# Patient Record
Sex: FEMALE | Race: WHITE | NOT HISPANIC OR LATINO
[De-identification: names, ages, dates, MRNs, and addresses within clinical notes are randomized per-mention and may not be internally consistent; named-entity substitution may affect disease eponyms.]

---

## 2017-07-28 ENCOUNTER — RESULT REVIEW (OUTPATIENT)
Age: 77
End: 2017-07-28

## 2017-07-28 ENCOUNTER — OUTPATIENT (OUTPATIENT)
Dept: OUTPATIENT SERVICES | Facility: HOSPITAL | Age: 77
LOS: 1 days | Discharge: HOME | End: 2017-07-28
Payer: MEDICARE

## 2017-07-28 ENCOUNTER — TRANSCRIPTION ENCOUNTER (OUTPATIENT)
Age: 77
End: 2017-07-28

## 2017-07-28 DIAGNOSIS — Z98.89 OTHER SPECIFIED POSTPROCEDURAL STATES: Chronic | ICD-10-CM

## 2017-07-28 DIAGNOSIS — Z86.010 PERSONAL HISTORY OF COLONIC POLYPS: ICD-10-CM

## 2017-07-28 DIAGNOSIS — Z90.711 ACQUIRED ABSENCE OF UTERUS WITH REMAINING CERVICAL STUMP: Chronic | ICD-10-CM

## 2017-07-28 DIAGNOSIS — K21.9 GASTRO-ESOPHAGEAL REFLUX DISEASE WITHOUT ESOPHAGITIS: ICD-10-CM

## 2017-07-28 PROCEDURE — G0105: CPT

## 2017-07-28 PROCEDURE — 88305 TISSUE EXAM BY PATHOLOGIST: CPT

## 2017-07-28 PROCEDURE — 88305 TISSUE EXAM BY PATHOLOGIST: CPT | Mod: 26

## 2017-07-28 PROCEDURE — 88313 SPECIAL STAINS GROUP 2: CPT

## 2017-07-28 PROCEDURE — 88313 SPECIAL STAINS GROUP 2: CPT | Mod: 26

## 2017-07-28 PROCEDURE — 43239 EGD BIOPSY SINGLE/MULTIPLE: CPT

## 2017-07-31 LAB — SURGICAL PATHOLOGY FINAL REPORT - CH: SIGNIFICANT CHANGE UP

## 2017-08-02 DIAGNOSIS — Z86.010 PERSONAL HISTORY OF COLONIC POLYPS: ICD-10-CM

## 2017-08-02 DIAGNOSIS — Z12.11 ENCOUNTER FOR SCREENING FOR MALIGNANT NEOPLASM OF COLON: ICD-10-CM

## 2017-08-02 DIAGNOSIS — J44.9 CHRONIC OBSTRUCTIVE PULMONARY DISEASE, UNSPECIFIED: ICD-10-CM

## 2017-08-02 DIAGNOSIS — Z88.3 ALLERGY STATUS TO OTHER ANTI-INFECTIVE AGENTS: ICD-10-CM

## 2017-08-02 DIAGNOSIS — K64.8 OTHER HEMORRHOIDS: ICD-10-CM

## 2017-08-02 DIAGNOSIS — I10 ESSENTIAL (PRIMARY) HYPERTENSION: ICD-10-CM

## 2017-08-02 DIAGNOSIS — K57.30 DIVERTICULOSIS OF LARGE INTESTINE WITHOUT PERFORATION OR ABSCESS WITHOUT BLEEDING: ICD-10-CM

## 2017-08-02 DIAGNOSIS — K44.9 DIAPHRAGMATIC HERNIA WITHOUT OBSTRUCTION OR GANGRENE: ICD-10-CM

## 2017-08-02 DIAGNOSIS — E11.9 TYPE 2 DIABETES MELLITUS WITHOUT COMPLICATIONS: ICD-10-CM

## 2017-08-02 DIAGNOSIS — Z88.2 ALLERGY STATUS TO SULFONAMIDES: ICD-10-CM

## 2017-08-02 DIAGNOSIS — Z88.0 ALLERGY STATUS TO PENICILLIN: ICD-10-CM

## 2019-07-15 ENCOUNTER — EMERGENCY (EMERGENCY)
Facility: HOSPITAL | Age: 79
LOS: 1 days | Discharge: HOME | End: 2019-07-15
Attending: EMERGENCY MEDICINE | Admitting: EMERGENCY MEDICINE
Payer: MEDICARE

## 2019-07-15 VITALS
SYSTOLIC BLOOD PRESSURE: 170 MMHG | HEART RATE: 74 BPM | DIASTOLIC BLOOD PRESSURE: 74 MMHG | RESPIRATION RATE: 16 BRPM | OXYGEN SATURATION: 100 %

## 2019-07-15 VITALS
RESPIRATION RATE: 16 BRPM | OXYGEN SATURATION: 99 % | HEART RATE: 77 BPM | WEIGHT: 141.1 LBS | DIASTOLIC BLOOD PRESSURE: 74 MMHG | TEMPERATURE: 98 F | HEIGHT: 62 IN | SYSTOLIC BLOOD PRESSURE: 160 MMHG

## 2019-07-15 DIAGNOSIS — Z90.711 ACQUIRED ABSENCE OF UTERUS WITH REMAINING CERVICAL STUMP: Chronic | ICD-10-CM

## 2019-07-15 DIAGNOSIS — Z98.89 OTHER SPECIFIED POSTPROCEDURAL STATES: Chronic | ICD-10-CM

## 2019-07-15 PROCEDURE — 73562 X-RAY EXAM OF KNEE 3: CPT

## 2019-07-15 PROCEDURE — 73564 X-RAY EXAM KNEE 4 OR MORE: CPT | Mod: 26,RT

## 2019-07-15 PROCEDURE — 73562 X-RAY EXAM OF KNEE 3: CPT | Mod: 26,RT

## 2019-07-15 PROCEDURE — 73564 X-RAY EXAM KNEE 4 OR MORE: CPT

## 2019-07-15 PROCEDURE — 99283 EMERGENCY DEPT VISIT LOW MDM: CPT | Mod: 25

## 2019-07-15 PROCEDURE — 99284 EMERGENCY DEPT VISIT MOD MDM: CPT

## 2019-07-15 RX ORDER — LIDOCAINE 4 G/100G
1 CREAM TOPICAL
Qty: 30 | Refills: 0
Start: 2019-07-15

## 2019-07-15 RX ORDER — OXYCODONE AND ACETAMINOPHEN 5; 325 MG/1; MG/1
1 TABLET ORAL ONCE
Refills: 0 | Status: DISCONTINUED | OUTPATIENT
Start: 2019-07-15 | End: 2019-07-15

## 2019-07-15 RX ADMIN — OXYCODONE AND ACETAMINOPHEN 1 TABLET(S): 5; 325 TABLET ORAL at 14:06

## 2019-07-15 RX ADMIN — OXYCODONE AND ACETAMINOPHEN 1 TABLET(S): 5; 325 TABLET ORAL at 16:11

## 2019-07-15 NOTE — ED PROVIDER NOTE - ATTENDING CONTRIBUTION TO CARE
pt with right knee pain with ambulating/standing [place]. pt denies any other obvious trauma.  PE nml.  +2 peds pulses, no deformity.

## 2019-07-15 NOTE — ED PROVIDER NOTE - OBJECTIVE STATEMENT
Patient is a 78 y/o Female with PMHx of HLD, DM, and HTN presenting with R knee pain since yesterday. Pain started yesterday abruptly while stepping onto her right foot and she describes being unable to bear weight on her right leg since secondary to pain. She denies any trauma or previous injury to the area and did not hear a popping or clicking noise. Pain is generalized in the right knee, sharp, 7/10 while lying down and 10/10 with bearing weight or bending. Pain radiates down the anterior shin to the ankle. Patient tried taking 2 ibuprofen and icing the knee last night which did not decrease pain. Of note, patient describes walking 4 miles three days ago with family at a carnival which is much more than normal.

## 2019-07-15 NOTE — ED ADULT NURSE NOTE - OBJECTIVE STATEMENT
pt states she developed severs right leg pain yesterday afternoon. pt denies trauma. pt states she could not walk on the leg secondary to pain.

## 2019-07-15 NOTE — ED ADULT NURSE NOTE - NSIMPLEMENTINTERV_GEN_ALL_ED
Implemented All Fall with Harm Risk Interventions:  [place] to call system. Call bell, personal items and telephone within reach. Instruct patient to call for assistance. Room bathroom lighting operational. Non-slip footwear when patient is off stretcher. Physically safe environment: no spills, clutter or unnecessary equipment. Stretcher in lowest position, wheels locked, appropriate side rails in place. Provide visual cue, wrist band, yellow gown, etc. Monitor gait and stability. Monitor for mental status changes and reorient to person, place, and time. Review medications for side effects contributing to fall risk. Reinforce activity limits and safety measures with patient and family. Provide visual clues: red socks.

## 2019-07-15 NOTE — ED PROVIDER NOTE - PHYSICAL EXAMINATION
LLE: 5/5 knee flexion/extension, ankle plantarflexion/dorsiflexion; sensation intact to light touch throughout; DTrs WNL throughout    RLE: 4/5 knee flexion/extension pain limited, 4/5 plantarflexion pain limited, 5/5 dorsiflexion; sensation intact to light touch, DTRs WNL throughout. Dorsalis and post tib pulses 2+. No TTP over patella, med/lateral joint line. No increased temperature, erythema, or swelling noted. Negative anterior drawer, poster drawer, medial and lateral stress tests.

## 2021-03-18 ENCOUNTER — APPOINTMENT (OUTPATIENT)
Dept: OTOLARYNGOLOGY | Facility: CLINIC | Age: 81
End: 2021-03-18
Payer: MEDICARE

## 2021-03-18 VITALS
BODY MASS INDEX: 28.89 KG/M2 | SYSTOLIC BLOOD PRESSURE: 110 MMHG | HEIGHT: 61 IN | WEIGHT: 153 LBS | DIASTOLIC BLOOD PRESSURE: 60 MMHG | TEMPERATURE: 97.1 F | OXYGEN SATURATION: 98 % | HEART RATE: 97 BPM

## 2021-03-18 DIAGNOSIS — R49.0 DYSPHONIA: ICD-10-CM

## 2021-03-18 DIAGNOSIS — J38.7 OTHER DISEASES OF LARYNX: ICD-10-CM

## 2021-03-18 DIAGNOSIS — H90.5 UNSPECIFIED SENSORINEURAL HEARING LOSS: ICD-10-CM

## 2021-03-18 DIAGNOSIS — H61.23 IMPACTED CERUMEN, BILATERAL: ICD-10-CM

## 2021-03-18 PROCEDURE — 31575 DIAGNOSTIC LARYNGOSCOPY: CPT

## 2021-03-18 PROCEDURE — 99203 OFFICE O/P NEW LOW 30 MIN: CPT | Mod: 25

## 2021-03-18 PROCEDURE — 69210 REMOVE IMPACTED EAR WAX UNI: CPT | Mod: LT

## 2021-03-18 RX ORDER — BENZONATATE 150 MG/1
CAPSULE ORAL
Refills: 0 | Status: ACTIVE | COMMUNITY

## 2021-03-18 RX ORDER — AMLODIPINE BESYLATE 5 MG/1
TABLET ORAL
Refills: 0 | Status: ACTIVE | COMMUNITY

## 2021-03-18 RX ORDER — BUMETANIDE 0.5 MG/1
TABLET ORAL
Refills: 0 | Status: ACTIVE | COMMUNITY

## 2021-03-18 RX ORDER — LOSARTAN POTASSIUM 100 MG/1
TABLET, FILM COATED ORAL
Refills: 0 | Status: ACTIVE | COMMUNITY

## 2021-03-18 RX ORDER — FEXOFENADINE HCL 60 MG
CAPSULE ORAL
Refills: 0 | Status: ACTIVE | COMMUNITY

## 2021-03-18 RX ORDER — MONTELUKAST SODIUM 10 MG/1
TABLET, FILM COATED ORAL
Refills: 0 | Status: ACTIVE | COMMUNITY

## 2021-03-18 RX ORDER — DULOXETINE HYDROCHLORIDE 20 MG/1
20 CAPSULE, DELAYED RELEASE ORAL
Refills: 0 | Status: ACTIVE | COMMUNITY

## 2021-03-18 RX ORDER — FOLIC ACID 1 MG/1
1 TABLET ORAL
Refills: 0 | Status: ACTIVE | COMMUNITY

## 2021-03-18 RX ORDER — CLONAZEPAM 2 MG/1
TABLET ORAL
Refills: 0 | Status: ACTIVE | COMMUNITY

## 2021-03-18 RX ORDER — DOCUSATE SODIUM 100 MG/1
100 CAPSULE, LIQUID FILLED ORAL
Refills: 0 | Status: ACTIVE | COMMUNITY

## 2021-03-18 RX ORDER — OMEGA-3/DHA/EPA/FISH OIL 300-1000MG
CAPSULE ORAL
Refills: 0 | Status: ACTIVE | COMMUNITY

## 2021-03-18 RX ORDER — SITAGLIPTIN AND METFORMIN HYDROCHLORIDE 50; 1000 MG/1; MG/1
TABLET, FILM COATED ORAL
Refills: 0 | Status: ACTIVE | COMMUNITY

## 2021-03-18 RX ORDER — PRAVASTATIN SODIUM 80 MG/1
TABLET ORAL
Refills: 0 | Status: ACTIVE | COMMUNITY

## 2021-03-18 NOTE — HISTORY OF PRESENT ILLNESS
[de-identified] : Ms. FLORES is a 80 year female who presents with trouble hearing.  She reports that it is worst in the morning and notes that it it sounds like she hears people through a plastic bag.  Her daughter notes that she also notes that her voice is hoarse.  Notes that she is taking medication (Fosamax) for osteoporosis (prescribed by her endocrinologist, Dr. Angeles) which could affect her voice.  Her pcp is concerned since she takes two weekly. [Difficulty Swallowing] : no difficulty swallowing [Painful Swallowing] : no painful swallowing

## 2021-03-18 NOTE — PROCEDURE
[Unable to Cooperate with Mirror] : patient unable to cooperate with mirror [Hoarseness] : hoarseness not clearly evaluated by indirect laryngoscopy [Topical Lidocaine] : topical lidocaine [Oxymetazoline HCl] : oxymetazoline HCl [Flexible Endoscope] : examined with the flexible endoscope [Serial Number: ___] : Serial Number: [unfilled] [True Vocal Cords Paralysis] : no true vocal cord paralysis [True Vocal Cords Erythematous] : no true vocal cord edema [True Vocal Cords Faria's Nodules] : no true vocal cord nodules [Glottis Arytenoid Cartilages] : no arytenoid granulomas [Glottis Arytenoid Cartilages Erythema] : no arytenoid erythema [Normal] : posterior cricoid area had healthy pink mucosa in the interarytenoid area and the esophageal inlet

## 2021-03-18 NOTE — REVIEW OF SYSTEMS
[Seasonal Allergies] : seasonal allergies [Post Nasal Drip] : post nasal drip [Hearing Loss] : hearing loss [Nasal Congestion] : nasal congestion [Sinus Pain] : sinus pain [Sinus Pressure] : sinus pressure [Hoarseness] : hoarseness [Throat Clearing] : throat clearing [Throat Pain] : throat pain [Throat Itching] : throat itching [Depression] : depression [Negative] : Heme/Lymph

## 2021-03-18 NOTE — CONSULT LETTER
[Dear  ___] : Dear  [unfilled], [Consult Letter:] : I had the pleasure of evaluating your patient, [unfilled]. [Please see my note below.] : Please see my note below. [Consult Closing:] : Thank you very much for allowing me to participate in the care of this patient.  If you have any questions, please do not hesitate to contact me. [Sincerely,] : Sincerely, [FreeTextEntry2] : Clifford Ortiz MD [FreeTextEntry3] : Víctor Aceves MD, FACS\par Chief of Otolaryngology [facility]\par  - Dept. of Otolaryngology\par [facility] [employer]\par \par

## 2021-06-21 ENCOUNTER — APPOINTMENT (OUTPATIENT)
Dept: PULMONOLOGY | Facility: CLINIC | Age: 81
End: 2021-06-21
Payer: MEDICARE

## 2021-06-21 VITALS
HEART RATE: 79 BPM | BODY MASS INDEX: 27.38 KG/M2 | WEIGHT: 145 LBS | TEMPERATURE: 97.3 F | RESPIRATION RATE: 16 BRPM | DIASTOLIC BLOOD PRESSURE: 60 MMHG | HEIGHT: 61 IN | OXYGEN SATURATION: 96 % | SYSTOLIC BLOOD PRESSURE: 141 MMHG

## 2021-06-21 DIAGNOSIS — F10.10 ALCOHOL ABUSE, UNCOMPLICATED: ICD-10-CM

## 2021-06-21 DIAGNOSIS — K21.9 GASTRO-ESOPHAGEAL REFLUX DISEASE W/OUT ESOPHAGITIS: ICD-10-CM

## 2021-06-21 DIAGNOSIS — R06.83 SNORING: ICD-10-CM

## 2021-06-21 DIAGNOSIS — Z87.891 PERSONAL HISTORY OF NICOTINE DEPENDENCE: ICD-10-CM

## 2021-06-21 DIAGNOSIS — F17.200 NICOTINE DEPENDENCE, UNSPECIFIED, UNCOMPLICATED: ICD-10-CM

## 2021-06-21 DIAGNOSIS — J30.9 ALLERGIC RHINITIS, UNSPECIFIED: ICD-10-CM

## 2021-06-21 DIAGNOSIS — R06.02 SHORTNESS OF BREATH: ICD-10-CM

## 2021-06-21 DIAGNOSIS — Z63.4 DISAPPEARANCE AND DEATH OF FAMILY MEMBER: ICD-10-CM

## 2021-06-21 PROCEDURE — 99204 OFFICE O/P NEW MOD 45 MIN: CPT | Mod: 25

## 2021-06-21 PROCEDURE — 71046 X-RAY EXAM CHEST 2 VIEWS: CPT

## 2021-06-21 SDOH — SOCIAL STABILITY - SOCIAL INSECURITY: DISSAPEARANCE AND DEATH OF FAMILY MEMBER: Z63.4

## 2021-06-21 NOTE — HISTORY OF PRESENT ILLNESS
[TextBox_4] : Ms. FLORES is a 81 year old female presenting to the office today for initial pulmonary evaluation. Her chief complaint is\par \par -she notes referred by PCP for excessive daytime sleepiness\par -she notes she could fall asleep while watching a boring TV show \par -she notes she could fall asleep in a car\par  -she notes increased episodes of uncontrollable falling asleep over the past year when playing bingo, eating, reading\par -she notes last week she spent a week home in [place] with an increased falling asleep \par -she notes during the pandemic she has gotten into the habit of taking long naps through the day and evening and then staying up through the night\par -she notes attempted to improve sleep pattern, which did alleviated daytime uncontrollable falling asleep.\par -she notes concern of return to poor sleep schedule when returned to Assisted Living now\par -she notes snoring exacerbated when on back and left side\par -she notes observed apneic episodes during sleep\par -she notes total body shaking approximately every four inhaler and legs and feet twitch.\par -she notes snore and apneic event improved while sleeping on right side\par -she notes exercise daily, walking\par -she now notes generally feeling well\par -she notes energy levels fluctuate\par -she notes heartburn and reflux controlled with omeprazole\par -she notes regular bowel movements\par -she notes senses of smell and taste are good \par -she notes her memory and concentration are good\par -she notes intermittent SOB on exertion\par -she denies wheeze\par -she denies cough\par -she denies current inhaler Rx\par -she notes intermittent sinus congestion\par -she notes history of allergies\par \par -she denies any chest pain, chest pressure, diarrhea, constipation, dysphagia, dizziness, leg swelling, leg pain, itchy eyes, itchy ears, sour taste in the mouth, myalgias or arthralgias.

## 2021-06-21 NOTE — ASSESSMENT
[FreeTextEntry1] : Ms. FLORES is a 81 year old female with a history of cervical radiculopathy, DM, GERD, HTN, HLD, kyphoscoliosis, former smoker c/w COPD who now comes to the office for an initial pulmonary evaluation. #1 SOB\par \par Her shortness of breath is multifactorial due to:\par -poor mechanics of breathing \par -out of shape / overweight\par -pulmonary disease\par -COPD, ?JOSESITO, Allergies, GERD, lung CA screening\par -?cardiac disease \par \par problem 1: COPD (prior smoker) ([place]) \par -complete full PFTs\par -no Rx indicated at this time\par -COPD is a progressive disease and although it can’t be cured , appropriate management can slow its progression, reduce frequency and severity of exacerbations, and improve symptoms and the patient quality of life. Hospitalizations are the greatest contributor to the total COPD costs and account for up to 87% of total COPD related costs. Exacerbations are the main cause of admissions and subsequently account for the 40-75% of COPD costs. Inhaled maintenance therapy reduces the incidence of exacerbations in patients with stable COPD. Incorrect inhaler use and nonadherence are major obstacles to achieving COPD treatment goals. Many COPD patients have challenges (impaired inhalation, limited dexterity, reduced cognition: that limit their ability to correctly use their COPD treatment devices resulting in reduced symptom control. Of most importance is smoking cessation and early intervention with respiratory illnesses and contemplation for pulmonary rehab to enhance quality of life.\par -Inhaler technique reviewed as well as oral hygiene techniques reviewed with patient. Avoidance of cold air, extremes of temperature, rescue inhaler should be used before exercise. Order of medication reviewed with patient. Recommended use of a cool mist humidifier in the bedroom. \par \par Problem 2: Allergies\par -get Blood work to include: asthma panel, food IgE panel, IgE level, eosinophil level, vitamin D level \par -Add Olopatadine 0.6% at 1 sniff/nostril BID \par Environmental measures for allergies were encouraged including mattress and pillow covers, air purifier, and environmental controls. \par \par Problem 3: GERD\par -add Omeprazole 40 mg QAM, pre-meal\par -Rule of 2s: avoid eating too much, eating too late, eating too spicy, eating two hours before bed.\par -Things to avoid including overeating, spicy foods, tight clothing, eating within three hours of bed, this list is not all inclusive. \par -For treatment of reflux, possible options discussed including diet control, H2 blockers, PPIs, as well as coating motility agents discussed as treatment options. Timing of meals and proximity of last meal to sleep were discussed. If symptoms persist, a formal gastrointestinal evaluation is needed.\par \par Problem 4: ?JOSESITO\par -complete home sleep study\par Sleep apnea is associated with adverse clinical consequences which can affect most organ systems. Cardiovascular disease risk includes arrhythmias, atrial fibrillation, hypertension, coronary artery disease, and stroke. Metabolic disorders include diabetes type 2, non-alcoholic fatty liver disease. Mood disorder especially depression; and cognitive decline especially in the elderly. Associations with chronic reflux/Nowak’s esophagus some but not all inclusive. \par -Reasons include arousal consistent with hypopnea; respiratory events most prominent in REM sleep or supine position; therefore sleep staging and body position are important for accurate diagnosis and estimation of AHI. \par \par Treatment options discussed including CPAP/BiPAP machine, oral appliance, ProVent therapy, Oxy-Aid by [employer]pite[employer], new technologies, or positional sleep.Recommended use of the CPAP machine for moderate (AHI >15), moderate to severe (AHI 15-30) and severe patients (AHI > 30). Recommended weight loss which can reduce AHI especially in weight loss of greater than 5% of BMI. Positional sleep is recommended in those with low AHI, low-moderate BMI, and younger age. For severe sleep apnea, the hypoglossal nerve stimulator was recommended as well. \par \par Problem 5: Lung CA screening (extended smoking history in the past) \par -complete HR CT\par Lung cancer screening is recommended for people between the ages of 50 and 80 with prior 20+ pack year smoking histories. There is irrefutable evidence for realization of lung cancer screening based on two large randomized control trials demonstrating relative reduction in lung cancer mortality for patients undergoing low-dose CT scanning. Risks and benefits reviewed with the patient.\par \par problem 6: cardiac disease\par -recommended to continue to follow up with Cardiologist if needed (Mami) \par \par problem 7: poor breathing mechanics\par -Proper breathing techniques were reviewed with an emphasis of exhalation. Patient instructed to breath in for 1 second and out for four seconds. Patient was encouraged to not talk while walking. \par \par problem 8: out of shape / overweight\par -Weight loss, exercise, and diet control were discussed and are highly encouraged. Treatment options were given such as, aqua therapy, and contacting a nutritionist. Recommended to use the elliptical, stationary bike, less use of treadmill. Mindful eating was explained to the patient Obesity is associated with worsening asthma, shortness of breath, and potential for cardiac disease, diabetes, and other underlying medical conditions\par \par problem 9: health maintenance \par -recommended yearly flu shot after October 15\par -recommended strep pneumonia vaccines: Prevnar-13 vaccine, followed by Pneumo vaccine 23 one year following after 65\par -recommended early intervention for Upper Respiratory Infections (URIs)\par -recommended regular osteoporosis evaluations\par -recommended early dermatological evaluations\par -recommended after the age of 50 to the age of 70, colonoscopy every 5 years\par \par F/U in 6-8 weeks.\par She is encouraged to call with any changes, concerns, or questions\par

## 2021-06-21 NOTE — PROCEDURE
[FreeTextEntry1] : CXR reveals a normal sized heart; kyphoscoliosis; calcified aortic knob. no evidence of infiltrate or effusion\par \par FENO was unable to be performed by patient ; a normal value being less than 25\par Fractional exhaled nitric oxide (FENO) is regarded as a simple, noninvasive method for assessing eosinophilic airway inflammation. Produced by a variety of cells within the lung, nitric oxide (NO) concentrations are generally low in healthy individuals. However, high concentrations of NO appear to be involved in nonspecific host defense mechanisms and chronic inflammatory diseases such as asthma. The American Thoracic Society (ATS) therefore has recommended using FENO to aid in the diagnosis and monitoring of eosinophilic airway inflammation and asthma, and for identifying steroid responsive individuals whose chronic respiratory symptoms may be caused by airway inflammation. \par \par Full PFT revealed mild restrictive and mild obstructive dysfunction, with a FEV1 of 1.25L, which is 67% of predicted, normal lung volumes, and a diffusion of 153.2, which is 824% of predicted, with a normal flow volume loop.\par \par -Images and procedures reviewed in detail and discussed with patient.

## 2021-06-21 NOTE — ADDENDUM
[FreeTextEntry1] : Documented by Parht Yost acting as a scribe for Dr. Boris Mcnally on 06/21/2021.\par \par All medical record entries made by the Scribe were at my, Dr. Boris Mcnally's, direction and personally dictated by me on 06/21/2021 . I have reviewed the chart and agree that the record accurately reflects my personal performance of the history, physical exam, assessment and plan. I have also personally directed, reviewed, and agree with the discharge instructions. \par

## 2021-06-21 NOTE — PHYSICAL EXAM
[No Acute Distress] : no acute distress [Normal Oropharynx] : normal oropharynx [Normal Appearance] : normal appearance [No Neck Mass] : no neck mass [Normal Rate/Rhythm] : normal rate/rhythm [Normal S1, S2] : normal s1, s2 [No Resp Distress] : no resp distress [Clear to Auscultation Bilaterally] : clear to auscultation bilaterally [Benign] : benign [Normal Gait] : normal gait [No Clubbing] : no clubbing [No Cyanosis] : no cyanosis [No Edema] : no edema [FROM] : FROM [Normal Color/ Pigmentation] : normal color/ pigmentation [No Focal Deficits] : no focal deficits [Oriented x3] : oriented x3 [Normal Affect] : normal affect [III] : Mallampati Class: III [Kyphosis] : kyphosis [Murmur ___ / 6] : murmur [unfilled] / 6 [TextBox_54] : 2/6 systolic murmur  [TextBox_68] : I:E ratio 1:3; clear

## 2021-06-26 ENCOUNTER — EMERGENCY (EMERGENCY)
Facility: HOSPITAL | Age: 81
LOS: 1 days | Discharge: HOME | End: 2021-06-26
Attending: EMERGENCY MEDICINE | Admitting: EMERGENCY MEDICINE
Payer: MEDICARE

## 2021-06-26 VITALS
SYSTOLIC BLOOD PRESSURE: 124 MMHG | RESPIRATION RATE: 16 BRPM | TEMPERATURE: 99 F | OXYGEN SATURATION: 98 % | DIASTOLIC BLOOD PRESSURE: 66 MMHG | HEART RATE: 80 BPM

## 2021-06-26 VITALS
OXYGEN SATURATION: 97 % | SYSTOLIC BLOOD PRESSURE: 129 MMHG | HEART RATE: 84 BPM | DIASTOLIC BLOOD PRESSURE: 65 MMHG | TEMPERATURE: 98 F | HEIGHT: 62 IN | RESPIRATION RATE: 16 BRPM | WEIGHT: 123.9 LBS

## 2021-06-26 DIAGNOSIS — Z98.89 OTHER SPECIFIED POSTPROCEDURAL STATES: Chronic | ICD-10-CM

## 2021-06-26 DIAGNOSIS — Z90.711 ACQUIRED ABSENCE OF UTERUS WITH REMAINING CERVICAL STUMP: Chronic | ICD-10-CM

## 2021-06-26 PROCEDURE — 70450 CT HEAD/BRAIN W/O DYE: CPT | Mod: 26,MH

## 2021-06-26 PROCEDURE — 72125 CT NECK SPINE W/O DYE: CPT

## 2021-06-26 PROCEDURE — 70450 CT HEAD/BRAIN W/O DYE: CPT

## 2021-06-26 PROCEDURE — 99284 EMERGENCY DEPT VISIT MOD MDM: CPT | Mod: 25

## 2021-06-26 PROCEDURE — 72125 CT NECK SPINE W/O DYE: CPT | Mod: 26,MH

## 2021-06-26 PROCEDURE — 99284 EMERGENCY DEPT VISIT MOD MDM: CPT

## 2021-06-26 NOTE — ED PROVIDER NOTE - PROGRESS NOTE DETAILS
ct's, no acute findings, patient to be discharged back to [facility].  patient resting comfortably, asymptomatic , copy of results with discharge papers.  Reevaluated patient at bedside.  Patient feeling much improved.  Discussed the results of all diagnostic testing in ED and copies of all reports given.   An opportunity to ask questions was given.  Discussed the importance of prompt, close medical follow-up.  Patient will return with any changes, concerns or persistent / worsening symptoms.  Understanding of all instructions verbalized.

## 2021-06-26 NOTE — ED PROVIDER NOTE - NSFOLLOWUPINSTRUCTIONS_ED_ALL_ED_FT
care note for head injury given    patient to follow up with pmd Dr Jones,   recommend over the counter tylenol as directed for pain  if condition worsens return to ED

## 2021-06-26 NOTE — ED PROVIDER NOTE - OBJECTIVE STATEMENT
81 y female presents sent to ED from the [facility], states a heavy lamp fell on her head on Wednesday, while she was in her room , was trying to unplug something under a desk, denies loc, nv, blurred vision, states she has had frequent headaches since the injury , she informed nursing staff and her friend at the [facility] who advised she come to ED for cat scan.  nonsmoker.  denies etoh.  states her pmd is Dr Jones  states she takes asa daily.    PMH: DM (diabetes mellitus)    HTN (hypertension)    Hyperlipemia

## 2021-06-26 NOTE — ED PROVIDER NOTE - PATIENT PORTAL LINK FT
You can access the FollowMyHealth Patient Portal offered by [facility] by registering at the following website: [email or web address]. By joining [employer]’s FollowMyHealth portal, you will also be able to view your health information using other applications (apps) compatible with our system.

## 2021-06-26 NOTE — ED ADULT TRIAGE NOTE - CHIEF COMPLAINT QUOTE
Patient is 82yo female complaining of headache for 4 days s/p a fall on Wednesday hitting her head patient denied loss of consciousness  Patient is on aspirin daily

## 2021-06-26 NOTE — ED PROVIDER NOTE - PROVIDER TOKENS
FREE:[LAST:[PMD Dr Jones],PHONE:[(   )    -],FAX:[(   )    -],FOLLOWUP:[1-3 Days],ESTABLISHEDPATIENT:[T]]

## 2021-06-26 NOTE — ED PROVIDER NOTE - ATTENDING CONTRIBUTION TO CARE
82 yo F p/w trauma to head 4 days ago  vss  no signs of facial/head trauma  no fnd  sfs    ct head/ c spine neg  likely concussion  dc with pmd f/u  Based on the H&P, I do not suspect any life- / limb- threatening pathology that requires further intervention at this time.

## 2021-06-26 NOTE — ED PROVIDER NOTE - NSDCPRINTRESULTS_ED_ALL_ED
Left message for patient to return call.     Patient requests all Lab and Radiology Results on their Discharge Instructions

## 2021-06-26 NOTE — ED PROVIDER NOTE - CHPI ED SYMPTOMS NEG
no blurred vision/no confusion/no dizziness/no loss of consciousness/no change in level of consciousness

## 2021-07-03 ENCOUNTER — APPOINTMENT (OUTPATIENT)
Dept: CT IMAGING | Facility: CLINIC | Age: 81
End: 2021-07-03

## 2021-07-03 ENCOUNTER — RESULT REVIEW (OUTPATIENT)
Age: 81
End: 2021-07-03

## 2021-07-03 ENCOUNTER — OUTPATIENT (OUTPATIENT)
Dept: OUTPATIENT SERVICES | Facility: HOSPITAL | Age: 81
LOS: 1 days | End: 2021-07-03
Payer: MEDICARE

## 2021-07-03 DIAGNOSIS — J44.9 CHRONIC OBSTRUCTIVE PULMONARY DISEASE, UNSPECIFIED: ICD-10-CM

## 2021-07-03 DIAGNOSIS — Z98.89 OTHER SPECIFIED POSTPROCEDURAL STATES: Chronic | ICD-10-CM

## 2021-07-03 DIAGNOSIS — Z90.711 ACQUIRED ABSENCE OF UTERUS WITH REMAINING CERVICAL STUMP: Chronic | ICD-10-CM

## 2021-07-03 PROCEDURE — 71250 CT THORAX DX C-: CPT

## 2021-07-03 PROCEDURE — 71250 CT THORAX DX C-: CPT | Mod: 26,MH

## 2021-07-07 RX ORDER — OLOPATADINE HYDROCHLORIDE 665 UG/1
0.6 SPRAY, METERED NASAL
Qty: 3 | Refills: 1 | Status: DISCONTINUED | COMMUNITY
Start: 2021-06-21 | End: 2021-07-07

## 2021-07-07 RX ORDER — AZELASTINE HYDROCHLORIDE 137 UG/1
0.1 SPRAY, METERED NASAL TWICE DAILY
Qty: 3 | Refills: 1 | Status: ACTIVE | COMMUNITY
Start: 2021-07-07 | End: 1900-01-01

## 2021-07-11 ENCOUNTER — EMERGENCY (EMERGENCY)
Facility: HOSPITAL | Age: 81
LOS: 1 days | Discharge: HOME | End: 2021-07-11
Attending: EMERGENCY MEDICINE | Admitting: EMERGENCY MEDICINE
Payer: MEDICARE

## 2021-07-11 VITALS
RESPIRATION RATE: 18 BRPM | OXYGEN SATURATION: 95 % | TEMPERATURE: 99 F | DIASTOLIC BLOOD PRESSURE: 58 MMHG | SYSTOLIC BLOOD PRESSURE: 100 MMHG

## 2021-07-11 VITALS
RESPIRATION RATE: 18 BRPM | WEIGHT: 139.99 LBS | HEIGHT: 62 IN | TEMPERATURE: 102 F | DIASTOLIC BLOOD PRESSURE: 60 MMHG | SYSTOLIC BLOOD PRESSURE: 140 MMHG | HEART RATE: 120 BPM | OXYGEN SATURATION: 96 %

## 2021-07-11 DIAGNOSIS — Z90.711 ACQUIRED ABSENCE OF UTERUS WITH REMAINING CERVICAL STUMP: Chronic | ICD-10-CM

## 2021-07-11 DIAGNOSIS — Z98.89 OTHER SPECIFIED POSTPROCEDURAL STATES: Chronic | ICD-10-CM

## 2021-07-11 LAB
ALBUMIN SERPL ELPH-MCNC: 3.5 G/DL — SIGNIFICANT CHANGE UP (ref 3.3–5)
ALP SERPL-CCNC: 22 U/L — LOW (ref 40–120)
ALT FLD-CCNC: 36 U/L — SIGNIFICANT CHANGE UP (ref 12–78)
ANION GAP SERPL CALC-SCNC: 7 MMOL/L — SIGNIFICANT CHANGE UP (ref 5–17)
APPEARANCE UR: CLEAR — SIGNIFICANT CHANGE UP
AST SERPL-CCNC: 21 U/L — SIGNIFICANT CHANGE UP (ref 15–37)
BASOPHILS # BLD AUTO: 0.03 K/UL — SIGNIFICANT CHANGE UP (ref 0–0.2)
BASOPHILS NFR BLD AUTO: 0.3 % — SIGNIFICANT CHANGE UP (ref 0–2)
BILIRUB SERPL-MCNC: 0.4 MG/DL — SIGNIFICANT CHANGE UP (ref 0.2–1.2)
BILIRUB UR-MCNC: NEGATIVE — SIGNIFICANT CHANGE UP
BUN SERPL-MCNC: 10 MG/DL — SIGNIFICANT CHANGE UP (ref 7–23)
CALCIUM SERPL-MCNC: 8.8 MG/DL — SIGNIFICANT CHANGE UP (ref 8.5–10.1)
CHLORIDE SERPL-SCNC: 106 MMOL/L — SIGNIFICANT CHANGE UP (ref 96–108)
CO2 SERPL-SCNC: 25 MMOL/L — SIGNIFICANT CHANGE UP (ref 22–31)
COLOR SPEC: YELLOW — SIGNIFICANT CHANGE UP
CREAT SERPL-MCNC: 0.91 MG/DL — SIGNIFICANT CHANGE UP (ref 0.5–1.3)
DIFF PNL FLD: NEGATIVE — SIGNIFICANT CHANGE UP
EOSINOPHIL # BLD AUTO: 0.01 K/UL — SIGNIFICANT CHANGE UP (ref 0–0.5)
EOSINOPHIL NFR BLD AUTO: 0.1 % — SIGNIFICANT CHANGE UP (ref 0–6)
GLUCOSE SERPL-MCNC: 352 MG/DL — HIGH (ref 70–99)
GLUCOSE UR QL: 1000 MG/DL
HCT VFR BLD CALC: 34.2 % — LOW (ref 34.5–45)
HGB BLD-MCNC: 10.8 G/DL — LOW (ref 11.5–15.5)
IMM GRANULOCYTES NFR BLD AUTO: 0.6 % — SIGNIFICANT CHANGE UP (ref 0–1.5)
KETONES UR-MCNC: NEGATIVE — SIGNIFICANT CHANGE UP
LACTATE SERPL-SCNC: 1.3 MMOL/L — SIGNIFICANT CHANGE UP (ref 0.7–2)
LACTATE SERPL-SCNC: 3.1 MMOL/L — HIGH (ref 0.7–2)
LEUKOCYTE ESTERASE UR-ACNC: ABNORMAL
LYMPHOCYTES # BLD AUTO: 1.11 K/UL — SIGNIFICANT CHANGE UP (ref 1–3.3)
LYMPHOCYTES # BLD AUTO: 10.7 % — LOW (ref 13–44)
MCHC RBC-ENTMCNC: 27.3 PG — SIGNIFICANT CHANGE UP (ref 27–34)
MCHC RBC-ENTMCNC: 31.6 GM/DL — LOW (ref 32–36)
MCV RBC AUTO: 86.6 FL — SIGNIFICANT CHANGE UP (ref 80–100)
MONOCYTES # BLD AUTO: 0.58 K/UL — SIGNIFICANT CHANGE UP (ref 0–0.9)
MONOCYTES NFR BLD AUTO: 5.6 % — SIGNIFICANT CHANGE UP (ref 2–14)
NEUTROPHILS # BLD AUTO: 8.58 K/UL — HIGH (ref 1.8–7.4)
NEUTROPHILS NFR BLD AUTO: 82.7 % — HIGH (ref 43–77)
NITRITE UR-MCNC: NEGATIVE — SIGNIFICANT CHANGE UP
NRBC # BLD: 0 /100 WBCS — SIGNIFICANT CHANGE UP (ref 0–0)
PH UR: 7 — SIGNIFICANT CHANGE UP (ref 5–8)
PLATELET # BLD AUTO: 206 K/UL — SIGNIFICANT CHANGE UP (ref 150–400)
POTASSIUM SERPL-MCNC: 4.3 MMOL/L — SIGNIFICANT CHANGE UP (ref 3.5–5.3)
POTASSIUM SERPL-SCNC: 4.3 MMOL/L — SIGNIFICANT CHANGE UP (ref 3.5–5.3)
PROCALCITONIN SERPL-MCNC: 0.06 NG/ML — HIGH (ref 0–0.04)
PROT SERPL-MCNC: 7 G/DL — SIGNIFICANT CHANGE UP (ref 6–8.3)
PROT UR-MCNC: NEGATIVE — SIGNIFICANT CHANGE UP
RBC # BLD: 3.95 M/UL — SIGNIFICANT CHANGE UP (ref 3.8–5.2)
RBC # FLD: 14.2 % — SIGNIFICANT CHANGE UP (ref 10.3–14.5)
SARS-COV-2 RNA SPEC QL NAA+PROBE: SIGNIFICANT CHANGE UP
SODIUM SERPL-SCNC: 138 MMOL/L — SIGNIFICANT CHANGE UP (ref 135–145)
SP GR SPEC: 1.01 — SIGNIFICANT CHANGE UP (ref 1.01–1.02)
UROBILINOGEN FLD QL: NEGATIVE — SIGNIFICANT CHANGE UP
WBC # BLD: 10.37 K/UL — SIGNIFICANT CHANGE UP (ref 3.8–10.5)
WBC # FLD AUTO: 10.37 K/UL — SIGNIFICANT CHANGE UP (ref 3.8–10.5)

## 2021-07-11 PROCEDURE — 80053 COMPREHEN METABOLIC PANEL: CPT

## 2021-07-11 PROCEDURE — 99284 EMERGENCY DEPT VISIT MOD MDM: CPT | Mod: CS

## 2021-07-11 PROCEDURE — 96374 THER/PROPH/DIAG INJ IV PUSH: CPT

## 2021-07-11 PROCEDURE — 87040 BLOOD CULTURE FOR BACTERIA: CPT

## 2021-07-11 PROCEDURE — 99284 EMERGENCY DEPT VISIT MOD MDM: CPT | Mod: 25

## 2021-07-11 PROCEDURE — 71045 X-RAY EXAM CHEST 1 VIEW: CPT | Mod: 26

## 2021-07-11 PROCEDURE — 87086 URINE CULTURE/COLONY COUNT: CPT

## 2021-07-11 PROCEDURE — U0003: CPT

## 2021-07-11 PROCEDURE — 71045 X-RAY EXAM CHEST 1 VIEW: CPT

## 2021-07-11 PROCEDURE — 84145 PROCALCITONIN (PCT): CPT

## 2021-07-11 PROCEDURE — 36415 COLL VENOUS BLD VENIPUNCTURE: CPT

## 2021-07-11 PROCEDURE — U0005: CPT

## 2021-07-11 PROCEDURE — 85025 COMPLETE CBC W/AUTO DIFF WBC: CPT

## 2021-07-11 PROCEDURE — 83605 ASSAY OF LACTIC ACID: CPT

## 2021-07-11 PROCEDURE — 81001 URINALYSIS AUTO W/SCOPE: CPT

## 2021-07-11 PROCEDURE — 93005 ELECTROCARDIOGRAM TRACING: CPT

## 2021-07-11 PROCEDURE — 93010 ELECTROCARDIOGRAM REPORT: CPT

## 2021-07-11 RX ORDER — SODIUM CHLORIDE 9 MG/ML
1550 INJECTION, SOLUTION INTRAVENOUS ONCE
Refills: 0 | Status: COMPLETED | OUTPATIENT
Start: 2021-07-11 | End: 2021-07-11

## 2021-07-11 RX ORDER — CEFTRIAXONE 500 MG/1
1000 INJECTION, POWDER, FOR SOLUTION INTRAMUSCULAR; INTRAVENOUS ONCE
Refills: 0 | Status: COMPLETED | OUTPATIENT
Start: 2021-07-11 | End: 2021-07-11

## 2021-07-11 RX ORDER — ACETAMINOPHEN 500 MG
650 TABLET ORAL ONCE
Refills: 0 | Status: COMPLETED | OUTPATIENT
Start: 2021-07-11 | End: 2021-07-11

## 2021-07-11 RX ADMIN — Medication 650 MILLIGRAM(S): at 11:40

## 2021-07-11 RX ADMIN — SODIUM CHLORIDE 1550 MILLILITER(S): 9 INJECTION, SOLUTION INTRAVENOUS at 11:40

## 2021-07-11 RX ADMIN — Medication 650 MILLIGRAM(S): at 12:10

## 2021-07-11 RX ADMIN — CEFTRIAXONE 100 MILLIGRAM(S): 500 INJECTION, POWDER, FOR SOLUTION INTRAMUSCULAR; INTRAVENOUS at 12:55

## 2021-07-11 NOTE — ED PROVIDER NOTE - PATIENT PORTAL LINK FT
You can access the FollowMyHealth Patient Portal offered by [facility] by registering at the following website: [email or web address]. By joining [employer]’s FollowMyHealth portal, you will also be able to view your health information using other applications (apps) compatible with our system.

## 2021-07-11 NOTE — ED PROVIDER NOTE - NSFOLLOWUPINSTRUCTIONS_ED_ALL_ED_FT
Rest  Increase fluid intake  Take AUGMENTIN 1-tablet every 12-hours for the next 10-days  Follow-up with your doctor this week  Return here if needed Rest  Increase fluid intake  Take DURICEF 1-tablet every 12-hours for the next 10-days  Follow-up with your doctor this week  Return here if needed

## 2021-07-11 NOTE — ED ADULT NURSE NOTE - OBJECTIVE STATEMENT
Pt [facility] shes had chest and abd pain since yesterday - today she awoke with chills and the pain worsened.  Staff called MD and instructed to send to hosp

## 2021-07-11 NOTE — ED PROVIDER NOTE - OBJECTIVE STATEMENT
82 yo white female with H/O DM (diabetes mellitus)    HTN (hypertension) and    Hyperlipemia who was in her usual state of health up until 3-days ago when she developed hoarse voice, non productive mild cough and mild diarrhea, non bloody. Symptoms remained but then beginning last evening patient felt cold and remembers experiencing fever and chills. In addition patient feels achy all over. Denied any headache, nausea, vomiting, SOB, chest or abdominal pains and no dysuria or hematuria.

## 2021-07-11 NOTE — ED PROVIDER NOTE - CARE PROVIDER_API CALL
Nitesh High; PhD)  Infectious Disease; Internal Medicine  [address] [place] [address]  Phone: [phone]  Fax: [phone]  Follow Up Time:

## 2021-07-13 LAB
CULTURE RESULTS: SIGNIFICANT CHANGE UP
SPECIMEN SOURCE: SIGNIFICANT CHANGE UP

## 2021-07-14 ENCOUNTER — EMERGENCY (EMERGENCY)
Facility: HOSPITAL | Age: 81
LOS: 1 days | Discharge: HOME | End: 2021-07-14
Attending: EMERGENCY MEDICINE
Payer: MEDICARE

## 2021-07-14 ENCOUNTER — EMERGENCY (EMERGENCY)
Facility: HOSPITAL | Age: 81
LOS: 1 days | Discharge: ANOTHER HOSPITAL | End: 2021-07-14
Attending: STUDENT IN AN ORGANIZED HEALTH CARE EDUCATION/TRAINING PROGRAM | Admitting: STUDENT IN AN ORGANIZED HEALTH CARE EDUCATION/TRAINING PROGRAM
Payer: MEDICARE

## 2021-07-14 VITALS
DIASTOLIC BLOOD PRESSURE: 67 MMHG | TEMPERATURE: 98 F | HEART RATE: 61 BPM | SYSTOLIC BLOOD PRESSURE: 144 MMHG | OXYGEN SATURATION: 99 % | RESPIRATION RATE: 16 BRPM

## 2021-07-14 VITALS
HEART RATE: 63 BPM | SYSTOLIC BLOOD PRESSURE: 191 MMHG | DIASTOLIC BLOOD PRESSURE: 85 MMHG | WEIGHT: 141.1 LBS | HEIGHT: 60 IN | OXYGEN SATURATION: 97 % | RESPIRATION RATE: 18 BRPM

## 2021-07-14 VITALS
DIASTOLIC BLOOD PRESSURE: 73 MMHG | TEMPERATURE: 98 F | SYSTOLIC BLOOD PRESSURE: 131 MMHG | HEIGHT: 62 IN | HEART RATE: 72 BPM | OXYGEN SATURATION: 96 % | RESPIRATION RATE: 16 BRPM | WEIGHT: 141.1 LBS

## 2021-07-14 DIAGNOSIS — Z98.89 OTHER SPECIFIED POSTPROCEDURAL STATES: Chronic | ICD-10-CM

## 2021-07-14 DIAGNOSIS — Z90.711 ACQUIRED ABSENCE OF UTERUS WITH REMAINING CERVICAL STUMP: Chronic | ICD-10-CM

## 2021-07-14 LAB
ALBUMIN SERPL ELPH-MCNC: 3.1 G/DL — LOW (ref 3.3–5)
ALBUMIN SERPL ELPH-MCNC: 3.6 G/DL — SIGNIFICANT CHANGE UP (ref 3.3–5.2)
ALP SERPL-CCNC: 22 U/L — LOW (ref 40–120)
ALP SERPL-CCNC: 22 U/L — LOW (ref 40–120)
ALT FLD-CCNC: 25 U/L — SIGNIFICANT CHANGE UP
ALT FLD-CCNC: 32 U/L — SIGNIFICANT CHANGE UP (ref 12–78)
ANION GAP SERPL CALC-SCNC: 10 MMOL/L — SIGNIFICANT CHANGE UP (ref 5–17)
ANION GAP SERPL CALC-SCNC: 5 MMOL/L — SIGNIFICANT CHANGE UP (ref 5–17)
APTT BLD: 31.3 SEC — SIGNIFICANT CHANGE UP (ref 27.5–35.5)
AST SERPL-CCNC: 23 U/L — SIGNIFICANT CHANGE UP (ref 15–37)
AST SERPL-CCNC: 25 U/L — SIGNIFICANT CHANGE UP
BASOPHILS # BLD AUTO: 0.02 K/UL — SIGNIFICANT CHANGE UP (ref 0–0.2)
BASOPHILS # BLD AUTO: 0.03 K/UL — SIGNIFICANT CHANGE UP (ref 0–0.2)
BASOPHILS NFR BLD AUTO: 0.3 % — SIGNIFICANT CHANGE UP (ref 0–2)
BASOPHILS NFR BLD AUTO: 0.5 % — SIGNIFICANT CHANGE UP (ref 0–2)
BILIRUB SERPL-MCNC: 0.2 MG/DL — SIGNIFICANT CHANGE UP (ref 0.2–1.2)
BILIRUB SERPL-MCNC: <0.2 MG/DL — LOW (ref 0.4–2)
BLD GP AB SCN SERPL QL: SIGNIFICANT CHANGE UP
BUN SERPL-MCNC: 7.4 MG/DL — LOW (ref 8–20)
BUN SERPL-MCNC: 8 MG/DL — SIGNIFICANT CHANGE UP (ref 7–23)
CALCIUM SERPL-MCNC: 8.7 MG/DL — SIGNIFICANT CHANGE UP (ref 8.5–10.1)
CALCIUM SERPL-MCNC: 9 MG/DL — SIGNIFICANT CHANGE UP (ref 8.6–10.2)
CHLORIDE SERPL-SCNC: 104 MMOL/L — SIGNIFICANT CHANGE UP (ref 98–107)
CHLORIDE SERPL-SCNC: 108 MMOL/L — SIGNIFICANT CHANGE UP (ref 96–108)
CO2 SERPL-SCNC: 26 MMOL/L — SIGNIFICANT CHANGE UP (ref 22–29)
CO2 SERPL-SCNC: 29 MMOL/L — SIGNIFICANT CHANGE UP (ref 22–31)
CREAT SERPL-MCNC: 0.49 MG/DL — LOW (ref 0.5–1.3)
CREAT SERPL-MCNC: 0.5 MG/DL — SIGNIFICANT CHANGE UP (ref 0.5–1.3)
EOSINOPHIL # BLD AUTO: 0.22 K/UL — SIGNIFICANT CHANGE UP (ref 0–0.5)
EOSINOPHIL # BLD AUTO: 0.27 K/UL — SIGNIFICANT CHANGE UP (ref 0–0.5)
EOSINOPHIL NFR BLD AUTO: 3.7 % — SIGNIFICANT CHANGE UP (ref 0–6)
EOSINOPHIL NFR BLD AUTO: 4.5 % — SIGNIFICANT CHANGE UP (ref 0–6)
ETHANOL SERPL-MCNC: <10 MG/DL — SIGNIFICANT CHANGE UP (ref 0–9)
GAS PNL BLDV: SIGNIFICANT CHANGE UP
GLUCOSE SERPL-MCNC: 122 MG/DL — HIGH (ref 70–99)
GLUCOSE SERPL-MCNC: 149 MG/DL — HIGH (ref 70–99)
HCT VFR BLD CALC: 33.9 % — LOW (ref 34.5–45)
HCT VFR BLD CALC: 33.9 % — LOW (ref 34.5–45)
HGB BLD-MCNC: 10.4 G/DL — LOW (ref 11.5–15.5)
HGB BLD-MCNC: 10.4 G/DL — LOW (ref 11.5–15.5)
IMM GRANULOCYTES NFR BLD AUTO: 0.2 % — SIGNIFICANT CHANGE UP (ref 0–1.5)
IMM GRANULOCYTES NFR BLD AUTO: 0.3 % — SIGNIFICANT CHANGE UP (ref 0–1.5)
INR BLD: 1.15 RATIO — SIGNIFICANT CHANGE UP (ref 0.88–1.16)
LYMPHOCYTES # BLD AUTO: 2.47 K/UL — SIGNIFICANT CHANGE UP (ref 1–3.3)
LYMPHOCYTES # BLD AUTO: 2.57 K/UL — SIGNIFICANT CHANGE UP (ref 1–3.3)
LYMPHOCYTES # BLD AUTO: 41 % — SIGNIFICANT CHANGE UP (ref 13–44)
LYMPHOCYTES # BLD AUTO: 42.9 % — SIGNIFICANT CHANGE UP (ref 13–44)
MCHC RBC-ENTMCNC: 26.7 PG — LOW (ref 27–34)
MCHC RBC-ENTMCNC: 27.3 PG — SIGNIFICANT CHANGE UP (ref 27–34)
MCHC RBC-ENTMCNC: 30.7 GM/DL — LOW (ref 32–36)
MCHC RBC-ENTMCNC: 30.7 GM/DL — LOW (ref 32–36)
MCV RBC AUTO: 86.9 FL — SIGNIFICANT CHANGE UP (ref 80–100)
MCV RBC AUTO: 89 FL — SIGNIFICANT CHANGE UP (ref 80–100)
MONOCYTES # BLD AUTO: 0.66 K/UL — SIGNIFICANT CHANGE UP (ref 0–0.9)
MONOCYTES # BLD AUTO: 0.67 K/UL — SIGNIFICANT CHANGE UP (ref 0–0.9)
MONOCYTES NFR BLD AUTO: 11 % — SIGNIFICANT CHANGE UP (ref 2–14)
MONOCYTES NFR BLD AUTO: 11.1 % — SIGNIFICANT CHANGE UP (ref 2–14)
NEUTROPHILS # BLD AUTO: 2.49 K/UL — SIGNIFICANT CHANGE UP (ref 1.8–7.4)
NEUTROPHILS # BLD AUTO: 2.58 K/UL — SIGNIFICANT CHANGE UP (ref 1.8–7.4)
NEUTROPHILS NFR BLD AUTO: 41.6 % — LOW (ref 43–77)
NEUTROPHILS NFR BLD AUTO: 42.9 % — LOW (ref 43–77)
NRBC # BLD: 0 /100 WBCS — SIGNIFICANT CHANGE UP (ref 0–0)
PLATELET # BLD AUTO: 202 K/UL — SIGNIFICANT CHANGE UP (ref 150–400)
PLATELET # BLD AUTO: 206 K/UL — SIGNIFICANT CHANGE UP (ref 150–400)
POTASSIUM SERPL-MCNC: 3.9 MMOL/L — SIGNIFICANT CHANGE UP (ref 3.5–5.3)
POTASSIUM SERPL-MCNC: 4.2 MMOL/L — SIGNIFICANT CHANGE UP (ref 3.5–5.3)
POTASSIUM SERPL-SCNC: 3.9 MMOL/L — SIGNIFICANT CHANGE UP (ref 3.5–5.3)
POTASSIUM SERPL-SCNC: 4.2 MMOL/L — SIGNIFICANT CHANGE UP (ref 3.5–5.3)
PROT SERPL-MCNC: 6.7 G/DL — SIGNIFICANT CHANGE UP (ref 6.6–8.7)
PROT SERPL-MCNC: 6.9 G/DL — SIGNIFICANT CHANGE UP (ref 6–8.3)
PROTHROM AB SERPL-ACNC: 13.4 SEC — SIGNIFICANT CHANGE UP (ref 10.6–13.6)
RBC # BLD: 3.81 M/UL — SIGNIFICANT CHANGE UP (ref 3.8–5.2)
RBC # BLD: 3.9 M/UL — SIGNIFICANT CHANGE UP (ref 3.8–5.2)
RBC # FLD: 14.4 % — SIGNIFICANT CHANGE UP (ref 10.3–14.5)
RBC # FLD: 14.5 % — SIGNIFICANT CHANGE UP (ref 10.3–14.5)
SARS-COV-2 RNA SPEC QL NAA+PROBE: SIGNIFICANT CHANGE UP
SARS-COV-2 RNA SPEC QL NAA+PROBE: SIGNIFICANT CHANGE UP
SODIUM SERPL-SCNC: 140 MMOL/L — SIGNIFICANT CHANGE UP (ref 135–145)
SODIUM SERPL-SCNC: 142 MMOL/L — SIGNIFICANT CHANGE UP (ref 135–145)
WBC # BLD: 5.99 K/UL — SIGNIFICANT CHANGE UP (ref 3.8–10.5)
WBC # BLD: 6.02 K/UL — SIGNIFICANT CHANGE UP (ref 3.8–10.5)
WBC # FLD AUTO: 5.99 K/UL — SIGNIFICANT CHANGE UP (ref 3.8–10.5)
WBC # FLD AUTO: 6.02 K/UL — SIGNIFICANT CHANGE UP (ref 3.8–10.5)

## 2021-07-14 PROCEDURE — 84132 ASSAY OF SERUM POTASSIUM: CPT

## 2021-07-14 PROCEDURE — 85025 COMPLETE CBC W/AUTO DIFF WBC: CPT

## 2021-07-14 PROCEDURE — 86901 BLOOD TYPING SEROLOGIC RH(D): CPT

## 2021-07-14 PROCEDURE — 99283 EMERGENCY DEPT VISIT LOW MDM: CPT

## 2021-07-14 PROCEDURE — 80053 COMPREHEN METABOLIC PANEL: CPT

## 2021-07-14 PROCEDURE — 70450 CT HEAD/BRAIN W/O DYE: CPT | Mod: 26,MA,77

## 2021-07-14 PROCEDURE — 99284 EMERGENCY DEPT VISIT MOD MDM: CPT

## 2021-07-14 PROCEDURE — 86850 RBC ANTIBODY SCREEN: CPT

## 2021-07-14 PROCEDURE — 93005 ELECTROCARDIOGRAM TRACING: CPT

## 2021-07-14 PROCEDURE — 71045 X-RAY EXAM CHEST 1 VIEW: CPT | Mod: 26

## 2021-07-14 PROCEDURE — 82435 ASSAY OF BLOOD CHLORIDE: CPT

## 2021-07-14 PROCEDURE — 82330 ASSAY OF CALCIUM: CPT

## 2021-07-14 PROCEDURE — 82803 BLOOD GASES ANY COMBINATION: CPT

## 2021-07-14 PROCEDURE — 99284 EMERGENCY DEPT VISIT MOD MDM: CPT | Mod: 25

## 2021-07-14 PROCEDURE — 80307 DRUG TEST PRSMV CHEM ANLYZR: CPT

## 2021-07-14 PROCEDURE — 70450 CT HEAD/BRAIN W/O DYE: CPT | Mod: 26,MA

## 2021-07-14 PROCEDURE — 70450 CT HEAD/BRAIN W/O DYE: CPT

## 2021-07-14 PROCEDURE — 84295 ASSAY OF SERUM SODIUM: CPT

## 2021-07-14 PROCEDURE — 36415 COLL VENOUS BLD VENIPUNCTURE: CPT

## 2021-07-14 PROCEDURE — 83605 ASSAY OF LACTIC ACID: CPT

## 2021-07-14 PROCEDURE — 70450 CT HEAD/BRAIN W/O DYE: CPT | Mod: MA

## 2021-07-14 PROCEDURE — 85014 HEMATOCRIT: CPT

## 2021-07-14 PROCEDURE — 86900 BLOOD TYPING SEROLOGIC ABO: CPT

## 2021-07-14 PROCEDURE — 93010 ELECTROCARDIOGRAM REPORT: CPT | Mod: 77

## 2021-07-14 PROCEDURE — U0003: CPT

## 2021-07-14 PROCEDURE — 85730 THROMBOPLASTIN TIME PARTIAL: CPT

## 2021-07-14 PROCEDURE — 93010 ELECTROCARDIOGRAM REPORT: CPT

## 2021-07-14 PROCEDURE — 85610 PROTHROMBIN TIME: CPT

## 2021-07-14 PROCEDURE — 71045 X-RAY EXAM CHEST 1 VIEW: CPT

## 2021-07-14 PROCEDURE — 72125 CT NECK SPINE W/O DYE: CPT | Mod: 26,MA

## 2021-07-14 PROCEDURE — 85018 HEMOGLOBIN: CPT

## 2021-07-14 PROCEDURE — 72190 X-RAY EXAM OF PELVIS: CPT | Mod: 26

## 2021-07-14 PROCEDURE — 99285 EMERGENCY DEPT VISIT HI MDM: CPT | Mod: 25

## 2021-07-14 PROCEDURE — 72125 CT NECK SPINE W/O DYE: CPT | Mod: MA

## 2021-07-14 PROCEDURE — 72190 X-RAY EXAM OF PELVIS: CPT

## 2021-07-14 PROCEDURE — 99285 EMERGENCY DEPT VISIT HI MDM: CPT

## 2021-07-14 PROCEDURE — U0005: CPT

## 2021-07-14 PROCEDURE — 87635 SARS-COV-2 COVID-19 AMP PRB: CPT

## 2021-07-14 PROCEDURE — 82947 ASSAY GLUCOSE BLOOD QUANT: CPT

## 2021-07-14 NOTE — ED PROVIDER NOTE - CROS ED CONS ALL NEG
Lambert Delgadillo [facility] 79  [address] [facility], [address] [place] [address]  [phone]      Medical Progress Note      NAME: Frederick Charles   :  1951  MRM:  [id number]    Date/Time of service: 2021  11:54 AM       Subjective:     Chief Complaint:  Patient was personally seen and examined by me during this time period. Chart reviewed. Levophed titrated off yesterday evening. Denies SOB, CP; all other systems reviewed are negative. Objective:       Vitals:       Last 24hrs VS reviewed since prior progress note.  Most recent are:    Visit Vitals  BP (!) 117/56   Pulse 73   Temp 98.9 °F (37.2 °C)   Resp 14   Ht 5' 10\" (1.778 m)   Wt 74.1 kg (163 lb 5.8 oz)   SpO2 92%   BMI 23.44 kg/m²     SpO2 Readings from Last 6 Encounters:   21 92%   20 94%   20 99%   18 99%   18 98%   18 98%    O2 Flow Rate (L/min): 2 l/min       Intake/Output Summary (Last 24 hours) at 2021 1154  Last data filed at 2021 0000  Gross per 24 hour   Intake 353.39 ml   Output    Net 353.39 ml        Exam:     Physical Exam:    Gen:  Ill appearing, in no acute distress  HEENT: PERRL, hearing intact to voice  Resp:  diminished b/l   Card:  RRR, No murmurs, normal S1, S2  Abd:  Soft, non-tender, non-distended, normoactive bowel sounds are present  Musc:  No cyanosis or clubbing  Skin:  No rashes or ulcers, skin turgor is good  Neuro:  Cranial nerves 3-12 are grossly intact, moves all extremities, follows commands appropriately  Psych:  Somnolent but arousable and Oriented to person, place, and time      Medications Reviewed: (see below)    Lab Data Reviewed: (see below)    ______________________________________________________________________    Medications:     Current Facility-Administered Medications   Medication Dose Route Frequency    lidocaine (XYLOCAINE) 10 mg/mL (1 %) injection 10-30 mL  10-30 mL SubCUTAneous Multiple    heparin (porcine) 1,000 unit/mL injection 2,500 Units  2,500 Units IntraVENous Multiple    epoetin medhat-epbx (RETACRIT) injection 10,000 Units  10,000 Units SubCUTAneous DIALYSIS MON, WED & FRI    sodium chloride (NS) flush 5-40 mL  5-40 mL IntraVENous Q8H    sodium chloride (NS) flush 5-40 mL  5-40 mL IntraVENous PRN    acetaminophen (TYLENOL) tablet 650 mg  650 mg Oral Q4H PRN    melatonin (rapid dissolve) tablet 5 mg  5 mg Oral QHS PRN    Vancomycin - pharmacy to dose   Other Rx Dosing/Monitoring    morphine injection 2 mg  2 mg IntraVENous Q4H PRN    ondansetron (ZOFRAN) injection 6 mg  6 mg IntraVENous Q6H PRN    midodrine (PROAMATINE) tablet 10 mg  10 mg Oral TID    heparin (porcine) injection 5,000 Units  5,000 Units SubCUTAneous Q8H    famotidine (PF) (PEPCID) 20 mg in 0.9% sodium chloride 10 mL injection  20 mg IntraVENous QPM    B complex-vitaminC-folic acid (NEPHROCAP) cap  1 Cap Oral DAILY    megestroL (MEGACE) tablet 40 mg  40 mg Oral BID    timolol (TIMOPTIC) 0.5 % ophthalmic solution 1 Drop  1 Drop Left Eye DAILY    predniSONE (DELTASONE) tablet 5 mg  5 mg Oral DAILY WITH BREAKFAST    sodium chloride (NS) flush 5-10 mL  5-10 mL IntraVENous PRN    piperacillin-tazobactam (ZOSYN) 3.375 g in 0.9% sodium chloride (MBP/ADV) 100 mL MBP  3.375 g IntraVENous Q12H          Lab Review:     Recent Labs     05/12/21  0520 05/10/21  1654   WBC 16.2* 9.6   HGB 9.6* 13.3   HCT 31.4* 42.4   PLT 98* 148*     Recent Labs     05/12/21  0520 05/11/21  0315 05/10/21  2211 05/10/21  1654    139  --  138   K 4.5 3.5  --  3.2*    106  --  102   CO2 26 27  --  29   GLU 79 67  --  75   BUN 25* 14  --  11   CREA 6.09* 4.46*  --  3.82*   CA 8.3* 7.9*  --  8.5   MG 2.2  --  1.4*  --    PHOS  --   --  1.6*  --    ALB  --  2.3*  --  3.1*   TBILI  --  0.4  --  0.4   ALT  --  9*  --  11*   INR  --  1.1  --   --      Lab Results   Component Value Date/Time    Glucose (POC) 134 (H) 03/15/2020 09:43 PM    Glucose (POC) 118 (H) 03/15/2020 04:18 PM Glucose (POC) 170 (H) 03/15/2020 11:04 AM    Glucose (POC) 99 03/15/2020 07:34 AM    Glucose (POC) 92 03/14/2020 09:02 PM          Assessment / Plan:     Septic shock / Leukocytosis/ PNA :  improving, off of IV pressors. C/w midodrine. Follow cx. C/w IV Zosyn and IV vancomycin. *MRSA neg, stop vancomycin     Acute hypoxic respiratory failure/  Community acquired pneumonia of left lower lobe of lung: due to PNA and sepsis as above. Plan as above. Maintain O2 sats at least 92-94% oxygen per protocol. Supportive care, incentive spirometry. Encephalopathy: improving. Likely due to infection and hypoxia. Diarrhea : Covid neg.  Monitor      ESRD (end stage renal disease) on dialysis: Nephrology consult for ESRD on HD management.     History of cervical spinal surgery: PT/OT        Total time spent with patient: 28 Minutes **I personally saw and examined the patient during this time period**                 Care Plan discussed with: Patient and Nursing Staff    Discussed:  Care Plan    Prophylaxis:  Hep SQ    Disposition:   PT, OT, RN           ___________________________________________________    Attending Physician: Beryl Cast DO negative...

## 2021-07-14 NOTE — H&P ADULT - ASSESSMENT
82 y/o F s/p fall w/ possible small R. Frontal SAH  -Repeat CTH  -C-collar cleared clinically  -Syncopal workup outpatient    -Final plan pending    82 y/o F s/p fall w/ possible small R. Frontal SAH  -Repeat CTH negative  -Neurosurgery consulted - no acute intervention  -C-collar cleared clinically  -Syncopal workup outpatient  -Dispo per ED

## 2021-07-14 NOTE — ED ADULT NURSE NOTE - CHIEF COMPLAINT QUOTE
Patient brought in by ambulance from [facility] had unwitnessed fall complaining of headache no LOC on aspirin

## 2021-07-14 NOTE — ED ADULT TRIAGE NOTE - CHIEF COMPLAINT QUOTE
Patient brought in by ambulance from [facility] had unwitnessed fall complaining of headache no LOC on aspirin

## 2021-07-14 NOTE — ED PROVIDER NOTE - CLINICAL SUMMARY MEDICAL DECISION MAKING FREE TEXT BOX
81 year old female p/w mechanical fall at assisted living.   No LOC.  C/o right forehead pain.  CT head, reassess

## 2021-07-14 NOTE — ED PROVIDER NOTE - OBJECTIVE STATEMENT
80 yo female on aspirin, s/p fall this am, was seen at [facility]  and ct showed possible bleed. patient without any complaints at this time  patient arrived as code trauma b  patient awake and alert  denies pain

## 2021-07-14 NOTE — ED ADULT NURSE REASSESSMENT NOTE - NS ED NURSE REASSESS COMMENT FT1
Handoff report given to Janette [facility]; awaiting transport at this time call bell within reach

## 2021-07-14 NOTE — CONSULT NOTE ADULT - ATTENDING COMMENTS
NSGY Attg:    see above    patient seen and examined    agree with exam as above    repeat CT head without evidence of ICH    agree with plan as above  dispo per ACS/ER  recall prn

## 2021-07-14 NOTE — ED PROVIDER NOTE - PATIENT PORTAL LINK FT
You can access the FollowMyHealth Patient Portal offered by [facility] by registering at the following website: [email or web address]. By joining [employer]’s FollowMyHealth portal, you will also be able to view your health information using other applications (apps) compatible with our system.

## 2021-07-14 NOTE — ED ADULT NURSE NOTE - CHIEF COMPLAINT QUOTE
Pt BIBA as transfer from [place].  Possible head bleed s/p fall.  Pt alert on arrival.  Trauma B called.

## 2021-07-14 NOTE — ED PROVIDER NOTE - PROGRESS NOTE DETAILS
CT results discussed with daughter, Marcelina (HCP) who is aware of possible small SAH and that patient will be transferred to [facility]

## 2021-07-14 NOTE — ED ADULT NURSE NOTE - NSIMPLEMENTINTERV_GEN_ALL_ED
Implemented All Fall Risk Interventions:  [place] to call system. Call bell, personal items and telephone within reach. Instruct patient to call for assistance. Room bathroom lighting operational. Non-slip footwear when patient is off stretcher. Physically safe environment: no spills, clutter or unnecessary equipment. Stretcher in lowest position, wheels locked, appropriate side rails in place. Provide visual cue, wrist band, yellow gown, etc. Monitor gait and stability. Monitor for mental status changes and reorient to person, place, and time. Review medications for side effects contributing to fall risk. Reinforce activity limits and safety measures with patient and family.

## 2021-07-14 NOTE — CONSULT NOTE ADULT - ASSESSMENT
s/p fall w CTB: Question tiny focus of subarachnoid hemorrhage in the right frontal lobe at [facility],   tx to [facility] for further management, f/u CTB or arrival at 8 AM reported as No acute intracranial hemorrhage or mass effect.    pt seen w Dr Spencer   no nsx intervention or recommendations   pt is stable from NSx standpoint for discharge   recommend BM management   case and plan d/w ACS team   defer further care to primary team

## 2021-07-14 NOTE — ED ADULT NURSE NOTE - ED STAT RN HANDOFF DETAILS
Handoff report given to Janette Johnson of [facility] ambulance service

## 2021-07-14 NOTE — CHART NOTE - NSCHARTNOTEFT_GEN_A_CORE
Tertiary Trauma Survey (TTS)    Date of TTS: 07-14-21 @ 10:25                             Admit Date: 07-14-21 @ 07:28      Trauma Activation: III      Subjective / 24 hour events:   Ms. Matthews is a 82 yo F s/p mechanical fall at nursing facility, after reaching for something on floor. Pt states she fell forward, and hit her head on the ground. She denies LOC , but patient states that she fell asleep after calling for help. She called her daughter, who notified facility. Pt was approximately down for 20 minutes. She was transferred to [facility] with a GCS 15, and noted on CT to have a questionable R frontal SAH. Endorses daily ASA use, no other AC use. Pt was then transferred to [facility], GCS 15 (unchanged). Repeat CT head reads no ICH, or abnormality. NSGY consulted, who states that patient is clear from neurological standpoint. No other traumatic injuries noted. No follow up indicated form a NSGY or trauma perspective. Patient dispo per ED.     Vital Signs Last 24 Hrs  T(C): --  T(F): --  HR: 63 (14 Jul 2021 07:31) (63 - 63)  BP: 191/85 (14 Jul 2021 07:31) (191/85 - 191/85)  BP(mean): --  RR: 18 (14 Jul 2021 07:31) (18 - 18)  SpO2: 97% (14 Jul 2021 07:31) (97% - 97%)    Physical Exam:    General:  NCAT, GCS 15,     HEENT: R pupil mm reactive, L pupil kolloboma (chronic, non-reactive)     Pulm/Chest:  CTA b/l, no increase WOB, no accessory muscle use    Cardiac:  HDS, S1S2, sinus rhythm, on tele     GI / Abdomen: Soft, non-tender, non-distended     Musculoskeletal / Extremities: Normal active ROM, non-traumatic, no overlying bruising or abrasions     Integumentary: Skin intact , Warm, Dry     Vascular: 2+ palpable distal pulses       List Injuries Identified to Date:  1. None     List Operative and Interventional Radiological Procedures:     Consults (Date):  [ x ] Neurosurgery (7/14)   [  ] Orthopedics  [  ] Plastics  [  ] Urology  [  ] PM&R  [  ] Social Work    RADIOLOGICAL FINDINGS REVIEW:  Head CT:     IMPRESSION: No acute intracranial hemorrhage or mass effect.

## 2021-07-14 NOTE — ED ADULT NURSE NOTE - OBJECTIVE STATEMENT
Patient brought in by ambulance from [facility] as reported had unwitnessed fall complaining of headache as reported was on the floor for 20 minutes taking aspirin waiting for MD evaluation.

## 2021-07-14 NOTE — CONSULT NOTE ADULT - SUBJECTIVE AND OBJECTIVE BOX
Patient is a 81y old  Female who presents with a chief complaint of Trauma - fall (14 Jul 2021 07:37)      HPI: 81[facility] Patient was down for 20 mins at a nursing home, then was brought to [facility] for eval. She was found to have a possible small R. frontal SAH and transferred to [facility]. (14 Jul 2021 07:37)  pt seen in ED, AAOx3 and cooperative, denied HA or any sensorimotor changes, states at her baseline   - Nausea /- Vomiting  denies new/ worsening weakness  denies new/ worsening paresthesias/ numbness   denies  new/ worsening visual changes  denies C- Spine pain  denies T/LS  Spine pain  denies Bowel/ Bladder dysfunction       GCS: 15  Eye response (E)4  Verbal response (V)5  Motor response (M)6      ?traumatic SAH - Hunt & Quiroz:         PAST MEDICAL & SURGICAL HISTORY:  Diabetes mellitus    No significant past surgical history        SOCIAL HISTORY: - EtOH, - tobacco, - drugs    FAMILY HISTORY: non-pertinent at this time        MEDICATIONS  (STANDING):    MEDICATIONS  (PRN):    Allergies    Cipro (Rash)  penicillin (Rash)  sulfa drugs (Rash)    Intolerances      Vital Signs Last 24 Hrs  T(C): --  T(F): --  HR: 63 (14 Jul 2021 07:31) (63 - 63)  BP: 191/85 (14 Jul 2021 07:31) (191/85 - 191/85)  BP(mean): --  RR: 18 (14 Jul 2021 07:31) (18 - 18)  SpO2: 97% (14 Jul 2021 07:31) (97% - 97%)        PHYSICAL EXAM:  GENERAL: NAD, well-groomed, well-developed, AAOx3 and very cooperative  HEAD: traumatic, Normocephalic, no palpable step-off appreciated on palpation  EYES:  EOMI b/l, pupils R.coloboma - nondilated, Left 3mm round and reactive to light b/l, no active drainage or redness  NECK: Supple, nontender to palpation   TS/LS: nontender to palpation midline or paraspinal muscles b/l,   NERVOUS SYSTEM:  Alert & Oriented X3, speech is clear and fluent, no dysarthria appreciated. Good concentration & cooperative; Motor Strength 5/5 B/L upper and lower extremities, sensory is at baseline and symmetric b/l; No pronators or ankle clonus appreciated b/l, cerebellar signs grossly intact b/l. FS, TML, no valdes's b/l appreciated.   EXTREMITIES:  2+ Peripheral Pulses, No clubbing, cyanosis, or edema,  SKIN: No rashes or lesions appreciated                           10.4   5.99  )-----------( 206      ( 14 Jul 2021 07:41 )             33.9     07-14    140  |  104  |  7.4<L>  ----------------------------<  149<H>  4.2   |  26.0  |  0.50    Ca    9.0      14 Jul 2021 07:41    TPro  6.7  /  Alb  3.6  /  TBili  <0.2<L>  /  DBili  x   /  AST  25  /  ALT  25  /  AlkPhos  22<L>  07-14        LIVER FUNCTIONS - ( 14 Jul 2021 07:41 )  Alb: 3.6 g/dL / Pro: 6.7 g/dL / ALK PHOS: 22 U/L / ALT: 25 U/L / AST: 25 U/L / GGT: x               RADIOLOGY & ADDITIONAL STUDIES:  < from: CT Head No Cont (07.14.21 @ 07:54) >  EXAM:  CT BRAIN                          PROCEDURE DATE:  07/14/2021    INTERPRETATION:  CLINICAL INFORMATION: Multiple Trauma. . .  TECHNIQUE: Sequential axial images were obtained from the vertex to the skull base without intravenous contrast. Coronal and sagittal reformations were obtained.  COMPARISON: None.    FINDINGS:  There is no acute intracranial hemorrhage or mass effect. The ventricles and sulci are normal in size for patient's age.  There is no extraaxial fluid collection.  There is no displaced calvarial fracture. Status post bilateral intraocular lens implants. Left maxillary sinus mucosal thickening. The mastoid air cells are well aerated.    IMPRESSION: No acute intracranial hemorrhage or mass effect.    --- End of Report ---  PEGGY ARNDT MD; Attending Radiologist  This document has been electronically signed. Jul 14 2021  8:27AM  < end of copied text >        EXAM:  CT BRAIN                        PROCEDURE DATE:  07/14/2021    INTERPRETATION:  CLINICAL INFORMATION:  trip and fall    TECHNIQUE:  Axial CT images were acquired through the head.  Intravenous contrast: None  Two-dimensional reformats were generated.    COMPARISON STUDY: CT head 6/26/2021.    FINDINGS:  There is a tiny hyperdensity in a right frontal lobe sulcus seen on 602:13, 601:31, which may represent acute subarachnoid hemorrhage.  There is no CT evidence of acute intraparenchymal hemorrhage, mass effect, midline shift, or acute, large territorial infarct.  The ventricles and sulci are age-appropriate in size and configuration. The basal cisterns are patent.  There is nonspecific attenuation in the cerebral white matter, which is likely on the basis of chronic small vessel ischemic disease.  There are minimal bilateral mastoid air cell effusions. There is mild mucosal thickening within bilateral ethmoid air cells.  The calvarium and skull base are grossly intact. Bilateral ocular lens replacements.    IMPRESSION:  Question tiny focus of subarachnoid hemorrhage in the right frontal lobe. Recommend further workup with follow-up noncontrast head CT in 4-6 hours.  --- End of Report ---    KATIA ENRIQUEZ MD; Attending Radiologist  This document has been electronically signed. Jul 14 2021  4:42AM      Time Spent with patient/ education on the floor & arranging care: >55 mins of care and s/w ACS

## 2021-07-14 NOTE — CHART NOTE - NSCHARTNOTEFT_GEN_A_CORE
SOCIAL WORK NOTE:  RECEIVED CALL FROM DR SEBASTIAN THAT HE PROVIDED REPORT TO THE [facility] AT [facility] AND PATIENT WAS ACCEPTED FOR RETURN.  THIS WORKER PLACED CALL TO THE [facility] AND SPOKE WITH [facility] # [phone] TO MAKE THEM AWARE AS WELL.  THEY REQUESTED DC PAPERS BE FAXED TO THEM AND SAME COMPLETED TO FAX # [phone].  SAME COMPLETED.  PLACED CALL TO PATIENT'S DAUGHTER- DIMITRI ESTEVES # [phone] TO MAKE HER AWARE. SHE WAS PLEASED THAT PATIENT WAS BEING TRANSFERRED HOME.  DISCUSSED TRANSORTATION FORM AND WAS PLACED ON DC RACK WITH [employer].  AMBULANCE ARRANGED FOR 2 PM.

## 2021-07-14 NOTE — ED ADULT NURSE REASSESSMENT NOTE - NS ED NURSE REASSESS COMMENT FT1
Sandra from [facility] called and was advised that patient will be transferred to [facility] and that daughter ross was advised by Dr. Giron regarding the said transfer.

## 2021-07-14 NOTE — ED PROVIDER NOTE - MUSCULOSKELETAL, MLM
Spine appears normal, range of motion is not limited, no muscle or joint tenderness.  Hips and pelvis stable, full ROM of b/l UE and LE.  No LS spine tenderness

## 2021-07-14 NOTE — ED ADULT NURSE REASSESSMENT NOTE - NS ED NURSE REASSESS COMMENT FT1
Called carolyne at [facility] spoke with security and was advised bernice LEGER will call back in 20 mins.

## 2021-07-14 NOTE — ED ADULT TRIAGE NOTE - CHIEF COMPLAINT QUOTE
Pt BIBA as transfer from [place].  Possible head bleed s/p fall.  Pt alert on arrival.  Trauma B called.

## 2021-07-14 NOTE — H&P ADULT - HISTORY OF PRESENT ILLNESS
81y/oF s/p [facility] fall on ASA 81. Patient was down for 20 mins at a nursing home, then was brought to [facility] for eval. She was found to have a possible small R. frontal SAH and transferred to [facility].

## 2021-07-14 NOTE — H&P ADULT - NSHPPHYSICALEXAM_GEN_ALL_CORE
Constitutional: Well-developed well nourished Female in no acute distress  HEENT: Head is normocephalic and atraumatic, maxillofacial structures stable, no blood or discharge from nares or oral cavity, no meza sign / racoon eyes, EOMI b/l, pupils R.coloboma - nondilated, Left 3mm round and reactive to light b/l, no active drainage or redness  Neck: cervical collar in place, trachea midline  Respiratory: Breath sounds CTA b/l respirations are unlabored, no accessory muscle use, no conversational dyspnea  Cardiovascular: Regular rate & rhythm, +S1, S1, Chest wall is non-tender to palpation, no subQ emphysema or crepitus palpated  Gastrointestinal: Abdomen soft, non-tender, non-distended, no rebound tenderness / guarding, no ecchymosis or external signs of abdominal trauma  Musculoskeletal: moving all extremities spontaneously, 5/5 motor strength of b/l Upper and lower extremities. no point tenderness or deformity noted to upper or lower extremities b/l  Pelvis: stable  Vascular: 2+ radial, femoral, and DP pulses b/l  Neurological: GCS: 15 (4/5/6). A&O x 3; no gross sensory / motor / coordination deficits  Neurospinal: no C/T/L/S spine tenderness to palpation, no step-offs or signs of external trauma to the back

## 2021-07-14 NOTE — ED PROVIDER NOTE - PHYSICAL EXAMINATION
Head: Small hematoma to right forehead, no laceration  Full ROM of c-spine, no step-off or deformity

## 2021-07-14 NOTE — H&P ADULT - ATTENDING COMMENTS
81F fall from standing, no LOC,  transferred from [place],  seen and examined upon arrival as code trauma B.    also fell 2 weeks ago    GCS = 15  hemodynamically stable  NC/AT  left pupil reactive, right pupil tear-drop shaped  EOMI  no c-spine tenderness or limit in full active ROM  no t-spine or l-spine tenderness  no chest wall tenderness  soft / NT / ND  pelvic girdle stable  no deformity x 4 extremities  no gross neurologic deficit    CXR - no pneumothorax, hemothorax or displaced rib fractures  X-ray pelvis - no fractures  CT c-spine - no c-spine fracture  CT head @ [facility] - ? trace SAH  CT head @ [facility] - no [facility]    fall from standing  no acute injury  -f/u with her PMD for recurrent falls 81F on ASA fall from standing, no LOC,  transferred from [place],  seen and examined upon arrival as code trauma B.    also fell 2 weeks ago    GCS = 15  hemodynamically stable  NC/AT  left pupil reactive, right pupil tear-drop shaped  EOMI  no c-spine tenderness or limit in full active ROM  no t-spine or l-spine tenderness  no chest wall tenderness  soft / NT / ND  pelvic girdle stable  no deformity x 4 extremities  no gross neurologic deficit    CXR - no pneumothorax, hemothorax or displaced rib fractures  X-ray pelvis - no fractures  CT c-spine - no c-spine fracture  CT head @ [place] - ? trace SAH  CT head @ [facility] - no [facility]    fall from standing  no acute injury  -f/u with her PMD for recurrent falls

## 2021-07-16 ENCOUNTER — NON-APPOINTMENT (OUTPATIENT)
Age: 81
End: 2021-07-16

## 2021-07-16 LAB
CULTURE RESULTS: SIGNIFICANT CHANGE UP
CULTURE RESULTS: SIGNIFICANT CHANGE UP
SPECIMEN SOURCE: SIGNIFICANT CHANGE UP
SPECIMEN SOURCE: SIGNIFICANT CHANGE UP

## 2021-08-19 ENCOUNTER — APPOINTMENT (OUTPATIENT)
Dept: PULMONOLOGY | Facility: CLINIC | Age: 81
End: 2021-08-19

## 2021-09-07 PROBLEM — E11.9 TYPE 2 DIABETES MELLITUS WITHOUT COMPLICATIONS: Chronic | Status: ACTIVE | Noted: 2021-07-14

## 2021-09-13 ENCOUNTER — APPOINTMENT (OUTPATIENT)
Dept: PULMONOLOGY | Facility: CLINIC | Age: 81
End: 2021-09-13
Payer: MEDICARE

## 2021-09-13 ENCOUNTER — NON-APPOINTMENT (OUTPATIENT)
Age: 81
End: 2021-09-13

## 2021-09-13 PROCEDURE — 99443: CPT | Mod: 95

## 2021-10-12 ENCOUNTER — APPOINTMENT (OUTPATIENT)
Dept: PULMONOLOGY | Facility: CLINIC | Age: 81
End: 2021-10-12
Payer: MEDICARE

## 2021-10-12 PROCEDURE — 99442: CPT | Mod: 95

## 2021-10-13 ENCOUNTER — TRANSCRIPTION ENCOUNTER (OUTPATIENT)
Age: 81
End: 2021-10-13

## 2021-11-30 ENCOUNTER — EMERGENCY (EMERGENCY)
Facility: HOSPITAL | Age: 81
LOS: 1 days | Discharge: ANOTHER HOSPITAL | End: 2021-11-30
Attending: EMERGENCY MEDICINE | Admitting: EMERGENCY MEDICINE
Payer: MEDICARE

## 2021-11-30 VITALS
OXYGEN SATURATION: 97 % | DIASTOLIC BLOOD PRESSURE: 66 MMHG | TEMPERATURE: 98 F | SYSTOLIC BLOOD PRESSURE: 141 MMHG | RESPIRATION RATE: 16 BRPM | HEART RATE: 83 BPM | HEIGHT: 60 IN | WEIGHT: 136.03 LBS

## 2021-11-30 DIAGNOSIS — Z98.89 OTHER SPECIFIED POSTPROCEDURAL STATES: Chronic | ICD-10-CM

## 2021-11-30 DIAGNOSIS — Z90.711 ACQUIRED ABSENCE OF UTERUS WITH REMAINING CERVICAL STUMP: Chronic | ICD-10-CM

## 2021-11-30 PROCEDURE — 70450 CT HEAD/BRAIN W/O DYE: CPT | Mod: 26,MA

## 2021-11-30 PROCEDURE — 99284 EMERGENCY DEPT VISIT MOD MDM: CPT

## 2021-11-30 PROCEDURE — 72125 CT NECK SPINE W/O DYE: CPT | Mod: 26,MA

## 2021-11-30 NOTE — ED ADULT NURSE NOTE - OBJECTIVE STATEMENT
received patient awake, alert, h/o dementia, was sleeping in a chair, and slid out of chair and fell, c/o pain to the head- ct hea d and neck done, patient seen by Dr colorado received patient awake, alert , h/o dementia, was sleeping in a chair, and slid out of chair and fell, c/o pain to the head- ct hea d and neck done, patient seen by Dr colorado

## 2021-11-30 NOTE — ED PROVIDER NOTE - OBJECTIVE STATEMENT
80yo female bib em[facility] s/p fall with head injury, pt states she was not feeling well and fell forward out of the chair and hit her head, no LOC, no vomiting or dizziness, +headache

## 2021-11-30 NOTE — ED PROVIDER NOTE - PROGRESS NOTE DETAILS
spoke with daughter ross who states pt has been wandering off, has not been taking her meds, and is unsafe at the [facility], psychiatrist wants to get pt into an inpatient facility, and explained if pt is uncooperative than she will not be able to be evaluated,   her number is [phone]  dr knowles [phone]  therapist cindy espino [phone]

## 2021-11-30 NOTE — ED ADULT NURSE NOTE - NSICDXPASTMEDICALHX_GEN_ALL_CORE_FT
PAST MEDICAL HISTORY:  Diabetes mellitus     DM (diabetes mellitus)     HTN (hypertension)     Hyperlipemia      PAST MEDICAL HISTORY:  Dementia     Diabetes mellitus     DM (diabetes mellitus)     HTN (hypertension)     Hyperlipemia

## 2021-11-30 NOTE — ED PROVIDER NOTE - NSICDXPASTMEDICALHX_GEN_ALL_CORE_FT
PAST MEDICAL HISTORY:  Diabetes mellitus     DM (diabetes mellitus)     HTN (hypertension)     Hyperlipemia

## 2021-11-30 NOTE — ED ADULT NURSE NOTE - NS TRANSFER PATIENT BELONGINGS
Clothing 2 hand bags/Wrist Watch/Cell Phone/PDA (specify)/Jewelry/Money (specify)/Other belongings/Clothing

## 2021-11-30 NOTE — ED ADULT NURSE NOTE - INTERVENTIONS DEFINITIONS
[place] to call system/Call bell, personal items and telephone within reach/Instruct patient to call for assistance/Non-slip footwear when patient is off stretcher/Physically safe environment: no spills, clutter or unnecessary equipment/Provide visual cue, wrist band, yellow gown, etc./Monitor gait and stability/Monitor for mental status changes and reorient to person, place, and time/Provide visual clues: red socks

## 2021-12-01 ENCOUNTER — INPATIENT (INPATIENT)
Facility: HOSPITAL | Age: 81
LOS: 47 days | Discharge: HOME | End: 2022-01-18
Attending: PSYCHIATRY & NEUROLOGY | Admitting: PSYCHIATRY & NEUROLOGY
Payer: MEDICARE

## 2021-12-01 VITALS
DIASTOLIC BLOOD PRESSURE: 68 MMHG | HEART RATE: 71 BPM | TEMPERATURE: 99 F | SYSTOLIC BLOOD PRESSURE: 144 MMHG | RESPIRATION RATE: 17 BRPM | OXYGEN SATURATION: 98 %

## 2021-12-01 VITALS — WEIGHT: 141.1 LBS | TEMPERATURE: 99 F

## 2021-12-01 DIAGNOSIS — Z90.711 ACQUIRED ABSENCE OF UTERUS WITH REMAINING CERVICAL STUMP: Chronic | ICD-10-CM

## 2021-12-01 DIAGNOSIS — F33.9 MAJOR DEPRESSIVE DISORDER, RECURRENT, UNSPECIFIED: ICD-10-CM

## 2021-12-01 DIAGNOSIS — F31.10 BIPOLAR DISORDER, CURRENT EPISODE MANIC WITHOUT PSYCHOTIC FEATURES, UNSPECIFIED: ICD-10-CM

## 2021-12-01 DIAGNOSIS — F31.13 BIPOLAR DISORDER, CURRENT EPISODE MANIC WITHOUT PSYCHOTIC FEATURES, SEVERE: ICD-10-CM

## 2021-12-01 DIAGNOSIS — Z98.89 OTHER SPECIFIED POSTPROCEDURAL STATES: Chronic | ICD-10-CM

## 2021-12-01 DIAGNOSIS — F31.12 BIPOLAR DISORDER, CURRENT EPISODE MANIC WITHOUT PSYCHOTIC FEATURES, MODERATE: ICD-10-CM

## 2021-12-01 LAB
ALBUMIN SERPL ELPH-MCNC: 3 G/DL — LOW (ref 3.3–5)
ALP SERPL-CCNC: 18 U/L — LOW (ref 40–120)
ALT FLD-CCNC: 26 U/L — SIGNIFICANT CHANGE UP (ref 12–78)
AMPHET UR-MCNC: NEGATIVE — SIGNIFICANT CHANGE UP
ANION GAP SERPL CALC-SCNC: 4 MMOL/L — LOW (ref 5–17)
APAP SERPL-MCNC: 5 UG/ML — LOW (ref 10–30)
APPEARANCE UR: CLEAR — SIGNIFICANT CHANGE UP
AST SERPL-CCNC: 16 U/L — SIGNIFICANT CHANGE UP (ref 15–37)
BACTERIA # UR AUTO: ABNORMAL
BARBITURATES UR SCN-MCNC: NEGATIVE — SIGNIFICANT CHANGE UP
BASOPHILS # BLD AUTO: 0.03 K/UL — SIGNIFICANT CHANGE UP (ref 0–0.2)
BASOPHILS NFR BLD AUTO: 0.4 % — SIGNIFICANT CHANGE UP (ref 0–2)
BENZODIAZ UR-MCNC: NEGATIVE — SIGNIFICANT CHANGE UP
BILIRUB SERPL-MCNC: 0.2 MG/DL — SIGNIFICANT CHANGE UP (ref 0.2–1.2)
BILIRUB UR-MCNC: NEGATIVE — SIGNIFICANT CHANGE UP
BUN SERPL-MCNC: 9 MG/DL — SIGNIFICANT CHANGE UP (ref 7–23)
CALCIUM SERPL-MCNC: 8.6 MG/DL — SIGNIFICANT CHANGE UP (ref 8.5–10.1)
CHLORIDE SERPL-SCNC: 111 MMOL/L — HIGH (ref 96–108)
CO2 SERPL-SCNC: 29 MMOL/L — SIGNIFICANT CHANGE UP (ref 22–31)
COCAINE METAB.OTHER UR-MCNC: NEGATIVE — SIGNIFICANT CHANGE UP
COLOR SPEC: YELLOW — SIGNIFICANT CHANGE UP
CREAT SERPL-MCNC: 0.59 MG/DL — SIGNIFICANT CHANGE UP (ref 0.5–1.3)
DIFF PNL FLD: NEGATIVE — SIGNIFICANT CHANGE UP
EOSINOPHIL # BLD AUTO: 0.14 K/UL — SIGNIFICANT CHANGE UP (ref 0–0.5)
EOSINOPHIL NFR BLD AUTO: 1.7 % — SIGNIFICANT CHANGE UP (ref 0–6)
EPI CELLS # UR: NEGATIVE — SIGNIFICANT CHANGE UP
ETHANOL SERPL-MCNC: <10 MG/DL — SIGNIFICANT CHANGE UP (ref 0–10)
GLUCOSE SERPL-MCNC: 125 MG/DL — HIGH (ref 70–99)
GLUCOSE UR QL: NEGATIVE — SIGNIFICANT CHANGE UP
HCT VFR BLD CALC: 34.1 % — LOW (ref 34.5–45)
HGB BLD-MCNC: 10.7 G/DL — LOW (ref 11.5–15.5)
IMM GRANULOCYTES NFR BLD AUTO: 0.1 % — SIGNIFICANT CHANGE UP (ref 0–1.5)
KETONES UR-MCNC: NEGATIVE — SIGNIFICANT CHANGE UP
LEUKOCYTE ESTERASE UR-ACNC: NEGATIVE — SIGNIFICANT CHANGE UP
LYMPHOCYTES # BLD AUTO: 3.28 K/UL — SIGNIFICANT CHANGE UP (ref 1–3.3)
LYMPHOCYTES # BLD AUTO: 40.1 % — SIGNIFICANT CHANGE UP (ref 13–44)
MCHC RBC-ENTMCNC: 26.2 PG — LOW (ref 27–34)
MCHC RBC-ENTMCNC: 31.4 GM/DL — LOW (ref 32–36)
MCV RBC AUTO: 83.6 FL — SIGNIFICANT CHANGE UP (ref 80–100)
METHADONE UR-MCNC: NEGATIVE — SIGNIFICANT CHANGE UP
MONOCYTES # BLD AUTO: 0.68 K/UL — SIGNIFICANT CHANGE UP (ref 0–0.9)
MONOCYTES NFR BLD AUTO: 8.3 % — SIGNIFICANT CHANGE UP (ref 2–14)
NEUTROPHILS # BLD AUTO: 4.04 K/UL — SIGNIFICANT CHANGE UP (ref 1.8–7.4)
NEUTROPHILS NFR BLD AUTO: 49.4 % — SIGNIFICANT CHANGE UP (ref 43–77)
NITRITE UR-MCNC: NEGATIVE — SIGNIFICANT CHANGE UP
NRBC # BLD: 0 /100 WBCS — SIGNIFICANT CHANGE UP (ref 0–0)
OPIATES UR-MCNC: NEGATIVE — SIGNIFICANT CHANGE UP
PCP SPEC-MCNC: SIGNIFICANT CHANGE UP
PCP UR-MCNC: NEGATIVE — SIGNIFICANT CHANGE UP
PH UR: 6 — SIGNIFICANT CHANGE UP (ref 5–8)
PLATELET # BLD AUTO: 223 K/UL — SIGNIFICANT CHANGE UP (ref 150–400)
POTASSIUM SERPL-MCNC: 3.9 MMOL/L — SIGNIFICANT CHANGE UP (ref 3.5–5.3)
POTASSIUM SERPL-SCNC: 3.9 MMOL/L — SIGNIFICANT CHANGE UP (ref 3.5–5.3)
PROT SERPL-MCNC: 7 G/DL — SIGNIFICANT CHANGE UP (ref 6–8.3)
PROT UR-MCNC: NEGATIVE — SIGNIFICANT CHANGE UP
RBC # BLD: 4.08 M/UL — SIGNIFICANT CHANGE UP (ref 3.8–5.2)
RBC # FLD: 15.7 % — HIGH (ref 10.3–14.5)
RBC CASTS # UR COMP ASSIST: SIGNIFICANT CHANGE UP /HPF (ref 0–4)
SALICYLATES SERPL-MCNC: <1.7 MG/DL — LOW (ref 2.8–20)
SARS-COV-2 RNA SPEC QL NAA+PROBE: SIGNIFICANT CHANGE UP
SODIUM SERPL-SCNC: 144 MMOL/L — SIGNIFICANT CHANGE UP (ref 135–145)
SP GR SPEC: 1.01 — SIGNIFICANT CHANGE UP (ref 1.01–1.02)
THC UR QL: NEGATIVE — SIGNIFICANT CHANGE UP
UROBILINOGEN FLD QL: NEGATIVE — SIGNIFICANT CHANGE UP
WBC # BLD: 8.18 K/UL — SIGNIFICANT CHANGE UP (ref 3.8–10.5)
WBC # FLD AUTO: 8.18 K/UL — SIGNIFICANT CHANGE UP (ref 3.8–10.5)
WBC UR QL: SIGNIFICANT CHANGE UP

## 2021-12-01 PROCEDURE — 85025 COMPLETE CBC W/AUTO DIFF WBC: CPT

## 2021-12-01 PROCEDURE — 99222 1ST HOSP IP/OBS MODERATE 55: CPT

## 2021-12-01 PROCEDURE — 93010 ELECTROCARDIOGRAM REPORT: CPT

## 2021-12-01 PROCEDURE — 93005 ELECTROCARDIOGRAM TRACING: CPT

## 2021-12-01 PROCEDURE — 90792 PSYCH DIAG EVAL W/MED SRVCS: CPT | Mod: 95

## 2021-12-01 PROCEDURE — 70450 CT HEAD/BRAIN W/O DYE: CPT | Mod: MA

## 2021-12-01 PROCEDURE — 72125 CT NECK SPINE W/O DYE: CPT | Mod: MA

## 2021-12-01 PROCEDURE — 99285 EMERGENCY DEPT VISIT HI MDM: CPT | Mod: 25

## 2021-12-01 PROCEDURE — 80307 DRUG TEST PRSMV CHEM ANLYZR: CPT

## 2021-12-01 PROCEDURE — 80053 COMPREHEN METABOLIC PANEL: CPT

## 2021-12-01 PROCEDURE — 81001 URINALYSIS AUTO W/SCOPE: CPT

## 2021-12-01 PROCEDURE — 87635 SARS-COV-2 COVID-19 AMP PRB: CPT

## 2021-12-01 PROCEDURE — 36415 COLL VENOUS BLD VENIPUNCTURE: CPT

## 2021-12-01 RX ORDER — SODIUM CHLORIDE 9 MG/ML
1000 INJECTION, SOLUTION INTRAVENOUS
Refills: 0 | Status: DISCONTINUED | OUTPATIENT
Start: 2021-12-01 | End: 2022-01-18

## 2021-12-01 RX ORDER — DEXTROSE 50 % IN WATER 50 %
15 SYRINGE (ML) INTRAVENOUS ONCE
Refills: 0 | Status: DISCONTINUED | OUTPATIENT
Start: 2021-12-01 | End: 2022-01-18

## 2021-12-01 RX ORDER — SERTRALINE 25 MG/1
50 TABLET, FILM COATED ORAL DAILY
Refills: 0 | Status: DISCONTINUED | OUTPATIENT
Start: 2021-12-01 | End: 2021-12-02

## 2021-12-01 RX ORDER — LORATADINE 10 MG/1
10 TABLET ORAL DAILY
Refills: 0 | Status: DISCONTINUED | OUTPATIENT
Start: 2021-12-01 | End: 2021-12-02

## 2021-12-01 RX ORDER — INSULIN LISPRO 100/ML
VIAL (ML) SUBCUTANEOUS AT BEDTIME
Refills: 0 | Status: DISCONTINUED | OUTPATIENT
Start: 2021-12-01 | End: 2021-12-30

## 2021-12-01 RX ORDER — INSULIN GLARGINE 100 [IU]/ML
10 INJECTION, SOLUTION SUBCUTANEOUS EVERY MORNING
Refills: 0 | Status: DISCONTINUED | OUTPATIENT
Start: 2021-12-01 | End: 2021-12-06

## 2021-12-01 RX ORDER — DEXTROSE 50 % IN WATER 50 %
25 SYRINGE (ML) INTRAVENOUS ONCE
Refills: 0 | Status: DISCONTINUED | OUTPATIENT
Start: 2021-12-01 | End: 2022-01-18

## 2021-12-01 RX ORDER — DEXTROSE 50 % IN WATER 50 %
12.5 SYRINGE (ML) INTRAVENOUS ONCE
Refills: 0 | Status: DISCONTINUED | OUTPATIENT
Start: 2021-12-01 | End: 2022-01-18

## 2021-12-01 RX ORDER — AMLODIPINE BESYLATE 2.5 MG/1
5 TABLET ORAL DAILY
Refills: 0 | Status: DISCONTINUED | OUTPATIENT
Start: 2021-12-01 | End: 2021-12-02

## 2021-12-01 RX ORDER — INSULIN GLARGINE 100 [IU]/ML
14 INJECTION, SOLUTION SUBCUTANEOUS AT BEDTIME
Refills: 0 | Status: DISCONTINUED | OUTPATIENT
Start: 2021-12-01 | End: 2021-12-06

## 2021-12-01 RX ORDER — LOSARTAN POTASSIUM 100 MG/1
100 TABLET, FILM COATED ORAL DAILY
Refills: 0 | Status: DISCONTINUED | OUTPATIENT
Start: 2021-12-01 | End: 2021-12-02

## 2021-12-01 RX ORDER — SIMVASTATIN 20 MG/1
5 TABLET, FILM COATED ORAL AT BEDTIME
Refills: 0 | Status: DISCONTINUED | OUTPATIENT
Start: 2021-12-01 | End: 2021-12-02

## 2021-12-01 RX ORDER — MONTELUKAST 4 MG/1
10 TABLET, CHEWABLE ORAL AT BEDTIME
Refills: 0 | Status: DISCONTINUED | OUTPATIENT
Start: 2021-12-01 | End: 2021-12-02

## 2021-12-01 RX ORDER — PANTOPRAZOLE SODIUM 20 MG/1
40 TABLET, DELAYED RELEASE ORAL
Refills: 0 | Status: DISCONTINUED | OUTPATIENT
Start: 2021-12-01 | End: 2021-12-02

## 2021-12-01 RX ORDER — HYDROCHLOROTHIAZIDE 25 MG
25 TABLET ORAL AT BEDTIME
Refills: 0 | Status: DISCONTINUED | OUTPATIENT
Start: 2021-12-01 | End: 2021-12-02

## 2021-12-01 RX ORDER — CEPHALEXIN 500 MG
500 CAPSULE ORAL
Refills: 0 | Status: DISCONTINUED | OUTPATIENT
Start: 2021-12-01 | End: 2021-12-02

## 2021-12-01 RX ORDER — INSULIN LISPRO 100/ML
VIAL (ML) SUBCUTANEOUS
Refills: 0 | Status: DISCONTINUED | OUTPATIENT
Start: 2021-12-01 | End: 2021-12-30

## 2021-12-01 RX ORDER — LIDOCAINE 4 G/100G
1 CREAM TOPICAL DAILY
Refills: 0 | Status: DISCONTINUED | OUTPATIENT
Start: 2021-12-01 | End: 2021-12-29

## 2021-12-01 RX ORDER — GLUCAGON INJECTION, SOLUTION 0.5 MG/.1ML
1 INJECTION, SOLUTION SUBCUTANEOUS ONCE
Refills: 0 | Status: DISCONTINUED | OUTPATIENT
Start: 2021-12-01 | End: 2022-01-18

## 2021-12-01 RX ADMIN — Medication 500 MILLIGRAM(S): at 22:00

## 2021-12-01 RX ADMIN — Medication 500 MILLIGRAM(S): at 21:10

## 2021-12-01 RX ADMIN — Medication 25 MILLIGRAM(S): at 21:10

## 2021-12-01 RX ADMIN — INSULIN GLARGINE 14 UNIT(S): 100 INJECTION, SOLUTION SUBCUTANEOUS at 21:25

## 2021-12-01 RX ADMIN — SIMVASTATIN 5 MILLIGRAM(S): 20 TABLET, FILM COATED ORAL at 21:10

## 2021-12-01 RX ADMIN — MONTELUKAST 10 MILLIGRAM(S): 4 TABLET, CHEWABLE ORAL at 21:10

## 2021-12-01 NOTE — BH INPATIENT PSYCHIATRY ASSESSMENT NOTE - RISK ASSESSMENT
The patient arrived from [facility] after fall. Collateral information from daughter revealed she has exhibited manic symptoms and she was to have a meeting with her care manager for geriatric psychiatry admission. On evaluation the patient appears to be in Manic episode, with mixed features. Affect labile, patient is very tearful on exam. She exhibits tangential thought processes, religiously preoccupations. She requires inpatient hospitalization for stabilization.

## 2021-12-01 NOTE — BH INPATIENT PSYCHIATRY ASSESSMENT NOTE - NSCOMMENTSUICRISKFACT_PSY_ALL_CORE
Patient appears in mixed manic episode. She does endorse depression, labile in affect - tearful at times. She denied specific symptoms of depression.

## 2021-12-01 NOTE — BH PATIENT PROFILE - FALL HARM RISK - RISK INTERVENTIONS
Assistance OOB with selected safe patient handling equipment/Communicate Fall Risk and Risk Factors to all staff, patient, and family/Discuss with provider need for PT consult/Monitor gait and stability/Provide patient with walking aids - walker, cane, crutches/Reinforce activity limits and safety measures with patient and family/Bed in lowest position, wheels locked, appropriate side rails in place/Call bell, personal items and telephone in reach/Instruct patient to call for assistance before getting out of bed or chair/Non-slip footwear when patient is out of bed/Physically safe environment - no spills, clutter or unnecessary equipment/Purposeful Proactive Rounding Assistance OOB with selected safe patient handling equipment/Communicate Fall Risk and Risk Factors to all staff, patient, and family/Discuss with provider need for PT consult/Monitor gait and stability/Provide patient with walking aids - walker, cane, crutches/Reinforce activity limits and safety measures with patient and family/Visual Cue: Yellow wristband/Bed in lowest position, wheels locked, appropriate side rails in place/Call bell, personal items and telephone in reach/Instruct patient to call for assistance before getting out of bed or chair/Non-slip footwear when patient is out of bed/[place] to call system/Physically safe environment - no spills, clutter or unnecessary equipment/Purposeful Proactive Rounding/Room/bathroom lighting operational, light cord in reach

## 2021-12-01 NOTE — ED BEHAVIORAL HEALTH NOTE - BEHAVIORAL HEALTH NOTE
===================  PRE-HOSPITAL COURSE  ===================    SOURCE: Chart    DETAILS: Arrived via EMS activated by assisted living after fall    ============  ED COURSE   ============    SOURCE: Chart      ARRIVAL: EMS    BELONGINGS: No items of note    BEHAVIOR: Arrived alert and oriented, cooperative, no psych complaints, in behavioral control.     TREATMENT: No PRN psych meds    VISITORS:  None    ========================  COLLATERAL  ========================    NAME: Marcelina    NUMBER: [phone]    RELATIONSHIP: Daughter – Health Care Proxy reportedly    RELIABILITY: Good    COMMENTS:     Patient gives permission to obtain collateral from _Daughter____:  (x  ) Yes  (  )  No  Rationale for overriding objection            (  ) Lack of capacity. Details: ________            (  ) Assessing risk of danger to self/others. Details: ________      Rationale for selecting specific collateral source            (  ) Potential to impact risk of danger to self/others and no alternative equivalent. Details: _____      ========================  HPI  ========================    BASELINE FUNCTIONING: Patient is a  female 3x  with 5 adult children. Patient resides at [facility] for the past ~4.5 years. Patient at baseline is A&Ox4 requiring some assistance with ADLs and medication management. Described to be dramatic, stubborn and inappropriate at times at baseline. Patient is in treatment with a psychiatrist and therapist.     DATE HPI STARTED: ~1 year ago    DECOMPENSATION: Per daughter patient has been in decline for the past year or so, states she has been increasingly irritable and defiant, often leaving the assisted living in the middle of the night, getting lost and being brought home by strangers or losing her phone. She feels patient is more Hoahaoism than normal using the excuse that “wang is with me” when responding to getting lost. Also states patient seems more sexual, in recent months has said she is having sex with the xray tech and other inappropriate things. Daughter does admit patient has always been somewhat Hoahaoism and sexually inappropriate however. States patient has been spending more of her money and taking advantage of a gentleman financially that she calls her boyfriend. Patient at times making bizarre statements about having magical perera. Reports she feels patient is mixing up her days and nights, stays awake at night often then sleep during the day. In recent weeks feels patient is speaking incessantly and rapidly possibly manic. Also noticing some slurring of her speech this past week, reports patient has past history of Etoh abuse which she had been sober since 61 yrs old, she is unsure if patient has started drinking again. Patient also admitted this past week to cheeking some of her medications. Denies patient has been overtly suicidal, no AH/VH, no violence.  Daughter indicates that patient’s outpatient psychiatrist and therapist have been trying to convince her to be admitted but patient has refused, daughter states once she heard patient was taken to the hospital they wanted to take advantage and get her psychiatrically hospitalized.     SUICIDALITY: None    VIOLENCE: None     SUBSTANCE: None known but questions relapse on Etoh after recent slurred speech per HPI?    ========================  PAST PSYCHIATRIC HISTORY  ========================    DATE PAST PSYCHIATRIC HISTORY STARTED: ~5 years ago    MAIN PSYCHIATRIC DIAGNOSIS: Depression, Anxiety, PTSD, Cluster B personality traits    PSYCHIATRIC HOSPITALIZATIONS: 1 prior hosp [facility] ~ 5 years ago    PRIOR ILLNESS: States patient had no formal psychiatric history but has always been somewhat histrionic, about 5 years ago stopped eating, lying in bed and became delusional that her ex-spouse was poisoning her. At that time admitted to [facility]. Was d/c to assisted living where she resides today, had returned to relatively baseline functioning after d/c until the pandemic hit.     SUICIDALITY: No past SI/SA/SIB    VIOLENCE: No violence hx     SUBSTANCE USE: Hx Etoh abuse in past, sober with AA meetings starting around 61 yrs old?    ==============  OTHER HISTORY  ==============    CURRENT MEDICATION: Per Medical ED assessment    MEDICAL HISTORY:  Per Medical ED assessment    ALLERGIES: Per Medical ED assessment    LEGAL ISSUES: None    FIREARM ACCESS: None    SOCIAL HISTORY: Has 5 children, daughter is HCP, son POA. X3 divorce and x1 .     FAMILY HISTORY: None known    COVID Exposure Screen- collateral (i.e. third-party, chart review, belongings, etc; include EMS and ED staff)  1.	*Has the patient had a COVID-19 test in the last 90 days?  (  ) Yes   (x  ) No   (  ) Unknown- Reason: _____  IF YES PROCEED TO QUESTION #2. IF NO OR UNKNOWN, PLEASE SKIP TO QUESTION #3.  2.	Date of test(s) and result(s): ________  3.	*Has the patient tested positive for COVID-19 antibodies? (  ) Yes   ( x ) No   (  ) Unknown- Reason: _____  IF YES PROCEED TO QUESTION #4. IF NO or UNKNOWN, PLEASE SKIP TO QUESTION #5.  4.	Date of positive antibody test: ________  5.	*Has the patient received 2 doses of the COVID-19 vaccine? ( x ) Yes   (  ) No   (  ) Unknown- Reason: _____  IF YES PROCEED TO QUESTION #6. IF NO or UNKNOWN, PLEASE SKIP TO QUESTION #7.  6.	 Date of second dose: ___x3 vacc’d_____  7.	*In the past 10 days, has the patient been around anyone with a positive COVID-19 test?* (  ) Yes   ( x ) No   (  ) Unknown- Reason: __  IF YES PROCEED TO QUESTION #8. IF NO or UNKNOWN, PLEASE SKIP TO QUESTION #13.  8.	Was the patient within 6 feet of them for at least 15 minutes? (  ) Yes   (  ) No   (  ) Unknown- Reason: _____  9.	Did the patient provide care for them? (  ) Yes   (  ) No   (  ) Unknown- Reason: ______  10.	Did the patient have direct physical contact with them (touched, hugged, or kissed them)? (  ) Yes   (  ) No    (  ) Unknown- Reason: __  11.	Did the patient share eating or drinking utensils with them? (  ) Yes   (  ) No    (  ) Unknown- Reason: ____  12.	Did they sneeze, cough, or somehow get respiratory droplets on the patient? (  ) Yes   (  ) No    (  ) Unknown- Reason: ______  13.	*Has the patient been out of [place] within the past 10 days?* (  ) Yes   (x  ) No   (  ) Unknown- Reason: _____  IF YES PLEASE ANSWER THE FOLLOWING QUESTIONS:  14.	Which state/country did they go to? ______  15.	Were they there over 24 hours? (  ) Yes   (  ) No    (  ) Unknown- Reason: ______  16.	Date of return to [facility]: ______

## 2021-12-01 NOTE — BH INPATIENT PSYCHIATRY ASSESSMENT NOTE - NSDCCRITERIA_PSY_ALL_CORE
Patient took Aspirin 325 mg at 0800, see medication record. The patient has a fraility score of 1-VERY FIT, based on exercises without difficulty. Stabilization of patient's anjelica, decrease of symptoms by 50%.

## 2021-12-01 NOTE — ED BEHAVIORAL HEALTH ASSESSMENT NOTE - DETAILS
after hours Case discussed with ADN and bed reserved for patient; MADELYN discussion pending and would recommend transferring after she is observed to be fully alert given presenting concerns as above as per allergy list see collateral from [facility]

## 2021-12-01 NOTE — CHART NOTE - NSCHARTNOTEFT_GEN_A_CORE
Screening Medical Evaluation  Patient Admitted from: V ED    [facility] admitting diagnosis: Recurrent major depressive disorder    PAST MEDICAL & SURGICAL HISTORY:  HTN (hypertension)    DM (diabetes mellitus)    Hyperlipemia    Diabetes mellitus    Dementia    S/P carpal tunnel release  bilateral    S/P partial hysterectomy          Allergies    adhesives (Unknown)  Cipro (Hives)  Cipro (Rash)  penicillin (Rash)  penicillin (Unknown)  sulfa drugs (Rash)  sulfa drugs (Unknown)    Intolerances        Social History:     FAMILY HISTORY:  No pertinent family history in first degree relatives        MEDICATIONS  (STANDING):  amLODIPine   Tablet 5 milliGRAM(s) Oral daily  cephalexin 500 milliGRAM(s) Oral two times a day  dextrose 40% Gel 15 Gram(s) Oral once  dextrose 5%. 1000 milliLiter(s) (50 mL/Hr) IV Continuous <Continuous>  dextrose 5%. 1000 milliLiter(s) (100 mL/Hr) IV Continuous <Continuous>  dextrose 50% Injectable 25 Gram(s) IV Push once  dextrose 50% Injectable 12.5 Gram(s) IV Push once  dextrose 50% Injectable 25 Gram(s) IV Push once  glucagon  Injectable 1 milliGRAM(s) IntraMuscular once  hydrochlorothiazide 25 milliGRAM(s) Oral at bedtime  insulin glargine Injectable (LANTUS) 10 Unit(s) SubCutaneous every morning  insulin glargine Injectable (LANTUS) 14 Unit(s) SubCutaneous at bedtime  lidocaine   4% Patch 1 Patch Transdermal daily  loratadine 10 milliGRAM(s) Oral daily  losartan 100 milliGRAM(s) Oral daily  montelukast 10 milliGRAM(s) Oral at bedtime  naproxen 500 milliGRAM(s) Oral at bedtime  pantoprazole    Tablet 40 milliGRAM(s) Oral before breakfast  sertraline 50 milliGRAM(s) Oral daily  simvastatin 5 milliGRAM(s) Oral at bedtime    MEDICATIONS  (PRN):  LORazepam     Tablet 0.5 milliGRAM(s) Oral every 6 hours PRN anxiety      Vital Signs Last 24 Hrs  T(C): 37.3 (01 Dec 2021 15:32), Max: 37.3 (01 Dec 2021 12:05)  T(F): 99.2 (01 Dec 2021 15:32), Max: 99.2 (01 Dec 2021 12:05)  HR: 69 (01 Dec 2021 20:25) (68 - 72)  BP: 128/53 (01 Dec 2021 20:25) (128/53 - 149/74)  BP(mean): --  RR: 17 (01 Dec 2021 12:05) (16 - 17)  SpO2: 98% (01 Dec 2021 12:05) (96% - 98%)  CAPILLARY BLOOD GLUCOSE      POCT Blood Glucose.: 133 mg/dL (01 Dec 2021 16:45)        PHYSICAL EXAM:  GENERAL: NAD, well-developed  HEAD:  Atraumatic, Normocephalic  EYES: EOMI, PERRLA, conjunctiva and sclera clear  NECK: Supple, No JVD  CHEST/LUNG: Clear to auscultation bilaterally; No wheeze  HEART: Regular rate and rhythm; No murmurs, rubs, or gallops  ABDOMEN: Soft, Nontender, Nondistended; Bowel sounds present  EXTREMITIES:  2+ Peripheral Pulses, No clubbing, cyanosis, or edema  PSYCH: AAOx3  NEUROLOGY: non-focal  SKIN: No rashes or lesions    LABS:                        10.7   8.18  )-----------( 223      ( 01 Dec 2021 00:10 )             34.1     12    144  |  111<H>  |  9   ----------------------------<  125<H>  3.9   |  29  |  0.59    Ca    8.6      01 Dec 2021 00:10    TPro  7.0  /  Alb  3.0<L>  /  TBili  0.2  /  DBili  x   /  AST  16  /  ALT  26  /  AlkPhos  18<L>  12-01          Urinalysis Basic - ( 01 Dec 2021 01:51 )    Color: Yellow / Appearance: Clear / S.010 / pH: x  Gluc: x / Ketone: Negative  / Bili: Negative / Urobili: Negative   Blood: x / Protein: Negative / Nitrite: Negative   Leuk Esterase: Negative / RBC: 0-2 /HPF / WBC 0-2   Sq Epi: x / Non Sq Epi: Negative / Bacteria: Occasional        RADIOLOGY & ADDITIONAL TESTS:    Assessment and Plan:  81 year old female with hx of HTN, DM, HLD, Asthma presents to [facility] with an admitting diagnosis of Recurrent major depressive disorder. Pt has no medical complaints at this time including fevers, headache, dizziness, changes in vision, chest pain, SOB, abdominal pain, N/V/D/C, dysuria.     1. Recurrent major depressive disorder- follow plan per psychiatric team    2. HTN- continue HCTZ 25 mg qhs, Norvasc 5mg qd    3. HLD- continue Simvastatin 5 mg qd    4. GERD- continue Protonix 40 mg daily    5. Chronic Back pain- continue Naproxen 500mg, Lidocaine patch prn Screening Medical Evaluation  Patient Admitted from: V ED    [facility] admitting diagnosis: Recurrent major depressive disorder    PAST MEDICAL & SURGICAL HISTORY:  HTN (hypertension)    DM (diabetes mellitus)    Hyperlipemia    Diabetes mellitus    Dementia    S/P carpal tunnel release  bilateral    S/P partial hysterectomy          Allergies    adhesives (Unknown)  Cipro (Hives)  Cipro (Rash)  penicillin (Rash)  penicillin (Unknown)  sulfa drugs (Rash)  sulfa drugs (Unknown)    Intolerances        Social History:     FAMILY HISTORY:  No pertinent family history in first degree relatives        MEDICATIONS  (STANDING):  amLODIPine   Tablet 5 milliGRAM(s) Oral daily  cephalexin 500 milliGRAM(s) Oral two times a day  dextrose 40% Gel 15 Gram(s) Oral once  dextrose 5%. 1000 milliLiter(s) (50 mL/Hr) IV Continuous <Continuous>  dextrose 5%. 1000 milliLiter(s) (100 mL/Hr) IV Continuous <Continuous>  dextrose 50% Injectable 25 Gram(s) IV Push once  dextrose 50% Injectable 12.5 Gram(s) IV Push once  dextrose 50% Injectable 25 Gram(s) IV Push once  glucagon  Injectable 1 milliGRAM(s) IntraMuscular once  hydrochlorothiazide 25 milliGRAM(s) Oral at bedtime  insulin glargine Injectable (LANTUS) 10 Unit(s) SubCutaneous every morning  insulin glargine Injectable (LANTUS) 14 Unit(s) SubCutaneous at bedtime  lidocaine   4% Patch 1 Patch Transdermal daily  loratadine 10 milliGRAM(s) Oral daily  losartan 100 milliGRAM(s) Oral daily  montelukast 10 milliGRAM(s) Oral at bedtime  naproxen 500 milliGRAM(s) Oral at bedtime  pantoprazole    Tablet 40 milliGRAM(s) Oral before breakfast  sertraline 50 milliGRAM(s) Oral daily  simvastatin 5 milliGRAM(s) Oral at bedtime    MEDICATIONS  (PRN):  LORazepam     Tablet 0.5 milliGRAM(s) Oral every 6 hours PRN anxiety      Vital Signs Last 24 Hrs  T(C): 37.3 (01 Dec 2021 15:32), Max: 37.3 (01 Dec 2021 12:05)  T(F): 99.2 (01 Dec 2021 15:32), Max: 99.2 (01 Dec 2021 12:05)  HR: 69 (01 Dec 2021 20:25) (68 - 72)  BP: 128/53 (01 Dec 2021 20:25) (128/53 - 149/74)  BP(mean): --  RR: 17 (01 Dec 2021 12:05) (16 - 17)  SpO2: 98% (01 Dec 2021 12:05) (96% - 98%)  CAPILLARY BLOOD GLUCOSE      POCT Blood Glucose.: 133 mg/dL (01 Dec 2021 16:45)        PHYSICAL EXAM:  GENERAL: NAD, well-developed  HEAD:  Atraumatic, Normocephalic  EYES: EOMI, PERRLA, conjunctiva and sclera clear  NECK: Supple, No JVD  CHEST/LUNG: Clear to auscultation bilaterally; No wheeze  HEART: Regular rate and rhythm; No murmurs, rubs, or gallops  ABDOMEN: Soft, Nontender, Nondistended; Bowel sounds present  EXTREMITIES:  2+ Peripheral Pulses, No clubbing, cyanosis, or edema  PSYCH: AAOx3  NEUROLOGY: non-focal  SKIN: No rashes or lesions    LABS:                        10.7   8.18  )-----------( 223      ( 01 Dec 2021 00:10 )             34.1     12    144  |  111<H>  |  9   ----------------------------<  125<H>  3.9   |  29  |  0.59    Ca    8.6      01 Dec 2021 00:10    TPro  7.0  /  Alb  3.0<L>  /  TBili  0.2  /  DBili  x   /  AST  16  /  ALT  26  /  AlkPhos  18<L>  12-01          Urinalysis Basic - ( 01 Dec 2021 01:51 )    Color: Yellow / Appearance: Clear / S.010 / pH: x  Gluc: x / Ketone: Negative  / Bili: Negative / Urobili: Negative   Blood: x / Protein: Negative / Nitrite: Negative   Leuk Esterase: Negative / RBC: 0-2 /HPF / WBC 0-2   Sq Epi: x / Non Sq Epi: Negative / Bacteria: Occasional        RADIOLOGY & ADDITIONAL TESTS:    Assessment and Plan:  81 year old female with hx of HTN, DM, HLD, Asthma presents to [facility] with an admitting diagnosis of Recurrent major depressive disorder. Pt has no medical complaints at this time including fevers, headache, dizziness, changes in vision, chest pain, SOB, abdominal pain, N/V/D/C, dysuria.     1. Recurrent major depressive disorder- follow plan per psychiatric team    2. HTN- continue HCTZ 25 mg qhs, Norvasc 5mg qd    3. HLD- continue Simvastatin 5 mg qd    4. GERD- continue Protonix 40 mg daily    5. Chronic Back pain- continue Naproxen 500mg, Lidocaine patch prn    6. DM- continue Lantus BID and Insulin sliding scale

## 2021-12-01 NOTE — ED BEHAVIORAL HEALTH NOTE - BEHAVIORAL HEALTH NOTE
Chart reviewed, received signout, patient reassessed   - Patient alert and oriented x 4 this morning, though interview somewhat limited by patient mental status as she was tangential, pressured (not loud or fast but increased production), hyperverbal. She denied SI/HI/AVTH but made multiple mentions of there being "duplicates" of her and her  , and states that she does not like her medications though she would not say what she takes.    - Message left for psychiatrist Dr herrera [phone] requesting call back    MSE notable for pressured speech and tangential thought process, no signs of internal preoccupation or odd movements    81F, , retired, lives at [facility], [facility] of dementia (diagnosed at [facility] though baseline AAOx4, needs some assistance in medications and ADLs), DM2, HTN, HLD, psych hx of bipolar with one prior hospitalization in 2017 for psychotic depression, former etoh abuse (reportedly sober x 15 years), PTSD, some cluster B personality traits, no known suicide attempts or violence, now initially BIBEMS after a fall from sitting, subsequent collateral indicated that patient has had an acute exacerbation of manic symptoms in the setting of a year of general decline, and team had been planning for inpatient psych admission before the fall. She has since been medically cleared and would likely benefit from admission for safety, stabilization, possible medication titration   - Continue with plan for [facility] admission   - Obtain medication list from psychiatrist Dr. Herrera at [phone] Chart reviewed, received signout, patient reassessed   - Patient alert and oriented x 4 this morning, though interview somewhat limited by patient mental status as she was tangential, pressured (not loud or fast but increased production), hyperverbal. She denied SI/HI/AVTH but made multiple mentions of there being "duplicates" of her and her  , and states that she does not like her medications though she would not say what she takes.    - Message left for psychiatrist Dr herrera [phone] requesting call back    MSE notable for pressured speech and tangential thought process, no signs of internal preoccupation or odd movements    81F, , retired, lives at [facility], [facility] of dementia (diagnosed at [facility] though baseline AAOx4, needs some assistance in medications and ADLs), DM2, HTN, HLD, psych hx of bipolar with one prior hospitalization in 2017 for psychotic depression, former etoh abuse (reportedly sober x 15 years), PTSD, some cluster B personality traits, no known suicide attempts or violence, now initially BIBEMS after a fall from sitting, subsequent collateral indicated that patient has had an acute exacerbation of manic symptoms in the setting of a year of general decline, and team had been planning for inpatient psych admission before the fall. She has since been medically cleared and would likely benefit from admission for safety, stabilization, possible medication titration   - Continue with plan for [facility] admission   - Obtain medication list from psychiatrist Dr. Herrera at [phone]      COVID Exposure Screen- Patient    1. *Have you had a COVID-19 test in the last 90 days? ( ) Yes ( X) No ( ) Unknown- Reason: _____    IF YES PROCEED TO QUESTION #2. IF NO OR UNKNOWN, PLEASE SKIP TO QUESTION #3.    2. Date of test(s) and result(s): ________    3. *Have you tested positive for COVID-19 antibodies? ( ) Yes (X ) No ( ) Unknown- Reason: _____    IF YES PROCEED TO QUESTION #4. IF NO or UNKNOWN, PLEASE SKIP TO QUESTION #5.    4. Date of positive antibody test: ________    5. *Have you received 2 doses of the COVID-19 vaccine? (X ) Yes ( ) No ( ) Unknown- Reason: _____    IF YES PROCEED TO QUESTION #6. IF NO or UNKNOWN, PLEASE SKIP TO QUESTION #7.    6. Date of second dose: __reported 3rd dose  two weeks ago   7. *In the past 10 days, have you been around anyone with a positive COVID-19 test?* ( ) Yes ( X) No ( ) Unknown- Reason: ____    IF YES PROCEED TO QUESTION #8. IF NO or UNKNOWN, PLEASE SKIP TO QUESTION #13.    8. Were you within 6 feet of them for at least 15 minutes? ( ) Yes ( ) No ( ) Unknown- Reason: _____    9. Have you provided care for them? ( ) Yes ( ) No ( ) Unknown- Reason: ______    10. Have you had direct physical contact with them (touched, hugged, or kissed them)? ( ) Yes ( ) No ( ) Unknown- Reason: _____    11. Have you shared eating or drinking utensils with them? ( ) Yes ( ) No ( ) Unknown- Reason: ____    12. Have they sneezed, coughed, or somehow gotten respiratory droplets on you? ( ) Yes ( ) No ( ) Unknown- Reason: ______    13. *Have you been out of [place] within the past 10 days?* ( ) Yes (X ) No ( ) Unknown- Reason: _____    IF YES PLEASE ANSWER THE FOLLOWING QUESTIONS:    14. Which state/country have you been to? ______    15. Were you there over 24 hours? ( ) Yes ( ) No ( ) Unknown- Reason: ______    16. Date of return to [facility]: ______

## 2021-12-01 NOTE — ED BEHAVIORAL HEALTH ASSESSMENT NOTE - SUMMARY
This is an 81 year old , retired female, domiciled at the [facility], with past psychiatric history of Bipolar I disorder, w/ documented episodes of anjelica (as per her outpatient psychiatrist; Dr. Ross; [phone]) though onset of symptoms and formal psychiatric illness is in the last 5 years beginning with depressive sx and failure to thrive, history of alcohol abuse and reported concerns of PTSD and Cluster B Personality traits (sees a therapist; Gabriella Hernández [phone]), one known psychiatric admission ([facility] 2/2017), no history of suicide attempts, non-suicidal self injury, violence, aggression or legal issues, and past medical history of Dementia (diagnosed by providers at the [facility]), HTN, HLD, Type II DM, Asthma, Carpal Tunnel syndrome (s/p surgery) and GERD who initially presented to the ED BIB EMS activated after an unwitnessed fall reportedly from a sitting position with impact to the forehead. Patient has been medically evaluated without any visible injuries, complained of headache without N/V or focal neurological signs and labs are grossly within normal limits with negative CT head/neck/spine for acute processes. Collateral from daughter, however, indicates that patient has coincidentally been exhibiting symptoms of anjelica such that a tentative plan for inpatient lluvia-psychiatric admission had already been made for today.

## 2021-12-01 NOTE — BH INPATIENT PSYCHIATRY ASSESSMENT NOTE - NSBHCHARTREVIEWVS_PSY_A_CORE FT
Vital Signs Last 24 Hrs  T(C): 37.3 (12-01-21 @ 15:32), Max: 37.3 (12-01-21 @ 12:05)  T(F): 99.2 (12-01-21 @ 15:32), Max: 99.2 (12-01-21 @ 12:05)  HR: 71 (12-01-21 @ 12:05) (68 - 83)  BP: 144/68 (12-01-21 @ 12:05) (134/71 - 149/74)  BP(mean): --  RR: 17 (12-01-21 @ 12:05) (16 - 17)  SpO2: 98% (12-01-21 @ 12:05) (96% - 98%)    Orthostatic VS  12-01-21 @ 15:23  Lying BP: --/-- HR: --  Sitting BP: 168/60 HR: 83  Standing BP: 155/70 HR: 85  Site: --  Mode: --

## 2021-12-01 NOTE — ED ADULT NURSE REASSESSMENT NOTE - NS ED NURSE REASSESS COMMENT FT1
Richi called twice no answer. Report finally given to Ivelisse LEGER.
daughter concerned about mother's psychiatric condition- patient is due to see a psychiatrist tomorrow, but daughter wants patient evaluated by kierra while in the er, Dr colorado spoke with daughter, for telepsych evaluation
patient unable to speak to telepsych as patient very sleepy.
patient walked to the bathroom with assistance with a  walker.. urine sample sent.
patient's clothes and belongings taken away from the bedside, patient has 1 hearing aide in the right ear. patient denies any suicidal or homicidal ideations.- patient placed on 1:1
Received pt in bed alert.  Pt on 1:1 . Pt calm.  Awaiting telepsy and  transfer to [facility]

## 2021-12-01 NOTE — ED BEHAVIORAL HEALTH ASSESSMENT NOTE - OTHER
Sedate Unable to assess Expansive grooming (blown out hair, heavy make up) [facility]; [facility] Records Checked: [place] [facility], [facility], [place] [facility], [facility], [facility] In[facility], [facility], [facility] Medical, [facility] Outpatient BH, HIE ED, [facility] Inpatient, [facility] Outpatient, Tier Inpatient, Tier E&A, [facility] Inpatient, [facility] ED, [facility], [facility], [facility], [facility] In[facility], [facility], [facility], Social Media (For example - Facebook, Vanuam, Kallik), Web search, [employer] [facility]

## 2021-12-01 NOTE — BH INPATIENT PSYCHIATRY ASSESSMENT NOTE - DETAILS
Poor historian due to tangential thought processes. Will reassess when patient able to respond appropriately.

## 2021-12-01 NOTE — BH INPATIENT PSYCHIATRY ASSESSMENT NOTE - DESCRIPTION
Patient lives at [facility] for 4 years. She is Frisian / Slov[place]. She has been  3 times, and has 4 children.

## 2021-12-01 NOTE — BH INPATIENT PSYCHIATRY ASSESSMENT NOTE - MSE UNSTRUCTURED FT
Appearance: Elderly  female, appears stated age, thin. Sitting with a stuffed toy Lion.   Behavior: Cooperative with team, able to be redirected. Appropriate eye contact.  Speech: Over-productive speech, regular in tone, normal rate.   Motor: No PMR  Mood: "I'm been depressed forever"  Affect: Labile  Thought process: Tangential thought processes.  Thought content: Religiously preoccupations. Concerned of her Kizzy. Denied SHIIP.  Perception: Grandiose delusions of believing she is a healer / counselor for her co-residents. Not responding to internal stimuli.   Judgement: poor  Insight: Poor

## 2021-12-01 NOTE — DISCHARGE NOTE NURSING/CASE MANAGEMENT/SOCIAL WORK - NSDCPEFALRISK_GEN_ALL_CORE
For information on Fall & Injury Prevention, visit: [email or web address] OR  [email or web address] OR  https://www.cdc.gov/steadi/patient.html

## 2021-12-01 NOTE — ED BEHAVIORAL HEALTH ASSESSMENT NOTE - RISK ASSESSMENT
Pertinent known risk and protective factors are noted in documentation above Unable to determine Suicide Risk

## 2021-12-01 NOTE — BH PATIENT PROFILE - HOME MEDICATIONS
cefadroxil 500 mg oral capsule , 1 cap(s) orally 2 times a day   Percocet 5/325 oral tablet , 1 tab(s) orally every 6 hours MDD:20mg  Naprosyn 500 mg oral tablet , 1 tab(s) orally 2 times a day   lidocaine 5% topical film , Apply topically to affected area once a day   naproxen 500 mg oral tablet ,  orally once a day (at bedtime)  Lantus Solostar Pen 100 units/mL subcutaneous solution , 14 unit(s) subcutaneous   simvastatin 5 mg oral tablet , 1 tab(s) orally once a day (at bedtime)  amlodipine 5 mg oral tablet , 1 tab(s) orally once a day  Zyrtec 10 mg oral tablet , 1 tab(s) orally once a day  Singulair 10 mg oral tablet , 1 tab(s) orally once a day (in the evening)  hydrochlorothiazide 25 mg oral tablet , 1 tab(s) orally once a day  Onglyza 5 mg oral tablet , 1 tab(s) orally once a day  sertraline 50 mg oral tablet , 1 tab(s) orally once a day  omeprazole 20 mg oral delayed release tablet , 1 tab(s) orally once a day  losartan 100 mg oral tablet , 1 tab(s) orally once a day  Lantus Solostar Pen 100 units/mL subcutaneous solution , 10 unit(s) subcutaneous

## 2021-12-01 NOTE — DISCHARGE NOTE NURSING/CASE MANAGEMENT/SOCIAL WORK - PATIENT PORTAL LINK FT
You can access the FollowMyHealth Patient Portal offered by [facility] by registering at the following website: [email or web address]. By joining [facility]’s FollowMyHealth portal, you will also be able to view your health information using other applications (apps) compatible with our system.

## 2021-12-01 NOTE — BH INPATIENT PSYCHIATRY ASSESSMENT NOTE - HPI (INCLUDE ILLNESS QUALITY, SEVERITY, DURATION, TIMING, CONTEXT, MODIFYING FACTORS, ASSOCIATED SIGNS AND SYMPTOMS)
Pt is a 81 year old  female, with reported past psychiatric history of Bipolar I Disorder, reported past medical history of Diabetes, Dementia, HTN, HLD, Type II DM, Asthma, Carpal Tunnel syndrome (s/p surgery) and GERD, was brought in by EMS from [facility] [place] after an unwitnessed fall reportedly from a sitting position with impact to the forehead.     The patient was medically evaluated and cleared for fall. However upon collateral information from daughter she has been reportedly exhibiting concerning manic symptoms. The patient was seen with primary team. She was noted to be hyperverbal with tangential thought processes which made patient an unreliable historian. The patient stated she was unsure why she was brought to the hospital from her housing facility. She stated that she was placed in the facility after her 4 children had left her home, and she became depressed. 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 She reported she wasn't doing well at the facility, and reported her last known time well as when she was 16 years old. She stated "you're not going to understand this, but it all started when my mother bought me my first long dress at 16 years old and they noticed my back was crooked."     She denied any changes in her appetite, sleep, denied any psychotic sx of AVH / paranoia, denied a period of impulsive spending, she denied anxiety. The patient reported her memory was fine, became guarded stating "I'm fine it's not because im old!" She was able to report current President, however reported previous president incorrectly. Patient reported date as December 1st. She exhibited some difficulty with time, stating her  passed in 1953 (patient was born in 1940).

## 2021-12-01 NOTE — BH INPATIENT PSYCHIATRY ASSESSMENT NOTE - OTHER PAST PSYCHIATRIC HISTORY (INCLUDE DETAILS REGARDING ONSET, COURSE OF ILLNESS, INPATIENT/OUTPATIENT TREATMENT)
Patient was unable to report her diagnosis. As per chart review the patient has one known hospitalization([facility] 2/2017). Patient sees - outpatient psychiatrist Dr. Ross, [phone]. No reported history of suicide attempts, non-suicidal self injury, violence, aggression or legal issues.

## 2021-12-01 NOTE — ED BEHAVIORAL HEALTH ASSESSMENT NOTE - DESCRIPTION
3 times ; ; adult children as per HPI ED course and collateral from daughter are as per BTCM (ED Behavioral health) note

## 2021-12-01 NOTE — ED ADULT NURSE REASSESSMENT NOTE - NSIMPLEMENTINTERV_GEN_ALL_ED
Implemented All Fall with Harm Risk Interventions:  [place] to call system. Call bell, personal items and telephone within reach. Instruct patient to call for assistance. Room bathroom lighting operational. Non-slip footwear when patient is off stretcher. Physically safe environment: no spills, clutter or unnecessary equipment. Stretcher in lowest position, wheels locked, appropriate side rails in place. Provide visual cue, wrist band, yellow gown, etc. Monitor gait and stability. Monitor for mental status changes and reorient to person, place, and time. Review medications for side effects contributing to fall risk. Reinforce activity limits and safety measures with patient and family. Provide visual clues: red socks.

## 2021-12-01 NOTE — BH INPATIENT PSYCHIATRY ASSESSMENT NOTE - CURRENT MEDICATION
MEDICATIONS  (STANDING):    MEDICATIONS  (PRN):   MEDICATIONS  (STANDING):  amLODIPine   Tablet 5 milliGRAM(s) Oral daily  cephalexin 500 milliGRAM(s) Oral two times a day  dextrose 40% Gel 15 Gram(s) Oral once  dextrose 5%. 1000 milliLiter(s) (50 mL/Hr) IV Continuous <Continuous>  dextrose 5%. 1000 milliLiter(s) (100 mL/Hr) IV Continuous <Continuous>  dextrose 50% Injectable 25 Gram(s) IV Push once  dextrose 50% Injectable 12.5 Gram(s) IV Push once  dextrose 50% Injectable 25 Gram(s) IV Push once  glucagon  Injectable 1 milliGRAM(s) IntraMuscular once  hydrochlorothiazide 25 milliGRAM(s) Oral at bedtime  insulin glargine Injectable (LANTUS) 10 Unit(s) SubCutaneous every morning  insulin glargine Injectable (LANTUS) 14 Unit(s) SubCutaneous at bedtime  lidocaine   4% Patch 1 Patch Transdermal daily  loratadine 10 milliGRAM(s) Oral daily  losartan 100 milliGRAM(s) Oral daily  montelukast 10 milliGRAM(s) Oral at bedtime  naproxen 500 milliGRAM(s) Oral at bedtime  pantoprazole    Tablet 40 milliGRAM(s) Oral before breakfast  sertraline 50 milliGRAM(s) Oral daily  simvastatin 5 milliGRAM(s) Oral at bedtime    MEDICATIONS  (PRN):  LORazepam     Tablet 0.5 milliGRAM(s) Oral every 6 hours PRN anxiety

## 2021-12-01 NOTE — ED BEHAVIORAL HEALTH ASSESSMENT NOTE - HPI (INCLUDE ILLNESS QUALITY, SEVERITY, DURATION, TIMING, CONTEXT, MODIFYING FACTORS, ASSOCIATED SIGNS AND SYMPTOMS)
This is an 81 year old , retired female, domiciled at the [facility], with past psychiatric history of Bipolar I disorder, w/ documented episodes of anjelica (as per her outpatient psychiatrist; Dr. Ross; [phone]) though onset of symptoms and formal psychiatric illness is in the last 5 years beginning with depressive sx and failure to thrive, history of alcohol abuse and reported concerns of PTSD and Cluster B Personality traits (sees a therapist; Gabriella Hernández [phone]), one known psychiatric admission ([facility] 2/2017), no history of suicide attempts, non-suicidal self injury, violence, aggression or legal issues, and past medical history of Dementia (diagnosed by providers at the [facility]), HTN, HLD, Type II DM, Asthma, Carpal Tunnel syndrome (s/p surgery) and GERD who initially presented to the ED BIB EMS activated after an unwitnessed fall reportedly from a sitting position with impact to the forehead.     Patient has been medically evaluated without any visible injuries, complained of headache without N/V or focal neurological signs and labs are grossly within normal limits with negative CT head/neck/spine for acute processes. Collateral from daughter, however, indicates that patient has coincidentally been exhibiting symptoms of anjelica such that a tentative multidisciplinary team meeting with patient, family, therapist and psychiatrist had been scheduled for later in the day today to arrange for inpatient lluvia-psychiatric admission. On assessment, patient is sleeping on approach and remains somnolent upon wakening. She opens her eyes, acknowledges writer and attempts to engage but repeatedly falls asleep. Unable to obtain any information from patient at this time.     COVID Exposure Screen- Patient  Unable to assess    Collateral from patient's psychiatrist is obtained as such: Dr. Ross ([phone]) confirms patient's history as noted above and reports that patient has a known history of Bipolar I disorder and has recently been manic. She has hyperactive, leaving her home in the middle of the night, sleeping poorly, got a boyfriend who lives around her residence and has been spending excessively amongst themselves with patient having spent $3000 in her name. Family is very involved and wants her psychiatrically stabilized and psychiatrist is in agreement that this is clinically necessary for her safety / well-being at this time.

## 2021-12-01 NOTE — BH INPATIENT PSYCHIATRY ASSESSMENT NOTE - NSBHASSESSSUMMFT_PSY_ALL_CORE
This is an 81 year old , retired female, domiciled at the [facility], with past psychiatric history of Bipolar I disorder, w/ documented episodes of anjelica (as per her outpatient psychiatrist; Dr. Ross; [phone]) though onset of symptoms and formal psychiatric illness is in the last 5 years beginning with depressive sx and failure to thrive, history of alcohol abuse and reported concerns of PTSD and Cluster B Personality traits (sees a therapist; Gabriella Hernández [phone]), one known psychiatric admission ([facility] 2/2017), no history of suicide attempts, non-suicidal self injury. The patient arrived from [facility] after fall. Collateral information from daughter revealed she has exhibited manic symptoms and she was to have a meeting with her care manager for geriatric psychiatry admission. On evaluation the patient appears to be in Manic episode, with mixed features. Affect labile, patient is very tearful on exam. She exhibits tangential thought processes, religiously preoccupations. She requires inpatient hospitalization for stabilization.     Plan:     Continue current medications: Sertraline 50 mg Qd.   Continue Medical management: Simvastatin 5 mg Qhs, Protonix 40 mg Qam, Naproxen 500 mg Qhs, Hydrochlorothiazide 25 mg Qhs, Norvasc 5 mg Qd, Lidocaine 4% patch transdermal Qd,  Cephalexin 500 mg BID for 7 days. Loratidine 10 mg Qd.   PRNs: Ativan 0.5 mg Q6h PRN for Anxiety. Glucagon 1 mg PRN for hypoglycemia.  Collateral from O/P provider on 12/2.

## 2021-12-01 NOTE — ED BEHAVIORAL HEALTH ASSESSMENT NOTE - AXIS III
Dementia (diagnosed by providers at the [facility]), HTN, HLD, Type II DM, Asthma, Carpal Tunnel syndrome (s/p surgery) and GERD

## 2021-12-01 NOTE — ED BEHAVIORAL HEALTH ASSESSMENT NOTE - DIFFERENTIAL
Bipolar I disorder, by hx, current episode manic  vs exacerbation/progression of Dementia with behavioral disturbance in a patient with histrionic personality traits.

## 2021-12-02 LAB
A1C WITH ESTIMATED AVERAGE GLUCOSE RESULT: 6.9 % — HIGH (ref 4–5.6)
ALBUMIN SERPL ELPH-MCNC: 3.9 G/DL — SIGNIFICANT CHANGE UP (ref 3.3–5)
ALP SERPL-CCNC: 21 U/L — LOW (ref 40–120)
ALT FLD-CCNC: 22 U/L — SIGNIFICANT CHANGE UP (ref 4–33)
ANION GAP SERPL CALC-SCNC: 8 MMOL/L — SIGNIFICANT CHANGE UP (ref 7–14)
AST SERPL-CCNC: 22 U/L — SIGNIFICANT CHANGE UP (ref 4–32)
BASOPHILS # BLD AUTO: 0.03 K/UL — SIGNIFICANT CHANGE UP (ref 0–0.2)
BASOPHILS NFR BLD AUTO: 0.4 % — SIGNIFICANT CHANGE UP (ref 0–2)
BILIRUB SERPL-MCNC: 0.3 MG/DL — SIGNIFICANT CHANGE UP (ref 0.2–1.2)
BUN SERPL-MCNC: 8 MG/DL — SIGNIFICANT CHANGE UP (ref 7–23)
CALCIUM SERPL-MCNC: 9.6 MG/DL — SIGNIFICANT CHANGE UP (ref 8.4–10.5)
CHLORIDE SERPL-SCNC: 104 MMOL/L — SIGNIFICANT CHANGE UP (ref 98–107)
CO2 SERPL-SCNC: 27 MMOL/L — SIGNIFICANT CHANGE UP (ref 22–31)
CREAT SERPL-MCNC: 0.57 MG/DL — SIGNIFICANT CHANGE UP (ref 0.5–1.3)
EOSINOPHIL # BLD AUTO: 0.14 K/UL — SIGNIFICANT CHANGE UP (ref 0–0.5)
EOSINOPHIL NFR BLD AUTO: 1.9 % — SIGNIFICANT CHANGE UP (ref 0–6)
ESTIMATED AVERAGE GLUCOSE: 151 — SIGNIFICANT CHANGE UP
GLUCOSE SERPL-MCNC: 149 MG/DL — HIGH (ref 70–99)
HCT VFR BLD CALC: 39 % — SIGNIFICANT CHANGE UP (ref 34.5–45)
HGB BLD-MCNC: 12.1 G/DL — SIGNIFICANT CHANGE UP (ref 11.5–15.5)
IANC: 3.7 K/UL — SIGNIFICANT CHANGE UP (ref 1.5–8.5)
IMM GRANULOCYTES NFR BLD AUTO: 0.3 % — SIGNIFICANT CHANGE UP (ref 0–1.5)
LYMPHOCYTES # BLD AUTO: 2.88 K/UL — SIGNIFICANT CHANGE UP (ref 1–3.3)
LYMPHOCYTES # BLD AUTO: 39.2 % — SIGNIFICANT CHANGE UP (ref 13–44)
MCHC RBC-ENTMCNC: 26.2 PG — LOW (ref 27–34)
MCHC RBC-ENTMCNC: 31 GM/DL — LOW (ref 32–36)
MCV RBC AUTO: 84.4 FL — SIGNIFICANT CHANGE UP (ref 80–100)
MONOCYTES # BLD AUTO: 0.57 K/UL — SIGNIFICANT CHANGE UP (ref 0–0.9)
MONOCYTES NFR BLD AUTO: 7.8 % — SIGNIFICANT CHANGE UP (ref 2–14)
NEUTROPHILS # BLD AUTO: 3.7 K/UL — SIGNIFICANT CHANGE UP (ref 1.8–7.4)
NEUTROPHILS NFR BLD AUTO: 50.4 % — SIGNIFICANT CHANGE UP (ref 43–77)
NRBC # BLD: 0 /100 WBCS — SIGNIFICANT CHANGE UP
NRBC # FLD: 0 K/UL — SIGNIFICANT CHANGE UP
PLATELET # BLD AUTO: 232 K/UL — SIGNIFICANT CHANGE UP (ref 150–400)
POTASSIUM SERPL-MCNC: 3.9 MMOL/L — SIGNIFICANT CHANGE UP (ref 3.5–5.3)
POTASSIUM SERPL-SCNC: 3.9 MMOL/L — SIGNIFICANT CHANGE UP (ref 3.5–5.3)
PROT SERPL-MCNC: 6.9 G/DL — SIGNIFICANT CHANGE UP (ref 6–8.3)
RBC # BLD: 4.62 M/UL — SIGNIFICANT CHANGE UP (ref 3.8–5.2)
RBC # FLD: 15.6 % — HIGH (ref 10.3–14.5)
SODIUM SERPL-SCNC: 139 MMOL/L — SIGNIFICANT CHANGE UP (ref 135–145)
TSH SERPL-MCNC: 1.21 UIU/ML — SIGNIFICANT CHANGE UP (ref 0.27–4.2)
WBC # BLD: 7.34 K/UL — SIGNIFICANT CHANGE UP (ref 3.8–10.5)
WBC # FLD AUTO: 7.34 K/UL — SIGNIFICANT CHANGE UP (ref 3.8–10.5)

## 2021-12-02 PROCEDURE — 99232 SBSQ HOSP IP/OBS MODERATE 35: CPT

## 2021-12-02 RX ORDER — CHOLECALCIFEROL (VITAMIN D3) 125 MCG
5000 CAPSULE ORAL DAILY
Refills: 0 | Status: DISCONTINUED | OUTPATIENT
Start: 2021-12-02 | End: 2022-01-18

## 2021-12-02 RX ORDER — ALENDRONATE SODIUM 70 MG/1
70 TABLET ORAL
Refills: 0 | Status: DISCONTINUED | OUTPATIENT
Start: 2021-12-02 | End: 2022-01-13

## 2021-12-02 RX ORDER — BUMETANIDE 0.25 MG/ML
1 INJECTION INTRAMUSCULAR; INTRAVENOUS DAILY
Refills: 0 | Status: DISCONTINUED | OUTPATIENT
Start: 2021-12-02 | End: 2022-01-18

## 2021-12-02 RX ORDER — FOLIC ACID 0.8 MG
1 TABLET ORAL DAILY
Refills: 0 | Status: DISCONTINUED | OUTPATIENT
Start: 2021-12-02 | End: 2022-01-18

## 2021-12-02 RX ORDER — CLONAZEPAM 1 MG
0.5 TABLET ORAL
Refills: 0 | Status: DISCONTINUED | OUTPATIENT
Start: 2021-12-02 | End: 2021-12-09

## 2021-12-02 RX ORDER — DIVALPROEX SODIUM 500 MG/1
500 TABLET, DELAYED RELEASE ORAL AT BEDTIME
Refills: 0 | Status: DISCONTINUED | OUTPATIENT
Start: 2021-12-02 | End: 2021-12-09

## 2021-12-02 RX ORDER — ASCORBIC ACID 60 MG
1000 TABLET,CHEWABLE ORAL
Refills: 0 | Status: DISCONTINUED | OUTPATIENT
Start: 2021-12-02 | End: 2022-01-18

## 2021-12-02 RX ORDER — AMLODIPINE BESYLATE 2.5 MG/1
2.5 TABLET ORAL DAILY
Refills: 0 | Status: DISCONTINUED | OUTPATIENT
Start: 2021-12-02 | End: 2022-01-18

## 2021-12-02 RX ORDER — LOSARTAN POTASSIUM 100 MG/1
100 TABLET, FILM COATED ORAL DAILY
Refills: 0 | Status: DISCONTINUED | OUTPATIENT
Start: 2021-12-02 | End: 2022-01-18

## 2021-12-02 RX ORDER — ASPIRIN/CALCIUM CARB/MAGNESIUM 324 MG
81 TABLET ORAL DAILY
Refills: 0 | Status: DISCONTINUED | OUTPATIENT
Start: 2021-12-02 | End: 2021-12-20

## 2021-12-02 RX ORDER — SENNA PLUS 8.6 MG/1
2 TABLET ORAL AT BEDTIME
Refills: 0 | Status: DISCONTINUED | OUTPATIENT
Start: 2021-12-02 | End: 2022-01-18

## 2021-12-02 RX ORDER — PREGABALIN 225 MG/1
1000 CAPSULE ORAL DAILY
Refills: 0 | Status: DISCONTINUED | OUTPATIENT
Start: 2021-12-02 | End: 2022-01-18

## 2021-12-02 RX ORDER — METFORMIN HYDROCHLORIDE 850 MG/1
500 TABLET ORAL
Refills: 0 | Status: DISCONTINUED | OUTPATIENT
Start: 2021-12-02 | End: 2021-12-27

## 2021-12-02 RX ORDER — ATORVASTATIN CALCIUM 80 MG/1
10 TABLET, FILM COATED ORAL AT BEDTIME
Refills: 0 | Status: DISCONTINUED | OUTPATIENT
Start: 2021-12-02 | End: 2022-01-18

## 2021-12-02 RX ORDER — PANTOPRAZOLE SODIUM 20 MG/1
40 TABLET, DELAYED RELEASE ORAL
Refills: 0 | Status: DISCONTINUED | OUTPATIENT
Start: 2021-12-02 | End: 2021-12-20

## 2021-12-02 RX ORDER — SODIUM CHLORIDE 0.65 %
1 AEROSOL, SPRAY (ML) NASAL EVERY 12 HOURS
Refills: 0 | Status: DISCONTINUED | OUTPATIENT
Start: 2021-12-02 | End: 2022-01-18

## 2021-12-02 RX ORDER — MONTELUKAST 4 MG/1
10 TABLET, CHEWABLE ORAL DAILY
Refills: 0 | Status: DISCONTINUED | OUTPATIENT
Start: 2021-12-02 | End: 2022-01-18

## 2021-12-02 RX ORDER — MAGNESIUM OXIDE 400 MG ORAL TABLET 241.3 MG
400 TABLET ORAL EVERY 12 HOURS
Refills: 0 | Status: DISCONTINUED | OUTPATIENT
Start: 2021-12-02 | End: 2022-01-18

## 2021-12-02 RX ORDER — ACETAMINOPHEN 500 MG
650 TABLET ORAL EVERY 6 HOURS
Refills: 0 | Status: DISCONTINUED | OUTPATIENT
Start: 2021-12-02 | End: 2021-12-20

## 2021-12-02 RX ORDER — LORATADINE 10 MG/1
10 TABLET ORAL DAILY
Refills: 0 | Status: DISCONTINUED | OUTPATIENT
Start: 2021-12-02 | End: 2022-01-18

## 2021-12-02 RX ORDER — LOPERAMIDE HCL 2 MG
2 TABLET ORAL DAILY
Refills: 0 | Status: DISCONTINUED | OUTPATIENT
Start: 2021-12-02 | End: 2022-01-18

## 2021-12-02 RX ORDER — PSYLLIUM SEED (WITH DEXTROSE)
1 POWDER (GRAM) ORAL
Refills: 0 | Status: DISCONTINUED | OUTPATIENT
Start: 2021-12-02 | End: 2022-01-18

## 2021-12-02 RX ADMIN — ATORVASTATIN CALCIUM 10 MILLIGRAM(S): 80 TABLET, FILM COATED ORAL at 21:13

## 2021-12-02 RX ADMIN — Medication 1000 MILLIGRAM(S): at 21:12

## 2021-12-02 RX ADMIN — Medication 1 PACKET(S): at 21:18

## 2021-12-02 RX ADMIN — Medication 1 SPRAY(S): at 23:18

## 2021-12-02 RX ADMIN — PANTOPRAZOLE SODIUM 40 MILLIGRAM(S): 20 TABLET, DELAYED RELEASE ORAL at 09:43

## 2021-12-02 RX ADMIN — LIDOCAINE 1 PATCH: 4 CREAM TOPICAL at 09:48

## 2021-12-02 RX ADMIN — INSULIN GLARGINE 14 UNIT(S): 100 INJECTION, SOLUTION SUBCUTANEOUS at 21:51

## 2021-12-02 RX ADMIN — Medication 0: at 21:52

## 2021-12-02 RX ADMIN — INSULIN GLARGINE 10 UNIT(S): 100 INJECTION, SOLUTION SUBCUTANEOUS at 09:43

## 2021-12-02 RX ADMIN — Medication 0.5 MILLIGRAM(S): at 21:13

## 2021-12-02 RX ADMIN — METFORMIN HYDROCHLORIDE 500 MILLIGRAM(S): 850 TABLET ORAL at 21:30

## 2021-12-02 RX ADMIN — MAGNESIUM OXIDE 400 MG ORAL TABLET 400 MILLIGRAM(S): 241.3 TABLET ORAL at 21:31

## 2021-12-02 RX ADMIN — SERTRALINE 50 MILLIGRAM(S): 25 TABLET, FILM COATED ORAL at 09:44

## 2021-12-02 RX ADMIN — AMLODIPINE BESYLATE 5 MILLIGRAM(S): 2.5 TABLET ORAL at 09:44

## 2021-12-02 RX ADMIN — LOSARTAN POTASSIUM 100 MILLIGRAM(S): 100 TABLET, FILM COATED ORAL at 09:43

## 2021-12-02 RX ADMIN — LORATADINE 10 MILLIGRAM(S): 10 TABLET ORAL at 09:45

## 2021-12-02 NOTE — BH INPATIENT PSYCHIATRY PROGRESS NOTE - CURRENT MEDICATION
MEDICATIONS  (STANDING):  amLODIPine   Tablet 5 milliGRAM(s) Oral daily  cephalexin 500 milliGRAM(s) Oral two times a day  dextrose 40% Gel 15 Gram(s) Oral once  dextrose 5%. 1000 milliLiter(s) (50 mL/Hr) IV Continuous <Continuous>  dextrose 5%. 1000 milliLiter(s) (100 mL/Hr) IV Continuous <Continuous>  dextrose 50% Injectable 25 Gram(s) IV Push once  dextrose 50% Injectable 12.5 Gram(s) IV Push once  dextrose 50% Injectable 25 Gram(s) IV Push once  glucagon  Injectable 1 milliGRAM(s) IntraMuscular once  hydrochlorothiazide 25 milliGRAM(s) Oral at bedtime  insulin glargine Injectable (LANTUS) 10 Unit(s) SubCutaneous every morning  insulin glargine Injectable (LANTUS) 14 Unit(s) SubCutaneous at bedtime  insulin lispro (ADMELOG) corrective regimen sliding scale   SubCutaneous three times a day before meals  insulin lispro (ADMELOG) corrective regimen sliding scale   SubCutaneous at bedtime  lidocaine   4% Patch 1 Patch Transdermal daily  loratadine 10 milliGRAM(s) Oral daily  losartan 100 milliGRAM(s) Oral daily  montelukast 10 milliGRAM(s) Oral at bedtime  naproxen 500 milliGRAM(s) Oral at bedtime  pantoprazole    Tablet 40 milliGRAM(s) Oral before breakfast  sertraline 50 milliGRAM(s) Oral daily  simvastatin 5 milliGRAM(s) Oral at bedtime    MEDICATIONS  (PRN):  LORazepam     Tablet 0.5 milliGRAM(s) Oral every 6 hours PRN anxiety   MEDICATIONS  (STANDING):  amLODIPine   Tablet 5 milliGRAM(s) Oral daily  dextrose 40% Gel 15 Gram(s) Oral once  dextrose 5%. 1000 milliLiter(s) (50 mL/Hr) IV Continuous <Continuous>  dextrose 5%. 1000 milliLiter(s) (100 mL/Hr) IV Continuous <Continuous>  dextrose 50% Injectable 25 Gram(s) IV Push once  dextrose 50% Injectable 12.5 Gram(s) IV Push once  dextrose 50% Injectable 25 Gram(s) IV Push once  glucagon  Injectable 1 milliGRAM(s) IntraMuscular once  hydrochlorothiazide 25 milliGRAM(s) Oral at bedtime  insulin glargine Injectable (LANTUS) 10 Unit(s) SubCutaneous every morning  insulin glargine Injectable (LANTUS) 14 Unit(s) SubCutaneous at bedtime  insulin lispro (ADMELOG) corrective regimen sliding scale   SubCutaneous three times a day before meals  insulin lispro (ADMELOG) corrective regimen sliding scale   SubCutaneous at bedtime  lidocaine   4% Patch 1 Patch Transdermal daily  loratadine 10 milliGRAM(s) Oral daily  losartan 100 milliGRAM(s) Oral daily  montelukast 10 milliGRAM(s) Oral at bedtime  naproxen 500 milliGRAM(s) Oral at bedtime  pantoprazole    Tablet 40 milliGRAM(s) Oral before breakfast  sertraline 50 milliGRAM(s) Oral daily  simvastatin 5 milliGRAM(s) Oral at bedtime    MEDICATIONS  (PRN):  LORazepam     Tablet 0.5 milliGRAM(s) Oral every 6 hours PRN anxiety

## 2021-12-02 NOTE — BH INPATIENT PSYCHIATRY PROGRESS NOTE - NSBHMETABOLIC_PSY_ALL_CORE_FT
BMI: BMI (kg/m2): 27.6 (12-01-21 @ 15:23)  HbA1c: A1C with Estimated Average Glucose Result: 6.9 % (12-02-21 @ 10:05)    Glucose: POCT Blood Glucose.: 130 mg/dL (12-02-21 @ 08:13)    BP: 147/57 (12-01-21 @ 21:38) (128/53 - 147/57)  Lipid Panel:  BMI: BMI (kg/m2): 27.6 (12-01-21 @ 15:23)  HbA1c: A1C with Estimated Average Glucose Result: 6.9 % (12-02-21 @ 10:05)    Glucose: POCT Blood Glucose.: 129 mg/dL (12-02-21 @ 12:00)    BP: 147/57 (12-01-21 @ 21:38) (128/53 - 147/57)  Lipid Panel:

## 2021-12-02 NOTE — PHARMACOTHERAPY INTERVENTION NOTE - COMMENTS
Spoke with Dr. Mcgovern regarding Cephalexin order. The patient no longer needs med and Dr. victor dc order.

## 2021-12-02 NOTE — BH SOCIAL WORK INITIAL PSYCHOSOCIAL EVALUATION - NSBHCOGDIS_PSY_ALL_CORE
As per ER assessment, pt was diagnosed with Dementia by the provider at the assisted living facility./Yes

## 2021-12-02 NOTE — BH SOCIAL WORK INITIAL PSYCHOSOCIAL EVALUATION - OTHER PAST PSYCHIATRIC HISTORY (INCLUDE DETAILS REGARDING ONSET, COURSE OF ILLNESS, INPATIENT/OUTPATIENT TREATMENT)
Pt has had one previous hospitalization; [place] in 2017, and she was placed in assisted living from there.  Pt has a previous diagnosis of BAD, PTSD, and personality disorder.

## 2021-12-02 NOTE — BH SOCIAL WORK INITIAL PSYCHOSOCIAL EVALUATION - NSHIGHRISKBEHFT_PSY_ALL_CORE
It is reported that pt has been leaving her assisted living for long hours and also after dark.  She walks along [address] for long distances and will also cross the [address].  She has asked strangers to drive her home and has been spending large amounts of money.

## 2021-12-02 NOTE — BH INPATIENT PSYCHIATRY PROGRESS NOTE - NSBHCHARTREVIEWVS_PSY_A_CORE FT
Vital Signs Last 24 Hrs  T(C): 35.8 (12-02-21 @ 07:55), Max: 37.3 (12-01-21 @ 12:05)  T(F): 96.4 (12-02-21 @ 07:55), Max: 99.2 (12-01-21 @ 12:05)  HR: 78 (12-01-21 @ 21:38) (69 - 78)  BP: 147/57 (12-01-21 @ 21:38) (128/53 - 147/57)  BP(mean): --  RR: 17 (12-01-21 @ 12:05) (17 - 17)  SpO2: 97% (12-02-21 @ 05:06) (96% - 98%)    Orthostatic VS  12-02-21 @ 07:55  Lying BP: 136/64 HR: 79  Sitting BP: --/-- HR: --  Standing BP: --/-- HR: --  Site: --  Mode: --  Orthostatic VS  12-01-21 @ 15:23  Lying BP: --/-- HR: --  Sitting BP: 168/60 HR: 83  Standing BP: 155/70 HR: 85  Site: --  Mode: --   Vital Signs Last 24 Hrs  T(C): 35.8 (12-02-21 @ 07:55), Max: 37.3 (12-01-21 @ 15:32)  T(F): 96.4 (12-02-21 @ 07:55), Max: 99.2 (12-01-21 @ 15:32)  HR: 78 (12-01-21 @ 21:38) (69 - 78)  BP: 147/57 (12-01-21 @ 21:38) (128/53 - 147/57)  BP(mean): --  RR: --  SpO2: 97% (12-02-21 @ 05:06) (96% - 97%)    Orthostatic VS  12-02-21 @ 07:55  Lying BP: 136/64 HR: 79  Sitting BP: --/-- HR: --  Standing BP: --/-- HR: --  Site: --  Mode: --  Orthostatic VS  12-01-21 @ 15:23  Lying BP: --/-- HR: --  Sitting BP: 168/60 HR: 83  Standing BP: 155/70 HR: 85  Site: --  Mode: --

## 2021-12-02 NOTE — PSYCHIATRIC REHAB INITIAL EVALUATION - NSBHPRRECOMMEND_PSY_ALL_CORE
Writer met with Patient to orient her to psychiatric rehabilitation staff and services. Patient was receptive.  Patient is 81 years old and identifies as a , female admitted for manic episode. Patient presented with an elevated mood and labile affect. Patient was well groomed and well dressed. Patient exhibited tangential thought process throughout the session. Writer required multiple verbal redirections. Patient is religiously preoccupied and grandiose delusions of believing she is a healer / counselor for her co-residents. However, Patient is not responding to internal stimuli. Patient denied SI/HI/AH/VH.      Patient was able to identify her primary reason for admission. Psychiatric rehabilitation staff was able to establish a collaborative treatment goal to work on over the next 7 days.

## 2021-12-02 NOTE — BH SOCIAL WORK INITIAL PSYCHOSOCIAL EVALUATION - NSBHHOUSECOMMENTFT_PSY_ALL_CORE
Pt has resided here for the last 4 years.  Pt requires some assistance with ADLs and medication administration.

## 2021-12-03 PROCEDURE — 99232 SBSQ HOSP IP/OBS MODERATE 35: CPT

## 2021-12-03 RX ADMIN — METFORMIN HYDROCHLORIDE 500 MILLIGRAM(S): 850 TABLET ORAL at 20:44

## 2021-12-03 RX ADMIN — INSULIN GLARGINE 10 UNIT(S): 100 INJECTION, SOLUTION SUBCUTANEOUS at 08:32

## 2021-12-03 RX ADMIN — BUMETANIDE 1 MILLIGRAM(S): 0.25 INJECTION INTRAMUSCULAR; INTRAVENOUS at 09:41

## 2021-12-03 RX ADMIN — METFORMIN HYDROCHLORIDE 500 MILLIGRAM(S): 850 TABLET ORAL at 09:42

## 2021-12-03 RX ADMIN — Medication 1000 MILLIGRAM(S): at 09:44

## 2021-12-03 RX ADMIN — Medication 1 SPRAY(S): at 09:50

## 2021-12-03 RX ADMIN — MAGNESIUM OXIDE 400 MG ORAL TABLET 400 MILLIGRAM(S): 241.3 TABLET ORAL at 09:44

## 2021-12-03 RX ADMIN — Medication 1000 MILLIGRAM(S): at 20:45

## 2021-12-03 RX ADMIN — SENNA PLUS 2 TABLET(S): 8.6 TABLET ORAL at 20:44

## 2021-12-03 RX ADMIN — MONTELUKAST 10 MILLIGRAM(S): 4 TABLET, CHEWABLE ORAL at 09:41

## 2021-12-03 RX ADMIN — LOSARTAN POTASSIUM 100 MILLIGRAM(S): 100 TABLET, FILM COATED ORAL at 09:45

## 2021-12-03 RX ADMIN — Medication 5000 UNIT(S): at 09:41

## 2021-12-03 RX ADMIN — Medication 1 MILLIGRAM(S): at 09:45

## 2021-12-03 RX ADMIN — Medication 0.5 MILLIGRAM(S): at 06:25

## 2021-12-03 RX ADMIN — Medication 1 SPRAY(S): at 20:45

## 2021-12-03 RX ADMIN — ATORVASTATIN CALCIUM 10 MILLIGRAM(S): 80 TABLET, FILM COATED ORAL at 20:44

## 2021-12-03 RX ADMIN — Medication 0: at 21:25

## 2021-12-03 RX ADMIN — Medication 0.5 MILLIGRAM(S): at 20:45

## 2021-12-03 RX ADMIN — LORATADINE 10 MILLIGRAM(S): 10 TABLET ORAL at 09:42

## 2021-12-03 RX ADMIN — Medication 81 MILLIGRAM(S): at 09:41

## 2021-12-03 RX ADMIN — MAGNESIUM OXIDE 400 MG ORAL TABLET 400 MILLIGRAM(S): 241.3 TABLET ORAL at 20:44

## 2021-12-03 RX ADMIN — PREGABALIN 1000 MICROGRAM(S): 225 CAPSULE ORAL at 09:45

## 2021-12-03 RX ADMIN — PANTOPRAZOLE SODIUM 40 MILLIGRAM(S): 20 TABLET, DELAYED RELEASE ORAL at 08:32

## 2021-12-03 RX ADMIN — AMLODIPINE BESYLATE 2.5 MILLIGRAM(S): 2.5 TABLET ORAL at 09:42

## 2021-12-03 RX ADMIN — Medication 1 TABLET(S): at 09:40

## 2021-12-03 RX ADMIN — Medication 1 PACKET(S): at 09:40

## 2021-12-03 RX ADMIN — INSULIN GLARGINE 14 UNIT(S): 100 INJECTION, SOLUTION SUBCUTANEOUS at 21:24

## 2021-12-03 NOTE — BH INPATIENT PSYCHIATRY PROGRESS NOTE - NSBHCHARTREVIEWVS_PSY_A_CORE FT
Vital Signs Last 24 Hrs  T(C): 36.9 (12-03-21 @ 08:14), Max: 36.9 (12-03-21 @ 08:14)  T(F): 98.5 (12-03-21 @ 08:14), Max: 98.5 (12-03-21 @ 08:14)  HR: --  BP: --  BP(mean): --  RR: --  SpO2: --    Orthostatic VS  12-03-21 @ 08:14  Lying BP: --/-- HR: --  Sitting BP: 113/94 HR: 77  Standing BP: 124/59 HR: 94  Site: --  Mode: --  Orthostatic VS  12-02-21 @ 07:55  Lying BP: 136/64 HR: 79  Sitting BP: --/-- HR: --  Standing BP: --/-- HR: --  Site: --  Mode: --  Orthostatic VS  12-01-21 @ 15:23  Lying BP: --/-- HR: --  Sitting BP: 168/60 HR: 83  Standing BP: 155/70 HR: 85  Site: --  Mode: --

## 2021-12-03 NOTE — PHYSICAL THERAPY INITIAL EVALUATION ADULT - DISCHARGE DISPOSITION, PT EVAL
return to [facility]. recommend patient ambulate on unit with supervision from staff due to poor safety awareness

## 2021-12-03 NOTE — PHYSICAL THERAPY INITIAL EVALUATION ADULT - PERTINENT HX OF CURRENT PROBLEM, REHAB EVAL
patient is a 81 year old female admitted to [facility] for psychiatric care. PT consulted for recent fall from sitting position at [facility]

## 2021-12-03 NOTE — BH INPATIENT PSYCHIATRY PROGRESS NOTE - CURRENT MEDICATION
MEDICATIONS  (STANDING):  alendronate 70 milliGRAM(s) Oral <User Schedule>  amLODIPine   Tablet 2.5 milliGRAM(s) Oral daily  ascorbic acid 1000 milliGRAM(s) Oral two times a day  aspirin enteric coated 81 milliGRAM(s) Oral daily  atorvastatin 10 milliGRAM(s) Oral at bedtime  buMETAnide 1 milliGRAM(s) Oral daily  calcium carbonate 1250 mG  + Vitamin D (OsCal 500 + D) 1 Tablet(s) Oral daily  cholecalciferol 5000 Unit(s) Oral daily  clonazePAM  Tablet 0.5 milliGRAM(s) Oral <User Schedule>  cyanocobalamin 1000 MICROGram(s) Oral daily  dextrose 40% Gel 15 Gram(s) Oral once  dextrose 5%. 1000 milliLiter(s) (50 mL/Hr) IV Continuous <Continuous>  dextrose 5%. 1000 milliLiter(s) (100 mL/Hr) IV Continuous <Continuous>  dextrose 50% Injectable 25 Gram(s) IV Push once  dextrose 50% Injectable 12.5 Gram(s) IV Push once  dextrose 50% Injectable 25 Gram(s) IV Push once  diVALproex  milliGRAM(s) Oral at bedtime  folic acid 1 milliGRAM(s) Oral daily  glucagon  Injectable 1 milliGRAM(s) IntraMuscular once  insulin glargine Injectable (LANTUS) 10 Unit(s) SubCutaneous every morning  insulin glargine Injectable (LANTUS) 14 Unit(s) SubCutaneous at bedtime  insulin lispro (ADMELOG) corrective regimen sliding scale   SubCutaneous three times a day before meals  insulin lispro (ADMELOG) corrective regimen sliding scale   SubCutaneous at bedtime  lidocaine   4% Patch 1 Patch Transdermal daily  loratadine 10 milliGRAM(s) Oral daily  losartan 100 milliGRAM(s) Oral daily  magnesium oxide 400 milliGRAM(s) Oral every 12 hours  metFORMIN 500 milliGRAM(s) Oral two times a day  montelukast 10 milliGRAM(s) Oral daily  pantoprazole    Tablet 40 milliGRAM(s) Oral before breakfast  psyllium Powder 1 Packet(s) Oral two times a day  senna 2 Tablet(s) Oral at bedtime  sitaGLIPtin 50 milliGRAM(s) Oral daily  sodium chloride 0.65% Nasal 1 Spray(s) Both Nostrils every 12 hours    MEDICATIONS  (PRN):  acetaminophen     Tablet .. 650 milliGRAM(s) Oral every 6 hours PRN Moderate Pain (4 - 6)  loperamide 2 milliGRAM(s) Oral daily PRN Diarrhea  LORazepam     Tablet 0.5 milliGRAM(s) Oral every 6 hours PRN anxiety

## 2021-12-03 NOTE — PHYSICAL THERAPY INITIAL EVALUATION ADULT - MANUAL MUSCLE TESTING RESULTS, REHAB EVAL
formal MMT not performed as patient is difficult to redirect. patient performed standing marching, and standing hip abduction at wall bar to demonstrate what she does every morning/no strength deficits were identified

## 2021-12-03 NOTE — BH INPATIENT PSYCHIATRY PROGRESS NOTE - PRN MEDS
MEDICATIONS  (PRN):  acetaminophen     Tablet .. 650 milliGRAM(s) Oral every 6 hours PRN Moderate Pain (4 - 6)  loperamide 2 milliGRAM(s) Oral daily PRN Diarrhea  LORazepam     Tablet 0.5 milliGRAM(s) Oral every 6 hours PRN anxiety

## 2021-12-03 NOTE — PHYSICAL THERAPY INITIAL EVALUATION ADULT - ADDITIONAL COMMENTS
patient reports she ambulates independently without device at [facility]. 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 she denies recent fall.

## 2021-12-03 NOTE — BH INPATIENT PSYCHIATRY PROGRESS NOTE - NSBHMETABOLIC_PSY_ALL_CORE_FT
BMI: BMI (kg/m2): 27.6 (12-01-21 @ 15:23)  HbA1c: A1C with Estimated Average Glucose Result: 6.9 % (12-02-21 @ 10:05)    Glucose: POCT Blood Glucose.: 120 mg/dL (12-03-21 @ 08:11)    BP: 147/57 (12-01-21 @ 21:38) (128/53 - 147/57)  Lipid Panel:  BMI: BMI (kg/m2): 27.6 (12-01-21 @ 15:23)  HbA1c: A1C with Estimated Average Glucose Result: 6.9 % (12-02-21 @ 10:05)    Glucose: POCT Blood Glucose.: 107 mg/dL (12-03-21 @ 12:02)    BP: 147/57 (12-01-21 @ 21:38) (128/53 - 147/57)  Lipid Panel:

## 2021-12-04 PROCEDURE — 99231 SBSQ HOSP IP/OBS SF/LOW 25: CPT

## 2021-12-04 RX ADMIN — ATORVASTATIN CALCIUM 10 MILLIGRAM(S): 80 TABLET, FILM COATED ORAL at 22:06

## 2021-12-04 RX ADMIN — METFORMIN HYDROCHLORIDE 500 MILLIGRAM(S): 850 TABLET ORAL at 09:01

## 2021-12-04 RX ADMIN — LIDOCAINE 1 PATCH: 4 CREAM TOPICAL at 10:00

## 2021-12-04 RX ADMIN — Medication 0.5 MILLIGRAM(S): at 22:06

## 2021-12-04 RX ADMIN — Medication 5000 UNIT(S): at 09:12

## 2021-12-04 RX ADMIN — Medication 1 TABLET(S): at 09:02

## 2021-12-04 RX ADMIN — Medication 1 SPRAY(S): at 22:08

## 2021-12-04 RX ADMIN — Medication 1 PACKET(S): at 09:07

## 2021-12-04 RX ADMIN — Medication 1000 MILLIGRAM(S): at 22:06

## 2021-12-04 RX ADMIN — MAGNESIUM OXIDE 400 MG ORAL TABLET 400 MILLIGRAM(S): 241.3 TABLET ORAL at 22:08

## 2021-12-04 RX ADMIN — METFORMIN HYDROCHLORIDE 500 MILLIGRAM(S): 850 TABLET ORAL at 22:09

## 2021-12-04 RX ADMIN — PREGABALIN 1000 MICROGRAM(S): 225 CAPSULE ORAL at 09:04

## 2021-12-04 RX ADMIN — MONTELUKAST 10 MILLIGRAM(S): 4 TABLET, CHEWABLE ORAL at 09:03

## 2021-12-04 RX ADMIN — PANTOPRAZOLE SODIUM 40 MILLIGRAM(S): 20 TABLET, DELAYED RELEASE ORAL at 09:04

## 2021-12-04 RX ADMIN — LIDOCAINE 1 PATCH: 4 CREAM TOPICAL at 09:10

## 2021-12-04 RX ADMIN — Medication 1 PACKET(S): at 22:09

## 2021-12-04 RX ADMIN — INSULIN GLARGINE 14 UNIT(S): 100 INJECTION, SOLUTION SUBCUTANEOUS at 22:07

## 2021-12-04 RX ADMIN — LOSARTAN POTASSIUM 100 MILLIGRAM(S): 100 TABLET, FILM COATED ORAL at 10:01

## 2021-12-04 RX ADMIN — SENNA PLUS 2 TABLET(S): 8.6 TABLET ORAL at 22:08

## 2021-12-04 RX ADMIN — INSULIN GLARGINE 10 UNIT(S): 100 INJECTION, SOLUTION SUBCUTANEOUS at 09:00

## 2021-12-04 RX ADMIN — AMLODIPINE BESYLATE 2.5 MILLIGRAM(S): 2.5 TABLET ORAL at 09:03

## 2021-12-04 RX ADMIN — Medication 1 SPRAY(S): at 09:11

## 2021-12-04 RX ADMIN — BUMETANIDE 1 MILLIGRAM(S): 0.25 INJECTION INTRAMUSCULAR; INTRAVENOUS at 09:05

## 2021-12-04 RX ADMIN — Medication 0.5 MILLIGRAM(S): at 06:12

## 2021-12-04 RX ADMIN — LORATADINE 10 MILLIGRAM(S): 10 TABLET ORAL at 09:04

## 2021-12-04 RX ADMIN — Medication 1 MILLIGRAM(S): at 09:03

## 2021-12-04 RX ADMIN — Medication 1000 MILLIGRAM(S): at 09:04

## 2021-12-04 RX ADMIN — Medication 81 MILLIGRAM(S): at 09:02

## 2021-12-04 RX ADMIN — MAGNESIUM OXIDE 400 MG ORAL TABLET 400 MILLIGRAM(S): 241.3 TABLET ORAL at 09:02

## 2021-12-04 NOTE — BH INPATIENT PSYCHIATRY PROGRESS NOTE - NSBHMETABOLIC_PSY_ALL_CORE_FT
BMI: BMI (kg/m2): 27.6 (12-01-21 @ 15:23)  HbA1c: A1C with Estimated Average Glucose Result: 6.9 % (12-02-21 @ 10:05)    Glucose: POCT Blood Glucose.: 108 mg/dL (12-04-21 @ 08:07)    BP: 147/57 (12-01-21 @ 21:38) (128/53 - 147/57)  Lipid Panel:

## 2021-12-04 NOTE — BH INPATIENT PSYCHIATRY PROGRESS NOTE - NSBHCHARTREVIEWVS_PSY_A_CORE FT
Vital Signs Last 24 Hrs  T(C): 36.7 (12-03-21 @ 15:39), Max: 36.7 (12-03-21 @ 15:39)  T(F): 98 (12-03-21 @ 15:39), Max: 98 (12-03-21 @ 15:39)  HR: --  BP: --  BP(mean): --  RR: --  SpO2: --    Orthostatic VS  12-04-21 @ 05:40  Lying BP: 128/57 HR: 73  Sitting BP: 118/53 HR: 77  Standing BP: --/-- HR: --  Site: --  Mode: --  Orthostatic VS  12-03-21 @ 08:14  Lying BP: --/-- HR: --  Sitting BP: 113/94 HR: 77  Standing BP: 124/59 HR: 94  Site: --  Mode: --

## 2021-12-05 RX ORDER — INSULIN GLARGINE 100 [IU]/ML
7 INJECTION, SOLUTION SUBCUTANEOUS ONCE
Refills: 0 | Status: DISCONTINUED | OUTPATIENT
Start: 2021-12-05 | End: 2021-12-06

## 2021-12-05 RX ADMIN — SENNA PLUS 2 TABLET(S): 8.6 TABLET ORAL at 20:57

## 2021-12-05 RX ADMIN — LIDOCAINE 1 PATCH: 4 CREAM TOPICAL at 09:11

## 2021-12-05 RX ADMIN — METFORMIN HYDROCHLORIDE 500 MILLIGRAM(S): 850 TABLET ORAL at 20:57

## 2021-12-05 RX ADMIN — Medication 1 SPRAY(S): at 09:17

## 2021-12-05 RX ADMIN — Medication 1 PACKET(S): at 09:07

## 2021-12-05 RX ADMIN — Medication 1 TABLET(S): at 09:09

## 2021-12-05 RX ADMIN — BUMETANIDE 1 MILLIGRAM(S): 0.25 INJECTION INTRAMUSCULAR; INTRAVENOUS at 09:07

## 2021-12-05 RX ADMIN — AMLODIPINE BESYLATE 2.5 MILLIGRAM(S): 2.5 TABLET ORAL at 09:09

## 2021-12-05 RX ADMIN — Medication 1000 MILLIGRAM(S): at 20:57

## 2021-12-05 RX ADMIN — LOSARTAN POTASSIUM 100 MILLIGRAM(S): 100 TABLET, FILM COATED ORAL at 09:10

## 2021-12-05 RX ADMIN — Medication 5000 UNIT(S): at 09:17

## 2021-12-05 RX ADMIN — Medication 0.5 MILLIGRAM(S): at 05:47

## 2021-12-05 RX ADMIN — MAGNESIUM OXIDE 400 MG ORAL TABLET 400 MILLIGRAM(S): 241.3 TABLET ORAL at 09:10

## 2021-12-05 RX ADMIN — LIDOCAINE 1 PATCH: 4 CREAM TOPICAL at 19:46

## 2021-12-05 RX ADMIN — Medication 0.5 MILLIGRAM(S): at 21:33

## 2021-12-05 RX ADMIN — PREGABALIN 1000 MICROGRAM(S): 225 CAPSULE ORAL at 09:10

## 2021-12-05 RX ADMIN — INSULIN GLARGINE 14 UNIT(S): 100 INJECTION, SOLUTION SUBCUTANEOUS at 21:29

## 2021-12-05 RX ADMIN — ALENDRONATE SODIUM 70 MILLIGRAM(S): 70 TABLET ORAL at 05:47

## 2021-12-05 RX ADMIN — Medication 1 MILLIGRAM(S): at 09:10

## 2021-12-05 RX ADMIN — ATORVASTATIN CALCIUM 10 MILLIGRAM(S): 80 TABLET, FILM COATED ORAL at 20:58

## 2021-12-05 RX ADMIN — Medication 1 SPRAY(S): at 21:30

## 2021-12-05 RX ADMIN — LIDOCAINE 1 PATCH: 4 CREAM TOPICAL at 18:02

## 2021-12-05 RX ADMIN — Medication 1000 MILLIGRAM(S): at 09:08

## 2021-12-05 RX ADMIN — LORATADINE 10 MILLIGRAM(S): 10 TABLET ORAL at 09:09

## 2021-12-05 RX ADMIN — PANTOPRAZOLE SODIUM 40 MILLIGRAM(S): 20 TABLET, DELAYED RELEASE ORAL at 06:38

## 2021-12-05 RX ADMIN — Medication 81 MILLIGRAM(S): at 09:07

## 2021-12-05 RX ADMIN — METFORMIN HYDROCHLORIDE 500 MILLIGRAM(S): 850 TABLET ORAL at 09:09

## 2021-12-05 RX ADMIN — LIDOCAINE 1 PATCH: 4 CREAM TOPICAL at 21:46

## 2021-12-05 RX ADMIN — Medication 1 PACKET(S): at 20:57

## 2021-12-05 RX ADMIN — MONTELUKAST 10 MILLIGRAM(S): 4 TABLET, CHEWABLE ORAL at 09:08

## 2021-12-05 RX ADMIN — MAGNESIUM OXIDE 400 MG ORAL TABLET 400 MILLIGRAM(S): 241.3 TABLET ORAL at 20:57

## 2021-12-05 NOTE — BH INPATIENT PSYCHIATRY PROGRESS NOTE - CURRENT MEDICATION
MEDICATIONS  (STANDING):  alendronate 70 milliGRAM(s) Oral <User Schedule>  amLODIPine   Tablet 2.5 milliGRAM(s) Oral daily  ascorbic acid 1000 milliGRAM(s) Oral two times a day  aspirin enteric coated 81 milliGRAM(s) Oral daily  atorvastatin 10 milliGRAM(s) Oral at bedtime  buMETAnide 1 milliGRAM(s) Oral daily  calcium carbonate 1250 mG  + Vitamin D (OsCal 500 + D) 1 Tablet(s) Oral daily  cholecalciferol 5000 Unit(s) Oral daily  clonazePAM  Tablet 0.5 milliGRAM(s) Oral <User Schedule>  cyanocobalamin 1000 MICROGram(s) Oral daily  dextrose 40% Gel 15 Gram(s) Oral once  dextrose 5%. 1000 milliLiter(s) (50 mL/Hr) IV Continuous <Continuous>  dextrose 5%. 1000 milliLiter(s) (100 mL/Hr) IV Continuous <Continuous>  dextrose 50% Injectable 25 Gram(s) IV Push once  dextrose 50% Injectable 12.5 Gram(s) IV Push once  dextrose 50% Injectable 25 Gram(s) IV Push once  diVALproex  milliGRAM(s) Oral at bedtime  folic acid 1 milliGRAM(s) Oral daily  glucagon  Injectable 1 milliGRAM(s) IntraMuscular once  insulin glargine Injectable (LANTUS) 14 Unit(s) SubCutaneous at bedtime  insulin glargine Injectable (LANTUS) 10 Unit(s) SubCutaneous every morning  insulin lispro (ADMELOG) corrective regimen sliding scale   SubCutaneous three times a day before meals  insulin lispro (ADMELOG) corrective regimen sliding scale   SubCutaneous at bedtime  lidocaine   4% Patch 1 Patch Transdermal daily  loratadine 10 milliGRAM(s) Oral daily  losartan 100 milliGRAM(s) Oral daily  magnesium oxide 400 milliGRAM(s) Oral every 12 hours  metFORMIN 500 milliGRAM(s) Oral two times a day  montelukast 10 milliGRAM(s) Oral daily  pantoprazole    Tablet 40 milliGRAM(s) Oral before breakfast  psyllium Powder 1 Packet(s) Oral two times a day  senna 2 Tablet(s) Oral at bedtime  sitaGLIPtin 50 milliGRAM(s) Oral daily  sodium chloride 0.65% Nasal 1 Spray(s) Both Nostrils every 12 hours    MEDICATIONS  (PRN):  acetaminophen     Tablet .. 650 milliGRAM(s) Oral every 6 hours PRN Moderate Pain (4 - 6)  loperamide 2 milliGRAM(s) Oral daily PRN Diarrhea  LORazepam     Tablet 0.5 milliGRAM(s) Oral every 6 hours PRN anxiety

## 2021-12-05 NOTE — BH INPATIENT PSYCHIATRY PROGRESS NOTE - NSBHCHARTREVIEWVS_PSY_A_CORE FT
Vital Signs Last 24 Hrs  T(C): 36.7 (12-05-21 @ 07:53), Max: 36.7 (12-05-21 @ 07:53)  T(F): 98 (12-05-21 @ 07:53), Max: 98 (12-05-21 @ 07:53)  HR: --  BP: --  BP(mean): --  RR: 17 (12-05-21 @ 07:53) (17 - 17)  SpO2: --    Orthostatic VS  12-05-21 @ 07:53  Lying BP: --/-- HR: --  Sitting BP: 123/60 HR: 81  Standing BP: 125/65 HR: 88  Site: upper left arm  Mode: electronic  Orthostatic VS  12-04-21 @ 05:40  Lying BP: 128/57 HR: 73  Sitting BP: 118/53 HR: 77  Standing BP: --/-- HR: --  Site: --  Mode: --

## 2021-12-05 NOTE — BH INPATIENT PSYCHIATRY PROGRESS NOTE - NSBHMETABOLIC_PSY_ALL_CORE_FT
BMI: BMI (kg/m2): 27.6 (12-01-21 @ 15:23)  HbA1c: A1C with Estimated Average Glucose Result: 6.9 % (12-02-21 @ 10:05)    Glucose: POCT Blood Glucose.: 61 mg/dL (12-05-21 @ 08:01)    BP: --  Lipid Panel:

## 2021-12-06 PROCEDURE — 99232 SBSQ HOSP IP/OBS MODERATE 35: CPT

## 2021-12-06 RX ADMIN — Medication 0.5 MILLIGRAM(S): at 20:23

## 2021-12-06 RX ADMIN — Medication 1 SPRAY(S): at 20:23

## 2021-12-06 RX ADMIN — Medication 1000 MILLIGRAM(S): at 20:14

## 2021-12-06 RX ADMIN — METFORMIN HYDROCHLORIDE 500 MILLIGRAM(S): 850 TABLET ORAL at 20:15

## 2021-12-06 RX ADMIN — Medication 0.5 MILLIGRAM(S): at 06:42

## 2021-12-06 RX ADMIN — SENNA PLUS 2 TABLET(S): 8.6 TABLET ORAL at 20:14

## 2021-12-06 RX ADMIN — Medication 1 PACKET(S): at 20:15

## 2021-12-06 RX ADMIN — INSULIN GLARGINE 10 UNIT(S): 100 INJECTION, SOLUTION SUBCUTANEOUS at 08:22

## 2021-12-06 RX ADMIN — MAGNESIUM OXIDE 400 MG ORAL TABLET 400 MILLIGRAM(S): 241.3 TABLET ORAL at 20:14

## 2021-12-06 RX ADMIN — PREGABALIN 1000 MICROGRAM(S): 225 CAPSULE ORAL at 10:14

## 2021-12-06 RX ADMIN — ATORVASTATIN CALCIUM 10 MILLIGRAM(S): 80 TABLET, FILM COATED ORAL at 20:14

## 2021-12-06 RX ADMIN — LORATADINE 10 MILLIGRAM(S): 10 TABLET ORAL at 10:08

## 2021-12-06 RX ADMIN — Medication 1 MILLIGRAM(S): at 10:14

## 2021-12-06 RX ADMIN — PANTOPRAZOLE SODIUM 40 MILLIGRAM(S): 20 TABLET, DELAYED RELEASE ORAL at 10:10

## 2021-12-06 RX ADMIN — Medication 0: at 20:57

## 2021-12-06 RX ADMIN — BUMETANIDE 1 MILLIGRAM(S): 0.25 INJECTION INTRAMUSCULAR; INTRAVENOUS at 10:14

## 2021-12-06 RX ADMIN — MAGNESIUM OXIDE 400 MG ORAL TABLET 400 MILLIGRAM(S): 241.3 TABLET ORAL at 10:09

## 2021-12-06 RX ADMIN — METFORMIN HYDROCHLORIDE 500 MILLIGRAM(S): 850 TABLET ORAL at 10:08

## 2021-12-06 RX ADMIN — Medication 1000 MILLIGRAM(S): at 10:09

## 2021-12-06 RX ADMIN — Medication 81 MILLIGRAM(S): at 10:14

## 2021-12-06 RX ADMIN — Medication 1 SPRAY(S): at 10:15

## 2021-12-06 RX ADMIN — Medication 1 TABLET(S): at 10:09

## 2021-12-06 RX ADMIN — Medication 5000 UNIT(S): at 10:14

## 2021-12-06 RX ADMIN — MONTELUKAST 10 MILLIGRAM(S): 4 TABLET, CHEWABLE ORAL at 10:10

## 2021-12-06 NOTE — BH INPATIENT PSYCHIATRY PROGRESS NOTE - NSBHCHARTREVIEWVS_PSY_A_CORE FT
Vital Signs Last 24 Hrs  T(C): 36.7 (12-06-21 @ 08:04), Max: 36.8 (12-05-21 @ 15:40)  T(F): 98 (12-06-21 @ 08:04), Max: 98.2 (12-05-21 @ 15:40)  HR: 71 (12-05-21 @ 20:04) (71 - 71)  BP: 131/51 (12-05-21 @ 20:04) (131/51 - 131/51)  BP(mean): --  RR: 18 (12-05-21 @ 20:04) (18 - 18)  SpO2: --    Orthostatic VS  12-06-21 @ 08:04  Lying BP: --/-- HR: --  Sitting BP: 109/55 HR: 68  Standing BP: 118/55 HR: 70  Site: --  Mode: --  Orthostatic VS  12-05-21 @ 07:53  Lying BP: --/-- HR: --  Sitting BP: 123/60 HR: 81  Standing BP: 125/65 HR: 88  Site: upper left arm  Mode: electronic

## 2021-12-06 NOTE — BH INPATIENT PSYCHIATRY PROGRESS NOTE - NSBHMETABOLIC_PSY_ALL_CORE_FT
BMI: BMI (kg/m2): 27.6 (12-01-21 @ 15:23)  HbA1c: A1C with Estimated Average Glucose Result: 6.9 % (12-02-21 @ 10:05)    Glucose: POCT Blood Glucose.: 85 mg/dL (12-06-21 @ 08:18)    BP: 131/51 (12-05-21 @ 20:04) (131/51 - 131/51)  Lipid Panel:

## 2021-12-06 NOTE — BH INPATIENT PSYCHIATRY PROGRESS NOTE - CURRENT MEDICATION
MEDICATIONS  (STANDING):  alendronate 70 milliGRAM(s) Oral <User Schedule>  amLODIPine   Tablet 2.5 milliGRAM(s) Oral daily  ascorbic acid 1000 milliGRAM(s) Oral two times a day  aspirin enteric coated 81 milliGRAM(s) Oral daily  atorvastatin 10 milliGRAM(s) Oral at bedtime  buMETAnide 1 milliGRAM(s) Oral daily  calcium carbonate 1250 mG  + Vitamin D (OsCal 500 + D) 1 Tablet(s) Oral daily  cholecalciferol 5000 Unit(s) Oral daily  clonazePAM  Tablet 0.5 milliGRAM(s) Oral <User Schedule>  cyanocobalamin 1000 MICROGram(s) Oral daily  dextrose 40% Gel 15 Gram(s) Oral once  dextrose 5%. 1000 milliLiter(s) (50 mL/Hr) IV Continuous <Continuous>  dextrose 5%. 1000 milliLiter(s) (100 mL/Hr) IV Continuous <Continuous>  dextrose 50% Injectable 25 Gram(s) IV Push once  dextrose 50% Injectable 12.5 Gram(s) IV Push once  dextrose 50% Injectable 25 Gram(s) IV Push once  diVALproex  milliGRAM(s) Oral at bedtime  folic acid 1 milliGRAM(s) Oral daily  glucagon  Injectable 1 milliGRAM(s) IntraMuscular once  insulin lispro (ADMELOG) corrective regimen sliding scale   SubCutaneous three times a day before meals  insulin lispro (ADMELOG) corrective regimen sliding scale   SubCutaneous at bedtime  lidocaine   4% Patch 1 Patch Transdermal daily  loratadine 10 milliGRAM(s) Oral daily  losartan 100 milliGRAM(s) Oral daily  magnesium oxide 400 milliGRAM(s) Oral every 12 hours  metFORMIN 500 milliGRAM(s) Oral two times a day  montelukast 10 milliGRAM(s) Oral daily  pantoprazole    Tablet 40 milliGRAM(s) Oral before breakfast  psyllium Powder 1 Packet(s) Oral two times a day  senna 2 Tablet(s) Oral at bedtime  sitaGLIPtin 50 milliGRAM(s) Oral daily  sodium chloride 0.65% Nasal 1 Spray(s) Both Nostrils every 12 hours    MEDICATIONS  (PRN):  acetaminophen     Tablet .. 650 milliGRAM(s) Oral every 6 hours PRN Moderate Pain (4 - 6)  loperamide 2 milliGRAM(s) Oral daily PRN Diarrhea  LORazepam     Tablet 0.5 milliGRAM(s) Oral every 6 hours PRN anxiety

## 2021-12-07 PROCEDURE — 99232 SBSQ HOSP IP/OBS MODERATE 35: CPT

## 2021-12-07 RX ORDER — MAGNESIUM HYDROXIDE 400 MG/1
30 TABLET, CHEWABLE ORAL DAILY
Refills: 0 | Status: DISCONTINUED | OUTPATIENT
Start: 2021-12-07 | End: 2022-01-18

## 2021-12-07 RX ADMIN — Medication 1000 MILLIGRAM(S): at 20:05

## 2021-12-07 RX ADMIN — Medication 5000 UNIT(S): at 11:21

## 2021-12-07 RX ADMIN — LOSARTAN POTASSIUM 100 MILLIGRAM(S): 100 TABLET, FILM COATED ORAL at 11:29

## 2021-12-07 RX ADMIN — AMLODIPINE BESYLATE 2.5 MILLIGRAM(S): 2.5 TABLET ORAL at 11:17

## 2021-12-07 RX ADMIN — ATORVASTATIN CALCIUM 10 MILLIGRAM(S): 80 TABLET, FILM COATED ORAL at 20:05

## 2021-12-07 RX ADMIN — PREGABALIN 1000 MICROGRAM(S): 225 CAPSULE ORAL at 11:14

## 2021-12-07 RX ADMIN — Medication 1 TABLET(S): at 11:20

## 2021-12-07 RX ADMIN — Medication 0.5 MILLIGRAM(S): at 20:05

## 2021-12-07 RX ADMIN — Medication 0: at 21:08

## 2021-12-07 RX ADMIN — METFORMIN HYDROCHLORIDE 500 MILLIGRAM(S): 850 TABLET ORAL at 20:05

## 2021-12-07 RX ADMIN — Medication 1000 MILLIGRAM(S): at 11:12

## 2021-12-07 RX ADMIN — LORATADINE 10 MILLIGRAM(S): 10 TABLET ORAL at 11:15

## 2021-12-07 RX ADMIN — MAGNESIUM OXIDE 400 MG ORAL TABLET 400 MILLIGRAM(S): 241.3 TABLET ORAL at 20:04

## 2021-12-07 RX ADMIN — Medication 1 MILLIGRAM(S): at 11:13

## 2021-12-07 RX ADMIN — PANTOPRAZOLE SODIUM 40 MILLIGRAM(S): 20 TABLET, DELAYED RELEASE ORAL at 11:16

## 2021-12-07 RX ADMIN — MONTELUKAST 10 MILLIGRAM(S): 4 TABLET, CHEWABLE ORAL at 11:15

## 2021-12-07 RX ADMIN — MAGNESIUM HYDROXIDE 30 MILLILITER(S): 400 TABLET, CHEWABLE ORAL at 20:23

## 2021-12-07 RX ADMIN — METFORMIN HYDROCHLORIDE 500 MILLIGRAM(S): 850 TABLET ORAL at 11:29

## 2021-12-07 RX ADMIN — Medication 0.5 MILLIGRAM(S): at 06:12

## 2021-12-07 RX ADMIN — Medication 81 MILLIGRAM(S): at 11:13

## 2021-12-07 RX ADMIN — SENNA PLUS 2 TABLET(S): 8.6 TABLET ORAL at 20:05

## 2021-12-07 RX ADMIN — Medication 1 SPRAY(S): at 20:06

## 2021-12-07 RX ADMIN — MAGNESIUM OXIDE 400 MG ORAL TABLET 400 MILLIGRAM(S): 241.3 TABLET ORAL at 11:16

## 2021-12-07 RX ADMIN — BUMETANIDE 1 MILLIGRAM(S): 0.25 INJECTION INTRAMUSCULAR; INTRAVENOUS at 11:20

## 2021-12-07 NOTE — BH INPATIENT PSYCHIATRY PROGRESS NOTE - CURRENT MEDICATION
MEDICATIONS  (STANDING):  alendronate 70 milliGRAM(s) Oral <User Schedule>  amLODIPine   Tablet 2.5 milliGRAM(s) Oral daily  ascorbic acid 1000 milliGRAM(s) Oral two times a day  aspirin enteric coated 81 milliGRAM(s) Oral daily  atorvastatin 10 milliGRAM(s) Oral at bedtime  buMETAnide 1 milliGRAM(s) Oral daily  calcium carbonate 1250 mG  + Vitamin D (OsCal 500 + D) 1 Tablet(s) Oral daily  cholecalciferol 5000 Unit(s) Oral daily  clonazePAM  Tablet 0.5 milliGRAM(s) Oral <User Schedule>  cyanocobalamin 1000 MICROGram(s) Oral daily  dextrose 40% Gel 15 Gram(s) Oral once  dextrose 5%. 1000 milliLiter(s) (50 mL/Hr) IV Continuous <Continuous>  dextrose 5%. 1000 milliLiter(s) (100 mL/Hr) IV Continuous <Continuous>  dextrose 50% Injectable 25 Gram(s) IV Push once  dextrose 50% Injectable 12.5 Gram(s) IV Push once  dextrose 50% Injectable 25 Gram(s) IV Push once  diVALproex  milliGRAM(s) Oral at bedtime  folic acid 1 milliGRAM(s) Oral daily  glucagon  Injectable 1 milliGRAM(s) IntraMuscular once  insulin lispro (ADMELOG) corrective regimen sliding scale   SubCutaneous three times a day before meals  insulin lispro (ADMELOG) corrective regimen sliding scale   SubCutaneous at bedtime  lidocaine   4% Patch 1 Patch Transdermal daily  loratadine 10 milliGRAM(s) Oral daily  losartan 100 milliGRAM(s) Oral daily  magnesium oxide 400 milliGRAM(s) Oral every 12 hours  metFORMIN 500 milliGRAM(s) Oral two times a day  montelukast 10 milliGRAM(s) Oral daily  pantoprazole    Tablet 40 milliGRAM(s) Oral before breakfast  psyllium Powder 1 Packet(s) Oral two times a day  senna 2 Tablet(s) Oral at bedtime  sitaGLIPtin 50 milliGRAM(s) Oral daily  sodium chloride 0.65% Nasal 1 Spray(s) Both Nostrils every 12 hours    MEDICATIONS  (PRN):  acetaminophen     Tablet .. 650 milliGRAM(s) Oral every 6 hours PRN Moderate Pain (4 - 6)  loperamide 2 milliGRAM(s) Oral daily PRN Diarrhea  LORazepam     Tablet 0.5 milliGRAM(s) Oral every 6 hours PRN anxiety   MEDICATIONS  (STANDING):  alendronate 70 milliGRAM(s) Oral <User Schedule>  amLODIPine   Tablet 2.5 milliGRAM(s) Oral daily  ascorbic acid 1000 milliGRAM(s) Oral two times a day  aspirin enteric coated 81 milliGRAM(s) Oral daily  atorvastatin 10 milliGRAM(s) Oral at bedtime  buMETAnide 1 milliGRAM(s) Oral daily  calcium carbonate 1250 mG  + Vitamin D (OsCal 500 + D) 1 Tablet(s) Oral daily  cholecalciferol 5000 Unit(s) Oral daily  clonazePAM  Tablet 0.5 milliGRAM(s) Oral <User Schedule>  cyanocobalamin 1000 MICROGram(s) Oral daily  dextrose 40% Gel 15 Gram(s) Oral once  dextrose 5%. 1000 milliLiter(s) (50 mL/Hr) IV Continuous <Continuous>  dextrose 5%. 1000 milliLiter(s) (100 mL/Hr) IV Continuous <Continuous>  dextrose 50% Injectable 25 Gram(s) IV Push once  dextrose 50% Injectable 12.5 Gram(s) IV Push once  dextrose 50% Injectable 25 Gram(s) IV Push once  diVALproex  milliGRAM(s) Oral at bedtime  folic acid 1 milliGRAM(s) Oral daily  glucagon  Injectable 1 milliGRAM(s) IntraMuscular once  insulin lispro (ADMELOG) corrective regimen sliding scale   SubCutaneous three times a day before meals  insulin lispro (ADMELOG) corrective regimen sliding scale   SubCutaneous at bedtime  lidocaine   4% Patch 1 Patch Transdermal daily  loratadine 10 milliGRAM(s) Oral daily  losartan 100 milliGRAM(s) Oral daily  magnesium oxide 400 milliGRAM(s) Oral every 12 hours  metFORMIN 500 milliGRAM(s) Oral two times a day  montelukast 10 milliGRAM(s) Oral daily  pantoprazole    Tablet 40 milliGRAM(s) Oral before breakfast  psyllium Powder 1 Packet(s) Oral two times a day  senna 2 Tablet(s) Oral at bedtime  sitaGLIPtin 50 milliGRAM(s) Oral daily  sodium chloride 0.65% Nasal 1 Spray(s) Both Nostrils every 12 hours    MEDICATIONS  (PRN):  acetaminophen     Tablet .. 650 milliGRAM(s) Oral every 6 hours PRN Moderate Pain (4 - 6)  loperamide 2 milliGRAM(s) Oral daily PRN Diarrhea  LORazepam     Tablet 0.5 milliGRAM(s) Oral every 6 hours PRN anxiety  magnesium hydroxide Suspension 30 milliLiter(s) Oral daily PRN Constipation

## 2021-12-07 NOTE — BH INPATIENT PSYCHIATRY PROGRESS NOTE - PRN MEDS
MEDICATIONS  (PRN):  acetaminophen     Tablet .. 650 milliGRAM(s) Oral every 6 hours PRN Moderate Pain (4 - 6)  loperamide 2 milliGRAM(s) Oral daily PRN Diarrhea  LORazepam     Tablet 0.5 milliGRAM(s) Oral every 6 hours PRN anxiety   MEDICATIONS  (PRN):  acetaminophen     Tablet .. 650 milliGRAM(s) Oral every 6 hours PRN Moderate Pain (4 - 6)  loperamide 2 milliGRAM(s) Oral daily PRN Diarrhea  LORazepam     Tablet 0.5 milliGRAM(s) Oral every 6 hours PRN anxiety  magnesium hydroxide Suspension 30 milliLiter(s) Oral daily PRN Constipation

## 2021-12-07 NOTE — BH INPATIENT PSYCHIATRY PROGRESS NOTE - NSBHCHARTREVIEWVS_PSY_A_CORE FT
Vital Signs Last 24 Hrs  T(C): 37.1 (12-06-21 @ 15:30), Max: 37.1 (12-06-21 @ 15:30)  T(F): 98.8 (12-06-21 @ 15:30), Max: 98.8 (12-06-21 @ 15:30)  HR: --  BP: --  BP(mean): --  RR: --  SpO2: --    Orthostatic VS  12-06-21 @ 08:04  Lying BP: --/-- HR: --  Sitting BP: 109/55 HR: 68  Standing BP: 118/55 HR: 70  Site: --  Mode: --

## 2021-12-07 NOTE — BH INPATIENT PSYCHIATRY PROGRESS NOTE - NSBHMETABOLIC_PSY_ALL_CORE_FT
BMI: BMI (kg/m2): 27.6 (12-01-21 @ 15:23)  HbA1c: A1C with Estimated Average Glucose Result: 6.9 % (12-02-21 @ 10:05)    Glucose: POCT Blood Glucose.: 84 mg/dL (12-07-21 @ 08:18)    BP: 131/51 (12-05-21 @ 20:04) (131/51 - 131/51)  Lipid Panel:  BMI: BMI (kg/m2): 27.6 (12-01-21 @ 15:23)  HbA1c: A1C with Estimated Average Glucose Result: 6.9 % (12-02-21 @ 10:05)    Glucose: POCT Blood Glucose.: 146 mg/dL (12-07-21 @ 11:53)    BP: 131/51 (12-05-21 @ 20:04) (131/51 - 131/51)  Lipid Panel:

## 2021-12-08 PROCEDURE — 90853 GROUP PSYCHOTHERAPY: CPT

## 2021-12-08 PROCEDURE — 99232 SBSQ HOSP IP/OBS MODERATE 35: CPT | Mod: 25

## 2021-12-08 RX ADMIN — Medication 5000 UNIT(S): at 09:09

## 2021-12-08 RX ADMIN — SENNA PLUS 2 TABLET(S): 8.6 TABLET ORAL at 20:40

## 2021-12-08 RX ADMIN — METFORMIN HYDROCHLORIDE 500 MILLIGRAM(S): 850 TABLET ORAL at 09:09

## 2021-12-08 RX ADMIN — Medication 0.5 MILLIGRAM(S): at 20:40

## 2021-12-08 RX ADMIN — PANTOPRAZOLE SODIUM 40 MILLIGRAM(S): 20 TABLET, DELAYED RELEASE ORAL at 08:24

## 2021-12-08 RX ADMIN — Medication 1000 MILLIGRAM(S): at 20:40

## 2021-12-08 RX ADMIN — LORATADINE 10 MILLIGRAM(S): 10 TABLET ORAL at 09:07

## 2021-12-08 RX ADMIN — MAGNESIUM OXIDE 400 MG ORAL TABLET 400 MILLIGRAM(S): 241.3 TABLET ORAL at 20:40

## 2021-12-08 RX ADMIN — Medication 1 SPRAY(S): at 20:39

## 2021-12-08 RX ADMIN — METFORMIN HYDROCHLORIDE 500 MILLIGRAM(S): 850 TABLET ORAL at 20:41

## 2021-12-08 RX ADMIN — Medication 0.5 MILLIGRAM(S): at 05:43

## 2021-12-08 RX ADMIN — PREGABALIN 1000 MICROGRAM(S): 225 CAPSULE ORAL at 11:30

## 2021-12-08 RX ADMIN — LOSARTAN POTASSIUM 100 MILLIGRAM(S): 100 TABLET, FILM COATED ORAL at 09:09

## 2021-12-08 RX ADMIN — Medication 81 MILLIGRAM(S): at 09:05

## 2021-12-08 RX ADMIN — Medication 1000 MILLIGRAM(S): at 09:08

## 2021-12-08 RX ADMIN — Medication 1 PACKET(S): at 20:40

## 2021-12-08 RX ADMIN — BUMETANIDE 1 MILLIGRAM(S): 0.25 INJECTION INTRAMUSCULAR; INTRAVENOUS at 09:05

## 2021-12-08 RX ADMIN — MONTELUKAST 10 MILLIGRAM(S): 4 TABLET, CHEWABLE ORAL at 11:30

## 2021-12-08 RX ADMIN — Medication 1 MILLIGRAM(S): at 09:07

## 2021-12-08 RX ADMIN — AMLODIPINE BESYLATE 2.5 MILLIGRAM(S): 2.5 TABLET ORAL at 09:06

## 2021-12-08 RX ADMIN — Medication 1 SPRAY(S): at 11:30

## 2021-12-08 RX ADMIN — ATORVASTATIN CALCIUM 10 MILLIGRAM(S): 80 TABLET, FILM COATED ORAL at 20:40

## 2021-12-08 RX ADMIN — MAGNESIUM OXIDE 400 MG ORAL TABLET 400 MILLIGRAM(S): 241.3 TABLET ORAL at 09:06

## 2021-12-08 RX ADMIN — Medication 1 TABLET(S): at 09:05

## 2021-12-08 NOTE — BH INPATIENT PSYCHIATRY PROGRESS NOTE - NSBHCHARTREVIEWVS_PSY_A_CORE FT
Vital Signs Last 24 Hrs  T(C): 36.6 (12-08-21 @ 07:55), Max: 37 (12-07-21 @ 15:59)  T(F): 97.8 (12-08-21 @ 07:55), Max: 98.6 (12-07-21 @ 15:59)  HR: --  BP: --  BP(mean): --  RR: 17 (12-08-21 @ 07:55) (17 - 17)  SpO2: --    Orthostatic VS  12-08-21 @ 07:55  Lying BP: --/-- HR: --  Sitting BP: 136/63 HR: 82  Standing BP: 130/67 HR: 83  Site: upper left arm  Mode: electronic

## 2021-12-08 NOTE — BH INPATIENT PSYCHIATRY PROGRESS NOTE - PRN MEDS
MEDICATIONS  (PRN):  acetaminophen     Tablet .. 650 milliGRAM(s) Oral every 6 hours PRN Moderate Pain (4 - 6)  loperamide 2 milliGRAM(s) Oral daily PRN Diarrhea  LORazepam     Tablet 0.5 milliGRAM(s) Oral every 6 hours PRN anxiety  magnesium hydroxide Suspension 30 milliLiter(s) Oral daily PRN Constipation

## 2021-12-08 NOTE — BH PSYCHOLOGY - GROUP THERAPY NOTE - NSBHPSYCHOLRESPCOMMENT_PSY_A_CORE FT
Patient was participatory, engaged, and attentive to material. Patient engaged in exercise. Their responses were slightly distracting, tending to shift focus away from group topic. However, with a fair amount of redirection, patient was able to provide somewhat meaningful responses. Patient reported enjoying group session and expressed gratitude to the facilitator.

## 2021-12-08 NOTE — BH INPATIENT PSYCHIATRY PROGRESS NOTE - CURRENT MEDICATION
MEDICATIONS  (STANDING):  alendronate 70 milliGRAM(s) Oral <User Schedule>  amLODIPine   Tablet 2.5 milliGRAM(s) Oral daily  ascorbic acid 1000 milliGRAM(s) Oral two times a day  aspirin enteric coated 81 milliGRAM(s) Oral daily  atorvastatin 10 milliGRAM(s) Oral at bedtime  buMETAnide 1 milliGRAM(s) Oral daily  calcium carbonate 1250 mG  + Vitamin D (OsCal 500 + D) 1 Tablet(s) Oral daily  cholecalciferol 5000 Unit(s) Oral daily  clonazePAM  Tablet 0.5 milliGRAM(s) Oral <User Schedule>  cyanocobalamin 1000 MICROGram(s) Oral daily  dextrose 40% Gel 15 Gram(s) Oral once  dextrose 5%. 1000 milliLiter(s) (50 mL/Hr) IV Continuous <Continuous>  dextrose 5%. 1000 milliLiter(s) (100 mL/Hr) IV Continuous <Continuous>  dextrose 50% Injectable 25 Gram(s) IV Push once  dextrose 50% Injectable 12.5 Gram(s) IV Push once  dextrose 50% Injectable 25 Gram(s) IV Push once  diVALproex  milliGRAM(s) Oral at bedtime  folic acid 1 milliGRAM(s) Oral daily  glucagon  Injectable 1 milliGRAM(s) IntraMuscular once  insulin lispro (ADMELOG) corrective regimen sliding scale   SubCutaneous three times a day before meals  insulin lispro (ADMELOG) corrective regimen sliding scale   SubCutaneous at bedtime  lidocaine   4% Patch 1 Patch Transdermal daily  loratadine 10 milliGRAM(s) Oral daily  losartan 100 milliGRAM(s) Oral daily  magnesium oxide 400 milliGRAM(s) Oral every 12 hours  metFORMIN 500 milliGRAM(s) Oral two times a day  montelukast 10 milliGRAM(s) Oral daily  pantoprazole    Tablet 40 milliGRAM(s) Oral before breakfast  psyllium Powder 1 Packet(s) Oral two times a day  senna 2 Tablet(s) Oral at bedtime  sitaGLIPtin 50 milliGRAM(s) Oral daily  sodium chloride 0.65% Nasal 1 Spray(s) Both Nostrils every 12 hours    MEDICATIONS  (PRN):  acetaminophen     Tablet .. 650 milliGRAM(s) Oral every 6 hours PRN Moderate Pain (4 - 6)  loperamide 2 milliGRAM(s) Oral daily PRN Diarrhea  LORazepam     Tablet 0.5 milliGRAM(s) Oral every 6 hours PRN anxiety  magnesium hydroxide Suspension 30 milliLiter(s) Oral daily PRN Constipation

## 2021-12-08 NOTE — BH PSYCHOLOGY - GROUP THERAPY NOTE - NSBHPSYCHOLDISCUSS_PSY_A_CORE FT
In group psychotherapy session, patients were introduced to the concept of relaxation and read from a handout describing the biological/psychological/social impact of stress. Patients discussed their experience with stress in their past and on the unit. Psychologist discussed ways in which relaxation and other forms of effective coping allow patients a greater sense of control over their thought process in the service of autonomy and expansion of their choices and options. Patients engaged in a relaxation exercise (visualization of a peaceful and safe place) incorporating technology (sounds of nature) to aid in exercise immersion.  Patients discussed ways to apply these techniques and were encouraged to extend this in practice during their time on the unit.

## 2021-12-08 NOTE — BH INPATIENT PSYCHIATRY PROGRESS NOTE - NSBHMETABOLIC_PSY_ALL_CORE_FT
BMI: BMI (kg/m2): 27.6 (12-01-21 @ 15:23)  HbA1c: A1C with Estimated Average Glucose Result: 6.9 % (12-02-21 @ 10:05)    Glucose: POCT Blood Glucose.: 103 mg/dL (12-08-21 @ 08:19)    BP: 131/51 (12-05-21 @ 20:04) (131/51 - 131/51)  Lipid Panel:  BMI: BMI (kg/m2): 27.6 (12-01-21 @ 15:23)  HbA1c: A1C with Estimated Average Glucose Result: 6.9 % (12-02-21 @ 10:05)    Glucose: POCT Blood Glucose.: 90 mg/dL (12-08-21 @ 12:11)    BP: 131/51 (12-05-21 @ 20:04) (131/51 - 131/51)  Lipid Panel:

## 2021-12-09 PROCEDURE — 99232 SBSQ HOSP IP/OBS MODERATE 35: CPT

## 2021-12-09 RX ORDER — GABAPENTIN 400 MG/1
100 CAPSULE ORAL
Refills: 0 | Status: DISCONTINUED | OUTPATIENT
Start: 2021-12-09 | End: 2021-12-15

## 2021-12-09 RX ADMIN — Medication 1 SPRAY(S): at 20:26

## 2021-12-09 RX ADMIN — LOSARTAN POTASSIUM 100 MILLIGRAM(S): 100 TABLET, FILM COATED ORAL at 10:21

## 2021-12-09 RX ADMIN — ATORVASTATIN CALCIUM 10 MILLIGRAM(S): 80 TABLET, FILM COATED ORAL at 20:23

## 2021-12-09 RX ADMIN — Medication 1000 MILLIGRAM(S): at 20:24

## 2021-12-09 RX ADMIN — Medication 1 MILLIGRAM(S): at 10:15

## 2021-12-09 RX ADMIN — GABAPENTIN 100 MILLIGRAM(S): 400 CAPSULE ORAL at 20:22

## 2021-12-09 RX ADMIN — Medication 5000 UNIT(S): at 10:29

## 2021-12-09 RX ADMIN — AMLODIPINE BESYLATE 2.5 MILLIGRAM(S): 2.5 TABLET ORAL at 10:17

## 2021-12-09 RX ADMIN — Medication 1 SPRAY(S): at 10:26

## 2021-12-09 RX ADMIN — MAGNESIUM OXIDE 400 MG ORAL TABLET 400 MILLIGRAM(S): 241.3 TABLET ORAL at 10:20

## 2021-12-09 RX ADMIN — METFORMIN HYDROCHLORIDE 500 MILLIGRAM(S): 850 TABLET ORAL at 20:24

## 2021-12-09 RX ADMIN — SENNA PLUS 2 TABLET(S): 8.6 TABLET ORAL at 20:25

## 2021-12-09 RX ADMIN — PREGABALIN 1000 MICROGRAM(S): 225 CAPSULE ORAL at 10:15

## 2021-12-09 RX ADMIN — BUMETANIDE 1 MILLIGRAM(S): 0.25 INJECTION INTRAMUSCULAR; INTRAVENOUS at 10:17

## 2021-12-09 RX ADMIN — Medication 81 MILLIGRAM(S): at 10:20

## 2021-12-09 RX ADMIN — Medication 0.5 MILLIGRAM(S): at 20:21

## 2021-12-09 RX ADMIN — Medication 1 PACKET(S): at 20:21

## 2021-12-09 RX ADMIN — PANTOPRAZOLE SODIUM 40 MILLIGRAM(S): 20 TABLET, DELAYED RELEASE ORAL at 10:19

## 2021-12-09 RX ADMIN — GABAPENTIN 100 MILLIGRAM(S): 400 CAPSULE ORAL at 10:21

## 2021-12-09 RX ADMIN — METFORMIN HYDROCHLORIDE 500 MILLIGRAM(S): 850 TABLET ORAL at 10:14

## 2021-12-09 RX ADMIN — MAGNESIUM OXIDE 400 MG ORAL TABLET 400 MILLIGRAM(S): 241.3 TABLET ORAL at 20:21

## 2021-12-09 RX ADMIN — Medication 0: at 21:07

## 2021-12-09 RX ADMIN — Medication 1 PACKET(S): at 10:26

## 2021-12-09 RX ADMIN — MONTELUKAST 10 MILLIGRAM(S): 4 TABLET, CHEWABLE ORAL at 10:24

## 2021-12-09 RX ADMIN — Medication 1 TABLET(S): at 10:16

## 2021-12-09 RX ADMIN — Medication 0.5 MILLIGRAM(S): at 06:19

## 2021-12-09 RX ADMIN — Medication 1000 MILLIGRAM(S): at 10:18

## 2021-12-09 RX ADMIN — LORATADINE 10 MILLIGRAM(S): 10 TABLET ORAL at 10:21

## 2021-12-09 NOTE — BH INPATIENT PSYCHIATRY PROGRESS NOTE - NSBHCHARTREVIEWVS_PSY_A_CORE FT
Vital Signs Last 24 Hrs  T(C): 36.7 (12-09-21 @ 08:10), Max: 37.2 (12-08-21 @ 15:40)  T(F): 98 (12-09-21 @ 08:10), Max: 99 (12-08-21 @ 15:40)  HR: --  BP: --  BP(mean): --  RR: --  SpO2: --    Orthostatic VS  12-09-21 @ 08:10  Lying BP: --/-- HR: --  Sitting BP: 142/70 HR: 101  Standing BP: 138/69 HR: 96  Site: --  Mode: --  Orthostatic VS  12-08-21 @ 07:55  Lying BP: --/-- HR: --  Sitting BP: 136/63 HR: 82  Standing BP: 130/67 HR: 83  Site: upper left arm  Mode: electronic

## 2021-12-09 NOTE — BH INPATIENT PSYCHIATRY PROGRESS NOTE - NSBHMETABOLIC_PSY_ALL_CORE_FT
BMI: BMI (kg/m2): 27.6 (12-01-21 @ 15:23)  HbA1c: A1C with Estimated Average Glucose Result: 6.9 % (12-02-21 @ 10:05)    Glucose: POCT Blood Glucose.: 133 mg/dL (12-09-21 @ 08:40)    BP: --  Lipid Panel:

## 2021-12-09 NOTE — BH INPATIENT PSYCHIATRY PROGRESS NOTE - PRN MEDS
MEDICATIONS  (PRN):  acetaminophen     Tablet .. 650 milliGRAM(s) Oral every 6 hours PRN Moderate Pain (4 - 6)  loperamide 2 milliGRAM(s) Oral daily PRN Diarrhea  magnesium hydroxide Suspension 30 milliLiter(s) Oral daily PRN Constipation

## 2021-12-09 NOTE — BH INPATIENT PSYCHIATRY PROGRESS NOTE - CURRENT MEDICATION
MEDICATIONS  (STANDING):  alendronate 70 milliGRAM(s) Oral <User Schedule>  amLODIPine   Tablet 2.5 milliGRAM(s) Oral daily  ascorbic acid 1000 milliGRAM(s) Oral two times a day  aspirin enteric coated 81 milliGRAM(s) Oral daily  atorvastatin 10 milliGRAM(s) Oral at bedtime  buMETAnide 1 milliGRAM(s) Oral daily  calcium carbonate 1250 mG  + Vitamin D (OsCal 500 + D) 1 Tablet(s) Oral daily  cholecalciferol 5000 Unit(s) Oral daily  clonazePAM  Tablet 0.5 milliGRAM(s) Oral <User Schedule>  cyanocobalamin 1000 MICROGram(s) Oral daily  dextrose 40% Gel 15 Gram(s) Oral once  dextrose 5%. 1000 milliLiter(s) (50 mL/Hr) IV Continuous <Continuous>  dextrose 5%. 1000 milliLiter(s) (100 mL/Hr) IV Continuous <Continuous>  dextrose 50% Injectable 25 Gram(s) IV Push once  dextrose 50% Injectable 12.5 Gram(s) IV Push once  dextrose 50% Injectable 25 Gram(s) IV Push once  folic acid 1 milliGRAM(s) Oral daily  gabapentin 100 milliGRAM(s) Oral two times a day  glucagon  Injectable 1 milliGRAM(s) IntraMuscular once  insulin lispro (ADMELOG) corrective regimen sliding scale   SubCutaneous three times a day before meals  insulin lispro (ADMELOG) corrective regimen sliding scale   SubCutaneous at bedtime  lidocaine   4% Patch 1 Patch Transdermal daily  loratadine 10 milliGRAM(s) Oral daily  losartan 100 milliGRAM(s) Oral daily  magnesium oxide 400 milliGRAM(s) Oral every 12 hours  metFORMIN 500 milliGRAM(s) Oral two times a day  montelukast 10 milliGRAM(s) Oral daily  pantoprazole    Tablet 40 milliGRAM(s) Oral before breakfast  psyllium Powder 1 Packet(s) Oral two times a day  senna 2 Tablet(s) Oral at bedtime  sitaGLIPtin 50 milliGRAM(s) Oral daily  sodium chloride 0.65% Nasal 1 Spray(s) Both Nostrils every 12 hours    MEDICATIONS  (PRN):  acetaminophen     Tablet .. 650 milliGRAM(s) Oral every 6 hours PRN Moderate Pain (4 - 6)  loperamide 2 milliGRAM(s) Oral daily PRN Diarrhea  magnesium hydroxide Suspension 30 milliLiter(s) Oral daily PRN Constipation

## 2021-12-10 RX ORDER — CLONAZEPAM 1 MG
0.5 TABLET ORAL ONCE
Refills: 0 | Status: DISCONTINUED | OUTPATIENT
Start: 2021-12-10 | End: 2021-12-10

## 2021-12-10 RX ORDER — CLONAZEPAM 1 MG
0.5 TABLET ORAL
Refills: 0 | Status: DISCONTINUED | OUTPATIENT
Start: 2021-12-10 | End: 2021-12-17

## 2021-12-10 RX ADMIN — Medication 0.5 MILLIGRAM(S): at 20:27

## 2021-12-10 RX ADMIN — METFORMIN HYDROCHLORIDE 500 MILLIGRAM(S): 850 TABLET ORAL at 20:26

## 2021-12-10 RX ADMIN — Medication 1 MILLIGRAM(S): at 08:46

## 2021-12-10 RX ADMIN — Medication 1 PACKET(S): at 08:45

## 2021-12-10 RX ADMIN — ATORVASTATIN CALCIUM 10 MILLIGRAM(S): 80 TABLET, FILM COATED ORAL at 20:27

## 2021-12-10 RX ADMIN — Medication 1000 MILLIGRAM(S): at 08:46

## 2021-12-10 RX ADMIN — LOSARTAN POTASSIUM 100 MILLIGRAM(S): 100 TABLET, FILM COATED ORAL at 08:46

## 2021-12-10 RX ADMIN — GABAPENTIN 100 MILLIGRAM(S): 400 CAPSULE ORAL at 08:46

## 2021-12-10 RX ADMIN — Medication 5000 UNIT(S): at 08:48

## 2021-12-10 RX ADMIN — PANTOPRAZOLE SODIUM 40 MILLIGRAM(S): 20 TABLET, DELAYED RELEASE ORAL at 08:47

## 2021-12-10 RX ADMIN — GABAPENTIN 100 MILLIGRAM(S): 400 CAPSULE ORAL at 20:26

## 2021-12-10 RX ADMIN — Medication 1000 MILLIGRAM(S): at 20:27

## 2021-12-10 RX ADMIN — MAGNESIUM HYDROXIDE 30 MILLILITER(S): 400 TABLET, CHEWABLE ORAL at 08:57

## 2021-12-10 RX ADMIN — Medication 0.5 MILLIGRAM(S): at 06:26

## 2021-12-10 RX ADMIN — Medication 1 SPRAY(S): at 08:56

## 2021-12-10 RX ADMIN — Medication 81 MILLIGRAM(S): at 08:47

## 2021-12-10 RX ADMIN — PREGABALIN 1000 MICROGRAM(S): 225 CAPSULE ORAL at 08:47

## 2021-12-10 RX ADMIN — Medication 1 TABLET(S): at 08:46

## 2021-12-10 RX ADMIN — Medication 1 SPRAY(S): at 20:28

## 2021-12-10 RX ADMIN — BUMETANIDE 1 MILLIGRAM(S): 0.25 INJECTION INTRAMUSCULAR; INTRAVENOUS at 08:46

## 2021-12-10 RX ADMIN — Medication 1 PACKET(S): at 20:25

## 2021-12-10 RX ADMIN — MONTELUKAST 10 MILLIGRAM(S): 4 TABLET, CHEWABLE ORAL at 08:48

## 2021-12-10 RX ADMIN — Medication 0: at 21:30

## 2021-12-10 RX ADMIN — MAGNESIUM OXIDE 400 MG ORAL TABLET 400 MILLIGRAM(S): 241.3 TABLET ORAL at 20:27

## 2021-12-10 RX ADMIN — AMLODIPINE BESYLATE 2.5 MILLIGRAM(S): 2.5 TABLET ORAL at 08:47

## 2021-12-10 RX ADMIN — LORATADINE 10 MILLIGRAM(S): 10 TABLET ORAL at 08:45

## 2021-12-10 RX ADMIN — MAGNESIUM OXIDE 400 MG ORAL TABLET 400 MILLIGRAM(S): 241.3 TABLET ORAL at 08:48

## 2021-12-10 RX ADMIN — METFORMIN HYDROCHLORIDE 500 MILLIGRAM(S): 850 TABLET ORAL at 08:46

## 2021-12-10 RX ADMIN — SENNA PLUS 2 TABLET(S): 8.6 TABLET ORAL at 20:27

## 2021-12-10 NOTE — BH INPATIENT PSYCHIATRY PROGRESS NOTE - NSBHCHARTREVIEWVS_PSY_A_CORE FT
Vital Signs Last 24 Hrs  T(C): 36.1 (12-09-21 @ 15:31), Max: 36.1 (12-09-21 @ 15:31)  T(F): 96.9 (12-09-21 @ 15:31), Max: 96.9 (12-09-21 @ 15:31)  HR: --  BP: --  BP(mean): --  RR: --  SpO2: --    Orthostatic VS  12-09-21 @ 08:10  Lying BP: --/-- HR: --  Sitting BP: 142/70 HR: 101  Standing BP: 138/69 HR: 96  Site: --  Mode: --

## 2021-12-10 NOTE — BH INPATIENT PSYCHIATRY PROGRESS NOTE - NSBHMETABOLIC_PSY_ALL_CORE_FT
BMI: BMI (kg/m2): 27.6 (12-01-21 @ 15:23)  HbA1c: A1C with Estimated Average Glucose Result: 6.9 % (12-02-21 @ 10:05)    Glucose: POCT Blood Glucose.: 111 mg/dL (12-10-21 @ 08:20)    BP: --  Lipid Panel:

## 2021-12-10 NOTE — BH INPATIENT PSYCHIATRY PROGRESS NOTE - NSBHFUPINTERVALCCFT_PSY_A_CORE
"I did not take the medication, Neurontin. I need my daughter to bring in the bible and I need to use it to get some answers before I can take it."

## 2021-12-10 NOTE — BH INPATIENT PSYCHIATRY PROGRESS NOTE - CURRENT MEDICATION
MEDICATIONS  (STANDING):  alendronate 70 milliGRAM(s) Oral <User Schedule>  amLODIPine   Tablet 2.5 milliGRAM(s) Oral daily  ascorbic acid 1000 milliGRAM(s) Oral two times a day  aspirin enteric coated 81 milliGRAM(s) Oral daily  atorvastatin 10 milliGRAM(s) Oral at bedtime  buMETAnide 1 milliGRAM(s) Oral daily  calcium carbonate 1250 mG  + Vitamin D (OsCal 500 + D) 1 Tablet(s) Oral daily  cholecalciferol 5000 Unit(s) Oral daily  cyanocobalamin 1000 MICROGram(s) Oral daily  dextrose 40% Gel 15 Gram(s) Oral once  dextrose 5%. 1000 milliLiter(s) (50 mL/Hr) IV Continuous <Continuous>  dextrose 5%. 1000 milliLiter(s) (100 mL/Hr) IV Continuous <Continuous>  dextrose 50% Injectable 25 Gram(s) IV Push once  dextrose 50% Injectable 12.5 Gram(s) IV Push once  dextrose 50% Injectable 25 Gram(s) IV Push once  folic acid 1 milliGRAM(s) Oral daily  gabapentin 100 milliGRAM(s) Oral two times a day  glucagon  Injectable 1 milliGRAM(s) IntraMuscular once  insulin lispro (ADMELOG) corrective regimen sliding scale   SubCutaneous three times a day before meals  insulin lispro (ADMELOG) corrective regimen sliding scale   SubCutaneous at bedtime  lidocaine   4% Patch 1 Patch Transdermal daily  loratadine 10 milliGRAM(s) Oral daily  losartan 100 milliGRAM(s) Oral daily  magnesium oxide 400 milliGRAM(s) Oral every 12 hours  metFORMIN 500 milliGRAM(s) Oral two times a day  montelukast 10 milliGRAM(s) Oral daily  pantoprazole    Tablet 40 milliGRAM(s) Oral before breakfast  psyllium Powder 1 Packet(s) Oral two times a day  senna 2 Tablet(s) Oral at bedtime  sitaGLIPtin 50 milliGRAM(s) Oral daily  sodium chloride 0.65% Nasal 1 Spray(s) Both Nostrils every 12 hours    MEDICATIONS  (PRN):  acetaminophen     Tablet .. 650 milliGRAM(s) Oral every 6 hours PRN Moderate Pain (4 - 6)  loperamide 2 milliGRAM(s) Oral daily PRN Diarrhea  magnesium hydroxide Suspension 30 milliLiter(s) Oral daily PRN Constipation   MEDICATIONS  (STANDING):  alendronate 70 milliGRAM(s) Oral <User Schedule>  amLODIPine   Tablet 2.5 milliGRAM(s) Oral daily  ascorbic acid 1000 milliGRAM(s) Oral two times a day  aspirin enteric coated 81 milliGRAM(s) Oral daily  atorvastatin 10 milliGRAM(s) Oral at bedtime  buMETAnide 1 milliGRAM(s) Oral daily  calcium carbonate 1250 mG  + Vitamin D (OsCal 500 + D) 1 Tablet(s) Oral daily  cholecalciferol 5000 Unit(s) Oral daily  clonazePAM  Tablet 0.5 milliGRAM(s) Oral <User Schedule>  cyanocobalamin 1000 MICROGram(s) Oral daily  dextrose 40% Gel 15 Gram(s) Oral once  dextrose 5%. 1000 milliLiter(s) (50 mL/Hr) IV Continuous <Continuous>  dextrose 5%. 1000 milliLiter(s) (100 mL/Hr) IV Continuous <Continuous>  dextrose 50% Injectable 25 Gram(s) IV Push once  dextrose 50% Injectable 12.5 Gram(s) IV Push once  dextrose 50% Injectable 25 Gram(s) IV Push once  folic acid 1 milliGRAM(s) Oral daily  gabapentin 100 milliGRAM(s) Oral two times a day  glucagon  Injectable 1 milliGRAM(s) IntraMuscular once  insulin lispro (ADMELOG) corrective regimen sliding scale   SubCutaneous three times a day before meals  insulin lispro (ADMELOG) corrective regimen sliding scale   SubCutaneous at bedtime  lidocaine   4% Patch 1 Patch Transdermal daily  loratadine 10 milliGRAM(s) Oral daily  losartan 100 milliGRAM(s) Oral daily  magnesium oxide 400 milliGRAM(s) Oral every 12 hours  metFORMIN 500 milliGRAM(s) Oral two times a day  montelukast 10 milliGRAM(s) Oral daily  pantoprazole    Tablet 40 milliGRAM(s) Oral before breakfast  psyllium Powder 1 Packet(s) Oral two times a day  senna 2 Tablet(s) Oral at bedtime  sitaGLIPtin 50 milliGRAM(s) Oral daily  sodium chloride 0.65% Nasal 1 Spray(s) Both Nostrils every 12 hours    MEDICATIONS  (PRN):  acetaminophen     Tablet .. 650 milliGRAM(s) Oral every 6 hours PRN Moderate Pain (4 - 6)  loperamide 2 milliGRAM(s) Oral daily PRN Diarrhea  magnesium hydroxide Suspension 30 milliLiter(s) Oral daily PRN Constipation

## 2021-12-11 PROCEDURE — 99232 SBSQ HOSP IP/OBS MODERATE 35: CPT

## 2021-12-11 RX ADMIN — Medication 1 PACKET(S): at 11:01

## 2021-12-11 RX ADMIN — MAGNESIUM HYDROXIDE 30 MILLILITER(S): 400 TABLET, CHEWABLE ORAL at 11:03

## 2021-12-11 RX ADMIN — MAGNESIUM OXIDE 400 MG ORAL TABLET 400 MILLIGRAM(S): 241.3 TABLET ORAL at 10:57

## 2021-12-11 RX ADMIN — METFORMIN HYDROCHLORIDE 500 MILLIGRAM(S): 850 TABLET ORAL at 10:56

## 2021-12-11 RX ADMIN — Medication 1 MILLIGRAM(S): at 10:58

## 2021-12-11 RX ADMIN — MAGNESIUM OXIDE 400 MG ORAL TABLET 400 MILLIGRAM(S): 241.3 TABLET ORAL at 21:51

## 2021-12-11 RX ADMIN — METFORMIN HYDROCHLORIDE 500 MILLIGRAM(S): 850 TABLET ORAL at 21:51

## 2021-12-11 RX ADMIN — GABAPENTIN 100 MILLIGRAM(S): 400 CAPSULE ORAL at 21:50

## 2021-12-11 RX ADMIN — Medication 1000 MILLIGRAM(S): at 10:54

## 2021-12-11 RX ADMIN — Medication 81 MILLIGRAM(S): at 10:53

## 2021-12-11 RX ADMIN — PREGABALIN 1000 MICROGRAM(S): 225 CAPSULE ORAL at 11:00

## 2021-12-11 RX ADMIN — MONTELUKAST 10 MILLIGRAM(S): 4 TABLET, CHEWABLE ORAL at 11:00

## 2021-12-11 RX ADMIN — Medication 0.5 MILLIGRAM(S): at 06:26

## 2021-12-11 RX ADMIN — LOSARTAN POTASSIUM 100 MILLIGRAM(S): 100 TABLET, FILM COATED ORAL at 10:58

## 2021-12-11 RX ADMIN — Medication 1000 MILLIGRAM(S): at 21:50

## 2021-12-11 RX ADMIN — AMLODIPINE BESYLATE 2.5 MILLIGRAM(S): 2.5 TABLET ORAL at 10:54

## 2021-12-11 RX ADMIN — PANTOPRAZOLE SODIUM 40 MILLIGRAM(S): 20 TABLET, DELAYED RELEASE ORAL at 10:57

## 2021-12-11 RX ADMIN — GABAPENTIN 100 MILLIGRAM(S): 400 CAPSULE ORAL at 10:59

## 2021-12-11 RX ADMIN — ATORVASTATIN CALCIUM 10 MILLIGRAM(S): 80 TABLET, FILM COATED ORAL at 21:51

## 2021-12-11 RX ADMIN — BUMETANIDE 1 MILLIGRAM(S): 0.25 INJECTION INTRAMUSCULAR; INTRAVENOUS at 10:53

## 2021-12-11 RX ADMIN — Medication 1 TABLET(S): at 11:01

## 2021-12-11 RX ADMIN — Medication 0.5 MILLIGRAM(S): at 21:50

## 2021-12-11 RX ADMIN — Medication 5000 UNIT(S): at 10:55

## 2021-12-11 RX ADMIN — LORATADINE 10 MILLIGRAM(S): 10 TABLET ORAL at 10:59

## 2021-12-11 RX ADMIN — Medication 1 SPRAY(S): at 11:02

## 2021-12-11 NOTE — BH INPATIENT PSYCHIATRY PROGRESS NOTE - NSBHCHARTREVIEWVS_PSY_A_CORE FT
Vital Signs Last 24 Hrs  T(C): 36.1 (12-11-21 @ 09:19), Max: 36.9 (12-10-21 @ 15:34)  T(F): 96.9 (12-11-21 @ 09:19), Max: 98.4 (12-10-21 @ 15:34)  HR: 91 (12-11-21 @ 09:19) (91 - 91)  BP: 124/53 (12-11-21 @ 09:19) (124/53 - 124/53)  BP(mean): --  RR: --  SpO2: --

## 2021-12-11 NOTE — BH INPATIENT PSYCHIATRY PROGRESS NOTE - NSBHMETABOLIC_PSY_ALL_CORE_FT
BMI: BMI (kg/m2): 27.6 (12-01-21 @ 15:23)  HbA1c: A1C with Estimated Average Glucose Result: 6.9 % (12-02-21 @ 10:05)    Glucose: POCT Blood Glucose.: 105 mg/dL (12-11-21 @ 12:00)    BP: 124/53 (12-11-21 @ 09:19) (124/53 - 124/53)  Lipid Panel:

## 2021-12-12 PROCEDURE — 99231 SBSQ HOSP IP/OBS SF/LOW 25: CPT

## 2021-12-12 RX ADMIN — Medication 0.5 MILLIGRAM(S): at 21:29

## 2021-12-12 RX ADMIN — ALENDRONATE SODIUM 70 MILLIGRAM(S): 70 TABLET ORAL at 06:12

## 2021-12-12 RX ADMIN — METFORMIN HYDROCHLORIDE 500 MILLIGRAM(S): 850 TABLET ORAL at 21:29

## 2021-12-12 RX ADMIN — GABAPENTIN 100 MILLIGRAM(S): 400 CAPSULE ORAL at 10:30

## 2021-12-12 RX ADMIN — LORATADINE 10 MILLIGRAM(S): 10 TABLET ORAL at 10:28

## 2021-12-12 RX ADMIN — MAGNESIUM OXIDE 400 MG ORAL TABLET 400 MILLIGRAM(S): 241.3 TABLET ORAL at 10:28

## 2021-12-12 RX ADMIN — Medication 0.5 MILLIGRAM(S): at 06:47

## 2021-12-12 RX ADMIN — PREGABALIN 1000 MICROGRAM(S): 225 CAPSULE ORAL at 10:26

## 2021-12-12 RX ADMIN — Medication 1 SPRAY(S): at 10:54

## 2021-12-12 RX ADMIN — LOSARTAN POTASSIUM 100 MILLIGRAM(S): 100 TABLET, FILM COATED ORAL at 10:28

## 2021-12-12 RX ADMIN — MAGNESIUM OXIDE 400 MG ORAL TABLET 400 MILLIGRAM(S): 241.3 TABLET ORAL at 21:30

## 2021-12-12 RX ADMIN — Medication 5000 UNIT(S): at 10:58

## 2021-12-12 RX ADMIN — ATORVASTATIN CALCIUM 10 MILLIGRAM(S): 80 TABLET, FILM COATED ORAL at 21:28

## 2021-12-12 RX ADMIN — Medication 1 SPRAY(S): at 21:32

## 2021-12-12 RX ADMIN — AMLODIPINE BESYLATE 2.5 MILLIGRAM(S): 2.5 TABLET ORAL at 10:26

## 2021-12-12 RX ADMIN — Medication 1 TABLET(S): at 10:27

## 2021-12-12 RX ADMIN — GABAPENTIN 100 MILLIGRAM(S): 400 CAPSULE ORAL at 21:28

## 2021-12-12 RX ADMIN — MONTELUKAST 10 MILLIGRAM(S): 4 TABLET, CHEWABLE ORAL at 10:44

## 2021-12-12 RX ADMIN — Medication 81 MILLIGRAM(S): at 10:58

## 2021-12-12 RX ADMIN — Medication 1 MILLIGRAM(S): at 10:27

## 2021-12-12 RX ADMIN — Medication 1000 MILLIGRAM(S): at 10:26

## 2021-12-12 RX ADMIN — BUMETANIDE 1 MILLIGRAM(S): 0.25 INJECTION INTRAMUSCULAR; INTRAVENOUS at 10:25

## 2021-12-12 RX ADMIN — METFORMIN HYDROCHLORIDE 500 MILLIGRAM(S): 850 TABLET ORAL at 10:49

## 2021-12-12 RX ADMIN — PANTOPRAZOLE SODIUM 40 MILLIGRAM(S): 20 TABLET, DELAYED RELEASE ORAL at 10:43

## 2021-12-12 RX ADMIN — Medication 1000 MILLIGRAM(S): at 21:27

## 2021-12-12 NOTE — BH INPATIENT PSYCHIATRY PROGRESS NOTE - NSBHMETABOLIC_PSY_ALL_CORE_FT
BMI: BMI (kg/m2): 27.6 (12-01-21 @ 15:23)  HbA1c: A1C with Estimated Average Glucose Result: 6.9 % (12-02-21 @ 10:05)  Glucose: POCT Blood Glucose.: 164 mg/dL (12-12-21 @ 20:58)  BP: 124/53 (12-11-21 @ 09:19) (124/53 - 124/53)

## 2021-12-12 NOTE — BH INPATIENT PSYCHIATRY PROGRESS NOTE - NSBHCHARTREVIEWVS_PSY_A_CORE FT
Vital Signs Last 24 Hrs  T(C): 36.6 (12-12-21 @ 15:33), Max: 36.7 (12-12-21 @ 07:58)  T(F): 97.8 (12-12-21 @ 15:33), Max: 98 (12-12-21 @ 07:58)  RR: 18 (12-12-21 @ 07:58) (18 - 18)    Orthostatic VS  12-12-21 @ 07:58  Sitting BP: 132/55 HR: 85  Standing BP: 127/58 HR: 91

## 2021-12-13 PROCEDURE — 99232 SBSQ HOSP IP/OBS MODERATE 35: CPT

## 2021-12-13 RX ADMIN — MAGNESIUM OXIDE 400 MG ORAL TABLET 400 MILLIGRAM(S): 241.3 TABLET ORAL at 09:32

## 2021-12-13 RX ADMIN — ATORVASTATIN CALCIUM 10 MILLIGRAM(S): 80 TABLET, FILM COATED ORAL at 20:23

## 2021-12-13 RX ADMIN — LIDOCAINE 1 PATCH: 4 CREAM TOPICAL at 19:32

## 2021-12-13 RX ADMIN — Medication 81 MILLIGRAM(S): at 09:30

## 2021-12-13 RX ADMIN — Medication 0.5 MILLIGRAM(S): at 06:31

## 2021-12-13 RX ADMIN — Medication 1 PACKET(S): at 09:29

## 2021-12-13 RX ADMIN — Medication 650 MILLIGRAM(S): at 05:45

## 2021-12-13 RX ADMIN — LIDOCAINE 1 PATCH: 4 CREAM TOPICAL at 09:32

## 2021-12-13 RX ADMIN — LOSARTAN POTASSIUM 100 MILLIGRAM(S): 100 TABLET, FILM COATED ORAL at 09:31

## 2021-12-13 RX ADMIN — PREGABALIN 1000 MICROGRAM(S): 225 CAPSULE ORAL at 09:32

## 2021-12-13 RX ADMIN — Medication 1 TABLET(S): at 09:31

## 2021-12-13 RX ADMIN — Medication 650 MILLIGRAM(S): at 05:00

## 2021-12-13 RX ADMIN — AMLODIPINE BESYLATE 2.5 MILLIGRAM(S): 2.5 TABLET ORAL at 09:31

## 2021-12-13 RX ADMIN — METFORMIN HYDROCHLORIDE 500 MILLIGRAM(S): 850 TABLET ORAL at 20:25

## 2021-12-13 RX ADMIN — Medication 1 PACKET(S): at 20:22

## 2021-12-13 RX ADMIN — LORATADINE 10 MILLIGRAM(S): 10 TABLET ORAL at 09:30

## 2021-12-13 RX ADMIN — BUMETANIDE 1 MILLIGRAM(S): 0.25 INJECTION INTRAMUSCULAR; INTRAVENOUS at 09:32

## 2021-12-13 RX ADMIN — Medication 0: at 20:49

## 2021-12-13 RX ADMIN — MAGNESIUM OXIDE 400 MG ORAL TABLET 400 MILLIGRAM(S): 241.3 TABLET ORAL at 20:25

## 2021-12-13 RX ADMIN — Medication 0.5 MILLIGRAM(S): at 20:23

## 2021-12-13 RX ADMIN — Medication 1000 MILLIGRAM(S): at 09:31

## 2021-12-13 RX ADMIN — GABAPENTIN 100 MILLIGRAM(S): 400 CAPSULE ORAL at 09:31

## 2021-12-13 RX ADMIN — Medication 5000 UNIT(S): at 09:34

## 2021-12-13 RX ADMIN — Medication 1 MILLIGRAM(S): at 09:31

## 2021-12-13 RX ADMIN — GABAPENTIN 100 MILLIGRAM(S): 400 CAPSULE ORAL at 20:23

## 2021-12-13 RX ADMIN — Medication 1 SPRAY(S): at 09:34

## 2021-12-13 RX ADMIN — LIDOCAINE 1 PATCH: 4 CREAM TOPICAL at 21:01

## 2021-12-13 RX ADMIN — MONTELUKAST 10 MILLIGRAM(S): 4 TABLET, CHEWABLE ORAL at 09:32

## 2021-12-13 RX ADMIN — Medication 1000 MILLIGRAM(S): at 20:24

## 2021-12-13 RX ADMIN — SENNA PLUS 2 TABLET(S): 8.6 TABLET ORAL at 20:25

## 2021-12-13 RX ADMIN — METFORMIN HYDROCHLORIDE 500 MILLIGRAM(S): 850 TABLET ORAL at 09:30

## 2021-12-13 RX ADMIN — Medication 1 SPRAY(S): at 20:26

## 2021-12-13 RX ADMIN — PANTOPRAZOLE SODIUM 40 MILLIGRAM(S): 20 TABLET, DELAYED RELEASE ORAL at 10:04

## 2021-12-13 NOTE — BH INPATIENT PSYCHIATRY PROGRESS NOTE - NSBHCHARTREVIEWVS_PSY_A_CORE FT
Vital Signs Last 24 Hrs  T(C): 36.9 (12-13-21 @ 06:35), Max: 37.1 (12-13-21 @ 01:27)  T(F): 98.5 (12-13-21 @ 06:35), Max: 98.7 (12-13-21 @ 01:27)  HR: --  BP: --  BP(mean): --  RR: --  SpO2: --    Orthostatic VS  12-12-21 @ 07:58  Lying BP: --/-- HR: --  Sitting BP: 132/55 HR: 85  Standing BP: 127/58 HR: 91  Site: --  Mode: --

## 2021-12-13 NOTE — BH INPATIENT PSYCHIATRY PROGRESS NOTE - NSBHMETABOLIC_PSY_ALL_CORE_FT
BMI: BMI (kg/m2): 27.6 (12-01-21 @ 15:23)  HbA1c: A1C with Estimated Average Glucose Result: 6.9 % (12-02-21 @ 10:05)    Glucose: POCT Blood Glucose.: 116 mg/dL (12-13-21 @ 07:54)    BP: 124/53 (12-11-21 @ 09:19) (124/53 - 124/53)  Lipid Panel:  BMI: BMI (kg/m2): 27.6 (12-01-21 @ 15:23)  HbA1c: A1C with Estimated Average Glucose Result: 6.9 % (12-02-21 @ 10:05)    Glucose: POCT Blood Glucose.: 107 mg/dL (12-13-21 @ 12:17)    BP: 124/53 (12-11-21 @ 09:19) (124/53 - 124/53)  Lipid Panel:

## 2021-12-14 PROCEDURE — 99232 SBSQ HOSP IP/OBS MODERATE 35: CPT

## 2021-12-14 RX ADMIN — GABAPENTIN 100 MILLIGRAM(S): 400 CAPSULE ORAL at 20:20

## 2021-12-14 RX ADMIN — Medication 1000 MILLIGRAM(S): at 20:21

## 2021-12-14 RX ADMIN — LIDOCAINE 1 PATCH: 4 CREAM TOPICAL at 19:50

## 2021-12-14 RX ADMIN — METFORMIN HYDROCHLORIDE 500 MILLIGRAM(S): 850 TABLET ORAL at 20:21

## 2021-12-14 RX ADMIN — SENNA PLUS 2 TABLET(S): 8.6 TABLET ORAL at 20:21

## 2021-12-14 RX ADMIN — Medication 1 PACKET(S): at 20:20

## 2021-12-14 RX ADMIN — Medication 1 SPRAY(S): at 20:23

## 2021-12-14 RX ADMIN — ATORVASTATIN CALCIUM 10 MILLIGRAM(S): 80 TABLET, FILM COATED ORAL at 20:21

## 2021-12-14 RX ADMIN — Medication 0.5 MILLIGRAM(S): at 06:12

## 2021-12-14 RX ADMIN — PANTOPRAZOLE SODIUM 40 MILLIGRAM(S): 20 TABLET, DELAYED RELEASE ORAL at 09:51

## 2021-12-14 RX ADMIN — MAGNESIUM OXIDE 400 MG ORAL TABLET 400 MILLIGRAM(S): 241.3 TABLET ORAL at 20:21

## 2021-12-14 RX ADMIN — Medication 0.5 MILLIGRAM(S): at 20:21

## 2021-12-14 NOTE — BH INPATIENT PSYCHIATRY PROGRESS NOTE - NSBHCHARTREVIEWVS_PSY_A_CORE FT
Vital Signs Last 24 Hrs  T(C): 36.6 (12-13-21 @ 15:41), Max: 36.6 (12-13-21 @ 15:41)  T(F): 97.8 (12-13-21 @ 15:41), Max: 97.8 (12-13-21 @ 15:41)  HR: --  BP: --  BP(mean): --  RR: --  SpO2: --    Orthostatic VS  12-14-21 @ 07:22  Lying BP: --/-- HR: --  Sitting BP: 116/53 HR: 67  Standing BP: 120/69 HR: 74  Site: --  Mode: --

## 2021-12-14 NOTE — BH INPATIENT PSYCHIATRY PROGRESS NOTE - NSBHMETABOLIC_PSY_ALL_CORE_FT
BMI: BMI (kg/m2): 27.6 (12-01-21 @ 15:23)  HbA1c: A1C with Estimated Average Glucose Result: 6.9 % (12-02-21 @ 10:05)    Glucose: POCT Blood Glucose.: 247 mg/dL (12-14-21 @ 08:26)    BP: --  Lipid Panel:

## 2021-12-15 PROCEDURE — 90853 GROUP PSYCHOTHERAPY: CPT

## 2021-12-15 PROCEDURE — 99232 SBSQ HOSP IP/OBS MODERATE 35: CPT | Mod: 25

## 2021-12-15 RX ORDER — GABAPENTIN 400 MG/1
300 CAPSULE ORAL
Refills: 0 | Status: DISCONTINUED | OUTPATIENT
Start: 2021-12-15 | End: 2021-12-22

## 2021-12-15 RX ADMIN — Medication 1 PACKET(S): at 20:35

## 2021-12-15 RX ADMIN — LOSARTAN POTASSIUM 100 MILLIGRAM(S): 100 TABLET, FILM COATED ORAL at 11:12

## 2021-12-15 RX ADMIN — Medication 1 MILLIGRAM(S): at 11:06

## 2021-12-15 RX ADMIN — MAGNESIUM OXIDE 400 MG ORAL TABLET 400 MILLIGRAM(S): 241.3 TABLET ORAL at 20:35

## 2021-12-15 RX ADMIN — Medication 1 PACKET(S): at 11:14

## 2021-12-15 RX ADMIN — METFORMIN HYDROCHLORIDE 500 MILLIGRAM(S): 850 TABLET ORAL at 20:37

## 2021-12-15 RX ADMIN — BUMETANIDE 1 MILLIGRAM(S): 0.25 INJECTION INTRAMUSCULAR; INTRAVENOUS at 11:05

## 2021-12-15 RX ADMIN — Medication 1 SPRAY(S): at 20:35

## 2021-12-15 RX ADMIN — METFORMIN HYDROCHLORIDE 500 MILLIGRAM(S): 850 TABLET ORAL at 11:07

## 2021-12-15 RX ADMIN — Medication 1000 MILLIGRAM(S): at 11:10

## 2021-12-15 RX ADMIN — Medication 1 SPRAY(S): at 11:13

## 2021-12-15 RX ADMIN — SENNA PLUS 2 TABLET(S): 8.6 TABLET ORAL at 20:38

## 2021-12-15 RX ADMIN — GABAPENTIN 300 MILLIGRAM(S): 400 CAPSULE ORAL at 20:36

## 2021-12-15 RX ADMIN — PANTOPRAZOLE SODIUM 40 MILLIGRAM(S): 20 TABLET, DELAYED RELEASE ORAL at 08:05

## 2021-12-15 RX ADMIN — Medication 0: at 21:10

## 2021-12-15 RX ADMIN — LORATADINE 10 MILLIGRAM(S): 10 TABLET ORAL at 11:11

## 2021-12-15 RX ADMIN — MAGNESIUM OXIDE 400 MG ORAL TABLET 400 MILLIGRAM(S): 241.3 TABLET ORAL at 11:12

## 2021-12-15 RX ADMIN — Medication 81 MILLIGRAM(S): at 11:09

## 2021-12-15 RX ADMIN — AMLODIPINE BESYLATE 2.5 MILLIGRAM(S): 2.5 TABLET ORAL at 11:21

## 2021-12-15 RX ADMIN — Medication 0.5 MILLIGRAM(S): at 20:37

## 2021-12-15 RX ADMIN — PREGABALIN 1000 MICROGRAM(S): 225 CAPSULE ORAL at 11:07

## 2021-12-15 RX ADMIN — Medication 1000 MILLIGRAM(S): at 20:37

## 2021-12-15 RX ADMIN — ATORVASTATIN CALCIUM 10 MILLIGRAM(S): 80 TABLET, FILM COATED ORAL at 20:36

## 2021-12-15 RX ADMIN — Medication 1 TABLET(S): at 11:08

## 2021-12-15 RX ADMIN — Medication 0.5 MILLIGRAM(S): at 06:44

## 2021-12-15 RX ADMIN — Medication 5000 UNIT(S): at 11:06

## 2021-12-15 RX ADMIN — LIDOCAINE 1 PATCH: 4 CREAM TOPICAL at 21:00

## 2021-12-15 RX ADMIN — MONTELUKAST 10 MILLIGRAM(S): 4 TABLET, CHEWABLE ORAL at 11:09

## 2021-12-15 RX ADMIN — GABAPENTIN 300 MILLIGRAM(S): 400 CAPSULE ORAL at 11:12

## 2021-12-15 NOTE — BH INPATIENT PSYCHIATRY PROGRESS NOTE - CURRENT MEDICATION
MEDICATIONS  (STANDING):  alendronate 70 milliGRAM(s) Oral <User Schedule>  amLODIPine   Tablet 2.5 milliGRAM(s) Oral daily  ascorbic acid 1000 milliGRAM(s) Oral two times a day  aspirin enteric coated 81 milliGRAM(s) Oral daily  atorvastatin 10 milliGRAM(s) Oral at bedtime  buMETAnide 1 milliGRAM(s) Oral daily  calcium carbonate 1250 mG  + Vitamin D (OsCal 500 + D) 1 Tablet(s) Oral daily  cholecalciferol 5000 Unit(s) Oral daily  clonazePAM  Tablet 0.5 milliGRAM(s) Oral <User Schedule>  cyanocobalamin 1000 MICROGram(s) Oral daily  dextrose 40% Gel 15 Gram(s) Oral once  dextrose 5%. 1000 milliLiter(s) (50 mL/Hr) IV Continuous <Continuous>  dextrose 5%. 1000 milliLiter(s) (100 mL/Hr) IV Continuous <Continuous>  dextrose 50% Injectable 25 Gram(s) IV Push once  dextrose 50% Injectable 12.5 Gram(s) IV Push once  dextrose 50% Injectable 25 Gram(s) IV Push once  folic acid 1 milliGRAM(s) Oral daily  gabapentin 300 milliGRAM(s) Oral two times a day  glucagon  Injectable 1 milliGRAM(s) IntraMuscular once  insulin lispro (ADMELOG) corrective regimen sliding scale   SubCutaneous three times a day before meals  insulin lispro (ADMELOG) corrective regimen sliding scale   SubCutaneous at bedtime  lidocaine   4% Patch 1 Patch Transdermal daily  loratadine 10 milliGRAM(s) Oral daily  losartan 100 milliGRAM(s) Oral daily  magnesium oxide 400 milliGRAM(s) Oral every 12 hours  metFORMIN 500 milliGRAM(s) Oral two times a day  montelukast 10 milliGRAM(s) Oral daily  pantoprazole    Tablet 40 milliGRAM(s) Oral before breakfast  psyllium Powder 1 Packet(s) Oral two times a day  senna 2 Tablet(s) Oral at bedtime  sitaGLIPtin 50 milliGRAM(s) Oral daily  sodium chloride 0.65% Nasal 1 Spray(s) Both Nostrils every 12 hours    MEDICATIONS  (PRN):  acetaminophen     Tablet .. 650 milliGRAM(s) Oral every 6 hours PRN Moderate Pain (4 - 6)  loperamide 2 milliGRAM(s) Oral daily PRN Diarrhea  magnesium hydroxide Suspension 30 milliLiter(s) Oral daily PRN Constipation

## 2021-12-15 NOTE — BH INPATIENT PSYCHIATRY PROGRESS NOTE - NSBHMETABOLIC_PSY_ALL_CORE_FT
BMI: BMI (kg/m2): 27.6 (12-01-21 @ 15:23)  HbA1c: A1C with Estimated Average Glucose Result: 6.9 % (12-02-21 @ 10:05)    Glucose: POCT Blood Glucose.: 139 mg/dL (12-15-21 @ 08:19)    BP: --  Lipid Panel:

## 2021-12-15 NOTE — BH INPATIENT PSYCHIATRY PROGRESS NOTE - NSBHCHARTREVIEWVS_PSY_A_CORE FT
Vital Signs Last 24 Hrs  T(C): 36.6 (12-14-21 @ 15:34), Max: 36.6 (12-14-21 @ 15:34)  T(F): 97.8 (12-14-21 @ 15:34), Max: 97.8 (12-14-21 @ 15:34)  HR: --  BP: --  BP(mean): --  RR: --  SpO2: --    Orthostatic VS  12-14-21 @ 07:22  Lying BP: --/-- HR: --  Sitting BP: 116/53 HR: 67  Standing BP: 120/69 HR: 74  Site: --  Mode: --

## 2021-12-16 PROCEDURE — 99232 SBSQ HOSP IP/OBS MODERATE 35: CPT

## 2021-12-16 RX ADMIN — Medication 1000 MILLIGRAM(S): at 12:31

## 2021-12-16 RX ADMIN — LORATADINE 10 MILLIGRAM(S): 10 TABLET ORAL at 12:34

## 2021-12-16 RX ADMIN — Medication 1 PACKET(S): at 21:00

## 2021-12-16 RX ADMIN — BUMETANIDE 1 MILLIGRAM(S): 0.25 INJECTION INTRAMUSCULAR; INTRAVENOUS at 12:36

## 2021-12-16 RX ADMIN — Medication 1 MILLIGRAM(S): at 12:34

## 2021-12-16 RX ADMIN — Medication 0.5 MILLIGRAM(S): at 06:12

## 2021-12-16 RX ADMIN — Medication 1 TABLET(S): at 12:38

## 2021-12-16 RX ADMIN — METFORMIN HYDROCHLORIDE 500 MILLIGRAM(S): 850 TABLET ORAL at 12:35

## 2021-12-16 RX ADMIN — PREGABALIN 1000 MICROGRAM(S): 225 CAPSULE ORAL at 12:36

## 2021-12-16 RX ADMIN — Medication 5000 UNIT(S): at 12:34

## 2021-12-16 RX ADMIN — Medication 1 PACKET(S): at 12:39

## 2021-12-16 RX ADMIN — LOSARTAN POTASSIUM 100 MILLIGRAM(S): 100 TABLET, FILM COATED ORAL at 12:32

## 2021-12-16 RX ADMIN — MONTELUKAST 10 MILLIGRAM(S): 4 TABLET, CHEWABLE ORAL at 13:13

## 2021-12-16 RX ADMIN — METFORMIN HYDROCHLORIDE 500 MILLIGRAM(S): 850 TABLET ORAL at 21:01

## 2021-12-16 RX ADMIN — MAGNESIUM OXIDE 400 MG ORAL TABLET 400 MILLIGRAM(S): 241.3 TABLET ORAL at 21:01

## 2021-12-16 RX ADMIN — MAGNESIUM OXIDE 400 MG ORAL TABLET 400 MILLIGRAM(S): 241.3 TABLET ORAL at 12:35

## 2021-12-16 RX ADMIN — AMLODIPINE BESYLATE 2.5 MILLIGRAM(S): 2.5 TABLET ORAL at 12:30

## 2021-12-16 RX ADMIN — SENNA PLUS 2 TABLET(S): 8.6 TABLET ORAL at 21:02

## 2021-12-16 RX ADMIN — GABAPENTIN 300 MILLIGRAM(S): 400 CAPSULE ORAL at 21:00

## 2021-12-16 RX ADMIN — ATORVASTATIN CALCIUM 10 MILLIGRAM(S): 80 TABLET, FILM COATED ORAL at 21:01

## 2021-12-16 RX ADMIN — Medication 1000 MILLIGRAM(S): at 21:01

## 2021-12-16 RX ADMIN — Medication 1 SPRAY(S): at 12:39

## 2021-12-16 RX ADMIN — Medication 0.5 MILLIGRAM(S): at 22:07

## 2021-12-16 RX ADMIN — Medication 1 SPRAY(S): at 22:56

## 2021-12-16 RX ADMIN — GABAPENTIN 300 MILLIGRAM(S): 400 CAPSULE ORAL at 12:33

## 2021-12-16 RX ADMIN — PANTOPRAZOLE SODIUM 40 MILLIGRAM(S): 20 TABLET, DELAYED RELEASE ORAL at 08:06

## 2021-12-16 RX ADMIN — Medication 81 MILLIGRAM(S): at 12:32

## 2021-12-16 NOTE — BH INPATIENT PSYCHIATRY PROGRESS NOTE - NSBHCHARTREVIEWVS_PSY_A_CORE FT
Vital Signs Last 24 Hrs  T(C): 36.7 (12-16-21 @ 08:28), Max: 37.1 (12-15-21 @ 15:40)  T(F): 98 (12-16-21 @ 08:28), Max: 98.7 (12-15-21 @ 15:40)  HR: 70 (12-16-21 @ 08:28) (70 - 70)  BP: 114/55 (12-16-21 @ 08:28) (114/55 - 114/55)  BP(mean): --  RR: --  SpO2: --

## 2021-12-16 NOTE — BH INPATIENT PSYCHIATRY PROGRESS NOTE - NSBHMETABOLIC_PSY_ALL_CORE_FT
BMI: BMI (kg/m2): 27.6 (12-01-21 @ 15:23)  HbA1c: A1C with Estimated Average Glucose Result: 6.9 % (12-02-21 @ 10:05)    Glucose: POCT Blood Glucose.: 137 mg/dL (12-16-21 @ 08:16)    BP: 114/55 (12-16-21 @ 08:28) (114/55 - 114/55)  Lipid Panel:  BMI: BMI (kg/m2): 27.6 (12-01-21 @ 15:23)  HbA1c: A1C with Estimated Average Glucose Result: 6.9 % (12-02-21 @ 10:05)    Glucose: POCT Blood Glucose.: 167 mg/dL (12-16-21 @ 12:11)    BP: 114/55 (12-16-21 @ 08:28) (114/55 - 114/55)  Lipid Panel:

## 2021-12-16 NOTE — PROGRESS NOTE ADULT - SUBJECTIVE AND OBJECTIVE BOX
Podiatry pager #: [phone]/ [phone]    Patient is a 81y old  Female who presents with a chief complaint of Painful tender ingrowing toenails of both feet aggravated by shoe gear ambulation palpation and secondary dry skin.    HPI:      PAST MEDICAL & SURGICAL HISTORY:  HTN (hypertension)    DM (diabetes mellitus)    Hyperlipemia    Diabetes mellitus    Dementia    S/P carpal tunnel release  bilateral    S/P partial hysterectomy        MEDICATIONS  (STANDING):  alendronate 70 milliGRAM(s) Oral <User Schedule>  amLODIPine   Tablet 2.5 milliGRAM(s) Oral daily  ascorbic acid 1000 milliGRAM(s) Oral two times a day  aspirin enteric coated 81 milliGRAM(s) Oral daily  atorvastatin 10 milliGRAM(s) Oral at bedtime  buMETAnide 1 milliGRAM(s) Oral daily  calcium carbonate 1250 mG  + Vitamin D (OsCal 500 + D) 1 Tablet(s) Oral daily  cholecalciferol 5000 Unit(s) Oral daily  clonazePAM  Tablet 0.5 milliGRAM(s) Oral <User Schedule>  cyanocobalamin 1000 MICROGram(s) Oral daily  dextrose 40% Gel 15 Gram(s) Oral once  dextrose 5%. 1000 milliLiter(s) (50 mL/Hr) IV Continuous <Continuous>  dextrose 5%. 1000 milliLiter(s) (100 mL/Hr) IV Continuous <Continuous>  dextrose 50% Injectable 25 Gram(s) IV Push once  dextrose 50% Injectable 12.5 Gram(s) IV Push once  dextrose 50% Injectable 25 Gram(s) IV Push once  folic acid 1 milliGRAM(s) Oral daily  gabapentin 300 milliGRAM(s) Oral two times a day  glucagon  Injectable 1 milliGRAM(s) IntraMuscular once  insulin lispro (ADMELOG) corrective regimen sliding scale   SubCutaneous three times a day before meals  insulin lispro (ADMELOG) corrective regimen sliding scale   SubCutaneous at bedtime  lidocaine   4% Patch 1 Patch Transdermal daily  loratadine 10 milliGRAM(s) Oral daily  losartan 100 milliGRAM(s) Oral daily  magnesium oxide 400 milliGRAM(s) Oral every 12 hours  metFORMIN 500 milliGRAM(s) Oral two times a day  montelukast 10 milliGRAM(s) Oral daily  pantoprazole    Tablet 40 milliGRAM(s) Oral before breakfast  psyllium Powder 1 Packet(s) Oral two times a day  senna 2 Tablet(s) Oral at bedtime  sitaGLIPtin 50 milliGRAM(s) Oral daily  sodium chloride 0.65% Nasal 1 Spray(s) Both Nostrils every 12 hours    MEDICATIONS  (PRN):  acetaminophen     Tablet .. 650 milliGRAM(s) Oral every 6 hours PRN Moderate Pain (4 - 6)  loperamide 2 milliGRAM(s) Oral daily PRN Diarrhea  magnesium hydroxide Suspension 30 milliLiter(s) Oral daily PRN Constipation      Allergies    adhesives (Unknown)  Cipro (Hives)  Cipro (Rash)  penicillin (Rash)  penicillin (Unknown)  sulfa drugs (Rash)  sulfa drugs (Unknown)    Intolerances        VITALS:    Vital Signs Last 24 Hrs  T(C): 36.7 (17 Dec 2021 06:08), Max: 37 (16 Dec 2021 15:48)  T(F): 98 (17 Dec 2021 06:08), Max: 98.6 (16 Dec 2021 15:48)  HR: --  BP: --  BP(mean): --  RR: --  SpO2: --    LABS:                CAPILLARY BLOOD GLUCOSE      POCT Blood Glucose.: 142 mg/dL (17 Dec 2021 08:13)  POCT Blood Glucose.: 124 mg/dL (16 Dec 2021 21:19)  POCT Blood Glucose.: 95 mg/dL (16 Dec 2021 16:29)  POCT Blood Glucose.: 167 mg/dL (16 Dec 2021 12:11)          LOWER EXTREMITY PHYSICAL EXAM:    Vasular: DP/PT 1_/4, B/L, CFT <_2 seconds B/L, Temperature gradient WNL_, B/L.   Neuro: Epicritic sensation Diminished_ to the level of _Toes, B/L.  Skin:  Positive ingrown/incurvated, dystrophic toenails all 10 digits.  Positive xerosis plantar aspect of both feet.  No ulcerations or clinical signs of infection      RADIOLOGY & ADDITIONAL STUDIES:

## 2021-12-16 NOTE — PROGRESS NOTE ADULT - ASSESSMENT
Assessment/plan:    Ingrown/incurvated toenails  Xerosis bilateral  Diabetes mellitus    Aseptic debridement of ingrown/incurvated toenails all 10 digits with Betadine applied  Recommend ammonium lactate 12% cream to feet daily  Recommend continued routine podiatric diabetic foot care as an outpatient  Thank you for consultation

## 2021-12-17 PROCEDURE — 99232 SBSQ HOSP IP/OBS MODERATE 35: CPT

## 2021-12-17 RX ORDER — SOD,AMMONIUM,POTASSIUM LACTATE
1 CREAM (GRAM) TOPICAL ONCE
Refills: 0 | Status: COMPLETED | OUTPATIENT
Start: 2021-12-17 | End: 2021-12-21

## 2021-12-17 RX ADMIN — MONTELUKAST 10 MILLIGRAM(S): 4 TABLET, CHEWABLE ORAL at 08:41

## 2021-12-17 RX ADMIN — Medication 1 PACKET(S): at 08:46

## 2021-12-17 RX ADMIN — LIDOCAINE 1 PATCH: 4 CREAM TOPICAL at 22:36

## 2021-12-17 RX ADMIN — Medication 0.5 MILLIGRAM(S): at 05:51

## 2021-12-17 RX ADMIN — GABAPENTIN 300 MILLIGRAM(S): 400 CAPSULE ORAL at 21:50

## 2021-12-17 RX ADMIN — Medication 1000 MILLIGRAM(S): at 21:51

## 2021-12-17 RX ADMIN — METFORMIN HYDROCHLORIDE 500 MILLIGRAM(S): 850 TABLET ORAL at 08:41

## 2021-12-17 RX ADMIN — AMLODIPINE BESYLATE 2.5 MILLIGRAM(S): 2.5 TABLET ORAL at 08:40

## 2021-12-17 RX ADMIN — Medication 1 SPRAY(S): at 22:35

## 2021-12-17 RX ADMIN — GABAPENTIN 300 MILLIGRAM(S): 400 CAPSULE ORAL at 08:39

## 2021-12-17 RX ADMIN — METFORMIN HYDROCHLORIDE 500 MILLIGRAM(S): 850 TABLET ORAL at 21:51

## 2021-12-17 RX ADMIN — LORATADINE 10 MILLIGRAM(S): 10 TABLET ORAL at 08:41

## 2021-12-17 RX ADMIN — Medication 81 MILLIGRAM(S): at 08:40

## 2021-12-17 RX ADMIN — Medication 0.5 MILLIGRAM(S): at 21:52

## 2021-12-17 RX ADMIN — MAGNESIUM OXIDE 400 MG ORAL TABLET 400 MILLIGRAM(S): 241.3 TABLET ORAL at 21:50

## 2021-12-17 RX ADMIN — Medication 1 TABLET(S): at 08:41

## 2021-12-17 RX ADMIN — LOSARTAN POTASSIUM 100 MILLIGRAM(S): 100 TABLET, FILM COATED ORAL at 08:41

## 2021-12-17 RX ADMIN — LIDOCAINE 1 PATCH: 4 CREAM TOPICAL at 08:42

## 2021-12-17 RX ADMIN — Medication 1 MILLIGRAM(S): at 08:42

## 2021-12-17 RX ADMIN — Medication 1 SPRAY(S): at 08:43

## 2021-12-17 RX ADMIN — Medication 1 PACKET(S): at 21:56

## 2021-12-17 RX ADMIN — MAGNESIUM HYDROXIDE 30 MILLILITER(S): 400 TABLET, CHEWABLE ORAL at 08:40

## 2021-12-17 RX ADMIN — PANTOPRAZOLE SODIUM 40 MILLIGRAM(S): 20 TABLET, DELAYED RELEASE ORAL at 07:41

## 2021-12-17 RX ADMIN — ATORVASTATIN CALCIUM 10 MILLIGRAM(S): 80 TABLET, FILM COATED ORAL at 21:52

## 2021-12-17 RX ADMIN — Medication 5000 UNIT(S): at 08:43

## 2021-12-17 RX ADMIN — Medication 1000 MILLIGRAM(S): at 08:41

## 2021-12-17 RX ADMIN — BUMETANIDE 1 MILLIGRAM(S): 0.25 INJECTION INTRAMUSCULAR; INTRAVENOUS at 08:40

## 2021-12-17 RX ADMIN — SENNA PLUS 2 TABLET(S): 8.6 TABLET ORAL at 21:49

## 2021-12-17 RX ADMIN — PREGABALIN 1000 MICROGRAM(S): 225 CAPSULE ORAL at 08:40

## 2021-12-17 RX ADMIN — MAGNESIUM OXIDE 400 MG ORAL TABLET 400 MILLIGRAM(S): 241.3 TABLET ORAL at 08:40

## 2021-12-17 NOTE — BH INPATIENT PSYCHIATRY PROGRESS NOTE - CURRENT MEDICATION
MEDICATIONS  (STANDING):  alendronate 70 milliGRAM(s) Oral <User Schedule>  amLODIPine   Tablet 2.5 milliGRAM(s) Oral daily  ammonium lactate 12% Lotion 1 Application(s) Topical once  ascorbic acid 1000 milliGRAM(s) Oral two times a day  aspirin enteric coated 81 milliGRAM(s) Oral daily  atorvastatin 10 milliGRAM(s) Oral at bedtime  buMETAnide 1 milliGRAM(s) Oral daily  calcium carbonate 1250 mG  + Vitamin D (OsCal 500 + D) 1 Tablet(s) Oral daily  cholecalciferol 5000 Unit(s) Oral daily  clonazePAM  Tablet 0.5 milliGRAM(s) Oral <User Schedule>  cyanocobalamin 1000 MICROGram(s) Oral daily  dextrose 40% Gel 15 Gram(s) Oral once  dextrose 5%. 1000 milliLiter(s) (50 mL/Hr) IV Continuous <Continuous>  dextrose 5%. 1000 milliLiter(s) (100 mL/Hr) IV Continuous <Continuous>  dextrose 50% Injectable 25 Gram(s) IV Push once  dextrose 50% Injectable 12.5 Gram(s) IV Push once  dextrose 50% Injectable 25 Gram(s) IV Push once  folic acid 1 milliGRAM(s) Oral daily  gabapentin 300 milliGRAM(s) Oral two times a day  glucagon  Injectable 1 milliGRAM(s) IntraMuscular once  insulin lispro (ADMELOG) corrective regimen sliding scale   SubCutaneous three times a day before meals  insulin lispro (ADMELOG) corrective regimen sliding scale   SubCutaneous at bedtime  lidocaine   4% Patch 1 Patch Transdermal daily  loratadine 10 milliGRAM(s) Oral daily  losartan 100 milliGRAM(s) Oral daily  magnesium oxide 400 milliGRAM(s) Oral every 12 hours  metFORMIN 500 milliGRAM(s) Oral two times a day  montelukast 10 milliGRAM(s) Oral daily  pantoprazole    Tablet 40 milliGRAM(s) Oral before breakfast  psyllium Powder 1 Packet(s) Oral two times a day  senna 2 Tablet(s) Oral at bedtime  sitaGLIPtin 50 milliGRAM(s) Oral daily  sodium chloride 0.65% Nasal 1 Spray(s) Both Nostrils every 12 hours    MEDICATIONS  (PRN):  acetaminophen     Tablet .. 650 milliGRAM(s) Oral every 6 hours PRN Moderate Pain (4 - 6)  loperamide 2 milliGRAM(s) Oral daily PRN Diarrhea  magnesium hydroxide Suspension 30 milliLiter(s) Oral daily PRN Constipation

## 2021-12-17 NOTE — BH INPATIENT PSYCHIATRY PROGRESS NOTE - NSBHMETABOLIC_PSY_ALL_CORE_FT
BMI: BMI (kg/m2): 27.6 (12-01-21 @ 15:23)  HbA1c: A1C with Estimated Average Glucose Result: 6.9 % (12-02-21 @ 10:05)    Glucose: POCT Blood Glucose.: 142 mg/dL (12-17-21 @ 08:13)    BP: 114/55 (12-16-21 @ 08:28) (114/55 - 114/55)  Lipid Panel:  BMI: BMI (kg/m2): 27.6 (12-01-21 @ 15:23)  HbA1c: A1C with Estimated Average Glucose Result: 6.9 % (12-02-21 @ 10:05)    Glucose: POCT Blood Glucose.: 144 mg/dL (12-17-21 @ 12:14)    BP: 114/55 (12-16-21 @ 08:28) (114/55 - 114/55)  Lipid Panel:

## 2021-12-17 NOTE — BH INPATIENT PSYCHIATRY PROGRESS NOTE - NSBHCHARTREVIEWVS_PSY_A_CORE FT
Vital Signs Last 24 Hrs  T(C): 36.7 (12-17-21 @ 06:08), Max: 37 (12-16-21 @ 15:48)  T(F): 98 (12-17-21 @ 06:08), Max: 98.6 (12-16-21 @ 15:48)  HR: --  BP: --  BP(mean): --  RR: --  SpO2: --    Orthostatic VS  12-17-21 @ 06:08  Lying BP: --/-- HR: --  Sitting BP: 131/57 HR: 79  Standing BP: 136/69 HR: 89  Site: upper right arm  Mode: electronic   Vital Signs Last 24 Hrs  T(C): 36.7 (12-17-21 @ 06:08), Max: 37 (12-16-21 @ 15:48)  T(F): 98 (12-17-21 @ 06:08), Max: 98.6 (12-16-21 @ 15:48)  HR: --  BP: --  BP(mean): --  RR: --  SpO2: --    Orthostatic VS  12-17-21 @ 10:30  Lying BP: --/-- HR: --  Sitting BP: 144/53 HR: 79  Standing BP: 143/58 HR: 89  Site: --  Mode: --  Orthostatic VS  12-17-21 @ 06:08  Lying BP: --/-- HR: --  Sitting BP: 131/57 HR: 79  Standing BP: 136/69 HR: 89  Site: upper right arm  Mode: electronic

## 2021-12-18 RX ADMIN — GABAPENTIN 300 MILLIGRAM(S): 400 CAPSULE ORAL at 10:00

## 2021-12-18 RX ADMIN — Medication 650 MILLIGRAM(S): at 18:51

## 2021-12-18 RX ADMIN — MAGNESIUM HYDROXIDE 30 MILLILITER(S): 400 TABLET, CHEWABLE ORAL at 09:59

## 2021-12-18 RX ADMIN — Medication 1 TABLET(S): at 10:00

## 2021-12-18 RX ADMIN — Medication 5000 UNIT(S): at 10:33

## 2021-12-18 RX ADMIN — SENNA PLUS 2 TABLET(S): 8.6 TABLET ORAL at 21:50

## 2021-12-18 RX ADMIN — AMLODIPINE BESYLATE 2.5 MILLIGRAM(S): 2.5 TABLET ORAL at 10:00

## 2021-12-18 RX ADMIN — Medication 1000 MILLIGRAM(S): at 21:50

## 2021-12-18 RX ADMIN — Medication 650 MILLIGRAM(S): at 11:26

## 2021-12-18 RX ADMIN — Medication 81 MILLIGRAM(S): at 09:59

## 2021-12-18 RX ADMIN — Medication 1000 MILLIGRAM(S): at 10:01

## 2021-12-18 RX ADMIN — Medication 1 MILLIGRAM(S): at 10:02

## 2021-12-18 RX ADMIN — LORATADINE 10 MILLIGRAM(S): 10 TABLET ORAL at 09:59

## 2021-12-18 RX ADMIN — BUMETANIDE 1 MILLIGRAM(S): 0.25 INJECTION INTRAMUSCULAR; INTRAVENOUS at 10:01

## 2021-12-18 RX ADMIN — PREGABALIN 1000 MICROGRAM(S): 225 CAPSULE ORAL at 10:01

## 2021-12-18 RX ADMIN — METFORMIN HYDROCHLORIDE 500 MILLIGRAM(S): 850 TABLET ORAL at 09:59

## 2021-12-18 RX ADMIN — GABAPENTIN 300 MILLIGRAM(S): 400 CAPSULE ORAL at 22:20

## 2021-12-18 RX ADMIN — LOSARTAN POTASSIUM 100 MILLIGRAM(S): 100 TABLET, FILM COATED ORAL at 10:01

## 2021-12-18 RX ADMIN — MAGNESIUM OXIDE 400 MG ORAL TABLET 400 MILLIGRAM(S): 241.3 TABLET ORAL at 10:01

## 2021-12-18 RX ADMIN — PANTOPRAZOLE SODIUM 40 MILLIGRAM(S): 20 TABLET, DELAYED RELEASE ORAL at 10:00

## 2021-12-18 RX ADMIN — ATORVASTATIN CALCIUM 10 MILLIGRAM(S): 80 TABLET, FILM COATED ORAL at 21:49

## 2021-12-18 RX ADMIN — MONTELUKAST 10 MILLIGRAM(S): 4 TABLET, CHEWABLE ORAL at 09:59

## 2021-12-18 RX ADMIN — METFORMIN HYDROCHLORIDE 500 MILLIGRAM(S): 850 TABLET ORAL at 21:50

## 2021-12-18 RX ADMIN — Medication 1 PACKET(S): at 09:58

## 2021-12-18 RX ADMIN — Medication 650 MILLIGRAM(S): at 19:10

## 2021-12-18 RX ADMIN — MAGNESIUM OXIDE 400 MG ORAL TABLET 400 MILLIGRAM(S): 241.3 TABLET ORAL at 21:49

## 2021-12-18 RX ADMIN — Medication 1 PACKET(S): at 21:49

## 2021-12-19 RX ADMIN — Medication 1000 MILLIGRAM(S): at 09:53

## 2021-12-19 RX ADMIN — Medication 1 SPRAY(S): at 20:39

## 2021-12-19 RX ADMIN — MONTELUKAST 10 MILLIGRAM(S): 4 TABLET, CHEWABLE ORAL at 09:51

## 2021-12-19 RX ADMIN — Medication 650 MILLIGRAM(S): at 09:52

## 2021-12-19 RX ADMIN — Medication 1000 MILLIGRAM(S): at 20:31

## 2021-12-19 RX ADMIN — METFORMIN HYDROCHLORIDE 500 MILLIGRAM(S): 850 TABLET ORAL at 20:32

## 2021-12-19 RX ADMIN — LORATADINE 10 MILLIGRAM(S): 10 TABLET ORAL at 09:52

## 2021-12-19 RX ADMIN — MAGNESIUM OXIDE 400 MG ORAL TABLET 400 MILLIGRAM(S): 241.3 TABLET ORAL at 09:52

## 2021-12-19 RX ADMIN — ALENDRONATE SODIUM 70 MILLIGRAM(S): 70 TABLET ORAL at 06:40

## 2021-12-19 RX ADMIN — Medication 5000 UNIT(S): at 09:56

## 2021-12-19 RX ADMIN — BUMETANIDE 1 MILLIGRAM(S): 0.25 INJECTION INTRAMUSCULAR; INTRAVENOUS at 09:52

## 2021-12-19 RX ADMIN — SENNA PLUS 2 TABLET(S): 8.6 TABLET ORAL at 20:31

## 2021-12-19 RX ADMIN — Medication 1 TABLET(S): at 09:53

## 2021-12-19 RX ADMIN — METFORMIN HYDROCHLORIDE 500 MILLIGRAM(S): 850 TABLET ORAL at 09:54

## 2021-12-19 RX ADMIN — AMLODIPINE BESYLATE 2.5 MILLIGRAM(S): 2.5 TABLET ORAL at 09:54

## 2021-12-19 RX ADMIN — ATORVASTATIN CALCIUM 10 MILLIGRAM(S): 80 TABLET, FILM COATED ORAL at 20:31

## 2021-12-19 RX ADMIN — Medication 1 PACKET(S): at 20:32

## 2021-12-19 RX ADMIN — Medication 81 MILLIGRAM(S): at 09:52

## 2021-12-19 RX ADMIN — PREGABALIN 1000 MICROGRAM(S): 225 CAPSULE ORAL at 09:53

## 2021-12-19 RX ADMIN — MAGNESIUM HYDROXIDE 30 MILLILITER(S): 400 TABLET, CHEWABLE ORAL at 09:54

## 2021-12-19 RX ADMIN — GABAPENTIN 300 MILLIGRAM(S): 400 CAPSULE ORAL at 20:32

## 2021-12-19 RX ADMIN — Medication 1 PACKET(S): at 09:54

## 2021-12-19 RX ADMIN — MAGNESIUM OXIDE 400 MG ORAL TABLET 400 MILLIGRAM(S): 241.3 TABLET ORAL at 20:31

## 2021-12-19 RX ADMIN — LOSARTAN POTASSIUM 100 MILLIGRAM(S): 100 TABLET, FILM COATED ORAL at 09:53

## 2021-12-19 RX ADMIN — Medication 1 MILLIGRAM(S): at 09:54

## 2021-12-19 RX ADMIN — GABAPENTIN 300 MILLIGRAM(S): 400 CAPSULE ORAL at 09:53

## 2021-12-19 NOTE — CHART NOTE - NSCHARTNOTEFT_GEN_A_CORE
Pt. c/o right sided rib pain Notified by RN patient c/o right sided flank pain. Patient evaluated at bedside. Patient is alert and oriented x 3.  Per patient yesterday morning she attempted to swallow one big pill and was having difficulty passing it. The male nurse attempted to assist with the Heimlich maneuver by wrapping his arms around her stomach at which time patient reports immediate sharp pain on the right side of her ribs. Patient endorses pain when taking deep breaths and coughing.  Patient declined pain medication at the present time.  Will order a X-Ray to r/o rib fracture and continue to monitor closely.  Plan discussed with patient and primary RN.

## 2021-12-20 PROCEDURE — 74230 X-RAY XM SWLNG FUNCJ C+: CPT | Mod: 26

## 2021-12-20 PROCEDURE — 71101 X-RAY EXAM UNILAT RIBS/CHEST: CPT | Mod: 26,RT

## 2021-12-20 PROCEDURE — 99232 SBSQ HOSP IP/OBS MODERATE 35: CPT | Mod: 25

## 2021-12-20 PROCEDURE — 72100 X-RAY EXAM L-S SPINE 2/3 VWS: CPT | Mod: 26

## 2021-12-20 PROCEDURE — 72220 X-RAY EXAM SACRUM TAILBONE: CPT | Mod: 26

## 2021-12-20 PROCEDURE — 90853 GROUP PSYCHOTHERAPY: CPT

## 2021-12-20 RX ORDER — ASPIRIN/CALCIUM CARB/MAGNESIUM 324 MG
81 TABLET ORAL DAILY
Refills: 0 | Status: DISCONTINUED | OUTPATIENT
Start: 2021-12-20 | End: 2022-01-18

## 2021-12-20 RX ORDER — PANTOPRAZOLE SODIUM 20 MG/1
40 TABLET, DELAYED RELEASE ORAL DAILY
Refills: 0 | Status: DISCONTINUED | OUTPATIENT
Start: 2021-12-20 | End: 2022-01-18

## 2021-12-20 RX ADMIN — Medication 1 MILLIGRAM(S): at 11:23

## 2021-12-20 RX ADMIN — Medication 1000 MILLIGRAM(S): at 21:40

## 2021-12-20 RX ADMIN — Medication 1 SPRAY(S): at 22:16

## 2021-12-20 RX ADMIN — Medication 5000 UNIT(S): at 11:22

## 2021-12-20 RX ADMIN — PANTOPRAZOLE SODIUM 40 MILLIGRAM(S): 20 TABLET, DELAYED RELEASE ORAL at 11:23

## 2021-12-20 RX ADMIN — MAGNESIUM OXIDE 400 MG ORAL TABLET 400 MILLIGRAM(S): 241.3 TABLET ORAL at 21:41

## 2021-12-20 RX ADMIN — Medication 1 SPRAY(S): at 11:24

## 2021-12-20 RX ADMIN — METFORMIN HYDROCHLORIDE 500 MILLIGRAM(S): 850 TABLET ORAL at 11:21

## 2021-12-20 RX ADMIN — Medication 1 PACKET(S): at 16:38

## 2021-12-20 RX ADMIN — MAGNESIUM OXIDE 400 MG ORAL TABLET 400 MILLIGRAM(S): 241.3 TABLET ORAL at 11:21

## 2021-12-20 RX ADMIN — PREGABALIN 1000 MICROGRAM(S): 225 CAPSULE ORAL at 11:23

## 2021-12-20 RX ADMIN — SENNA PLUS 2 TABLET(S): 8.6 TABLET ORAL at 22:16

## 2021-12-20 RX ADMIN — Medication 1000 MILLIGRAM(S): at 11:22

## 2021-12-20 RX ADMIN — Medication 1 TABLET(S): at 11:22

## 2021-12-20 RX ADMIN — GABAPENTIN 300 MILLIGRAM(S): 400 CAPSULE ORAL at 21:41

## 2021-12-20 RX ADMIN — Medication 81 MILLIGRAM(S): at 11:23

## 2021-12-20 RX ADMIN — Medication 1 PACKET(S): at 21:48

## 2021-12-20 RX ADMIN — LORATADINE 10 MILLIGRAM(S): 10 TABLET ORAL at 11:21

## 2021-12-20 RX ADMIN — BUMETANIDE 1 MILLIGRAM(S): 0.25 INJECTION INTRAMUSCULAR; INTRAVENOUS at 11:21

## 2021-12-20 RX ADMIN — MONTELUKAST 10 MILLIGRAM(S): 4 TABLET, CHEWABLE ORAL at 11:23

## 2021-12-20 RX ADMIN — LOSARTAN POTASSIUM 100 MILLIGRAM(S): 100 TABLET, FILM COATED ORAL at 11:23

## 2021-12-20 RX ADMIN — METFORMIN HYDROCHLORIDE 500 MILLIGRAM(S): 850 TABLET ORAL at 21:42

## 2021-12-20 RX ADMIN — Medication 0: at 21:35

## 2021-12-20 RX ADMIN — AMLODIPINE BESYLATE 2.5 MILLIGRAM(S): 2.5 TABLET ORAL at 11:22

## 2021-12-20 RX ADMIN — GABAPENTIN 300 MILLIGRAM(S): 400 CAPSULE ORAL at 11:21

## 2021-12-20 RX ADMIN — ATORVASTATIN CALCIUM 10 MILLIGRAM(S): 80 TABLET, FILM COATED ORAL at 21:41

## 2021-12-20 NOTE — BH PSYCHOLOGY - GROUP THERAPY NOTE - NSPSYCHOLGRPCOGPT_PSY_A_CORE FT
Thought process appeared circumstantial. Patient noted making several comments that appeared to be loosely related to topic and was somewhat difficult to redirect when brought to her attention.   
Thought process appeared tangential and self-aggrandizing, at times. Two comments were notable as they were drastically unrelated to the topic.

## 2021-12-20 NOTE — BH PSYCHOLOGY - GROUP THERAPY NOTE - NSPSYCHOLGRPCOGAFCT_PSY_A_CORE FT
Affect appeared variable, though congruent with reported mood. 
Affect appeared Variable in range; appropriate to the situation; congruent with reported mood. 
Affect appeared slightly labile, though appropriate to reported content.

## 2021-12-20 NOTE — BH INPATIENT PSYCHIATRY PROGRESS NOTE - CURRENT MEDICATION
MEDICATIONS  (STANDING):  alendronate 70 milliGRAM(s) Oral <User Schedule>  amLODIPine   Tablet 2.5 milliGRAM(s) Oral daily  ammonium lactate 12% Lotion 1 Application(s) Topical once  ascorbic acid 1000 milliGRAM(s) Oral two times a day  aspirin  chewable 81 milliGRAM(s) Oral daily  aspirin enteric coated 81 milliGRAM(s) Oral daily  atorvastatin 10 milliGRAM(s) Oral at bedtime  buMETAnide 1 milliGRAM(s) Oral daily  calcium carbonate 1250 mG  + Vitamin D (OsCal 500 + D) 1 Tablet(s) Oral daily  cholecalciferol 5000 Unit(s) Oral daily  cyanocobalamin 1000 MICROGram(s) Oral daily  dextrose 40% Gel 15 Gram(s) Oral once  dextrose 5%. 1000 milliLiter(s) (50 mL/Hr) IV Continuous <Continuous>  dextrose 5%. 1000 milliLiter(s) (100 mL/Hr) IV Continuous <Continuous>  dextrose 50% Injectable 25 Gram(s) IV Push once  dextrose 50% Injectable 12.5 Gram(s) IV Push once  dextrose 50% Injectable 25 Gram(s) IV Push once  folic acid 1 milliGRAM(s) Oral daily  gabapentin 300 milliGRAM(s) Oral two times a day  glucagon  Injectable 1 milliGRAM(s) IntraMuscular once  insulin lispro (ADMELOG) corrective regimen sliding scale   SubCutaneous three times a day before meals  insulin lispro (ADMELOG) corrective regimen sliding scale   SubCutaneous at bedtime  lidocaine   4% Patch 1 Patch Transdermal daily  loratadine 10 milliGRAM(s) Oral daily  losartan 100 milliGRAM(s) Oral daily  magnesium oxide 400 milliGRAM(s) Oral every 12 hours  metFORMIN 500 milliGRAM(s) Oral two times a day  montelukast 10 milliGRAM(s) Oral daily  pantoprazole   Suspension 40 milliGRAM(s) Oral daily  psyllium Powder 1 Packet(s) Oral two times a day  senna 2 Tablet(s) Oral at bedtime  sitaGLIPtin 50 milliGRAM(s) Oral daily  sodium chloride 0.65% Nasal 1 Spray(s) Both Nostrils every 12 hours    MEDICATIONS  (PRN):  loperamide 2 milliGRAM(s) Oral daily PRN Diarrhea  magnesium hydroxide Suspension 30 milliLiter(s) Oral daily PRN Constipation   MEDICATIONS  (STANDING):  alendronate 70 milliGRAM(s) Oral <User Schedule>  amLODIPine   Tablet 2.5 milliGRAM(s) Oral daily  ammonium lactate 12% Lotion 1 Application(s) Topical once  ascorbic acid 1000 milliGRAM(s) Oral two times a day  aspirin  chewable 81 milliGRAM(s) Oral daily  atorvastatin 10 milliGRAM(s) Oral at bedtime  buMETAnide 1 milliGRAM(s) Oral daily  calcium carbonate 1250 mG  + Vitamin D (OsCal 500 + D) 1 Tablet(s) Oral daily  cholecalciferol 5000 Unit(s) Oral daily  cyanocobalamin 1000 MICROGram(s) Oral daily  dextrose 40% Gel 15 Gram(s) Oral once  dextrose 5%. 1000 milliLiter(s) (50 mL/Hr) IV Continuous <Continuous>  dextrose 5%. 1000 milliLiter(s) (100 mL/Hr) IV Continuous <Continuous>  dextrose 50% Injectable 25 Gram(s) IV Push once  dextrose 50% Injectable 12.5 Gram(s) IV Push once  dextrose 50% Injectable 25 Gram(s) IV Push once  folic acid 1 milliGRAM(s) Oral daily  gabapentin 300 milliGRAM(s) Oral two times a day  glucagon  Injectable 1 milliGRAM(s) IntraMuscular once  insulin lispro (ADMELOG) corrective regimen sliding scale   SubCutaneous three times a day before meals  insulin lispro (ADMELOG) corrective regimen sliding scale   SubCutaneous at bedtime  lidocaine   4% Patch 1 Patch Transdermal daily  loratadine 10 milliGRAM(s) Oral daily  losartan 100 milliGRAM(s) Oral daily  magnesium oxide 400 milliGRAM(s) Oral every 12 hours  metFORMIN 500 milliGRAM(s) Oral two times a day  montelukast 10 milliGRAM(s) Oral daily  pantoprazole   Suspension 40 milliGRAM(s) Oral daily  psyllium Powder 1 Packet(s) Oral two times a day  senna 2 Tablet(s) Oral at bedtime  sitaGLIPtin 50 milliGRAM(s) Oral daily  sodium chloride 0.65% Nasal 1 Spray(s) Both Nostrils every 12 hours    MEDICATIONS  (PRN):  loperamide 2 milliGRAM(s) Oral daily PRN Diarrhea  magnesium hydroxide Suspension 30 milliLiter(s) Oral daily PRN Constipation

## 2021-12-20 NOTE — BH PSYCHOLOGY - GROUP THERAPY NOTE - NSBHPSYCHOLPARTICIPCOMMENT_PSY_A_CORE FT
Patient appeared partially engaged and attentive as evidenced by fair eye contact, head nodding and body language, and responses to questions posed by the facilitator. At times, she engaged in cross talk with a fellow group member. When redirected, she appeared attentive and engaged.    Despite limited verbal participation / engagement  
Patient appeared fully engaged and attentive as evidenced by good eye contact, head nodding and body language, and responses to questions posed by the facilitator. 
Patient appeared partially engaged and attentive as evidenced by fair eye contact, head nodding and body language, and occasional responses to questions posed by the facilitator.

## 2021-12-20 NOTE — BH PSYCHOLOGY - GROUP THERAPY NOTE - NSPSYCHOLGRPCOGPROB_PSY_A_CORE
anxiety/high reactivity to psychosocial stressor/lack of behaviors to reduce symptoms/poor insight into triggers for symptoms
anxiety/cognitive distortions/dysphoria/impaired problem solving skills/lack of behaviors to reduce symptoms/poor insight into illness/poor insight into triggers for symptoms
anxiety/high reactivity to psychosocial stressor/impaired problem solving skills/lack of behaviors to reduce symptoms

## 2021-12-20 NOTE — BH TREATMENT PLAN - NSCMSPTSTRENGTHS_PSY_ALL_CORE
Assertive/Olivia/spirituality
Assertive/Olivia/spirituality
Patient reports none
Patient reports none

## 2021-12-20 NOTE — BH PSYCHOLOGY - GROUP THERAPY NOTE - NSBHPSYCHOLADDL_PSY_A_CORE
The concept of challenging dysfunctional beliefs was reviewed. Psychologist provided psychoeducation on cognitive restructuring. Patients took sample handouts of scripted questions which they used practice challenging negative self-statements. Discussion included the following topics: changing one's mood, improving self-esteem and the experience of symptoms.
In group session patients discussed coping. Psychoeducation on stress, negative patterns of behavior, and positive/negative coping strategies was provided. Stressors were defined and examples categories were discussed. Patients were encouraged to share recent examples of stress triggers and to discuss their own responses. Patients were presented with a handout (Badger ahead plan) and instructions on how to complete the plan for review at next session. Patients identified effective coping behaviors that suited them best. Patients discussed coping plan and reflected on it as a tool to expand their choices and options.

## 2021-12-20 NOTE — BH INPATIENT PSYCHIATRY PROGRESS NOTE - NSBHMETABOLIC_PSY_ALL_CORE_FT
BMI: BMI (kg/m2): 27.6 (12-01-21 @ 15:23)  HbA1c: A1C with Estimated Average Glucose Result: 6.9 % (12-02-21 @ 10:05)    Glucose: POCT Blood Glucose.: 142 mg/dL (12-20-21 @ 08:01)    BP: 135/58 (12-19-21 @ 08:05) (135/58 - 135/58)  Lipid Panel:  BMI: BMI (kg/m2): 27.6 (12-01-21 @ 15:23)  HbA1c: A1C with Estimated Average Glucose Result: 6.9 % (12-02-21 @ 10:05)    Glucose: POCT Blood Glucose.: 229 mg/dL (12-20-21 @ 12:13)    BP: 135/58 (12-19-21 @ 08:05) (135/58 - 135/58)  Lipid Panel:

## 2021-12-20 NOTE — BH PSYCHOLOGY - GROUP THERAPY NOTE - NSPSYCHOLGRPCOGINT_PSY_A_CORE
explain the connection between health habits and stress vulnerability/group members provided support/emotion regulation skills taught/behavioral distress tolerance skills taught/relaxation skills practiced
explain the connection between health habits and stress vulnerability/explore reducing vulnerability to stress/behavioral distress tolerance skills taught
group members provided support/group members suggested positive behaviors/emotion regulation skills taught/explore reducing vulnerability to stress/provided education about depression treatments/cognitive restructuring/behavioral distress tolerance skills taught

## 2021-12-20 NOTE — BH INPATIENT PSYCHIATRY PROGRESS NOTE - PRN MEDS
MEDICATIONS  (PRN):  loperamide 2 milliGRAM(s) Oral daily PRN Diarrhea  magnesium hydroxide Suspension 30 milliLiter(s) Oral daily PRN Constipation

## 2021-12-20 NOTE — BH TREATMENT PLAN - NSTXMANICINTERRN_PSY_ALL_CORE
Provided diversion activities of coloring and crafts, limit setting and redirected pt as needed
Redirect to quieter areas of the unit to decrease stimulation as needed. Limit-setting on intrusive or inappropriate behavior. Encourage compliance with treatment plan.

## 2021-12-20 NOTE — BH PSYCHOLOGY - GROUP THERAPY NOTE - NSPSYCHOLGRPCOGPT_PSY_A_CORE
shared negative coping experience/shared positive coping experience/gave feedback to others/other...
participated in exercise/shared negative coping experience/shared positive coping experience
shared positive coping experience/other...

## 2021-12-20 NOTE — BH PSYCHOLOGY - GROUP THERAPY NOTE - NSPSYCHOLGRPCOGSPCH_PSY_A_CORE FT
Rate of speech appeared normal. Volume of speech appeared normal. Quality of speech appeared emotional and slightly dramatic. Speech quantity was garrulous.
Rate, intelligibility, and volume of expressive speech fell WNL. Quality of speech appeared emotional. Speech quantity was garrulous and overly detailed.   
Expressive speech fell WNL in regards to rate, intelligibility, and volume. Quality of speech was emotional and patient was garrulous. She was difficult to redirect.

## 2021-12-20 NOTE — BH INPATIENT PSYCHIATRY PROGRESS NOTE - NSBHCHARTREVIEWVS_PSY_A_CORE FT
Vital Signs Last 24 Hrs  T(C): 36.8 (12-20-21 @ 08:24), Max: 36.8 (12-20-21 @ 08:24)  T(F): 98.2 (12-20-21 @ 08:24), Max: 98.2 (12-20-21 @ 08:24)  HR: --  BP: --  BP(mean): --  RR: 17 (12-20-21 @ 08:24) (17 - 17)  SpO2: --    Orthostatic VS  12-20-21 @ 08:24  Lying BP: --/-- HR: --  Sitting BP: 134/58 HR: 90  Standing BP: 118/70 HR: 80  Site: --  Mode: --

## 2021-12-20 NOTE — BH TREATMENT PLAN - NSTXMANICINTERPR_PSY_ALL_CORE
Over the next 7 days, Psychiatric Rehabilitation staff will utilize group and individual psychotherapy, and psychoeducation to assist the patient in reaching his treatment goal.
Over the next 7 days, Psychiatric Rehabilitation staff will utilize group and individual psychotherapy, and psychoeducation to assist the patient in reaching his treatment goal.
Writer met with patient in order to discuss progress towards Psychiatric Rehabilitation goals over the past week to which patient has demonstrated some progress. Patient remains hypomanic, though with improvements in intensity in symptoms. Patient is increasingly redirectable. Writer will continue to engage patient daily in order to develop rapport, provide support and assist patient in demonstrating progress towards Psychiatric Rehabilitaiton goals over the next 7 days.

## 2021-12-20 NOTE — BH TREATMENT PLAN - NSTXDISORGINTERRN_PSY_ALL_CORE
Redirected pt as needed
Encourage medication compliance. Encourage participation in group and individual therapies. Re-orient/redirect Pt. throughout the day as needed.
Reinforce unit routine. Maintain consistency and structure. Reinforce treatment plan. Encourage participation in unit activities.

## 2021-12-20 NOTE — BH PSYCHOLOGY - GROUP THERAPY NOTE - NSPSYCHOLGRPCOGGOAL_PSY_A_CORE
reduce reaction to psychosocial stressors/recognize triggers for onset of symptoms/develop improved problem solving skills/learn cognitive skills to improve coping with stress
reduce reaction to psychosocial stressors/recognize triggers for onset of symptoms/develop improved problem solving skills/learn cognitive skills to improve coping with stress
reduce reaction to psychosocial stressors/develop improved problem solving skills/prevent relapse of symptoms/learn cognitive skills to improve coping with stress

## 2021-12-21 PROCEDURE — 99232 SBSQ HOSP IP/OBS MODERATE 35: CPT

## 2021-12-21 RX ORDER — ACETAMINOPHEN 500 MG
650 TABLET ORAL EVERY 6 HOURS
Refills: 0 | Status: DISCONTINUED | OUTPATIENT
Start: 2021-12-21 | End: 2022-01-18

## 2021-12-21 RX ADMIN — AMLODIPINE BESYLATE 2.5 MILLIGRAM(S): 2.5 TABLET ORAL at 08:58

## 2021-12-21 RX ADMIN — Medication 1 SPRAY(S): at 21:35

## 2021-12-21 RX ADMIN — Medication 0: at 21:34

## 2021-12-21 RX ADMIN — METFORMIN HYDROCHLORIDE 500 MILLIGRAM(S): 850 TABLET ORAL at 08:57

## 2021-12-21 RX ADMIN — PANTOPRAZOLE SODIUM 40 MILLIGRAM(S): 20 TABLET, DELAYED RELEASE ORAL at 08:00

## 2021-12-21 RX ADMIN — GABAPENTIN 300 MILLIGRAM(S): 400 CAPSULE ORAL at 08:58

## 2021-12-21 RX ADMIN — Medication 1 MILLIGRAM(S): at 08:58

## 2021-12-21 RX ADMIN — Medication 1000 MILLIGRAM(S): at 08:57

## 2021-12-21 RX ADMIN — SENNA PLUS 2 TABLET(S): 8.6 TABLET ORAL at 21:21

## 2021-12-21 RX ADMIN — MAGNESIUM OXIDE 400 MG ORAL TABLET 400 MILLIGRAM(S): 241.3 TABLET ORAL at 21:21

## 2021-12-21 RX ADMIN — ATORVASTATIN CALCIUM 10 MILLIGRAM(S): 80 TABLET, FILM COATED ORAL at 21:22

## 2021-12-21 RX ADMIN — Medication 650 MILLIGRAM(S): at 16:03

## 2021-12-21 RX ADMIN — Medication 650 MILLIGRAM(S): at 16:54

## 2021-12-21 RX ADMIN — MAGNESIUM OXIDE 400 MG ORAL TABLET 400 MILLIGRAM(S): 241.3 TABLET ORAL at 08:58

## 2021-12-21 RX ADMIN — Medication 1 PACKET(S): at 21:34

## 2021-12-21 RX ADMIN — Medication 1000 MILLIGRAM(S): at 21:21

## 2021-12-21 RX ADMIN — Medication 1 TABLET(S): at 08:58

## 2021-12-21 RX ADMIN — LORATADINE 10 MILLIGRAM(S): 10 TABLET ORAL at 08:58

## 2021-12-21 RX ADMIN — Medication 5000 UNIT(S): at 08:59

## 2021-12-21 RX ADMIN — GABAPENTIN 300 MILLIGRAM(S): 400 CAPSULE ORAL at 21:22

## 2021-12-21 RX ADMIN — PREGABALIN 1000 MICROGRAM(S): 225 CAPSULE ORAL at 08:57

## 2021-12-21 RX ADMIN — BUMETANIDE 1 MILLIGRAM(S): 0.25 INJECTION INTRAMUSCULAR; INTRAVENOUS at 08:58

## 2021-12-21 RX ADMIN — METFORMIN HYDROCHLORIDE 500 MILLIGRAM(S): 850 TABLET ORAL at 21:22

## 2021-12-21 RX ADMIN — Medication 81 MILLIGRAM(S): at 08:57

## 2021-12-21 RX ADMIN — LOSARTAN POTASSIUM 100 MILLIGRAM(S): 100 TABLET, FILM COATED ORAL at 08:57

## 2021-12-21 RX ADMIN — MONTELUKAST 10 MILLIGRAM(S): 4 TABLET, CHEWABLE ORAL at 08:57

## 2021-12-21 RX ADMIN — Medication 1 APPLICATION(S): at 23:43

## 2021-12-21 RX ADMIN — Medication 1 PACKET(S): at 09:00

## 2021-12-21 RX ADMIN — Medication 1 SPRAY(S): at 08:59

## 2021-12-21 NOTE — BH INPATIENT PSYCHIATRY PROGRESS NOTE - NSBHCHARTREVIEWVS_PSY_A_CORE FT
Vital Signs Last 24 Hrs  T(C): 37.1 (12-21-21 @ 05:51), Max: 37.1 (12-21-21 @ 05:51)  T(F): 98.7 (12-21-21 @ 05:51), Max: 98.7 (12-21-21 @ 05:51)  HR: --  BP: --  BP(mean): --  RR: --  SpO2: --    Orthostatic VS  12-21-21 @ 05:51  Lying BP: 126/50 HR: 84  Sitting BP: 120/56 HR: 90  Standing BP: --/-- HR: --  Site: --  Mode: --  Orthostatic VS  12-20-21 @ 08:24  Lying BP: --/-- HR: --  Sitting BP: 134/58 HR: 90  Standing BP: 118/70 HR: 80  Site: --  Mode: --

## 2021-12-21 NOTE — BH INPATIENT PSYCHIATRY PROGRESS NOTE - CURRENT MEDICATION
MEDICATIONS  (STANDING):  alendronate 70 milliGRAM(s) Oral <User Schedule>  amLODIPine   Tablet 2.5 milliGRAM(s) Oral daily  ammonium lactate 12% Lotion 1 Application(s) Topical once  ascorbic acid 1000 milliGRAM(s) Oral two times a day  aspirin  chewable 81 milliGRAM(s) Oral daily  atorvastatin 10 milliGRAM(s) Oral at bedtime  buMETAnide 1 milliGRAM(s) Oral daily  calcium carbonate 1250 mG  + Vitamin D (OsCal 500 + D) 1 Tablet(s) Oral daily  cholecalciferol 5000 Unit(s) Oral daily  cyanocobalamin 1000 MICROGram(s) Oral daily  dextrose 40% Gel 15 Gram(s) Oral once  dextrose 5%. 1000 milliLiter(s) (50 mL/Hr) IV Continuous <Continuous>  dextrose 5%. 1000 milliLiter(s) (100 mL/Hr) IV Continuous <Continuous>  dextrose 50% Injectable 25 Gram(s) IV Push once  dextrose 50% Injectable 12.5 Gram(s) IV Push once  dextrose 50% Injectable 25 Gram(s) IV Push once  folic acid 1 milliGRAM(s) Oral daily  gabapentin 300 milliGRAM(s) Oral two times a day  glucagon  Injectable 1 milliGRAM(s) IntraMuscular once  insulin lispro (ADMELOG) corrective regimen sliding scale   SubCutaneous at bedtime  insulin lispro (ADMELOG) corrective regimen sliding scale   SubCutaneous three times a day before meals  lidocaine   4% Patch 1 Patch Transdermal daily  loratadine 10 milliGRAM(s) Oral daily  losartan 100 milliGRAM(s) Oral daily  magnesium oxide 400 milliGRAM(s) Oral every 12 hours  metFORMIN 500 milliGRAM(s) Oral two times a day  montelukast 10 milliGRAM(s) Oral daily  pantoprazole   Suspension 40 milliGRAM(s) Oral daily  psyllium Powder 1 Packet(s) Oral two times a day  senna 2 Tablet(s) Oral at bedtime  sitaGLIPtin 50 milliGRAM(s) Oral daily  sodium chloride 0.65% Nasal 1 Spray(s) Both Nostrils every 12 hours    MEDICATIONS  (PRN):  loperamide 2 milliGRAM(s) Oral daily PRN Diarrhea  magnesium hydroxide Suspension 30 milliLiter(s) Oral daily PRN Constipation

## 2021-12-21 NOTE — BH INPATIENT PSYCHIATRY PROGRESS NOTE - NSBHMETABOLIC_PSY_ALL_CORE_FT
BMI: BMI (kg/m2): 27.6 (12-01-21 @ 15:23)  HbA1c: A1C with Estimated Average Glucose Result: 6.9 % (12-02-21 @ 10:05)    Glucose: POCT Blood Glucose.: 142 mg/dL (12-21-21 @ 08:16)    BP: 135/58 (12-19-21 @ 08:05) (135/58 - 135/58)  Lipid Panel:  BMI: BMI (kg/m2): 27.6 (12-01-21 @ 15:23)  HbA1c: A1C with Estimated Average Glucose Result: 6.9 % (12-02-21 @ 10:05)    Glucose: POCT Blood Glucose.: 182 mg/dL (12-21-21 @ 12:42)    BP: 135/58 (12-19-21 @ 08:05) (135/58 - 135/58)  Lipid Panel:

## 2021-12-22 PROCEDURE — 99232 SBSQ HOSP IP/OBS MODERATE 35: CPT

## 2021-12-22 RX ORDER — GABAPENTIN 400 MG/1
400 CAPSULE ORAL
Refills: 0 | Status: DISCONTINUED | OUTPATIENT
Start: 2021-12-22 | End: 2021-12-25

## 2021-12-22 RX ADMIN — SENNA PLUS 2 TABLET(S): 8.6 TABLET ORAL at 21:26

## 2021-12-22 RX ADMIN — Medication 1000 MILLIGRAM(S): at 10:45

## 2021-12-22 RX ADMIN — AMLODIPINE BESYLATE 2.5 MILLIGRAM(S): 2.5 TABLET ORAL at 10:46

## 2021-12-22 RX ADMIN — Medication 1 MILLIGRAM(S): at 11:01

## 2021-12-22 RX ADMIN — Medication 1 TABLET(S): at 10:46

## 2021-12-22 RX ADMIN — ATORVASTATIN CALCIUM 10 MILLIGRAM(S): 80 TABLET, FILM COATED ORAL at 21:25

## 2021-12-22 RX ADMIN — PANTOPRAZOLE SODIUM 40 MILLIGRAM(S): 20 TABLET, DELAYED RELEASE ORAL at 11:02

## 2021-12-22 RX ADMIN — Medication 1 SPRAY(S): at 21:43

## 2021-12-22 RX ADMIN — Medication 5000 UNIT(S): at 11:01

## 2021-12-22 RX ADMIN — BUMETANIDE 1 MILLIGRAM(S): 0.25 INJECTION INTRAMUSCULAR; INTRAVENOUS at 10:45

## 2021-12-22 RX ADMIN — Medication 0: at 21:42

## 2021-12-22 RX ADMIN — Medication 1 PACKET(S): at 10:45

## 2021-12-22 RX ADMIN — METFORMIN HYDROCHLORIDE 500 MILLIGRAM(S): 850 TABLET ORAL at 21:27

## 2021-12-22 RX ADMIN — LOSARTAN POTASSIUM 100 MILLIGRAM(S): 100 TABLET, FILM COATED ORAL at 10:46

## 2021-12-22 RX ADMIN — MAGNESIUM OXIDE 400 MG ORAL TABLET 400 MILLIGRAM(S): 241.3 TABLET ORAL at 10:47

## 2021-12-22 RX ADMIN — MONTELUKAST 10 MILLIGRAM(S): 4 TABLET, CHEWABLE ORAL at 10:46

## 2021-12-22 RX ADMIN — GABAPENTIN 400 MILLIGRAM(S): 400 CAPSULE ORAL at 10:45

## 2021-12-22 RX ADMIN — Medication 81 MILLIGRAM(S): at 10:44

## 2021-12-22 RX ADMIN — LORATADINE 10 MILLIGRAM(S): 10 TABLET ORAL at 10:46

## 2021-12-22 RX ADMIN — Medication 1 PACKET(S): at 21:30

## 2021-12-22 RX ADMIN — Medication 1000 MILLIGRAM(S): at 21:25

## 2021-12-22 RX ADMIN — MAGNESIUM OXIDE 400 MG ORAL TABLET 400 MILLIGRAM(S): 241.3 TABLET ORAL at 21:26

## 2021-12-22 RX ADMIN — GABAPENTIN 400 MILLIGRAM(S): 400 CAPSULE ORAL at 21:28

## 2021-12-22 RX ADMIN — PREGABALIN 1000 MICROGRAM(S): 225 CAPSULE ORAL at 11:01

## 2021-12-22 RX ADMIN — METFORMIN HYDROCHLORIDE 500 MILLIGRAM(S): 850 TABLET ORAL at 10:46

## 2021-12-22 NOTE — BH INPATIENT PSYCHIATRY PROGRESS NOTE - CURRENT MEDICATION
MEDICATIONS  (STANDING):  alendronate 70 milliGRAM(s) Oral <User Schedule>  amLODIPine   Tablet 2.5 milliGRAM(s) Oral daily  ascorbic acid 1000 milliGRAM(s) Oral two times a day  aspirin  chewable 81 milliGRAM(s) Oral daily  atorvastatin 10 milliGRAM(s) Oral at bedtime  buMETAnide 1 milliGRAM(s) Oral daily  calcium carbonate 1250 mG  + Vitamin D (OsCal 500 + D) 1 Tablet(s) Oral daily  cholecalciferol 5000 Unit(s) Oral daily  cyanocobalamin 1000 MICROGram(s) Oral daily  dextrose 40% Gel 15 Gram(s) Oral once  dextrose 5%. 1000 milliLiter(s) (50 mL/Hr) IV Continuous <Continuous>  dextrose 5%. 1000 milliLiter(s) (100 mL/Hr) IV Continuous <Continuous>  dextrose 50% Injectable 25 Gram(s) IV Push once  dextrose 50% Injectable 12.5 Gram(s) IV Push once  dextrose 50% Injectable 25 Gram(s) IV Push once  folic acid 1 milliGRAM(s) Oral daily  gabapentin 400 milliGRAM(s) Oral two times a day  glucagon  Injectable 1 milliGRAM(s) IntraMuscular once  insulin lispro (ADMELOG) corrective regimen sliding scale   SubCutaneous three times a day before meals  insulin lispro (ADMELOG) corrective regimen sliding scale   SubCutaneous at bedtime  lidocaine   4% Patch 1 Patch Transdermal daily  loratadine 10 milliGRAM(s) Oral daily  losartan 100 milliGRAM(s) Oral daily  magnesium oxide 400 milliGRAM(s) Oral every 12 hours  metFORMIN 500 milliGRAM(s) Oral two times a day  montelukast 10 milliGRAM(s) Oral daily  pantoprazole   Suspension 40 milliGRAM(s) Oral daily  psyllium Powder 1 Packet(s) Oral two times a day  senna 2 Tablet(s) Oral at bedtime  sitaGLIPtin 50 milliGRAM(s) Oral daily  sodium chloride 0.65% Nasal 1 Spray(s) Both Nostrils every 12 hours    MEDICATIONS  (PRN):  acetaminophen     Tablet .. 650 milliGRAM(s) Oral every 6 hours PRN Mild Pain (1 - 3)  loperamide 2 milliGRAM(s) Oral daily PRN Diarrhea  magnesium hydroxide Suspension 30 milliLiter(s) Oral daily PRN Constipation

## 2021-12-22 NOTE — BH INPATIENT PSYCHIATRY PROGRESS NOTE - NSBHMETABOLIC_PSY_ALL_CORE_FT
BMI: BMI (kg/m2): 27.6 (12-01-21 @ 15:23)  HbA1c: A1C with Estimated Average Glucose Result: 6.9 % (12-02-21 @ 10:05)    Glucose: POCT Blood Glucose.: 179 mg/dL (12-22-21 @ 07:53)    BP: --  Lipid Panel:  BMI: BMI (kg/m2): 27.6 (12-01-21 @ 15:23)  HbA1c: A1C with Estimated Average Glucose Result: 6.9 % (12-02-21 @ 10:05)    Glucose: POCT Blood Glucose.: 344 mg/dL (12-22-21 @ 12:07)    BP: --  Lipid Panel:

## 2021-12-22 NOTE — BH INPATIENT PSYCHIATRY PROGRESS NOTE - NSBHCHARTREVIEWVS_PSY_A_CORE FT
Vital Signs Last 24 Hrs  T(C): 36.7 (12-22-21 @ 07:47), Max: 36.8 (12-21-21 @ 15:44)  T(F): 98 (12-22-21 @ 07:47), Max: 98.2 (12-21-21 @ 15:44)  HR: --  BP: --  BP(mean): --  RR: --  SpO2: --    Orthostatic VS  12-22-21 @ 07:47  Lying BP: --/-- HR: --  Sitting BP: 142/61 HR: 78  Standing BP: 134/63 HR: 80  Site: --  Mode: --  Orthostatic VS  12-21-21 @ 05:51  Lying BP: 126/50 HR: 84  Sitting BP: 120/56 HR: 90  Standing BP: --/-- HR: --  Site: --  Mode: --

## 2021-12-22 NOTE — BH INPATIENT PSYCHIATRY PROGRESS NOTE - PRN MEDS
MEDICATIONS  (PRN):  acetaminophen     Tablet .. 650 milliGRAM(s) Oral every 6 hours PRN Mild Pain (1 - 3)  loperamide 2 milliGRAM(s) Oral daily PRN Diarrhea  magnesium hydroxide Suspension 30 milliLiter(s) Oral daily PRN Constipation

## 2021-12-23 LAB
GLUCOSE BLDC GLUCOMTR-MCNC: 150 MG/DL — HIGH (ref 70–99)
SARS-COV-2 RNA SPEC QL NAA+PROBE: SIGNIFICANT CHANGE UP

## 2021-12-23 PROCEDURE — 99232 SBSQ HOSP IP/OBS MODERATE 35: CPT

## 2021-12-23 RX ADMIN — Medication 650 MILLIGRAM(S): at 23:58

## 2021-12-23 RX ADMIN — METFORMIN HYDROCHLORIDE 500 MILLIGRAM(S): 850 TABLET ORAL at 22:30

## 2021-12-23 RX ADMIN — Medication 81 MILLIGRAM(S): at 10:02

## 2021-12-23 RX ADMIN — Medication 1 MILLIGRAM(S): at 10:04

## 2021-12-23 RX ADMIN — Medication 5000 UNIT(S): at 10:13

## 2021-12-23 RX ADMIN — LORATADINE 10 MILLIGRAM(S): 10 TABLET ORAL at 10:03

## 2021-12-23 RX ADMIN — AMLODIPINE BESYLATE 2.5 MILLIGRAM(S): 2.5 TABLET ORAL at 10:12

## 2021-12-23 RX ADMIN — BUMETANIDE 1 MILLIGRAM(S): 0.25 INJECTION INTRAMUSCULAR; INTRAVENOUS at 10:03

## 2021-12-23 RX ADMIN — MAGNESIUM OXIDE 400 MG ORAL TABLET 400 MILLIGRAM(S): 241.3 TABLET ORAL at 10:03

## 2021-12-23 RX ADMIN — PANTOPRAZOLE SODIUM 40 MILLIGRAM(S): 20 TABLET, DELAYED RELEASE ORAL at 10:02

## 2021-12-23 RX ADMIN — Medication 1 TABLET(S): at 10:02

## 2021-12-23 RX ADMIN — MAGNESIUM HYDROXIDE 30 MILLILITER(S): 400 TABLET, CHEWABLE ORAL at 10:12

## 2021-12-23 RX ADMIN — Medication 1000 MILLIGRAM(S): at 22:29

## 2021-12-23 RX ADMIN — SENNA PLUS 2 TABLET(S): 8.6 TABLET ORAL at 22:30

## 2021-12-23 RX ADMIN — GABAPENTIN 400 MILLIGRAM(S): 400 CAPSULE ORAL at 22:29

## 2021-12-23 RX ADMIN — Medication 1 PACKET(S): at 22:30

## 2021-12-23 RX ADMIN — Medication 1 PACKET(S): at 10:12

## 2021-12-23 RX ADMIN — METFORMIN HYDROCHLORIDE 500 MILLIGRAM(S): 850 TABLET ORAL at 10:03

## 2021-12-23 RX ADMIN — PREGABALIN 1000 MICROGRAM(S): 225 CAPSULE ORAL at 10:02

## 2021-12-23 RX ADMIN — MAGNESIUM OXIDE 400 MG ORAL TABLET 400 MILLIGRAM(S): 241.3 TABLET ORAL at 22:30

## 2021-12-23 RX ADMIN — Medication 1000 MILLIGRAM(S): at 10:03

## 2021-12-23 RX ADMIN — MONTELUKAST 10 MILLIGRAM(S): 4 TABLET, CHEWABLE ORAL at 10:02

## 2021-12-23 RX ADMIN — GABAPENTIN 400 MILLIGRAM(S): 400 CAPSULE ORAL at 10:02

## 2021-12-23 RX ADMIN — Medication 650 MILLIGRAM(S): at 22:58

## 2021-12-23 RX ADMIN — ATORVASTATIN CALCIUM 10 MILLIGRAM(S): 80 TABLET, FILM COATED ORAL at 22:29

## 2021-12-23 RX ADMIN — Medication 1 SPRAY(S): at 22:31

## 2021-12-23 RX ADMIN — LOSARTAN POTASSIUM 100 MILLIGRAM(S): 100 TABLET, FILM COATED ORAL at 10:04

## 2021-12-23 NOTE — BH INPATIENT PSYCHIATRY PROGRESS NOTE - NSBHCHARTREVIEWVS_PSY_A_CORE FT
Vital Signs Last 24 Hrs  T(C): 37.2 (12-23-21 @ 07:37), Max: 37.2 (12-23-21 @ 07:37)  T(F): 98.9 (12-23-21 @ 07:37), Max: 98.9 (12-23-21 @ 07:37)  HR: --  BP: --  BP(mean): --  RR: --  SpO2: --    Orthostatic VS  12-23-21 @ 07:37  Lying BP: --/-- HR: --  Sitting BP: 146/57 HR: 88  Standing BP: 164/60 HR: 101  Site: --  Mode: --  Orthostatic VS  12-22-21 @ 07:47  Lying BP: --/-- HR: --  Sitting BP: 142/61 HR: 78  Standing BP: 134/63 HR: 80  Site: --  Mode: --

## 2021-12-23 NOTE — BH INPATIENT PSYCHIATRY PROGRESS NOTE - CURRENT MEDICATION
MEDICATIONS  (STANDING):  alendronate 70 milliGRAM(s) Oral <User Schedule>  amLODIPine   Tablet 2.5 milliGRAM(s) Oral daily  ascorbic acid 1000 milliGRAM(s) Oral two times a day  aspirin  chewable 81 milliGRAM(s) Oral daily  atorvastatin 10 milliGRAM(s) Oral at bedtime  buMETAnide 1 milliGRAM(s) Oral daily  calcium carbonate 1250 mG  + Vitamin D (OsCal 500 + D) 1 Tablet(s) Oral daily  cholecalciferol 5000 Unit(s) Oral daily  cyanocobalamin 1000 MICROGram(s) Oral daily  dextrose 40% Gel 15 Gram(s) Oral once  dextrose 5%. 1000 milliLiter(s) (50 mL/Hr) IV Continuous <Continuous>  dextrose 5%. 1000 milliLiter(s) (100 mL/Hr) IV Continuous <Continuous>  dextrose 50% Injectable 25 Gram(s) IV Push once  dextrose 50% Injectable 12.5 Gram(s) IV Push once  dextrose 50% Injectable 25 Gram(s) IV Push once  folic acid 1 milliGRAM(s) Oral daily  gabapentin 400 milliGRAM(s) Oral two times a day  glucagon  Injectable 1 milliGRAM(s) IntraMuscular once  insulin lispro (ADMELOG) corrective regimen sliding scale   SubCutaneous three times a day before meals  insulin lispro (ADMELOG) corrective regimen sliding scale   SubCutaneous at bedtime  lidocaine   4% Patch 1 Patch Transdermal daily  loratadine 10 milliGRAM(s) Oral daily  losartan 100 milliGRAM(s) Oral daily  magnesium oxide 400 milliGRAM(s) Oral every 12 hours  metFORMIN 500 milliGRAM(s) Oral two times a day  montelukast 10 milliGRAM(s) Oral daily  pantoprazole   Suspension 40 milliGRAM(s) Oral daily  psyllium Powder 1 Packet(s) Oral two times a day  senna 2 Tablet(s) Oral at bedtime  sitaGLIPtin 50 milliGRAM(s) Oral daily  sodium chloride 0.65% Nasal 1 Spray(s) Both Nostrils every 12 hours    MEDICATIONS  (PRN):  acetaminophen     Tablet .. 650 milliGRAM(s) Oral every 6 hours PRN Mild Pain (1 - 3)  loperamide 2 milliGRAM(s) Oral daily PRN Diarrhea  magnesium hydroxide Suspension 30 milliLiter(s) Oral daily PRN Constipation   MEDICATIONS  (STANDING):  alendronate 70 milliGRAM(s) Oral <User Schedule>  amLODIPine   Tablet 2.5 milliGRAM(s) Oral daily  ascorbic acid 1000 milliGRAM(s) Oral two times a day  aspirin  chewable 81 milliGRAM(s) Oral daily  atorvastatin 10 milliGRAM(s) Oral at bedtime  buMETAnide 1 milliGRAM(s) Oral daily  calcium carbonate 1250 mG  + Vitamin D (OsCal 500 + D) 1 Tablet(s) Oral daily  cholecalciferol 5000 Unit(s) Oral daily  cyanocobalamin 1000 MICROGram(s) Oral daily  dextrose 40% Gel 15 Gram(s) Oral once  dextrose 5%. 1000 milliLiter(s) (50 mL/Hr) IV Continuous <Continuous>  dextrose 5%. 1000 milliLiter(s) (100 mL/Hr) IV Continuous <Continuous>  dextrose 50% Injectable 25 Gram(s) IV Push once  dextrose 50% Injectable 12.5 Gram(s) IV Push once  dextrose 50% Injectable 25 Gram(s) IV Push once  folic acid 1 milliGRAM(s) Oral daily  gabapentin 400 milliGRAM(s) Oral two times a day  glucagon  Injectable 1 milliGRAM(s) IntraMuscular once  insulin lispro (ADMELOG) corrective regimen sliding scale   SubCutaneous at bedtime  insulin lispro (ADMELOG) corrective regimen sliding scale   SubCutaneous three times a day before meals  lidocaine   4% Patch 1 Patch Transdermal daily  loratadine 10 milliGRAM(s) Oral daily  losartan 100 milliGRAM(s) Oral daily  magnesium oxide 400 milliGRAM(s) Oral every 12 hours  metFORMIN 500 milliGRAM(s) Oral two times a day  montelukast 10 milliGRAM(s) Oral daily  pantoprazole   Suspension 40 milliGRAM(s) Oral daily  psyllium Powder 1 Packet(s) Oral two times a day  senna 2 Tablet(s) Oral at bedtime  sitaGLIPtin 50 milliGRAM(s) Oral daily  sodium chloride 0.65% Nasal 1 Spray(s) Both Nostrils every 12 hours    MEDICATIONS  (PRN):  acetaminophen     Tablet .. 650 milliGRAM(s) Oral every 6 hours PRN Mild Pain (1 - 3)  loperamide 2 milliGRAM(s) Oral daily PRN Diarrhea  magnesium hydroxide Suspension 30 milliLiter(s) Oral daily PRN Constipation

## 2021-12-23 NOTE — BH INPATIENT PSYCHIATRY PROGRESS NOTE - NSBHMETABOLIC_PSY_ALL_CORE_FT
BMI: BMI (kg/m2): 27.6 (12-01-21 @ 15:23)  HbA1c: A1C with Estimated Average Glucose Result: 6.9 % (12-02-21 @ 10:05)    Glucose: POCT Blood Glucose.: 149 mg/dL (12-23-21 @ 08:12)    BP: --  Lipid Panel:  BMI: BMI (kg/m2): 27.6 (12-01-21 @ 15:23)  HbA1c: A1C with Estimated Average Glucose Result: 6.9 % (12-02-21 @ 10:05)    Glucose: POCT Blood Glucose.: 158 mg/dL (12-23-21 @ 11:53)    BP: --  Lipid Panel:

## 2021-12-24 LAB
GLUCOSE BLDC GLUCOMTR-MCNC: 133 MG/DL — HIGH (ref 70–99)
GLUCOSE BLDC GLUCOMTR-MCNC: 145 MG/DL — HIGH (ref 70–99)
GLUCOSE BLDC GLUCOMTR-MCNC: 163 MG/DL — HIGH (ref 70–99)
GLUCOSE BLDC GLUCOMTR-MCNC: 204 MG/DL — HIGH (ref 70–99)

## 2021-12-24 RX ADMIN — PANTOPRAZOLE SODIUM 40 MILLIGRAM(S): 20 TABLET, DELAYED RELEASE ORAL at 09:07

## 2021-12-24 RX ADMIN — Medication 5000 UNIT(S): at 09:12

## 2021-12-24 RX ADMIN — Medication 1 SPRAY(S): at 21:50

## 2021-12-24 RX ADMIN — LIDOCAINE 1 PATCH: 4 CREAM TOPICAL at 23:02

## 2021-12-24 RX ADMIN — Medication 1 SPRAY(S): at 09:13

## 2021-12-24 RX ADMIN — Medication 1000 MILLIGRAM(S): at 21:49

## 2021-12-24 RX ADMIN — LIDOCAINE 1 PATCH: 4 CREAM TOPICAL at 09:09

## 2021-12-24 RX ADMIN — Medication 1 MILLIGRAM(S): at 09:08

## 2021-12-24 RX ADMIN — Medication 1 TABLET(S): at 09:10

## 2021-12-24 RX ADMIN — Medication 1 PACKET(S): at 09:07

## 2021-12-24 RX ADMIN — MAGNESIUM OXIDE 400 MG ORAL TABLET 400 MILLIGRAM(S): 241.3 TABLET ORAL at 21:49

## 2021-12-24 RX ADMIN — SENNA PLUS 2 TABLET(S): 8.6 TABLET ORAL at 21:49

## 2021-12-24 RX ADMIN — AMLODIPINE BESYLATE 2.5 MILLIGRAM(S): 2.5 TABLET ORAL at 09:10

## 2021-12-24 RX ADMIN — LORATADINE 10 MILLIGRAM(S): 10 TABLET ORAL at 09:10

## 2021-12-24 RX ADMIN — PREGABALIN 1000 MICROGRAM(S): 225 CAPSULE ORAL at 09:09

## 2021-12-24 RX ADMIN — ATORVASTATIN CALCIUM 10 MILLIGRAM(S): 80 TABLET, FILM COATED ORAL at 21:49

## 2021-12-24 RX ADMIN — METFORMIN HYDROCHLORIDE 500 MILLIGRAM(S): 850 TABLET ORAL at 09:07

## 2021-12-24 RX ADMIN — Medication 1: at 12:55

## 2021-12-24 RX ADMIN — MONTELUKAST 10 MILLIGRAM(S): 4 TABLET, CHEWABLE ORAL at 09:10

## 2021-12-24 RX ADMIN — LOSARTAN POTASSIUM 100 MILLIGRAM(S): 100 TABLET, FILM COATED ORAL at 09:07

## 2021-12-24 RX ADMIN — METFORMIN HYDROCHLORIDE 500 MILLIGRAM(S): 850 TABLET ORAL at 21:50

## 2021-12-24 RX ADMIN — GABAPENTIN 400 MILLIGRAM(S): 400 CAPSULE ORAL at 09:06

## 2021-12-24 RX ADMIN — MAGNESIUM HYDROXIDE 30 MILLILITER(S): 400 TABLET, CHEWABLE ORAL at 22:12

## 2021-12-24 RX ADMIN — BUMETANIDE 1 MILLIGRAM(S): 0.25 INJECTION INTRAMUSCULAR; INTRAVENOUS at 09:11

## 2021-12-24 RX ADMIN — LIDOCAINE 1 PATCH: 4 CREAM TOPICAL at 21:01

## 2021-12-24 RX ADMIN — MAGNESIUM OXIDE 400 MG ORAL TABLET 400 MILLIGRAM(S): 241.3 TABLET ORAL at 09:09

## 2021-12-24 RX ADMIN — Medication 1 PACKET(S): at 22:04

## 2021-12-24 RX ADMIN — Medication 81 MILLIGRAM(S): at 09:06

## 2021-12-24 RX ADMIN — GABAPENTIN 400 MILLIGRAM(S): 400 CAPSULE ORAL at 21:49

## 2021-12-24 RX ADMIN — Medication 1000 MILLIGRAM(S): at 09:08

## 2021-12-25 LAB
GLUCOSE BLDC GLUCOMTR-MCNC: 114 MG/DL — HIGH (ref 70–99)
GLUCOSE BLDC GLUCOMTR-MCNC: 126 MG/DL — HIGH (ref 70–99)
GLUCOSE BLDC GLUCOMTR-MCNC: 163 MG/DL — HIGH (ref 70–99)
GLUCOSE BLDC GLUCOMTR-MCNC: 206 MG/DL — HIGH (ref 70–99)
SARS-COV-2 RNA SPEC QL NAA+PROBE: SIGNIFICANT CHANGE UP

## 2021-12-25 PROCEDURE — 99232 SBSQ HOSP IP/OBS MODERATE 35: CPT

## 2021-12-25 RX ORDER — GABAPENTIN 400 MG/1
500 CAPSULE ORAL AT BEDTIME
Refills: 0 | Status: DISCONTINUED | OUTPATIENT
Start: 2021-12-25 | End: 2021-12-30

## 2021-12-25 RX ORDER — GABAPENTIN 400 MG/1
400 CAPSULE ORAL DAILY
Refills: 0 | Status: DISCONTINUED | OUTPATIENT
Start: 2021-12-25 | End: 2021-12-30

## 2021-12-25 RX ADMIN — LIDOCAINE 1 PATCH: 4 CREAM TOPICAL at 19:38

## 2021-12-25 RX ADMIN — GABAPENTIN 400 MILLIGRAM(S): 400 CAPSULE ORAL at 09:43

## 2021-12-25 RX ADMIN — Medication 1 SPRAY(S): at 09:48

## 2021-12-25 RX ADMIN — AMLODIPINE BESYLATE 2.5 MILLIGRAM(S): 2.5 TABLET ORAL at 09:46

## 2021-12-25 RX ADMIN — Medication 5000 UNIT(S): at 09:47

## 2021-12-25 RX ADMIN — BUMETANIDE 1 MILLIGRAM(S): 0.25 INJECTION INTRAMUSCULAR; INTRAVENOUS at 09:46

## 2021-12-25 RX ADMIN — METFORMIN HYDROCHLORIDE 500 MILLIGRAM(S): 850 TABLET ORAL at 09:44

## 2021-12-25 RX ADMIN — MAGNESIUM OXIDE 400 MG ORAL TABLET 400 MILLIGRAM(S): 241.3 TABLET ORAL at 09:46

## 2021-12-25 RX ADMIN — Medication 1: at 17:26

## 2021-12-25 RX ADMIN — LIDOCAINE 1 PATCH: 4 CREAM TOPICAL at 22:30

## 2021-12-25 RX ADMIN — PANTOPRAZOLE SODIUM 40 MILLIGRAM(S): 20 TABLET, DELAYED RELEASE ORAL at 09:45

## 2021-12-25 RX ADMIN — MAGNESIUM HYDROXIDE 30 MILLILITER(S): 400 TABLET, CHEWABLE ORAL at 21:37

## 2021-12-25 RX ADMIN — METFORMIN HYDROCHLORIDE 500 MILLIGRAM(S): 850 TABLET ORAL at 21:12

## 2021-12-25 RX ADMIN — SENNA PLUS 2 TABLET(S): 8.6 TABLET ORAL at 21:12

## 2021-12-25 RX ADMIN — MAGNESIUM OXIDE 400 MG ORAL TABLET 400 MILLIGRAM(S): 241.3 TABLET ORAL at 21:12

## 2021-12-25 RX ADMIN — Medication 81 MILLIGRAM(S): at 09:48

## 2021-12-25 RX ADMIN — Medication 1 TABLET(S): at 09:45

## 2021-12-25 RX ADMIN — GABAPENTIN 500 MILLIGRAM(S): 400 CAPSULE ORAL at 21:13

## 2021-12-25 RX ADMIN — LIDOCAINE 1 PATCH: 4 CREAM TOPICAL at 09:47

## 2021-12-25 RX ADMIN — LORATADINE 10 MILLIGRAM(S): 10 TABLET ORAL at 09:45

## 2021-12-25 RX ADMIN — Medication 1000 MILLIGRAM(S): at 21:12

## 2021-12-25 RX ADMIN — LOSARTAN POTASSIUM 100 MILLIGRAM(S): 100 TABLET, FILM COATED ORAL at 09:45

## 2021-12-25 RX ADMIN — Medication 1 SPRAY(S): at 21:13

## 2021-12-25 RX ADMIN — ATORVASTATIN CALCIUM 10 MILLIGRAM(S): 80 TABLET, FILM COATED ORAL at 21:12

## 2021-12-25 RX ADMIN — MONTELUKAST 10 MILLIGRAM(S): 4 TABLET, CHEWABLE ORAL at 09:44

## 2021-12-25 RX ADMIN — Medication 1 PACKET(S): at 21:12

## 2021-12-25 RX ADMIN — Medication 1 PACKET(S): at 09:49

## 2021-12-25 RX ADMIN — Medication 1 MILLIGRAM(S): at 09:45

## 2021-12-25 RX ADMIN — PREGABALIN 1000 MICROGRAM(S): 225 CAPSULE ORAL at 09:46

## 2021-12-25 RX ADMIN — Medication 1000 MILLIGRAM(S): at 09:44

## 2021-12-25 NOTE — BH INPATIENT PSYCHIATRY PROGRESS NOTE - NSBHMETABOLIC_PSY_ALL_CORE_FT
BMI: BMI (kg/m2): 27.6 (12-01-21 @ 15:23)  HbA1c: A1C with Estimated Average Glucose Result: 6.9 % (12-02-21 @ 10:05)    Glucose: POCT Blood Glucose.: 114 mg/dL (12-25-21 @ 13:20)    BP: --  Lipid Panel:

## 2021-12-25 NOTE — BH INPATIENT PSYCHIATRY PROGRESS NOTE - NSBHCHARTREVIEWVS_PSY_A_CORE FT
Vital Signs Last 24 Hrs  T(C): 37.1 (12-25-21 @ 08:13), Max: 37.1 (12-25-21 @ 08:13)  T(F): 98.8 (12-25-21 @ 08:13), Max: 98.8 (12-25-21 @ 08:13)  HR: --  BP: --  BP(mean): --  RR: --  SpO2: --    Orthostatic VS  12-25-21 @ 09:54  Lying BP: --/-- HR: --  Sitting BP: 130/60 HR: 80  Standing BP: 126/60 HR: 80  Site: --  Mode: --  Orthostatic VS  12-25-21 @ 08:13  Lying BP: --/-- HR: --  Sitting BP: 133/52 HR: 80  Standing BP: 124/53 HR: 91  Site: --  Mode: --  Orthostatic VS  12-24-21 @ 07:23  Lying BP: --/-- HR: --  Sitting BP: 141/56 HR: 73  Standing BP: 139/56 HR: 92  Site: --  Mode: --

## 2021-12-26 LAB
GLUCOSE BLDC GLUCOMTR-MCNC: 129 MG/DL — HIGH (ref 70–99)
GLUCOSE BLDC GLUCOMTR-MCNC: 147 MG/DL — HIGH (ref 70–99)
GLUCOSE BLDC GLUCOMTR-MCNC: 153 MG/DL — HIGH (ref 70–99)
GLUCOSE BLDC GLUCOMTR-MCNC: 201 MG/DL — HIGH (ref 70–99)

## 2021-12-26 RX ADMIN — Medication 5000 UNIT(S): at 09:05

## 2021-12-26 RX ADMIN — Medication 1 SPRAY(S): at 21:59

## 2021-12-26 RX ADMIN — Medication 1 TABLET(S): at 08:19

## 2021-12-26 RX ADMIN — Medication 1 PACKET(S): at 21:59

## 2021-12-26 RX ADMIN — Medication 81 MILLIGRAM(S): at 08:19

## 2021-12-26 RX ADMIN — MAGNESIUM HYDROXIDE 30 MILLILITER(S): 400 TABLET, CHEWABLE ORAL at 22:00

## 2021-12-26 RX ADMIN — Medication 650 MILLIGRAM(S): at 22:03

## 2021-12-26 RX ADMIN — Medication 1000 MILLIGRAM(S): at 21:58

## 2021-12-26 RX ADMIN — LOSARTAN POTASSIUM 100 MILLIGRAM(S): 100 TABLET, FILM COATED ORAL at 08:20

## 2021-12-26 RX ADMIN — MAGNESIUM OXIDE 400 MG ORAL TABLET 400 MILLIGRAM(S): 241.3 TABLET ORAL at 21:59

## 2021-12-26 RX ADMIN — Medication 1 MILLIGRAM(S): at 08:20

## 2021-12-26 RX ADMIN — MONTELUKAST 10 MILLIGRAM(S): 4 TABLET, CHEWABLE ORAL at 08:19

## 2021-12-26 RX ADMIN — Medication 1 SPRAY(S): at 09:08

## 2021-12-26 RX ADMIN — METFORMIN HYDROCHLORIDE 500 MILLIGRAM(S): 850 TABLET ORAL at 08:20

## 2021-12-26 RX ADMIN — BUMETANIDE 1 MILLIGRAM(S): 0.25 INJECTION INTRAMUSCULAR; INTRAVENOUS at 08:19

## 2021-12-26 RX ADMIN — Medication 1000 MILLIGRAM(S): at 08:19

## 2021-12-26 RX ADMIN — Medication 1 PACKET(S): at 08:21

## 2021-12-26 RX ADMIN — SENNA PLUS 2 TABLET(S): 8.6 TABLET ORAL at 21:58

## 2021-12-26 RX ADMIN — LORATADINE 10 MILLIGRAM(S): 10 TABLET ORAL at 08:20

## 2021-12-26 RX ADMIN — ATORVASTATIN CALCIUM 10 MILLIGRAM(S): 80 TABLET, FILM COATED ORAL at 21:58

## 2021-12-26 RX ADMIN — PANTOPRAZOLE SODIUM 40 MILLIGRAM(S): 20 TABLET, DELAYED RELEASE ORAL at 08:19

## 2021-12-26 RX ADMIN — ALENDRONATE SODIUM 70 MILLIGRAM(S): 70 TABLET ORAL at 06:33

## 2021-12-26 RX ADMIN — PREGABALIN 1000 MICROGRAM(S): 225 CAPSULE ORAL at 08:20

## 2021-12-26 RX ADMIN — AMLODIPINE BESYLATE 2.5 MILLIGRAM(S): 2.5 TABLET ORAL at 08:20

## 2021-12-26 RX ADMIN — MAGNESIUM OXIDE 400 MG ORAL TABLET 400 MILLIGRAM(S): 241.3 TABLET ORAL at 08:20

## 2021-12-26 RX ADMIN — Medication 650 MILLIGRAM(S): at 23:00

## 2021-12-26 RX ADMIN — GABAPENTIN 400 MILLIGRAM(S): 400 CAPSULE ORAL at 08:19

## 2021-12-26 RX ADMIN — METFORMIN HYDROCHLORIDE 500 MILLIGRAM(S): 850 TABLET ORAL at 21:59

## 2021-12-26 RX ADMIN — GABAPENTIN 500 MILLIGRAM(S): 400 CAPSULE ORAL at 21:59

## 2021-12-27 LAB
GLUCOSE BLDC GLUCOMTR-MCNC: 124 MG/DL — HIGH (ref 70–99)
GLUCOSE BLDC GLUCOMTR-MCNC: 141 MG/DL — HIGH (ref 70–99)
GLUCOSE BLDC GLUCOMTR-MCNC: 198 MG/DL — HIGH (ref 70–99)

## 2021-12-27 PROCEDURE — 99232 SBSQ HOSP IP/OBS MODERATE 35: CPT

## 2021-12-27 PROCEDURE — 99231 SBSQ HOSP IP/OBS SF/LOW 25: CPT

## 2021-12-27 RX ORDER — METFORMIN HYDROCHLORIDE 850 MG/1
500 TABLET ORAL
Refills: 0 | Status: DISCONTINUED | OUTPATIENT
Start: 2021-12-27 | End: 2022-01-04

## 2021-12-27 RX ORDER — LANOLIN ALCOHOL/MO/W.PET/CERES
3 CREAM (GRAM) TOPICAL AT BEDTIME
Refills: 0 | Status: DISCONTINUED | OUTPATIENT
Start: 2021-12-27 | End: 2022-01-18

## 2021-12-27 RX ADMIN — LORATADINE 10 MILLIGRAM(S): 10 TABLET ORAL at 09:55

## 2021-12-27 RX ADMIN — Medication 650 MILLIGRAM(S): at 10:44

## 2021-12-27 RX ADMIN — GABAPENTIN 400 MILLIGRAM(S): 400 CAPSULE ORAL at 09:56

## 2021-12-27 RX ADMIN — MONTELUKAST 10 MILLIGRAM(S): 4 TABLET, CHEWABLE ORAL at 09:55

## 2021-12-27 RX ADMIN — MAGNESIUM HYDROXIDE 30 MILLILITER(S): 400 TABLET, CHEWABLE ORAL at 10:18

## 2021-12-27 RX ADMIN — Medication 650 MILLIGRAM(S): at 10:17

## 2021-12-27 RX ADMIN — METFORMIN HYDROCHLORIDE 500 MILLIGRAM(S): 850 TABLET ORAL at 16:44

## 2021-12-27 RX ADMIN — Medication 650 MILLIGRAM(S): at 23:00

## 2021-12-27 RX ADMIN — Medication 1 TABLET(S): at 09:55

## 2021-12-27 RX ADMIN — MAGNESIUM OXIDE 400 MG ORAL TABLET 400 MILLIGRAM(S): 241.3 TABLET ORAL at 09:56

## 2021-12-27 RX ADMIN — Medication 650 MILLIGRAM(S): at 22:22

## 2021-12-27 RX ADMIN — Medication 5000 UNIT(S): at 10:12

## 2021-12-27 RX ADMIN — Medication 1 SPRAY(S): at 10:11

## 2021-12-27 RX ADMIN — METFORMIN HYDROCHLORIDE 500 MILLIGRAM(S): 850 TABLET ORAL at 09:56

## 2021-12-27 RX ADMIN — SENNA PLUS 2 TABLET(S): 8.6 TABLET ORAL at 21:35

## 2021-12-27 RX ADMIN — MAGNESIUM OXIDE 400 MG ORAL TABLET 400 MILLIGRAM(S): 241.3 TABLET ORAL at 21:36

## 2021-12-27 RX ADMIN — GABAPENTIN 500 MILLIGRAM(S): 400 CAPSULE ORAL at 21:36

## 2021-12-27 RX ADMIN — AMLODIPINE BESYLATE 2.5 MILLIGRAM(S): 2.5 TABLET ORAL at 09:55

## 2021-12-27 RX ADMIN — PANTOPRAZOLE SODIUM 40 MILLIGRAM(S): 20 TABLET, DELAYED RELEASE ORAL at 09:55

## 2021-12-27 RX ADMIN — PREGABALIN 1000 MICROGRAM(S): 225 CAPSULE ORAL at 09:55

## 2021-12-27 RX ADMIN — Medication 3 MILLIGRAM(S): at 22:26

## 2021-12-27 RX ADMIN — Medication 1 SPRAY(S): at 21:36

## 2021-12-27 RX ADMIN — Medication 1 PACKET(S): at 21:35

## 2021-12-27 RX ADMIN — BUMETANIDE 1 MILLIGRAM(S): 0.25 INJECTION INTRAMUSCULAR; INTRAVENOUS at 09:56

## 2021-12-27 RX ADMIN — Medication 1000 MILLIGRAM(S): at 21:36

## 2021-12-27 RX ADMIN — Medication 1 MILLIGRAM(S): at 09:56

## 2021-12-27 RX ADMIN — Medication 1000 MILLIGRAM(S): at 09:57

## 2021-12-27 RX ADMIN — Medication 81 MILLIGRAM(S): at 09:55

## 2021-12-27 RX ADMIN — LOSARTAN POTASSIUM 100 MILLIGRAM(S): 100 TABLET, FILM COATED ORAL at 09:56

## 2021-12-27 RX ADMIN — ATORVASTATIN CALCIUM 10 MILLIGRAM(S): 80 TABLET, FILM COATED ORAL at 21:36

## 2021-12-27 RX ADMIN — Medication 1 PACKET(S): at 10:12

## 2021-12-27 RX ADMIN — Medication 1: at 11:49

## 2021-12-27 NOTE — BH INPATIENT PSYCHIATRY PROGRESS NOTE - NSBHCHARTREVIEWVS_PSY_A_CORE FT
Vital Signs Last 24 Hrs  T(C): 36.6 (12-27-21 @ 08:04), Max: 36.6 (12-27-21 @ 08:04)  T(F): 97.8 (12-27-21 @ 08:04), Max: 97.8 (12-27-21 @ 08:04)  HR: --  BP: --  BP(mean): --  RR: --  SpO2: --    Orthostatic VS  12-27-21 @ 08:04  Lying BP: --/-- HR: --  Sitting BP: 133/51 HR: 68  Standing BP: 130/57 HR: 80  Site: --  Mode: --

## 2021-12-27 NOTE — BH INPATIENT PSYCHIATRY PROGRESS NOTE - CURRENT MEDICATION
MEDICATIONS  (STANDING):  alendronate 70 milliGRAM(s) Oral <User Schedule>  amLODIPine   Tablet 2.5 milliGRAM(s) Oral daily  ascorbic acid 1000 milliGRAM(s) Oral two times a day  aspirin  chewable 81 milliGRAM(s) Oral daily  atorvastatin 10 milliGRAM(s) Oral at bedtime  buMETAnide 1 milliGRAM(s) Oral daily  calcium carbonate 1250 mG  + Vitamin D (OsCal 500 + D) 1 Tablet(s) Oral daily  cholecalciferol 5000 Unit(s) Oral daily  cyanocobalamin 1000 MICROGram(s) Oral daily  dextrose 40% Gel 15 Gram(s) Oral once  dextrose 5%. 1000 milliLiter(s) (100 mL/Hr) IV Continuous <Continuous>  dextrose 5%. 1000 milliLiter(s) (50 mL/Hr) IV Continuous <Continuous>  dextrose 50% Injectable 25 Gram(s) IV Push once  dextrose 50% Injectable 12.5 Gram(s) IV Push once  dextrose 50% Injectable 25 Gram(s) IV Push once  folic acid 1 milliGRAM(s) Oral daily  gabapentin 400 milliGRAM(s) Oral daily  gabapentin 500 milliGRAM(s) Oral at bedtime  glucagon  Injectable 1 milliGRAM(s) IntraMuscular once  insulin lispro (ADMELOG) corrective regimen sliding scale   SubCutaneous at bedtime  insulin lispro (ADMELOG) corrective regimen sliding scale   SubCutaneous three times a day before meals  lidocaine   4% Patch 1 Patch Transdermal daily  loratadine 10 milliGRAM(s) Oral daily  losartan 100 milliGRAM(s) Oral daily  magnesium oxide 400 milliGRAM(s) Oral every 12 hours  metFORMIN 500 milliGRAM(s) Oral two times a day with meals  montelukast 10 milliGRAM(s) Oral daily  pantoprazole   Suspension 40 milliGRAM(s) Oral daily  psyllium Powder 1 Packet(s) Oral two times a day  senna 2 Tablet(s) Oral at bedtime  sitaGLIPtin 50 milliGRAM(s) Oral daily  sodium chloride 0.65% Nasal 1 Spray(s) Both Nostrils every 12 hours    MEDICATIONS  (PRN):  acetaminophen     Tablet .. 650 milliGRAM(s) Oral every 6 hours PRN Mild Pain (1 - 3)  loperamide 2 milliGRAM(s) Oral daily PRN Diarrhea  magnesium hydroxide Suspension 30 milliLiter(s) Oral daily PRN Constipation

## 2021-12-27 NOTE — BH INPATIENT PSYCHIATRY PROGRESS NOTE - NSBHATTENDATTEST_PSY_ALL_CORE
I have personally seen, examined and participated in the care of this patient. I have reviewed all pertinent clinical information, including history, physical exam, plan and the Medical/PA/NP Student’s note and agree except as noted.

## 2021-12-27 NOTE — BH INPATIENT PSYCHIATRY PROGRESS NOTE - NSBHMETABOLIC_PSY_ALL_CORE_FT
BMI: BMI (kg/m2): 27.6 (12-01-21 @ 15:23)  HbA1c: A1C with Estimated Average Glucose Result: 6.9 % (12-02-21 @ 10:05)    Glucose: POCT Blood Glucose.: 198 mg/dL (12-27-21 @ 11:32)    BP: 142/57 (12-26-21 @ 07:44) (142/57 - 142/57)  Lipid Panel:

## 2021-12-28 LAB
GLUCOSE BLDC GLUCOMTR-MCNC: 127 MG/DL — HIGH (ref 70–99)
GLUCOSE BLDC GLUCOMTR-MCNC: 145 MG/DL — HIGH (ref 70–99)
GLUCOSE BLDC GLUCOMTR-MCNC: 151 MG/DL — HIGH (ref 70–99)
GLUCOSE BLDC GLUCOMTR-MCNC: 179 MG/DL — HIGH (ref 70–99)
GLUCOSE BLDC GLUCOMTR-MCNC: 215 MG/DL — HIGH (ref 70–99)
SARS-COV-2 RNA SPEC QL NAA+PROBE: SIGNIFICANT CHANGE UP

## 2021-12-28 PROCEDURE — 99232 SBSQ HOSP IP/OBS MODERATE 35: CPT

## 2021-12-28 RX ADMIN — Medication 1 MILLIGRAM(S): at 08:02

## 2021-12-28 RX ADMIN — METFORMIN HYDROCHLORIDE 500 MILLIGRAM(S): 850 TABLET ORAL at 16:46

## 2021-12-28 RX ADMIN — LIDOCAINE 1 PATCH: 4 CREAM TOPICAL at 08:04

## 2021-12-28 RX ADMIN — AMLODIPINE BESYLATE 2.5 MILLIGRAM(S): 2.5 TABLET ORAL at 08:03

## 2021-12-28 RX ADMIN — Medication 1 SPRAY(S): at 22:20

## 2021-12-28 RX ADMIN — SENNA PLUS 2 TABLET(S): 8.6 TABLET ORAL at 22:24

## 2021-12-28 RX ADMIN — LORATADINE 10 MILLIGRAM(S): 10 TABLET ORAL at 08:02

## 2021-12-28 RX ADMIN — PREGABALIN 1000 MICROGRAM(S): 225 CAPSULE ORAL at 08:03

## 2021-12-28 RX ADMIN — Medication 1: at 12:05

## 2021-12-28 RX ADMIN — MONTELUKAST 10 MILLIGRAM(S): 4 TABLET, CHEWABLE ORAL at 08:03

## 2021-12-28 RX ADMIN — LIDOCAINE 1 PATCH: 4 CREAM TOPICAL at 22:44

## 2021-12-28 RX ADMIN — Medication 1 PACKET(S): at 08:04

## 2021-12-28 RX ADMIN — Medication 1 SPRAY(S): at 08:05

## 2021-12-28 RX ADMIN — Medication 1000 MILLIGRAM(S): at 22:17

## 2021-12-28 RX ADMIN — Medication 650 MILLIGRAM(S): at 17:47

## 2021-12-28 RX ADMIN — PANTOPRAZOLE SODIUM 40 MILLIGRAM(S): 20 TABLET, DELAYED RELEASE ORAL at 08:02

## 2021-12-28 RX ADMIN — Medication 5000 UNIT(S): at 08:05

## 2021-12-28 RX ADMIN — ATORVASTATIN CALCIUM 10 MILLIGRAM(S): 80 TABLET, FILM COATED ORAL at 22:18

## 2021-12-28 RX ADMIN — MAGNESIUM OXIDE 400 MG ORAL TABLET 400 MILLIGRAM(S): 241.3 TABLET ORAL at 08:03

## 2021-12-28 RX ADMIN — Medication 1 TABLET(S): at 08:03

## 2021-12-28 RX ADMIN — Medication 81 MILLIGRAM(S): at 08:02

## 2021-12-28 RX ADMIN — LOSARTAN POTASSIUM 100 MILLIGRAM(S): 100 TABLET, FILM COATED ORAL at 08:02

## 2021-12-28 RX ADMIN — GABAPENTIN 400 MILLIGRAM(S): 400 CAPSULE ORAL at 08:02

## 2021-12-28 RX ADMIN — MAGNESIUM OXIDE 400 MG ORAL TABLET 400 MILLIGRAM(S): 241.3 TABLET ORAL at 22:17

## 2021-12-28 RX ADMIN — Medication 1 PACKET(S): at 22:18

## 2021-12-28 RX ADMIN — Medication 1000 MILLIGRAM(S): at 08:02

## 2021-12-28 RX ADMIN — BUMETANIDE 1 MILLIGRAM(S): 0.25 INJECTION INTRAMUSCULAR; INTRAVENOUS at 08:03

## 2021-12-28 RX ADMIN — GABAPENTIN 500 MILLIGRAM(S): 400 CAPSULE ORAL at 22:25

## 2021-12-28 RX ADMIN — METFORMIN HYDROCHLORIDE 500 MILLIGRAM(S): 850 TABLET ORAL at 08:03

## 2021-12-28 NOTE — BH INPATIENT PSYCHIATRY PROGRESS NOTE - CURRENT MEDICATION
MEDICATIONS  (STANDING):  alendronate 70 milliGRAM(s) Oral <User Schedule>  amLODIPine   Tablet 2.5 milliGRAM(s) Oral daily  ascorbic acid 1000 milliGRAM(s) Oral two times a day  aspirin  chewable 81 milliGRAM(s) Oral daily  atorvastatin 10 milliGRAM(s) Oral at bedtime  buMETAnide 1 milliGRAM(s) Oral daily  calcium carbonate 1250 mG  + Vitamin D (OsCal 500 + D) 1 Tablet(s) Oral daily  cholecalciferol 5000 Unit(s) Oral daily  cyanocobalamin 1000 MICROGram(s) Oral daily  dextrose 40% Gel 15 Gram(s) Oral once  dextrose 5%. 1000 milliLiter(s) (50 mL/Hr) IV Continuous <Continuous>  dextrose 5%. 1000 milliLiter(s) (100 mL/Hr) IV Continuous <Continuous>  dextrose 50% Injectable 25 Gram(s) IV Push once  dextrose 50% Injectable 12.5 Gram(s) IV Push once  dextrose 50% Injectable 25 Gram(s) IV Push once  folic acid 1 milliGRAM(s) Oral daily  gabapentin 500 milliGRAM(s) Oral at bedtime  gabapentin 400 milliGRAM(s) Oral daily  glucagon  Injectable 1 milliGRAM(s) IntraMuscular once  insulin lispro (ADMELOG) corrective regimen sliding scale   SubCutaneous three times a day before meals  insulin lispro (ADMELOG) corrective regimen sliding scale   SubCutaneous at bedtime  lidocaine   4% Patch 1 Patch Transdermal daily  loratadine 10 milliGRAM(s) Oral daily  losartan 100 milliGRAM(s) Oral daily  magnesium oxide 400 milliGRAM(s) Oral every 12 hours  metFORMIN 500 milliGRAM(s) Oral two times a day with meals  montelukast 10 milliGRAM(s) Oral daily  pantoprazole   Suspension 40 milliGRAM(s) Oral daily  psyllium Powder 1 Packet(s) Oral two times a day  senna 2 Tablet(s) Oral at bedtime  sitaGLIPtin 50 milliGRAM(s) Oral daily  sodium chloride 0.65% Nasal 1 Spray(s) Both Nostrils every 12 hours    MEDICATIONS  (PRN):  acetaminophen     Tablet .. 650 milliGRAM(s) Oral every 6 hours PRN Mild Pain (1 - 3)  loperamide 2 milliGRAM(s) Oral daily PRN Diarrhea  magnesium hydroxide Suspension 30 milliLiter(s) Oral daily PRN Constipation  melatonin. 3 milliGRAM(s) Oral at bedtime PRN Insomnia   MEDICATIONS  (STANDING):  alendronate 70 milliGRAM(s) Oral <User Schedule>  amLODIPine   Tablet 2.5 milliGRAM(s) Oral daily  ascorbic acid 1000 milliGRAM(s) Oral two times a day  aspirin  chewable 81 milliGRAM(s) Oral daily  atorvastatin 10 milliGRAM(s) Oral at bedtime  buMETAnide 1 milliGRAM(s) Oral daily  calcium carbonate 1250 mG  + Vitamin D (OsCal 500 + D) 1 Tablet(s) Oral daily  cholecalciferol 5000 Unit(s) Oral daily  cyanocobalamin 1000 MICROGram(s) Oral daily  dextrose 40% Gel 15 Gram(s) Oral once  dextrose 5%. 1000 milliLiter(s) (50 mL/Hr) IV Continuous <Continuous>  dextrose 5%. 1000 milliLiter(s) (100 mL/Hr) IV Continuous <Continuous>  dextrose 50% Injectable 25 Gram(s) IV Push once  dextrose 50% Injectable 12.5 Gram(s) IV Push once  dextrose 50% Injectable 25 Gram(s) IV Push once  folic acid 1 milliGRAM(s) Oral daily  gabapentin 400 milliGRAM(s) Oral daily  gabapentin 500 milliGRAM(s) Oral at bedtime  glucagon  Injectable 1 milliGRAM(s) IntraMuscular once  insulin lispro (ADMELOG) corrective regimen sliding scale   SubCutaneous three times a day before meals  insulin lispro (ADMELOG) corrective regimen sliding scale   SubCutaneous at bedtime  lidocaine   4% Patch 1 Patch Transdermal daily  loratadine 10 milliGRAM(s) Oral daily  losartan 100 milliGRAM(s) Oral daily  magnesium oxide 400 milliGRAM(s) Oral every 12 hours  metFORMIN 500 milliGRAM(s) Oral two times a day with meals  montelukast 10 milliGRAM(s) Oral daily  pantoprazole   Suspension 40 milliGRAM(s) Oral daily  psyllium Powder 1 Packet(s) Oral two times a day  senna 2 Tablet(s) Oral at bedtime  sitaGLIPtin 50 milliGRAM(s) Oral daily  sodium chloride 0.65% Nasal 1 Spray(s) Both Nostrils every 12 hours    MEDICATIONS  (PRN):  acetaminophen     Tablet .. 650 milliGRAM(s) Oral every 6 hours PRN Mild Pain (1 - 3)  loperamide 2 milliGRAM(s) Oral daily PRN Diarrhea  magnesium hydroxide Suspension 30 milliLiter(s) Oral daily PRN Constipation  melatonin. 3 milliGRAM(s) Oral at bedtime PRN Insomnia

## 2021-12-28 NOTE — BH INPATIENT PSYCHIATRY PROGRESS NOTE - PRN MEDS
MEDICATIONS  (PRN):  acetaminophen     Tablet .. 650 milliGRAM(s) Oral every 6 hours PRN Mild Pain (1 - 3)  loperamide 2 milliGRAM(s) Oral daily PRN Diarrhea  magnesium hydroxide Suspension 30 milliLiter(s) Oral daily PRN Constipation  melatonin. 3 milliGRAM(s) Oral at bedtime PRN Insomnia

## 2021-12-28 NOTE — BH INPATIENT PSYCHIATRY PROGRESS NOTE - NSBHCHARTREVIEWVS_PSY_A_CORE FT
Vital Signs Last 24 Hrs  T(C): 36.9 (12-28-21 @ 06:08), Max: 36.9 (12-28-21 @ 06:08)  T(F): 98.4 (12-28-21 @ 06:08), Max: 98.4 (12-28-21 @ 06:08)  HR: 71 (12-28-21 @ 06:08) (71 - 71)  BP: 123/52 (12-28-21 @ 06:08) (123/52 - 123/52)  BP(mean): --  RR: --  SpO2: 95% (12-28-21 @ 04:00) (95% - 96%)    Orthostatic VS  12-27-21 @ 08:04  Lying BP: --/-- HR: --  Sitting BP: 133/51 HR: 68  Standing BP: 130/57 HR: 80  Site: --  Mode: --

## 2021-12-28 NOTE — BH INPATIENT PSYCHIATRY PROGRESS NOTE - NSBHMETABOLIC_PSY_ALL_CORE_FT
BMI: BMI (kg/m2): 27.6 (12-01-21 @ 15:23)  HbA1c: A1C with Estimated Average Glucose Result: 6.9 % (12-02-21 @ 10:05)    Glucose: POCT Blood Glucose.: 145 mg/dL (12-28-21 @ 07:44)    BP: 123/52 (12-28-21 @ 06:08) (123/52 - 142/57)  Lipid Panel:  BMI: BMI (kg/m2): 27.6 (12-01-21 @ 15:23)  HbA1c: A1C with Estimated Average Glucose Result: 6.9 % (12-02-21 @ 10:05)    Glucose: POCT Blood Glucose.: 179 mg/dL (12-28-21 @ 11:34)    BP: 123/52 (12-28-21 @ 06:08) (123/52 - 142/57)  Lipid Panel:

## 2021-12-29 LAB
GLUCOSE BLDC GLUCOMTR-MCNC: 123 MG/DL — HIGH (ref 70–99)
GLUCOSE BLDC GLUCOMTR-MCNC: 126 MG/DL — HIGH (ref 70–99)
GLUCOSE BLDC GLUCOMTR-MCNC: 165 MG/DL — HIGH (ref 70–99)
GLUCOSE BLDC GLUCOMTR-MCNC: 183 MG/DL — HIGH (ref 70–99)

## 2021-12-29 PROCEDURE — 99232 SBSQ HOSP IP/OBS MODERATE 35: CPT

## 2021-12-29 RX ORDER — DIVALPROEX SODIUM 500 MG/1
250 TABLET, DELAYED RELEASE ORAL
Refills: 0 | Status: DISCONTINUED | OUTPATIENT
Start: 2021-12-29 | End: 2022-01-03

## 2021-12-29 RX ORDER — DIVALPROEX SODIUM 500 MG/1
125 TABLET, DELAYED RELEASE ORAL
Refills: 0 | Status: DISCONTINUED | OUTPATIENT
Start: 2021-12-29 | End: 2021-12-29

## 2021-12-29 RX ORDER — DIVALPROEX SODIUM 500 MG/1
250 TABLET, DELAYED RELEASE ORAL
Refills: 0 | Status: DISCONTINUED | OUTPATIENT
Start: 2021-12-29 | End: 2021-12-29

## 2021-12-29 RX ADMIN — SENNA PLUS 2 TABLET(S): 8.6 TABLET ORAL at 21:18

## 2021-12-29 RX ADMIN — Medication 1 SPRAY(S): at 21:20

## 2021-12-29 RX ADMIN — MAGNESIUM OXIDE 400 MG ORAL TABLET 400 MILLIGRAM(S): 241.3 TABLET ORAL at 08:13

## 2021-12-29 RX ADMIN — LIDOCAINE 1 PATCH: 4 CREAM TOPICAL at 19:30

## 2021-12-29 RX ADMIN — Medication 1000 MILLIGRAM(S): at 21:18

## 2021-12-29 RX ADMIN — Medication 650 MILLIGRAM(S): at 09:00

## 2021-12-29 RX ADMIN — LIDOCAINE 1 PATCH: 4 CREAM TOPICAL at 20:59

## 2021-12-29 RX ADMIN — Medication 81 MILLIGRAM(S): at 08:11

## 2021-12-29 RX ADMIN — MONTELUKAST 10 MILLIGRAM(S): 4 TABLET, CHEWABLE ORAL at 08:12

## 2021-12-29 RX ADMIN — Medication 1 PACKET(S): at 08:12

## 2021-12-29 RX ADMIN — Medication 1 MILLIGRAM(S): at 08:14

## 2021-12-29 RX ADMIN — DIVALPROEX SODIUM 250 MILLIGRAM(S): 500 TABLET, DELAYED RELEASE ORAL at 21:19

## 2021-12-29 RX ADMIN — ATORVASTATIN CALCIUM 10 MILLIGRAM(S): 80 TABLET, FILM COATED ORAL at 21:18

## 2021-12-29 RX ADMIN — Medication 1 TABLET(S): at 08:12

## 2021-12-29 RX ADMIN — PREGABALIN 1000 MICROGRAM(S): 225 CAPSULE ORAL at 08:11

## 2021-12-29 RX ADMIN — METFORMIN HYDROCHLORIDE 500 MILLIGRAM(S): 850 TABLET ORAL at 17:42

## 2021-12-29 RX ADMIN — GABAPENTIN 400 MILLIGRAM(S): 400 CAPSULE ORAL at 08:13

## 2021-12-29 RX ADMIN — LORATADINE 10 MILLIGRAM(S): 10 TABLET ORAL at 08:13

## 2021-12-29 RX ADMIN — Medication 1 PACKET(S): at 21:16

## 2021-12-29 RX ADMIN — LOSARTAN POTASSIUM 100 MILLIGRAM(S): 100 TABLET, FILM COATED ORAL at 08:14

## 2021-12-29 RX ADMIN — AMLODIPINE BESYLATE 2.5 MILLIGRAM(S): 2.5 TABLET ORAL at 08:12

## 2021-12-29 RX ADMIN — Medication 0: at 20:58

## 2021-12-29 RX ADMIN — Medication 5000 UNIT(S): at 08:11

## 2021-12-29 RX ADMIN — LIDOCAINE 1 PATCH: 4 CREAM TOPICAL at 08:13

## 2021-12-29 RX ADMIN — METFORMIN HYDROCHLORIDE 500 MILLIGRAM(S): 850 TABLET ORAL at 08:14

## 2021-12-29 RX ADMIN — GABAPENTIN 500 MILLIGRAM(S): 400 CAPSULE ORAL at 21:17

## 2021-12-29 RX ADMIN — Medication 1000 MILLIGRAM(S): at 08:10

## 2021-12-29 RX ADMIN — BUMETANIDE 1 MILLIGRAM(S): 0.25 INJECTION INTRAMUSCULAR; INTRAVENOUS at 08:14

## 2021-12-29 RX ADMIN — PANTOPRAZOLE SODIUM 40 MILLIGRAM(S): 20 TABLET, DELAYED RELEASE ORAL at 08:15

## 2021-12-29 RX ADMIN — MAGNESIUM OXIDE 400 MG ORAL TABLET 400 MILLIGRAM(S): 241.3 TABLET ORAL at 21:19

## 2021-12-29 RX ADMIN — Medication 1 SPRAY(S): at 08:16

## 2021-12-29 NOTE — BH INPATIENT PSYCHIATRY PROGRESS NOTE - NSBHCHARTREVIEWVS_PSY_A_CORE FT
Vital Signs Last 24 Hrs  T(C): 36.9 (12-29-21 @ 08:43), Max: 36.9 (12-28-21 @ 15:49)  T(F): 98.4 (12-29-21 @ 08:43), Max: 98.4 (12-28-21 @ 15:49)  HR: --  BP: --  BP(mean): --  RR: --  SpO2: --

## 2021-12-29 NOTE — BH INPATIENT PSYCHIATRY PROGRESS NOTE - NSBHMETABOLIC_PSY_ALL_CORE_FT
BMI: BMI (kg/m2): 27.6 (12-01-21 @ 15:23)  HbA1c: A1C with Estimated Average Glucose Result: 6.9 % (12-02-21 @ 10:05)    Glucose: POCT Blood Glucose.: 165 mg/dL (12-29-21 @ 11:49)    BP: 123/52 (12-28-21 @ 06:08) (123/52 - 123/52)  Lipid Panel:

## 2021-12-29 NOTE — UM REPORT PROGRESS NOTE - NSUMSWACTION_GEN_A_CORE FT
SW will provide pt support and psychoeducation and encourage compliance with medication and participation in unit milieu. SW will maintain contact with the pt's daughter, providing treatment updates, gaining her perspective, and addressing discharge planning issues. Daughter was dissatisfied with [facility] onsite psychiatric services and requested referral to [facility] which will be arranged.

## 2021-12-29 NOTE — UM REPORT PROGRESS NOTE - NSUMSWNOTE_GEN_A_CORE FT
The pt is an 81 year old  woman with a dx Bipolar I Disorder, manic phase who has been residing in an [facility] for 4 years. The pt was admitted due to recent onset of manic symptoms, including impulsivity and leaving the facility at night. The pt has been compliant with medication and eating adequately but her sleep has been impaired. Pt was recently dx with sleep apnea and now is using a CPAP since being hospitalized, reporting difficulty adjusting to it. Pt's thinking is tangential, circumstantial, grandiose, and religiously focused but no overt delusional ideas seen. Pt's mood is labile, at times elevated and at others emotional and on the verge of tears. The pt's speech is hyperverbal but less pressured than previously. The pt is being treated with Depakote and level will be obtained on 1/7/22 with possible need for further dose titration and start of Seroquel trial. Pt will be referred back to [facility] when stable and SW will arrange an evaluation.  The pt is an 81 year old  woman with a dx Bipolar I Disorder, manic phase who has been residing in an [facility] for 4 years. The pt was admitted due to recent onset of manic symptoms, including impulsivity and leaving the facility at night. The pt has been compliant with medication and eating adequately but her sleep has been impaired. Pt was recently dx with sleep apnea and now is using a CPAP since being hospitalized, reporting difficulty adjusting to it. The pt required prn Melatonin last night. Pt's thinking is tangential, circumstantial, grandiose, and religiously focused but less intensity seen and no overt delusional ideas seen. Pt's mood is elevated but less intensity seen and less labile now. The pt's speech is hyperverbal but less pressured than previously. The pt is being treated with Depakote and Gabapentin. SW has initiated discharge planning efforts for pt's return to [facility] and referral to the [facility] as pt's family were dissatisfied with the [facility] onsite psychiatrist..

## 2021-12-29 NOTE — BH INPATIENT PSYCHIATRY PROGRESS NOTE - CURRENT MEDICATION
MEDICATIONS  (STANDING):  alendronate 70 milliGRAM(s) Oral <User Schedule>  amLODIPine   Tablet 2.5 milliGRAM(s) Oral daily  ascorbic acid 1000 milliGRAM(s) Oral two times a day  aspirin  chewable 81 milliGRAM(s) Oral daily  atorvastatin 10 milliGRAM(s) Oral at bedtime  buMETAnide 1 milliGRAM(s) Oral daily  calcium carbonate 1250 mG  + Vitamin D (OsCal 500 + D) 1 Tablet(s) Oral daily  cholecalciferol 5000 Unit(s) Oral daily  cyanocobalamin 1000 MICROGram(s) Oral daily  dextrose 40% Gel 15 Gram(s) Oral once  dextrose 5%. 1000 milliLiter(s) (50 mL/Hr) IV Continuous <Continuous>  dextrose 5%. 1000 milliLiter(s) (100 mL/Hr) IV Continuous <Continuous>  dextrose 50% Injectable 25 Gram(s) IV Push once  dextrose 50% Injectable 12.5 Gram(s) IV Push once  dextrose 50% Injectable 25 Gram(s) IV Push once  diVALproex Sprinkle 250 milliGRAM(s) Oral two times a day  folic acid 1 milliGRAM(s) Oral daily  gabapentin 400 milliGRAM(s) Oral daily  gabapentin 500 milliGRAM(s) Oral at bedtime  glucagon  Injectable 1 milliGRAM(s) IntraMuscular once  insulin lispro (ADMELOG) corrective regimen sliding scale   SubCutaneous three times a day before meals  insulin lispro (ADMELOG) corrective regimen sliding scale   SubCutaneous at bedtime  loratadine 10 milliGRAM(s) Oral daily  losartan 100 milliGRAM(s) Oral daily  magnesium oxide 400 milliGRAM(s) Oral every 12 hours  metFORMIN 500 milliGRAM(s) Oral two times a day with meals  montelukast 10 milliGRAM(s) Oral daily  pantoprazole   Suspension 40 milliGRAM(s) Oral daily  psyllium Powder 1 Packet(s) Oral two times a day  senna 2 Tablet(s) Oral at bedtime  sitaGLIPtin 50 milliGRAM(s) Oral daily  sodium chloride 0.65% Nasal 1 Spray(s) Both Nostrils every 12 hours    MEDICATIONS  (PRN):  acetaminophen     Tablet .. 650 milliGRAM(s) Oral every 6 hours PRN Mild Pain (1 - 3)  loperamide 2 milliGRAM(s) Oral daily PRN Diarrhea  magnesium hydroxide Suspension 30 milliLiter(s) Oral daily PRN Constipation  melatonin. 3 milliGRAM(s) Oral at bedtime PRN Insomnia   MEDICATIONS  (STANDING):  alendronate 70 milliGRAM(s) Oral <User Schedule>  amLODIPine   Tablet 2.5 milliGRAM(s) Oral daily  ascorbic acid 1000 milliGRAM(s) Oral two times a day  aspirin  chewable 81 milliGRAM(s) Oral daily  atorvastatin 10 milliGRAM(s) Oral at bedtime  buMETAnide 1 milliGRAM(s) Oral daily  calcium carbonate 1250 mG  + Vitamin D (OsCal 500 + D) 1 Tablet(s) Oral daily  cholecalciferol 5000 Unit(s) Oral daily  cyanocobalamin 1000 MICROGram(s) Oral daily  dextrose 40% Gel 15 Gram(s) Oral once  dextrose 5%. 1000 milliLiter(s) (50 mL/Hr) IV Continuous <Continuous>  dextrose 5%. 1000 milliLiter(s) (100 mL/Hr) IV Continuous <Continuous>  dextrose 50% Injectable 25 Gram(s) IV Push once  dextrose 50% Injectable 12.5 Gram(s) IV Push once  dextrose 50% Injectable 25 Gram(s) IV Push once  diVALproex Sprinkle 125 milliGRAM(s) Oral two times a day  folic acid 1 milliGRAM(s) Oral daily  gabapentin 400 milliGRAM(s) Oral daily  gabapentin 500 milliGRAM(s) Oral at bedtime  glucagon  Injectable 1 milliGRAM(s) IntraMuscular once  insulin lispro (ADMELOG) corrective regimen sliding scale   SubCutaneous three times a day before meals  insulin lispro (ADMELOG) corrective regimen sliding scale   SubCutaneous at bedtime  loratadine 10 milliGRAM(s) Oral daily  losartan 100 milliGRAM(s) Oral daily  magnesium oxide 400 milliGRAM(s) Oral every 12 hours  metFORMIN 500 milliGRAM(s) Oral two times a day with meals  montelukast 10 milliGRAM(s) Oral daily  pantoprazole   Suspension 40 milliGRAM(s) Oral daily  psyllium Powder 1 Packet(s) Oral two times a day  senna 2 Tablet(s) Oral at bedtime  sitaGLIPtin 50 milliGRAM(s) Oral daily  sodium chloride 0.65% Nasal 1 Spray(s) Both Nostrils every 12 hours    MEDICATIONS  (PRN):  acetaminophen     Tablet .. 650 milliGRAM(s) Oral every 6 hours PRN Mild Pain (1 - 3)  loperamide 2 milliGRAM(s) Oral daily PRN Diarrhea  magnesium hydroxide Suspension 30 milliLiter(s) Oral daily PRN Constipation  melatonin. 3 milliGRAM(s) Oral at bedtime PRN Insomnia

## 2021-12-30 LAB
GLUCOSE BLDC GLUCOMTR-MCNC: 125 MG/DL — HIGH (ref 70–99)
GLUCOSE BLDC GLUCOMTR-MCNC: 160 MG/DL — HIGH (ref 70–99)
GLUCOSE BLDC GLUCOMTR-MCNC: 161 MG/DL — HIGH (ref 70–99)

## 2021-12-30 PROCEDURE — 99232 SBSQ HOSP IP/OBS MODERATE 35: CPT

## 2021-12-30 RX ORDER — GABAPENTIN 400 MG/1
300 CAPSULE ORAL DAILY
Refills: 0 | Status: DISCONTINUED | OUTPATIENT
Start: 2021-12-30 | End: 2022-01-18

## 2021-12-30 RX ORDER — LANOLIN ALCOHOL/MO/W.PET/CERES
3 CREAM (GRAM) TOPICAL AT BEDTIME
Refills: 0 | Status: DISCONTINUED | OUTPATIENT
Start: 2021-12-30 | End: 2022-01-18

## 2021-12-30 RX ORDER — GABAPENTIN 400 MG/1
600 CAPSULE ORAL AT BEDTIME
Refills: 0 | Status: DISCONTINUED | OUTPATIENT
Start: 2021-12-30 | End: 2022-01-18

## 2021-12-30 RX ADMIN — LORATADINE 10 MILLIGRAM(S): 10 TABLET ORAL at 10:19

## 2021-12-30 RX ADMIN — PREGABALIN 1000 MICROGRAM(S): 225 CAPSULE ORAL at 10:17

## 2021-12-30 RX ADMIN — GABAPENTIN 600 MILLIGRAM(S): 400 CAPSULE ORAL at 21:44

## 2021-12-30 RX ADMIN — Medication 3 MILLIGRAM(S): at 21:44

## 2021-12-30 RX ADMIN — Medication 1 TABLET(S): at 10:17

## 2021-12-30 RX ADMIN — AMLODIPINE BESYLATE 2.5 MILLIGRAM(S): 2.5 TABLET ORAL at 10:16

## 2021-12-30 RX ADMIN — Medication 1 PACKET(S): at 10:19

## 2021-12-30 RX ADMIN — GABAPENTIN 400 MILLIGRAM(S): 400 CAPSULE ORAL at 10:19

## 2021-12-30 RX ADMIN — MAGNESIUM OXIDE 400 MG ORAL TABLET 400 MILLIGRAM(S): 241.3 TABLET ORAL at 21:43

## 2021-12-30 RX ADMIN — Medication 1000 MILLIGRAM(S): at 10:17

## 2021-12-30 RX ADMIN — Medication 1 PACKET(S): at 21:44

## 2021-12-30 RX ADMIN — METFORMIN HYDROCHLORIDE 500 MILLIGRAM(S): 850 TABLET ORAL at 10:18

## 2021-12-30 RX ADMIN — Medication 5000 UNIT(S): at 10:26

## 2021-12-30 RX ADMIN — MONTELUKAST 10 MILLIGRAM(S): 4 TABLET, CHEWABLE ORAL at 10:17

## 2021-12-30 RX ADMIN — DIVALPROEX SODIUM 250 MILLIGRAM(S): 500 TABLET, DELAYED RELEASE ORAL at 21:43

## 2021-12-30 RX ADMIN — PANTOPRAZOLE SODIUM 40 MILLIGRAM(S): 20 TABLET, DELAYED RELEASE ORAL at 10:19

## 2021-12-30 RX ADMIN — MAGNESIUM OXIDE 400 MG ORAL TABLET 400 MILLIGRAM(S): 241.3 TABLET ORAL at 10:17

## 2021-12-30 RX ADMIN — Medication 1 MILLIGRAM(S): at 10:18

## 2021-12-30 RX ADMIN — ATORVASTATIN CALCIUM 10 MILLIGRAM(S): 80 TABLET, FILM COATED ORAL at 21:44

## 2021-12-30 RX ADMIN — Medication 1000 MILLIGRAM(S): at 21:43

## 2021-12-30 RX ADMIN — Medication 1 SPRAY(S): at 21:45

## 2021-12-30 RX ADMIN — SENNA PLUS 2 TABLET(S): 8.6 TABLET ORAL at 21:44

## 2021-12-30 RX ADMIN — Medication 1 SPRAY(S): at 10:25

## 2021-12-30 RX ADMIN — LOSARTAN POTASSIUM 100 MILLIGRAM(S): 100 TABLET, FILM COATED ORAL at 10:18

## 2021-12-30 RX ADMIN — BUMETANIDE 1 MILLIGRAM(S): 0.25 INJECTION INTRAMUSCULAR; INTRAVENOUS at 10:18

## 2021-12-30 RX ADMIN — Medication 81 MILLIGRAM(S): at 10:18

## 2021-12-30 RX ADMIN — DIVALPROEX SODIUM 250 MILLIGRAM(S): 500 TABLET, DELAYED RELEASE ORAL at 10:16

## 2021-12-30 NOTE — BH INPATIENT PSYCHIATRY PROGRESS NOTE - CURRENT MEDICATION
MEDICATIONS  (STANDING):  alendronate 70 milliGRAM(s) Oral <User Schedule>  amLODIPine   Tablet 2.5 milliGRAM(s) Oral daily  ascorbic acid 1000 milliGRAM(s) Oral two times a day  aspirin  chewable 81 milliGRAM(s) Oral daily  atorvastatin 10 milliGRAM(s) Oral at bedtime  buMETAnide 1 milliGRAM(s) Oral daily  calcium carbonate 1250 mG  + Vitamin D (OsCal 500 + D) 1 Tablet(s) Oral daily  cholecalciferol 5000 Unit(s) Oral daily  cyanocobalamin 1000 MICROGram(s) Oral daily  dextrose 40% Gel 15 Gram(s) Oral once  dextrose 5%. 1000 milliLiter(s) (50 mL/Hr) IV Continuous <Continuous>  dextrose 5%. 1000 milliLiter(s) (100 mL/Hr) IV Continuous <Continuous>  dextrose 50% Injectable 25 Gram(s) IV Push once  dextrose 50% Injectable 12.5 Gram(s) IV Push once  dextrose 50% Injectable 25 Gram(s) IV Push once  diVALproex Sprinkle 250 milliGRAM(s) Oral two times a day  folic acid 1 milliGRAM(s) Oral daily  gabapentin 400 milliGRAM(s) Oral daily  gabapentin 500 milliGRAM(s) Oral at bedtime  glucagon  Injectable 1 milliGRAM(s) IntraMuscular once  insulin lispro (ADMELOG) corrective regimen sliding scale   SubCutaneous three times a day before meals  insulin lispro (ADMELOG) corrective regimen sliding scale   SubCutaneous at bedtime  loratadine 10 milliGRAM(s) Oral daily  losartan 100 milliGRAM(s) Oral daily  magnesium oxide 400 milliGRAM(s) Oral every 12 hours  melatonin. 3 milliGRAM(s) Oral at bedtime  metFORMIN 500 milliGRAM(s) Oral two times a day with meals  montelukast 10 milliGRAM(s) Oral daily  pantoprazole   Suspension 40 milliGRAM(s) Oral daily  psyllium Powder 1 Packet(s) Oral two times a day  senna 2 Tablet(s) Oral at bedtime  sitaGLIPtin 50 milliGRAM(s) Oral daily  sodium chloride 0.65% Nasal 1 Spray(s) Both Nostrils every 12 hours    MEDICATIONS  (PRN):  acetaminophen     Tablet .. 650 milliGRAM(s) Oral every 6 hours PRN Mild Pain (1 - 3)  loperamide 2 milliGRAM(s) Oral daily PRN Diarrhea  magnesium hydroxide Suspension 30 milliLiter(s) Oral daily PRN Constipation  melatonin. 3 milliGRAM(s) Oral at bedtime PRN Insomnia   MEDICATIONS  (STANDING):  alendronate 70 milliGRAM(s) Oral <User Schedule>  amLODIPine   Tablet 2.5 milliGRAM(s) Oral daily  ascorbic acid 1000 milliGRAM(s) Oral two times a day  aspirin  chewable 81 milliGRAM(s) Oral daily  atorvastatin 10 milliGRAM(s) Oral at bedtime  buMETAnide 1 milliGRAM(s) Oral daily  calcium carbonate 1250 mG  + Vitamin D (OsCal 500 + D) 1 Tablet(s) Oral daily  cholecalciferol 5000 Unit(s) Oral daily  cyanocobalamin 1000 MICROGram(s) Oral daily  dextrose 40% Gel 15 Gram(s) Oral once  dextrose 5%. 1000 milliLiter(s) (50 mL/Hr) IV Continuous <Continuous>  dextrose 5%. 1000 milliLiter(s) (100 mL/Hr) IV Continuous <Continuous>  dextrose 50% Injectable 25 Gram(s) IV Push once  dextrose 50% Injectable 12.5 Gram(s) IV Push once  dextrose 50% Injectable 25 Gram(s) IV Push once  diVALproex Sprinkle 250 milliGRAM(s) Oral two times a day  folic acid 1 milliGRAM(s) Oral daily  gabapentin 300 milliGRAM(s) Oral daily  gabapentin 600 milliGRAM(s) Oral at bedtime  glucagon  Injectable 1 milliGRAM(s) IntraMuscular once  insulin lispro (ADMELOG) corrective regimen sliding scale   SubCutaneous three times a day before meals  insulin lispro (ADMELOG) corrective regimen sliding scale   SubCutaneous at bedtime  loratadine 10 milliGRAM(s) Oral daily  losartan 100 milliGRAM(s) Oral daily  magnesium oxide 400 milliGRAM(s) Oral every 12 hours  melatonin. 3 milliGRAM(s) Oral at bedtime  metFORMIN 500 milliGRAM(s) Oral two times a day with meals  montelukast 10 milliGRAM(s) Oral daily  pantoprazole   Suspension 40 milliGRAM(s) Oral daily  psyllium Powder 1 Packet(s) Oral two times a day  senna 2 Tablet(s) Oral at bedtime  sitaGLIPtin 50 milliGRAM(s) Oral daily  sodium chloride 0.65% Nasal 1 Spray(s) Both Nostrils every 12 hours    MEDICATIONS  (PRN):  acetaminophen     Tablet .. 650 milliGRAM(s) Oral every 6 hours PRN Mild Pain (1 - 3)  loperamide 2 milliGRAM(s) Oral daily PRN Diarrhea  magnesium hydroxide Suspension 30 milliLiter(s) Oral daily PRN Constipation  melatonin. 3 milliGRAM(s) Oral at bedtime PRN Insomnia

## 2021-12-30 NOTE — BH INPATIENT PSYCHIATRY PROGRESS NOTE - NSBHCHARTREVIEWVS_PSY_A_CORE FT
Vital Signs Last 24 Hrs  T(C): 36.7 (12-30-21 @ 08:14), Max: 36.7 (12-29-21 @ 16:02)  T(F): 98.1 (12-30-21 @ 08:14), Max: 98.1 (12-29-21 @ 16:02)  HR: --  BP: --  BP(mean): --  RR: --  SpO2: --    Orthostatic VS  12-30-21 @ 08:14  Lying BP: --/-- HR: --  Sitting BP: 117/59 HR: 88  Standing BP: --/-- HR: --  Site: --  Mode: --

## 2021-12-30 NOTE — BH INPATIENT PSYCHIATRY PROGRESS NOTE - NSBHMETABOLIC_PSY_ALL_CORE_FT
BMI: BMI (kg/m2): 27.6 (12-01-21 @ 15:23)  HbA1c: A1C with Estimated Average Glucose Result: 6.9 % (12-02-21 @ 10:05)    Glucose: POCT Blood Glucose.: 161 mg/dL (12-30-21 @ 07:52)    BP: 123/52 (12-28-21 @ 06:08) (123/52 - 123/52)  Lipid Panel:  BMI: BMI (kg/m2): 27.6 (12-01-21 @ 15:23)  HbA1c: A1C with Estimated Average Glucose Result: 6.9 % (12-02-21 @ 10:05)    Glucose: POCT Blood Glucose.: 160 mg/dL (12-30-21 @ 11:37)    BP: 123/52 (12-28-21 @ 06:08) (123/52 - 123/52)  Lipid Panel:

## 2021-12-30 NOTE — CHART NOTE - NSCHARTNOTEFT_GEN_A_CORE
Patient blood sugars persistently well controlled on oral diabetes medications metformin and Januvia. Can discontinue sliding scale and fingersticks. A1C 6.9.

## 2021-12-31 PROCEDURE — 99231 SBSQ HOSP IP/OBS SF/LOW 25: CPT

## 2021-12-31 RX ADMIN — LORATADINE 10 MILLIGRAM(S): 10 TABLET ORAL at 09:09

## 2021-12-31 RX ADMIN — LOSARTAN POTASSIUM 100 MILLIGRAM(S): 100 TABLET, FILM COATED ORAL at 09:09

## 2021-12-31 RX ADMIN — MAGNESIUM OXIDE 400 MG ORAL TABLET 400 MILLIGRAM(S): 241.3 TABLET ORAL at 22:03

## 2021-12-31 RX ADMIN — Medication 5000 UNIT(S): at 09:11

## 2021-12-31 RX ADMIN — Medication 1 PACKET(S): at 09:12

## 2021-12-31 RX ADMIN — Medication 1 SPRAY(S): at 09:10

## 2021-12-31 RX ADMIN — Medication 1000 MILLIGRAM(S): at 09:11

## 2021-12-31 RX ADMIN — PREGABALIN 1000 MICROGRAM(S): 225 CAPSULE ORAL at 09:11

## 2021-12-31 RX ADMIN — Medication 1 MILLIGRAM(S): at 09:11

## 2021-12-31 RX ADMIN — Medication 1 PACKET(S): at 00:04

## 2021-12-31 RX ADMIN — PANTOPRAZOLE SODIUM 40 MILLIGRAM(S): 20 TABLET, DELAYED RELEASE ORAL at 09:12

## 2021-12-31 RX ADMIN — Medication 81 MILLIGRAM(S): at 09:10

## 2021-12-31 RX ADMIN — Medication 3 MILLIGRAM(S): at 22:03

## 2021-12-31 RX ADMIN — GABAPENTIN 300 MILLIGRAM(S): 400 CAPSULE ORAL at 09:11

## 2021-12-31 RX ADMIN — BUMETANIDE 1 MILLIGRAM(S): 0.25 INJECTION INTRAMUSCULAR; INTRAVENOUS at 09:10

## 2021-12-31 RX ADMIN — Medication 1 TABLET(S): at 09:10

## 2021-12-31 RX ADMIN — METFORMIN HYDROCHLORIDE 500 MILLIGRAM(S): 850 TABLET ORAL at 16:42

## 2021-12-31 RX ADMIN — SENNA PLUS 2 TABLET(S): 8.6 TABLET ORAL at 22:02

## 2021-12-31 RX ADMIN — Medication 1000 MILLIGRAM(S): at 22:02

## 2021-12-31 RX ADMIN — AMLODIPINE BESYLATE 2.5 MILLIGRAM(S): 2.5 TABLET ORAL at 09:12

## 2021-12-31 RX ADMIN — DIVALPROEX SODIUM 250 MILLIGRAM(S): 500 TABLET, DELAYED RELEASE ORAL at 22:03

## 2021-12-31 RX ADMIN — ATORVASTATIN CALCIUM 10 MILLIGRAM(S): 80 TABLET, FILM COATED ORAL at 22:02

## 2021-12-31 RX ADMIN — METFORMIN HYDROCHLORIDE 500 MILLIGRAM(S): 850 TABLET ORAL at 09:11

## 2021-12-31 RX ADMIN — Medication 1 SPRAY(S): at 22:04

## 2021-12-31 RX ADMIN — GABAPENTIN 600 MILLIGRAM(S): 400 CAPSULE ORAL at 22:02

## 2021-12-31 RX ADMIN — MONTELUKAST 10 MILLIGRAM(S): 4 TABLET, CHEWABLE ORAL at 09:10

## 2021-12-31 RX ADMIN — DIVALPROEX SODIUM 250 MILLIGRAM(S): 500 TABLET, DELAYED RELEASE ORAL at 09:10

## 2021-12-31 RX ADMIN — MAGNESIUM OXIDE 400 MG ORAL TABLET 400 MILLIGRAM(S): 241.3 TABLET ORAL at 09:09

## 2021-12-31 NOTE — BH INPATIENT PSYCHIATRY PROGRESS NOTE - CURRENT MEDICATION
MEDICATIONS  (STANDING):  alendronate 70 milliGRAM(s) Oral <User Schedule>  amLODIPine   Tablet 2.5 milliGRAM(s) Oral daily  ascorbic acid 1000 milliGRAM(s) Oral two times a day  aspirin  chewable 81 milliGRAM(s) Oral daily  atorvastatin 10 milliGRAM(s) Oral at bedtime  buMETAnide 1 milliGRAM(s) Oral daily  calcium carbonate 1250 mG  + Vitamin D (OsCal 500 + D) 1 Tablet(s) Oral daily  cholecalciferol 5000 Unit(s) Oral daily  cyanocobalamin 1000 MICROGram(s) Oral daily  dextrose 40% Gel 15 Gram(s) Oral once  dextrose 5%. 1000 milliLiter(s) (50 mL/Hr) IV Continuous <Continuous>  dextrose 5%. 1000 milliLiter(s) (100 mL/Hr) IV Continuous <Continuous>  dextrose 50% Injectable 25 Gram(s) IV Push once  dextrose 50% Injectable 12.5 Gram(s) IV Push once  dextrose 50% Injectable 25 Gram(s) IV Push once  diVALproex Sprinkle 250 milliGRAM(s) Oral two times a day  folic acid 1 milliGRAM(s) Oral daily  gabapentin 300 milliGRAM(s) Oral daily  gabapentin 600 milliGRAM(s) Oral at bedtime  glucagon  Injectable 1 milliGRAM(s) IntraMuscular once  loratadine 10 milliGRAM(s) Oral daily  losartan 100 milliGRAM(s) Oral daily  magnesium oxide 400 milliGRAM(s) Oral every 12 hours  melatonin. 3 milliGRAM(s) Oral at bedtime  metFORMIN 500 milliGRAM(s) Oral two times a day with meals  montelukast 10 milliGRAM(s) Oral daily  pantoprazole   Suspension 40 milliGRAM(s) Oral daily  psyllium Powder 1 Packet(s) Oral two times a day  senna 2 Tablet(s) Oral at bedtime  sitaGLIPtin 50 milliGRAM(s) Oral daily  sodium chloride 0.65% Nasal 1 Spray(s) Both Nostrils every 12 hours    MEDICATIONS  (PRN):  acetaminophen     Tablet .. 650 milliGRAM(s) Oral every 6 hours PRN Mild Pain (1 - 3)  loperamide 2 milliGRAM(s) Oral daily PRN Diarrhea  magnesium hydroxide Suspension 30 milliLiter(s) Oral daily PRN Constipation  melatonin. 3 milliGRAM(s) Oral at bedtime PRN Insomnia

## 2021-12-31 NOTE — BH INPATIENT PSYCHIATRY PROGRESS NOTE - NSBHCHARTREVIEWVS_PSY_A_CORE FT
Vital Signs Last 24 Hrs  T(C): 37.1 (12-30-21 @ 15:43), Max: 37.1 (12-30-21 @ 15:43)  T(F): 98.7 (12-30-21 @ 15:43), Max: 98.7 (12-30-21 @ 15:43)  HR: --  BP: --  BP(mean): --  RR: --  SpO2: --    Orthostatic VS  12-31-21 @ 05:57  Lying BP: 101/59 HR: 69  Sitting BP: --/-- HR: --  Standing BP: --/-- HR: --  Site: --  Mode: --  Orthostatic VS  12-30-21 @ 08:14  Lying BP: --/-- HR: --  Sitting BP: 117/59 HR: 88  Standing BP: --/-- HR: --  Site: --  Mode: --

## 2021-12-31 NOTE — BH INPATIENT PSYCHIATRY PROGRESS NOTE - NSBHMETABOLIC_PSY_ALL_CORE_FT
BMI: BMI (kg/m2): 27.6 (12-01-21 @ 15:23)  HbA1c: A1C with Estimated Average Glucose Result: 6.9 % (12-02-21 @ 10:05)    Glucose: POCT Blood Glucose.: 125 mg/dL (12-30-21 @ 16:20)    BP: --  Lipid Panel:

## 2022-01-01 LAB — SARS-COV-2 RNA SPEC QL NAA+PROBE: SIGNIFICANT CHANGE UP

## 2022-01-01 PROCEDURE — 99231 SBSQ HOSP IP/OBS SF/LOW 25: CPT

## 2022-01-01 RX ADMIN — PREGABALIN 1000 MICROGRAM(S): 225 CAPSULE ORAL at 08:05

## 2022-01-01 RX ADMIN — ATORVASTATIN CALCIUM 10 MILLIGRAM(S): 80 TABLET, FILM COATED ORAL at 20:36

## 2022-01-01 RX ADMIN — Medication 1000 MILLIGRAM(S): at 20:35

## 2022-01-01 RX ADMIN — AMLODIPINE BESYLATE 2.5 MILLIGRAM(S): 2.5 TABLET ORAL at 08:04

## 2022-01-01 RX ADMIN — MAGNESIUM OXIDE 400 MG ORAL TABLET 400 MILLIGRAM(S): 241.3 TABLET ORAL at 20:38

## 2022-01-01 RX ADMIN — GABAPENTIN 300 MILLIGRAM(S): 400 CAPSULE ORAL at 08:05

## 2022-01-01 RX ADMIN — BUMETANIDE 1 MILLIGRAM(S): 0.25 INJECTION INTRAMUSCULAR; INTRAVENOUS at 08:04

## 2022-01-01 RX ADMIN — Medication 3 MILLIGRAM(S): at 02:08

## 2022-01-01 RX ADMIN — Medication 1 PACKET(S): at 20:31

## 2022-01-01 RX ADMIN — METFORMIN HYDROCHLORIDE 500 MILLIGRAM(S): 850 TABLET ORAL at 16:25

## 2022-01-01 RX ADMIN — MONTELUKAST 10 MILLIGRAM(S): 4 TABLET, CHEWABLE ORAL at 08:06

## 2022-01-01 RX ADMIN — SENNA PLUS 2 TABLET(S): 8.6 TABLET ORAL at 20:34

## 2022-01-01 RX ADMIN — METFORMIN HYDROCHLORIDE 500 MILLIGRAM(S): 850 TABLET ORAL at 08:06

## 2022-01-01 RX ADMIN — Medication 1 TABLET(S): at 08:05

## 2022-01-01 RX ADMIN — Medication 5000 UNIT(S): at 08:05

## 2022-01-01 RX ADMIN — Medication 1 SPRAY(S): at 20:31

## 2022-01-01 RX ADMIN — Medication 1000 MILLIGRAM(S): at 08:04

## 2022-01-01 RX ADMIN — Medication 3 MILLIGRAM(S): at 20:34

## 2022-01-01 RX ADMIN — GABAPENTIN 600 MILLIGRAM(S): 400 CAPSULE ORAL at 20:33

## 2022-01-01 RX ADMIN — Medication 81 MILLIGRAM(S): at 08:04

## 2022-01-01 RX ADMIN — DIVALPROEX SODIUM 250 MILLIGRAM(S): 500 TABLET, DELAYED RELEASE ORAL at 20:32

## 2022-01-01 RX ADMIN — DIVALPROEX SODIUM 250 MILLIGRAM(S): 500 TABLET, DELAYED RELEASE ORAL at 08:05

## 2022-01-01 RX ADMIN — Medication 1 PACKET(S): at 08:08

## 2022-01-01 RX ADMIN — Medication 1 SPRAY(S): at 08:08

## 2022-01-01 RX ADMIN — Medication 1 MILLIGRAM(S): at 08:07

## 2022-01-01 RX ADMIN — LOSARTAN POTASSIUM 100 MILLIGRAM(S): 100 TABLET, FILM COATED ORAL at 08:06

## 2022-01-01 RX ADMIN — LORATADINE 10 MILLIGRAM(S): 10 TABLET ORAL at 08:07

## 2022-01-01 RX ADMIN — MAGNESIUM OXIDE 400 MG ORAL TABLET 400 MILLIGRAM(S): 241.3 TABLET ORAL at 08:09

## 2022-01-01 RX ADMIN — PANTOPRAZOLE SODIUM 40 MILLIGRAM(S): 20 TABLET, DELAYED RELEASE ORAL at 08:06

## 2022-01-01 NOTE — BH INPATIENT PSYCHIATRY PROGRESS NOTE - CURRENT MEDICATION
MEDICATIONS  (STANDING):  alendronate 70 milliGRAM(s) Oral <User Schedule>  amLODIPine   Tablet 2.5 milliGRAM(s) Oral daily  ascorbic acid 1000 milliGRAM(s) Oral two times a day  aspirin  chewable 81 milliGRAM(s) Oral daily  atorvastatin 10 milliGRAM(s) Oral at bedtime  buMETAnide 1 milliGRAM(s) Oral daily  calcium carbonate 1250 mG  + Vitamin D (OsCal 500 + D) 1 Tablet(s) Oral daily  cholecalciferol 5000 Unit(s) Oral daily  cyanocobalamin 1000 MICROGram(s) Oral daily  dextrose 40% Gel 15 Gram(s) Oral once  dextrose 5%. 1000 milliLiter(s) (50 mL/Hr) IV Continuous <Continuous>  dextrose 5%. 1000 milliLiter(s) (100 mL/Hr) IV Continuous <Continuous>  dextrose 50% Injectable 25 Gram(s) IV Push once  dextrose 50% Injectable 12.5 Gram(s) IV Push once  dextrose 50% Injectable 25 Gram(s) IV Push once  diVALproex Sprinkle 250 milliGRAM(s) Oral two times a day  folic acid 1 milliGRAM(s) Oral daily  gabapentin 600 milliGRAM(s) Oral at bedtime  gabapentin 300 milliGRAM(s) Oral daily  glucagon  Injectable 1 milliGRAM(s) IntraMuscular once  loratadine 10 milliGRAM(s) Oral daily  losartan 100 milliGRAM(s) Oral daily  magnesium oxide 400 milliGRAM(s) Oral every 12 hours  melatonin. 3 milliGRAM(s) Oral at bedtime  metFORMIN 500 milliGRAM(s) Oral two times a day with meals  montelukast 10 milliGRAM(s) Oral daily  pantoprazole   Suspension 40 milliGRAM(s) Oral daily  psyllium Powder 1 Packet(s) Oral two times a day  senna 2 Tablet(s) Oral at bedtime  sitaGLIPtin 50 milliGRAM(s) Oral daily  sodium chloride 0.65% Nasal 1 Spray(s) Both Nostrils every 12 hours    MEDICATIONS  (PRN):  acetaminophen     Tablet .. 650 milliGRAM(s) Oral every 6 hours PRN Mild Pain (1 - 3)  loperamide 2 milliGRAM(s) Oral daily PRN Diarrhea  magnesium hydroxide Suspension 30 milliLiter(s) Oral daily PRN Constipation  melatonin. 3 milliGRAM(s) Oral at bedtime PRN Insomnia

## 2022-01-01 NOTE — BH INPATIENT PSYCHIATRY PROGRESS NOTE - NSBHMETABOLIC_PSY_ALL_CORE_FT
BMI: BMI (kg/m2): 27.6 (12-01-21 @ 15:23)  HbA1c: A1C with Estimated Average Glucose Result: 6.9 % (12-02-21 @ 10:05)  Glucose: POCT Blood Glucose.: 125 mg/dL (12-30-21 @ 16:20)

## 2022-01-01 NOTE — BH INPATIENT PSYCHIATRY PROGRESS NOTE - NSBHCHARTREVIEWVS_PSY_A_CORE FT
Vital Signs Last 24 Hrs  T(C): 36.9 (01-01-22 @ 15:13), Max: 36.9 (01-01-22 @ 15:13)  T(F): 98.5 (01-01-22 @ 15:13), Max: 98.5 (01-01-22 @ 15:13)    01-01-22 @ 07:36  Lying BP: 109/61 HR: 67

## 2022-01-02 PROCEDURE — 99231 SBSQ HOSP IP/OBS SF/LOW 25: CPT

## 2022-01-02 RX ADMIN — Medication 1 MILLIGRAM(S): at 08:06

## 2022-01-02 RX ADMIN — PREGABALIN 1000 MICROGRAM(S): 225 CAPSULE ORAL at 08:07

## 2022-01-02 RX ADMIN — METFORMIN HYDROCHLORIDE 500 MILLIGRAM(S): 850 TABLET ORAL at 16:50

## 2022-01-02 RX ADMIN — DIVALPROEX SODIUM 250 MILLIGRAM(S): 500 TABLET, DELAYED RELEASE ORAL at 08:07

## 2022-01-02 RX ADMIN — Medication 3 MILLIGRAM(S): at 20:37

## 2022-01-02 RX ADMIN — Medication 5000 UNIT(S): at 08:07

## 2022-01-02 RX ADMIN — MONTELUKAST 10 MILLIGRAM(S): 4 TABLET, CHEWABLE ORAL at 08:06

## 2022-01-02 RX ADMIN — ALENDRONATE SODIUM 70 MILLIGRAM(S): 70 TABLET ORAL at 06:30

## 2022-01-02 RX ADMIN — GABAPENTIN 300 MILLIGRAM(S): 400 CAPSULE ORAL at 08:06

## 2022-01-02 RX ADMIN — LORATADINE 10 MILLIGRAM(S): 10 TABLET ORAL at 08:06

## 2022-01-02 RX ADMIN — PANTOPRAZOLE SODIUM 40 MILLIGRAM(S): 20 TABLET, DELAYED RELEASE ORAL at 08:06

## 2022-01-02 RX ADMIN — Medication 1 PACKET(S): at 08:05

## 2022-01-02 RX ADMIN — MAGNESIUM OXIDE 400 MG ORAL TABLET 400 MILLIGRAM(S): 241.3 TABLET ORAL at 20:37

## 2022-01-02 RX ADMIN — DIVALPROEX SODIUM 250 MILLIGRAM(S): 500 TABLET, DELAYED RELEASE ORAL at 20:37

## 2022-01-02 RX ADMIN — Medication 1000 MILLIGRAM(S): at 08:06

## 2022-01-02 RX ADMIN — BUMETANIDE 1 MILLIGRAM(S): 0.25 INJECTION INTRAMUSCULAR; INTRAVENOUS at 08:06

## 2022-01-02 RX ADMIN — METFORMIN HYDROCHLORIDE 500 MILLIGRAM(S): 850 TABLET ORAL at 08:05

## 2022-01-02 RX ADMIN — Medication 1 PACKET(S): at 20:45

## 2022-01-02 RX ADMIN — Medication 1000 MILLIGRAM(S): at 20:37

## 2022-01-02 RX ADMIN — Medication 81 MILLIGRAM(S): at 08:07

## 2022-01-02 RX ADMIN — MAGNESIUM OXIDE 400 MG ORAL TABLET 400 MILLIGRAM(S): 241.3 TABLET ORAL at 08:05

## 2022-01-02 RX ADMIN — Medication 1 TABLET(S): at 08:07

## 2022-01-02 RX ADMIN — SENNA PLUS 2 TABLET(S): 8.6 TABLET ORAL at 20:38

## 2022-01-02 RX ADMIN — ATORVASTATIN CALCIUM 10 MILLIGRAM(S): 80 TABLET, FILM COATED ORAL at 20:37

## 2022-01-02 RX ADMIN — AMLODIPINE BESYLATE 2.5 MILLIGRAM(S): 2.5 TABLET ORAL at 08:07

## 2022-01-02 RX ADMIN — GABAPENTIN 600 MILLIGRAM(S): 400 CAPSULE ORAL at 20:38

## 2022-01-02 RX ADMIN — Medication 1 SPRAY(S): at 08:07

## 2022-01-02 RX ADMIN — LOSARTAN POTASSIUM 100 MILLIGRAM(S): 100 TABLET, FILM COATED ORAL at 08:07

## 2022-01-02 NOTE — BH INPATIENT PSYCHIATRY PROGRESS NOTE - CURRENT MEDICATION
MEDICATIONS  (STANDING):  alendronate 70 milliGRAM(s) Oral <User Schedule>  amLODIPine   Tablet 2.5 milliGRAM(s) Oral daily  ascorbic acid 1000 milliGRAM(s) Oral two times a day  aspirin  chewable 81 milliGRAM(s) Oral daily  atorvastatin 10 milliGRAM(s) Oral at bedtime  buMETAnide 1 milliGRAM(s) Oral daily  calcium carbonate 1250 mG  + Vitamin D (OsCal 500 + D) 1 Tablet(s) Oral daily  cholecalciferol 5000 Unit(s) Oral daily  cyanocobalamin 1000 MICROGram(s) Oral daily  dextrose 40% Gel 15 Gram(s) Oral once  dextrose 5%. 1000 milliLiter(s) (100 mL/Hr) IV Continuous <Continuous>  dextrose 5%. 1000 milliLiter(s) (50 mL/Hr) IV Continuous <Continuous>  dextrose 50% Injectable 25 Gram(s) IV Push once  dextrose 50% Injectable 12.5 Gram(s) IV Push once  dextrose 50% Injectable 25 Gram(s) IV Push once  diVALproex Sprinkle 250 milliGRAM(s) Oral two times a day  folic acid 1 milliGRAM(s) Oral daily  gabapentin 300 milliGRAM(s) Oral daily  gabapentin 600 milliGRAM(s) Oral at bedtime  glucagon  Injectable 1 milliGRAM(s) IntraMuscular once  loratadine 10 milliGRAM(s) Oral daily  losartan 100 milliGRAM(s) Oral daily  magnesium oxide 400 milliGRAM(s) Oral every 12 hours  melatonin. 3 milliGRAM(s) Oral at bedtime  metFORMIN 500 milliGRAM(s) Oral two times a day with meals  montelukast 10 milliGRAM(s) Oral daily  pantoprazole   Suspension 40 milliGRAM(s) Oral daily  psyllium Powder 1 Packet(s) Oral two times a day  senna 2 Tablet(s) Oral at bedtime  sitaGLIPtin 50 milliGRAM(s) Oral daily  sodium chloride 0.65% Nasal 1 Spray(s) Both Nostrils every 12 hours    MEDICATIONS  (PRN):  acetaminophen     Tablet .. 650 milliGRAM(s) Oral every 6 hours PRN Mild Pain (1 - 3)  loperamide 2 milliGRAM(s) Oral daily PRN Diarrhea  magnesium hydroxide Suspension 30 milliLiter(s) Oral daily PRN Constipation  melatonin. 3 milliGRAM(s) Oral at bedtime PRN Insomnia

## 2022-01-02 NOTE — BH INPATIENT PSYCHIATRY PROGRESS NOTE - NSBHCHARTREVIEWVS_PSY_A_CORE FT
Vital Signs Last 24 Hrs  T(C): 36.7 (01-02-22 @ 07:45), Max: 36.9 (01-01-22 @ 15:13)  T(F): 98.1 (01-02-22 @ 07:45), Max: 98.5 (01-01-22 @ 15:13)  HR: --  BP: --  BP(mean): --  RR: --  SpO2: --    Orthostatic VS  01-02-22 @ 07:45  Lying BP: --/-- HR: --  Sitting BP: 120/63 HR: 85  Standing BP: --/-- HR: --  Site: upper left arm  Mode: electronic  Orthostatic VS  01-01-22 @ 07:36  Lying BP: 109/61 HR: 67  Sitting BP: --/-- HR: --  Standing BP: --/-- HR: --  Site: --  Mode: --

## 2022-01-03 LAB
24R-OH-CALCIDIOL SERPL-MCNC: 55.7 NG/ML — SIGNIFICANT CHANGE UP (ref 30–80)
ALBUMIN SERPL ELPH-MCNC: 4.2 G/DL — SIGNIFICANT CHANGE UP (ref 3.3–5)
ALP SERPL-CCNC: 26 U/L — LOW (ref 40–120)
ALT FLD-CCNC: 17 U/L — SIGNIFICANT CHANGE UP (ref 4–33)
ANION GAP SERPL CALC-SCNC: 9 MMOL/L — SIGNIFICANT CHANGE UP (ref 7–14)
AST SERPL-CCNC: 17 U/L — SIGNIFICANT CHANGE UP (ref 4–32)
BASOPHILS # BLD AUTO: 0.03 K/UL — SIGNIFICANT CHANGE UP (ref 0–0.2)
BASOPHILS NFR BLD AUTO: 0.4 % — SIGNIFICANT CHANGE UP (ref 0–2)
BILIRUB SERPL-MCNC: 0.3 MG/DL — SIGNIFICANT CHANGE UP (ref 0.2–1.2)
BUN SERPL-MCNC: 11 MG/DL — SIGNIFICANT CHANGE UP (ref 7–23)
CALCIUM SERPL-MCNC: 9.8 MG/DL — SIGNIFICANT CHANGE UP (ref 8.4–10.5)
CHLORIDE SERPL-SCNC: 104 MMOL/L — SIGNIFICANT CHANGE UP (ref 98–107)
CO2 SERPL-SCNC: 28 MMOL/L — SIGNIFICANT CHANGE UP (ref 22–31)
CREAT SERPL-MCNC: 0.66 MG/DL — SIGNIFICANT CHANGE UP (ref 0.5–1.3)
EOSINOPHIL # BLD AUTO: 0.19 K/UL — SIGNIFICANT CHANGE UP (ref 0–0.5)
EOSINOPHIL NFR BLD AUTO: 2.6 % — SIGNIFICANT CHANGE UP (ref 0–6)
FOLATE SERPL-MCNC: 20 NG/ML — HIGH (ref 3.1–17.5)
GLUCOSE SERPL-MCNC: 136 MG/DL — HIGH (ref 70–99)
HCT VFR BLD CALC: 36.7 % — SIGNIFICANT CHANGE UP (ref 34.5–45)
HGB BLD-MCNC: 11.4 G/DL — LOW (ref 11.5–15.5)
IANC: 4.04 K/UL — SIGNIFICANT CHANGE UP (ref 1.5–8.5)
IMM GRANULOCYTES NFR BLD AUTO: 0.3 % — SIGNIFICANT CHANGE UP (ref 0–1.5)
LYMPHOCYTES # BLD AUTO: 2.48 K/UL — SIGNIFICANT CHANGE UP (ref 1–3.3)
LYMPHOCYTES # BLD AUTO: 33.6 % — SIGNIFICANT CHANGE UP (ref 13–44)
MCHC RBC-ENTMCNC: 26 PG — LOW (ref 27–34)
MCHC RBC-ENTMCNC: 31.1 GM/DL — LOW (ref 32–36)
MCV RBC AUTO: 83.8 FL — SIGNIFICANT CHANGE UP (ref 80–100)
MONOCYTES # BLD AUTO: 0.62 K/UL — SIGNIFICANT CHANGE UP (ref 0–0.9)
MONOCYTES NFR BLD AUTO: 8.4 % — SIGNIFICANT CHANGE UP (ref 2–14)
NEUTROPHILS # BLD AUTO: 4.04 K/UL — SIGNIFICANT CHANGE UP (ref 1.8–7.4)
NEUTROPHILS NFR BLD AUTO: 54.7 % — SIGNIFICANT CHANGE UP (ref 43–77)
NRBC # BLD: 0 /100 WBCS — SIGNIFICANT CHANGE UP
NRBC # FLD: 0 K/UL — SIGNIFICANT CHANGE UP
PLATELET # BLD AUTO: 257 K/UL — SIGNIFICANT CHANGE UP (ref 150–400)
POTASSIUM SERPL-MCNC: 4.7 MMOL/L — SIGNIFICANT CHANGE UP (ref 3.5–5.3)
POTASSIUM SERPL-SCNC: 4.7 MMOL/L — SIGNIFICANT CHANGE UP (ref 3.5–5.3)
PROT SERPL-MCNC: 7 G/DL — SIGNIFICANT CHANGE UP (ref 6–8.3)
RBC # BLD: 4.38 M/UL — SIGNIFICANT CHANGE UP (ref 3.8–5.2)
RBC # FLD: 14.9 % — HIGH (ref 10.3–14.5)
SODIUM SERPL-SCNC: 141 MMOL/L — SIGNIFICANT CHANGE UP (ref 135–145)
VALPROATE SERPL-MCNC: 33.3 UG/ML — LOW (ref 50–100)
VIT B12 SERPL-MCNC: 1494 PG/ML — HIGH (ref 200–900)
WBC # BLD: 7.38 K/UL — SIGNIFICANT CHANGE UP (ref 3.8–10.5)
WBC # FLD AUTO: 7.38 K/UL — SIGNIFICANT CHANGE UP (ref 3.8–10.5)

## 2022-01-03 PROCEDURE — 99232 SBSQ HOSP IP/OBS MODERATE 35: CPT

## 2022-01-03 RX ORDER — DIVALPROEX SODIUM 500 MG/1
500 TABLET, DELAYED RELEASE ORAL AT BEDTIME
Refills: 0 | Status: DISCONTINUED | OUTPATIENT
Start: 2022-01-03 | End: 2022-01-18

## 2022-01-03 RX ORDER — DIVALPROEX SODIUM 500 MG/1
250 TABLET, DELAYED RELEASE ORAL DAILY
Refills: 0 | Status: DISCONTINUED | OUTPATIENT
Start: 2022-01-03 | End: 2022-01-18

## 2022-01-03 RX ADMIN — BUMETANIDE 1 MILLIGRAM(S): 0.25 INJECTION INTRAMUSCULAR; INTRAVENOUS at 09:08

## 2022-01-03 RX ADMIN — Medication 1000 MILLIGRAM(S): at 09:02

## 2022-01-03 RX ADMIN — PREGABALIN 1000 MICROGRAM(S): 225 CAPSULE ORAL at 09:02

## 2022-01-03 RX ADMIN — Medication 1 PACKET(S): at 09:00

## 2022-01-03 RX ADMIN — LOSARTAN POTASSIUM 100 MILLIGRAM(S): 100 TABLET, FILM COATED ORAL at 09:06

## 2022-01-03 RX ADMIN — LORATADINE 10 MILLIGRAM(S): 10 TABLET ORAL at 09:05

## 2022-01-03 RX ADMIN — PANTOPRAZOLE SODIUM 40 MILLIGRAM(S): 20 TABLET, DELAYED RELEASE ORAL at 09:00

## 2022-01-03 RX ADMIN — MONTELUKAST 10 MILLIGRAM(S): 4 TABLET, CHEWABLE ORAL at 09:03

## 2022-01-03 RX ADMIN — Medication 81 MILLIGRAM(S): at 09:04

## 2022-01-03 RX ADMIN — METFORMIN HYDROCHLORIDE 500 MILLIGRAM(S): 850 TABLET ORAL at 16:51

## 2022-01-03 RX ADMIN — GABAPENTIN 300 MILLIGRAM(S): 400 CAPSULE ORAL at 09:04

## 2022-01-03 RX ADMIN — METFORMIN HYDROCHLORIDE 500 MILLIGRAM(S): 850 TABLET ORAL at 09:08

## 2022-01-03 RX ADMIN — MAGNESIUM OXIDE 400 MG ORAL TABLET 400 MILLIGRAM(S): 241.3 TABLET ORAL at 09:01

## 2022-01-03 RX ADMIN — Medication 1000 MILLIGRAM(S): at 21:11

## 2022-01-03 RX ADMIN — Medication 1 PACKET(S): at 21:12

## 2022-01-03 RX ADMIN — Medication 1 MILLIGRAM(S): at 09:06

## 2022-01-03 RX ADMIN — Medication 1 SPRAY(S): at 09:51

## 2022-01-03 RX ADMIN — AMLODIPINE BESYLATE 2.5 MILLIGRAM(S): 2.5 TABLET ORAL at 09:01

## 2022-01-03 RX ADMIN — Medication 1 SPRAY(S): at 21:12

## 2022-01-03 RX ADMIN — DIVALPROEX SODIUM 500 MILLIGRAM(S): 500 TABLET, DELAYED RELEASE ORAL at 21:11

## 2022-01-03 RX ADMIN — MAGNESIUM OXIDE 400 MG ORAL TABLET 400 MILLIGRAM(S): 241.3 TABLET ORAL at 21:11

## 2022-01-03 RX ADMIN — Medication 3 MILLIGRAM(S): at 21:12

## 2022-01-03 RX ADMIN — DIVALPROEX SODIUM 250 MILLIGRAM(S): 500 TABLET, DELAYED RELEASE ORAL at 09:04

## 2022-01-03 RX ADMIN — Medication 5000 UNIT(S): at 09:51

## 2022-01-03 RX ADMIN — Medication 1 TABLET(S): at 09:00

## 2022-01-03 RX ADMIN — GABAPENTIN 600 MILLIGRAM(S): 400 CAPSULE ORAL at 21:11

## 2022-01-03 RX ADMIN — ATORVASTATIN CALCIUM 10 MILLIGRAM(S): 80 TABLET, FILM COATED ORAL at 21:10

## 2022-01-03 RX ADMIN — SENNA PLUS 2 TABLET(S): 8.6 TABLET ORAL at 21:12

## 2022-01-03 NOTE — BH INPATIENT PSYCHIATRY PROGRESS NOTE - NSBHMETABOLIC_PSY_ALL_CORE_FT
BMI: BMI (kg/m2): 27.6 (12-01-21 @ 15:23)  HbA1c: A1C with Estimated Average Glucose Result: 6.9 % (12-02-21 @ 10:05)    Glucose: POCT Blood Glucose.: 125 mg/dL (12-30-21 @ 16:20)    BP: 132/56 (01-03-22 @ 06:36) (132/56 - 132/56)  Lipid Panel:

## 2022-01-03 NOTE — BH INPATIENT PSYCHIATRY PROGRESS NOTE - NSBHCHARTREVIEWVS_PSY_A_CORE FT
Vital Signs Last 24 Hrs  T(C): 36.9 (01-03-22 @ 06:36), Max: 36.9 (01-03-22 @ 06:36)  T(F): 98.4 (01-03-22 @ 06:36), Max: 98.4 (01-03-22 @ 06:36)  HR: 76 (01-03-22 @ 06:36) (76 - 76)  BP: 132/56 (01-03-22 @ 06:36) (132/56 - 132/56)  BP(mean): --  RR: --  SpO2: --    Orthostatic VS  01-02-22 @ 07:45  Lying BP: --/-- HR: --  Sitting BP: 120/63 HR: 85  Standing BP: --/-- HR: --  Site: upper left arm  Mode: electronic

## 2022-01-03 NOTE — BH INPATIENT PSYCHIATRY PROGRESS NOTE - CURRENT MEDICATION
MEDICATIONS  (STANDING):  alendronate 70 milliGRAM(s) Oral <User Schedule>  amLODIPine   Tablet 2.5 milliGRAM(s) Oral daily  ascorbic acid 1000 milliGRAM(s) Oral two times a day  aspirin  chewable 81 milliGRAM(s) Oral daily  atorvastatin 10 milliGRAM(s) Oral at bedtime  buMETAnide 1 milliGRAM(s) Oral daily  calcium carbonate 1250 mG  + Vitamin D (OsCal 500 + D) 1 Tablet(s) Oral daily  cholecalciferol 5000 Unit(s) Oral daily  cyanocobalamin 1000 MICROGram(s) Oral daily  dextrose 40% Gel 15 Gram(s) Oral once  dextrose 5%. 1000 milliLiter(s) (50 mL/Hr) IV Continuous <Continuous>  dextrose 5%. 1000 milliLiter(s) (100 mL/Hr) IV Continuous <Continuous>  dextrose 50% Injectable 25 Gram(s) IV Push once  dextrose 50% Injectable 12.5 Gram(s) IV Push once  dextrose 50% Injectable 25 Gram(s) IV Push once  diVALproex Sprinkle 250 milliGRAM(s) Oral daily  diVALproex Sprinkle 500 milliGRAM(s) Oral at bedtime  folic acid 1 milliGRAM(s) Oral daily  gabapentin 600 milliGRAM(s) Oral at bedtime  gabapentin 300 milliGRAM(s) Oral daily  glucagon  Injectable 1 milliGRAM(s) IntraMuscular once  loratadine 10 milliGRAM(s) Oral daily  losartan 100 milliGRAM(s) Oral daily  magnesium oxide 400 milliGRAM(s) Oral every 12 hours  melatonin. 3 milliGRAM(s) Oral at bedtime  metFORMIN 500 milliGRAM(s) Oral two times a day with meals  montelukast 10 milliGRAM(s) Oral daily  pantoprazole   Suspension 40 milliGRAM(s) Oral daily  psyllium Powder 1 Packet(s) Oral two times a day  senna 2 Tablet(s) Oral at bedtime  sitaGLIPtin 50 milliGRAM(s) Oral daily  sodium chloride 0.65% Nasal 1 Spray(s) Both Nostrils every 12 hours    MEDICATIONS  (PRN):  acetaminophen     Tablet .. 650 milliGRAM(s) Oral every 6 hours PRN Mild Pain (1 - 3)  loperamide 2 milliGRAM(s) Oral daily PRN Diarrhea  magnesium hydroxide Suspension 30 milliLiter(s) Oral daily PRN Constipation  melatonin. 3 milliGRAM(s) Oral at bedtime PRN Insomnia

## 2022-01-04 PROCEDURE — 99232 SBSQ HOSP IP/OBS MODERATE 35: CPT

## 2022-01-04 RX ORDER — METFORMIN HYDROCHLORIDE 850 MG/1
500 TABLET ORAL
Refills: 0 | Status: DISCONTINUED | OUTPATIENT
Start: 2022-01-04 | End: 2022-01-18

## 2022-01-04 RX ADMIN — METFORMIN HYDROCHLORIDE 500 MILLIGRAM(S): 850 TABLET ORAL at 18:27

## 2022-01-04 RX ADMIN — DIVALPROEX SODIUM 250 MILLIGRAM(S): 500 TABLET, DELAYED RELEASE ORAL at 09:21

## 2022-01-04 RX ADMIN — Medication 81 MILLIGRAM(S): at 09:21

## 2022-01-04 RX ADMIN — ATORVASTATIN CALCIUM 10 MILLIGRAM(S): 80 TABLET, FILM COATED ORAL at 21:23

## 2022-01-04 RX ADMIN — GABAPENTIN 300 MILLIGRAM(S): 400 CAPSULE ORAL at 09:19

## 2022-01-04 RX ADMIN — Medication 1 PACKET(S): at 09:20

## 2022-01-04 RX ADMIN — PANTOPRAZOLE SODIUM 40 MILLIGRAM(S): 20 TABLET, DELAYED RELEASE ORAL at 09:20

## 2022-01-04 RX ADMIN — Medication 1 MILLIGRAM(S): at 09:21

## 2022-01-04 RX ADMIN — Medication 1 PACKET(S): at 21:24

## 2022-01-04 RX ADMIN — LOSARTAN POTASSIUM 100 MILLIGRAM(S): 100 TABLET, FILM COATED ORAL at 09:20

## 2022-01-04 RX ADMIN — AMLODIPINE BESYLATE 2.5 MILLIGRAM(S): 2.5 TABLET ORAL at 09:20

## 2022-01-04 RX ADMIN — Medication 1 SPRAY(S): at 09:21

## 2022-01-04 RX ADMIN — Medication 1 SPRAY(S): at 21:24

## 2022-01-04 RX ADMIN — BUMETANIDE 1 MILLIGRAM(S): 0.25 INJECTION INTRAMUSCULAR; INTRAVENOUS at 09:19

## 2022-01-04 RX ADMIN — SENNA PLUS 2 TABLET(S): 8.6 TABLET ORAL at 21:21

## 2022-01-04 RX ADMIN — Medication 3 MILLIGRAM(S): at 01:51

## 2022-01-04 RX ADMIN — GABAPENTIN 600 MILLIGRAM(S): 400 CAPSULE ORAL at 21:22

## 2022-01-04 RX ADMIN — MONTELUKAST 10 MILLIGRAM(S): 4 TABLET, CHEWABLE ORAL at 09:21

## 2022-01-04 RX ADMIN — Medication 1000 MILLIGRAM(S): at 21:23

## 2022-01-04 RX ADMIN — DIVALPROEX SODIUM 500 MILLIGRAM(S): 500 TABLET, DELAYED RELEASE ORAL at 21:23

## 2022-01-04 RX ADMIN — MAGNESIUM OXIDE 400 MG ORAL TABLET 400 MILLIGRAM(S): 241.3 TABLET ORAL at 21:22

## 2022-01-04 RX ADMIN — MAGNESIUM OXIDE 400 MG ORAL TABLET 400 MILLIGRAM(S): 241.3 TABLET ORAL at 09:21

## 2022-01-04 RX ADMIN — Medication 1000 MILLIGRAM(S): at 09:19

## 2022-01-04 RX ADMIN — Medication 3 MILLIGRAM(S): at 21:22

## 2022-01-04 RX ADMIN — LORATADINE 10 MILLIGRAM(S): 10 TABLET ORAL at 09:20

## 2022-01-04 RX ADMIN — Medication 5000 UNIT(S): at 09:19

## 2022-01-04 RX ADMIN — PREGABALIN 1000 MICROGRAM(S): 225 CAPSULE ORAL at 09:20

## 2022-01-04 RX ADMIN — Medication 1 TABLET(S): at 09:20

## 2022-01-04 NOTE — BH INPATIENT PSYCHIATRY PROGRESS NOTE - NSBHCHARTREVIEWVS_PSY_A_CORE FT
Vital Signs Last 24 Hrs  T(C): 36.7 (01-04-22 @ 08:18), Max: 36.7 (01-03-22 @ 15:35)  T(F): 98.1 (01-04-22 @ 08:18), Max: 98.1 (01-04-22 @ 08:18)  HR: --  BP: --  BP(mean): --  RR: --  SpO2: --    Orthostatic VS  01-04-22 @ 08:18  Lying BP: --/-- HR: --  Sitting BP: 124/59 HR: 76  Standing BP: 127/54 HR: 86  Site: upper left arm  Mode: electronic

## 2022-01-04 NOTE — BH INPATIENT PSYCHIATRY PROGRESS NOTE - CURRENT MEDICATION
MEDICATIONS  (STANDING):  alendronate 70 milliGRAM(s) Oral <User Schedule>  amLODIPine   Tablet 2.5 milliGRAM(s) Oral daily  ascorbic acid 1000 milliGRAM(s) Oral two times a day  aspirin  chewable 81 milliGRAM(s) Oral daily  atorvastatin 10 milliGRAM(s) Oral at bedtime  buMETAnide 1 milliGRAM(s) Oral daily  calcium carbonate 1250 mG  + Vitamin D (OsCal 500 + D) 1 Tablet(s) Oral daily  cholecalciferol 5000 Unit(s) Oral daily  cyanocobalamin 1000 MICROGram(s) Oral daily  dextrose 40% Gel 15 Gram(s) Oral once  dextrose 5%. 1000 milliLiter(s) (50 mL/Hr) IV Continuous <Continuous>  dextrose 5%. 1000 milliLiter(s) (100 mL/Hr) IV Continuous <Continuous>  dextrose 50% Injectable 25 Gram(s) IV Push once  dextrose 50% Injectable 12.5 Gram(s) IV Push once  dextrose 50% Injectable 25 Gram(s) IV Push once  diVALproex Sprinkle 250 milliGRAM(s) Oral daily  diVALproex Sprinkle 500 milliGRAM(s) Oral at bedtime  folic acid 1 milliGRAM(s) Oral daily  gabapentin 300 milliGRAM(s) Oral daily  gabapentin 600 milliGRAM(s) Oral at bedtime  glucagon  Injectable 1 milliGRAM(s) IntraMuscular once  loratadine 10 milliGRAM(s) Oral daily  losartan 100 milliGRAM(s) Oral daily  magnesium oxide 400 milliGRAM(s) Oral every 12 hours  melatonin. 3 milliGRAM(s) Oral at bedtime  metFORMIN 500 milliGRAM(s) Oral two times a day with meals  montelukast 10 milliGRAM(s) Oral daily  pantoprazole   Suspension 40 milliGRAM(s) Oral daily  psyllium Powder 1 Packet(s) Oral two times a day  senna 2 Tablet(s) Oral at bedtime  sitaGLIPtin 50 milliGRAM(s) Oral daily  sodium chloride 0.65% Nasal 1 Spray(s) Both Nostrils every 12 hours    MEDICATIONS  (PRN):  acetaminophen     Tablet .. 650 milliGRAM(s) Oral every 6 hours PRN Mild Pain (1 - 3)  loperamide 2 milliGRAM(s) Oral daily PRN Diarrhea  magnesium hydroxide Suspension 30 milliLiter(s) Oral daily PRN Constipation  melatonin. 3 milliGRAM(s) Oral at bedtime PRN Insomnia

## 2022-01-05 PROCEDURE — 99232 SBSQ HOSP IP/OBS MODERATE 35: CPT

## 2022-01-05 RX ADMIN — Medication 1 TABLET(S): at 08:03

## 2022-01-05 RX ADMIN — Medication 1 PACKET(S): at 08:03

## 2022-01-05 RX ADMIN — Medication 81 MILLIGRAM(S): at 08:01

## 2022-01-05 RX ADMIN — SENNA PLUS 2 TABLET(S): 8.6 TABLET ORAL at 21:55

## 2022-01-05 RX ADMIN — DIVALPROEX SODIUM 250 MILLIGRAM(S): 500 TABLET, DELAYED RELEASE ORAL at 08:01

## 2022-01-05 RX ADMIN — GABAPENTIN 300 MILLIGRAM(S): 400 CAPSULE ORAL at 08:02

## 2022-01-05 RX ADMIN — Medication 5000 UNIT(S): at 08:04

## 2022-01-05 RX ADMIN — GABAPENTIN 600 MILLIGRAM(S): 400 CAPSULE ORAL at 21:55

## 2022-01-05 RX ADMIN — METFORMIN HYDROCHLORIDE 500 MILLIGRAM(S): 850 TABLET ORAL at 16:22

## 2022-01-05 RX ADMIN — DIVALPROEX SODIUM 500 MILLIGRAM(S): 500 TABLET, DELAYED RELEASE ORAL at 21:56

## 2022-01-05 RX ADMIN — MAGNESIUM OXIDE 400 MG ORAL TABLET 400 MILLIGRAM(S): 241.3 TABLET ORAL at 21:54

## 2022-01-05 RX ADMIN — Medication 1 PACKET(S): at 21:54

## 2022-01-05 RX ADMIN — AMLODIPINE BESYLATE 2.5 MILLIGRAM(S): 2.5 TABLET ORAL at 08:02

## 2022-01-05 RX ADMIN — ATORVASTATIN CALCIUM 10 MILLIGRAM(S): 80 TABLET, FILM COATED ORAL at 21:55

## 2022-01-05 RX ADMIN — Medication 1 MILLIGRAM(S): at 08:01

## 2022-01-05 RX ADMIN — Medication 1000 MILLIGRAM(S): at 21:54

## 2022-01-05 RX ADMIN — Medication 1 SPRAY(S): at 21:56

## 2022-01-05 RX ADMIN — LOSARTAN POTASSIUM 100 MILLIGRAM(S): 100 TABLET, FILM COATED ORAL at 08:01

## 2022-01-05 RX ADMIN — LORATADINE 10 MILLIGRAM(S): 10 TABLET ORAL at 08:03

## 2022-01-05 RX ADMIN — Medication 1 SPRAY(S): at 08:17

## 2022-01-05 RX ADMIN — BUMETANIDE 1 MILLIGRAM(S): 0.25 INJECTION INTRAMUSCULAR; INTRAVENOUS at 08:01

## 2022-01-05 RX ADMIN — MONTELUKAST 10 MILLIGRAM(S): 4 TABLET, CHEWABLE ORAL at 08:02

## 2022-01-05 RX ADMIN — METFORMIN HYDROCHLORIDE 500 MILLIGRAM(S): 850 TABLET ORAL at 08:02

## 2022-01-05 RX ADMIN — PANTOPRAZOLE SODIUM 40 MILLIGRAM(S): 20 TABLET, DELAYED RELEASE ORAL at 08:03

## 2022-01-05 RX ADMIN — PREGABALIN 1000 MICROGRAM(S): 225 CAPSULE ORAL at 08:02

## 2022-01-05 RX ADMIN — Medication 3 MILLIGRAM(S): at 21:55

## 2022-01-05 RX ADMIN — MAGNESIUM OXIDE 400 MG ORAL TABLET 400 MILLIGRAM(S): 241.3 TABLET ORAL at 08:02

## 2022-01-05 RX ADMIN — Medication 1000 MILLIGRAM(S): at 08:01

## 2022-01-05 NOTE — BH INPATIENT PSYCHIATRY PROGRESS NOTE - NSBHCHARTREVIEWVS_PSY_A_CORE FT
Vital Signs Last 24 Hrs  T(C): 36.4 (01-04-22 @ 15:54), Max: 36.4 (01-04-22 @ 15:54)  T(F): 97.5 (01-04-22 @ 15:54), Max: 97.5 (01-04-22 @ 15:54)  HR: 74 (01-05-22 @ 05:39) (74 - 74)  BP: 143/55 (01-05-22 @ 05:39) (143/55 - 143/55)  BP(mean): --  RR: --  SpO2: --    Orthostatic VS  01-04-22 @ 08:18  Lying BP: --/-- HR: --  Sitting BP: 124/59 HR: 76  Standing BP: 127/54 HR: 86  Site: upper left arm  Mode: electronic   Vital Signs Last 24 Hrs  T(C): 36.1 (01-05-22 @ 15:57), Max: 36.1 (01-05-22 @ 15:57)  T(F): 96.9 (01-05-22 @ 15:57), Max: 96.9 (01-05-22 @ 15:57)  HR: --  BP: --  BP(mean): --  RR: --  SpO2: --    Orthostatic VS  01-04-22 @ 08:18  Lying BP: --/-- HR: --  Sitting BP: 124/59 HR: 76  Standing BP: 127/54 HR: 86  Site: upper left arm  Mode: electronic

## 2022-01-06 LAB — SARS-COV-2 RNA SPEC QL NAA+PROBE: SIGNIFICANT CHANGE UP

## 2022-01-06 PROCEDURE — 99232 SBSQ HOSP IP/OBS MODERATE 35: CPT

## 2022-01-06 RX ORDER — SALIVA SUBSTITUTE COMB NO.11 351 MG
5 POWDER IN PACKET (EA) MUCOUS MEMBRANE EVERY 4 HOURS
Refills: 0 | Status: DISCONTINUED | OUTPATIENT
Start: 2022-01-06 | End: 2022-01-18

## 2022-01-06 RX ADMIN — MAGNESIUM OXIDE 400 MG ORAL TABLET 400 MILLIGRAM(S): 241.3 TABLET ORAL at 08:07

## 2022-01-06 RX ADMIN — DIVALPROEX SODIUM 500 MILLIGRAM(S): 500 TABLET, DELAYED RELEASE ORAL at 21:54

## 2022-01-06 RX ADMIN — LOSARTAN POTASSIUM 100 MILLIGRAM(S): 100 TABLET, FILM COATED ORAL at 08:08

## 2022-01-06 RX ADMIN — PANTOPRAZOLE SODIUM 40 MILLIGRAM(S): 20 TABLET, DELAYED RELEASE ORAL at 08:08

## 2022-01-06 RX ADMIN — METFORMIN HYDROCHLORIDE 500 MILLIGRAM(S): 850 TABLET ORAL at 08:07

## 2022-01-06 RX ADMIN — BUMETANIDE 1 MILLIGRAM(S): 0.25 INJECTION INTRAMUSCULAR; INTRAVENOUS at 08:07

## 2022-01-06 RX ADMIN — Medication 3 MILLIGRAM(S): at 21:55

## 2022-01-06 RX ADMIN — GABAPENTIN 300 MILLIGRAM(S): 400 CAPSULE ORAL at 08:06

## 2022-01-06 RX ADMIN — Medication 1000 MILLIGRAM(S): at 21:54

## 2022-01-06 RX ADMIN — Medication 1000 MILLIGRAM(S): at 08:06

## 2022-01-06 RX ADMIN — PREGABALIN 1000 MICROGRAM(S): 225 CAPSULE ORAL at 08:08

## 2022-01-06 RX ADMIN — Medication 1 TABLET(S): at 08:06

## 2022-01-06 RX ADMIN — MAGNESIUM OXIDE 400 MG ORAL TABLET 400 MILLIGRAM(S): 241.3 TABLET ORAL at 21:55

## 2022-01-06 RX ADMIN — DIVALPROEX SODIUM 250 MILLIGRAM(S): 500 TABLET, DELAYED RELEASE ORAL at 08:06

## 2022-01-06 RX ADMIN — GABAPENTIN 600 MILLIGRAM(S): 400 CAPSULE ORAL at 21:54

## 2022-01-06 RX ADMIN — Medication 1 MILLIGRAM(S): at 08:08

## 2022-01-06 RX ADMIN — Medication 1 SPRAY(S): at 08:08

## 2022-01-06 RX ADMIN — Medication 1 PACKET(S): at 21:55

## 2022-01-06 RX ADMIN — Medication 81 MILLIGRAM(S): at 08:08

## 2022-01-06 RX ADMIN — ATORVASTATIN CALCIUM 10 MILLIGRAM(S): 80 TABLET, FILM COATED ORAL at 21:55

## 2022-01-06 RX ADMIN — Medication 5000 UNIT(S): at 08:07

## 2022-01-06 RX ADMIN — METFORMIN HYDROCHLORIDE 500 MILLIGRAM(S): 850 TABLET ORAL at 16:47

## 2022-01-06 RX ADMIN — AMLODIPINE BESYLATE 2.5 MILLIGRAM(S): 2.5 TABLET ORAL at 08:07

## 2022-01-06 RX ADMIN — Medication 1 PACKET(S): at 08:07

## 2022-01-06 RX ADMIN — LORATADINE 10 MILLIGRAM(S): 10 TABLET ORAL at 08:07

## 2022-01-06 RX ADMIN — MONTELUKAST 10 MILLIGRAM(S): 4 TABLET, CHEWABLE ORAL at 08:06

## 2022-01-06 RX ADMIN — SENNA PLUS 2 TABLET(S): 8.6 TABLET ORAL at 21:55

## 2022-01-06 NOTE — BH INPATIENT PSYCHIATRY PROGRESS NOTE - NSBHMETABOLIC_PSY_ALL_CORE_FT
BMI: BMI (kg/m2): 27.6 (12-01-21 @ 15:23)  HbA1c: A1C with Estimated Average Glucose Result: 6.9 % (12-02-21 @ 10:05)    Glucose: POCT Blood Glucose.: 125 mg/dL (12-30-21 @ 16:20)    BP: 143/55 (01-05-22 @ 05:39) (143/55 - 143/55)  Lipid Panel:

## 2022-01-06 NOTE — BH INPATIENT PSYCHIATRY PROGRESS NOTE - NSBHCHARTREVIEWVS_PSY_A_CORE FT
Vital Signs Last 24 Hrs  T(C): 36.1 (01-05-22 @ 15:57), Max: 36.1 (01-05-22 @ 15:57)  T(F): 96.9 (01-05-22 @ 15:57), Max: 96.9 (01-05-22 @ 15:57)  HR: --  BP: --  BP(mean): --  RR: --  SpO2: --    Orthostatic VS  01-06-22 @ 07:50  Lying BP: --/-- HR: --  Sitting BP: 108/68 HR: 80  Standing BP: --/-- HR: --  Site: --  Mode: --

## 2022-01-06 NOTE — BH INPATIENT PSYCHIATRY PROGRESS NOTE - PRN MEDS
MEDICATIONS  (PRN):  acetaminophen     Tablet .. 650 milliGRAM(s) Oral every 6 hours PRN Mild Pain (1 - 3)  loperamide 2 milliGRAM(s) Oral daily PRN Diarrhea  magnesium hydroxide Suspension 30 milliLiter(s) Oral daily PRN Constipation  melatonin. 3 milliGRAM(s) Oral at bedtime PRN Insomnia   MEDICATIONS  (PRN):  acetaminophen     Tablet .. 650 milliGRAM(s) Oral every 6 hours PRN Mild Pain (1 - 3)  Biotene Dry Mouth Oral Rinse 5 milliLiter(s) Swish and Spit every 4 hours PRN dry mouth  loperamide 2 milliGRAM(s) Oral daily PRN Diarrhea  magnesium hydroxide Suspension 30 milliLiter(s) Oral daily PRN Constipation  melatonin. 3 milliGRAM(s) Oral at bedtime PRN Insomnia

## 2022-01-07 LAB — VALPROATE SERPL-MCNC: 68.4 UG/ML — SIGNIFICANT CHANGE UP (ref 50–100)

## 2022-01-07 PROCEDURE — 99232 SBSQ HOSP IP/OBS MODERATE 35: CPT

## 2022-01-07 RX ADMIN — Medication 1 MILLIGRAM(S): at 10:09

## 2022-01-07 RX ADMIN — LOSARTAN POTASSIUM 100 MILLIGRAM(S): 100 TABLET, FILM COATED ORAL at 10:10

## 2022-01-07 RX ADMIN — Medication 5000 UNIT(S): at 10:12

## 2022-01-07 RX ADMIN — MAGNESIUM OXIDE 400 MG ORAL TABLET 400 MILLIGRAM(S): 241.3 TABLET ORAL at 10:09

## 2022-01-07 RX ADMIN — Medication 1 PACKET(S): at 10:08

## 2022-01-07 RX ADMIN — DIVALPROEX SODIUM 500 MILLIGRAM(S): 500 TABLET, DELAYED RELEASE ORAL at 22:11

## 2022-01-07 RX ADMIN — MONTELUKAST 10 MILLIGRAM(S): 4 TABLET, CHEWABLE ORAL at 10:09

## 2022-01-07 RX ADMIN — Medication 1 TABLET(S): at 10:09

## 2022-01-07 RX ADMIN — AMLODIPINE BESYLATE 2.5 MILLIGRAM(S): 2.5 TABLET ORAL at 10:09

## 2022-01-07 RX ADMIN — Medication 81 MILLIGRAM(S): at 10:10

## 2022-01-07 RX ADMIN — Medication 1 SPRAY(S): at 22:11

## 2022-01-07 RX ADMIN — DIVALPROEX SODIUM 250 MILLIGRAM(S): 500 TABLET, DELAYED RELEASE ORAL at 10:08

## 2022-01-07 RX ADMIN — Medication 1 SPRAY(S): at 10:12

## 2022-01-07 RX ADMIN — PREGABALIN 1000 MICROGRAM(S): 225 CAPSULE ORAL at 10:10

## 2022-01-07 RX ADMIN — LORATADINE 10 MILLIGRAM(S): 10 TABLET ORAL at 10:10

## 2022-01-07 RX ADMIN — Medication 1 PACKET(S): at 22:13

## 2022-01-07 RX ADMIN — ATORVASTATIN CALCIUM 10 MILLIGRAM(S): 80 TABLET, FILM COATED ORAL at 22:12

## 2022-01-07 RX ADMIN — Medication 1000 MILLIGRAM(S): at 10:09

## 2022-01-07 RX ADMIN — GABAPENTIN 300 MILLIGRAM(S): 400 CAPSULE ORAL at 10:10

## 2022-01-07 RX ADMIN — Medication 1000 MILLIGRAM(S): at 22:10

## 2022-01-07 RX ADMIN — SENNA PLUS 2 TABLET(S): 8.6 TABLET ORAL at 22:12

## 2022-01-07 RX ADMIN — PANTOPRAZOLE SODIUM 40 MILLIGRAM(S): 20 TABLET, DELAYED RELEASE ORAL at 10:08

## 2022-01-07 RX ADMIN — BUMETANIDE 1 MILLIGRAM(S): 0.25 INJECTION INTRAMUSCULAR; INTRAVENOUS at 10:09

## 2022-01-07 RX ADMIN — METFORMIN HYDROCHLORIDE 500 MILLIGRAM(S): 850 TABLET ORAL at 10:01

## 2022-01-07 RX ADMIN — METFORMIN HYDROCHLORIDE 500 MILLIGRAM(S): 850 TABLET ORAL at 16:57

## 2022-01-07 RX ADMIN — MAGNESIUM OXIDE 400 MG ORAL TABLET 400 MILLIGRAM(S): 241.3 TABLET ORAL at 22:12

## 2022-01-07 RX ADMIN — Medication 3 MILLIGRAM(S): at 22:11

## 2022-01-07 RX ADMIN — GABAPENTIN 600 MILLIGRAM(S): 400 CAPSULE ORAL at 22:12

## 2022-01-07 NOTE — BH TREATMENT PLAN - NSDCCRITERIA_PSY_ALL_CORE
Stabilization of patient's anjelica, decrease of symptoms by 50%.

## 2022-01-07 NOTE — BH TREATMENT PLAN - NSBHPRIMARYDX_PSY_ALL_CORE
Bipolar I disorder, most recent episode (or current) manic with mixed features    

## 2022-01-07 NOTE — BH TREATMENT PLAN - NSTXMANICGOAL_PSY_ALL_CORE
Exhibit a substantial reduction in elated/angry acting out, and pressured speech that prevents mutual conversation
Demonstrate a substantial reduction in both hyperactive pacing on the unit and social intrusiveness
Exhibit a substantial reduction in elated/angry acting out, and pressured speech that prevents mutual conversation
Demonstrate a substantial reduction in both hyperactive pacing on the unit and social intrusiveness
Demonstrate a substantial reduction in both hyperactive pacing on the unit and social intrusiveness

## 2022-01-07 NOTE — BH TREATMENT PLAN - NSTXDCOPNOINTERSW_PSY_ALL_CORE
SW will encourage compliance in order to facilite discharge planning
SW will provide encouragement for compliance
SW will encourage pt to comply with medication and treatment planning
SW will provide support and psychoeducation and reinforce the pt's adaptive efforts. SW will maintain contact with the pt's daughter, providing treatment updates, gaining her perspective, and addressing discharge plan of pt returning to [facility] when stable and [facility]. BABATUNDE will confer with [facility] regarding pt's return to their facility as the pt shows stabilization.

## 2022-01-07 NOTE — BH TREATMENT PLAN - NSTXDISORGGOAL_PSY_ALL_CORE
Will demonstrate the ability to maintain reality orientation and communicate clearly with others during 2 conversations with staff daily
Will demonstrate purposeful and predictable thoughts/behaviors by making a request
Will demonstrate the ability to maintain reality orientation and communicate clearly with others during 2 conversations with staff daily
Will demonstrate the ability to maintain reality orientation and communicate clearly with others during 2 conversations with staff daily

## 2022-01-07 NOTE — BH INPATIENT PSYCHIATRY PROGRESS NOTE - NSBHCHARTREVIEWVS_PSY_A_CORE FT
Vital Signs Last 24 Hrs  T(C): 36.6 (01-07-22 @ 07:40), Max: 36.8 (01-06-22 @ 16:02)  T(F): 97.9 (01-07-22 @ 07:40), Max: 98.2 (01-06-22 @ 16:02)  HR: --  BP: --  BP(mean): --  RR: --  SpO2: --    Orthostatic VS  01-07-22 @ 07:40  Lying BP: --/-- HR: --  Sitting BP: 118/50 HR: 73  Standing BP: --/-- HR: --  Site: upper left arm  Mode: electronic  Orthostatic VS  01-06-22 @ 07:50  Lying BP: --/-- HR: --  Sitting BP: 108/68 HR: 80  Standing BP: --/-- HR: --  Site: --  Mode: --

## 2022-01-07 NOTE — BH TREATMENT PLAN - NSTXCOPEINTERPR_PSY_ALL_CORE
Patient will identify and utilize two coping skills daily to manage her anxiety and irritability within seven days.

## 2022-01-07 NOTE — BH INPATIENT PSYCHIATRY PROGRESS NOTE - CURRENT MEDICATION
MEDICATIONS  (STANDING):  alendronate 70 milliGRAM(s) Oral <User Schedule>  amLODIPine   Tablet 2.5 milliGRAM(s) Oral daily  ascorbic acid 1000 milliGRAM(s) Oral two times a day  aspirin  chewable 81 milliGRAM(s) Oral daily  atorvastatin 10 milliGRAM(s) Oral at bedtime  buMETAnide 1 milliGRAM(s) Oral daily  calcium carbonate 1250 mG  + Vitamin D (OsCal 500 + D) 1 Tablet(s) Oral daily  cholecalciferol 5000 Unit(s) Oral daily  cyanocobalamin 1000 MICROGram(s) Oral daily  dextrose 40% Gel 15 Gram(s) Oral once  dextrose 5%. 1000 milliLiter(s) (50 mL/Hr) IV Continuous <Continuous>  dextrose 5%. 1000 milliLiter(s) (100 mL/Hr) IV Continuous <Continuous>  dextrose 50% Injectable 25 Gram(s) IV Push once  dextrose 50% Injectable 12.5 Gram(s) IV Push once  dextrose 50% Injectable 25 Gram(s) IV Push once  diVALproex Sprinkle 250 milliGRAM(s) Oral daily  diVALproex Sprinkle 500 milliGRAM(s) Oral at bedtime  folic acid 1 milliGRAM(s) Oral daily  gabapentin 300 milliGRAM(s) Oral daily  gabapentin 600 milliGRAM(s) Oral at bedtime  glucagon  Injectable 1 milliGRAM(s) IntraMuscular once  loratadine 10 milliGRAM(s) Oral daily  losartan 100 milliGRAM(s) Oral daily  magnesium oxide 400 milliGRAM(s) Oral every 12 hours  melatonin. 3 milliGRAM(s) Oral at bedtime  metFORMIN 500 milliGRAM(s) Oral two times a day with meals  montelukast 10 milliGRAM(s) Oral daily  pantoprazole   Suspension 40 milliGRAM(s) Oral daily  psyllium Powder 1 Packet(s) Oral two times a day  senna 2 Tablet(s) Oral at bedtime  sitaGLIPtin 50 milliGRAM(s) Oral daily  sodium chloride 0.65% Nasal 1 Spray(s) Both Nostrils every 12 hours    MEDICATIONS  (PRN):  acetaminophen     Tablet .. 650 milliGRAM(s) Oral every 6 hours PRN Mild Pain (1 - 3)  Biotene Dry Mouth Oral Rinse 5 milliLiter(s) Swish and Spit every 4 hours PRN dry mouth  loperamide 2 milliGRAM(s) Oral daily PRN Diarrhea  magnesium hydroxide Suspension 30 milliLiter(s) Oral daily PRN Constipation  melatonin. 3 milliGRAM(s) Oral at bedtime PRN Insomnia

## 2022-01-07 NOTE — BH TREATMENT PLAN - NSTXPLANTHERAPYSESSIONSFT_PSY_ALL_CORE
12-09-21  Type of therapy: Dialectical behavior therapy,Coping skills,Inspiration and motiviation,Leisure development,Music therapy,Peer advocate  Type of session: Individual  Level of patient participation: Engaged  Duration of participation: 30 minutes  Therapy conducted by: Psych rehab  Therapy Summary: Writer met with Patient to discuss the progress of Psych Rehab goal. Patient was receptive. Patient was seen in her room. Patient was well dressed and well groomed. Patient was pleasant, cooperative, and receptive with the writer. Patient reported that she is doing fabulous and continued to be hypomanic.  Patient did not reduce her hyperactivity in the unit. Patient reported that she will continue to have her 45 mins physical exercise daily in order to be in shape. However, she is receptive when verbally redirected by the staff to minimize her hyperactivity. Patient denied SI/HI/VH/AH. Patient remains to be religiously preoccupied and believes as the healer/therapist of her residence. Patient calls her co-residence and unit’s peers as her children. Patient thought process is tangential and jumps from one topic to another topic and it was difficult to interrupt her speech.  Patient reported that she refused certain medications because she knows nothing is wrong with her. Patient reported that she will continue to take her vitamins and other meds without any hesitations. Over the past seven days, Patient demonstrated some progress towards her Psych Rehab goal but she continues to need verbal redirections from the staff to reduce hyperactivity in the unit. Patient has pleasant attitude towards her peers. Patient is visible in the unit and attends Psych Rehab groups. Patient has good ADLs and fair behavior control.    12-09-21  Type of therapy: Other,Physical therapy  Type of session: Individual  Level of patient participation: Participates  --  Therapy conducted by: Other (specify),Physical therapy  Therapy Summary: Patient engaged in gait training.  
  01-05-22  Type of therapy: Coping skills,Creative arts therapy,Health and fitness,Spirituality,Stress management  Type of session: Individual  Level of patient participation: Participated with encouragement  Duration of participation: 15 minutes  Therapy conducted by: Psych rehab  Therapy Summary: In response to the COVID-19 outbreak there has been a shift in hospital wide policies and protocols. As a result it should be noted the unit activities and structure have been temporarily modified to promote safety of patients and staff. Patient over the past week made some gains towards her rehab goals. Patient appears calmer, less restless, and is less socially intrusive. Patient is less irritable and less religiously preoccupied. Patient is complying with her medications and is attentive to her ADL's. Patient maintains herself active with reading, writing, and watching television. However, patient continues to complain of not sleeping well. Patient continues to state she is worried the medications will make her lethargic and unable to function. Patient remains grandiose.  
  12-16-21  Type of therapy: Creative arts therapy,Leisure development,Music therapy,Peer advocate,Spirituality,Symptom management,Transition planning  Type of session: Individual  Level of patient participation: Participates  Duration of participation: 30 minutes  Therapy conducted by: Psych rehab  Therapy Summary: Writer and patient discussed level of functioning. Patient discussed childhood memories and experiences, though was notably disorganized, tangential and hyperverbal. During the session, writer provided empathy and support. Patient has been compliant with standing medications, though states to family that she is not taking her medication. Patient's reality testing is poor at this time. Patient endorses some improvements in regards to symptom. Patient is slightly less intrusive and better redirectable. Patient continues to endorse grandiosity, hyperverbal speech, unrelated thoughts, and Mosque preoccupation. Per chart patient denies AH/VH, SI/HI. Writer was unable to obtain a CGI self-rating due to tangential thought process. Patient's mood was notably elevated.     Patient is visible on the unit and interactive with peers. Patient's behavioral control was fair over the past week, though patient continues to require some redirection in the context of intrusiveness with peers. Patient attended the majority of Psychiatric Rehabilitation groups offered over the past week. Patient is fairly engaged and requires some redirection at times in the context of disorganization.

## 2022-01-07 NOTE — BH TREATMENT PLAN - NSTXCAREGIVERPARTICIPATE_PSY_P_CORE
Family/Caregiver participated in defining interventions
Family/Caregiver participated in identification of needs/problems/goals for treatment

## 2022-01-07 NOTE — BH TREATMENT PLAN - NSTXPATIENTPARTICIPATE_PSY_ALL_CORE
Patient participated in identification of needs/problems/goals for treatment
Patient participated in development of after care plan
Patient participated in identification of needs/problems/goals for treatment

## 2022-01-07 NOTE — BH INPATIENT PSYCHIATRY PROGRESS NOTE - PRN MEDS
MEDICATIONS  (PRN):  acetaminophen     Tablet .. 650 milliGRAM(s) Oral every 6 hours PRN Mild Pain (1 - 3)  Biotene Dry Mouth Oral Rinse 5 milliLiter(s) Swish and Spit every 4 hours PRN dry mouth  loperamide 2 milliGRAM(s) Oral daily PRN Diarrhea  magnesium hydroxide Suspension 30 milliLiter(s) Oral daily PRN Constipation  melatonin. 3 milliGRAM(s) Oral at bedtime PRN Insomnia

## 2022-01-08 LAB — VIT C SERPL-MCNC: 2 MG/DL — SIGNIFICANT CHANGE UP (ref 0.4–2)

## 2022-01-08 RX ADMIN — GABAPENTIN 300 MILLIGRAM(S): 400 CAPSULE ORAL at 08:11

## 2022-01-08 RX ADMIN — AMLODIPINE BESYLATE 2.5 MILLIGRAM(S): 2.5 TABLET ORAL at 08:13

## 2022-01-08 RX ADMIN — GABAPENTIN 600 MILLIGRAM(S): 400 CAPSULE ORAL at 20:34

## 2022-01-08 RX ADMIN — PANTOPRAZOLE SODIUM 40 MILLIGRAM(S): 20 TABLET, DELAYED RELEASE ORAL at 08:12

## 2022-01-08 RX ADMIN — Medication 1 TABLET(S): at 08:13

## 2022-01-08 RX ADMIN — Medication 1 SPRAY(S): at 09:15

## 2022-01-08 RX ADMIN — SENNA PLUS 2 TABLET(S): 8.6 TABLET ORAL at 20:34

## 2022-01-08 RX ADMIN — Medication 5000 UNIT(S): at 09:15

## 2022-01-08 RX ADMIN — MAGNESIUM OXIDE 400 MG ORAL TABLET 400 MILLIGRAM(S): 241.3 TABLET ORAL at 08:13

## 2022-01-08 RX ADMIN — Medication 1 SPRAY(S): at 20:41

## 2022-01-08 RX ADMIN — ATORVASTATIN CALCIUM 10 MILLIGRAM(S): 80 TABLET, FILM COATED ORAL at 20:37

## 2022-01-08 RX ADMIN — Medication 3 MILLIGRAM(S): at 20:35

## 2022-01-08 RX ADMIN — PREGABALIN 1000 MICROGRAM(S): 225 CAPSULE ORAL at 08:13

## 2022-01-08 RX ADMIN — Medication 1000 MILLIGRAM(S): at 08:11

## 2022-01-08 RX ADMIN — Medication 1000 MILLIGRAM(S): at 20:36

## 2022-01-08 RX ADMIN — Medication 1 MILLIGRAM(S): at 08:12

## 2022-01-08 RX ADMIN — MONTELUKAST 10 MILLIGRAM(S): 4 TABLET, CHEWABLE ORAL at 08:13

## 2022-01-08 RX ADMIN — METFORMIN HYDROCHLORIDE 500 MILLIGRAM(S): 850 TABLET ORAL at 17:43

## 2022-01-08 RX ADMIN — Medication 1 PACKET(S): at 08:11

## 2022-01-08 RX ADMIN — Medication 1 PACKET(S): at 20:33

## 2022-01-08 RX ADMIN — METFORMIN HYDROCHLORIDE 500 MILLIGRAM(S): 850 TABLET ORAL at 08:12

## 2022-01-08 RX ADMIN — Medication 81 MILLIGRAM(S): at 08:11

## 2022-01-08 RX ADMIN — LOSARTAN POTASSIUM 100 MILLIGRAM(S): 100 TABLET, FILM COATED ORAL at 08:12

## 2022-01-08 RX ADMIN — DIVALPROEX SODIUM 250 MILLIGRAM(S): 500 TABLET, DELAYED RELEASE ORAL at 08:11

## 2022-01-08 RX ADMIN — LORATADINE 10 MILLIGRAM(S): 10 TABLET ORAL at 08:13

## 2022-01-08 RX ADMIN — MAGNESIUM OXIDE 400 MG ORAL TABLET 400 MILLIGRAM(S): 241.3 TABLET ORAL at 20:37

## 2022-01-08 RX ADMIN — DIVALPROEX SODIUM 500 MILLIGRAM(S): 500 TABLET, DELAYED RELEASE ORAL at 20:36

## 2022-01-08 RX ADMIN — BUMETANIDE 1 MILLIGRAM(S): 0.25 INJECTION INTRAMUSCULAR; INTRAVENOUS at 08:11

## 2022-01-09 RX ADMIN — GABAPENTIN 300 MILLIGRAM(S): 400 CAPSULE ORAL at 08:12

## 2022-01-09 RX ADMIN — MAGNESIUM OXIDE 400 MG ORAL TABLET 400 MILLIGRAM(S): 241.3 TABLET ORAL at 08:16

## 2022-01-09 RX ADMIN — AMLODIPINE BESYLATE 2.5 MILLIGRAM(S): 2.5 TABLET ORAL at 08:15

## 2022-01-09 RX ADMIN — DIVALPROEX SODIUM 250 MILLIGRAM(S): 500 TABLET, DELAYED RELEASE ORAL at 08:12

## 2022-01-09 RX ADMIN — PANTOPRAZOLE SODIUM 40 MILLIGRAM(S): 20 TABLET, DELAYED RELEASE ORAL at 08:11

## 2022-01-09 RX ADMIN — Medication 1 TABLET(S): at 08:12

## 2022-01-09 RX ADMIN — BUMETANIDE 1 MILLIGRAM(S): 0.25 INJECTION INTRAMUSCULAR; INTRAVENOUS at 08:15

## 2022-01-09 RX ADMIN — Medication 1000 MILLIGRAM(S): at 08:14

## 2022-01-09 RX ADMIN — METFORMIN HYDROCHLORIDE 500 MILLIGRAM(S): 850 TABLET ORAL at 08:12

## 2022-01-09 RX ADMIN — ATORVASTATIN CALCIUM 10 MILLIGRAM(S): 80 TABLET, FILM COATED ORAL at 20:43

## 2022-01-09 RX ADMIN — MAGNESIUM OXIDE 400 MG ORAL TABLET 400 MILLIGRAM(S): 241.3 TABLET ORAL at 20:43

## 2022-01-09 RX ADMIN — METFORMIN HYDROCHLORIDE 500 MILLIGRAM(S): 850 TABLET ORAL at 16:46

## 2022-01-09 RX ADMIN — LOSARTAN POTASSIUM 100 MILLIGRAM(S): 100 TABLET, FILM COATED ORAL at 08:15

## 2022-01-09 RX ADMIN — MONTELUKAST 10 MILLIGRAM(S): 4 TABLET, CHEWABLE ORAL at 08:15

## 2022-01-09 RX ADMIN — Medication 81 MILLIGRAM(S): at 08:14

## 2022-01-09 RX ADMIN — PREGABALIN 1000 MICROGRAM(S): 225 CAPSULE ORAL at 08:15

## 2022-01-09 RX ADMIN — SENNA PLUS 2 TABLET(S): 8.6 TABLET ORAL at 20:44

## 2022-01-09 RX ADMIN — Medication 1 SPRAY(S): at 08:13

## 2022-01-09 RX ADMIN — Medication 1000 MILLIGRAM(S): at 20:44

## 2022-01-09 RX ADMIN — Medication 1 PACKET(S): at 20:40

## 2022-01-09 RX ADMIN — Medication 1 SPRAY(S): at 20:41

## 2022-01-09 RX ADMIN — LORATADINE 10 MILLIGRAM(S): 10 TABLET ORAL at 08:16

## 2022-01-09 RX ADMIN — Medication 5000 UNIT(S): at 08:13

## 2022-01-09 RX ADMIN — Medication 3 MILLIGRAM(S): at 20:43

## 2022-01-09 RX ADMIN — Medication 1 MILLIGRAM(S): at 08:16

## 2022-01-09 RX ADMIN — GABAPENTIN 600 MILLIGRAM(S): 400 CAPSULE ORAL at 20:41

## 2022-01-09 RX ADMIN — Medication 1 PACKET(S): at 08:12

## 2022-01-09 RX ADMIN — ALENDRONATE SODIUM 70 MILLIGRAM(S): 70 TABLET ORAL at 06:30

## 2022-01-09 RX ADMIN — DIVALPROEX SODIUM 500 MILLIGRAM(S): 500 TABLET, DELAYED RELEASE ORAL at 20:42

## 2022-01-10 LAB — VALPROATE SERPL-MCNC: 62.5 UG/ML — SIGNIFICANT CHANGE UP (ref 50–100)

## 2022-01-10 PROCEDURE — 99232 SBSQ HOSP IP/OBS MODERATE 35: CPT

## 2022-01-10 RX ADMIN — DIVALPROEX SODIUM 500 MILLIGRAM(S): 500 TABLET, DELAYED RELEASE ORAL at 20:29

## 2022-01-10 RX ADMIN — MAGNESIUM OXIDE 400 MG ORAL TABLET 400 MILLIGRAM(S): 241.3 TABLET ORAL at 08:52

## 2022-01-10 RX ADMIN — Medication 1000 MILLIGRAM(S): at 20:29

## 2022-01-10 RX ADMIN — LORATADINE 10 MILLIGRAM(S): 10 TABLET ORAL at 08:54

## 2022-01-10 RX ADMIN — Medication 1 PACKET(S): at 08:52

## 2022-01-10 RX ADMIN — MONTELUKAST 10 MILLIGRAM(S): 4 TABLET, CHEWABLE ORAL at 08:54

## 2022-01-10 RX ADMIN — Medication 1 PACKET(S): at 20:29

## 2022-01-10 RX ADMIN — Medication 1 TABLET(S): at 08:53

## 2022-01-10 RX ADMIN — METFORMIN HYDROCHLORIDE 500 MILLIGRAM(S): 850 TABLET ORAL at 08:53

## 2022-01-10 RX ADMIN — MAGNESIUM OXIDE 400 MG ORAL TABLET 400 MILLIGRAM(S): 241.3 TABLET ORAL at 20:29

## 2022-01-10 RX ADMIN — Medication 81 MILLIGRAM(S): at 08:53

## 2022-01-10 RX ADMIN — PREGABALIN 1000 MICROGRAM(S): 225 CAPSULE ORAL at 08:52

## 2022-01-10 RX ADMIN — Medication 5000 UNIT(S): at 10:06

## 2022-01-10 RX ADMIN — LOSARTAN POTASSIUM 100 MILLIGRAM(S): 100 TABLET, FILM COATED ORAL at 08:54

## 2022-01-10 RX ADMIN — DIVALPROEX SODIUM 250 MILLIGRAM(S): 500 TABLET, DELAYED RELEASE ORAL at 08:53

## 2022-01-10 RX ADMIN — ATORVASTATIN CALCIUM 10 MILLIGRAM(S): 80 TABLET, FILM COATED ORAL at 20:29

## 2022-01-10 RX ADMIN — BUMETANIDE 1 MILLIGRAM(S): 0.25 INJECTION INTRAMUSCULAR; INTRAVENOUS at 10:06

## 2022-01-10 RX ADMIN — GABAPENTIN 300 MILLIGRAM(S): 400 CAPSULE ORAL at 08:54

## 2022-01-10 RX ADMIN — Medication 1000 MILLIGRAM(S): at 08:52

## 2022-01-10 RX ADMIN — GABAPENTIN 600 MILLIGRAM(S): 400 CAPSULE ORAL at 20:28

## 2022-01-10 RX ADMIN — Medication 1 SPRAY(S): at 08:55

## 2022-01-10 RX ADMIN — PANTOPRAZOLE SODIUM 40 MILLIGRAM(S): 20 TABLET, DELAYED RELEASE ORAL at 08:53

## 2022-01-10 RX ADMIN — Medication 3 MILLIGRAM(S): at 21:32

## 2022-01-10 RX ADMIN — Medication 1 MILLIGRAM(S): at 08:53

## 2022-01-10 RX ADMIN — SENNA PLUS 2 TABLET(S): 8.6 TABLET ORAL at 20:29

## 2022-01-10 RX ADMIN — AMLODIPINE BESYLATE 2.5 MILLIGRAM(S): 2.5 TABLET ORAL at 08:54

## 2022-01-10 NOTE — BH INPATIENT PSYCHIATRY PROGRESS NOTE - NSBHCHARTREVIEWVS_PSY_A_CORE FT
Vital Signs Last 24 Hrs  T(C): 36.6 (01-10-22 @ 07:18), Max: 36.6 (01-10-22 @ 07:18)  T(F): 97.9 (01-10-22 @ 07:18), Max: 97.9 (01-10-22 @ 07:18)  HR: 85 (01-10-22 @ 07:18) (85 - 85)  BP: 126/53 (01-10-22 @ 07:18) (126/53 - 126/53)  BP(mean): --  RR: --  SpO2: --

## 2022-01-10 NOTE — BH INPATIENT PSYCHIATRY PROGRESS NOTE - NSBHMETABOLIC_PSY_ALL_CORE_FT
BMI: BMI (kg/m2): 27.6 (12-01-21 @ 15:23)  HbA1c: A1C with Estimated Average Glucose Result: 6.9 % (12-02-21 @ 10:05)    Glucose: POCT Blood Glucose.: 125 mg/dL (12-30-21 @ 16:20)    BP: 126/53 (01-10-22 @ 07:18) (116/52 - 126/53)  Lipid Panel:

## 2022-01-11 DIAGNOSIS — F31.10 BIPOLAR DISORDER, CURRENT EPISODE MANIC WITHOUT PSYCHOTIC FEATURES, UNSPECIFIED: ICD-10-CM

## 2022-01-11 LAB
FERRITIN SERPL-MCNC: 10 NG/ML — LOW (ref 15–150)
IRON SATN MFR SERPL: 10 % — LOW (ref 14–50)
IRON SATN MFR SERPL: 43 UG/DL — SIGNIFICANT CHANGE UP (ref 30–160)
TIBC SERPL-MCNC: 416 UG/DL — SIGNIFICANT CHANGE UP (ref 220–430)
UIBC SERPL-MCNC: 373 UG/DL — HIGH (ref 110–370)

## 2022-01-11 RX ORDER — FERROUS SULFATE 325(65) MG
300 TABLET ORAL
Refills: 0 | Status: DISCONTINUED | OUTPATIENT
Start: 2022-01-11 | End: 2022-01-11

## 2022-01-11 RX ORDER — FERROUS SULFATE 325(65) MG
300 TABLET ORAL DAILY
Refills: 0 | Status: DISCONTINUED | OUTPATIENT
Start: 2022-01-11 | End: 2022-01-18

## 2022-01-11 RX ADMIN — Medication 1 PACKET(S): at 08:49

## 2022-01-11 RX ADMIN — PREGABALIN 1000 MICROGRAM(S): 225 CAPSULE ORAL at 08:48

## 2022-01-11 RX ADMIN — Medication 1000 MILLIGRAM(S): at 08:48

## 2022-01-11 RX ADMIN — METFORMIN HYDROCHLORIDE 500 MILLIGRAM(S): 850 TABLET ORAL at 08:48

## 2022-01-11 RX ADMIN — Medication 1000 MILLIGRAM(S): at 22:13

## 2022-01-11 RX ADMIN — Medication 1 PACKET(S): at 22:09

## 2022-01-11 RX ADMIN — DIVALPROEX SODIUM 500 MILLIGRAM(S): 500 TABLET, DELAYED RELEASE ORAL at 22:10

## 2022-01-11 RX ADMIN — MONTELUKAST 10 MILLIGRAM(S): 4 TABLET, CHEWABLE ORAL at 08:49

## 2022-01-11 RX ADMIN — GABAPENTIN 600 MILLIGRAM(S): 400 CAPSULE ORAL at 22:09

## 2022-01-11 RX ADMIN — MAGNESIUM OXIDE 400 MG ORAL TABLET 400 MILLIGRAM(S): 241.3 TABLET ORAL at 08:54

## 2022-01-11 RX ADMIN — Medication 1 MILLIGRAM(S): at 08:49

## 2022-01-11 RX ADMIN — LORATADINE 10 MILLIGRAM(S): 10 TABLET ORAL at 08:54

## 2022-01-11 RX ADMIN — Medication 1 TABLET(S): at 08:48

## 2022-01-11 RX ADMIN — ATORVASTATIN CALCIUM 10 MILLIGRAM(S): 80 TABLET, FILM COATED ORAL at 22:13

## 2022-01-11 RX ADMIN — Medication 81 MILLIGRAM(S): at 08:48

## 2022-01-11 RX ADMIN — Medication 5000 UNIT(S): at 08:56

## 2022-01-11 RX ADMIN — Medication 3 MILLIGRAM(S): at 22:10

## 2022-01-11 RX ADMIN — AMLODIPINE BESYLATE 2.5 MILLIGRAM(S): 2.5 TABLET ORAL at 08:49

## 2022-01-11 RX ADMIN — Medication 1 SPRAY(S): at 08:56

## 2022-01-11 RX ADMIN — MAGNESIUM OXIDE 400 MG ORAL TABLET 400 MILLIGRAM(S): 241.3 TABLET ORAL at 22:09

## 2022-01-11 RX ADMIN — BUMETANIDE 1 MILLIGRAM(S): 0.25 INJECTION INTRAMUSCULAR; INTRAVENOUS at 08:48

## 2022-01-11 RX ADMIN — GABAPENTIN 300 MILLIGRAM(S): 400 CAPSULE ORAL at 08:49

## 2022-01-11 RX ADMIN — DIVALPROEX SODIUM 250 MILLIGRAM(S): 500 TABLET, DELAYED RELEASE ORAL at 08:48

## 2022-01-11 RX ADMIN — LOSARTAN POTASSIUM 100 MILLIGRAM(S): 100 TABLET, FILM COATED ORAL at 08:54

## 2022-01-11 RX ADMIN — SENNA PLUS 2 TABLET(S): 8.6 TABLET ORAL at 22:10

## 2022-01-11 RX ADMIN — PANTOPRAZOLE SODIUM 40 MILLIGRAM(S): 20 TABLET, DELAYED RELEASE ORAL at 08:49

## 2022-01-11 NOTE — BH DISCHARGE NOTE NURSING/SOCIAL WORK/PSYCH REHAB - DISCHARGE INSTRUCTIONS AFTERCARE APPOINTMENTS
In order to check the location, date, or time of your aftercare appointment, please refer to your Discharge Instructions Document given to you upon leaving the hospital.  If you have lost the instructions please call [phone]

## 2022-01-11 NOTE — BH INPATIENT PSYCHIATRY DISCHARGE NOTE - DESCRIPTION
Patient lives at [facility] for 4 years. She is Slov[place] / French. She has been  3 times, and has 4 children.

## 2022-01-11 NOTE — BH INPATIENT PSYCHIATRY PROGRESS NOTE - NSBHATTESTNPTRAINEE_PSY_A_CORE
agree

## 2022-01-11 NOTE — BH DISCHARGE NOTE NURSING/SOCIAL WORK/PSYCH REHAB - PATIENT PORTAL LINK FT
You can access the FollowMyHealth Patient Portal offered by [facility] by registering at the following website: [email or web address]. By joining [employer]’s FollowMyHealth portal, you will also be able to view your health information using other applications (apps) compatible with our system.

## 2022-01-11 NOTE — BH INPATIENT PSYCHIATRY DISCHARGE NOTE - NSDCCPCAREPLAN_GEN_ALL_CORE_FT
PRINCIPAL DISCHARGE DIAGNOSIS  Diagnosis: Bipolar I disorder, most recent episode (or current) manic with mixed features  Assessment and Plan of Treatment:

## 2022-01-11 NOTE — BH DISCHARGE NOTE NURSING/SOCIAL WORK/PSYCH REHAB - NSBHDCADDR1FT_A_CORE
Your appointment is virtual. You will be contacted by your provider and sent a link to join a Zoom meeting.

## 2022-01-11 NOTE — BH INPATIENT PSYCHIATRY DISCHARGE NOTE - HPI (INCLUDE ILLNESS QUALITY, SEVERITY, DURATION, TIMING, CONTEXT, MODIFYING FACTORS, ASSOCIATED SIGNS AND SYMPTOMS)
Pt is a 81 year old  female, with reported past psychiatric history of Bipolar I Disorder, reported past medical history of Diabetes, Dementia, HTN, HLD, Type II DM, Asthma, Carpal Tunnel syndrome (s/p surgery) and GERD, was brought in by EMS from [facility] after an unwitnessed fall reportedly from a sitting position with impact to the forehead.     The patient was medically evaluated and cleared for fall. However upon collateral information from daughter she has been reportedly exhibiting concerning manic symptoms. The patient was seen with primary team. She was noted to be hyperverbal with tangential thought processes which made patient an unreliable historian. The patient stated she was unsure why she was brought to the hospital from her housing facility. She stated that she was placed in the facility after her 4 children had left her home, and she became depressed. 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 She reported she wasn't doing well at the facility, and reported her last known time well as when she was 16 years old. She stated "you're not going to understand this, but it all started when my mother bought me my first long dress at 16 years old and they noticed my back was crooked."     She denied any changes in her appetite, sleep, denied any psychotic sx of AVH / paranoia, denied a period of impulsive spending, she denied anxiety. The patient reported her memory was fine, became guarded stating "I'm fine it's not because im old!" She was able to report current President, however reported previous president incorrectly. Patient reported date as December 1st. She exhibited some difficulty with time, stating her  passed in 1953 (patient was born in 1940).

## 2022-01-11 NOTE — BH INPATIENT PSYCHIATRY DISCHARGE NOTE - HOSPITAL COURSE
On admission the patient displaying an elevated mood, tangential thought process, poor sleep, and hyperverbal/pressured speech concerning for anjelica, along with cognitive abnormalities and decreased orientation/insight. She also appeared to have Taoist delusions, sharing anecdotes about healing tumors concerning for grandiosity. Collateral information from family revealed a longstanding Taoist preoccupation with prayer and healing, and while these may have been amplified towards grandiosity by her current mental state it became clear that they are not out of character for her in regards to content. Collateral information from her daughter (and health care proxy) revealed a history of psychiatric decompensation many years prior, presumed to be depression with psychotic features, that was reportedly treated with klonopin monotherapy. She says the patient responded phenomenally to anti-anxiety medications and was managed well with klonopin for years. Given the patient's history of anti-anxiety medication response, the patient was trialed on gabapentin, and titrated to 300mg qAM and 600mg qHS. The patient also has a history of untreated sleep apnea, and the resulting poor sleep/oxygenation may have also contributed to cognitive difficulties and progression to anjelica. She was started on a CPAP nightly. The patient still displayed manic symtpoms, but began improving in orientation and insight. Her conversations with family on the phone were reportedly improved, and the patient was able to identify some problem behaviors leading to admission. To target her anjelica she was started on Depakote and titrated to 250mg qAM and 500mg qHS. The patients main concern was daytime sedation from medication, but she tolerated it well with no reported side effects. Over time on medication her speech became less pressured and more interruptible, and her thought process normalized towards linearity. She began speaking in a less tangential fashion, but still shares the occasional anecdote which is indicative of her communication style. Her Taoist views were less pervasive in her shared anecdotes, and she focused more on prayer and healing rather than grandiose claims. She showed improved sleep, reporting 6-8 hours nightly. Her mood also normalized and she demonstrated a euthymic affect with less lability and expansiveness.     There were no behavioral problems on the unit.  Patient did not become agitated, did not require emergency intramuscular medications or seclusion/restraints, and did not self-harm on the unit. Patient remained actively engaged in treatment and participated in individual, group, and milieu therapy.    Medically, during this hospitalization the patient had one mechanical fall with no injury. She also experienced a choking episode which was worked up with an xray and speech/swallow evaluation. The Xray was negative for any acute pathology. 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 She was placed on a minced and moist diet and progressively returned to her home "soft" diet with no complication.     DAY OF DISCHARGE    - Mental Status  Patient appears stated age, more excessive make up again. She is cooperative with appropriate eye contact. Speech with increased productivity, normal volume, normal rate, interruptable. No PMR/ PMA. Mood is stated as "good" with euthymic affect, normal range, stable. Thought process is linear with intermittent tangential anecdotes that can relate to story (appears to be part of communication style), but redirectable. Thought content with previously noted Taoist delusions although less focused on them. No SI/HI. No AVH. Judgement fair, insight improving, impulse control intact at the time of exam.    - Suicidal and aggression risk assessments  The patient is at elevated chronic risk of self-harm due to dx of bipolar disorder, history of trauma, hx of substance abuse. Modifiable risk factors included active manic episode. Immediate risk was minimized by inpatient admission to a safe environment with appropriate supervision and limited access to lethal means. Protective factors for suicide and aggression include supportive family, future oriented thinking (planning on going back to her group home to see her boyfriend), Taoist beliefs (believes in the power of prayer and healing), stable housing at her assisted living facility, stable relationships, denial of any SI/HI. Future risk was minimized before discharge by maximizing outpatient support, providing relevant patient education, discussing emergency procedures, and ensuring close follow-up. Patient denies all suicidal and aggressive/homicidal ideation, intent and plan, and, in view of demonstrated clinical improvement, is NOT an acute danger to self or others at this time. Although the patient remains at their CHRONIC baseline risk of self-harm, such risk cannot be further ameliorated by continued inpatient treatment and the patient is therefore appropriate for discharge.     - Summary  On day of discharge, the patient has improved significantly and no longer requires inpatient treatment and care. Pt will be discharged and follow-up with outpatient care. Patient was provided with a 14-day supply of medications, extensive psychoeducation on treatment options and motivational counseling targeting healthy lifestyle. Patient was instructed on actions for crisis situations, understood and agreed to follow instructions for handling crisis, including coming to ER or calling 911 should the patient or their family feel that they are in danger of hurting self or others. Patient also was given Suicide Prevention Lifeline number 2-841-947-3355 and provided with instructions on its use.     Verbal handoff was given to the following, including discussion of hospital course, treatment, and medications. The patient’s appointment is:   On admission the patient displaying an elevated mood, tangential thought process, poor sleep, and hyperverbal/pressured speech concerning for anjelica, along with cognitive abnormalities and decreased orientation/insight. She also appeared to have Gnosticist delusions, sharing anecdotes about healing tumors concerning for grandiosity. Collateral information from family revealed a longstanding Gnosticist preoccupation with prayer and healing, and while these may have been amplified towards grandiosity by her current mental state it became clear that they are not out of character for her in regards to content. Collateral information from her daughter (and health care proxy) revealed a history of psychiatric decompensation many years prior, presumed to be depression with psychotic features, that was reportedly treated with klonopin monotherapy. She says the patient responded phenomenally to anti-anxiety medications and was managed well with klonopin for years. Given the patient's history of anti-anxiety medication response, the patient was trialed on gabapentin, and titrated to 300mg qAM and 600mg qHS. The patient also has a history of untreated sleep apnea, and the resulting poor sleep/oxygenation may have also contributed to cognitive difficulties and progression to anjelica. She was started on a CPAP nightly. The patient still displayed manic symtpoms, but began improving in orientation and insight. Her conversations with family on the phone were reportedly improved, and the patient was able to identify some problem behaviors leading to admission. To target her anjelica she was started on Depakote and titrated to 250mg qAM and 500mg qHS. The patients main concern was daytime sedation from medication, but she tolerated it well with no reported side effects. Over time on medication her speech became less pressured and more interruptible, and her thought process normalized towards linearity. She began speaking in a less tangential fashion, but still shares the occasional anecdote which is indicative of her communication style. Her Gnosticist views were less pervasive in her shared anecdotes, and she focused more on prayer and healing rather than grandiose claims. She showed improved sleep, reporting 6-8 hours nightly. Her mood also normalized and she demonstrated a euthymic affect with less lability and expansiveness. VPA level 62    There were no behavioral problems on the unit.  Patient did not become agitated, did not require emergency intramuscular medications or seclusion/restraints, and did not self-harm on the unit. Patient remained actively engaged in treatment and participated in individual, group, and milieu therapy.    Medically, during this hospitalization the patient had one mechanical fall with no injury. She also experienced a choking episode which was worked up with an xray and speech/swallow evaluation. The Xray was negative for any acute pathology. 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 She was placed on a minced and moist diet and progressively returned to her home "soft" diet with no complication.     DAY OF DISCHARGE    - Mental Status  Patient appears stated age, more excessive make up again. She is cooperative with appropriate eye contact. Speech with increased productivity, normal volume, normal rate, interruptable. No PMR/ PMA. Mood is stated as "good" with euthymic affect, normal range, stable. Thought process is linear with intermittent tangential anecdotes that can relate to story (appears to be part of communication style), but redirectable. Thought content with previously noted Gnosticist delusions although less focused on them. No SI/HI. No AVH. Judgement fair, insight improving, impulse control intact at the time of exam.    - Suicidal and aggression risk assessments  The patient is at elevated chronic risk of self-harm due to dx of bipolar disorder, history of trauma, hx of substance abuse. Modifiable risk factors included active manic episode. Immediate risk was minimized by inpatient admission to a safe environment with appropriate supervision and limited access to lethal means. Protective factors for suicide and aggression include supportive family, future oriented thinking (planning on going back to her group home to see her boyfriend), Gnosticist beliefs (believes in the power of prayer and healing), stable housing at her assisted living facility, stable relationships, denial of any SI/HI. Future risk was minimized before discharge by maximizing outpatient support, providing relevant patient education, discussing emergency procedures, and ensuring close follow-up. Patient denies all suicidal and aggressive/homicidal ideation, intent and plan, and, in view of demonstrated clinical improvement, is NOT an acute danger to self or others at this time. Although the patient remains at their CHRONIC baseline risk of self-harm, such risk cannot be further ameliorated by continued inpatient treatment and the patient is therefore appropriate for discharge.     - Summary  On day of discharge, the patient has improved significantly and no longer requires inpatient treatment and care. Pt will be discharged and follow-up with outpatient care. Patient was provided with a30 day supply of medication sent to assisted living, extensive psychoeducation on treatment options and motivational counseling targeting healthy lifestyle. Patient was instructed on actions for crisis situations, understood and agreed to follow instructions for handling crisis, including coming to ER or calling 911 should the patient or their family feel that they are in danger of hurting self or others. Patient also was given Suicide Prevention Lifeline number 1-805.747.5333 and provided with instructions on its use.

## 2022-01-11 NOTE — BH DISCHARGE NOTE NURSING/SOCIAL WORK/PSYCH REHAB - NSDCPRRECOMMEND_PSY_ALL_CORE
Patient is scheduled to be discharged on Tuesday January 18th 2022 and will return to her previous residence. Writer encouraged patient to continue utilizing her coping skills, maintain medication compliance and to participate in structured social activities.

## 2022-01-11 NOTE — BH INPATIENT PSYCHIATRY PROGRESS NOTE - CASE SUMMARY
Agree
agree with above will start VPA
Agree
Agree
agree with above, consider Li for anjelica Cr 0.57 no major contraindication. Start CPAP , clarify diet
Agree with assessment and plan of resident. Will recheck  VPA level Monday, eval need to increase dose. Patient is improving less intrusive, gaining some insight. Will focus on insight and judgement particularly safety awarenss around not leaving her home assisted living at night alone
Patient continues manic. Cont Depakote trial, valproic acid level ordered for 1/3. Vitamin levels ordered
agree with a/p of resident Patient appearing less manic, less pressured showing some insight about her behaviors when manic including leaving facility unsupervised at night. Will not for now increase Depakote
agree with above, patient  hyperverbal, thought disordered, sleep is somewhat better,  will recheck VPA level, consider trial seroquel for anjelica with plan to taper gabapentin
Agree with assessment and plan of resident. PAtient remains with grandiose Mormon delusions though appears current symptoms represent worsening of a long term  baseline In that she reports she saw [employer] [place] [employer] in a vision around birth of a child and since then has come to believe she has a special relation with her as well as ability to heal . Consider low dose Abilify and taper gabapentin
agree with above. Cont Depakote and gabapentin, discussed with medicine will cont ASA and add Fe for anemia will need further w/u post d/c
Agree with above A/P patient seen as less overactive, less intrusive with peers. Cont meds, check VPA, ordered Biotene for c/o dry mouth

## 2022-01-11 NOTE — BH INPATIENT PSYCHIATRY DISCHARGE NOTE - NSICDXPASTMEDICALHX_GEN_ALL_CORE_FT
PAST MEDICAL HISTORY:  Dementia     Diabetes mellitus     DM (diabetes mellitus)     HTN (hypertension)     Hyperlipemia

## 2022-01-11 NOTE — BH INPATIENT PSYCHIATRY DISCHARGE NOTE - NSDCMRMEDTOKEN_GEN_ALL_CORE_FT
amlodipine 5 mg oral tablet: 1 tab(s) orally once a day  cefadroxil 500 mg oral capsule: 1 cap(s) orally 2 times a day   hydrochlorothiazide 25 mg oral tablet: 1 tab(s) orally once a day  Lantus Solostar Pen 100 units/mL subcutaneous solution: 10 unit(s) subcutaneous   Lantus Solostar Pen 100 units/mL subcutaneous solution: 14 unit(s) subcutaneous   lidocaine 5% topical film: Apply topically to affected area once a day   losartan 100 mg oral tablet: 1 tab(s) orally once a day  Naprosyn 500 mg oral tablet: 1 tab(s) orally 2 times a day   naproxen 500 mg oral tablet:  orally once a day (at bedtime)  omeprazole 20 mg oral delayed release tablet: 1 tab(s) orally once a day  Onglyza 5 mg oral tablet: 1 tab(s) orally once a day  Percocet 5/325 oral tablet: 1 tab(s) orally every 6 hours MDD:20mg  sertraline 50 mg oral tablet: 1 tab(s) orally once a day  simvastatin 5 mg oral tablet: 1 tab(s) orally once a day (at bedtime)  Singulair 10 mg oral tablet: 1 tab(s) orally once a day (in the evening)  Zyrtec 10 mg oral tablet: 1 tab(s) orally once a day   acetaminophen 325 mg oral tablet: 2 tab(s) orally every 6 hours, As needed, Mild Pain (1 - 3)  alendronate 70 mg/75 mL oral solution: 75 milliliter(s) orally once a week  at 6;30 AM on Sundays   amLODIPine 2.5 mg oral tablet: 1 tab(s) orally once a day  ascorbic acid 1000 mg oral tablet: 1 tab(s) orally 2 times a day  aspirin 81 mg oral tablet, chewable: 1 tab(s) orally once a day  Astelin 0.1% nasal spray: 1 spray(s) in each nostril 2 times a day  bumetanide 1 mg oral tablet: 1 tab(s) orally once a day  calcium-vitamin D 500 mg-5 mcg (200 intl units) oral tablet: 1 tab(s) orally once a day  cholecalciferol oral tablet: 5000 unit(s) orally once a day  cyanocobalamin 1000 mcg oral tablet: 1 tab(s) orally once a day  divalproex sodium 125 mg oral delayed release capsule: 2 caps in AM and 4 caps in PM  ferrous sulfate 300 mg/5 mL (60 mg/5 mL elemental iron) oral liquid: 5 milliliter(s) orally once a day  fexofenadine 60 mg oral tablet: 1 tab(s) orally 2 times a day  folic acid 1 mg oral tablet: 1 tab(s) orally once a day  gabapentin 300 mg oral capsule: 1 cap(s) in AM and 2 caps at hs  loperamide 2 mg oral capsule: 1 cap(s) orally once a day, As needed, Diarrhea  losartan 100 mg oral tablet: 1 tab(s) orally once a day  magnesium hydroxide 8% oral suspension: 30 milliliter(s) orally once a day, As needed, Constipation  melatonin 3 mg oral tablet: 1 tab(s) orally once a day (at bedtime) and may take one addition tab prn nightly prn insomnia  Metamucil Unflavored Coarse Milled Original 3.4 g/7 g oral powder for reconstitution: 1 packet(s) orally 2 times a day  metFORMIN 500 mg oral tablet: 1 tab(s) orally 2 times a day (with meals)  montelukast 10 mg oral tablet: 1 tab(s) orally once a day  Ocean 0.65% nasal spray: 1 spray(s) nasal 2 times a day, As Needed  omeprazole 20 mg oral delayed release tablet: 1 tab(s) orally once a day  pravastatin 20 mg oral tablet: 1 tab(s) orally once a day (at bedtime)   saliva substitutes oral solution: 5 milliliter(s) swish and spit orally every 4 hours, As Needed for dry mouth  senna oral tablet: 2 tab(s) orally once a day (at bedtime)  SITagliptin 50 mg oral tablet: 1 tab(s) orally once a day  Slow-Mag 64m tab(s) orally 3 times a day   alendronate 70 mg/75 mL oral solution: 75 milliliter(s) orally once a week  at 6;30 AM on Sundays   amLODIPine 2.5 mg oral tablet: 1 tab(s) orally once a day  ascorbic acid 1000 mg oral tablet: 1 tab(s) orally 2 times a day  aspirin 81 mg oral tablet, chewable: 1 tab(s) orally once a day  Astelin 0.1% nasal spray: 1 spray(s) in each nostril 2 times a day  bumetanide 1 mg oral tablet: 1 tab(s) orally once a day  calcium-vitamin D 500 mg-5 mcg (200 intl units) oral tablet: 1 tab(s) orally once a day  cholecalciferol oral tablet: 5000 unit(s) orally once a day  cyanocobalamin 1000 mcg oral tablet: 1 tab(s) orally once a day  divalproex sodium 125 mg oral delayed release capsule: 2 caps in AM and 4 caps in PM  ferrous sulfate 300 mg/5 mL (60 mg/5 mL elemental iron) oral liquid: 5 milliliter(s) orally once a day  fexofenadine 60 mg oral tablet: 1 tab(s) orally 2 times a day  folic acid 0.8 mg oral tablet: 1 tab(s) orally once a day  gabapentin 300 mg oral capsule: 1 cap(s) in AM and 2 caps at hs  loperamide 2 mg oral capsule: 1 cap(s) orally once a day, As needed, Diarrhea  losartan 100 mg oral tablet: 1 tab(s) orally once a day  magnesium hydroxide 8% oral suspension: 30 milliliter(s) orally once a day, As needed, Constipation  melatonin 3 mg oral tablet: 1 tab(s) orally once a day (at bedtime) and may take one addition tab prn nightly prn insomnia  Metamucil Unflavored Coarse Milled Original 3.4 g/7 g oral powder for reconstitution: 1 packet(s) orally 2 times a day  metFORMIN 500 mg oral tablet: 1 tab(s) orally 2 times a day (with meals)  montelukast 10 mg oral tablet: 1 tab(s) orally once a day  Ocean 0.65% nasal spray: 1 spray(s) nasal 2 times a day, As Needed  omeprazole 20 mg oral delayed release tablet: 1 tab(s) orally once a day  pravastatin 20 mg oral tablet: 1 tab(s) orally once a day (at bedtime)   saliva substitutes oral solution: 5 milliliter(s) swish and spit orally every 4 hours, As Needed for dry mouth  senna oral tablet: 2 tab(s) orally once a day (at bedtime)  SITagliptin 50 mg oral tablet: 1 tab(s) orally once a day  Slow-Mag 64m tab(s) orally 3 times a day  Tylenol 500 mg oral tablet: 1 tab(s) orally every 6 hours, As Needed - 3)

## 2022-01-11 NOTE — BH INPATIENT PSYCHIATRY PROGRESS NOTE - NSBHCHARTREVIEWVS_PSY_A_CORE FT
Vital Signs Last 24 Hrs  T(C): 36.4 (01-11-22 @ 07:49), Max: 36.4 (01-11-22 @ 07:49)  T(F): 97.6 (01-11-22 @ 07:49), Max: 97.6 (01-11-22 @ 07:49)  HR: --  BP: --  BP(mean): --  RR: --  SpO2: --    Orthostatic VS  01-11-22 @ 07:49  Lying BP: --/-- HR: --  Sitting BP: 124/57 HR: 76  Standing BP: --/-- HR: --  Site: --  Mode: --

## 2022-01-11 NOTE — BH INPATIENT PSYCHIATRY PROGRESS NOTE - CURRENT MEDICATION
MEDICATIONS  (STANDING):  alendronate 70 milliGRAM(s) Oral <User Schedule>  amLODIPine   Tablet 2.5 milliGRAM(s) Oral daily  ascorbic acid 1000 milliGRAM(s) Oral two times a day  aspirin  chewable 81 milliGRAM(s) Oral daily  atorvastatin 10 milliGRAM(s) Oral at bedtime  buMETAnide 1 milliGRAM(s) Oral daily  calcium carbonate 1250 mG  + Vitamin D (OsCal 500 + D) 1 Tablet(s) Oral daily  cholecalciferol 5000 Unit(s) Oral daily  cyanocobalamin 1000 MICROGram(s) Oral daily  dextrose 40% Gel 15 Gram(s) Oral once  dextrose 5%. 1000 milliLiter(s) (50 mL/Hr) IV Continuous <Continuous>  dextrose 5%. 1000 milliLiter(s) (100 mL/Hr) IV Continuous <Continuous>  dextrose 50% Injectable 25 Gram(s) IV Push once  dextrose 50% Injectable 12.5 Gram(s) IV Push once  dextrose 50% Injectable 25 Gram(s) IV Push once  diVALproex Sprinkle 250 milliGRAM(s) Oral daily  diVALproex Sprinkle 500 milliGRAM(s) Oral at bedtime  folic acid 1 milliGRAM(s) Oral daily  gabapentin 300 milliGRAM(s) Oral daily  gabapentin 600 milliGRAM(s) Oral at bedtime  glucagon  Injectable 1 milliGRAM(s) IntraMuscular once  loratadine 10 milliGRAM(s) Oral daily  losartan 100 milliGRAM(s) Oral daily  magnesium oxide 400 milliGRAM(s) Oral every 12 hours  melatonin. 3 milliGRAM(s) Oral at bedtime  metFORMIN 500 milliGRAM(s) Oral two times a day with meals  montelukast 10 milliGRAM(s) Oral daily  pantoprazole   Suspension 40 milliGRAM(s) Oral daily  psyllium Powder 1 Packet(s) Oral two times a day  senna 2 Tablet(s) Oral at bedtime  sitaGLIPtin 50 milliGRAM(s) Oral daily  sodium chloride 0.65% Nasal 1 Spray(s) Both Nostrils every 12 hours    MEDICATIONS  (PRN):  acetaminophen     Tablet .. 650 milliGRAM(s) Oral every 6 hours PRN Mild Pain (1 - 3)  Biotene Dry Mouth Oral Rinse 5 milliLiter(s) Swish and Spit every 4 hours PRN dry mouth  loperamide 2 milliGRAM(s) Oral daily PRN Diarrhea  magnesium hydroxide Suspension 30 milliLiter(s) Oral daily PRN Constipation  melatonin. 3 milliGRAM(s) Oral at bedtime PRN Insomnia   MEDICATIONS  (STANDING):  alendronate 70 milliGRAM(s) Oral <User Schedule>  amLODIPine   Tablet 2.5 milliGRAM(s) Oral daily  ascorbic acid 1000 milliGRAM(s) Oral two times a day  aspirin  chewable 81 milliGRAM(s) Oral daily  atorvastatin 10 milliGRAM(s) Oral at bedtime  buMETAnide 1 milliGRAM(s) Oral daily  calcium carbonate 1250 mG  + Vitamin D (OsCal 500 + D) 1 Tablet(s) Oral daily  cholecalciferol 5000 Unit(s) Oral daily  cyanocobalamin 1000 MICROGram(s) Oral daily  dextrose 40% Gel 15 Gram(s) Oral once  dextrose 5%. 1000 milliLiter(s) (50 mL/Hr) IV Continuous <Continuous>  dextrose 5%. 1000 milliLiter(s) (100 mL/Hr) IV Continuous <Continuous>  dextrose 50% Injectable 25 Gram(s) IV Push once  dextrose 50% Injectable 12.5 Gram(s) IV Push once  dextrose 50% Injectable 25 Gram(s) IV Push once  diVALproex Sprinkle 250 milliGRAM(s) Oral daily  diVALproex Sprinkle 500 milliGRAM(s) Oral at bedtime  ferrous    sulfate Liquid 300 milliGRAM(s) Oral daily  folic acid 1 milliGRAM(s) Oral daily  gabapentin 300 milliGRAM(s) Oral daily  gabapentin 600 milliGRAM(s) Oral at bedtime  glucagon  Injectable 1 milliGRAM(s) IntraMuscular once  loratadine 10 milliGRAM(s) Oral daily  losartan 100 milliGRAM(s) Oral daily  magnesium oxide 400 milliGRAM(s) Oral every 12 hours  melatonin. 3 milliGRAM(s) Oral at bedtime  metFORMIN 500 milliGRAM(s) Oral two times a day with meals  montelukast 10 milliGRAM(s) Oral daily  pantoprazole   Suspension 40 milliGRAM(s) Oral daily  psyllium Powder 1 Packet(s) Oral two times a day  senna 2 Tablet(s) Oral at bedtime  sitaGLIPtin 50 milliGRAM(s) Oral daily  sodium chloride 0.65% Nasal 1 Spray(s) Both Nostrils every 12 hours    MEDICATIONS  (PRN):  acetaminophen     Tablet .. 650 milliGRAM(s) Oral every 6 hours PRN Mild Pain (1 - 3)  Biotene Dry Mouth Oral Rinse 5 milliLiter(s) Swish and Spit every 4 hours PRN dry mouth  loperamide 2 milliGRAM(s) Oral daily PRN Diarrhea  magnesium hydroxide Suspension 30 milliLiter(s) Oral daily PRN Constipation  melatonin. 3 milliGRAM(s) Oral at bedtime PRN Insomnia

## 2022-01-11 NOTE — BH INPATIENT PSYCHIATRY PROGRESS NOTE - MODIFICATIONS
None
NA
None
NA

## 2022-01-11 NOTE — BH DISCHARGE NOTE NURSING/SOCIAL WORK/PSYCH REHAB - NSCDUDCCRISIS_PSY_A_CORE
[facility] Well  [phone] [facility]-WELL ([phone])  Text "WELL" to [phone]  Website: [email or web address] [facility] 1 [phone] 861-EVYX ([phone]) Website: www.safehorizon.org/.National Suicide Prevention Lifeline 1 (582) 889-7971/.  [employer]  1 (270) LIFENET (570-0067)/.  [facility]  [phone] [place] [address] [phone]   Hours:  Monday through Friday from 9 AM to 3 PM/.  [employer] [facility] Crisis Line  [phone], Option 1

## 2022-01-11 NOTE — BH INPATIENT PSYCHIATRY DISCHARGE NOTE - DETAILS
Patient was a poor historian due to tangential thought processes. On reassessment and after discussing with patient's family, it was revealed that the patient does have a distant history of trauma.

## 2022-01-11 NOTE — BH INPATIENT PSYCHIATRY DISCHARGE NOTE - NSBHDCMEDICALFT_PSY_A_CORE
Medically, during this hospitalization the patient had one mechanical fall with no injury. She also experienced a choking episode which was worked up with an xray and speech/swallow evaluation. The Xray was negative for any acute pathology. 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 She was placed on a minced and moist diet and progressively returned to her home "soft" diet with no complication.    Medically, during this hospitalization the patient had one mechanical fall with no injury. She also experienced a choking episode which was worked up with an xray and speech/swallow evaluation. The Xray was negative for any acute pathology. 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 She was placed on a minced and moist diet and progressively returned to her home "soft" diet with no complication.   Dx with Fe deficiency started on Fe family aware and will arrange for outpatinet w/u with own providers

## 2022-01-11 NOTE — BH INPATIENT PSYCHIATRY DISCHARGE NOTE - OTHER PAST PSYCHIATRIC HISTORY (INCLUDE DETAILS REGARDING ONSET, COURSE OF ILLNESS, INPATIENT/OUTPATIENT TREATMENT)
Pt has had one previous hospitalization; [place] in 2017, and she was placed in assisted living from there.  Pt has a previous diagnosis of BAD, PTSD, and personality disorder.

## 2022-01-12 ENCOUNTER — NON-APPOINTMENT (OUTPATIENT)
Age: 82
End: 2022-01-12

## 2022-01-12 ENCOUNTER — APPOINTMENT (OUTPATIENT)
Dept: PULMONOLOGY | Facility: CLINIC | Age: 82
End: 2022-01-12
Payer: MEDICARE

## 2022-01-12 PROBLEM — F03.90 UNSPECIFIED DEMENTIA WITHOUT BEHAVIORAL DISTURBANCE: Chronic | Status: ACTIVE | Noted: 2021-12-01

## 2022-01-12 LAB — SARS-COV-2 RNA SPEC QL NAA+PROBE: SIGNIFICANT CHANGE UP

## 2022-01-12 PROCEDURE — 99448 NTRPROF PH1/NTRNET/EHR 21-30: CPT

## 2022-01-12 PROCEDURE — 99232 SBSQ HOSP IP/OBS MODERATE 35: CPT

## 2022-01-12 RX ORDER — SALIVA SUBSTITUTE COMB NO.11 351 MG
5 POWDER IN PACKET (EA) MUCOUS MEMBRANE
Qty: 0 | Refills: 0 | DISCHARGE
Start: 2022-01-12

## 2022-01-12 RX ORDER — AMLODIPINE BESYLATE 2.5 MG/1
1 TABLET ORAL
Qty: 30 | Refills: 0
Start: 2022-01-12 | End: 2022-02-10

## 2022-01-12 RX ORDER — LOPERAMIDE HCL 2 MG
1 TABLET ORAL
Qty: 0 | Refills: 0 | DISCHARGE
Start: 2022-01-12

## 2022-01-12 RX ORDER — PSYLLIUM SEED (WITH DEXTROSE)
1 POWDER (GRAM) ORAL
Qty: 0 | Refills: 0 | DISCHARGE
Start: 2022-01-12

## 2022-01-12 RX ORDER — ACETAMINOPHEN 500 MG
2 TABLET ORAL
Qty: 0 | Refills: 0 | DISCHARGE
Start: 2022-01-12

## 2022-01-12 RX ORDER — ASPIRIN/CALCIUM CARB/MAGNESIUM 324 MG
1 TABLET ORAL
Qty: 30 | Refills: 0
Start: 2022-01-12 | End: 2022-02-10

## 2022-01-12 RX ORDER — SENNA PLUS 8.6 MG/1
2 TABLET ORAL
Qty: 0 | Refills: 0 | DISCHARGE
Start: 2022-01-12

## 2022-01-12 RX ORDER — LANOLIN ALCOHOL/MO/W.PET/CERES
1 CREAM (GRAM) TOPICAL
Qty: 0 | Refills: 0 | DISCHARGE
Start: 2022-01-12

## 2022-01-12 RX ORDER — FOLIC ACID 0.8 MG
1 TABLET ORAL
Qty: 0 | Refills: 0 | DISCHARGE
Start: 2022-01-12

## 2022-01-12 RX ORDER — PREGABALIN 225 MG/1
1 CAPSULE ORAL
Qty: 0 | Refills: 0 | DISCHARGE
Start: 2022-01-12

## 2022-01-12 RX ORDER — MAGNESIUM HYDROXIDE 400 MG/1
30 TABLET, CHEWABLE ORAL
Qty: 0 | Refills: 0 | DISCHARGE
Start: 2022-01-12

## 2022-01-12 RX ORDER — BUMETANIDE 0.25 MG/ML
1 INJECTION INTRAMUSCULAR; INTRAVENOUS
Qty: 30 | Refills: 0
Start: 2022-01-12 | End: 2022-02-10

## 2022-01-12 RX ORDER — SODIUM CHLORIDE 0.65 %
1 AEROSOL, SPRAY (ML) NASAL
Qty: 0 | Refills: 0 | DISCHARGE
Start: 2022-01-12

## 2022-01-12 RX ORDER — MONTELUKAST 4 MG/1
1 TABLET, CHEWABLE ORAL
Qty: 30 | Refills: 0
Start: 2022-01-12 | End: 2022-02-10

## 2022-01-12 RX ORDER — SITAGLIPTIN 50 MG/1
1 TABLET, FILM COATED ORAL
Qty: 30 | Refills: 0
Start: 2022-01-12 | End: 2022-02-10

## 2022-01-12 RX ORDER — ASCORBIC ACID 60 MG
1 TABLET,CHEWABLE ORAL
Qty: 0 | Refills: 0 | DISCHARGE
Start: 2022-01-12

## 2022-01-12 RX ORDER — FERROUS SULFATE 325(65) MG
5 TABLET ORAL
Qty: 150 | Refills: 0
Start: 2022-01-12 | End: 2022-02-10

## 2022-01-12 RX ORDER — METFORMIN HYDROCHLORIDE 850 MG/1
1 TABLET ORAL
Qty: 60 | Refills: 0
Start: 2022-01-12 | End: 2022-02-10

## 2022-01-12 RX ORDER — DIVALPROEX SODIUM 500 MG/1
2 TABLET, DELAYED RELEASE ORAL
Qty: 180 | Refills: 0
Start: 2022-01-12 | End: 2022-02-10

## 2022-01-12 RX ORDER — CHOLECALCIFEROL (VITAMIN D3) 125 MCG
5000 CAPSULE ORAL
Qty: 0 | Refills: 0 | DISCHARGE
Start: 2022-01-12

## 2022-01-12 RX ORDER — GABAPENTIN 400 MG/1
1 CAPSULE ORAL
Qty: 60 | Refills: 0
Start: 2022-01-12 | End: 2022-02-10

## 2022-01-12 RX ORDER — ALENDRONATE SODIUM 70 MG/1
75 TABLET ORAL
Qty: 2250 | Refills: 0
Start: 2022-01-12 | End: 2022-02-10

## 2022-01-12 RX ORDER — LOSARTAN POTASSIUM 100 MG/1
1 TABLET, FILM COATED ORAL
Qty: 30 | Refills: 0
Start: 2022-01-12 | End: 2022-02-10

## 2022-01-12 RX ADMIN — METFORMIN HYDROCHLORIDE 500 MILLIGRAM(S): 850 TABLET ORAL at 10:08

## 2022-01-12 RX ADMIN — AMLODIPINE BESYLATE 2.5 MILLIGRAM(S): 2.5 TABLET ORAL at 10:10

## 2022-01-12 RX ADMIN — MONTELUKAST 10 MILLIGRAM(S): 4 TABLET, CHEWABLE ORAL at 10:11

## 2022-01-12 RX ADMIN — Medication 1 SPRAY(S): at 10:11

## 2022-01-12 RX ADMIN — Medication 1000 MILLIGRAM(S): at 10:10

## 2022-01-12 RX ADMIN — PREGABALIN 1000 MICROGRAM(S): 225 CAPSULE ORAL at 10:10

## 2022-01-12 RX ADMIN — LOSARTAN POTASSIUM 100 MILLIGRAM(S): 100 TABLET, FILM COATED ORAL at 10:08

## 2022-01-12 RX ADMIN — MAGNESIUM OXIDE 400 MG ORAL TABLET 400 MILLIGRAM(S): 241.3 TABLET ORAL at 21:11

## 2022-01-12 RX ADMIN — ATORVASTATIN CALCIUM 10 MILLIGRAM(S): 80 TABLET, FILM COATED ORAL at 21:12

## 2022-01-12 RX ADMIN — Medication 5000 UNIT(S): at 10:09

## 2022-01-12 RX ADMIN — Medication 1 PACKET(S): at 21:12

## 2022-01-12 RX ADMIN — DIVALPROEX SODIUM 500 MILLIGRAM(S): 500 TABLET, DELAYED RELEASE ORAL at 21:12

## 2022-01-12 RX ADMIN — Medication 1 MILLIGRAM(S): at 10:09

## 2022-01-12 RX ADMIN — METFORMIN HYDROCHLORIDE 500 MILLIGRAM(S): 850 TABLET ORAL at 16:56

## 2022-01-12 RX ADMIN — PANTOPRAZOLE SODIUM 40 MILLIGRAM(S): 20 TABLET, DELAYED RELEASE ORAL at 10:09

## 2022-01-12 RX ADMIN — Medication 1 TABLET(S): at 10:09

## 2022-01-12 RX ADMIN — Medication 5 MILLILITER(S): at 10:11

## 2022-01-12 RX ADMIN — MAGNESIUM OXIDE 400 MG ORAL TABLET 400 MILLIGRAM(S): 241.3 TABLET ORAL at 10:10

## 2022-01-12 RX ADMIN — Medication 3 MILLIGRAM(S): at 21:12

## 2022-01-12 RX ADMIN — GABAPENTIN 300 MILLIGRAM(S): 400 CAPSULE ORAL at 10:11

## 2022-01-12 RX ADMIN — Medication 81 MILLIGRAM(S): at 10:08

## 2022-01-12 RX ADMIN — Medication 1000 MILLIGRAM(S): at 21:11

## 2022-01-12 RX ADMIN — LORATADINE 10 MILLIGRAM(S): 10 TABLET ORAL at 10:11

## 2022-01-12 RX ADMIN — BUMETANIDE 1 MILLIGRAM(S): 0.25 INJECTION INTRAMUSCULAR; INTRAVENOUS at 10:10

## 2022-01-12 RX ADMIN — Medication 1 SPRAY(S): at 21:13

## 2022-01-12 RX ADMIN — GABAPENTIN 600 MILLIGRAM(S): 400 CAPSULE ORAL at 21:11

## 2022-01-12 RX ADMIN — SENNA PLUS 2 TABLET(S): 8.6 TABLET ORAL at 21:11

## 2022-01-12 RX ADMIN — DIVALPROEX SODIUM 250 MILLIGRAM(S): 500 TABLET, DELAYED RELEASE ORAL at 10:08

## 2022-01-12 RX ADMIN — Medication 1 PACKET(S): at 10:09

## 2022-01-12 RX ADMIN — Medication 300 MILLIGRAM(S): at 10:09

## 2022-01-12 RX ADMIN — Medication 3 MILLIGRAM(S): at 02:14

## 2022-01-12 NOTE — BH INPATIENT PSYCHIATRY PROGRESS NOTE - CURRENT MEDICATION
MEDICATIONS  (STANDING):  alendronate 70 milliGRAM(s) Oral <User Schedule>  amLODIPine   Tablet 2.5 milliGRAM(s) Oral daily  ascorbic acid 1000 milliGRAM(s) Oral two times a day  aspirin  chewable 81 milliGRAM(s) Oral daily  atorvastatin 10 milliGRAM(s) Oral at bedtime  buMETAnide 1 milliGRAM(s) Oral daily  calcium carbonate 1250 mG  + Vitamin D (OsCal 500 + D) 1 Tablet(s) Oral daily  cholecalciferol 5000 Unit(s) Oral daily  cyanocobalamin 1000 MICROGram(s) Oral daily  dextrose 40% Gel 15 Gram(s) Oral once  dextrose 5%. 1000 milliLiter(s) (50 mL/Hr) IV Continuous <Continuous>  dextrose 5%. 1000 milliLiter(s) (100 mL/Hr) IV Continuous <Continuous>  dextrose 50% Injectable 25 Gram(s) IV Push once  dextrose 50% Injectable 12.5 Gram(s) IV Push once  dextrose 50% Injectable 25 Gram(s) IV Push once  diVALproex Sprinkle 250 milliGRAM(s) Oral daily  diVALproex Sprinkle 500 milliGRAM(s) Oral at bedtime  ferrous    sulfate Liquid 300 milliGRAM(s) Oral daily  folic acid 1 milliGRAM(s) Oral daily  gabapentin 300 milliGRAM(s) Oral daily  gabapentin 600 milliGRAM(s) Oral at bedtime  glucagon  Injectable 1 milliGRAM(s) IntraMuscular once  loratadine 10 milliGRAM(s) Oral daily  losartan 100 milliGRAM(s) Oral daily  magnesium oxide 400 milliGRAM(s) Oral every 12 hours  melatonin. 3 milliGRAM(s) Oral at bedtime  metFORMIN 500 milliGRAM(s) Oral two times a day with meals  montelukast 10 milliGRAM(s) Oral daily  pantoprazole   Suspension 40 milliGRAM(s) Oral daily  psyllium Powder 1 Packet(s) Oral two times a day  senna 2 Tablet(s) Oral at bedtime  sitaGLIPtin 50 milliGRAM(s) Oral daily  sodium chloride 0.65% Nasal 1 Spray(s) Both Nostrils every 12 hours    MEDICATIONS  (PRN):  acetaminophen     Tablet .. 650 milliGRAM(s) Oral every 6 hours PRN Mild Pain (1 - 3)  Biotene Dry Mouth Oral Rinse 5 milliLiter(s) Swish and Spit every 4 hours PRN dry mouth  loperamide 2 milliGRAM(s) Oral daily PRN Diarrhea  magnesium hydroxide Suspension 30 milliLiter(s) Oral daily PRN Constipation  melatonin. 3 milliGRAM(s) Oral at bedtime PRN Insomnia

## 2022-01-12 NOTE — BH INPATIENT PSYCHIATRY PROGRESS NOTE - NSBHCHARTREVIEWVS_PSY_A_CORE FT
Vital Signs Last 24 Hrs  T(C): 36.7 (01-11-22 @ 15:33), Max: 36.7 (01-11-22 @ 15:33)  T(F): 98.1 (01-11-22 @ 15:33), Max: 98.1 (01-11-22 @ 15:33)  HR: 64 (01-12-22 @ 06:31) (64 - 64)  BP: 113/68 (01-12-22 @ 06:31) (113/68 - 113/68)  BP(mean): --  RR: --  SpO2: --    Orthostatic VS  01-11-22 @ 07:49  Lying BP: --/-- HR: --  Sitting BP: 124/57 HR: 76  Standing BP: --/-- HR: --  Site: --  Mode: --

## 2022-01-12 NOTE — BH INPATIENT PSYCHIATRY PROGRESS NOTE - NSBHMETABOLIC_PSY_ALL_CORE_FT
BMI: BMI (kg/m2): 27.6 (12-01-21 @ 15:23)  HbA1c: A1C with Estimated Average Glucose Result: 6.9 % (12-02-21 @ 10:05)    Glucose: POCT Blood Glucose.: 125 mg/dL (12-30-21 @ 16:20)    BP: 113/68 (01-12-22 @ 06:31) (113/68 - 126/53)  Lipid Panel:

## 2022-01-12 NOTE — BH INPATIENT PSYCHIATRY PROGRESS NOTE - NSBHFUPINTERVALCCFT_PSY_A_CORE
"I'm battling this mask" Patient reports difficulty getting used to CPAP mask, no dizziness, cough, SOB

## 2022-01-13 PROCEDURE — 99232 SBSQ HOSP IP/OBS MODERATE 35: CPT

## 2022-01-13 RX ADMIN — PANTOPRAZOLE SODIUM 40 MILLIGRAM(S): 20 TABLET, DELAYED RELEASE ORAL at 08:12

## 2022-01-13 RX ADMIN — BUMETANIDE 1 MILLIGRAM(S): 0.25 INJECTION INTRAMUSCULAR; INTRAVENOUS at 08:10

## 2022-01-13 RX ADMIN — Medication 3 MILLIGRAM(S): at 22:00

## 2022-01-13 RX ADMIN — Medication 1 SPRAY(S): at 08:13

## 2022-01-13 RX ADMIN — Medication 300 MILLIGRAM(S): at 08:12

## 2022-01-13 RX ADMIN — Medication 1 PACKET(S): at 22:01

## 2022-01-13 RX ADMIN — LOSARTAN POTASSIUM 100 MILLIGRAM(S): 100 TABLET, FILM COATED ORAL at 08:11

## 2022-01-13 RX ADMIN — DIVALPROEX SODIUM 500 MILLIGRAM(S): 500 TABLET, DELAYED RELEASE ORAL at 22:01

## 2022-01-13 RX ADMIN — METFORMIN HYDROCHLORIDE 500 MILLIGRAM(S): 850 TABLET ORAL at 08:10

## 2022-01-13 RX ADMIN — Medication 1 SPRAY(S): at 22:38

## 2022-01-13 RX ADMIN — ATORVASTATIN CALCIUM 10 MILLIGRAM(S): 80 TABLET, FILM COATED ORAL at 21:58

## 2022-01-13 RX ADMIN — AMLODIPINE BESYLATE 2.5 MILLIGRAM(S): 2.5 TABLET ORAL at 08:10

## 2022-01-13 RX ADMIN — Medication 1 TABLET(S): at 08:08

## 2022-01-13 RX ADMIN — MONTELUKAST 10 MILLIGRAM(S): 4 TABLET, CHEWABLE ORAL at 08:08

## 2022-01-13 RX ADMIN — GABAPENTIN 600 MILLIGRAM(S): 400 CAPSULE ORAL at 22:00

## 2022-01-13 RX ADMIN — Medication 1000 MILLIGRAM(S): at 08:09

## 2022-01-13 RX ADMIN — Medication 1000 MILLIGRAM(S): at 21:58

## 2022-01-13 RX ADMIN — Medication 1 PACKET(S): at 08:11

## 2022-01-13 RX ADMIN — LORATADINE 10 MILLIGRAM(S): 10 TABLET ORAL at 08:10

## 2022-01-13 RX ADMIN — PREGABALIN 1000 MICROGRAM(S): 225 CAPSULE ORAL at 08:10

## 2022-01-13 RX ADMIN — DIVALPROEX SODIUM 250 MILLIGRAM(S): 500 TABLET, DELAYED RELEASE ORAL at 08:11

## 2022-01-13 RX ADMIN — Medication 5000 UNIT(S): at 08:08

## 2022-01-13 RX ADMIN — MAGNESIUM OXIDE 400 MG ORAL TABLET 400 MILLIGRAM(S): 241.3 TABLET ORAL at 08:09

## 2022-01-13 RX ADMIN — Medication 81 MILLIGRAM(S): at 08:12

## 2022-01-13 RX ADMIN — Medication 1 MILLIGRAM(S): at 08:09

## 2022-01-13 RX ADMIN — METFORMIN HYDROCHLORIDE 500 MILLIGRAM(S): 850 TABLET ORAL at 17:25

## 2022-01-13 RX ADMIN — GABAPENTIN 300 MILLIGRAM(S): 400 CAPSULE ORAL at 08:08

## 2022-01-13 RX ADMIN — SENNA PLUS 2 TABLET(S): 8.6 TABLET ORAL at 23:38

## 2022-01-13 RX ADMIN — MAGNESIUM OXIDE 400 MG ORAL TABLET 400 MILLIGRAM(S): 241.3 TABLET ORAL at 23:38

## 2022-01-13 NOTE — BH INPATIENT PSYCHIATRY PROGRESS NOTE - NSBHFUPINTERVALCCFT_PSY_A_CORE
"I slept better Patient reports difficulty getting used to CPAP mask though says it went better when staff helped her put it on after she was already resting in bed for a while

## 2022-01-13 NOTE — BH INPATIENT PSYCHIATRY PROGRESS NOTE - NSBHMETABOLIC_PSY_ALL_CORE_FT
BMI: BMI (kg/m2): 27.6 (12-01-21 @ 15:23)  HbA1c: A1C with Estimated Average Glucose Result: 6.9 % (12-02-21 @ 10:05)    Glucose: POCT Blood Glucose.: 125 mg/dL (12-30-21 @ 16:20)    BP: 135/56 (01-13-22 @ 06:27) (113/68 - 135/56)  Lipid Panel:

## 2022-01-13 NOTE — BH INPATIENT PSYCHIATRY PROGRESS NOTE - CURRENT MEDICATION
MEDICATIONS  (STANDING):  amLODIPine   Tablet 2.5 milliGRAM(s) Oral daily  ascorbic acid 1000 milliGRAM(s) Oral two times a day  aspirin  chewable 81 milliGRAM(s) Oral daily  atorvastatin 10 milliGRAM(s) Oral at bedtime  buMETAnide 1 milliGRAM(s) Oral daily  calcium carbonate 1250 mG  + Vitamin D (OsCal 500 + D) 1 Tablet(s) Oral daily  cholecalciferol 5000 Unit(s) Oral daily  cyanocobalamin 1000 MICROGram(s) Oral daily  dextrose 40% Gel 15 Gram(s) Oral once  dextrose 5%. 1000 milliLiter(s) (50 mL/Hr) IV Continuous <Continuous>  dextrose 5%. 1000 milliLiter(s) (100 mL/Hr) IV Continuous <Continuous>  dextrose 50% Injectable 25 Gram(s) IV Push once  dextrose 50% Injectable 12.5 Gram(s) IV Push once  dextrose 50% Injectable 25 Gram(s) IV Push once  diVALproex Sprinkle 250 milliGRAM(s) Oral daily  diVALproex Sprinkle 500 milliGRAM(s) Oral at bedtime  ferrous    sulfate Liquid 300 milliGRAM(s) Oral daily  folic acid 1 milliGRAM(s) Oral daily  gabapentin 300 milliGRAM(s) Oral daily  gabapentin 600 milliGRAM(s) Oral at bedtime  glucagon  Injectable 1 milliGRAM(s) IntraMuscular once  loratadine 10 milliGRAM(s) Oral daily  losartan 100 milliGRAM(s) Oral daily  magnesium oxide 400 milliGRAM(s) Oral every 12 hours  melatonin. 3 milliGRAM(s) Oral at bedtime  metFORMIN 500 milliGRAM(s) Oral two times a day with meals  montelukast 10 milliGRAM(s) Oral daily  pantoprazole   Suspension 40 milliGRAM(s) Oral daily  psyllium Powder 1 Packet(s) Oral two times a day  senna 2 Tablet(s) Oral at bedtime  sitaGLIPtin 50 milliGRAM(s) Oral daily  sodium chloride 0.65% Nasal 1 Spray(s) Both Nostrils every 12 hours    MEDICATIONS  (PRN):  acetaminophen     Tablet .. 650 milliGRAM(s) Oral every 6 hours PRN Mild Pain (1 - 3)  Biotene Dry Mouth Oral Rinse 5 milliLiter(s) Swish and Spit every 4 hours PRN dry mouth  loperamide 2 milliGRAM(s) Oral daily PRN Diarrhea  magnesium hydroxide Suspension 30 milliLiter(s) Oral daily PRN Constipation  melatonin. 3 milliGRAM(s) Oral at bedtime PRN Insomnia

## 2022-01-13 NOTE — BH INPATIENT PSYCHIATRY PROGRESS NOTE - NSBHCHARTREVIEWVS_PSY_A_CORE FT
Vital Signs Last 24 Hrs  T(C): 36.6 (01-13-22 @ 06:27), Max: 36.7 (01-12-22 @ 15:33)  T(F): 97.9 (01-13-22 @ 06:27), Max: 98.1 (01-12-22 @ 15:33)  HR: 76 (01-13-22 @ 06:27) (76 - 76)  BP: 135/56 (01-13-22 @ 06:27) (135/56 - 135/56)  BP(mean): --  RR: --  SpO2: --    Orthostatic VS  01-12-22 @ 20:19  Lying BP: --/-- HR: --  Sitting BP: 116/78 HR: 80  Standing BP: 123/60 HR: 91  Site: upper left arm  Mode: electronic

## 2022-01-14 PROCEDURE — 99232 SBSQ HOSP IP/OBS MODERATE 35: CPT

## 2022-01-14 RX ADMIN — GABAPENTIN 600 MILLIGRAM(S): 400 CAPSULE ORAL at 21:05

## 2022-01-14 RX ADMIN — SENNA PLUS 2 TABLET(S): 8.6 TABLET ORAL at 21:06

## 2022-01-14 RX ADMIN — DIVALPROEX SODIUM 500 MILLIGRAM(S): 500 TABLET, DELAYED RELEASE ORAL at 21:05

## 2022-01-14 RX ADMIN — Medication 1 SPRAY(S): at 08:12

## 2022-01-14 RX ADMIN — METFORMIN HYDROCHLORIDE 500 MILLIGRAM(S): 850 TABLET ORAL at 16:40

## 2022-01-14 RX ADMIN — Medication 300 MILLIGRAM(S): at 08:10

## 2022-01-14 RX ADMIN — AMLODIPINE BESYLATE 2.5 MILLIGRAM(S): 2.5 TABLET ORAL at 08:04

## 2022-01-14 RX ADMIN — LOSARTAN POTASSIUM 100 MILLIGRAM(S): 100 TABLET, FILM COATED ORAL at 08:00

## 2022-01-14 RX ADMIN — MAGNESIUM OXIDE 400 MG ORAL TABLET 400 MILLIGRAM(S): 241.3 TABLET ORAL at 08:03

## 2022-01-14 RX ADMIN — PANTOPRAZOLE SODIUM 40 MILLIGRAM(S): 20 TABLET, DELAYED RELEASE ORAL at 08:05

## 2022-01-14 RX ADMIN — MAGNESIUM OXIDE 400 MG ORAL TABLET 400 MILLIGRAM(S): 241.3 TABLET ORAL at 21:06

## 2022-01-14 RX ADMIN — DIVALPROEX SODIUM 250 MILLIGRAM(S): 500 TABLET, DELAYED RELEASE ORAL at 08:27

## 2022-01-14 RX ADMIN — GABAPENTIN 300 MILLIGRAM(S): 400 CAPSULE ORAL at 08:04

## 2022-01-14 RX ADMIN — Medication 1 PACKET(S): at 08:01

## 2022-01-14 RX ADMIN — Medication 1000 MILLIGRAM(S): at 08:11

## 2022-01-14 RX ADMIN — Medication 3 MILLIGRAM(S): at 21:05

## 2022-01-14 RX ADMIN — Medication 5000 UNIT(S): at 08:12

## 2022-01-14 RX ADMIN — Medication 1000 MILLIGRAM(S): at 21:06

## 2022-01-14 RX ADMIN — Medication 1 TABLET(S): at 08:02

## 2022-01-14 RX ADMIN — LORATADINE 10 MILLIGRAM(S): 10 TABLET ORAL at 08:01

## 2022-01-14 RX ADMIN — Medication 1 MILLIGRAM(S): at 08:11

## 2022-01-14 RX ADMIN — METFORMIN HYDROCHLORIDE 500 MILLIGRAM(S): 850 TABLET ORAL at 08:02

## 2022-01-14 RX ADMIN — BUMETANIDE 1 MILLIGRAM(S): 0.25 INJECTION INTRAMUSCULAR; INTRAVENOUS at 08:11

## 2022-01-14 RX ADMIN — Medication 1 SPRAY(S): at 21:09

## 2022-01-14 RX ADMIN — Medication 81 MILLIGRAM(S): at 08:01

## 2022-01-14 RX ADMIN — ATORVASTATIN CALCIUM 10 MILLIGRAM(S): 80 TABLET, FILM COATED ORAL at 21:06

## 2022-01-14 RX ADMIN — Medication 1 PACKET(S): at 21:06

## 2022-01-14 RX ADMIN — PREGABALIN 1000 MICROGRAM(S): 225 CAPSULE ORAL at 08:03

## 2022-01-14 RX ADMIN — MONTELUKAST 10 MILLIGRAM(S): 4 TABLET, CHEWABLE ORAL at 08:10

## 2022-01-14 NOTE — BH INPATIENT PSYCHIATRY PROGRESS NOTE - NSBHCHARTREVIEWVS_PSY_A_CORE FT
Vital Signs Last 24 Hrs  T(C): 36.5 (01-14-22 @ 15:44), Max: 36.6 (01-14-22 @ 08:01)  T(F): 97.7 (01-14-22 @ 15:44), Max: 97.9 (01-14-22 @ 08:01)  HR: --  BP: --  BP(mean): --  RR: --  SpO2: --    Orthostatic VS  01-14-22 @ 08:01  Lying BP: --/-- HR: --  Sitting BP: 135/67 HR: 70  Standing BP: --/-- HR: --  Site: upper left arm  Mode: electronic  Orthostatic VS  01-12-22 @ 20:19  Lying BP: --/-- HR: --  Sitting BP: 116/78 HR: 80  Standing BP: 123/60 HR: 91  Site: upper left arm  Mode: electronic

## 2022-01-15 LAB — SARS-COV-2 RNA SPEC QL NAA+PROBE: SIGNIFICANT CHANGE UP

## 2022-01-15 RX ADMIN — BUMETANIDE 1 MILLIGRAM(S): 0.25 INJECTION INTRAMUSCULAR; INTRAVENOUS at 09:43

## 2022-01-15 RX ADMIN — Medication 1 TABLET(S): at 09:44

## 2022-01-15 RX ADMIN — GABAPENTIN 600 MILLIGRAM(S): 400 CAPSULE ORAL at 21:44

## 2022-01-15 RX ADMIN — Medication 5000 UNIT(S): at 09:43

## 2022-01-15 RX ADMIN — GABAPENTIN 300 MILLIGRAM(S): 400 CAPSULE ORAL at 09:47

## 2022-01-15 RX ADMIN — Medication 3 MILLIGRAM(S): at 21:45

## 2022-01-15 RX ADMIN — MAGNESIUM OXIDE 400 MG ORAL TABLET 400 MILLIGRAM(S): 241.3 TABLET ORAL at 21:44

## 2022-01-15 RX ADMIN — Medication 1 PACKET(S): at 09:42

## 2022-01-15 RX ADMIN — Medication 1 MILLIGRAM(S): at 09:42

## 2022-01-15 RX ADMIN — LOSARTAN POTASSIUM 100 MILLIGRAM(S): 100 TABLET, FILM COATED ORAL at 09:44

## 2022-01-15 RX ADMIN — Medication 1 PACKET(S): at 21:41

## 2022-01-15 RX ADMIN — Medication 1000 MILLIGRAM(S): at 09:43

## 2022-01-15 RX ADMIN — Medication 5 MILLILITER(S): at 09:45

## 2022-01-15 RX ADMIN — DIVALPROEX SODIUM 250 MILLIGRAM(S): 500 TABLET, DELAYED RELEASE ORAL at 09:44

## 2022-01-15 RX ADMIN — SENNA PLUS 2 TABLET(S): 8.6 TABLET ORAL at 21:42

## 2022-01-15 RX ADMIN — PREGABALIN 1000 MICROGRAM(S): 225 CAPSULE ORAL at 09:44

## 2022-01-15 RX ADMIN — MONTELUKAST 10 MILLIGRAM(S): 4 TABLET, CHEWABLE ORAL at 09:44

## 2022-01-15 RX ADMIN — LORATADINE 10 MILLIGRAM(S): 10 TABLET ORAL at 09:44

## 2022-01-15 RX ADMIN — Medication 1 SPRAY(S): at 09:45

## 2022-01-15 RX ADMIN — Medication 81 MILLIGRAM(S): at 09:43

## 2022-01-15 RX ADMIN — Medication 1000 MILLIGRAM(S): at 21:45

## 2022-01-15 RX ADMIN — AMLODIPINE BESYLATE 2.5 MILLIGRAM(S): 2.5 TABLET ORAL at 09:43

## 2022-01-15 RX ADMIN — ATORVASTATIN CALCIUM 10 MILLIGRAM(S): 80 TABLET, FILM COATED ORAL at 21:43

## 2022-01-15 RX ADMIN — Medication 300 MILLIGRAM(S): at 09:45

## 2022-01-15 RX ADMIN — DIVALPROEX SODIUM 500 MILLIGRAM(S): 500 TABLET, DELAYED RELEASE ORAL at 21:46

## 2022-01-15 RX ADMIN — METFORMIN HYDROCHLORIDE 500 MILLIGRAM(S): 850 TABLET ORAL at 09:45

## 2022-01-15 RX ADMIN — MAGNESIUM OXIDE 400 MG ORAL TABLET 400 MILLIGRAM(S): 241.3 TABLET ORAL at 09:43

## 2022-01-15 RX ADMIN — Medication 1 SPRAY(S): at 22:09

## 2022-01-15 RX ADMIN — PANTOPRAZOLE SODIUM 40 MILLIGRAM(S): 20 TABLET, DELAYED RELEASE ORAL at 09:42

## 2022-01-16 RX ADMIN — GABAPENTIN 600 MILLIGRAM(S): 400 CAPSULE ORAL at 22:00

## 2022-01-16 RX ADMIN — SENNA PLUS 2 TABLET(S): 8.6 TABLET ORAL at 21:59

## 2022-01-16 RX ADMIN — GABAPENTIN 300 MILLIGRAM(S): 400 CAPSULE ORAL at 08:02

## 2022-01-16 RX ADMIN — BUMETANIDE 1 MILLIGRAM(S): 0.25 INJECTION INTRAMUSCULAR; INTRAVENOUS at 08:01

## 2022-01-16 RX ADMIN — LORATADINE 10 MILLIGRAM(S): 10 TABLET ORAL at 08:03

## 2022-01-16 RX ADMIN — AMLODIPINE BESYLATE 2.5 MILLIGRAM(S): 2.5 TABLET ORAL at 08:03

## 2022-01-16 RX ADMIN — ATORVASTATIN CALCIUM 10 MILLIGRAM(S): 80 TABLET, FILM COATED ORAL at 22:00

## 2022-01-16 RX ADMIN — MAGNESIUM OXIDE 400 MG ORAL TABLET 400 MILLIGRAM(S): 241.3 TABLET ORAL at 22:01

## 2022-01-16 RX ADMIN — MONTELUKAST 10 MILLIGRAM(S): 4 TABLET, CHEWABLE ORAL at 08:02

## 2022-01-16 RX ADMIN — METFORMIN HYDROCHLORIDE 500 MILLIGRAM(S): 850 TABLET ORAL at 16:47

## 2022-01-16 RX ADMIN — MAGNESIUM OXIDE 400 MG ORAL TABLET 400 MILLIGRAM(S): 241.3 TABLET ORAL at 08:01

## 2022-01-16 RX ADMIN — Medication 1 MILLIGRAM(S): at 08:03

## 2022-01-16 RX ADMIN — Medication 1 PACKET(S): at 22:02

## 2022-01-16 RX ADMIN — Medication 300 MILLIGRAM(S): at 08:03

## 2022-01-16 RX ADMIN — Medication 1 TABLET(S): at 08:01

## 2022-01-16 RX ADMIN — DIVALPROEX SODIUM 500 MILLIGRAM(S): 500 TABLET, DELAYED RELEASE ORAL at 22:01

## 2022-01-16 RX ADMIN — DIVALPROEX SODIUM 250 MILLIGRAM(S): 500 TABLET, DELAYED RELEASE ORAL at 08:02

## 2022-01-16 RX ADMIN — Medication 1 PACKET(S): at 08:03

## 2022-01-16 RX ADMIN — PREGABALIN 1000 MICROGRAM(S): 225 CAPSULE ORAL at 08:02

## 2022-01-16 RX ADMIN — METFORMIN HYDROCHLORIDE 500 MILLIGRAM(S): 850 TABLET ORAL at 08:03

## 2022-01-16 RX ADMIN — Medication 1000 MILLIGRAM(S): at 08:01

## 2022-01-16 RX ADMIN — PANTOPRAZOLE SODIUM 40 MILLIGRAM(S): 20 TABLET, DELAYED RELEASE ORAL at 08:01

## 2022-01-16 RX ADMIN — Medication 1 SPRAY(S): at 07:43

## 2022-01-16 RX ADMIN — Medication 81 MILLIGRAM(S): at 08:01

## 2022-01-16 RX ADMIN — Medication 1000 MILLIGRAM(S): at 22:00

## 2022-01-16 RX ADMIN — Medication 3 MILLIGRAM(S): at 22:00

## 2022-01-16 RX ADMIN — Medication 5000 UNIT(S): at 08:14

## 2022-01-16 RX ADMIN — LOSARTAN POTASSIUM 100 MILLIGRAM(S): 100 TABLET, FILM COATED ORAL at 08:14

## 2022-01-17 LAB — SARS-COV-2 RNA SPEC QL NAA+PROBE: SIGNIFICANT CHANGE UP

## 2022-01-17 RX ADMIN — Medication 3 MILLIGRAM(S): at 21:45

## 2022-01-17 RX ADMIN — Medication 1 PACKET(S): at 21:46

## 2022-01-17 RX ADMIN — Medication 1 SPRAY(S): at 09:21

## 2022-01-17 RX ADMIN — MONTELUKAST 10 MILLIGRAM(S): 4 TABLET, CHEWABLE ORAL at 09:12

## 2022-01-17 RX ADMIN — PREGABALIN 1000 MICROGRAM(S): 225 CAPSULE ORAL at 09:12

## 2022-01-17 RX ADMIN — MAGNESIUM OXIDE 400 MG ORAL TABLET 400 MILLIGRAM(S): 241.3 TABLET ORAL at 21:46

## 2022-01-17 RX ADMIN — Medication 81 MILLIGRAM(S): at 09:12

## 2022-01-17 RX ADMIN — GABAPENTIN 600 MILLIGRAM(S): 400 CAPSULE ORAL at 21:45

## 2022-01-17 RX ADMIN — Medication 1 PACKET(S): at 09:13

## 2022-01-17 RX ADMIN — LORATADINE 10 MILLIGRAM(S): 10 TABLET ORAL at 09:17

## 2022-01-17 RX ADMIN — Medication 300 MILLIGRAM(S): at 09:13

## 2022-01-17 RX ADMIN — Medication 1000 MILLIGRAM(S): at 09:11

## 2022-01-17 RX ADMIN — DIVALPROEX SODIUM 250 MILLIGRAM(S): 500 TABLET, DELAYED RELEASE ORAL at 09:12

## 2022-01-17 RX ADMIN — Medication 1 TABLET(S): at 09:12

## 2022-01-17 RX ADMIN — BUMETANIDE 1 MILLIGRAM(S): 0.25 INJECTION INTRAMUSCULAR; INTRAVENOUS at 09:11

## 2022-01-17 RX ADMIN — METFORMIN HYDROCHLORIDE 500 MILLIGRAM(S): 850 TABLET ORAL at 09:12

## 2022-01-17 RX ADMIN — PANTOPRAZOLE SODIUM 40 MILLIGRAM(S): 20 TABLET, DELAYED RELEASE ORAL at 09:13

## 2022-01-17 RX ADMIN — MAGNESIUM OXIDE 400 MG ORAL TABLET 400 MILLIGRAM(S): 241.3 TABLET ORAL at 09:12

## 2022-01-17 RX ADMIN — Medication 5000 UNIT(S): at 09:21

## 2022-01-17 RX ADMIN — AMLODIPINE BESYLATE 2.5 MILLIGRAM(S): 2.5 TABLET ORAL at 09:13

## 2022-01-17 RX ADMIN — Medication 1000 MILLIGRAM(S): at 21:45

## 2022-01-17 RX ADMIN — GABAPENTIN 300 MILLIGRAM(S): 400 CAPSULE ORAL at 09:19

## 2022-01-17 RX ADMIN — LOSARTAN POTASSIUM 100 MILLIGRAM(S): 100 TABLET, FILM COATED ORAL at 09:20

## 2022-01-17 RX ADMIN — SENNA PLUS 2 TABLET(S): 8.6 TABLET ORAL at 21:45

## 2022-01-17 RX ADMIN — Medication 1 MILLIGRAM(S): at 09:11

## 2022-01-17 RX ADMIN — METFORMIN HYDROCHLORIDE 500 MILLIGRAM(S): 850 TABLET ORAL at 16:59

## 2022-01-17 RX ADMIN — DIVALPROEX SODIUM 500 MILLIGRAM(S): 500 TABLET, DELAYED RELEASE ORAL at 21:44

## 2022-01-17 RX ADMIN — Medication 3 MILLIGRAM(S): at 02:09

## 2022-01-17 RX ADMIN — Medication 1 SPRAY(S): at 21:46

## 2022-01-17 RX ADMIN — ATORVASTATIN CALCIUM 10 MILLIGRAM(S): 80 TABLET, FILM COATED ORAL at 21:45

## 2022-01-18 VITALS — DIASTOLIC BLOOD PRESSURE: 57 MMHG | HEART RATE: 72 BPM | SYSTOLIC BLOOD PRESSURE: 120 MMHG

## 2022-01-18 PROCEDURE — 99238 HOSP IP/OBS DSCHRG MGMT 30/<: CPT

## 2022-01-18 RX ORDER — FOLIC ACID 0.8 MG
1 TABLET ORAL
Qty: 0 | Refills: 0 | DISCHARGE
Start: 2022-01-18

## 2022-01-18 RX ORDER — INFLUENZA VIRUS VACCINE 15; 15; 15; 15 UG/.5ML; UG/.5ML; UG/.5ML; UG/.5ML
0.7 SUSPENSION INTRAMUSCULAR ONCE
Refills: 0 | Status: COMPLETED | OUTPATIENT
Start: 2022-01-18 | End: 2022-01-18

## 2022-01-18 RX ADMIN — Medication 300 MILLIGRAM(S): at 08:39

## 2022-01-18 RX ADMIN — Medication 1 TABLET(S): at 08:41

## 2022-01-18 RX ADMIN — LORATADINE 10 MILLIGRAM(S): 10 TABLET ORAL at 08:40

## 2022-01-18 RX ADMIN — GABAPENTIN 300 MILLIGRAM(S): 400 CAPSULE ORAL at 08:41

## 2022-01-18 RX ADMIN — PANTOPRAZOLE SODIUM 40 MILLIGRAM(S): 20 TABLET, DELAYED RELEASE ORAL at 08:41

## 2022-01-18 RX ADMIN — PREGABALIN 1000 MICROGRAM(S): 225 CAPSULE ORAL at 08:40

## 2022-01-18 RX ADMIN — Medication 1 SPRAY(S): at 08:39

## 2022-01-18 RX ADMIN — AMLODIPINE BESYLATE 2.5 MILLIGRAM(S): 2.5 TABLET ORAL at 08:41

## 2022-01-18 RX ADMIN — Medication 81 MILLIGRAM(S): at 08:39

## 2022-01-18 RX ADMIN — MONTELUKAST 10 MILLIGRAM(S): 4 TABLET, CHEWABLE ORAL at 08:40

## 2022-01-18 RX ADMIN — Medication 1 PACKET(S): at 08:39

## 2022-01-18 RX ADMIN — Medication 1000 MILLIGRAM(S): at 08:41

## 2022-01-18 RX ADMIN — MAGNESIUM OXIDE 400 MG ORAL TABLET 400 MILLIGRAM(S): 241.3 TABLET ORAL at 08:40

## 2022-01-18 RX ADMIN — Medication 5 MILLILITER(S): at 08:38

## 2022-01-18 RX ADMIN — BUMETANIDE 1 MILLIGRAM(S): 0.25 INJECTION INTRAMUSCULAR; INTRAVENOUS at 08:40

## 2022-01-18 RX ADMIN — LOSARTAN POTASSIUM 100 MILLIGRAM(S): 100 TABLET, FILM COATED ORAL at 08:41

## 2022-01-18 RX ADMIN — DIVALPROEX SODIUM 250 MILLIGRAM(S): 500 TABLET, DELAYED RELEASE ORAL at 08:40

## 2022-01-18 RX ADMIN — Medication 5000 UNIT(S): at 08:39

## 2022-01-18 RX ADMIN — Medication 1 MILLIGRAM(S): at 08:40

## 2022-01-18 RX ADMIN — METFORMIN HYDROCHLORIDE 500 MILLIGRAM(S): 850 TABLET ORAL at 08:39

## 2022-01-18 NOTE — BH INPATIENT PSYCHIATRY PROGRESS NOTE - NSTXCOPEPROGRES_PSY_ALL_CORE
Improving
Met - goal discontinued
Improving

## 2022-01-18 NOTE — BH INPATIENT PSYCHIATRY PROGRESS NOTE - NSTXDCOPNODATETRGT_PSY_ALL_CORE
27-Dec-2021
20-Dec-2021
03-Jan-2022
05-Jan-2022
12-Jan-2022
03-Jan-2022
05-Jan-2022
17-Dec-2021
17-Dec-2021
27-Dec-2021
03-Jan-2022
12-Jan-2022
12-Jan-2022
27-Dec-2021
03-Jan-2022
05-Jan-2022
05-Jan-2022
09-Dec-2021
03-Jan-2022
05-Jan-2022
20-Dec-2021
05-Jan-2022
09-Dec-2021
12-Jan-2022
20-Dec-2021
03-Jan-2022
03-Jan-2022
09-Dec-2021
17-Dec-2021
12-Jan-2022
20-Dec-2021
03-Jan-2022
27-Dec-2021
20-Dec-2021
18-Jan-2022
05-Jan-2022
18-Jan-2022
18-Jan-2022

## 2022-01-18 NOTE — BH INPATIENT PSYCHIATRY PROGRESS NOTE - NSICDXBHPRIMARYDX_PSY_ALL_CORE
Bipolar I disorder, most recent episode (or current) manic with mixed features   F31.10  

## 2022-01-18 NOTE — BH INPATIENT PSYCHIATRY PROGRESS NOTE - NSTXMANICINTERMD_PSY_ALL_CORE
Med titration

## 2022-01-18 NOTE — BH INPATIENT PSYCHIATRY PROGRESS NOTE - NSTXCOPEGOAL_PSY_ALL_CORE
Identify and utilize 2 coping skills that meet their needs

## 2022-01-18 NOTE — BH INPATIENT PSYCHIATRY PROGRESS NOTE - NSBHCHARTREVIEWVS_PSY_A_CORE FT
Vital Signs Last 24 Hrs  T(C): 36.9 (01-17-22 @ 15:50), Max: 36.9 (01-17-22 @ 15:50)  T(F): 98.4 (01-17-22 @ 15:50), Max: 98.4 (01-17-22 @ 15:50)  HR: 72 (01-18-22 @ 06:30) (72 - 72)  BP: 120/57 (01-18-22 @ 06:30) (120/57 - 120/57)  BP(mean): --  RR: --  SpO2: --

## 2022-01-18 NOTE — BH INPATIENT PSYCHIATRY PROGRESS NOTE - NSTXDISORGGOAL_PSY_ALL_CORE
Will demonstrate the ability to maintain reality orientation and communicate clearly with others during 2 conversations with staff daily
Will identify 2 coping skills that assist in organizing
Will demonstrate the ability to maintain reality orientation and communicate clearly with others during 2 conversations with staff daily
Will identify 2 coping skills that assist in organizing
Will demonstrate purposeful and predictable thoughts/behaviors by making a request
Will demonstrate purposeful and predictable thoughts/behaviors by making a request
Will demonstrate the ability to maintain reality orientation and communicate clearly with others during 2 conversations with staff daily
Will identify 2 coping skills that assist in organizing
Will demonstrate the ability to maintain reality orientation and communicate clearly with others during 2 conversations with staff daily
Will identify 2 coping skills that assist in organizing
Will demonstrate the ability to maintain reality orientation and communicate clearly with others during 2 conversations with staff daily
Will identify 2 coping skills that assist in organizing
Will demonstrate the ability to maintain reality orientation and communicate clearly with others during 2 conversations with staff daily
Will identify 2 coping skills that assist in organizing
Will demonstrate purposeful and predictable thoughts/behaviors by making a request
Will identify 2 coping skills that assist in organizing
Will demonstrate the ability to maintain reality orientation and communicate clearly with others during 2 conversations with staff daily
Will demonstrate the ability to maintain reality orientation and communicate clearly with others during 2 conversations with staff daily
Will identify 2 coping skills that assist in organizing
Will identify 2 coping skills that assist in organizing
Will demonstrate the ability to maintain reality orientation and communicate clearly with others during 2 conversations with staff daily
Will demonstrate purposeful and predictable thoughts/behaviors by making a request
Will demonstrate the ability to maintain reality orientation and communicate clearly with others during 2 conversations with staff daily
Will identify 2 coping skills that assist in organizing
Will demonstrate purposeful and predictable thoughts/behaviors by making a request
Will identify 2 coping skills that assist in organizing
Will demonstrate purposeful and predictable thoughts/behaviors by making a request
Will demonstrate purposeful and predictable thoughts/behaviors by making a request

## 2022-01-18 NOTE — BH INPATIENT PSYCHIATRY PROGRESS NOTE - NSTXPROBCOPE_PSY_ALL_CORE
COPING, INEFFECTIVE

## 2022-01-18 NOTE — BH INPATIENT PSYCHIATRY PROGRESS NOTE - NSTXMANICDATETRGT_PSY_ALL_CORE
23-Dec-2021
20-Dec-2021
13-Dec-2021
29-Dec-2021
20-Dec-2021
06-Jan-2022
03-Jan-2022
29-Dec-2021
03-Jan-2022
03-Jan-2022
06-Jan-2022
13-Dec-2021
29-Dec-2021
20-Dec-2021
29-Dec-2021
29-Dec-2021
13-Dec-2021
20-Dec-2021
06-Jan-2022
16-Dec-2021
29-Dec-2021
06-Jan-2022
13-Dec-2021
06-Jan-2022
06-Jan-2022
16-Dec-2021
16-Dec-2021
29-Dec-2021
16-Dec-2021
27-Dec-2021
29-Dec-2021
29-Dec-2021
06-Jan-2022
06-Jan-2022

## 2022-01-18 NOTE — BH INPATIENT PSYCHIATRY PROGRESS NOTE - NSBHMETABOLIC_PSY_ALL_CORE_FT
BMI: BMI (kg/m2): 27.6 (12-01-21 @ 15:23)  HbA1c: A1C with Estimated Average Glucose Result: 6.9 % (12-02-21 @ 10:05)    Glucose: POCT Blood Glucose.: 125 mg/dL (12-30-21 @ 16:20)    BP: 120/57 (01-18-22 @ 06:30) (95/63 - 120/57)  Lipid Panel:

## 2022-01-18 NOTE — BH INPATIENT PSYCHIATRY PROGRESS NOTE - NSCGIIMPROVESX_PSY_ALL_CORE
3 = Minimally improved - slightly better with little or no clinically meaningful reduction of symptoms.  Represents very little change in basic clinical status, level of care, or functional capacity.
4 = No change - symptoms remain essentially unchanged
3 = Minimally improved - slightly better with little or no clinically meaningful reduction of symptoms.  Represents very little change in basic clinical status, level of care, or functional capacity.
2 = Much improved - notably better with signficant reduction of symptoms; increase in the level of functioning but some symptoms remain
4 = No change - symptoms remain essentially unchanged
3 = Minimally improved - slightly better with little or no clinically meaningful reduction of symptoms.  Represents very little change in basic clinical status, level of care, or functional capacity.
4 = No change - symptoms remain essentially unchanged
3 = Minimally improved - slightly better with little or no clinically meaningful reduction of symptoms.  Represents very little change in basic clinical status, level of care, or functional capacity.
3 = Minimally improved - slightly better with little or no clinically meaningful reduction of symptoms.  Represents very little change in basic clinical status, level of care, or functional capacity.
4 = No change - symptoms remain essentially unchanged
2 = Much improved - notably better with signficant reduction of symptoms; increase in the level of functioning but some symptoms remain
3 = Minimally improved - slightly better with little or no clinically meaningful reduction of symptoms.  Represents very little change in basic clinical status, level of care, or functional capacity.
4 = No change - symptoms remain essentially unchanged
3 = Minimally improved - slightly better with little or no clinically meaningful reduction of symptoms.  Represents very little change in basic clinical status, level of care, or functional capacity.
3 = Minimally improved - slightly better with little or no clinically meaningful reduction of symptoms.  Represents very little change in basic clinical status, level of care, or functional capacity.
2 = Much improved - notably better with signficant reduction of symptoms; increase in the level of functioning but some symptoms remain
3 = Minimally improved - slightly better with little or no clinically meaningful reduction of symptoms.  Represents very little change in basic clinical status, level of care, or functional capacity.
2 = Much improved - notably better with signficant reduction of symptoms; increase in the level of functioning but some symptoms remain
4 = No change - symptoms remain essentially unchanged
3 = Minimally improved - slightly better with little or no clinically meaningful reduction of symptoms.  Represents very little change in basic clinical status, level of care, or functional capacity.
2 = Much improved - notably better with signficant reduction of symptoms; increase in the level of functioning but some symptoms remain

## 2022-01-18 NOTE — BH INPATIENT PSYCHIATRY PROGRESS NOTE - NSCGISEVERILLNESS_PSY_ALL_CORE
5 = Markedly ill - intrusive symptoms that distinctly impair social/occupational function or cause intrusive levels of distress
4 = Moderately ill – overt symptoms causing noticeable, but modest, functional impairment or distress; symptom level may warrant medication
3 = Mildly ill – clearly established symptoms with minimal, if any, distress or difficulty in social and occupational function
5 = Markedly ill - intrusive symptoms that distinctly impair social/occupational function or cause intrusive levels of distress
3 = Mildly ill – clearly established symptoms with minimal, if any, distress or difficulty in social and occupational function
3 = Mildly ill – clearly established symptoms with minimal, if any, distress or difficulty in social and occupational function
5 = Markedly ill - intrusive symptoms that distinctly impair social/occupational function or cause intrusive levels of distress
3 = Mildly ill – clearly established symptoms with minimal, if any, distress or difficulty in social and occupational function
5 = Markedly ill - intrusive symptoms that distinctly impair social/occupational function or cause intrusive levels of distress
4 = Moderately ill – overt symptoms causing noticeable, but modest, functional impairment or distress; symptom level may warrant medication
5 = Markedly ill - intrusive symptoms that distinctly impair social/occupational function or cause intrusive levels of distress
5 = Markedly ill - intrusive symptoms that distinctly impair social/occupational function or cause intrusive levels of distress
3 = Mildly ill – clearly established symptoms with minimal, if any, distress or difficulty in social and occupational function
4 = Moderately ill – overt symptoms causing noticeable, but modest, functional impairment or distress; symptom level may warrant medication
5 = Markedly ill - intrusive symptoms that distinctly impair social/occupational function or cause intrusive levels of distress
5 = Markedly ill - intrusive symptoms that distinctly impair social/occupational function or cause intrusive levels of distress
3 = Mildly ill – clearly established symptoms with minimal, if any, distress or difficulty in social and occupational function
5 = Markedly ill - intrusive symptoms that distinctly impair social/occupational function or cause intrusive levels of distress
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
## 2022-01-18 NOTE — BH INPATIENT PSYCHIATRY PROGRESS NOTE - NSBHCHARTREVIEWLAB_PSY_A_CORE FT
12-02    139  |  104  |  8   ----------------------------<  149<H>  3.9   |  27  |  0.57

## 2022-01-18 NOTE — BH INPATIENT PSYCHIATRY PROGRESS NOTE - NSTXDCOPNOPROGRES_PSY_ALL_CORE
No Change
Improving
No Change
Improving
No Change
Improving
Improving
No Change
Improving
No Change
Improving
Improving
No Change
Improving
Improving
No Change
Improving
No Change
Improving
No Change
Improving
No Change
No Change
Improving

## 2022-01-18 NOTE — BH INPATIENT PSYCHIATRY PROGRESS NOTE - NSICDXBHSECONDARYDX_PSY_ALL_CORE
Bipolar disorder, manic   F31.10  

## 2022-01-18 NOTE — BH INPATIENT PSYCHIATRY PROGRESS NOTE - MSE OPTIONS
Unstructured MSE
Structured MSE

## 2022-01-18 NOTE — BH INPATIENT PSYCHIATRY PROGRESS NOTE - NSTXMANICGOAL_PSY_ALL_CORE
Demonstrate a substantial reduction in both hyperactive pacing on the unit and social intrusiveness
Exhibit a substantial reduction in elated/angry acting out, and pressured speech that prevents mutual conversation
Exhibit a substantial reduction in elated/angry acting out, and pressured speech that prevents mutual conversation
Demonstrate a substantial reduction in both hyperactive pacing on the unit and social intrusiveness
Demonstrate a substantial reduction in both hyperactive pacing on the unit and social intrusiveness
Exhibit a substantial reduction in elated/angry acting out, and pressured speech that prevents mutual conversation
Exhibit a substantial reduction in elated/angry acting out, and pressured speech that prevents mutual conversation
Demonstrate a substantial reduction in both hyperactive pacing on the unit and social intrusiveness
Exhibit a substantial reduction in elated/angry acting out, and pressured speech that prevents mutual conversation
Demonstrate a substantial reduction in both hyperactive pacing on the unit and social intrusiveness
Exhibit a substantial reduction in elated/angry acting out, and pressured speech that prevents mutual conversation
Exhibit a substantial reduction in elated/angry acting out, and pressured speech that prevents mutual conversation
Demonstrate a substantial reduction in both hyperactive pacing on the unit and social intrusiveness
Exhibit a substantial reduction in elated/angry acting out, and pressured speech that prevents mutual conversation
Demonstrate a substantial reduction in both hyperactive pacing on the unit and social intrusiveness
Demonstrate a substantial reduction in both hyperactive pacing on the unit and social intrusiveness
Exhibit a substantial reduction in elated/angry acting out, and pressured speech that prevents mutual conversation
Exhibit a substantial reduction in elated/angry acting out, and pressured speech that prevents mutual conversation
Demonstrate a substantial reduction in both hyperactive pacing on the unit and social intrusiveness
Exhibit a substantial reduction in elated/angry acting out, and pressured speech that prevents mutual conversation
Exhibit a substantial reduction in elated/angry acting out, and pressured speech that prevents mutual conversation
Demonstrate a substantial reduction in both hyperactive pacing on the unit and social intrusiveness
Exhibit a substantial reduction in elated/angry acting out, and pressured speech that prevents mutual conversation
Demonstrate a substantial reduction in both hyperactive pacing on the unit and social intrusiveness
Exhibit a substantial reduction in elated/angry acting out, and pressured speech that prevents mutual conversation
Demonstrate a substantial reduction in both hyperactive pacing on the unit and social intrusiveness
Exhibit a substantial reduction in elated/angry acting out, and pressured speech that prevents mutual conversation

## 2022-01-18 NOTE — BH INPATIENT PSYCHIATRY PROGRESS NOTE - NSBHINPTBILLING_PSY_ALL_CORE
[id number] - Inpatient Moderate Complexity
65472 - Inpatient Moderate Complexity
04026 - Inpatient Moderate Complexity
17482 - Inpatient Moderate Complexity
20484 - Hospital Discharge Day Management; 30 min or less
92130 - Inpatient Low Complexity
82107 - Inpatient Low Complexity
83931 - Inpatient Moderate Complexity
36712 - Inpatient Moderate Complexity
70480 - Inpatient Low Complexity
86764 - Inpatient Low Complexity
99034 - Inpatient Moderate Complexity
99974 - Inpatient Low Complexity
43591 - Inpatient Moderate Complexity
50463 - Inpatient Moderate Complexity
07876 - Inpatient Moderate Complexity
74643 - Inpatient Moderate Complexity
77191 - Inpatient Moderate Complexity
18799 - Inpatient Moderate Complexity
07896 - Inpatient Moderate Complexity
70815 - Inpatient Moderate Complexity
77402 - Inpatient Moderate Complexity
35199 - Inpatient Moderate Complexity
48656 - Inpatient Moderate Complexity
64658 - Inpatient Moderate Complexity
21528 - Inpatient Moderate Complexity
21934 - Inpatient Moderate Complexity
36561 - Inpatient Moderate Complexity
73637 - Inpatient Moderate Complexity
74242 - Inpatient Moderate Complexity
05183 - Inpatient Moderate Complexity
49680 - Inpatient Low Complexity
35641 - Inpatient Moderate Complexity
76302 - Inpatient Moderate Complexity
04796 - Inpatient Moderate Complexity
32517 - Inpatient Moderate Complexity
34114 - Inpatient Moderate Complexity
68729 - Inpatient Moderate Complexity
69149 - Inpatient Moderate Complexity

## 2022-01-18 NOTE — BH INPATIENT PSYCHIATRY PROGRESS NOTE - NSTXCOPEDATETRGT_PSY_ALL_CORE
05-Jan-2022
12-Jan-2022
05-Jan-2022
19-Jan-2022
19-Jan-2022
05-Jan-2022
12-Jan-2022
12-Jan-2022
05-Jan-2022
19-Jan-2022
05-Jan-2022
18-Jan-2022
12-Jan-2022
12-Jan-2022
05-Jan-2022
05-Jan-2022

## 2022-01-18 NOTE — BH INPATIENT PSYCHIATRY PROGRESS NOTE - NSICDXBHTERTIARYDX_PSY_ALL_CORE
R/O Schizoaffective disorder, bipolar type   F25.0  

## 2022-01-18 NOTE — BH INPATIENT PSYCHIATRY PROGRESS NOTE - NSTREATMENTCERTY_PSY_ALL_CORE

## 2022-01-18 NOTE — BH INPATIENT PSYCHIATRY PROGRESS NOTE - NSBHCONTPROVIDER_PSY_ALL_CORE
No, attempted...

## 2022-01-18 NOTE — BH INPATIENT PSYCHIATRY PROGRESS NOTE - NSTXMANICPROGRES_PSY_ALL_CORE
Improving
No Change
No Change
Improving

## 2022-01-18 NOTE — BH INPATIENT PSYCHIATRY PROGRESS NOTE - NSTXMANICDATEEST_PSY_ALL_CORE
02-Dec-2021
01-Dec-2021
02-Dec-2021
31-Dec-2021
02-Dec-2021
01-Dec-2021
01-Dec-2021
02-Dec-2021
31-Dec-2021
31-Dec-2021
02-Dec-2021
31-Dec-2021
01-Dec-2021
31-Dec-2021
01-Dec-2021
31-Dec-2021
02-Dec-2021
01-Dec-2021
02-Dec-2021
02-Dec-2021
31-Dec-2021
02-Dec-2021

## 2022-01-18 NOTE — BH INPATIENT PSYCHIATRY PROGRESS NOTE - NSTXDCOPNOGOAL_PSY_ALL_CORE
Will agree to participate in appropriate outpatient care

## 2022-01-18 NOTE — BH INPATIENT PSYCHIATRY PROGRESS NOTE - NSBHASSESSSUMMFT_PSY_ALL_CORE
This is an 81 year old , retired female, domiciled at the [facility], with past psychiatric history of Bipolar I disorder, w/ documented episodes of anjelica (as per her outpatient psychiatrist; Dr. Ross; [phone]) though onset of symptoms and formal psychiatric illness is in the last 5 years beginning with depressive sx and failure to thrive, history of alcohol abuse and reported concerns of PTSD and Cluster B Personality traits (sees a therapist; Gabriella Hernández [phone]), one known psychiatric admission ([facility] 2/2017), no history of suicide attempts, non-suicidal self injury. 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 The patient arrived from [facility] after fall. Collateral information from daughter revealed she has exhibited manic symptoms and she was to have a meeting with her care manager for geriatric psychiatry admission. On evaluation the patient appears to be in Manic episode, with mixed features. Affect labile, patient is very tearful on exam. She exhibits tangential thought processes, religiously preoccupations. She requires inpatient hospitalization for stabilization.     12/25/21: Appears manic, hyperverbal, tangential, intrusive, will increase neurontin, no s/s of klonopin withdrawal. Called daughter as per request, no response, no option for voicemail.   12/28/21:  Appears manic, hyperverbal, tangential. Patient difficult to redirect and overly affectionate towards staff. Reports improving sleep.  12/29: Still appearing manic, hyperverbal, tangential, difficult to redirect and overly affectionate. After discussing with patient and family, will start trial of depakote sprinkles.   12/30: Still appearing manic, hyperverbal, and overly affectionate. Tolerating depakote trial. Poor sleep either secondary to CPAP use or manic sx. Will add melatonin for sleep.   2/31: Seems improved on VPA, slept better last night, less energetic and mood elevated this AM"  01/01/2022: Patient reports that she slept poorly last night but describes mood as "fine". Denies nay other concerns. Taking meds.   Plan: Will continue Depakote and gabapentin at current dose for mood. Will continue rest of the current medication as per primary team.   1/2/2022: Remains slightly mood elevated but improving, continue VPA and gabapentin  1/3/2022: Remains elevated with increased speech and tangentiality. VPA level low at 33. Will increase Depakote to 250mg qAM 500mg qHS. Will consider switching from gabapentin to Abilify for more targeted bipolar/anjelica treatment.   1/4: Patient appears les pressured and thought disordered today. COnt VPA,  consider increase to 500mg bid. Conisder taper gabapentin and add Seroquel if sleep issue or Abilify  1/5: Patient reporting improved sleep, requiring less redirection. Continuing depakote, considering tapering gabapentin and adding Seroquel; patient amenable but continuing to observe for now.   1/6: Patient reporting improved sleep, requiring redirection again. Continuing depakote, f/u level tomorrow. Patient still with limited insight regarding hospitalization.   1/7: Patient with improved sleep, less redirection required, less tangential. Depakote level 68.4; will retest on Monday to assess for steady state. Family reports patient is improving per their conversations on the phone, less tangentiality. Will continue to monitor on current regimen over weekend.   1/10: Patient with improved sleep, minimal edirection required. Depakote repeat level 62.5. Improving insight; will communicate with family and begin considering discharge planning.  1/11: Patient with improved sleep, no redirection required today. Labs indicating GELACIO; will consider liquid Fe2+ supplementation.   1/12 Improved anjelica with some residual which may be approaching baseline. Difficulty with CPAP is source of some distress but responds to education, support. Cont curren meds no changes for now.     Plan:  Legal: 9.27  Safety: Routine observation  Psychiatry:  	Start melatonin 3mg qHS with additional 3mg PRN  	Continue Gabapentin 400mg qAM 600mg qHS  	Depakote Sprinkles 250mg qAM, 500mg qHS    Medical:  Continue medications : Alendronate 70 mg once a week, Bumex 1 mg qd, Norvasc 2.5 mg qd, Tylenol 650 mg q6 prn moderate pain, Aspirin EC 81 qd  Astelin Nose spray BID, Bengay patch qhs, Calcium + D 600 mg / 200 IU qd,  Dulcosate 200 mg bid  Allegra 1 tablet bid, Folic Acid 800 MCG bid, Janumet XR 50 -1000 mg qd, Imodium prn, Cozaar 100 mg qd, Singulair 10 mg qd, Prilosec 20 qd, Pravstatin 20 mg qhs, Metamucil 2 caps bid,   Saline nasal spray bid, Slowmag 64 mg x 2 bid, vitamin B12 1000 bid, Vitamin C 1000 bid, Vitamin D 5000 qd, Zinc Glutamine 50 mg bid.  - advance diet as tolerated    PRNs: Ativan 0.5 mg Q6h PRN for Anxiety. Glucagon 1 mg PRN for hypoglycemia.  1/13 Patient improving  gradually less manic, less pressured, less disorganized, less focused on special abilities. Plan cont current meds will give more time to respond rather than increase or make changes reviewed plan of care with daughter  
This is an 81 year old , retired female, domiciled at the [facility], with past psychiatric history of Bipolar I disorder, w/ documented episodes of anjelica (as per her outpatient psychiatrist; Dr. Ross; [phone]) though onset of symptoms and formal psychiatric illness is in the last 5 years beginning with depressive sx and failure to thrive, history of alcohol abuse and reported concerns of PTSD and Cluster B Personality traits (sees a therapist; Gabriella Hernández [phone]), one known psychiatric admission ([facility] 2/2017), no history of suicide attempts, non-suicidal self injury. 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 The patient arrived from [facility] after fall. Collateral information from daughter revealed she has exhibited manic symptoms and she was to have a meeting with her care manager for geriatric psychiatry admission. On evaluation the patient appears to be in Manic episode, with mixed features. Affect labile, patient is very tearful on exam. She exhibits tangential thought processes, religiously preoccupations. She requires inpatient hospitalization for stabilization.     12/25/21: Appears manic, hyperverbal, tangential, intrusive, will increase neurontin, no s/s of klonopin withdrawal. Called daughter as per request, no response, no option for voicemail.   12/28/21:  Appears manic, hyperverbal, tangential. Patient difficult to redirect and overly affectionate towards staff. Reports improving sleep.  12/29: Still appearing manic, hyperverbal, tangential, difficult to redirect and overly affectionate. After discussing with patient and family, will start trial of depakote sprinkles.   12/30: Still appearing manic, hyperverbal, and overly affectionate. Tolerating depakote trial. Poor sleep either secondary to CPAP use or manic sx. Will add melatonin for sleep.   2/31: Seems improved on VPA, slept better last night, less energetic and mood elevated this AM"  01/01/2022: Patient reports that she slept poorly last night but describes mood as "fine". Denies nay other concerns. Taking meds.   Plan: Will continue Depakote and gabapentin at current dose for mood. Will continue rest of the current medication as per primary team.   1/2/2022: Remains slightly mood elevated but improving, continue VPA and gabapentin  1/3/2022: Remains elevated with increased speech and tangentiality. VPA level low at 33. Will increase Depakote to 250mg qAM 500mg qHS. Will consider switching from gabapentin to Abilify for more targeted bipolar/anjelica treatment.   1/4: Patient appears les pressured and thought disordered today. COnt VPA,  consider increase to 500mg bid. Conisder taper gabapentin and add Seroquel if sleep issue or Abilify  1/5: Patient reporting improved sleep, requiring less redirection. Continuing depakote, considering tapering gabapentin and adding Seroquel; patient amenable but continuing to observe for now.   1/6: Patient reporting improved sleep, requiring redirection again. Continuing depakote, f/u level tomorrow. Patient still with limited insight regarding hospitalization.   1/7: Patient with improved sleep, less redirection required, less tangential. Depakote level 68.4; will retest on Monday to assess for steady state. Family reports patient is improving per their conversations on the phone, less tangentiality. Will continue to monitor on current regimen over weekend.   1/10: Patient with improved sleep, minimal edirection required. Depakote repeat level 62.5. Improving insight; will communicate with family and begin considering discharge planning.  1/11: Patient with improved sleep, no redirection required today. Labs indicating GELACIO; will consider liquid Fe2+ supplementation.   1/12 Improved anjelica with some residual which may be approaching baseline. Difficulty with CPAP is source of some distress but responds to education, support. Cont curren meds no changes for now.     Plan:  Legal: 9.27  Safety: Routine observation  Psychiatry:  	Start melatonin 3mg qHS with additional 3mg PRN  	Continue Gabapentin 400mg qAM 600mg qHS  	Depakote Sprinkles 250mg qAM, 500mg qHS    Medical:  Continue medications : Alendronate 70 mg once a week, Bumex 1 mg qd, Norvasc 2.5 mg qd, Tylenol 650 mg q6 prn moderate pain, Aspirin EC 81 qd  Astelin Nose spray BID, Bengay patch qhs, Calcium + D 600 mg / 200 IU qd,  Dulcosate 200 mg bid  Allegra 1 tablet bid, Folic Acid 800 MCG bid, Janumet XR 50 -1000 mg qd, Imodium prn, Cozaar 100 mg qd, Singulair 10 mg qd, Prilosec 20 qd, Pravstatin 20 mg qhs, Metamucil 2 caps bid,   Saline nasal spray bid, Slowmag 64 mg x 2 bid, vitamin B12 1000 bid, Vitamin C 1000 bid, Vitamin D 5000 qd, Zinc Glutamine 50 mg bid.  - advance diet as tolerated    PRNs: Ativan 0.5 mg Q6h PRN for Anxiety. Glucagon 1 mg PRN for hypoglycemia.  
This is an 81 year old , retired female, domiciled at the [facility], with past psychiatric history of Bipolar I disorder, w/ documented episodes of anjelica (as per her outpatient psychiatrist; Dr. Ross; [phone]) though onset of symptoms and formal psychiatric illness is in the last 5 years beginning with depressive sx and failure to thrive, history of alcohol abuse and reported concerns of PTSD and Cluster B Personality traits (sees a therapist; Gabriella Hernández [phone]), one known psychiatric admission ([facility] 2/2017), no history of suicide attempts, non-suicidal self injury. The patient arrived from [facility] after fall. Collateral information from daughter revealed she has exhibited manic symptoms and she was to have a meeting with her care manager for geriatric psychiatry admission. On evaluation the patient appears to be in Manic episode, with mixed features. Affect labile, patient is very tearful on exam. She exhibits tangential thought processes, religiously preoccupations. She requires inpatient hospitalization for stabilization.     Plan:     Discontinue previous medications: Sertraline 50 mg Qd, Simvastatin 5 mg Qhs, Protonix 40 mg Qam, Naproxen 500 mg Qhs, Hydrochlorothiazide 25 mg Qhs, Norvasc 5 mg Qd, Lidocaine 4% patch transdermal Qd,  Cephalexin 500 mg BID for 7 days. Loratidine 10 mg Qd.     Start confirmed medications : Alendronate 70 mg once a week, Bumex 1 mg qd, Norvasc 2.5 mg qd, Tylenol 650 mg q6 prn moderate pain, Aspirin EC 81 qd  Astelin Nose spray BID, Bengay patch qhs, Calcium + D 600 mg / 200 IU qd, Klonopin 0.5 mg 6 pm / 9pm, Depakote  mg qhs, Dulcosate 200 mg bid  Allegra 1 tablet bid, Folic Acid 800 MCG bid, Janumet XR 50 -1000 mg qd, Imodium prn, Cozaar 100 mg qd, Singulair 10 mg qd, Prilosec 20 qd, Pravstatin 20 mg qhs, Metamucil 2 caps bid,   Saline nasal spray bid, Slowmag 64 mg x 2 bid, vitamin B12 1000 bid, Vitamin C 1000 bid, Vitamin D 5000 qd, Zinc Glutamine 50 mg bid.    PRNs: Ativan 0.5 mg Q6h PRN for Anxiety. Glucagon 1 mg PRN for hypoglycemia.  Collateral from O/P provider, MD left callback number in voicemail. 
This is an 81 year old , retired female, domiciled at the [facility], with past psychiatric history of Bipolar I disorder, w/ documented episodes of anjelica (as per her outpatient psychiatrist; Dr. Ross; [phone]) though onset of symptoms and formal psychiatric illness is in the last 5 years beginning with depressive sx and failure to thrive, history of alcohol abuse and reported concerns of PTSD and Cluster B Personality traits (sees a therapist; Gabriella Hernández [phone]), one known psychiatric admission ([facility] 2/2017), no history of suicide attempts, non-suicidal self injury. The patient arrived from [facility] after fall. Collateral information from daughter revealed she has exhibited manic symptoms and she was to have a meeting with her care manager for geriatric psychiatry admission. On evaluation the patient appears to be in Manic episode, with mixed features. Affect labile, patient is very tearful on exam. She exhibits tangential thought processes, religiously preoccupations. She requires inpatient hospitalization for stabilization.     Plan:         Continue medications : Alendronate 70 mg once a week, Bumex 1 mg qd, Norvasc 2.5 mg qd, Tylenol 650 mg q6 prn moderate pain, Aspirin EC 81 qd  Astelin Nose spray BID, Bengay patch qhs, Calcium + D 600 mg / 200 IU qd, Klonopin 0.5 mg 6 pm / 9pm, Depakote  mg qhs, Dulcosate 200 mg bid  Allegra 1 tablet bid, Folic Acid 800 MCG bid, Janumet XR 50 -1000 mg qd, Imodium prn, Cozaar 100 mg qd, Singulair 10 mg qd, Prilosec 20 qd, Pravstatin 20 mg qhs, Metamucil 2 caps bid,   Saline nasal spray bid, Slowmag 64 mg x 2 bid, vitamin B12 1000 bid, Vitamin C 1000 bid, Vitamin D 5000 qd, Zinc Glutamine 50 mg bid.    Discontinue Lantus, as home reported these are her old medications, the medical reconciliation they sent over with patient had previously discontinued medications.     PRNs: Ativan 0.5 mg Q6h PRN for Anxiety. Glucagon 1 mg PRN for hypoglycemia.  Collateral from O/P provider, MD left callback number in voicemail. 
This is an 81 year old , retired female, domiciled at the [facility], with past psychiatric history of Bipolar I disorder, w/ documented episodes of anjelica (as per her outpatient psychiatrist; Dr. Ross; [phone]) though onset of symptoms and formal psychiatric illness is in the last 5 years beginning with depressive sx and failure to thrive, history of alcohol abuse and reported concerns of PTSD and Cluster B Personality traits (sees a therapist; Gabriella Hernández [phone]), one known psychiatric admission ([facility] 2/2017), no history of suicide attempts, non-suicidal self injury. 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 The patient arrived from [facility] after fall. Collateral information from daughter revealed she has exhibited manic symptoms and she was to have a meeting with her care manager for geriatric psychiatry admission. On evaluation the patient appears to be in Manic episode, with mixed features. Affect labile, patient is very tearful on exam. She exhibits tangential thought processes, religiously preoccupations. She requires inpatient hospitalization for stabilization.     12/25/21: Appears manic, hyperverbal, tangential, intrusive, will increase neurontin, no s/s of klonopin withdrawal. Called daughter as per request, no response, no option for voicemail.   12/28/21:  Appears manic, hyperverbal, tangential. Patient difficult to redirect and overly affectionate towards staff. Reports improving sleep.  12/29: Still appearing manic, hyperverbal, tangential, difficult to redirect and overly affectionate. After discussing with patient and family, will start trial of depakote sprinkles.   12/30: Still appearing manic, hyperverbal, and overly affectionate. Tolerating depakote trial. Poor sleep either secondary to CPAP use or manic sx. Will add melatonin for sleep.     Plan:  Legal: 9.27  Safety: Routine observation  Psychiatry:  	Start melatonin 3mg qHS with additional 3mg PRN  	Continue Gabapentin 400mg qAM 500mg qHS  	Continue Depakote Sprinkles 250mg BID  		Discussed treatment of anjelica with family and patient. Concerns regarding side effects of Li and toxicity. Recent imaging showing 				hepatosteatosis; remote history of EtOH use. Reportedly tolerated depakote well in the past, however was utilized in depression management and 		less efficacious. Patient has concerns of being "drugged up and sleepy." Open to trying depakote at this time.    Medical:  Continue medications : Alendronate 70 mg once a week, Bumex 1 mg qd, Norvasc 2.5 mg qd, Tylenol 650 mg q6 prn moderate pain, Aspirin EC 81 qd  Astelin Nose spray BID, Bengay patch qhs, Calcium + D 600 mg / 200 IU qd,  Dulcosate 200 mg bid  Allegra 1 tablet bid, Folic Acid 800 MCG bid, Janumet XR 50 -1000 mg qd, Imodium prn, Cozaar 100 mg qd, Singulair 10 mg qd, Prilosec 20 qd, Pravstatin 20 mg qhs, Metamucil 2 caps bid,   Saline nasal spray bid, Slowmag 64 mg x 2 bid, vitamin B12 1000 bid, Vitamin C 1000 bid, Vitamin D 5000 qd, Zinc Glutamine 50 mg bid.  - advance diet as tolerated    PRNs: Ativan 0.5 mg Q6h PRN for Anxiety. Glucagon 1 mg PRN for hypoglycemia.  
This is an 81 year old , retired female, domiciled at the [facility], with past psychiatric history of Bipolar I disorder, w/ documented episodes of anjelica (as per her outpatient psychiatrist; Dr. Ross; [phone]) though onset of symptoms and formal psychiatric illness is in the last 5 years beginning with depressive sx and failure to thrive, history of alcohol abuse and reported concerns of PTSD and Cluster B Personality traits (sees a therapist; Gabriella Hernández [phone]), one known psychiatric admission ([facility] 2/2017), no history of suicide attempts, non-suicidal self injury. 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 The patient arrived from [facility] after fall. Collateral information from daughter revealed she has exhibited manic symptoms and she was to have a meeting with her care manager for geriatric psychiatry admission. On evaluation the patient appears to be in Manic episode, with mixed features. Affect labile, patient is very tearful on exam. She exhibits tangential thought processes, religiously preoccupations. She requires inpatient hospitalization for stabilization.     12/25/21: Appears manic, hyperverbal, tangential, intrusive, will increase neurontin, no s/s of klonopin withdrawal. Called daughter as per request, no response, no option for voicemail.   12/28/21:  Appears manic, hyperverbal, tangential. Patient difficult to redirect and overly affectionate towards staff. Reports improving sleep.  12/29: Still appearing manic, hyperverbal, tangential, difficult to redirect and overly affectionate. After discussing with patient and family, will start trial of depakote sprinkles.   12/30: Still appearing manic, hyperverbal, and overly affectionate. Tolerating depakote trial. Poor sleep either secondary to CPAP use or manic sx. Will add melatonin for sleep.   2/31: Seems improved on VPA, slept better last night, less energetic and mood elevated this AM"  01/01/2022: Patient reports that she slept poorly last night but describes mood as "fine". Denies nay other concerns. Taking meds.   Plan: Will continue Depakote and gabapentin at current dose for mood. Will continue rest of the current medication as per primary team.   1/2/2022: Remains slightly mood elevated but improving, continue VPA and gabapentin  1/3/2022: Remains elevated with increased speech and tangentiality. VPA level low at 33. Will increase Depakote to 250mg qAM 500mg qHS. Will consider switching from gabapentin to Abilify for more targeted bipolar/anjelica treatment.   1/4: Patient appears les pressured and thought disordered today. COnt VPA,  consider increase to 500mg bid. Conisder taper gabapentin and add Seroquel if sleep issue or Abilify  1/5: Patient reporting improved sleep, requiring less redirection. Continuing depakote, considering tapering gabapentin and adding Seroquel; patient amenable but continuing to observe for now.     Plan:  Legal: 9.27  Safety: Routine observation  Psychiatry:  	Start melatonin 3mg qHS with additional 3mg PRN  	Continue Gabapentin 400mg qAM 600mg qHS  		Was previously considering abilify as replacement, but given concern for sleep and dysphagia, will now consider low-dose seroquel. Patient 		amenable, however with ongoing improvement will continue to observe for now.   	Depakote Sprinkles 250mg qAM, 500mg qHS  		Discussed treatment of anjelica with family and patient. Concerns regarding side effects of Li and toxicity. Recent imaging showing 				hepatosteatosis; remote history of EtOH use. Reportedly tolerated depakote well in the past, however was utilized in depression management and 		less efficacious. Patient has concerns of being "drugged up and sleepy." Open to trying depakote at this time.    Medical:  Continue medications : Alendronate 70 mg once a week, Bumex 1 mg qd, Norvasc 2.5 mg qd, Tylenol 650 mg q6 prn moderate pain, Aspirin EC 81 qd  Astelin Nose spray BID, Bengay patch qhs, Calcium + D 600 mg / 200 IU qd,  Dulcosate 200 mg bid  Allegra 1 tablet bid, Folic Acid 800 MCG bid, Janumet XR 50 -1000 mg qd, Imodium prn, Cozaar 100 mg qd, Singulair 10 mg qd, Prilosec 20 qd, Pravstatin 20 mg qhs, Metamucil 2 caps bid,   Saline nasal spray bid, Slowmag 64 mg x 2 bid, vitamin B12 1000 bid, Vitamin C 1000 bid, Vitamin D 5000 qd, Zinc Glutamine 50 mg bid.  - advance diet as tolerated    PRNs: Ativan 0.5 mg Q6h PRN for Anxiety. Glucagon 1 mg PRN for hypoglycemia.  
This is an 81 year old , retired female, domiciled at the [facility], with past psychiatric history of Bipolar I disorder, w/ documented episodes of anjelica (as per her outpatient psychiatrist; Dr. Ross; [phone]) though onset of symptoms and formal psychiatric illness is in the last 5 years beginning with depressive sx and failure to thrive, history of alcohol abuse and reported concerns of PTSD and Cluster B Personality traits (sees a therapist; Gabriella Hernández [phone]), one known psychiatric admission ([facility] 2/2017), no history of suicide attempts, non-suicidal self injury. The patient arrived from [facility] after fall. Collateral information from daughter revealed she has exhibited manic symptoms and she was to have a meeting with her care manager for geriatric psychiatry admission. On evaluation the patient appears to be in Manic episode, with mixed features. Affect labile, patient is very tearful on exam. She exhibits tangential thought processes, religiously preoccupations. She requires inpatient hospitalization for stabilization.     12/12: On encounter, patient continues to be hypomanic with excessive make up, is hyperverbal with tangential thought process. No new concerns. Taking meds.  Plan: Will continue current management as per primary team. 
This is an 81 year old , retired female, domiciled at the [facility], with past psychiatric history of Bipolar I disorder, w/ documented episodes of anjelica (as per her outpatient psychiatrist; Dr. Ross; [phone]) though onset of symptoms and formal psychiatric illness is in the last 5 years beginning with depressive sx and failure to thrive, history of alcohol abuse and reported concerns of PTSD and Cluster B Personality traits (sees a therapist; Gabriella Hernández [phone]), one known psychiatric admission ([facility] 2/2017), no history of suicide attempts, non-suicidal self injury. The patient arrived from [facility] after fall. Collateral information from daughter revealed she has exhibited manic symptoms and she was to have a meeting with her care manager for geriatric psychiatry admission. On evaluation the patient appears to be in Manic episode, with mixed features. Affect labile, patient is very tearful on exam. She exhibits tangential thought processes, religiously preoccupations. She requires inpatient hospitalization for stabilization.     Plan:    Continue medications : Alendronate 70 mg once a week, Bumex 1 mg qd, Norvasc 2.5 mg qd, Tylenol 650 mg q6 prn moderate pain, Aspirin EC 81 qd    Astelin Nose spray BID, Bengay patch qhs, Calcium + D 600 mg / 200 IU qd, Klonopin 0.5 mg 6 pm / 9pm, Dulcosate 200 mg bid  Allegra 1 tablet bid, Folic Acid 800 MCG bid, Janumet XR 50 -1000 mg qd, Imodium prn, Cozaar 100 mg qd, Singulair 10 mg qd, Prilosec 20 qd, Pravstatin 20 mg qhs, Metamucil 2 caps bid,   Saline nasal spray bid, Slowmag 64 mg x 2 bid, vitamin B12 1000 bid, Vitamin C 1000 bid, Vitamin D 5000 qd, Zinc Glutamine 50 mg bid.    Continue Neurontin at 400 mg BID for mood control.     PRNs: Ativan 0.5 mg Q6h PRN for Anxiety. Glucagon 1 mg PRN for hypoglycemia.
As per previous documentation: "This is an 81 year old , retired female, domiciled at the [facility], with past psychiatric history of Bipolar I disorder, w/ documented episodes of anjelica (as per her outpatient psychiatrist; Dr. Ross; [phone]) though onset of symptoms and formal psychiatric illness is in the last 5 years beginning with depressive sx and failure to thrive, history of alcohol abuse and reported concerns of PTSD and Cluster B Personality traits (sees a therapist; Gabriella Hernández [phone]), one known psychiatric admission ([facility] 2/2017), no history of suicide attempts, non-suicidal self injury. 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 The patient arrived from [facility] after fall. Collateral information from daughter revealed she has exhibited manic symptoms and she was to have a meeting with her care manager for geriatric psychiatry admission. On evaluation the patient appears to be in Manic episode, with mixed features. Affect labile, patient is very tearful on exam. She exhibits tangential thought processes, religiously preoccupations. She requires inpatient hospitalization for stabilization.     12/25/21: Appears manic, hyperverbal, tangential, intrusive, will increase neurontin, no s/s of klonopin withdrawal. Called daughter as per request, no response, no option for voicemail.   12/28/21:  Appears manic, hyperverbal, tangential. Patient difficult to redirect and overly affectionate towards staff. Reports improving sleep.  12/29: Still appearing manic, hyperverbal, tangential, difficult to redirect and overly affectionate. After discussing with patient and family, will start trial of depakote sprinkles.   12/30: Still appearing manic, hyperverbal, and overly affectionate. Tolerating depakote trial. Poor sleep either secondary to CPAP use or manic sx. Will add melatonin for sleep.   12/31: Seems improved on VPA, slept better last night, less energetic and mood elevated this AM"    01/01/2022: Patient reports that she slept poorly last night but describes mood as "fine". Denies nay other concerns. Taking meds.   Plan: Will continue Depakote and gabapentin at current dose for mood. Will continue rest of the current medication as per primary team.     
This is an 81 year old , retired female, domiciled at the [facility], with past psychiatric history of Bipolar I disorder, w/ documented episodes of anjelica (as per her outpatient psychiatrist; Dr. Ross; [phone]) though onset of symptoms and formal psychiatric illness is in the last 5 years beginning with depressive sx and failure to thrive, history of alcohol abuse and reported concerns of PTSD and Cluster B Personality traits (sees a therapist; Gabriella Hernández [phone]), one known psychiatric admission ([facility] 2/2017), no history of suicide attempts, non-suicidal self injury. The patient arrived from [facility] after fall. Collateral information from daughter revealed she has exhibited manic symptoms and she was to have a meeting with her care manager for geriatric psychiatry admission. On evaluation the patient appears to be in Manic episode, with mixed features. Affect labile, patient is very tearful on exam. She exhibits tangential thought processes, religiously preoccupations. She requires inpatient hospitalization for stabilization.     Plan: Will continue current management as per primary team. 
This is an 81 year old , retired female, domiciled at the [facility], with past psychiatric history of Bipolar I disorder, w/ documented episodes of anjelica (as per her outpatient psychiatrist; Dr. Ross; [phone]) though onset of symptoms and formal psychiatric illness is in the last 5 years beginning with depressive sx and failure to thrive, history of alcohol abuse and reported concerns of PTSD and Cluster B Personality traits (sees a therapist; Gabriella Hernández [phone]), one known psychiatric admission ([facility] 2/2017), no history of suicide attempts, non-suicidal self injury. The patient arrived from [facility] after fall. Collateral information from daughter revealed she has exhibited manic symptoms and she was to have a meeting with her care manager for geriatric psychiatry admission. On evaluation the patient appears to be in Manic episode, with mixed features. Affect labile, patient is very tearful on exam. She exhibits tangential thought processes, religiously preoccupations. She requires inpatient hospitalization for stabilization.     Plan:     Discontinue previous medications: Sertraline 50 mg Qd, Simvastatin 5 mg Qhs, Protonix 40 mg Qam, Naproxen 500 mg Qhs, Hydrochlorothiazide 25 mg Qhs, Norvasc 5 mg Qd, Lidocaine 4% patch transdermal Qd,  Cephalexin 500 mg BID for 7 days. Loratidine 10 mg Qd.     Start confirmed medications : Alendronate 70 mg once a week, Bumex 1 mg qd, Norvasc 2.5 mg qd, Tylenol 650 mg q6 prn moderate pain, Aspirin EC 81 qd  Astelin Nose spray BID, Bengay patch qhs, Calcium + D 600 mg / 200 IU qd, Klonopin 0.5 mg 6 pm / 9pm, Depakote  mg qhs, Dulcosate 200 mg bid  Allegra 1 tablet bid, Folic Acid 800 MCG bid, Janumet XR 50 -1000 mg qd, Imodium prn, Cozaar 100 mg qd, Singulair 10 mg qd, Prilosec 20 qd, Pravstatin 20 mg qhs, Metamucil 2 caps bid,   Saline nasal spray bid, Slowmag 64 mg x 2 bid, vitamin B12 1000 bid, Vitamin C 1000 bid, Vitamin D 5000 qd, Zinc Glutamine 50 mg bid.    PRNs: Ativan 0.5 mg Q6h PRN for Anxiety. Glucagon 1 mg PRN for hypoglycemia.  Collateral from O/P provider, MD left callback number in voicemail. 
This is an 81 year old , retired female, domiciled at the [facility], with past psychiatric history of Bipolar I disorder, w/ documented episodes of anjelica (as per her outpatient psychiatrist; Dr. Ross; [phone]) though onset of symptoms and formal psychiatric illness is in the last 5 years beginning with depressive sx and failure to thrive, history of alcohol abuse and reported concerns of PTSD and Cluster B Personality traits (sees a therapist; Gabriella Hernández [phone]), one known psychiatric admission ([facility] 2/2017), no history of suicide attempts, non-suicidal self injury. The patient arrived from [facility] after fall. Collateral information from daughter revealed she has exhibited manic symptoms and she was to have a meeting with her care manager for geriatric psychiatry admission. On evaluation the patient appears to be in Manic episode, with mixed features. Affect labile, patient is very tearful on exam. She exhibits tangential thought processes, religiously preoccupations. She requires inpatient hospitalization for stabilization.     Plan:   Continue medications : Alendronate 70 mg once a week, Bumex 1 mg qd, Norvasc 2.5 mg qd, Tylenol 650 mg q6 prn moderate pain, Aspirin EC 81 qd    Astelin Nose spray BID, Bengay patch qhs, Calcium + D 600 mg / 200 IU qd, Klonopin 0.5 mg 6 pm / 9pm, Dulcosate 200 mg bid  Allegra 1 tablet bid, Folic Acid 800 MCG bid, Janumet XR 50 -1000 mg qd, Imodium prn, Cozaar 100 mg qd, Singulair 10 mg qd, Prilosec 20 qd, Pravstatin 20 mg qhs, Metamucil 2 caps bid,   Saline nasal spray bid, Slowmag 64 mg x 2 bid, vitamin B12 1000 bid, Vitamin C 1000 bid, Vitamin D 5000 qd, Zinc Glutamine 50 mg bid.    Not compliant with Neurontin 100 mg BID for mood control. Continue to encourage medication compliance.     PRNs: Ativan 0.5 mg Q6h PRN for Anxiety. Glucagon 1 mg PRN for hypoglycemia.
This is an 81 year old , retired female, domiciled at the [facility], with past psychiatric history of Bipolar I disorder, w/ documented episodes of anjelica (as per her outpatient psychiatrist; Dr. Ross; [phone]) though onset of symptoms and formal psychiatric illness is in the last 5 years beginning with depressive sx and failure to thrive, history of alcohol abuse and reported concerns of PTSD and Cluster B Personality traits (sees a therapist; Gabriella Hernández [phone]), one known psychiatric admission ([facility] 2/2017), no history of suicide attempts, non-suicidal self injury. 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 The patient arrived from [facility] after fall. Collateral information from daughter revealed she has exhibited manic symptoms and she was to have a meeting with her care manager for geriatric psychiatry admission. On evaluation the patient appears to be in Manic episode, with mixed features. Affect labile, patient is very tearful on exam. She exhibits tangential thought processes, religiously preoccupations. She requires inpatient hospitalization for stabilization.     12/25/21: Appears manic, hyperverbal, tangential, intrusive, will increase neurontin, no s/s of klonopin withdrawal. Called daughter as per request, no response, no option for voicemail.   12/28/21:  Appears manic, hyperverbal, tangential. Patient difficult to redirect and overly affectionate towards staff. Reports improving sleep.  12/29: Still appearing manic, hyperverbal, tangential, difficult to redirect and overly affectionate. After discussing with patient and family, will start trial of depakote sprinkles.   12/30: Still appearing manic, hyperverbal, and overly affectionate. Tolerating depakote trial. Poor sleep either secondary to CPAP use or manic sx. Will add melatonin for sleep.   12/31: Seems improved on VPA, slept better last night, less energetic and mood elevated this AM    Plan:  Legal: 9.27  Safety: Routine observation  Psychiatry:  	Start melatonin 3mg qHS with additional 3mg PRN  	Continue Gabapentin 400mg qAM 500mg qHS  	Continue Depakote Sprinkles 250mg BID  		Discussed treatment of anjelica with family and patient. Concerns regarding side effects of Li and toxicity. Recent imaging showing 				hepatosteatosis; remote history of EtOH use. Reportedly tolerated depakote well in the past, however was utilized in depression management and 		less efficacious. Patient has concerns of being "drugged up and sleepy." Open to trying depakote at this time.    Medical:  Continue medications : Alendronate 70 mg once a week, Bumex 1 mg qd, Norvasc 2.5 mg qd, Tylenol 650 mg q6 prn moderate pain, Aspirin EC 81 qd  Astelin Nose spray BID, Bengay patch qhs, Calcium + D 600 mg / 200 IU qd,  Dulcosate 200 mg bid  Allegra 1 tablet bid, Folic Acid 800 MCG bid, Janumet XR 50 -1000 mg qd, Imodium prn, Cozaar 100 mg qd, Singulair 10 mg qd, Prilosec 20 qd, Pravstatin 20 mg qhs, Metamucil 2 caps bid,   Saline nasal spray bid, Slowmag 64 mg x 2 bid, vitamin B12 1000 bid, Vitamin C 1000 bid, Vitamin D 5000 qd, Zinc Glutamine 50 mg bid.  - advance diet as tolerated    PRNs: Ativan 0.5 mg Q6h PRN for Anxiety. Glucagon 1 mg PRN for hypoglycemia.  
This is an 81 year old , retired female, domiciled at the [facility], with past psychiatric history of Bipolar I disorder, w/ documented episodes of anjelica (as per her outpatient psychiatrist; Dr. Ross; [phone]) though onset of symptoms and formal psychiatric illness is in the last 5 years beginning with depressive sx and failure to thrive, history of alcohol abuse and reported concerns of PTSD and Cluster B Personality traits (sees a therapist; Gabriella Hernández [phone]), one known psychiatric admission ([facility] 2/2017), no history of suicide attempts, non-suicidal self injury. The patient arrived from [facility] after fall. Collateral information from daughter revealed she has exhibited manic symptoms and she was to have a meeting with her care manager for geriatric psychiatry admission. On evaluation the patient appears to be in Manic episode, with mixed features. Affect labile, patient is very tearful on exam. She exhibits tangential thought processes, religiously preoccupations. She requires inpatient hospitalization for stabilization.     12/12: On encounter, patient continues to be hypomanic with excessive make up, is hyperverbal with tangential thought process. No new concerns. Taking meds.  Plan: Will continue current management as per primary team. 
This is an 81 year old , retired female, domiciled at the [facility], with past psychiatric history of Bipolar I disorder, w/ documented episodes of beto (as per her outpatient psychiatrist; Dr. Ross; [phone]) though onset of symptoms and formal psychiatric illness is in the last 5 years beginning with depressive sx and failure to thrive, history of alcohol abuse and reported concerns of PTSD and Cluster B Personality traits (sees a therapist; Gabriella Hernández [phone]), one known psychiatric admission ([facility] 2/2017), no history of suicide attempts, non-suicidal self injury. The patient arrived from [facility] after fall. Collateral information from daughter revealed she has exhibited manic symptoms and she was to have a meeting with her care manager for geriatric psychiatry admission. On evaluation the patient appears to be in Manic episode, with mixed features. Affect labile, patient is very tearful on exam. She exhibits tangential thought processes, religiously preoccupations. She requires inpatient hospitalization for stabilization.     12/25/21; Appears manic, hyperverbal, tangential, intrusive, will increase neurontin, no s/s of klonopin withdrawal. Called daughter as per request, no response, no option for voicemail.     Plan:    Continue medications : Alendronate 70 mg once a week, Bumex 1 mg qd, Norvasc 2.5 mg qd, Tylenol 650 mg q6 prn moderate pain, Aspirin EC 81 qd    Astelin Nose spray BID, Bengay patch qhs, Calcium + D 600 mg / 200 IU qd, Klonopin 0.5 mg 6 pm / 9pm, Dulcosate 200 mg bid  Allegra 1 tablet bid, Folic Acid 800 MCG bid, Janumet XR 50 -1000 mg qd, Imodium prn, Cozaar 100 mg qd, Singulair 10 mg qd, Prilosec 20 qd, Pravstatin 20 mg qhs, Metamucil 2 caps bid,   Saline nasal spray bid, Slowmag 64 mg x 2 bid, vitamin B12 1000 bid, Vitamin C 1000 bid, Vitamin D 5000 qd, Zinc Glutamine 50 mg bid.    Increase Neurontin to 400 mg qam and 500 mg qhs for mood control.     PRNs: Ativan 0.5 mg Q6h PRN for Anxiety. Glucagon 1 mg PRN for hypoglycemia.  12/27 Psychotic Beto, Cont meds, review med hx with family to review options, Consider d/cing some of her vitamins to simplify regimen
This is an 81 year old , retired female, domiciled at the [facility], with past psychiatric history of Bipolar I disorder, w/ documented episodes of anjelica (as per her outpatient psychiatrist; Dr. Ross; [phone]) though onset of symptoms and formal psychiatric illness is in the last 5 years beginning with depressive sx and failure to thrive, history of alcohol abuse and reported concerns of PTSD and Cluster B Personality traits (sees a therapist; Gabriella Hernández [phone]), one known psychiatric admission ([facility] 2/2017), no history of suicide attempts, non-suicidal self injury. The patient arrived from [facility] after fall. Collateral information from daughter revealed she has exhibited manic symptoms and she was to have a meeting with her care manager for geriatric psychiatry admission. On evaluation the patient appears to be in Manic episode, with mixed features. Affect labile, patient is very tearful on exam. She exhibits tangential thought processes, religiously preoccupations. She requires inpatient hospitalization for stabilization.     Plan:    Continue medications : Alendronate 70 mg once a week, Bumex 1 mg qd, Norvasc 2.5 mg qd, Tylenol 650 mg q6 prn moderate pain, Aspirin EC 81 qd    Astelin Nose spray BID, Bengay patch qhs, Calcium + D 600 mg / 200 IU qd, Klonopin 0.5 mg 6 pm / 9pm, Dulcosate 200 mg bid  Allegra 1 tablet bid, Folic Acid 800 MCG bid, Janumet XR 50 -1000 mg qd, Imodium prn, Cozaar 100 mg qd, Singulair 10 mg qd, Prilosec 20 qd, Pravstatin 20 mg qhs, Metamucil 2 caps bid,   Saline nasal spray bid, Slowmag 64 mg x 2 bid, vitamin B12 1000 bid, Vitamin C 1000 bid, Vitamin D 5000 qd, Zinc Glutamine 50 mg bid.    Increase Neurontin at 400 mg BID for mood control.     PRNs: Ativan 0.5 mg Q6h PRN for Anxiety. Glucagon 1 mg PRN for hypoglycemia.
This is an 81 year old , retired female, domiciled at the [facility], with past psychiatric history of Bipolar I disorder, w/ documented episodes of anjelica (as per her outpatient psychiatrist; Dr. Ross; [phone]) though onset of symptoms and formal psychiatric illness is in the last 5 years beginning with depressive sx and failure to thrive, history of alcohol abuse and reported concerns of PTSD and Cluster B Personality traits (sees a therapist; Gabriella Hernández [phone]), one known psychiatric admission ([facility] 2/2017), no history of suicide attempts, non-suicidal self injury. The patient arrived from [facility] after fall. Collateral information from daughter revealed she has exhibited manic symptoms and she was to have a meeting with her care manager for geriatric psychiatry admission. On evaluation the patient appears to be in Manic episode, with mixed features. Affect labile, patient is very tearful on exam. She exhibits tangential thought processes, religiously preoccupations. She requires inpatient hospitalization for stabilization.     Plan:   Continue medications : Alendronate 70 mg once a week, Bumex 1 mg qd, Norvasc 2.5 mg qd, Tylenol 650 mg q6 prn moderate pain, Aspirin EC 81 qd    Astelin Nose spray BID, Bengay patch qhs, Calcium + D 600 mg / 200 IU qd, Klonopin 0.5 mg 6 pm / 9pm, Depakote  mg qhs, Dulcosate 200 mg bid  Allegra 1 tablet bid, Folic Acid 800 MCG bid, Janumet XR 50 -1000 mg qd, Imodium prn, Cozaar 100 mg qd, Singulair 10 mg qd, Prilosec 20 qd, Pravstatin 20 mg qhs, Metamucil 2 caps bid,   Saline nasal spray bid, Slowmag 64 mg x 2 bid, vitamin B12 1000 bid, Vitamin C 1000 bid, Vitamin D 5000 qd, Zinc Glutamine 50 mg bid.    PRNs: Ativan 0.5 mg Q6h PRN for Anxiety. Glucagon 1 mg PRN for hypoglycemia.
This is an 81 year old , retired female, domiciled at the [facility], with past psychiatric history of Bipolar I disorder, w/ documented episodes of anjelica (as per her outpatient psychiatrist; Dr. Ross; [phone]) though onset of symptoms and formal psychiatric illness is in the last 5 years beginning with depressive sx and failure to thrive, history of alcohol abuse and reported concerns of PTSD and Cluster B Personality traits (sees a therapist; Gabriella Hernández [phone]), one known psychiatric admission ([facility] 2/2017), no history of suicide attempts, non-suicidal self injury. The patient arrived from [facility] after fall. Collateral information from daughter revealed she has exhibited manic symptoms and she was to have a meeting with her care manager for geriatric psychiatry admission. On evaluation the patient appears to be in Manic episode, with mixed features. Affect labile, patient is very tearful on exam. She exhibits tangential thought processes, religiously preoccupations. She requires inpatient hospitalization for stabilization.     Plan:    Continue medications : Alendronate 70 mg once a week, Bumex 1 mg qd, Norvasc 2.5 mg qd, Tylenol 650 mg q6 prn moderate pain, Aspirin EC 81 qd    Astelin Nose spray BID, Bengay patch qhs, Calcium + D 600 mg / 200 IU qd, Klonopin 0.5 mg 6 pm / 9pm, Dulcosate 200 mg bid  Allegra 1 tablet bid, Folic Acid 800 MCG bid, Janumet XR 50 -1000 mg qd, Imodium prn, Cozaar 100 mg qd, Singulair 10 mg qd, Prilosec 20 qd, Pravstatin 20 mg qhs, Metamucil 2 caps bid,   Saline nasal spray bid, Slowmag 64 mg x 2 bid, vitamin B12 1000 bid, Vitamin C 1000 bid, Vitamin D 5000 qd, Zinc Glutamine 50 mg bid.    Continue with Neurontin 300 mg BID for mood control.     PRNs: Ativan 0.5 mg Q6h PRN for Anxiety. Glucagon 1 mg PRN for hypoglycemia.
This is an 81 year old , retired female, domiciled at the [facility], with past psychiatric history of Bipolar I disorder, w/ documented episodes of anjelica (as per her outpatient psychiatrist; Dr. Ross; [phone]) though onset of symptoms and formal psychiatric illness is in the last 5 years beginning with depressive sx and failure to thrive, history of alcohol abuse and reported concerns of PTSD and Cluster B Personality traits (sees a therapist; Gabriella Hernández [phone]), one known psychiatric admission ([facility] 2/2017), no history of suicide attempts, non-suicidal self injury. The patient arrived from [facility] after fall. Collateral information from daughter revealed she has exhibited manic symptoms and she was to have a meeting with her care manager for geriatric psychiatry admission. On evaluation the patient appears to be in Manic episode, with mixed features. Affect labile, patient is very tearful on exam. She exhibits tangential thought processes, religiously preoccupations. She requires inpatient hospitalization for stabilization.     Plan:   Continue medications : Alendronate 70 mg once a week, Bumex 1 mg qd, Norvasc 2.5 mg qd, Tylenol 650 mg q6 prn moderate pain, Aspirin EC 81 qd    Astelin Nose spray BID, Bengay patch qhs, Calcium + D 600 mg / 200 IU qd, Klonopin 0.5 mg 6 pm / 9pm, Dulcosate 200 mg bid  Allegra 1 tablet bid, Folic Acid 800 MCG bid, Janumet XR 50 -1000 mg qd, Imodium prn, Cozaar 100 mg qd, Singulair 10 mg qd, Prilosec 20 qd, Pravstatin 20 mg qhs, Metamucil 2 caps bid,   Saline nasal spray bid, Slowmag 64 mg x 2 bid, vitamin B12 1000 bid, Vitamin C 1000 bid, Vitamin D 5000 qd, Zinc Glutamine 50 mg bid.    Discontinue  Depakote  mg qhs for noncompliance. Start Neurontin 100 mg BID for mood control.     PRNs: Ativan 0.5 mg Q6h PRN for Anxiety. Glucagon 1 mg PRN for hypoglycemia.
This is an 81 year old , retired female, domiciled at the [facility], with past psychiatric history of Bipolar I disorder, w/ documented episodes of anjelica (as per her outpatient psychiatrist; Dr. Ross; [phone]) though onset of symptoms and formal psychiatric illness is in the last 5 years beginning with depressive sx and failure to thrive, history of alcohol abuse and reported concerns of PTSD and Cluster B Personality traits (sees a therapist; Gabriella Hernández [phone]), one known psychiatric admission ([facility] 2/2017), no history of suicide attempts, non-suicidal self injury. 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 The patient arrived from [facility] after fall. Collateral information from daughter revealed she has exhibited manic symptoms and she was to have a meeting with her care manager for geriatric psychiatry admission. On evaluation the patient appears to be in Manic episode, with mixed features. Affect labile, patient is very tearful on exam. She exhibits tangential thought processes, religiously preoccupations. She requires inpatient hospitalization for stabilization.     12/25/21: Appears manic, hyperverbal, tangential, intrusive, will increase neurontin, no s/s of klonopin withdrawal. Called daughter as per request, no response, no option for voicemail.   12/28/21:  Appears manic, hyperverbal, tangential. Patient difficult to redirect and overly affectionate towards staff. Reports improving sleep.    Plan:  Legal: 9.27  Safety: Routine observation  Psychiatry:  Continue Gabapentin 400mg daily and 500 mg qhs  will consider SGA vs Lithium for manic sx   	Patient has a history of depakote trial with no benefit. Repots hx of post-partum psychosis many years prior which raises concern for underlying bipolar.   	Will discuss further with patient    Medical:  Continue medications : Alendronate 70 mg once a week, Bumex 1 mg qd, Norvasc 2.5 mg qd, Tylenol 650 mg q6 prn moderate pain, Aspirin EC 81 qd  Astelin Nose spray BID, Bengay patch qhs, Calcium + D 600 mg / 200 IU qd,  Dulcosate 200 mg bid  Allegra 1 tablet bid, Folic Acid 800 MCG bid, Janumet XR 50 -1000 mg qd, Imodium prn, Cozaar 100 mg qd, Singulair 10 mg qd, Prilosec 20 qd, Pravstatin 20 mg qhs, Metamucil 2 caps bid,   Saline nasal spray bid, Slowmag 64 mg x 2 bid, vitamin B12 1000 bid, Vitamin C 1000 bid, Vitamin D 5000 qd, Zinc Glutamine 50 mg bid.  f/u with speech/swallow specialists regarding hospital vs home dietary requirements  will consider d/cing some of her vitamins to simplify regimen    PRNs: Ativan 0.5 mg Q6h PRN for Anxiety. Glucagon 1 mg PRN for hypoglycemia.  
This is an 81 year old , retired female, domiciled at the [facility], with past psychiatric history of Bipolar I disorder, w/ documented episodes of anjelica (as per her outpatient psychiatrist; Dr. Ross; [phone]) though onset of symptoms and formal psychiatric illness is in the last 5 years beginning with depressive sx and failure to thrive, history of alcohol abuse and reported concerns of PTSD and Cluster B Personality traits (sees a therapist; Gabriella Hernández [phone]), one known psychiatric admission ([facility] 2/2017), no history of suicide attempts, non-suicidal self injury. 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 The patient arrived from [facility] after fall. Collateral information from daughter revealed she has exhibited manic symptoms and she was to have a meeting with her care manager for geriatric psychiatry admission. On evaluation the patient appears to be in Manic episode, with mixed features. Affect labile, patient is very tearful on exam. She exhibits tangential thought processes, religiously preoccupations. She requires inpatient hospitalization for stabilization.     12/25/21: Appears manic, hyperverbal, tangential, intrusive, will increase neurontin, no s/s of klonopin withdrawal. Called daughter as per request, no response, no option for voicemail.   12/28/21:  Appears manic, hyperverbal, tangential. Patient difficult to redirect and overly affectionate towards staff. Reports improving sleep.  12/29: Still appearing manic, hyperverbal, tangential, difficult to redirect and overly affectionate. After discussing with patient and family, will start trial of depakote sprinkles.   12/30: Still appearing manic, hyperverbal, and overly affectionate. Tolerating depakote trial. Poor sleep either secondary to CPAP use or manic sx. Will add melatonin for sleep.   2/31: Seems improved on VPA, slept better last night, less energetic and mood elevated this AM"  01/01/2022: Patient reports that she slept poorly last night but describes mood as "fine". Denies nay other concerns. Taking meds.   Plan: Will continue Depakote and gabapentin at current dose for mood. Will continue rest of the current medication as per primary team.   1/2/2022: Remains slightly mood elevated but improving, continue VPA and gabapentin  1/3/2022: Remains elevated with increased speech and tangentiality. VPA level low at 33. Will increase Depakote to 250mg qAM 500mg qHS. Will consider switching from gabapentin to Abilify for more targeted bipolar/anjelica treatment.   1/4: Patient appears les pressured and thought disordered today. COnt VPA,  consider increase to 500mg bid. Conisder taper gabapentin and add Seroquel if sleep issue or Abilify  1/5: Patient reporting improved sleep, requiring less redirection. Continuing depakote, considering tapering gabapentin and adding Seroquel; patient amenable but continuing to observe for now.   1/6: Patient reporting improved sleep, requiring redirection again. Continuing depakote, f/u level tomorrow. Patient still with limited insight regarding hospitalization.   1/7: Patient with improved sleep, less redirection required, less tangential. Depakote level 68.4; will retest on Monday to assess for steady state. Family reports patient is improving per their conversations on the phone, less tangentiality. Will continue to monitor on current regimen over weekend.   1/10: Patient with improved sleep, minimal edirection required. Depakote repeat level 62.5. Improving insight; will communicate with family and begin considering discharge planning.  1/11: Patient with improved sleep, no redirection required today. Labs indicating GELACIO; will consider liquid Fe2+ supplementation.   1/12 Improved anjelica with some residual which may be approaching baseline. Difficulty with CPAP is source of some distress but responds to education, support. Cont curren meds no changes for now.   1/13 Patient improving  gradually less manic, less pressured, less disorganized, less focused on special abilities. Plan cont current meds will give more time to respond rather than increase or make changes reviewed plan of care with daughter  1/14:  Pt was exercising with a peer, doing ballet barre exercises. She was redirected and asked to refrain from doing this due to risk of falls. Pt is in good spirits, denies any complaints. Scheduled for d.c tuesday.     Plan:  Legal: 9.27  Safety: Routine observation  Psychiatry:  	Start melatonin 3mg qHS with additional 3mg PRN  	Continue Gabapentin 400mg qAM 600mg qHS  	Depakote Sprinkles 250mg qAM, 500mg qHS    Medical:  Continue medications : Alendronate 70 mg once a week, Bumex 1 mg qd, Norvasc 2.5 mg qd, Tylenol 650 mg q6 prn moderate pain, Aspirin EC 81 qd  Astelin Nose spray BID, Bengay patch qhs, Calcium + D 600 mg / 200 IU qd,  Dulcosate 200 mg bid  Allegra 1 tablet bid, Folic Acid 800 MCG bid, Janumet XR 50 -1000 mg qd, Imodium prn, Cozaar 100 mg qd, Singulair 10 mg qd, Prilosec 20 qd, Pravstatin 20 mg qhs, Metamucil 2 caps bid,   Saline nasal spray bid, Slowmag 64 mg x 2 bid, vitamin B12 1000 bid, Vitamin C 1000 bid, Vitamin D 5000 qd, Zinc Glutamine 50 mg bid.  - advance diet as tolerated    PRNs: Ativan 0.5 mg Q6h PRN for Anxiety. Glucagon 1 mg PRN for hypoglycemia.  1/18: Patient much improved. 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 She is notably less irritable, energetic and has shown much improved insight and judgement for example now aware she should never leave facility alone at night. No SI/HI, hallucinations. Patient not a danger to self or others. No longer needs hospital care. D/C to assisted living with f/u in out clinic.     
This is an 81 year old , retired female, domiciled at the [facility], with past psychiatric history of Bipolar I disorder, w/ documented episodes of anjelica (as per her outpatient psychiatrist; Dr. Ross; [phone]) though onset of symptoms and formal psychiatric illness is in the last 5 years beginning with depressive sx and failure to thrive, history of alcohol abuse and reported concerns of PTSD and Cluster B Personality traits (sees a therapist; Gabriella Hernández [phone]), one known psychiatric admission ([facility] 2/2017), no history of suicide attempts, non-suicidal self injury. 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 The patient arrived from [facility] after fall. Collateral information from daughter revealed she has exhibited manic symptoms and she was to have a meeting with her care manager for geriatric psychiatry admission. On evaluation the patient appears to be in Manic episode, with mixed features. Affect labile, patient is very tearful on exam. She exhibits tangential thought processes, religiously preoccupations. She requires inpatient hospitalization for stabilization.     12/25/21: Appears manic, hyperverbal, tangential, intrusive, will increase neurontin, no s/s of klonopin withdrawal. Called daughter as per request, no response, no option for voicemail.   12/28/21:  Appears manic, hyperverbal, tangential. Patient difficult to redirect and overly affectionate towards staff. Reports improving sleep.  12/29: Still appearing manic, hyperverbal, tangential, difficult to redirect and overly affectionate. After discussing with patient and family, will start trial of depakote sprinkles.   12/30: Still appearing manic, hyperverbal, and overly affectionate. Tolerating depakote trial. Poor sleep either secondary to CPAP use or manic sx. Will add melatonin for sleep.   2/31: Seems improved on VPA, slept better last night, less energetic and mood elevated this AM"  01/01/2022: Patient reports that she slept poorly last night but describes mood as "fine". Denies nay other concerns. Taking meds.   Plan: Will continue Depakote and gabapentin at current dose for mood. Will continue rest of the current medication as per primary team.   1/2/2022: Remains slightly mood elevated but improving, continue VPA and gabapentin  1/3/2022: Remains elevated with increased speech and tangentiality. VPA level low at 33. Will increase Depakote to 250mg qAM 500mg qHS. Will consider switching from gabapentin to Abilify for more targeted bipolar/anjelica treatment.   1/4: Patient appears les pressured and thought disordered today. COnt VPA,  consider increase to 500mg bid. Conisder taper gabapentin and add Seroquel if sleep issue or Abilify  1/5: Patient reporting improved sleep, requiring less redirection. Continuing depakote, considering tapering gabapentin and adding Seroquel; patient amenable but continuing to observe for now.   1/6: Patient reporting improved sleep, requiring redirection again. Continuing depakote, f/u level tomorrow. Patient still with limited insight regarding hospitalization.   1/7: Patient with improved sleep, less redirection required, less tangential. Depakote level 68.4; will retest on Monday to assess for steady state. Family reports patient is improving per their conversations on the phone, less tangentiality. Will continue to monitor on current regimen over weekend.     Plan:  Legal: 9.27  Safety: Routine observation  Psychiatry:  	Start melatonin 3mg qHS with additional 3mg PRN  	Continue Gabapentin 400mg qAM 600mg qHS  		Was previously considering abilify as replacement, but given concern for sleep and dysphagia, will now consider low-dose seroquel. Patient 		amenable, however with ongoing improvement will continue to observe for now.   	Depakote Sprinkles 250mg qAM, 500mg qHS  		- f/u VPA level friday    Medical:  Continue medications : Alendronate 70 mg once a week, Bumex 1 mg qd, Norvasc 2.5 mg qd, Tylenol 650 mg q6 prn moderate pain, Aspirin EC 81 qd  Astelin Nose spray BID, Bengay patch qhs, Calcium + D 600 mg / 200 IU qd,  Dulcosate 200 mg bid  Allegra 1 tablet bid, Folic Acid 800 MCG bid, Janumet XR 50 -1000 mg qd, Imodium prn, Cozaar 100 mg qd, Singulair 10 mg qd, Prilosec 20 qd, Pravstatin 20 mg qhs, Metamucil 2 caps bid,   Saline nasal spray bid, Slowmag 64 mg x 2 bid, vitamin B12 1000 bid, Vitamin C 1000 bid, Vitamin D 5000 qd, Zinc Glutamine 50 mg bid.  - advance diet as tolerated    PRNs: Ativan 0.5 mg Q6h PRN for Anxiety. Glucagon 1 mg PRN for hypoglycemia.  
This is an 81 year old , retired female, domiciled at the [facility], with past psychiatric history of Bipolar I disorder, w/ documented episodes of anjelica (as per her outpatient psychiatrist; Dr. Ross; [phone]) though onset of symptoms and formal psychiatric illness is in the last 5 years beginning with depressive sx and failure to thrive, history of alcohol abuse and reported concerns of PTSD and Cluster B Personality traits (sees a therapist; Gabriella Hernández [phone]), one known psychiatric admission ([facility] 2/2017), no history of suicide attempts, non-suicidal self injury. 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 The patient arrived from [facility] after fall. Collateral information from daughter revealed she has exhibited manic symptoms and she was to have a meeting with her care manager for geriatric psychiatry admission. On evaluation the patient appears to be in Manic episode, with mixed features. Affect labile, patient is very tearful on exam. She exhibits tangential thought processes, religiously preoccupations. She requires inpatient hospitalization for stabilization.     12/25/21: Appears manic, hyperverbal, tangential, intrusive, will increase neurontin, no s/s of klonopin withdrawal. Called daughter as per request, no response, no option for voicemail.   12/28/21:  Appears manic, hyperverbal, tangential. Patient difficult to redirect and overly affectionate towards staff. Reports improving sleep.  12/29: Still appearing manic, hyperverbal, tangential, difficult to redirect and overly affectionate. After discussing with patient and family, will start trial of depakote sprinkles.   12/30: Still appearing manic, hyperverbal, and overly affectionate. Tolerating depakote trial. Poor sleep either secondary to CPAP use or manic sx. Will add melatonin for sleep.   2/31: Seems improved on VPA, slept better last night, less energetic and mood elevated this AM"  01/01/2022: Patient reports that she slept poorly last night but describes mood as "fine". Denies nay other concerns. Taking meds.   Plan: Will continue Depakote and gabapentin at current dose for mood. Will continue rest of the current medication as per primary team.   1/2/2022: Remains slightly mood elevated but improving, continue VPA and gabapentin  1/3/2022: Remains elevated with increased speech and tangentiality. VPA level low at 33. Will increase Depakote to 250mg qAM 500mg qHS. Will consider switching from gabapentin to Abilify for more targeted bipolar/anjelica treatment.     Plan:  Legal: 9.27  Safety: Routine observation  Psychiatry:  	Start melatonin 3mg qHS with additional 3mg PRN  	Continue Gabapentin 400mg qAM 600mg qHS  		Will consider adding abilify in place of gabapentin  	Depakote Sprinkles 250mg qAM, 500mg qHS  		Discussed treatment of anjelica with family and patient. Concerns regarding side effects of Li and toxicity. Recent imaging showing 				hepatosteatosis; remote history of EtOH use. Reportedly tolerated depakote well in the past, however was utilized in depression management and 		less efficacious. Patient has concerns of being "drugged up and sleepy." Open to trying depakote at this time.    Medical:  Continue medications : Alendronate 70 mg once a week, Bumex 1 mg qd, Norvasc 2.5 mg qd, Tylenol 650 mg q6 prn moderate pain, Aspirin EC 81 qd  Astelin Nose spray BID, Bengay patch qhs, Calcium + D 600 mg / 200 IU qd,  Dulcosate 200 mg bid  Allegra 1 tablet bid, Folic Acid 800 MCG bid, Janumet XR 50 -1000 mg qd, Imodium prn, Cozaar 100 mg qd, Singulair 10 mg qd, Prilosec 20 qd, Pravstatin 20 mg qhs, Metamucil 2 caps bid,   Saline nasal spray bid, Slowmag 64 mg x 2 bid, vitamin B12 1000 bid, Vitamin C 1000 bid, Vitamin D 5000 qd, Zinc Glutamine 50 mg bid.  - advance diet as tolerated    PRNs: Ativan 0.5 mg Q6h PRN for Anxiety. Glucagon 1 mg PRN for hypoglycemia.  
This is an 81 year old , retired female, domiciled at the [facility], with past psychiatric history of Bipolar I disorder, w/ documented episodes of anjelica (as per her outpatient psychiatrist; Dr. Ross; [phone]) though onset of symptoms and formal psychiatric illness is in the last 5 years beginning with depressive sx and failure to thrive, history of alcohol abuse and reported concerns of PTSD and Cluster B Personality traits (sees a therapist; Gabriella Hernández [phone]), one known psychiatric admission ([facility] 2/2017), no history of suicide attempts, non-suicidal self injury. The patient arrived from [facility] after fall. Collateral information from daughter revealed she has exhibited manic symptoms and she was to have a meeting with her care manager for geriatric psychiatry admission. On evaluation the patient appears to be in Manic episode, with mixed features. Affect labile, patient is very tearful on exam. She exhibits tangential thought processes, religiously preoccupations. She requires inpatient hospitalization for stabilization.     Plan:    Continue medications : Alendronate 70 mg once a week, Bumex 1 mg qd, Norvasc 2.5 mg qd, Tylenol 650 mg q6 prn moderate pain, Aspirin EC 81 qd    Astelin Nose spray BID, Bengay patch qhs, Calcium + D 600 mg / 200 IU qd, Klonopin 0.5 mg 6 pm / 9pm, Dulcosate 200 mg bid  Allegra 1 tablet bid, Folic Acid 800 MCG bid, Janumet XR 50 -1000 mg qd, Imodium prn, Cozaar 100 mg qd, Singulair 10 mg qd, Prilosec 20 qd, Pravstatin 20 mg qhs, Metamucil 2 caps bid,   Saline nasal spray bid, Slowmag 64 mg x 2 bid, vitamin B12 1000 bid, Vitamin C 1000 bid, Vitamin D 5000 qd, Zinc Glutamine 50 mg bid.    Continue with Neurontin 300 mg BID for mood control.     PRNs: Ativan 0.5 mg Q6h PRN for Anxiety. Glucagon 1 mg PRN for hypoglycemia.
This is an 81 year old , retired female, domiciled at the [facility], with past psychiatric history of Bipolar I disorder, w/ documented episodes of anjelica (as per her outpatient psychiatrist; Dr. Ross; [phone]) though onset of symptoms and formal psychiatric illness is in the last 5 years beginning with depressive sx and failure to thrive, history of alcohol abuse and reported concerns of PTSD and Cluster B Personality traits (sees a therapist; Gabriella Hernández [phone]), one known psychiatric admission ([facility] 2/2017), no history of suicide attempts, non-suicidal self injury. The patient arrived from [facility] after fall. Collateral information from daughter revealed she has exhibited manic symptoms and she was to have a meeting with her care manager for geriatric psychiatry admission. On evaluation the patient appears to be in Manic episode, with mixed features. Affect labile, patient is very tearful on exam. She exhibits tangential thought processes, religiously preoccupations. She requires inpatient hospitalization for stabilization.     12/11: Pt continues to be hypomanic, grandiose, hyperverbal, with tangential thought process. Difficult to redirect, answering questions with unrelated and overinclusive stories. Denies any acute complaints, denies SIIP, AH/VH. No acute medical concerns. Appetite and sleep are adequate. Cont Neurontin and Klonopin.       Plan:   Continue medications : Alendronate 70 mg once a week, Bumex 1 mg qd, Norvasc 2.5 mg qd, Tylenol 650 mg q6 prn moderate pain, Aspirin EC 81 qd    Astelin Nose spray BID, Bengay patch qhs, Calcium + D 600 mg / 200 IU qd, Klonopin 0.5 mg 6 pm / 9pm, Dulcosate 200 mg bid  Allegra 1 tablet bid, Folic Acid 800 MCG bid, Janumet XR 50 -1000 mg qd, Imodium prn, Cozaar 100 mg qd, Singulair 10 mg qd, Prilosec 20 qd, Pravstatin 20 mg qhs, Metamucil 2 caps bid,   Saline nasal spray bid, Slowmag 64 mg x 2 bid, vitamin B12 1000 bid, Vitamin C 1000 bid, Vitamin D 5000 qd, Zinc Glutamine 50 mg bid.    Not compliant with Neurontin 100 mg BID for mood control. Continue to encourage medication compliance.     PRNs: Ativan 0.5 mg Q6h PRN for Anxiety. Glucagon 1 mg PRN for hypoglycemia.
This is an 81 year old , retired female, domiciled at the [facility], with past psychiatric history of Bipolar I disorder, w/ documented episodes of anjelica (as per her outpatient psychiatrist; Dr. Ross; [phone]) though onset of symptoms and formal psychiatric illness is in the last 5 years beginning with depressive sx and failure to thrive, history of alcohol abuse and reported concerns of PTSD and Cluster B Personality traits (sees a therapist; Gabriella Hernández [phone]), one known psychiatric admission ([facility] 2/2017), no history of suicide attempts, non-suicidal self injury. The patient arrived from [facility] after fall. Collateral information from daughter revealed she has exhibited manic symptoms and she was to have a meeting with her care manager for geriatric psychiatry admission. On evaluation the patient appears to be in Manic episode, with mixed features. Affect labile, patient is very tearful on exam. She exhibits tangential thought processes, religiously preoccupations. She requires inpatient hospitalization for stabilization.     Plan:    Continue medications : Alendronate 70 mg once a week, Bumex 1 mg qd, Norvasc 2.5 mg qd, Tylenol 650 mg q6 prn moderate pain, Aspirin EC 81 qd    Astelin Nose spray BID, Bengay patch qhs, Calcium + D 600 mg / 200 IU qd, Klonopin 0.5 mg 6 pm / 9pm, Dulcosate 200 mg bid  Allegra 1 tablet bid, Folic Acid 800 MCG bid, Janumet XR 50 -1000 mg qd, Imodium prn, Cozaar 100 mg qd, Singulair 10 mg qd, Prilosec 20 qd, Pravstatin 20 mg qhs, Metamucil 2 caps bid,   Saline nasal spray bid, Slowmag 64 mg x 2 bid, vitamin B12 1000 bid, Vitamin C 1000 bid, Vitamin D 5000 qd, Zinc Glutamine 50 mg bid.    Increase Neurontin 100 mg BID to Neurontin 300 mg BID for mood control.     PRNs: Ativan 0.5 mg Q6h PRN for Anxiety. Glucagon 1 mg PRN for hypoglycemia.
This is an 81 year old , retired female, domiciled at the [facility], with past psychiatric history of Bipolar I disorder, w/ documented episodes of anjelica (as per her outpatient psychiatrist; Dr. Ross; [phone]) though onset of symptoms and formal psychiatric illness is in the last 5 years beginning with depressive sx and failure to thrive, history of alcohol abuse and reported concerns of PTSD and Cluster B Personality traits (sees a therapist; Gabriella Hernández [phone]), one known psychiatric admission ([facility] 2/2017), no history of suicide attempts, non-suicidal self injury. The patient arrived from [facility] after fall. Collateral information from daughter revealed she has exhibited manic symptoms and she was to have a meeting with her care manager for geriatric psychiatry admission. On evaluation the patient appears to be in Manic episode, with mixed features. Affect labile, patient is very tearful on exam. She exhibits tangential thought processes, religiously preoccupations. She requires inpatient hospitalization for stabilization.     Plan:    Continue medications : Alendronate 70 mg once a week, Bumex 1 mg qd, Norvasc 2.5 mg qd, Tylenol 650 mg q6 prn moderate pain, Aspirin EC 81 qd    Astelin Nose spray BID, Bengay patch qhs, Calcium + D 600 mg / 200 IU qd, Klonopin 0.5 mg 6 pm / 9pm, Dulcosate 200 mg bid  Allegra 1 tablet bid, Folic Acid 800 MCG bid, Janumet XR 50 -1000 mg qd, Imodium prn, Cozaar 100 mg qd, Singulair 10 mg qd, Prilosec 20 qd, Pravstatin 20 mg qhs, Metamucil 2 caps bid,   Saline nasal spray bid, Slowmag 64 mg x 2 bid, vitamin B12 1000 bid, Vitamin C 1000 bid, Vitamin D 5000 qd, Zinc Glutamine 50 mg bid.    Continue with Neurontin 300 mg BID for mood control.     PRNs: Ativan 0.5 mg Q6h PRN for Anxiety. Glucagon 1 mg PRN for hypoglycemia.
This is an 81 year old , retired female, domiciled at the [facility], with past psychiatric history of Bipolar I disorder, w/ documented episodes of anjelica (as per her outpatient psychiatrist; Dr. Ross; [phone]) though onset of symptoms and formal psychiatric illness is in the last 5 years beginning with depressive sx and failure to thrive, history of alcohol abuse and reported concerns of PTSD and Cluster B Personality traits (sees a therapist; Gabriella Hernández [phone]), one known psychiatric admission ([facility] 2/2017), no history of suicide attempts, non-suicidal self injury. The patient arrived from [facility] after fall. Collateral information from daughter revealed she has exhibited manic symptoms and she was to have a meeting with her care manager for geriatric psychiatry admission. On evaluation the patient appears to be in Manic episode, with mixed features. Affect labile, patient is very tearful on exam. She exhibits tangential thought processes, religiously preoccupations. She requires inpatient hospitalization for stabilization.     Plan:         Continue medications : Alendronate 70 mg once a week, Bumex 1 mg qd, Norvasc 2.5 mg qd, Tylenol 650 mg q6 prn moderate pain, Aspirin EC 81 qd  Astelin Nose spray BID, Bengay patch qhs, Calcium + D 600 mg / 200 IU qd, Klonopin 0.5 mg 6 pm / 9pm, Depakote  mg qhs, Dulcosate 200 mg bid  Allegra 1 tablet bid, Folic Acid 800 MCG bid, Janumet XR 50 -1000 mg qd, Imodium prn, Cozaar 100 mg qd, Singulair 10 mg qd, Prilosec 20 qd, Pravstatin 20 mg qhs, Metamucil 2 caps bid,   Saline nasal spray bid, Slowmag 64 mg x 2 bid, vitamin B12 1000 bid, Vitamin C 1000 bid, Vitamin D 5000 qd, Zinc Glutamine 50 mg bid.    Discontinue Lantus, as home reported these are her old medications, the medical reconciliation they sent over with patient had previously discontinued medications.     PRNs: Ativan 0.5 mg Q6h PRN for Anxiety. Glucagon 1 mg PRN for hypoglycemia.  Collateral from O/P provider, MD left callback number in voicemail. 
This is an 81 year old , retired female, domiciled at the [facility], with past psychiatric history of Bipolar I disorder, w/ documented episodes of anjelica (as per her outpatient psychiatrist; Dr. Ross; [phone]) though onset of symptoms and formal psychiatric illness is in the last 5 years beginning with depressive sx and failure to thrive, history of alcohol abuse and reported concerns of PTSD and Cluster B Personality traits (sees a therapist; Gabriella Hernández [phone]), one known psychiatric admission ([facility] 2/2017), no history of suicide attempts, non-suicidal self injury. 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 The patient arrived from [facility] after fall. Collateral information from daughter revealed she has exhibited manic symptoms and she was to have a meeting with her care manager for geriatric psychiatry admission. On evaluation the patient appears to be in Manic episode, with mixed features. Affect labile, patient is very tearful on exam. She exhibits tangential thought processes, religiously preoccupations. She requires inpatient hospitalization for stabilization.     12/25/21: Appears manic, hyperverbal, tangential, intrusive, will increase neurontin, no s/s of klonopin withdrawal. Called daughter as per request, no response, no option for voicemail.   12/28/21:  Appears manic, hyperverbal, tangential. Patient difficult to redirect and overly affectionate towards staff. Reports improving sleep.  12/29: Still appearing manic, hyperverbal, tangential, difficult to redirect and overly affectionate. After discussing with patient and family, will start trial of depakote sprinkles.   12/30: Still appearing manic, hyperverbal, and overly affectionate. Tolerating depakote trial. Poor sleep either secondary to CPAP use or manic sx. Will add melatonin for sleep.   2/31: Seems improved on VPA, slept better last night, less energetic and mood elevated this AM"  01/01/2022: Patient reports that she slept poorly last night but describes mood as "fine". Denies nay other concerns. Taking meds.   Plan: Will continue Depakote and gabapentin at current dose for mood. Will continue rest of the current medication as per primary team.   1/2/2022: Remains slightly mood elevated but improving, continue VPA and gabapentin  1/3/2022: Remains elevated with increased speech and tangentiality. VPA level low at 33. Will increase Depakote to 250mg qAM 500mg qHS. Will consider switching from gabapentin to Abilify for more targeted bipolar/anjelica treatment.   1/4: Patient appears les pressured and thought disordered today. COnt VPA,  consider increase to 500mg bid. Conisder taper gabapentin and add Seroquel if sleep issue or Abilify  1/5: Patient reporting improved sleep, requiring less redirection. Continuing depakote, considering tapering gabapentin and adding Seroquel; patient amenable but continuing to observe for now.   1/6: Patient reporting improved sleep, requiring redirection again. Continuing depakote, f/u level tomorrow. Patient still with limited insight regarding hospitalization.   1/7: Patient with improved sleep, less redirection required, less tangential. Depakote level 68.4; will retest on Monday to assess for steady state. Family reports patient is improving per their conversations on the phone, less tangentiality. Will continue to monitor on current regimen over weekend.   1/10: Patient with improved sleep, minimal edirection required. Depakote repeat level 62.5. Improving insight; will communicate with family and begin considering discharge planning.  1/11: Patient with improved sleep, no redirection required today. Labs indicating GELACIO; will consider liquid Fe2+ supplementation.   1/12 Improved anjelica with some residual which may be approaching baseline. Difficulty with CPAP is source of some distress but responds to education, support. Cont curren meds no changes for now.   1/13 Patient improving  gradually less manic, less pressured, less disorganized, less focused on special abilities. Plan cont current meds will give more time to respond rather than increase or make changes reviewed plan of care with daughter  1/14:  Pt was exercising with a peer, doing ballet barre exercises. She was redirected and asked to refrain from doing this due to risk of falls. Pt is in good spirits, denies any complaints. Scheduled for d.c tuesday.     Plan:  Legal: 9.27  Safety: Routine observation  Psychiatry:  	Start melatonin 3mg qHS with additional 3mg PRN  	Continue Gabapentin 400mg qAM 600mg qHS  	Depakote Sprinkles 250mg qAM, 500mg qHS    Medical:  Continue medications : Alendronate 70 mg once a week, Bumex 1 mg qd, Norvasc 2.5 mg qd, Tylenol 650 mg q6 prn moderate pain, Aspirin EC 81 qd  Astelin Nose spray BID, Bengay patch qhs, Calcium + D 600 mg / 200 IU qd,  Dulcosate 200 mg bid  Allegra 1 tablet bid, Folic Acid 800 MCG bid, Janumet XR 50 -1000 mg qd, Imodium prn, Cozaar 100 mg qd, Singulair 10 mg qd, Prilosec 20 qd, Pravstatin 20 mg qhs, Metamucil 2 caps bid,   Saline nasal spray bid, Slowmag 64 mg x 2 bid, vitamin B12 1000 bid, Vitamin C 1000 bid, Vitamin D 5000 qd, Zinc Glutamine 50 mg bid.  - advance diet as tolerated    PRNs: Ativan 0.5 mg Q6h PRN for Anxiety. Glucagon 1 mg PRN for hypoglycemia.    
This is an 81 year old , retired female, domiciled at the [facility], with past psychiatric history of Bipolar I disorder, w/ documented episodes of anjelica (as per her outpatient psychiatrist; Dr. Ross; [phone]) though onset of symptoms and formal psychiatric illness is in the last 5 years beginning with depressive sx and failure to thrive, history of alcohol abuse and reported concerns of PTSD and Cluster B Personality traits (sees a therapist; Gabriella Hernández [phone]), one known psychiatric admission ([facility] 2/2017), no history of suicide attempts, non-suicidal self injury. 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 The patient arrived from [facility] after fall. Collateral information from daughter revealed she has exhibited manic symptoms and she was to have a meeting with her care manager for geriatric psychiatry admission. On evaluation the patient appears to be in Manic episode, with mixed features. Affect labile, patient is very tearful on exam. She exhibits tangential thought processes, religiously preoccupations. She requires inpatient hospitalization for stabilization.     12/25/21: Appears manic, hyperverbal, tangential, intrusive, will increase neurontin, no s/s of klonopin withdrawal. Called daughter as per request, no response, no option for voicemail.   12/28/21:  Appears manic, hyperverbal, tangential. Patient difficult to redirect and overly affectionate towards staff. Reports improving sleep.  12/29: Still appearing manic, hyperverbal, tangential, difficult to redirect and overly affectionate. After discussing with patient and family, will start trial of depakote sprinkles.     Plan:  Legal: 9.27  Safety: Routine observation  Psychiatry:  	Continue Gabapentin 400mg qAM 500mg qHS  	Start trial of Depakote Sprinkles 250mg BID  		Discussed treatment of anjelica with family and patient. Concerns regarding side effects of Li and toxicity. Recent imaging showing 				hepatosteatosis; remote history of EtOH use. Reportedly tolerated depakote well in the past, however was utilized in depression management and 		less efficacious. Patient has concerns of being "drugged up and sleepy." Open to trying depakote at this time.    Medical:  Continue medications : Alendronate 70 mg once a week, Bumex 1 mg qd, Norvasc 2.5 mg qd, Tylenol 650 mg q6 prn moderate pain, Aspirin EC 81 qd  Astelin Nose spray BID, Bengay patch qhs, Calcium + D 600 mg / 200 IU qd,  Dulcosate 200 mg bid  Allegra 1 tablet bid, Folic Acid 800 MCG bid, Janumet XR 50 -1000 mg qd, Imodium prn, Cozaar 100 mg qd, Singulair 10 mg qd, Prilosec 20 qd, Pravstatin 20 mg qhs, Metamucil 2 caps bid,   Saline nasal spray bid, Slowmag 64 mg x 2 bid, vitamin B12 1000 bid, Vitamin C 1000 bid, Vitamin D 5000 qd, Zinc Glutamine 50 mg bid.  f/u with speech/swallow specialists regarding hospital vs home dietary requirements  will consider d/cing some of her vitamins to simplify regimen    PRNs: Ativan 0.5 mg Q6h PRN for Anxiety. Glucagon 1 mg PRN for hypoglycemia.  
This is an 81 year old , retired female, domiciled at the [facility], with past psychiatric history of Bipolar I disorder, w/ documented episodes of anjelica (as per her outpatient psychiatrist; Dr. Ross; [phone]) though onset of symptoms and formal psychiatric illness is in the last 5 years beginning with depressive sx and failure to thrive, history of alcohol abuse and reported concerns of PTSD and Cluster B Personality traits (sees a therapist; Gabriella Hernández [phone]), one known psychiatric admission ([facility] 2/2017), no history of suicide attempts, non-suicidal self injury. 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 The patient arrived from [facility] after fall. Collateral information from daughter revealed she has exhibited manic symptoms and she was to have a meeting with her care manager for geriatric psychiatry admission. On evaluation the patient appears to be in Manic episode, with mixed features. Affect labile, patient is very tearful on exam. She exhibits tangential thought processes, religiously preoccupations. She requires inpatient hospitalization for stabilization.     12/25/21: Appears manic, hyperverbal, tangential, intrusive, will increase neurontin, no s/s of klonopin withdrawal. Called daughter as per request, no response, no option for voicemail.   12/28/21:  Appears manic, hyperverbal, tangential. Patient difficult to redirect and overly affectionate towards staff. Reports improving sleep.  12/29: Still appearing manic, hyperverbal, tangential, difficult to redirect and overly affectionate. After discussing with patient and family, will start trial of depakote sprinkles.   12/30: Still appearing manic, hyperverbal, and overly affectionate. Tolerating depakote trial. Poor sleep either secondary to CPAP use or manic sx. Will add melatonin for sleep.   2/31: Seems improved on VPA, slept better last night, less energetic and mood elevated this AM"  01/01/2022: Patient reports that she slept poorly last night but describes mood as "fine". Denies nay other concerns. Taking meds.   Plan: Will continue Depakote and gabapentin at current dose for mood. Will continue rest of the current medication as per primary team.   1/2/2022: Remains slightly mood elevated but improving, continue VPA and gabapentin  1/3/2022: Remains elevated with increased speech and tangentiality. VPA level low at 33. Will increase Depakote to 250mg qAM 500mg qHS. Will consider switching from gabapentin to Abilify for more targeted bipolar/anjelica treatment.     Plan:  Legal: 9.27  Safety: Routine observation  Psychiatry:  	Start melatonin 3mg qHS with additional 3mg PRN  	Continue Gabapentin 400mg qAM 600mg qHS  		Will consider adding abilify in place of gabapentin  	Depakote Sprinkles 250mg qAM, 500mg qHS  		Discussed treatment of anjelica with family and patient. Concerns regarding side effects of Li and toxicity. Recent imaging showing 				hepatosteatosis; remote history of EtOH use. Reportedly tolerated depakote well in the past, however was utilized in depression management and 		less efficacious. Patient has concerns of being "drugged up and sleepy." Open to trying depakote at this time.    Medical:  Continue medications : Alendronate 70 mg once a week, Bumex 1 mg qd, Norvasc 2.5 mg qd, Tylenol 650 mg q6 prn moderate pain, Aspirin EC 81 qd  Astelin Nose spray BID, Bengay patch qhs, Calcium + D 600 mg / 200 IU qd,  Dulcosate 200 mg bid  Allegra 1 tablet bid, Folic Acid 800 MCG bid, Janumet XR 50 -1000 mg qd, Imodium prn, Cozaar 100 mg qd, Singulair 10 mg qd, Prilosec 20 qd, Pravstatin 20 mg qhs, Metamucil 2 caps bid,   Saline nasal spray bid, Slowmag 64 mg x 2 bid, vitamin B12 1000 bid, Vitamin C 1000 bid, Vitamin D 5000 qd, Zinc Glutamine 50 mg bid.  - advance diet as tolerated    PRNs: Ativan 0.5 mg Q6h PRN for Anxiety. Glucagon 1 mg PRN for hypoglycemia.  1/4: Patient appears les pressured and thought disordered today. COnt VPA,  consider increase to 500mg bid. Conisder taper gabapentin and add Seroquel if sleep issue or Abilify  
This is an 81 year old , retired female, domiciled at the [facility], with past psychiatric history of Bipolar I disorder, w/ documented episodes of anjelica (as per her outpatient psychiatrist; Dr. Ross; [phone]) though onset of symptoms and formal psychiatric illness is in the last 5 years beginning with depressive sx and failure to thrive, history of alcohol abuse and reported concerns of PTSD and Cluster B Personality traits (sees a therapist; Gabriella Hernández [phone]), one known psychiatric admission ([facility] 2/2017), no history of suicide attempts, non-suicidal self injury. The patient arrived from [facility] after fall. Collateral information from daughter revealed she has exhibited manic symptoms and she was to have a meeting with her care manager for geriatric psychiatry admission. On evaluation the patient appears to be in Manic episode, with mixed features. Affect labile, patient is very tearful on exam. She exhibits tangential thought processes, religiously preoccupations. She requires inpatient hospitalization for stabilization.     Plan:    Continue medications : Alendronate 70 mg once a week, Bumex 1 mg qd, Norvasc 2.5 mg qd, Tylenol 650 mg q6 prn moderate pain, Aspirin EC 81 qd    Astelin Nose spray BID, Bengay patch qhs, Calcium + D 600 mg / 200 IU qd, Klonopin 0.5 mg 6 pm / 9pm, Dulcosate 200 mg bid  Allegra 1 tablet bid, Folic Acid 800 MCG bid, Janumet XR 50 -1000 mg qd, Imodium prn, Cozaar 100 mg qd, Singulair 10 mg qd, Prilosec 20 qd, Pravstatin 20 mg qhs, Metamucil 2 caps bid,   Saline nasal spray bid, Slowmag 64 mg x 2 bid, vitamin B12 1000 bid, Vitamin C 1000 bid, Vitamin D 5000 qd, Zinc Glutamine 50 mg bid.    Continue with Neurontin 300 mg BID for mood control.     PRNs: Ativan 0.5 mg Q6h PRN for Anxiety. Glucagon 1 mg PRN for hypoglycemia.
This is an 81 year old , retired female, domiciled at the [facility], with past psychiatric history of Bipolar I disorder, w/ documented episodes of anjelica (as per her outpatient psychiatrist; Dr. Ross; [phone]) though onset of symptoms and formal psychiatric illness is in the last 5 years beginning with depressive sx and failure to thrive, history of alcohol abuse and reported concerns of PTSD and Cluster B Personality traits (sees a therapist; Gabriella Hernández [phone]), one known psychiatric admission ([facility] 2/2017), no history of suicide attempts, non-suicidal self injury. 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 The patient arrived from [facility] after fall. Collateral information from daughter revealed she has exhibited manic symptoms and she was to have a meeting with her care manager for geriatric psychiatry admission. On evaluation the patient appears to be in Manic episode, with mixed features. Affect labile, patient is very tearful on exam. She exhibits tangential thought processes, religiously preoccupations. She requires inpatient hospitalization for stabilization.     12/25/21: Appears manic, hyperverbal, tangential, intrusive, will increase neurontin, no s/s of klonopin withdrawal. Called daughter as per request, no response, no option for voicemail.   12/28/21:  Appears manic, hyperverbal, tangential. Patient difficult to redirect and overly affectionate towards staff. Reports improving sleep.  12/29: Still appearing manic, hyperverbal, tangential, difficult to redirect and overly affectionate. After discussing with patient and family, will start trial of depakote sprinkles.   12/30: Still appearing manic, hyperverbal, and overly affectionate. Tolerating depakote trial. Poor sleep either secondary to CPAP use or manic sx. Will add melatonin for sleep.   2/31: Seems improved on VPA, slept better last night, less energetic and mood elevated this AM"  01/01/2022: Patient reports that she slept poorly last night but describes mood as "fine". Denies nay other concerns. Taking meds.   Plan: Will continue Depakote and gabapentin at current dose for mood. Will continue rest of the current medication as per primary team.   1/2/2022: Remains slightly mood elevated but improving, continue VPA and gabapentin  1/3/2022: Remains elevated with increased speech and tangentiality. VPA level low at 33. Will increase Depakote to 250mg qAM 500mg qHS. Will consider switching from gabapentin to Abilify for more targeted bipolar/anjelica treatment.   1/4: Patient appears les pressured and thought disordered today. COnt VPA,  consider increase to 500mg bid. Conisder taper gabapentin and add Seroquel if sleep issue or Abilify  1/5: Patient reporting improved sleep, requiring less redirection. Continuing depakote, considering tapering gabapentin and adding Seroquel; patient amenable but continuing to observe for now.   1/6: Patient reporting improved sleep, requiring redirection again. Continuing depakote, f/u level tomorrow. Patient still with limited insight regarding hospitalization.   1/7: Patient with improved sleep, less redirection required, less tangential. Depakote level 68.4; will retest on Monday to assess for steady state. Family reports patient is improving per their conversations on the phone, less tangentiality. Will continue to monitor on current regimen over weekend.   1/10: Patient with improved sleep, minimal edirection required. Depakote repeat level 62.5. Improving insight; will communicate with family and begin considering discharge planning.    Plan:  Legal: 9.27  Safety: Routine observation  Psychiatry:  	Start melatonin 3mg qHS with additional 3mg PRN  	Continue Gabapentin 400mg qAM 600mg qHS  	Depakote Sprinkles 250mg qAM, 500mg qHS    Medical:  Continue medications : Alendronate 70 mg once a week, Bumex 1 mg qd, Norvasc 2.5 mg qd, Tylenol 650 mg q6 prn moderate pain, Aspirin EC 81 qd  Astelin Nose spray BID, Bengay patch qhs, Calcium + D 600 mg / 200 IU qd,  Dulcosate 200 mg bid  Allegra 1 tablet bid, Folic Acid 800 MCG bid, Janumet XR 50 -1000 mg qd, Imodium prn, Cozaar 100 mg qd, Singulair 10 mg qd, Prilosec 20 qd, Pravstatin 20 mg qhs, Metamucil 2 caps bid,   Saline nasal spray bid, Slowmag 64 mg x 2 bid, vitamin B12 1000 bid, Vitamin C 1000 bid, Vitamin D 5000 qd, Zinc Glutamine 50 mg bid.  - advance diet as tolerated    PRNs: Ativan 0.5 mg Q6h PRN for Anxiety. Glucagon 1 mg PRN for hypoglycemia.  
This is an 81 year old , retired female, domiciled at the [facility], with past psychiatric history of Bipolar I disorder, w/ documented episodes of anjelica (as per her outpatient psychiatrist; Dr. Ross; [phone]) though onset of symptoms and formal psychiatric illness is in the last 5 years beginning with depressive sx and failure to thrive, history of alcohol abuse and reported concerns of PTSD and Cluster B Personality traits (sees a therapist; Gabriella Hernández [phone]), one known psychiatric admission ([facility] 2/2017), no history of suicide attempts, non-suicidal self injury. 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 The patient arrived from [facility] after fall. Collateral information from daughter revealed she has exhibited manic symptoms and she was to have a meeting with her care manager for geriatric psychiatry admission. On evaluation the patient appears to be in Manic episode, with mixed features. Affect labile, patient is very tearful on exam. She exhibits tangential thought processes, religiously preoccupations. She requires inpatient hospitalization for stabilization.     12/25/21: Appears manic, hyperverbal, tangential, intrusive, will increase neurontin, no s/s of klonopin withdrawal. Called daughter as per request, no response, no option for voicemail.   12/28/21:  Appears manic, hyperverbal, tangential. Patient difficult to redirect and overly affectionate towards staff. Reports improving sleep.  12/29: Still appearing manic, hyperverbal, tangential, difficult to redirect and overly affectionate. After discussing with patient and family, will start trial of depakote sprinkles.   12/30: Still appearing manic, hyperverbal, and overly affectionate. Tolerating depakote trial. Poor sleep either secondary to CPAP use or manic sx. Will add melatonin for sleep.   2/31: Seems improved on VPA, slept better last night, less energetic and mood elevated this AM"  01/01/2022: Patient reports that she slept poorly last night but describes mood as "fine". Denies nay other concerns. Taking meds.   Plan: Will continue Depakote and gabapentin at current dose for mood. Will continue rest of the current medication as per primary team.   1/2/2022: Remains slightly mood elevated but improving, continue VPA and gabapentin  1/3/2022: Remains elevated with increased speech and tangentiality. VPA level low at 33. Will increase Depakote to 250mg qAM 500mg qHS. Will consider switching from gabapentin to Abilify for more targeted bipolar/anjelica treatment.   1/4: Patient appears les pressured and thought disordered today. COnt VPA,  consider increase to 500mg bid. Conisder taper gabapentin and add Seroquel if sleep issue or Abilify  1/5: Patient reporting improved sleep, requiring less redirection. Continuing depakote, considering tapering gabapentin and adding Seroquel; patient amenable but continuing to observe for now.   1/6: Patient reporting improved sleep, requiring redirection again. Continuing depakote, f/u level tomorrow. Patient still with limited insight regarding hospitalization.   1/7: Patient with improved sleep, less redirection required, less tangential. Depakote level 68.4; will retest on Monday to assess for steady state. Family reports patient is improving per their conversations on the phone, less tangentiality. Will continue to monitor on current regimen over weekend.   1/10: Patient with improved sleep, minimal edirection required. Depakote repeat level 62.5. Improving insight; will communicate with family and begin considering discharge planning.  1/11: Patient with improved sleep, no redirection required today. Labs indicating GELACIO; will consider liquid Fe2+ supplementation.     Plan:  Legal: 9.27  Safety: Routine observation  Psychiatry:  	Start melatonin 3mg qHS with additional 3mg PRN  	Continue Gabapentin 400mg qAM 600mg qHS  	Depakote Sprinkles 250mg qAM, 500mg qHS    Medical:  Continue medications : Alendronate 70 mg once a week, Bumex 1 mg qd, Norvasc 2.5 mg qd, Tylenol 650 mg q6 prn moderate pain, Aspirin EC 81 qd  Astelin Nose spray BID, Bengay patch qhs, Calcium + D 600 mg / 200 IU qd,  Dulcosate 200 mg bid  Allegra 1 tablet bid, Folic Acid 800 MCG bid, Janumet XR 50 -1000 mg qd, Imodium prn, Cozaar 100 mg qd, Singulair 10 mg qd, Prilosec 20 qd, Pravstatin 20 mg qhs, Metamucil 2 caps bid,   Saline nasal spray bid, Slowmag 64 mg x 2 bid, vitamin B12 1000 bid, Vitamin C 1000 bid, Vitamin D 5000 qd, Zinc Glutamine 50 mg bid.  - advance diet as tolerated    PRNs: Ativan 0.5 mg Q6h PRN for Anxiety. Glucagon 1 mg PRN for hypoglycemia.  
This is an 81 year old , retired female, domiciled at the [facility], with past psychiatric history of Bipolar I disorder, w/ documented episodes of anjelica (as per her outpatient psychiatrist; Dr. Ross; [phone]) though onset of symptoms and formal psychiatric illness is in the last 5 years beginning with depressive sx and failure to thrive, history of alcohol abuse and reported concerns of PTSD and Cluster B Personality traits (sees a therapist; Gabriella Hernández [phone]), one known psychiatric admission ([facility] 2/2017), no history of suicide attempts, non-suicidal self injury. 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 The patient arrived from [facility] after fall. Collateral information from daughter revealed she has exhibited manic symptoms and she was to have a meeting with her care manager for geriatric psychiatry admission. On evaluation the patient appears to be in Manic episode, with mixed features. Affect labile, patient is very tearful on exam. She exhibits tangential thought processes, religiously preoccupations. She requires inpatient hospitalization for stabilization.     12/25/21: Appears manic, hyperverbal, tangential, intrusive, will increase neurontin, no s/s of klonopin withdrawal. Called daughter as per request, no response, no option for voicemail.   12/28/21:  Appears manic, hyperverbal, tangential. Patient difficult to redirect and overly affectionate towards staff. Reports improving sleep.  12/29: Still appearing manic, hyperverbal, tangential, difficult to redirect and overly affectionate. After discussing with patient and family, will start trial of depakote sprinkles.   12/30: Still appearing manic, hyperverbal, and overly affectionate. Tolerating depakote trial. Poor sleep either secondary to CPAP use or manic sx. Will add melatonin for sleep.   2/31: Seems improved on VPA, slept better last night, less energetic and mood elevated this AM"  01/01/2022: Patient reports that she slept poorly last night but describes mood as "fine". Denies nay other concerns. Taking meds.   Plan: Will continue Depakote and gabapentin at current dose for mood. Will continue rest of the current medication as per primary team.   1/2/2022: Remains slightly mood elevated but improving, continue VPA and gabapentin  1/3/2022: Remains elevated with increased speech and tangentiality. VPA level low at 33. Will increase Depakote to 250mg qAM 500mg qHS. Will consider switching from gabapentin to Abilify for more targeted bipolar/anjelica treatment.   1/4: Patient appears les pressured and thought disordered today. COnt VPA,  consider increase to 500mg bid. Conisder taper gabapentin and add Seroquel if sleep issue or Abilify  1/5: Patient reporting improved sleep, requiring less redirection. Continuing depakote, considering tapering gabapentin and adding Seroquel; patient amenable but continuing to observe for now.   1/6: Patient reporting improved sleep, requiring redirection again. Continuing depakote, f/u level tomorrow. Patient still with limited insight regarding hospitalization.     Plan:  Legal: 9.27  Safety: Routine observation  Psychiatry:  	Start melatonin 3mg qHS with additional 3mg PRN  	Continue Gabapentin 400mg qAM 600mg qHS  		Was previously considering abilify as replacement, but given concern for sleep and dysphagia, will now consider low-dose seroquel. Patient 		amenable, however with ongoing improvement will continue to observe for now.   	Depakote Sprinkles 250mg qAM, 500mg qHS  		- f/u VPA level tomorrow    Medical:  Continue medications : Alendronate 70 mg once a week, Bumex 1 mg qd, Norvasc 2.5 mg qd, Tylenol 650 mg q6 prn moderate pain, Aspirin EC 81 qd  Astelin Nose spray BID, Bengay patch qhs, Calcium + D 600 mg / 200 IU qd,  Dulcosate 200 mg bid  Allegra 1 tablet bid, Folic Acid 800 MCG bid, Janumet XR 50 -1000 mg qd, Imodium prn, Cozaar 100 mg qd, Singulair 10 mg qd, Prilosec 20 qd, Pravstatin 20 mg qhs, Metamucil 2 caps bid,   Saline nasal spray bid, Slowmag 64 mg x 2 bid, vitamin B12 1000 bid, Vitamin C 1000 bid, Vitamin D 5000 qd, Zinc Glutamine 50 mg bid.  - advance diet as tolerated    PRNs: Ativan 0.5 mg Q6h PRN for Anxiety. Glucagon 1 mg PRN for hypoglycemia.  
As per previous documentation: "This is an 81 year old , retired female, domiciled at the [facility], with past psychiatric history of Bipolar I disorder, w/ documented episodes of anjelica (as per her outpatient psychiatrist; Dr. Ross; [phone]) though onset of symptoms and formal psychiatric illness is in the last 5 years beginning with depressive sx and failure to thrive, history of alcohol abuse and reported concerns of PTSD and Cluster B Personality traits (sees a therapist; Gabriella Hernández [phone]), one known psychiatric admission ([facility] 2/2017), no history of suicide attempts, non-suicidal self injury. 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 The patient arrived from [facility] after fall. Collateral information from daughter revealed she has exhibited manic symptoms and she was to have a meeting with her care manager for geriatric psychiatry admission. On evaluation the patient appears to be in Manic episode, with mixed features. Affect labile, patient is very tearful on exam. She exhibits tangential thought processes, religiously preoccupations. She requires inpatient hospitalization for stabilization.     12/25/21: Appears manic, hyperverbal, tangential, intrusive, will increase neurontin, no s/s of klonopin withdrawal. Called daughter as per request, no response, no option for voicemail.   12/28/21:  Appears manic, hyperverbal, tangential. Patient difficult to redirect and overly affectionate towards staff. Reports improving sleep.  12/29: Still appearing manic, hyperverbal, tangential, difficult to redirect and overly affectionate. After discussing with patient and family, will start trial of depakote sprinkles.   12/30: Still appearing manic, hyperverbal, and overly affectionate. Tolerating depakote trial. Poor sleep either secondary to CPAP use or manic sx. Will add melatonin for sleep.   12/31: Seems improved on VPA, slept better last night, less energetic and mood elevated this AM"    01/01/2022: Patient reports that she slept poorly last night but describes mood as "fine". Denies nay other concerns. Taking meds.   Plan: Will continue Depakote and gabapentin at current dose for mood. Will continue rest of the current medication as per primary team.   1/2/2022: Remains slightly mood elevated but improving, continue VPA and gabapentin    
This is an 81 year old , retired female, domiciled at the [facility], with past psychiatric history of Bipolar I disorder, w/ documented episodes of anjelica (as per her outpatient psychiatrist; Dr. Ross; [phone]) though onset of symptoms and formal psychiatric illness is in the last 5 years beginning with depressive sx and failure to thrive, history of alcohol abuse and reported concerns of PTSD and Cluster B Personality traits (sees a therapist; Gabriella Hernández [phone]), one known psychiatric admission ([facility] 2/2017), no history of suicide attempts, non-suicidal self injury. The patient arrived from [facility] after fall. Collateral information from daughter revealed she has exhibited manic symptoms and she was to have a meeting with her care manager for geriatric psychiatry admission. On evaluation the patient appears to be in Manic episode, with mixed features. Affect labile, patient is very tearful on exam. She exhibits tangential thought processes, religiously preoccupations. She requires inpatient hospitalization for stabilization.     12/25/21; Appears manic, hyperverbal, tangential, intrusive, will increase neurontin, no s/s of klonopin withdrawal. Called daughter as per request, no response, no option for voicemail.     Plan:    Continue medications : Alendronate 70 mg once a week, Bumex 1 mg qd, Norvasc 2.5 mg qd, Tylenol 650 mg q6 prn moderate pain, Aspirin EC 81 qd    Astelin Nose spray BID, Bengay patch qhs, Calcium + D 600 mg / 200 IU qd, Klonopin 0.5 mg 6 pm / 9pm, Dulcosate 200 mg bid  Allegra 1 tablet bid, Folic Acid 800 MCG bid, Janumet XR 50 -1000 mg qd, Imodium prn, Cozaar 100 mg qd, Singulair 10 mg qd, Prilosec 20 qd, Pravstatin 20 mg qhs, Metamucil 2 caps bid,   Saline nasal spray bid, Slowmag 64 mg x 2 bid, vitamin B12 1000 bid, Vitamin C 1000 bid, Vitamin D 5000 qd, Zinc Glutamine 50 mg bid.    Increase Neurontin to 400 mg qam and 500 mg qhs for mood control.     PRNs: Ativan 0.5 mg Q6h PRN for Anxiety. Glucagon 1 mg PRN for hypoglycemia.

## 2022-01-18 NOTE — BH INPATIENT PSYCHIATRY PROGRESS NOTE - NSTXDCOPNODATEEST_PSY_ALL_CORE
10-Dec-2021
02-Dec-2021
17-Dec-2021
12-Jan-2022
29-Dec-2021
02-Dec-2021
10-Dec-2021
10-Dec-2021
29-Dec-2021
05-Jan-2022
17-Dec-2021
17-Dec-2021
02-Dec-2021
10-Dec-2021
29-Dec-2021
02-Dec-2021
02-Dec-2021
10-Dec-2021
29-Dec-2021
02-Dec-2021
17-Dec-2021
10-Dec-2021
29-Dec-2021
10-Dec-2021
17-Dec-2021
02-Dec-2021
29-Dec-2021
02-Dec-2021
05-Jan-2022
17-Dec-2021
17-Dec-2021
29-Dec-2021
05-Jan-2022
12-Jan-2022
12-Jan-2022
17-Dec-2021

## 2022-01-18 NOTE — BH INPATIENT PSYCHIATRY PROGRESS NOTE - NSTXDISORGPROGRES_PSY_ALL_CORE
Improving
No Change
No Change
Improving
Improving
No Change
Improving
Improving
No Change
No Change
Improving
No Change
Improving
No Change
No Change
Improving
No Change
Improving
No Change
Improving
Improving
No Change
Improving

## 2022-01-18 NOTE — BH INPATIENT PSYCHIATRY PROGRESS NOTE - NSBHATTESTSEENBY_PSY_A_CORE
attending Psychiatrist without NP/Trainee
Attending Psychiatrist supervising NP/Trainee, meeting pt...
attending Psychiatrist without NP/Trainee
Attending Psychiatrist supervising NP/Trainee, meeting pt...
attending Psychiatrist without NP/Trainee
attending Psychiatrist without NP/Trainee
Attending Psychiatrist supervising NP/Trainee, meeting pt...
attending Psychiatrist without NP/Trainee
Attending Psychiatrist supervising NP/Trainee, meeting pt...
attending Psychiatrist without NP/Trainee
Attending Psychiatrist supervising NP/Trainee, meeting pt...
attending Psychiatrist without NP/Trainee
Attending Psychiatrist supervising NP/Trainee, meeting pt...

## 2022-01-18 NOTE — BH INPATIENT PSYCHIATRY PROGRESS NOTE - NSTXPROBDCOPNO_PSY_ALL_CORE
DISCHARGE ISSUE - NON-ADHERENT WITH OUTPATIENT SERVICES

## 2022-01-18 NOTE — BH INPATIENT PSYCHIATRY PROGRESS NOTE - NSBHROSSYSTEMS_PSY_ALL_CORE
Psychiatric

## 2022-01-18 NOTE — BH INPATIENT PSYCHIATRY PROGRESS NOTE - NSTXPROBMANIC_PSY_ALL_CORE
MANIC SYMPTOMS

## 2022-01-18 NOTE — BH INPATIENT PSYCHIATRY PROGRESS NOTE - NSTXCOPEDATEEST_PSY_ALL_CORE
12-Jan-2022
05-Jan-2022
12-Jan-2022
05-Jan-2022
29-Dec-2021
12-Jan-2022
29-Dec-2021
05-Jan-2022
29-Dec-2021
17-Jan-2022
29-Dec-2021

## 2022-01-18 NOTE — BH INPATIENT PSYCHIATRY PROGRESS NOTE - NSTXDISORGDATEEST_PSY_ALL_CORE
01-Dec-2021
31-Dec-2021
01-Dec-2021
31-Dec-2021
01-Dec-2021
31-Dec-2021
01-Dec-2021
01-Dec-2021
31-Dec-2021
31-Dec-2021
01-Dec-2021
31-Dec-2021
01-Dec-2021

## 2022-01-18 NOTE — BH INPATIENT PSYCHIATRY PROGRESS NOTE - MSE UNSTRUCTURED FT
Patient appears stated age, wears excessive make up and bright shirt. She is cooperative with appropriate eye contact. Speech with increased productivity, normal volume, continues to be less pressured. No PMR/ PMA. Mood is stated as "I didn't sleep very well" with elevated affect, expansive, labile at times. Thought process is tangential, Thought content with Methodist delusions although less evident today. No SI/HI. No AVH. Judgement fair, insight limited, impulse control intact at the time of exam.    
Appearance: Elderly  female, appears stated age, wearing excessive make up   Behavior: Cooperative with appropriate eye contact. Tired, laying in bed.  Speech: increased productivity, normal volume    Motor: No PMR/ PMA  Mood: "great"  Affect: expansive  Thought process: more linear and goal directed  Thought content: Religiously preoccupied. No SI/HI. Perseverating on not wearing a diaper.   Perception: No AVH  Judgement: Fair  Insight: Fair  Impulse control: Intact at the time of exam    
Appearance: Elderly  female, appears stated age, thin.   Behavior: Cooperative with team, Appropriate eye contact.  Speech: Regular in tone, normal rate.   Motor: No PMR  Mood: "completely fine"  Affect: Broad   Thought process: Tangential today.   Thought content: Denied SHIIP.   Perception: No longer reporting grandiose delusions of believing she is a healer / counselor for her co-residents. Not responding to internal stimuli.   Judgement: Fair  Insight: Fair  Impulse control : Appropriate    
Appearance: Elderly  female, appears stated age, thin. Still sitting with a stuffed toy Lion.   Behavior: Cooperative with team, able to be redirected. Appropriate eye contact.  Speech: Over-productive speech, regular in tone, normal rate.   Motor: No PMR  Mood: "I'm okay"  Affect: Still labile, and irritable.   Thought process: Tangential thought processes. Exhibited some linearity today.   Thought content: Religiously preoccupations. Denied SHIIP.  Perception: Grandiose delusions of believing she is a healer / counselor for her co-residents. Not responding to internal stimuli.   Judgement: poor  Insight: Poor    
Appearance: Elderly  female, appears stated age, wearing excessive make up   Behavior: Cooperative with appropriate eye contact.  Speech: Hyperverbal, normal volume    Motor: No PMR/ PMA  Mood: "wonderful"  Affect: Elevated  Thought process: Tangential   Thought content: Religiously preoccupied. No SI/HI  Perception: No AVH  Judgement: Fair  Insight: Fair  Impulse control: Intact at the time of exam    
Appearance: Elderly  female, appears stated age, wearing excessive make up and bright shirt  Behavior: Cooperative with appropriate eye contact. Tired, laying in bed.  Speech: increased productivity, normal volume    Motor: No PMR/ PMA  Mood: "wonderful"  Affect: expansive  Thought process: more linear and goal directed  Thought content: Adventism delusion she is in contact with [place] [employer], she cured a man of brain tumor. No SI/HI.  Perception: No AVH  Judgement: Fair  Insight: Fair  Impulse control: Intact at the time of exam    
Appearance: Elderly  female, appears stated age, thin.   Behavior: Cooperative with team, Appropriate eye contact.  Speech: Regular in tone, normal rate.   Motor: No PMR  Mood: "completely fine"  Affect: Broad   Thought process: Tangential today.   Thought content: Denied SHIIP, but remains religiously preoccupied.  Perception: No longer reporting grandiose delusions of believing she is a healer / counselor for her co-residents. Not responding to internal stimuli.   Judgement: Fair  Insight: Fair  Impulse control : Appropriate    
Appearance: Elderly  female, appears stated age, thin.   Behavior: Cooperative with team, Appropriate eye contact.  Speech: Regular in tone, normal rate.   Motor: No PMR  Mood: "I'm okay"  Affect: Broad   Thought process: Linear, goal directed.  Thought content: Denied SHIIP.   Perception: No longer reporting grandiose delusions of believing she is a healer / counselor for her co-residents. Not responding to internal stimuli.   Judgement: Fair  Insight: Fair  Impulse control : Appropriate    
Appearance: Elderly  female, appears stated age, wearing excessive make up   Behavior: Cooperative with appropriate eye contact. Tired, laying in bed.  Speech: decreased productivity, normal volume    Motor: No PMR/ PMA  Mood: "tired"  Affect: constricted  Thought process: concrete  Thought content: Religiously preoccupied. No SI/HI  Perception: No AVH  Judgement: Fair  Insight: Fair  Impulse control: Intact at the time of exam    
Patient appears stated age, less excessive make up. She is cooperative with appropriate eye contact. Speech with increased productivity, normal volume, continues to be less pressured. No PMR/ PMA. Mood is stated as "good, I got 8 hours of sleep" with less elevated affect, normal range, less labile. Thought process is less tangential, Thought content with Caodaism delusions although less focused on them again. No SI/HI. No AVH. Judgement fair, insight limited, impulse control intact at the time of exam.
Appearance: Elderly  female, appears stated age, thin. Still sitting with a stuffed toy Lion.   Behavior: Cooperative with team, able to be redirected. Appropriate eye contact.  Speech: Over-productive speech, regular in tone, normal rate.   Motor: No PMR  Mood: "I'm okay"  Affect: Still labile, and irritable.   Thought process: Tangential thought processes. Exhibited some linearity today.   Thought content: Religiously preoccupations. Denied SHIIP.  Perception: Grandiose delusions of believing she is a healer / counselor for her co-residents. Not responding to internal stimuli.   Judgement: poor  Insight: Poor    
Patient appears stated age, wears excessive make up and bright shirt. She is cooperative with appropriate eye contact. Speech with increased productivity, normal volume, pressured. No PMR/ PMA. Mood is stated as "so good!" with elevated affect, expansive, labile at times. Thought process is tangential, Thought content with Confucianism delusions. No SI/HI. No AVH. Judgement fair, insight fair, impulse control intact at the time of exam.    
Patient appears stated age, bright clothing and fairly heavy make up. She is cooperative with appropriate eye contact. Speech with increased productivity, normal volume, normal rate,more interruptable and able to sustain dialogue. No PMR/ PMA. Mood is stated as "okay" with some anxious affect, normal range, stable. Thought process is tangential though less and more redirectable. Thought content with grandiosity but notably less focus content less readily revealed . Particular focus has moved to trying to adjust to CPAP No SI/HI. No AVH. Judgement improving, insight improving, impulse control intact at the time of exam.
Appearance: Elderly  female, appears stated age, wearing excessive make up   Behavior: Cooperative with appropriate eye contact. Tired, laying in bed.  Speech: increased productivity, normal volume    Motor: No PMR/ PMA  Mood: "tired"  Affect: constricted  Thought process: more linear and goal directed  Thought content: Religiously preoccupied. No SI/HI  Perception: No AVH  Judgement: Fair  Insight: Fair  Impulse control: Intact at the time of exam    
Appearance: Elderly  female, appears stated age, wearing excessive make up   Behavior: Cooperative with appropriate eye contact. Tired, laying in bed.  Speech: increased productivity, normal volume    Motor: No PMR/ PMA  Mood: "tired"  Affect: constricted  Thought process: tangential  Thought content: Religiously preoccupied. No SI/HI  Perception: No AVH  Judgement: Fair  Insight: Fair  Impulse control: Intact at the time of exam    
Appearance: Elderly  female, appears stated age, thin. Still sitting with a stuffed toy Lion.   Behavior: Cooperative with team, able to be redirected. Appropriate eye contact.  Speech: Over-productive speech, regular in tone, normal rate.   Motor: No PMR  Mood: "I'm okay"  Affect: Still labile, and irritable.   Thought process: Tangential thought processes. Exhibited some linearity today.   Thought content: Religiously preoccupations. Denied SHIIP.  Perception: Grandiose delusions of believing she is a healer / counselor for her co-residents. Not responding to internal stimuli.   Judgement: poor  Insight: Poor    
Patient appears stated age, bright clothing and fairly heavy make up. She is cooperative with appropriate eye contact. Speech with increased productivity, normal volume, normal rate,more interruptable and able to sustain dialogue. No PMR/ PMA. Mood is stated as "okay" with some anxious affect, normal range, stable. Thought process is tangential though less and more redirectable. Thought content with grandiosity but notably less focus content less readily revealed . Particular focus has moved to trying to adjust to CPAP No SI/HI. No AVH. Judgement improving, insight improving, impulse control intact at the time of exam.
Appearance: Elderly  female, appears stated age, wearing excessive make up   Behavior: Cooperative with appropriate eye contact. Tired, laying in bed.  Speech: increased productivity, normal volume    Motor: No PMR/ PMA  Mood: "wonderful"  Affect: expansive  Thought process: more linear and goal directed  Thought content: Religiously preoccupied. No SI/HI. Perseverating on rib pain.  Perception: No AVH  Judgement: Fair  Insight: Fair  Impulse control: Intact at the time of exam    
Appearance: Elderly  female, appears stated age, wearing excessive make up   Behavior: Cooperative with appropriate eye contact. Tired, laying in bed.  Speech: increased productivity, normal volume    Motor: No PMR/ PMA  Mood: "wonderful"  Affect: expansive  Thought process: more linear and goal directed  Thought content: Religiously preoccupied. No SI/HI.  Perception: No AVH  Judgement: Fair  Insight: Fair  Impulse control: Intact at the time of exam    
Patient appears stated age, more excessive make up again. She is cooperative with appropriate eye contact. Speech with increased productivity, normal volume, normal rate, interruptable. No PMR/ PMA. Mood is stated as "okay" with some anxious affect, normal range, stable. Thought process is tangential though less and more redirectable. Thought content with grandiosity but notably less focus . Some focus on complaints about her assisted living.  Focused on CPAP issues No SI/HI. No AVH. Judgement improving, insight improving, impulse control intact at the time of exam.
Appearance: Elderly  female, appears stated age, wearing excessive make up   Behavior: Cooperative with appropriate eye contact. Tired, laying in bed.  Speech: increased productivity, normal volume    Motor: No PMR/ PMA  Mood: "wonderful"  Affect: expansive  Thought process: more linear and goal directed  Thought content: Religiously preoccupied. No SI/HI.  Perception: No AVH  Judgement: Fair  Insight: Fair  Impulse control: Intact at the time of exam    
Patient appears stated age, wears excessive make up and bright shirt. She is cooperative with appropriate eye contact. Speech with increased productivity, normal volume, less pressured. No PMR/ PMA. Mood is stated as "very well!" with elevated affect, expansive, labile at times. Thought process is tangential, Thought content with Pentecostal delusions. No SI/HI. No AVH. Judgement fair, insight limited, impulse control intact at the time of exam.    
Appearance: Elderly  female, appears stated age, wearing excessive make up   Behavior: Cooperative with appropriate eye contact. Tired, laying in bed.  Speech: increased productivity, normal volume    Motor: No PMR/ PMA  Mood: "great"  Affect: expansive  Thought process: more linear and goal directed  Thought content: Religiously preoccupied. No SI/HI. Perseverating on not wearing a diaper.   Perception: No AVH  Judgement: Fair  Insight: Fair  Impulse control: Intact at the time of exam    
Patient is awake and alert. Affect is neutral, slightly expansive. Speech is fluent. TP is logical, no flight of ideas. No suicidal ideations. Fair insight.  
Patient appears stated age, more excessive make up again. She is cooperative with appropriate eye contact. Speech with increased productivity, normal volume, normal rate, interruptable. No PMR/ PMA. Mood is stated as "good" with euthymic affect, normal range, stable. Thought process is linear with intermittent tangential anecdotes that can relate to story (appears to be part of communication style), but redirectable. Thought content with no delusions elicited today. No SI/HI. No AVH. Judgement improving, insight improving, impulse control intact at the time of exam.
Patient appears stated age, bright clothing and fairly heavy make up. She is cooperative with appropriate eye contact. Speech with increased productivity, normal volume, normal rate,more interruptable and able to sustain dialogue. No PMR/ PMA. Mood is stated as "okay" , normal range, stable. Thought process is tangential though notably less and more redirectable. Thought content with grandiosity but notably less focus content less readily revealed . Particular focus has moved to trying to adjust to CPAP No SI/HI. No AVH. Judgement improving, insight improving, impulse control intact at the time of exam.
Patient appears stated age, wears excessive make up and bright shirt/pants. She is cooperative with appropriate eye contact. Speech with increased productivity, normal volume, continues to be less pressured. No PMR/ PMA. Mood is stated as "I didn't sleep very well again" with elevated affect, expansive, labile at times. Thought process is tangential, Thought content with Anabaptism delusions although less evident today. No SI/HI. No AVH. Judgement fair, insight limited, impulse control intact at the time of exam.
Appearance: Elderly  female, appears stated age, thin. Still sitting with a stuffed toy Lion.   Behavior: Cooperative with team, able to be redirected. Appropriate eye contact.  Speech: Over-productive speech, regular in tone, normal rate.   Motor: No PMR  Mood: "I'm okay"  Affect: Still labile, and irritable.   Thought process: Tangential thought processes. Exhibited some linearity today.   Thought content: Religiously preoccupations. Denied SHIIP.  Perception: Grandiose delusions of believing she is a healer / counselor for her co-residents. Not responding to internal stimuli.   Judgement: poor  Insight: Poor    
Appearance: Elderly  female, appears stated age, thin.   Behavior: Cooperative with team, able to be redirected. Appropriate eye contact.  Speech: Over-productive speech, regular in tone, normal rate.   Motor: No PMR  Mood: "I'm okay"  Affect: Broad   Thought process: Tangential thought processes, but exhibited more linearity today.   Thought content: Religiously preoccupations. Denied SHIIP.  Perception: Grandiose delusions of believing she is a healer / counselor for her co-residents. Not responding to internal stimuli.   Judgement: poor  Insight: Poor    
Patient appears stated age, wearing excessive make up. Cooperative on encounter with appropriate eye contact. No PMR/ PMA. Speech with increased productivity, normal volume. Reported mood "fine" with expansive affect. Thought process is tangential. No delusions expressed today. No SI/HI. No AVH. Judgement fair, insight limited. Impulse control Intact at the time of exam.   
Appearance: Elderly  female, appears stated age, thin.   Behavior: Cooperative with team, Appropriate eye contact.  Speech: Regular in tone, normal rate.   Motor: No PMR  Mood: "completely fine"  Affect: Broad   Thought process: Tangential today.   Thought content: Denied SHIIP, but remains religiously preoccupied.  Perception: No longer reporting grandiose delusions of believing she is a healer / counselor for her co-residents. Not responding to internal stimuli.   Judgement: Fair  Insight: Fair  Impulse control : Appropriate    
Appearance: Elderly  female, appears stated age, thin.   Behavior: Cooperative with team, able to be redirected. Appropriate eye contact.  Speech: Regular in tone, normal rate.   Motor: No PMR  Mood: "I'm okay"  Affect: Broad   Thought process: Linear, goal directed.  Thought content: Denied SHIIP.   Perception: No longer reporting grandiose delusions of believing she is a healer / counselor for her co-residents. Not responding to internal stimuli.   Judgement: Fair  Insight: Fair  Impulse control : Appropriate    
Appearance: Elderly  female, appears stated age, wearing excessive make up   Behavior: Cooperative with appropriate eye contact. Tired, laying in bed.  Speech: increased productivity, normal volume    Motor: No PMR/ PMA  Mood: "great"  Affect: expansive  Thought process: more linear and goal directed  Thought content: Religiously preoccupied. No SI/HI  Perception: No AVH  Judgement: Fair  Insight: Fair  Impulse control: Intact at the time of exam    
Patient appears stated age, more excessive make up again. She is cooperative with appropriate eye contact. Speech with increased productivity, normal volume, continues to be less pressured/interruptable. No PMR/ PMA. Mood is stated as "I'm rested, I got 6.5 hours of sleep" with less elevated affect, normal range, less labile. Thought process is tangential, Thought content with previously noted Zoroastrian delusions although less focused on them. No SI/HI. No AVH. Judgement fair, insight limited, impulse control intact at the time of exam.
Patient appears stated age, more excessive make up again. She is cooperative with appropriate eye contact. Speech with increased productivity, normal volume, normal rate, interruptable. No PMR/ PMA. Mood is stated as "good" with euthymic affect, normal range, stable. Thought process is linear with intermittent tangential anecdotes that can relate to story (appears to be part of communication style), but redirectable. Thought content with previously noted Advent delusions although less focused on them. No SI/HI. No AVH. Judgement fair, insight improving, impulse control intact at the time of exam.
Patient is awake and alert. Affect calm. Speech fluent and productive but not pressured this AM. No delusions reported.No tremor or mvt disorder. No   suicidal ideations. Limited insight.   
Patient appears stated age, more excessive make up again. She is cooperative with appropriate eye contact. Speech with increased productivity, normal volume, continues to be less pressured/interruptable. No PMR/ PMA. Mood is stated as "good" with less elevated affect, normal range, less labile. Thought process is tangential at times, but redirectable. Thought content with previously noted Faith delusions although less focused on them. No SI/HI. No AVH. Judgement fair, insight limited, impulse control intact at the time of exam.
Patient appears stated age, wears excessive make up,  less today. She is cooperative with appropriate eye contact. Speech with increased productivity, normal volume, continues to be less pressured. No PMR/ PMA. Mood is stated as "I didn't sleep very well again" with elevated affect, expansive, labile at times. Thought process is tangential, Thought content with Sikhism delusions although less focused on them today No SI/HI. No AVH. Judgement fair, insight limited, impulse control intact at the time of exam.

## 2022-01-18 NOTE — BH INPATIENT PSYCHIATRY PROGRESS NOTE - NSTXDISORGINTERMD_PSY_ALL_CORE
Med titration

## 2022-01-18 NOTE — BH INPATIENT PSYCHIATRY PROGRESS NOTE - NSTXPROBDISORG_PSY_ALL_CORE
DISORGANIZATION OF THOUGHT/BEHAVIOR

## 2022-01-18 NOTE — BH INPATIENT PSYCHIATRY PROGRESS NOTE - NSTXDISORGDATETRGT_PSY_ALL_CORE
29-Dec-2021
20-Dec-2021
20-Dec-2021
29-Dec-2021
06-Jan-2022
08-Dec-2021
20-Dec-2021
03-Jan-2022
06-Jan-2022
29-Dec-2021
20-Dec-2021
08-Dec-2021
08-Dec-2021
20-Dec-2021
06-Jan-2022
29-Dec-2021
13-Jan-2022
29-Dec-2021
06-Jan-2022
13-Jan-2022
20-Dec-2021
13-Jan-2022
06-Jan-2022
13-Jan-2022
29-Dec-2021
27-Dec-2021
03-Jan-2022
08-Dec-2021
29-Dec-2021
20-Dec-2021
29-Dec-2021
06-Jan-2022
29-Dec-2021
13-Jan-2022
20-Dec-2021
13-Jan-2022
13-Jan-2022

## 2022-01-18 NOTE — BH INPATIENT PSYCHIATRY PROGRESS NOTE - NSDCCRITERIA_PSY_ALL_CORE
Stabilization of patient's anjelica, decrease of symptoms by 50%.

## 2022-01-24 ENCOUNTER — OUTPATIENT (OUTPATIENT)
Dept: OUTPATIENT SERVICES | Facility: HOSPITAL | Age: 82
LOS: 1 days | Discharge: HOME | End: 2022-01-24
Payer: MEDICARE

## 2022-01-24 DIAGNOSIS — Z90.711 ACQUIRED ABSENCE OF UTERUS WITH REMAINING CERVICAL STUMP: Chronic | ICD-10-CM

## 2022-01-24 DIAGNOSIS — Z98.89 OTHER SPECIFIED POSTPROCEDURAL STATES: Chronic | ICD-10-CM

## 2022-01-24 PROCEDURE — 90792 PSYCH DIAG EVAL W/MED SRVCS: CPT | Mod: 95

## 2022-01-25 DIAGNOSIS — F41.9 ANXIETY DISORDER, UNSPECIFIED: ICD-10-CM

## 2022-01-25 DIAGNOSIS — F31.74 BIPOLAR DISORDER, IN FULL REMISSION, MOST RECENT EPISODE MANIC: ICD-10-CM

## 2022-01-27 ENCOUNTER — NON-APPOINTMENT (OUTPATIENT)
Age: 82
End: 2022-01-27

## 2022-02-26 ENCOUNTER — APPOINTMENT (OUTPATIENT)
Dept: CT IMAGING | Facility: CLINIC | Age: 82
End: 2022-02-26
Payer: MEDICARE

## 2022-02-26 ENCOUNTER — OUTPATIENT (OUTPATIENT)
Dept: OUTPATIENT SERVICES | Facility: HOSPITAL | Age: 82
LOS: 1 days | End: 2022-02-26
Payer: MEDICARE

## 2022-02-26 DIAGNOSIS — Z90.711 ACQUIRED ABSENCE OF UTERUS WITH REMAINING CERVICAL STUMP: Chronic | ICD-10-CM

## 2022-02-26 DIAGNOSIS — Z00.8 ENCOUNTER FOR OTHER GENERAL EXAMINATION: ICD-10-CM

## 2022-02-26 DIAGNOSIS — R93.89 ABNORMAL FINDINGS ON DIAGNOSTIC IMAGING OF OTHER SPECIFIED BODY STRUCTURES: ICD-10-CM

## 2022-02-26 DIAGNOSIS — J44.9 CHRONIC OBSTRUCTIVE PULMONARY DISEASE, UNSPECIFIED: ICD-10-CM

## 2022-02-26 DIAGNOSIS — Z98.89 OTHER SPECIFIED POSTPROCEDURAL STATES: Chronic | ICD-10-CM

## 2022-02-26 PROCEDURE — 71250 CT THORAX DX C-: CPT

## 2022-02-26 PROCEDURE — 71250 CT THORAX DX C-: CPT | Mod: 26,MH

## 2022-03-02 ENCOUNTER — NON-APPOINTMENT (OUTPATIENT)
Age: 82
End: 2022-03-02

## 2022-03-07 ENCOUNTER — NON-APPOINTMENT (OUTPATIENT)
Age: 82
End: 2022-03-07

## 2022-03-08 ENCOUNTER — NON-APPOINTMENT (OUTPATIENT)
Age: 82
End: 2022-03-08

## 2022-03-08 ENCOUNTER — APPOINTMENT (OUTPATIENT)
Dept: PULMONOLOGY | Facility: CLINIC | Age: 82
End: 2022-03-08
Payer: MEDICARE

## 2022-03-08 VITALS
HEIGHT: 61 IN | DIASTOLIC BLOOD PRESSURE: 60 MMHG | RESPIRATION RATE: 16 BRPM | OXYGEN SATURATION: 96 % | SYSTOLIC BLOOD PRESSURE: 130 MMHG | WEIGHT: 144 LBS | HEART RATE: 106 BPM | TEMPERATURE: 96.6 F | BODY MASS INDEX: 27.19 KG/M2

## 2022-03-08 DIAGNOSIS — J44.9 CHRONIC OBSTRUCTIVE PULMONARY DISEASE, UNSPECIFIED: ICD-10-CM

## 2022-03-08 PROCEDURE — 99214 OFFICE O/P EST MOD 30 MIN: CPT | Mod: 25

## 2022-03-08 PROCEDURE — 94010 BREATHING CAPACITY TEST: CPT

## 2022-03-08 NOTE — PROCEDURE
[FreeTextEntry1] : SPI performed in office show \par FEV1: 69% \par FEV1/FVC Ratio: 118% \par VWM96-97%: 108% \par

## 2022-03-08 NOTE — PHYSICAL EXAM
[No Acute Distress] : no acute distress [Well Nourished] : well nourished [No Deformities] : no deformities [Well Developed] : well developed [Normal Appearance] : normal appearance [No Neck Mass] : no neck mass [Normal Rate/Rhythm] : normal rate/rhythm [Normal S1, S2] : normal s1, s2 [No Murmurs] : no murmurs [No Resp Distress] : no resp distress [Clear to Auscultation Bilaterally] : clear to auscultation bilaterally [Kyphosis] : kyphosis [Normal Gait] : normal gait [No Clubbing] : no clubbing [No Cyanosis] : no cyanosis [No Edema] : no edema [FROM] : FROM [Normal Color/ Pigmentation] : normal color/ pigmentation [No Focal Deficits] : no focal deficits [Oriented x3] : oriented x3 [Normal Affect] : normal affect

## 2022-03-08 NOTE — REVIEW OF SYSTEMS
[Dry Mouth] : dry mouth [Negative] : Endocrine [TextBox_14] : Bilateral mouth sores in corner of the mouth.

## 2022-03-08 NOTE — ASSESSMENT
[FreeTextEntry1] : Ms. FLORES is an 81 year old female with history of JOSESITO. She is s/p admission at [facility] (1/2022) for dementia and manic episodes. She presents today in the office for a follow up visit regarding CPAP use. She is accompanied by her son-in-law (daughter Kimmy's ). \par \par 1. JOSESITO: \par - I have discussed all the negative health consequences associated with obstructive and central sleep apnea including heart conditions/MI, hypertension, diabetes, chronic inflammation, memory issues, stroke, obesity, decreased libido, sleep related accidents, as well as anxiety and depression. Additional recommendations included: Avoid alcohol and sedatives at bedtime. Proper sleep hygiene such as maintaining a regular sleep routine, avoiding naps if possible, not watching TV or reading in bed,  and maintaining a quiet, comfortable bedroom. Sleepy driving avoidance and risks discussed with patient. Diet, exercise and weight loss suggested\par - Discussed need for increased compliance.\par - Mask fitting: changed from Airfit F20 size medium to large. The medium was found to sit at the corners of the mouth.  The large did not.  Patient states she felt more comfortable with the size large.  Attached large to clips/headgear appropriately.  Patient demonstrated appropriate technique upon applying headgear and mask on. \par - Discussed that with better mask fit and decreased leak time, hopefully she will not feel as dry upon awakening.  Humidity level changed from default of 4 to 6, to introduce more humidification/moisture to help with dryness.\par - Explained 95th percentile of pressure is 9.9 and her max is 10 CMH2O.  If decreased leak time occurs, but AHI remains > 5hr, will need to increase Max pressure. Will discuss this at next visit. \par \par Patient to follow up in 1 month (can be telephone/video) to re-evaluate data. \par Patient to call with further questions and concerns.\par Patient verbalizes understanding of care plan and is agreeable.

## 2022-03-08 NOTE — HISTORY OF PRESENT ILLNESS
[TextBox_4] : Ms. FLORES is an 81 year old female with history of JOSESITO. She is s/p admission at [facility] (1/2022) for dementia and manic episodes. She presents today in the office for a follow up visit regarding CPAP use. She is accompanied by her son-in-law (daughter Kimmy's ). \par \par 9/13/21:\par Daughter's endorse that patient snores and witnessed apneas. Marcelina notes sleeping on back worsened apneas and snoring. Daughter Marcelina also notes she has witnessed abnormal movements during sleep. Pt admits to EDS and falling asleep in the car. She does admit to frequent headaches. \par \par 10/12/21:\par Daughter informed provider that psychiatrist felt patient was experiencing manic episodes - RX'ed Depakote PO QHS 2 weeks ago.\par \par 1/12/22 TELEPHONIC:\par Pt's daughter Marcelina notes patient is on current regimen of Gabbapentin and Depakote to help control anjelica. In the past patient was managed on Klonopin 0.5 mg BID, but that has since been discontinued. \par Psych MD had concerns that untreated JOSESITO could have been contributing to her anjelica/mental decline. Patient was initiated on APAP therapy inpatient, but daughter is unsure of current APAP settings. She notes patient has been using APAP device QHS, but notes she does not feel as though there is "enough air coming out". \par \par 3/8/22 UPDATE: \par \par She states upon awakening with device in use she experiences extreme dry mouth.  She c/o bilateral sores at the corner of her mouth, which she is unsure is attributed to her current CPAP mask.  She notes sores appeared shortly after initiation of PAP use. She is currently using Resmed Airfit F20 (med). She states Dr. Bolaños, PCP, RX'ed Iron for bilateral mouth sores as he thought it could be related to iron depletion, but there has been no improvement.  She notes she is seeing a dermatologist tomorrow for evaluation. \par \par She notes when she does not wear the device she sleeps for 7-8 hrs and does not feel as dry upon awakening. \par \par Patient notes she is in her normal state of health.\par \par She denies any pulmonary concerns at this time. \par \par \par

## 2022-03-08 NOTE — DISCUSSION/SUMMARY
[FreeTextEntry1] : - 8/22/21 [employer] HST c/w severe JOSESITO w/ RDI of 35.1.\par \par - 9/24/21 [facility] CPAP Titration: CPAP @ 8CMH2O w/ TST of 311 mins decreased AHI to 0.77/hr w/ SPO2 improvement. Resmed Airfit F20 - FFM (medium) used. \par \par - PAP Data obtained from [employer] 1/27-3/8/22:\par DME company is [employer].\par APAP set at 6-10 CMH2O\par > 4 HRS OF USE for 54% of Days\par Avg AHI of 10.6 \par \par - Discussed need for increased compliance.\par - Mask fitting: changed from Airfit F20 size medium to large. The medium was found to sit at the corners of the mouth.  The large did not.  Patient states she felt more comfortable with the size large.  Attached large to clips/headgear appropriately.  Patient demonstrated appropriate technique upon applying headgear and mask on. \par - Discussed that with better mask fit and decreased leak time, hopefully she will not feel as dry upon awakening.  Humidity level changed from default of 4 to 6, to introduce more humidification/moisture to help with dryness.\par - Explained 95th percentile of pressure is 9.9 and her max is 10 CMH2O.  If decreased leak time occurs, but AHI remains > 5hr, will need to increase Max pressure. Will discuss this at next visit. \par \par

## 2022-03-22 NOTE — ED ADULT NURSE NOTE - MUSCULOSKELETAL ASSESSMENT
- - - Cartilage Graft Text: The defect edges were debeveled with a #15 scalpel blade.  Given the location of the defect, shape of the defect, the fact the defect involved a full thickness cartilage defect a cartilage graft was deemed most appropriate.  An appropriate donor site was identified, cleansed, and anesthetized. The cartilage graft was then harvested and transferred to the recipient site, oriented appropriately and then sutured into place.  The secondary defect was then repaired using a primary closure.

## 2022-04-08 ENCOUNTER — APPOINTMENT (OUTPATIENT)
Dept: PULMONOLOGY | Facility: CLINIC | Age: 82
End: 2022-04-08
Payer: MEDICARE

## 2022-04-08 PROCEDURE — 99448 NTRPROF PH1/NTRNET/EHR 21-30: CPT

## 2022-05-24 ENCOUNTER — APPOINTMENT (OUTPATIENT)
Dept: PULMONOLOGY | Facility: CLINIC | Age: 82
End: 2022-05-24

## 2022-05-24 ENCOUNTER — NON-APPOINTMENT (OUTPATIENT)
Age: 82
End: 2022-05-24

## 2022-06-24 ENCOUNTER — APPOINTMENT (OUTPATIENT)
Dept: PULMONOLOGY | Facility: CLINIC | Age: 82
End: 2022-06-24
Payer: MEDICARE

## 2022-06-24 PROCEDURE — 99441: CPT | Mod: 95

## 2022-07-24 NOTE — BH CHART NOTE - NSEVENTNOTEFT_PSY_ALL_CORE
Patient evaluated due to concern for Benzodiazepine withdrawal. MADELYN was notified that the patient's Klonopin 0.5mg at 6pm and 9pm had autodiscontinued on 12/17 and the patient had been without this medication for a week.     On evaluation, the patient was found seated in no distress, pleasant, cooperative, no signs of psychomotor agitation, no tremors of outstretched hands, no lingual tremors. No signs of autonomic instability in vital signs. Nursing staff report that the patient has been in good behavioral control. The patient reports that she slept well last night. She has no complaints and denies any history of seizures.     Assessment: The patient has been without Klonopin for 7 days and is out of the window for acute withdrawal. Furthermore, Klonopin is a long acting benzo with low potential for acute withdrawal symptoms. Exam and vitals show no signs of a withdrawal syndrome. Nursing staff and subjective patient reports indicate that there is no obvious indication to restart Klonopin treatment at present.    Recommendation: Will not restart Klonopin at the present time. The issue can be revisited by the treatment team as indicated at a later date.  3

## 2022-08-19 ENCOUNTER — APPOINTMENT (OUTPATIENT)
Dept: PULMONOLOGY | Facility: CLINIC | Age: 82
End: 2022-08-19

## 2022-08-19 PROCEDURE — 99448 NTRPROF PH1/NTRNET/EHR 21-30: CPT

## 2022-11-09 ENCOUNTER — APPOINTMENT (OUTPATIENT)
Dept: PULMONOLOGY | Facility: CLINIC | Age: 82
End: 2022-11-09

## 2022-11-09 VITALS
OXYGEN SATURATION: 97 % | RESPIRATION RATE: 16 BRPM | WEIGHT: 134 LBS | TEMPERATURE: 97.3 F | DIASTOLIC BLOOD PRESSURE: 60 MMHG | HEIGHT: 57 IN | HEART RATE: 79 BPM | BODY MASS INDEX: 28.91 KG/M2 | SYSTOLIC BLOOD PRESSURE: 130 MMHG

## 2022-11-09 DIAGNOSIS — Z71.89 OTHER SPECIFIED COUNSELING: ICD-10-CM

## 2022-11-09 DIAGNOSIS — R93.89 ABNORMAL FINDINGS ON DIAGNOSTIC IMAGING OF OTHER SPECIFIED BODY STRUCTURES: ICD-10-CM

## 2022-11-09 DIAGNOSIS — G47.33 OBSTRUCTIVE SLEEP APNEA (ADULT) (PEDIATRIC): ICD-10-CM

## 2022-11-09 PROCEDURE — 99213 OFFICE O/P EST LOW 20 MIN: CPT

## 2022-11-09 NOTE — ASSESSMENT
[FreeTextEntry1] : Ms. FLORES is an 82 year old female with history of JOSESITO. She is s/p admission at [facility] (1/2022) for dementia and manic episodes. She presents today in the office for a follow up visit regarding CPAP use. She is accompanied by her son-in-law (daughter Kimmy's ). \par \par 1. JOSESITO: \par - I have discussed all the negative health consequences associated with obstructive and central sleep apnea including heart conditions/MI, hypertension, diabetes, chronic inflammation, memory issues, stroke, obesity, decreased libido, sleep related accidents, as well as anxiety and depression. \par - Additional recommendations included: Avoid alcohol and sedatives at bedtime. Proper sleep hygiene such as maintaining a regular sleep routine, avoiding naps if possible, not watching TV or reading in bed,  and maintaining a quiet, comfortable bedroom. Sleepy driving avoidance and risks discussed with patient. \par - Diet, exercise and weight loss suggested.\par - Contacted [facility] to see why device stopped transmitting on 9/30/22 and to obtain more recent data. \par \par 2. Abnormal Chest CT:\par - E-Mail sent to [facility] team to see if comparison can be made (multiple attempts already made via e-mail on 8/23, 9/20, 9/30, & 11/4. \par \par Patient to follow up in 3-4 months. \par Patient to call with further questions and concerns.\par Patient verbalizes understanding of care plan and is agreeable.\par \par

## 2022-11-09 NOTE — DISCUSSION/SUMMARY
[FreeTextEntry1] : 6/22/2022 - 7/22/2022 of data via [employer]: Patient use device for more than 4 hours only 14 days in last 30 days with AHI 18.2 with median air leaks 9.7L/min.\par \par View DME from 11/9/22 for data.

## 2022-11-09 NOTE — HISTORY OF PRESENT ILLNESS
[TextBox_4] : Ms. FLORES is an 82 year old female with history of JOSESITO. She is s/p admission at [facility] (1/2022) for dementia and manic episodes. She presents today in the office for a follow up visit regarding CPAP use. She is accompanied by her son-in-law (daughter Kimmy's ). \par \par 9/13/21:\par Daughter's endorse that patient snores and witnessed apneas. Marcelina notes sleeping on back worsened apneas and snoring. Daughter Marcelina also notes she has witnessed abnormal movements during sleep. Pt admits to EDS and falling asleep in the car. She does admit to frequent headaches. \par \par 10/12/21:\par Daughter informed provider that psychiatrist felt patient was experiencing manic episodes - RX'ed Depakote PO QHS 2 weeks ago.\par \par 1/12/22 TELEPHONIC:\par Pt's daughter Marcelina notes patient is on current regimen of Gabbapentin and Depakote to help control anjelica. In the past patient was managed on Klonopin 0.5 mg BID, but that has since been discontinued. \par Psych MD had concerns that untreated JOSESITO could have been contributing to her anjelica/mental decline. Patient was initiated on APAP therapy inpatient, but daughter is unsure of current APAP settings. She notes patient has been using APAP device QHS, but notes she does not feel as though there is "enough air coming out". \par \par 3/8/22:\par She states upon awakening with device in use she experiences extreme dry mouth.  She c/o bilateral sores at the corner of her mouth, which she is unsure is attributed to her current CPAP mask.  She notes sores appeared shortly after initiation of PAP use. She is currently using Resmed Airfit F20 (med). She states Dr. Bolaños, PCP, RX'ed Iron for bilateral mouth sores as he thought it could be related to iron depletion, but there has been no improvement.  She notes she is seeing a dermatologist tomorrow for evaluation. \par She notes when she does not wear the device she sleeps for 7-8 hrs and does not feel as dry upon awakening. \par Patient notes she is in her normal state of health.\par \par 4/8/22 TELEPHONIC:\par Patient states since changing mask from med to large at last visit, the sores on the corner of her mouth have improved. \par Patient notes she is in her normal state of health.\par \par 11/9/22 UPDATE:\par Contacted daughter, Marcelina, to help with subjective information.\par \par Patient states she has been using her device nightly.  She notes there was a period of time where she did not use it as she had diarrhea throughout the night.  Patient's daughter notes that device does not transmit wirelessly and that patient has had to continually send SD card to [employer].\par \par Patient states she moved into a new assisted living facility in [place].  She states she is very happy there [place].  Patient had an upper respiratory infection in 6/2022 and was taken to [facility].  She had a chest CAT scan at this time and has been waiting to hear back from our office regarding the comparison from her CAT scan performed at [facility] in comparison to prior CAT scans done at [facility].  Patient notes she also had carotid echo which resulted within normal limits.\par \par She denies any pulmonary concerns at this time.

## 2022-11-25 NOTE — ED ADULT NURSE REASSESSMENT NOTE - NS ED NURSE REASSESS COMMENT FT1
An (Occluder Cv 30mm Septal Soft Wire Frm Flrscp Img) occlusion device was deployed and released. Patient lying in bed asleep waiting for Ct scan result at this time call bell within reach.

## 2022-11-28 ENCOUNTER — APPOINTMENT (OUTPATIENT)
Dept: CT IMAGING | Facility: CLINIC | Age: 82
End: 2022-11-28

## 2022-11-28 ENCOUNTER — OUTPATIENT (OUTPATIENT)
Dept: OUTPATIENT SERVICES | Facility: HOSPITAL | Age: 82
LOS: 1 days | End: 2022-11-28
Payer: MEDICARE

## 2022-11-28 DIAGNOSIS — R93.89 ABNORMAL FINDINGS ON DIAGNOSTIC IMAGING OF OTHER SPECIFIED BODY STRUCTURES: ICD-10-CM

## 2022-11-28 DIAGNOSIS — Z98.89 OTHER SPECIFIED POSTPROCEDURAL STATES: Chronic | ICD-10-CM

## 2022-11-28 DIAGNOSIS — Z90.711 ACQUIRED ABSENCE OF UTERUS WITH REMAINING CERVICAL STUMP: Chronic | ICD-10-CM

## 2022-11-28 PROCEDURE — 71250 CT THORAX DX C-: CPT

## 2022-11-28 PROCEDURE — 71250 CT THORAX DX C-: CPT | Mod: 26,MH

## 2022-12-07 ENCOUNTER — NON-APPOINTMENT (OUTPATIENT)
Age: 82
End: 2022-12-07

## 2023-01-01 NOTE — BH INPATIENT PSYCHIATRY PROGRESS NOTE - NSBHMETABOLIC_PSY_ALL_CORE_FT
Statement Selected BMI: BMI (kg/m2): 27.6 (12-01-21 @ 15:23)  HbA1c: A1C with Estimated Average Glucose Result: 6.9 % (12-02-21 @ 10:05)    Glucose: POCT Blood Glucose.: 125 mg/dL (12-30-21 @ 16:20)    BP: 143/55 (01-05-22 @ 05:39) (132/56 - 143/55)  Lipid Panel:  BMI: BMI (kg/m2): 27.6 (12-01-21 @ 15:23)  HbA1c: A1C with Estimated Average Glucose Result: 6.9 % (12-02-21 @ 10:05)    Glucose: POCT Blood Glucose.: 125 mg/dL (12-30-21 @ 16:20)    BP: 143/55 (01-05-22 @ 05:39) (143/55 - 143/55)  Lipid Panel:

## 2023-02-07 NOTE — BH DISCHARGE NOTE NURSING/SOCIAL WORK/PSYCH REHAB - FLU SEASON?
Call to pt advised per provider review f/u w/ hand specialist, Dr. Socorro Smith. Routing to [facility] and [facility] office for scheduling. Yes...

## 2023-02-16 NOTE — BH PSYCHOLOGY - GROUP THERAPY NOTE - NSBHPSYCHOLASSESSPROV_PSY_A_CORE
Licensed Staff Psychologist
4 = No assist / stand by assistance

## 2023-04-25 ENCOUNTER — APPOINTMENT (OUTPATIENT)
Dept: PULMONOLOGY | Facility: CLINIC | Age: 83
End: 2023-04-25

## 2023-05-09 ENCOUNTER — APPOINTMENT (OUTPATIENT)
Dept: PULMONOLOGY | Facility: CLINIC | Age: 83
End: 2023-05-09

## 2023-05-30 ENCOUNTER — INPATIENT (INPATIENT)
Facility: HOSPITAL | Age: 83
LOS: 7 days | Discharge: PSYCHIATRIC OR SUBSTANCE USE FACILITY | End: 2023-06-07
Attending: STUDENT IN AN ORGANIZED HEALTH CARE EDUCATION/TRAINING PROGRAM | Admitting: STUDENT IN AN ORGANIZED HEALTH CARE EDUCATION/TRAINING PROGRAM
Payer: MEDICARE

## 2023-05-30 VITALS
OXYGEN SATURATION: 100 % | RESPIRATION RATE: 17 BRPM | SYSTOLIC BLOOD PRESSURE: 106 MMHG | DIASTOLIC BLOOD PRESSURE: 48 MMHG | TEMPERATURE: 99 F | HEART RATE: 89 BPM

## 2023-05-30 DIAGNOSIS — Z90.711 ACQUIRED ABSENCE OF UTERUS WITH REMAINING CERVICAL STUMP: Chronic | ICD-10-CM

## 2023-05-30 DIAGNOSIS — Z98.89 OTHER SPECIFIED POSTPROCEDURAL STATES: Chronic | ICD-10-CM

## 2023-05-30 DIAGNOSIS — R41.82 ALTERED MENTAL STATUS, UNSPECIFIED: ICD-10-CM

## 2023-05-30 LAB
ALBUMIN SERPL ELPH-MCNC: 4.2 G/DL — SIGNIFICANT CHANGE UP (ref 3.3–5)
ALP SERPL-CCNC: 25 U/L — LOW (ref 40–120)
ALT FLD-CCNC: 12 U/L — SIGNIFICANT CHANGE UP (ref 4–33)
ANION GAP SERPL CALC-SCNC: 11 MMOL/L — SIGNIFICANT CHANGE UP (ref 7–14)
APAP SERPL-MCNC: <10 UG/ML — LOW (ref 15–25)
APPEARANCE UR: CLEAR — SIGNIFICANT CHANGE UP
APTT BLD: 29.8 SEC — SIGNIFICANT CHANGE UP (ref 27–36.3)
AST SERPL-CCNC: 16 U/L — SIGNIFICANT CHANGE UP (ref 4–32)
BASOPHILS # BLD AUTO: 0.03 K/UL — SIGNIFICANT CHANGE UP (ref 0–0.2)
BASOPHILS NFR BLD AUTO: 0.4 % — SIGNIFICANT CHANGE UP (ref 0–2)
BILIRUB SERPL-MCNC: <0.2 MG/DL — SIGNIFICANT CHANGE UP (ref 0.2–1.2)
BILIRUB UR-MCNC: NEGATIVE — SIGNIFICANT CHANGE UP
BLOOD GAS VENOUS COMPREHENSIVE RESULT: SIGNIFICANT CHANGE UP
BUN SERPL-MCNC: 10 MG/DL — SIGNIFICANT CHANGE UP (ref 7–23)
CALCIUM SERPL-MCNC: 9.9 MG/DL — SIGNIFICANT CHANGE UP (ref 8.4–10.5)
CHLORIDE SERPL-SCNC: 98 MMOL/L — SIGNIFICANT CHANGE UP (ref 98–107)
CO2 SERPL-SCNC: 24 MMOL/L — SIGNIFICANT CHANGE UP (ref 22–31)
COLOR SPEC: SIGNIFICANT CHANGE UP
CREAT SERPL-MCNC: 0.42 MG/DL — LOW (ref 0.5–1.3)
DIFF PNL FLD: NEGATIVE — SIGNIFICANT CHANGE UP
EGFR: 98 ML/MIN/1.73M2 — SIGNIFICANT CHANGE UP
EOSINOPHIL # BLD AUTO: 0.11 K/UL — SIGNIFICANT CHANGE UP (ref 0–0.5)
EOSINOPHIL NFR BLD AUTO: 1.4 % — SIGNIFICANT CHANGE UP (ref 0–6)
ETHANOL SERPL-MCNC: <10 MG/DL — SIGNIFICANT CHANGE UP
GLUCOSE SERPL-MCNC: 110 MG/DL — HIGH (ref 70–99)
GLUCOSE UR QL: NEGATIVE — SIGNIFICANT CHANGE UP
HCT VFR BLD CALC: 37.3 % — SIGNIFICANT CHANGE UP (ref 34.5–45)
HGB BLD-MCNC: 11.9 G/DL — SIGNIFICANT CHANGE UP (ref 11.5–15.5)
IANC: 4.85 K/UL — SIGNIFICANT CHANGE UP (ref 1.8–7.4)
IMM GRANULOCYTES NFR BLD AUTO: 0.5 % — SIGNIFICANT CHANGE UP (ref 0–0.9)
INR BLD: 1.08 RATIO — SIGNIFICANT CHANGE UP (ref 0.88–1.16)
KETONES UR-MCNC: NEGATIVE — SIGNIFICANT CHANGE UP
LEUKOCYTE ESTERASE UR-ACNC: ABNORMAL
LYMPHOCYTES # BLD AUTO: 1.99 K/UL — SIGNIFICANT CHANGE UP (ref 1–3.3)
LYMPHOCYTES # BLD AUTO: 24.9 % — SIGNIFICANT CHANGE UP (ref 13–44)
MCHC RBC-ENTMCNC: 26.6 PG — LOW (ref 27–34)
MCHC RBC-ENTMCNC: 31.9 GM/DL — LOW (ref 32–36)
MCV RBC AUTO: 83.3 FL — SIGNIFICANT CHANGE UP (ref 80–100)
MONOCYTES # BLD AUTO: 0.97 K/UL — HIGH (ref 0–0.9)
MONOCYTES NFR BLD AUTO: 12.1 % — SIGNIFICANT CHANGE UP (ref 2–14)
NEUTROPHILS # BLD AUTO: 4.85 K/UL — SIGNIFICANT CHANGE UP (ref 1.8–7.4)
NEUTROPHILS NFR BLD AUTO: 60.7 % — SIGNIFICANT CHANGE UP (ref 43–77)
NITRITE UR-MCNC: NEGATIVE — SIGNIFICANT CHANGE UP
NRBC # BLD: 0 /100 WBCS — SIGNIFICANT CHANGE UP (ref 0–0)
NRBC # FLD: 0 K/UL — SIGNIFICANT CHANGE UP (ref 0–0)
PH UR: 7 — SIGNIFICANT CHANGE UP (ref 5–8)
PLATELET # BLD AUTO: 303 K/UL — SIGNIFICANT CHANGE UP (ref 150–400)
POTASSIUM SERPL-MCNC: 4.5 MMOL/L — SIGNIFICANT CHANGE UP (ref 3.5–5.3)
POTASSIUM SERPL-SCNC: 4.5 MMOL/L — SIGNIFICANT CHANGE UP (ref 3.5–5.3)
PROT SERPL-MCNC: 7.2 G/DL — SIGNIFICANT CHANGE UP (ref 6–8.3)
PROT UR-MCNC: NEGATIVE — SIGNIFICANT CHANGE UP
PROTHROM AB SERPL-ACNC: 12.5 SEC — SIGNIFICANT CHANGE UP (ref 10.5–13.4)
RBC # BLD: 4.48 M/UL — SIGNIFICANT CHANGE UP (ref 3.8–5.2)
RBC # FLD: 13.7 % — SIGNIFICANT CHANGE UP (ref 10.3–14.5)
SALICYLATES SERPL-MCNC: <0.3 MG/DL — LOW (ref 15–30)
SODIUM SERPL-SCNC: 133 MMOL/L — LOW (ref 135–145)
SP GR SPEC: 1.01 — SIGNIFICANT CHANGE UP (ref 1.01–1.05)
TOXICOLOGY SCREEN, DRUGS OF ABUSE, SERUM RESULT: SIGNIFICANT CHANGE UP
UROBILINOGEN FLD QL: SIGNIFICANT CHANGE UP
VALPROATE SERPL-MCNC: 48.7 UG/ML — LOW (ref 50–100)
WBC # BLD: 7.99 K/UL — SIGNIFICANT CHANGE UP (ref 3.8–10.5)
WBC # FLD AUTO: 7.99 K/UL — SIGNIFICANT CHANGE UP (ref 3.8–10.5)

## 2023-05-30 PROCEDURE — 71045 X-RAY EXAM CHEST 1 VIEW: CPT | Mod: 26

## 2023-05-30 PROCEDURE — 70450 CT HEAD/BRAIN W/O DYE: CPT | Mod: 26,MA

## 2023-05-30 PROCEDURE — 99223 1ST HOSP IP/OBS HIGH 75: CPT

## 2023-05-30 PROCEDURE — 99285 EMERGENCY DEPT VISIT HI MDM: CPT

## 2023-05-30 NOTE — ED PROVIDER NOTE - OBJECTIVE STATEMENT
83y/o F pmh of Dementia, diabetes, hypertension, hyperlipidemia, status post recent admission to emergency department due to cellulitis from IV site, status post recent UTI, past medical history of of delusions,/paranoia that someone is poisoning her food in 2017 which resolved with Klonopin, gabapentin, recent manic episode in 2021 requiring patient to start Depakote, presents emergency department with 2-3 months of worsening altered mental status.  Collateral provided by daughter on phone and son at bedside.  Patient has no acute complaints.  Patient denies suicidal homicidal thoughts.  Patient admits to paranoia of people trying to poison her food.  Patient resides at [facility] assisted living facility.  Family also endorses 20 pound weight loss in the last several months. 81y/o F pmh of Dementia, diabetes, hypertension, hyperlipidemia, status post recent admission to emergency department due to cellulitis from IV site, status post recent UTI, past medical history of of delusions,/paranoia that someone is poisoning her food in 2017 which resolved with Klonopin, gabapentin, recent manic episode in 2021 requiring patient to start Depakote, presents emergency department with 2-3 months of worsening altered mental status.  Collateral provided by daughter on phone and son at bedside.  Patient has no acute complaints.  Patient denies suicidal homicidal thoughts.  Patient admits to paranoia of people trying to poison her food.  Patient resides at [facility] assisted living facility.  Family also endorses 20 pound weight loss since march. patient only likes to eat soft foods. patient feels as if she has trouble swollowing solid foods.

## 2023-05-30 NOTE — ED PROVIDER NOTE - CLINICAL SUMMARY MEDICAL DECISION MAKING FREE TEXT BOX
81y/o F pmh of Dementia, diabetes, hypertension, hyperlipidemia, status post recent admission to emergency department due to cellulitis from IV site, status post recent UTI, past medical history of of delusions,/paranoia that someone is poisoning her food in 2017 which resolved with Klonopin, gabapentin, recent manic episode in 2021 requiring patient to start Depakote, presents emergency department with 2-3 months of worsening altered mental status. PE as documented. will get CT head, UA, labs to eval metabolic vs electrolyte vs infectious etiology. patient not currently endorsing suicidal or homicidal thoughts. most likely will require admission for AMS and failure to thrive.

## 2023-05-30 NOTE — ED ADULT NURSE NOTE - OBJECTIVE STATEMENT
Pt received from assisted living facility, brought to ED for AMS. Pt confused, alert and oriented x1 (to self). Agitated at times but redirectable. 22g IV placed in L arm, labs drawn as ordered. Pt admited to medicine. Safety maintained, continue to monitor.

## 2023-05-30 NOTE — ED PROVIDER NOTE - ATTENDING CONTRIBUTION TO CARE
I (Rancho) agree with above, I performed a history and physical. Counseled familia medical staff, physician assistant, and/or medical student on medical decision making as documented. Medical decisions and treatment interventions were made in real time during the patient encounter. Additionally and/or with the following exceptions: Patient is an 82-year-old female past medical history of dementia, diabetes, hypertension, hyperlipidemia, psychotic disorder who is presenting to the emergency department delusions paranoia that someone is poisoning her food.  Has had this in the past.  History is corroborated by daughter and son who are at bedside.  Patient has no acute medical complaints.  Patient is neurologically intact, moving all extremities 5 out of 5.  Cognition is at times delusional and disordered and not cohesive.  Abdomen soft nontender nondistended.  No respiratory distress.  Patient was signed out to oncoming attending pending toxic infectious metabolic work-up.  And CT head.  If work-up negative patient would be stable for admission to hospital for AMS and consult liaison service psychiatry.  The patient's condition was not amenable to outpatient treatment due either the lack of feasibility of outpatient care coordination, possibility for further decompensation with adverse outcome if discharge, or treatments and diagnostic  modalities only available during an inpatient hospitalization.

## 2023-05-30 NOTE — ED ADULT NURSE NOTE - PRO INTERPRETER NEED 2
Dr Irene's message/plan noted.  Spoke to patient, gave  Dr Irene's message/plan in detail.  Patient verbalized understanding, accepts and is agreeable to implement the doctor's message/plan.    Patient wanted to inform you that the lesion on nose is all healed up after medication you prescribed.   English

## 2023-05-30 NOTE — ED ADULT NURSE NOTE - CHIEF COMPLAINT QUOTE
pt from assisted living, brought in by EMS for 3 days of AMS with hallucinations. pt has hearing aides in b/l ears.  daughter called: Marcelinajose alfredo Ryan, daughter and healthcare proxy [phone]

## 2023-05-30 NOTE — ED PROVIDER NOTE - CARE PLAN
1 Principal Discharge DX:	AMS (altered mental status)  Secondary Diagnosis:	Adult failure to thrive

## 2023-05-30 NOTE — ED ADULT TRIAGE NOTE - CHIEF COMPLAINT QUOTE
pt from assisted living, brought in by EMS for 3 days of AMS with hallucinations. pt has hearing aides in b/l ears. pt from assisted living, brought in by EMS for 3 days of AMS with hallucinations. pt has hearing aides in b/l ears.  daughter called: Marcelinajose alfredo Ryan, daughter and healthcare proxy [phone]

## 2023-05-31 DIAGNOSIS — I10 ESSENTIAL (PRIMARY) HYPERTENSION: ICD-10-CM

## 2023-05-31 DIAGNOSIS — E11.9 TYPE 2 DIABETES MELLITUS WITHOUT COMPLICATIONS: ICD-10-CM

## 2023-05-31 DIAGNOSIS — E78.5 HYPERLIPIDEMIA, UNSPECIFIED: ICD-10-CM

## 2023-05-31 DIAGNOSIS — F22 DELUSIONAL DISORDERS: ICD-10-CM

## 2023-05-31 DIAGNOSIS — Z29.9 ENCOUNTER FOR PROPHYLACTIC MEASURES, UNSPECIFIED: ICD-10-CM

## 2023-05-31 DIAGNOSIS — R13.10 DYSPHAGIA, UNSPECIFIED: ICD-10-CM

## 2023-05-31 LAB
CULTURE RESULTS: SIGNIFICANT CHANGE UP
GLUCOSE BLDC GLUCOMTR-MCNC: 105 MG/DL — HIGH (ref 70–99)
GLUCOSE BLDC GLUCOMTR-MCNC: 111 MG/DL — HIGH (ref 70–99)
GLUCOSE BLDC GLUCOMTR-MCNC: 135 MG/DL — HIGH (ref 70–99)
SPECIMEN SOURCE: SIGNIFICANT CHANGE UP

## 2023-05-31 PROCEDURE — 12345: CPT | Mod: NC

## 2023-05-31 PROCEDURE — 93010 ELECTROCARDIOGRAM REPORT: CPT

## 2023-05-31 PROCEDURE — 90792 PSYCH DIAG EVAL W/MED SRVCS: CPT

## 2023-05-31 RX ORDER — DIVALPROEX SODIUM 500 MG/1
250 TABLET, DELAYED RELEASE ORAL DAILY
Refills: 0 | Status: DISCONTINUED | OUTPATIENT
Start: 2023-05-31 | End: 2023-05-31

## 2023-05-31 RX ORDER — QUETIAPINE FUMARATE 200 MG/1
25 TABLET, FILM COATED ORAL
Refills: 0 | Status: DISCONTINUED | OUTPATIENT
Start: 2023-05-31 | End: 2023-06-01

## 2023-05-31 RX ORDER — DEXTROSE 50 % IN WATER 50 %
25 SYRINGE (ML) INTRAVENOUS ONCE
Refills: 0 | Status: DISCONTINUED | OUTPATIENT
Start: 2023-05-31 | End: 2023-06-07

## 2023-05-31 RX ORDER — AMLODIPINE BESYLATE 2.5 MG/1
2.5 TABLET ORAL DAILY
Refills: 0 | Status: DISCONTINUED | OUTPATIENT
Start: 2023-05-31 | End: 2023-05-31

## 2023-05-31 RX ORDER — HALOPERIDOL DECANOATE 100 MG/ML
1 INJECTION INTRAMUSCULAR EVERY 6 HOURS
Refills: 0 | Status: DISCONTINUED | OUTPATIENT
Start: 2023-05-31 | End: 2023-06-05

## 2023-05-31 RX ORDER — CALCIUM CARBONATE 500(1250)
1 TABLET ORAL
Refills: 0 | Status: DISCONTINUED | OUTPATIENT
Start: 2023-05-31 | End: 2023-06-07

## 2023-05-31 RX ORDER — PANTOPRAZOLE SODIUM 20 MG/1
40 TABLET, DELAYED RELEASE ORAL DAILY
Refills: 0 | Status: DISCONTINUED | OUTPATIENT
Start: 2023-05-31 | End: 2023-06-07

## 2023-05-31 RX ORDER — DEXTROSE 50 % IN WATER 50 %
15 SYRINGE (ML) INTRAVENOUS ONCE
Refills: 0 | Status: DISCONTINUED | OUTPATIENT
Start: 2023-05-31 | End: 2023-06-07

## 2023-05-31 RX ORDER — LOSARTAN POTASSIUM 100 MG/1
100 TABLET, FILM COATED ORAL DAILY
Refills: 0 | Status: DISCONTINUED | OUTPATIENT
Start: 2023-05-31 | End: 2023-05-31

## 2023-05-31 RX ORDER — DIVALPROEX SODIUM 500 MG/1
500 TABLET, DELAYED RELEASE ORAL AT BEDTIME
Refills: 0 | Status: DISCONTINUED | OUTPATIENT
Start: 2023-05-31 | End: 2023-05-31

## 2023-05-31 RX ORDER — LANOLIN ALCOHOL/MO/W.PET/CERES
3 CREAM (GRAM) TOPICAL AT BEDTIME
Refills: 0 | Status: DISCONTINUED | OUTPATIENT
Start: 2023-05-31 | End: 2023-05-31

## 2023-05-31 RX ORDER — HEPARIN SODIUM 5000 [USP'U]/ML
5000 INJECTION INTRAVENOUS; SUBCUTANEOUS EVERY 12 HOURS
Refills: 0 | Status: DISCONTINUED | OUTPATIENT
Start: 2023-05-31 | End: 2023-05-31

## 2023-05-31 RX ORDER — ASPIRIN/CALCIUM CARB/MAGNESIUM 324 MG
81 TABLET ORAL DAILY
Refills: 0 | Status: DISCONTINUED | OUTPATIENT
Start: 2023-05-31 | End: 2023-05-31

## 2023-05-31 RX ORDER — SODIUM CHLORIDE 9 MG/ML
1000 INJECTION, SOLUTION INTRAVENOUS
Refills: 0 | Status: DISCONTINUED | OUTPATIENT
Start: 2023-05-31 | End: 2023-06-07

## 2023-05-31 RX ORDER — AZELASTINE 137 UG/1
1 SPRAY, METERED NASAL
Qty: 0 | Refills: 0 | DISCHARGE

## 2023-05-31 RX ORDER — ATORVASTATIN CALCIUM 80 MG/1
10 TABLET, FILM COATED ORAL AT BEDTIME
Refills: 0 | Status: DISCONTINUED | OUTPATIENT
Start: 2023-05-31 | End: 2023-06-07

## 2023-05-31 RX ORDER — SENNA PLUS 8.6 MG/1
2 TABLET ORAL AT BEDTIME
Refills: 0 | Status: DISCONTINUED | OUTPATIENT
Start: 2023-05-31 | End: 2023-06-07

## 2023-05-31 RX ORDER — CALCIUM CARBONATE 500(1250)
1 TABLET ORAL
Refills: 0 | Status: DISCONTINUED | OUTPATIENT
Start: 2023-05-31 | End: 2023-05-31

## 2023-05-31 RX ORDER — GLUCAGON INJECTION, SOLUTION 0.5 MG/.1ML
1 INJECTION, SOLUTION SUBCUTANEOUS ONCE
Refills: 0 | Status: DISCONTINUED | OUTPATIENT
Start: 2023-05-31 | End: 2023-06-07

## 2023-05-31 RX ORDER — CLONAZEPAM 1 MG
0.5 TABLET ORAL DAILY
Refills: 0 | Status: DISCONTINUED | OUTPATIENT
Start: 2023-05-31 | End: 2023-06-01

## 2023-05-31 RX ORDER — GABAPENTIN 400 MG/1
300 CAPSULE ORAL ONCE
Refills: 0 | Status: COMPLETED | OUTPATIENT
Start: 2023-05-31 | End: 2023-05-31

## 2023-05-31 RX ORDER — FOLIC ACID 0.8 MG
1 TABLET ORAL DAILY
Refills: 0 | Status: DISCONTINUED | OUTPATIENT
Start: 2023-05-31 | End: 2023-06-07

## 2023-05-31 RX ORDER — ATORVASTATIN CALCIUM 80 MG/1
10 TABLET, FILM COATED ORAL AT BEDTIME
Refills: 0 | Status: DISCONTINUED | OUTPATIENT
Start: 2023-05-31 | End: 2023-05-31

## 2023-05-31 RX ORDER — PANTOPRAZOLE SODIUM 20 MG/1
40 TABLET, DELAYED RELEASE ORAL
Refills: 0 | Status: DISCONTINUED | OUTPATIENT
Start: 2023-05-31 | End: 2023-05-31

## 2023-05-31 RX ORDER — ACETAMINOPHEN 500 MG
650 TABLET ORAL EVERY 6 HOURS
Refills: 0 | Status: DISCONTINUED | OUTPATIENT
Start: 2023-05-31 | End: 2023-06-07

## 2023-05-31 RX ORDER — DIVALPROEX SODIUM 500 MG/1
250 TABLET, DELAYED RELEASE ORAL DAILY
Refills: 0 | Status: DISCONTINUED | OUTPATIENT
Start: 2023-05-31 | End: 2023-06-07

## 2023-05-31 RX ORDER — ACETAMINOPHEN 500 MG
650 TABLET ORAL EVERY 6 HOURS
Refills: 0 | Status: DISCONTINUED | OUTPATIENT
Start: 2023-05-31 | End: 2023-05-31

## 2023-05-31 RX ORDER — DIVALPROEX SODIUM 500 MG/1
500 TABLET, DELAYED RELEASE ORAL AT BEDTIME
Refills: 0 | Status: DISCONTINUED | OUTPATIENT
Start: 2023-05-31 | End: 2023-06-07

## 2023-05-31 RX ORDER — AMLODIPINE BESYLATE 2.5 MG/1
2.5 TABLET ORAL DAILY
Refills: 0 | Status: DISCONTINUED | OUTPATIENT
Start: 2023-05-31 | End: 2023-06-07

## 2023-05-31 RX ORDER — ASPIRIN/CALCIUM CARB/MAGNESIUM 324 MG
81 TABLET ORAL DAILY
Refills: 0 | Status: DISCONTINUED | OUTPATIENT
Start: 2023-05-31 | End: 2023-06-07

## 2023-05-31 RX ORDER — LANOLIN ALCOHOL/MO/W.PET/CERES
3 CREAM (GRAM) TOPICAL AT BEDTIME
Refills: 0 | Status: DISCONTINUED | OUTPATIENT
Start: 2023-05-31 | End: 2023-06-07

## 2023-05-31 RX ORDER — FEXOFENADINE HCL 30 MG
1 TABLET ORAL
Qty: 0 | Refills: 0 | DISCHARGE

## 2023-05-31 RX ORDER — HALOPERIDOL DECANOATE 100 MG/ML
1 INJECTION INTRAMUSCULAR EVERY 6 HOURS
Refills: 0 | Status: DISCONTINUED | OUTPATIENT
Start: 2023-05-31 | End: 2023-05-31

## 2023-05-31 RX ORDER — ACETAMINOPHEN 500 MG
1 TABLET ORAL
Qty: 0 | Refills: 0 | DISCHARGE

## 2023-05-31 RX ORDER — LOSARTAN POTASSIUM 100 MG/1
100 TABLET, FILM COATED ORAL DAILY
Refills: 0 | Status: DISCONTINUED | OUTPATIENT
Start: 2023-05-31 | End: 2023-06-07

## 2023-05-31 RX ORDER — GABAPENTIN 400 MG/1
300 CAPSULE ORAL
Refills: 0 | Status: DISCONTINUED | OUTPATIENT
Start: 2023-05-31 | End: 2023-05-31

## 2023-05-31 RX ORDER — QUETIAPINE FUMARATE 200 MG/1
12.5 TABLET, FILM COATED ORAL EVERY 6 HOURS
Refills: 0 | Status: DISCONTINUED | OUTPATIENT
Start: 2023-05-31 | End: 2023-06-03

## 2023-05-31 RX ORDER — INSULIN LISPRO 100/ML
VIAL (ML) SUBCUTANEOUS AT BEDTIME
Refills: 0 | Status: DISCONTINUED | OUTPATIENT
Start: 2023-05-31 | End: 2023-06-07

## 2023-05-31 RX ORDER — CLONAZEPAM 1 MG
0.5 TABLET ORAL DAILY
Refills: 0 | Status: DISCONTINUED | OUTPATIENT
Start: 2023-05-31 | End: 2023-05-31

## 2023-05-31 RX ORDER — DEXTROSE 50 % IN WATER 50 %
12.5 SYRINGE (ML) INTRAVENOUS ONCE
Refills: 0 | Status: DISCONTINUED | OUTPATIENT
Start: 2023-05-31 | End: 2023-06-07

## 2023-05-31 RX ORDER — INSULIN LISPRO 100/ML
VIAL (ML) SUBCUTANEOUS
Refills: 0 | Status: DISCONTINUED | OUTPATIENT
Start: 2023-05-31 | End: 2023-06-07

## 2023-05-31 RX ORDER — GABAPENTIN 400 MG/1
300 CAPSULE ORAL
Refills: 0 | Status: DISCONTINUED | OUTPATIENT
Start: 2023-05-31 | End: 2023-06-02

## 2023-05-31 RX ORDER — FOLIC ACID 0.8 MG
1 TABLET ORAL DAILY
Refills: 0 | Status: DISCONTINUED | OUTPATIENT
Start: 2023-05-31 | End: 2023-05-31

## 2023-05-31 RX ADMIN — ATORVASTATIN CALCIUM 10 MILLIGRAM(S): 80 TABLET, FILM COATED ORAL at 21:47

## 2023-05-31 RX ADMIN — HALOPERIDOL DECANOATE 1 MILLIGRAM(S): 100 INJECTION INTRAMUSCULAR at 12:45

## 2023-05-31 RX ADMIN — Medication 81 MILLIGRAM(S): at 12:57

## 2023-05-31 RX ADMIN — QUETIAPINE FUMARATE 25 MILLIGRAM(S): 200 TABLET, FILM COATED ORAL at 21:47

## 2023-05-31 RX ADMIN — SENNA PLUS 2 TABLET(S): 8.6 TABLET ORAL at 21:47

## 2023-05-31 RX ADMIN — HEPARIN SODIUM 5000 UNIT(S): 5000 INJECTION INTRAVENOUS; SUBCUTANEOUS at 05:41

## 2023-05-31 RX ADMIN — DIVALPROEX SODIUM 250 MILLIGRAM(S): 500 TABLET, DELAYED RELEASE ORAL at 12:56

## 2023-05-31 RX ADMIN — Medication 1 MILLIGRAM(S): at 12:57

## 2023-05-31 RX ADMIN — Medication 0.25 MILLIGRAM(S): at 13:00

## 2023-05-31 RX ADMIN — GABAPENTIN 300 MILLIGRAM(S): 400 CAPSULE ORAL at 21:46

## 2023-05-31 RX ADMIN — QUETIAPINE FUMARATE 12.5 MILLIGRAM(S): 200 TABLET, FILM COATED ORAL at 18:18

## 2023-05-31 RX ADMIN — HALOPERIDOL DECANOATE 1 MILLIGRAM(S): 100 INJECTION INTRAMUSCULAR at 08:35

## 2023-05-31 RX ADMIN — DIVALPROEX SODIUM 500 MILLIGRAM(S): 500 TABLET, DELAYED RELEASE ORAL at 21:46

## 2023-05-31 RX ADMIN — PANTOPRAZOLE SODIUM 40 MILLIGRAM(S): 20 TABLET, DELAYED RELEASE ORAL at 12:57

## 2023-05-31 RX ADMIN — GABAPENTIN 300 MILLIGRAM(S): 400 CAPSULE ORAL at 13:39

## 2023-05-31 RX ADMIN — Medication 0.5 MILLIGRAM(S): at 13:09

## 2023-05-31 RX ADMIN — Medication 650 MILLIGRAM(S): at 18:18

## 2023-05-31 RX ADMIN — Medication 1 TABLET(S): at 21:46

## 2023-05-31 NOTE — PROGRESS NOTE ADULT - SUBJECTIVE AND OBJECTIVE BOX
[facility] Division of Hospital Medicine  Araceli Han MD  Pager (M-F, 8A-5P): [phone]  Other Times:      Patient is a 82y old  Female who presents with a chief complaint of paranoia (31 May 2023 02:06)      SUBJECTIVE / OVERNIGHT EVENTS:      MEDICATIONS  (STANDING):  amLODIPine   Tablet 2.5 milliGRAM(s) Oral daily  aspirin  chewable 81 milliGRAM(s) Oral daily  atorvastatin 10 milliGRAM(s) Oral at bedtime  calcium carbonate   1250 mG (OsCal) 1 Tablet(s) Oral two times a day  clonazePAM  Tablet 0.5 milliGRAM(s) Oral daily  dextrose 5%. 1000 milliLiter(s) (100 mL/Hr) IV Continuous <Continuous>  dextrose 5%. 1000 milliLiter(s) (50 mL/Hr) IV Continuous <Continuous>  dextrose 50% Injectable 12.5 Gram(s) IV Push once  dextrose 50% Injectable 25 Gram(s) IV Push once  dextrose 50% Injectable 25 Gram(s) IV Push once  divalproex Sprinkle 250 milliGRAM(s) Oral daily  divalproex Sprinkle 500 milliGRAM(s) Oral at bedtime  folic acid 1 milliGRAM(s) Oral daily  glucagon  Injectable 1 milliGRAM(s) IntraMuscular once  heparin   Injectable 5000 Unit(s) SubCutaneous every 12 hours  insulin lispro (ADMELOG) corrective regimen sliding scale   SubCutaneous three times a day before meals  insulin lispro (ADMELOG) corrective regimen sliding scale   SubCutaneous at bedtime  losartan 100 milliGRAM(s) Oral daily  pantoprazole   Suspension 40 milliGRAM(s) Oral daily  senna 2 Tablet(s) Oral at bedtime    MEDICATIONS  (PRN):  acetaminophen     Tablet .. 650 milliGRAM(s) Oral every 6 hours PRN Temp greater or equal to 38C (100.4F), Mild Pain (1 - 3)  dextrose Oral Gel 15 Gram(s) Oral once PRN Blood Glucose LESS THAN 70 milliGRAM(s)/deciliter  haloperidol    Injectable 1 milliGRAM(s) IntraMuscular every 6 hours PRN Agitation  melatonin 3 milliGRAM(s) Oral at bedtime PRN Insomnia      CAPILLARY BLOOD GLUCOSE      POCT Blood Glucose.: 83 mg/dL (31 May 2023 06:19)  POCT Blood Glucose.: 96 mg/dL (31 May 2023 00:12)  POCT Blood Glucose.: 86 mg/dL (30 May 2023 23:15)    I&O's Summary      PHYSICAL EXAM:  Vital Signs Last 24 Hrs  T(C): 37 (31 May 2023 05:29), Max: 37 (30 May 2023 12:16)  T(F): 98.6 (31 May 2023 05:29), Max: 98.6 (30 May 2023 12:16)  HR: 82 (31 May 2023 05:29) (80 - 89)  BP: 142/55 (31 May 2023 05:29) (106/48 - 154/84)  BP(mean): --  RR: 18 (31 May 2023 05:29) (16 - 18)  SpO2: 99% (31 May 2023 05:29) (99% - 100%)    Parameters below as of 31 May 2023 05:29  Patient On (Oxygen Delivery Method): room air        CONSTITUTIONAL: NAD, well-developed, well-groomed  EYES: EOMI; conjunctiva and sclera clear  ENMT: Moist oral mucosa  RESPIRATORY: Normal respiratory effort; lungs are clear to auscultation bilaterally  CARDIOVASCULAR: Regular rate and rhythm, normal S1 and S2, no murmur; No lower extremity edema  ABDOMEN: Nontender to palpation, normoactive bowel sounds, no rebound/guarding  MUSCULOSKELETAL:   no clubbing or cyanosis of digits; no joint swelling or tenderness to palpation  PSYCH: A+O to person, place, and time; affect appropriate  NEUROLOGY: CN 2-12 are intact and symmetric; no gross sensory deficits   SKIN: No rashes; no palpable lesions    LABS:                        11.9   7.99  )-----------( 303      ( 30 May 2023 17:07 )             37.3     05-30    133<L>  |  98  |  10  ----------------------------<  110<H>  4.5   |  24  |  0.42<L>    Ca    9.9      30 May 2023 17:07    TPro  7.2  /  Alb  4.2  /  TBili  <0.2  /  DBili  x   /  AST  16  /  ALT  12  /  AlkPhos  25<L>  05-30    PT/INR - ( 30 May 2023 17:07 )   PT: 12.5 sec;   INR: 1.08 ratio         PTT - ( 30 May 2023 17:07 )  PTT:29.8 sec      Urinalysis Basic - ( 30 May 2023 16:49 )    Color: Light Yellow / Appearance: Clear / S.011 / pH: x  Gluc: x / Ketone: Negative  / Bili: Negative / Urobili: <2 mg/dL   Blood: x / Protein: Negative / Nitrite: Negative   Leuk Esterase: Large / RBC: 0 /HPF / WBC 7 /HPF   Sq Epi: x / Non Sq Epi: x / Bacteria: Negative        RADIOLOGY & ADDITIONAL TESTS:  Results Reviewed:   Imaging Personally Reviewed:  Electrocardiogram Personally Reviewed:    COORDINATION OF CARE:  Care Discussed with Consultants/Other Providers [Y/N]: Y  Prior or Outpatient Records Reviewed [Y/N]: Y   [facility] Medicine  Araceli Han MD  Pager (M-F, 8A-5P): [phone]  Other Times:      Patient is a 82y old  Female who presents with a chief complaint of paranoia (31 May 2023 02:06)      SUBJECTIVE / OVERNIGHT EVENTS:  Pt is pleasant and comfortable.  She is not sure why she is in the hospital.      MEDICATIONS  (STANDING):  amLODIPine   Tablet 2.5 milliGRAM(s) Oral daily  aspirin  chewable 81 milliGRAM(s) Oral daily  atorvastatin 10 milliGRAM(s) Oral at bedtime  calcium carbonate   1250 mG (OsCal) 1 Tablet(s) Oral two times a day  clonazePAM  Tablet 0.5 milliGRAM(s) Oral daily  dextrose 5%. 1000 milliLiter(s) (100 mL/Hr) IV Continuous <Continuous>  dextrose 5%. 1000 milliLiter(s) (50 mL/Hr) IV Continuous <Continuous>  dextrose 50% Injectable 12.5 Gram(s) IV Push once  dextrose 50% Injectable 25 Gram(s) IV Push once  dextrose 50% Injectable 25 Gram(s) IV Push once  divalproex Sprinkle 250 milliGRAM(s) Oral daily  divalproex Sprinkle 500 milliGRAM(s) Oral at bedtime  folic acid 1 milliGRAM(s) Oral daily  glucagon  Injectable 1 milliGRAM(s) IntraMuscular once  heparin   Injectable 5000 Unit(s) SubCutaneous every 12 hours  insulin lispro (ADMELOG) corrective regimen sliding scale   SubCutaneous three times a day before meals  insulin lispro (ADMELOG) corrective regimen sliding scale   SubCutaneous at bedtime  losartan 100 milliGRAM(s) Oral daily  pantoprazole   Suspension 40 milliGRAM(s) Oral daily  senna 2 Tablet(s) Oral at bedtime    MEDICATIONS  (PRN):  acetaminophen     Tablet .. 650 milliGRAM(s) Oral every 6 hours PRN Temp greater or equal to 38C (100.4F), Mild Pain (1 - 3)  dextrose Oral Gel 15 Gram(s) Oral once PRN Blood Glucose LESS THAN 70 milliGRAM(s)/deciliter  haloperidol    Injectable 1 milliGRAM(s) IntraMuscular every 6 hours PRN Agitation  melatonin 3 milliGRAM(s) Oral at bedtime PRN Insomnia      CAPILLARY BLOOD GLUCOSE      POCT Blood Glucose.: 83 mg/dL (31 May 2023 06:19)  POCT Blood Glucose.: 96 mg/dL (31 May 2023 00:12)  POCT Blood Glucose.: 86 mg/dL (30 May 2023 23:15)    I&O's Summary      PHYSICAL EXAM:  Vital Signs Last 24 Hrs  T(C): 37 (31 May 2023 05:29), Max: 37 (30 May 2023 12:16)  T(F): 98.6 (31 May 2023 05:29), Max: 98.6 (30 May 2023 12:16)  HR: 82 (31 May 2023 05:29) (80 - 89)  BP: 142/55 (31 May 2023 05:29) (106/48 - 154/84)  BP(mean): --  RR: 18 (31 May 2023 05:29) (16 - 18)  SpO2: 99% (31 May 2023 05:29) (99% - 100%)    Parameters below as of 31 May 2023 05:29  Patient On (Oxygen Delivery Method): room air        CONSTITUTIONAL: NAD, well-developed, well-groomed  EYES: EOMI; conjunctiva and sclera clear  ENMT: Moist oral mucosa  RESPIRATORY: Normal respiratory effort; lungs are clear to auscultation bilaterally  CARDIOVASCULAR: Regular rate and rhythm, normal S1 and S2, no murmur; No lower extremity edema  ABDOMEN: Nontender to palpation, normoactive bowel sounds, no rebound/guarding  MUSCULOSKELETAL:   no clubbing or cyanosis of digits; no joint swelling or tenderness to palpation  PSYCH: A+O to person, place, and time; affect appropriate  NEUROLOGY: CN 2-12 are intact and symmetric; no gross sensory deficits   SKIN: No rashes; no palpable lesions    LABS:                        11.9   7.99  )-----------( 303      ( 30 May 2023 17:07 )             37.3     05-30    133<L>  |  98  |  10  ----------------------------<  110<H>  4.5   |  24  |  0.42<L>    Ca    9.9      30 May 2023 17:07    TPro  7.2  /  Alb  4.2  /  TBili  <0.2  /  DBili  x   /  AST  16  /  ALT  12  /  AlkPhos  25<L>  05-30    PT/INR - ( 30 May 2023 17:07 )   PT: 12.5 sec;   INR: 1.08 ratio         PTT - ( 30 May 2023 17:07 )  PTT:29.8 sec      Urinalysis Basic - ( 30 May 2023 16:49 )    Color: Light Yellow / Appearance: Clear / S.011 / pH: x  Gluc: x / Ketone: Negative  / Bili: Negative / Urobili: <2 mg/dL   Blood: x / Protein: Negative / Nitrite: Negative   Leuk Esterase: Large / RBC: 0 /HPF / WBC 7 /HPF   Sq Epi: x / Non Sq Epi: x / Bacteria: Negative        RADIOLOGY & ADDITIONAL TESTS:  Results Reviewed:   Imaging Personally Reviewed:  Electrocardiogram Personally Reviewed:    COORDINATION OF CARE:  Care Discussed with Consultants/Other Providers [Y/N]: Y  Prior or Outpatient Records Reviewed [Y/N]: Y   [facility] Medicine  Araceli Han MD  Pager (M-F, 8A-5P): [phone]  Other Times:      Patient is a 82y old  Female who presents with a chief complaint of paranoia (31 May 2023 02:06)      SUBJECTIVE / OVERNIGHT EVENTS:  Pt is pleasant and comfortable.  She is not sure why she is in the hospital.  Pt is able to have a pleasant conversation up until questions are asked about her ability to eat.      MEDICATIONS  (STANDING):  amLODIPine   Tablet 2.5 milliGRAM(s) Oral daily  aspirin  chewable 81 milliGRAM(s) Oral daily  atorvastatin 10 milliGRAM(s) Oral at bedtime  calcium carbonate   1250 mG (OsCal) 1 Tablet(s) Oral two times a day  clonazePAM  Tablet 0.5 milliGRAM(s) Oral daily  dextrose 5%. 1000 milliLiter(s) (100 mL/Hr) IV Continuous <Continuous>  dextrose 5%. 1000 milliLiter(s) (50 mL/Hr) IV Continuous <Continuous>  dextrose 50% Injectable 12.5 Gram(s) IV Push once  dextrose 50% Injectable 25 Gram(s) IV Push once  dextrose 50% Injectable 25 Gram(s) IV Push once  divalproex Sprinkle 250 milliGRAM(s) Oral daily  divalproex Sprinkle 500 milliGRAM(s) Oral at bedtime  folic acid 1 milliGRAM(s) Oral daily  glucagon  Injectable 1 milliGRAM(s) IntraMuscular once  heparin   Injectable 5000 Unit(s) SubCutaneous every 12 hours  insulin lispro (ADMELOG) corrective regimen sliding scale   SubCutaneous three times a day before meals  insulin lispro (ADMELOG) corrective regimen sliding scale   SubCutaneous at bedtime  losartan 100 milliGRAM(s) Oral daily  pantoprazole   Suspension 40 milliGRAM(s) Oral daily  senna 2 Tablet(s) Oral at bedtime    MEDICATIONS  (PRN):  acetaminophen     Tablet .. 650 milliGRAM(s) Oral every 6 hours PRN Temp greater or equal to 38C (100.4F), Mild Pain (1 - 3)  dextrose Oral Gel 15 Gram(s) Oral once PRN Blood Glucose LESS THAN 70 milliGRAM(s)/deciliter  haloperidol    Injectable 1 milliGRAM(s) IntraMuscular every 6 hours PRN Agitation  melatonin 3 milliGRAM(s) Oral at bedtime PRN Insomnia      CAPILLARY BLOOD GLUCOSE      POCT Blood Glucose.: 83 mg/dL (31 May 2023 06:19)  POCT Blood Glucose.: 96 mg/dL (31 May 2023 00:12)  POCT Blood Glucose.: 86 mg/dL (30 May 2023 23:15)    I&O's Summary      PHYSICAL EXAM:  Vital Signs Last 24 Hrs  T(C): 37 (31 May 2023 05:29), Max: 37 (30 May 2023 12:16)  T(F): 98.6 (31 May 2023 05:29), Max: 98.6 (30 May 2023 12:16)  HR: 82 (31 May 2023 05:29) (80 - 89)  BP: 142/55 (31 May 2023 05:29) (106/48 - 154/84)  BP(mean): --  RR: 18 (31 May 2023 05:29) (16 - 18)  SpO2: 99% (31 May 2023 05:29) (99% - 100%)    Parameters below as of 31 May 2023 05:29  Patient On (Oxygen Delivery Method): room air        CONSTITUTIONAL: NAD, well-developed, well-groomed  EYES: EOMI; conjunctiva and sclera clear  ENMT: Moist oral mucosa  RESPIRATORY: Normal respiratory effort; lungs are clear to auscultation bilaterally  CARDIOVASCULAR: Regular rate and rhythm, normal S1 and S2, no murmur; No lower extremity edema  ABDOMEN: Nontender to palpation, normoactive bowel sounds, no rebound/guarding  MUSCULOSKELETAL:   no clubbing or cyanosis of digits; no joint swelling or tenderness to palpation  PSYCH: A+O to person, place, and time; affect appropriate  NEUROLOGY: CN 2-12 are intact and symmetric; no gross sensory deficits   SKIN: No rashes; no palpable lesions    LABS:                        11.9   7.99  )-----------( 303      ( 30 May 2023 17:07 )             37.3     05-30    133<L>  |  98  |  10  ----------------------------<  110<H>  4.5   |  24  |  0.42<L>    Ca    9.9      30 May 2023 17:07    TPro  7.2  /  Alb  4.2  /  TBili  <0.2  /  DBili  x   /  AST  16  /  ALT  12  /  AlkPhos  25<L>  05-30    PT/INR - ( 30 May 2023 17:07 )   PT: 12.5 sec;   INR: 1.08 ratio         PTT - ( 30 May 2023 17:07 )  PTT:29.8 sec      Urinalysis Basic - ( 30 May 2023 16:49 )    Color: Light Yellow / Appearance: Clear / S.011 / pH: x  Gluc: x / Ketone: Negative  / Bili: Negative / Urobili: <2 mg/dL   Blood: x / Protein: Negative / Nitrite: Negative   Leuk Esterase: Large / RBC: 0 /HPF / WBC 7 /HPF   Sq Epi: x / Non Sq Epi: x / Bacteria: Negative        RADIOLOGY & ADDITIONAL TESTS:  Results Reviewed:   Imaging Personally Reviewed:  Electrocardiogram Personally Reviewed:    COORDINATION OF CARE:  Care Discussed with Consultants/Other Providers [Y/N]: Y  Prior or Outpatient Records Reviewed [Y/N]: Y

## 2023-05-31 NOTE — H&P ADULT - PROBLEM SELECTOR PLAN 1
Assessment:  - patient presented for new onset acute paranoia  - the patient is known to have behavorial problems for which she needed inpatient kb admission  - still actively paranoid    Plan:  - enhanced supervision room  - psych consult - would advise psych to call the daughter who knows the psych pt well  - will leeroy/w klonopin, depakote  - hold neurotin for now as per daughter, thinks it is making the confusion worse  - haldol prn

## 2023-05-31 NOTE — SWALLOW BEDSIDE ASSESSMENT ADULT - SWALLOW EVAL: DIAGNOSIS
1. Functional oral stage for puree, minced and moist solids, regular solids and thin liquids marked by adequate oral acceptance, chewing for solids and transfer. 2. Functional pharyngeal phase for puree, minced and moist solids and thin liquids marked by a present pharyngeal swallow trigger with hyolaryngeal elevation noted upon digital palpation without evidence of impaired airway protection. Of Note: Patient given kimberly cracker at which time patient self crushed and placed kimberly cracker crumbs into applesauce. Patient required encouragement to accept regular solids with Patient reporting her throat feels "Scratchy" after PO intake of regular solids and patient stating "I have to chew it for a really long time".

## 2023-05-31 NOTE — DIETITIAN INITIAL EVALUATION ADULT - OTHER INFO
Pt 83 yo female with PMHx of dementia, DM2, HTN, anxiety, paranoia presented with new onset AMS and worsening agitation and paranoia - per chart review. Of note, Pt with dysphagia.     At time of visit, Pt awake, appears disoriented. Pt's daughter at bed side. Of note Pt NPO at time of visit; pending -> Speech Bedside Swallow Evaluation. PTA Pt was on Purred diet; but Pt did not have much appetite/PO intake for "2 months". Per daughter Pt with unintended weight loss of ~20# in past 3 months. Pt's height: ~60" & Pt's actual body weight: ~120#. Once clinically indicates, Add PO supplement: Glucerna shake - 2x daily to diet rx. Case discussed with nurse. RDN remains available, nurse & Pt's daughter made aware.

## 2023-05-31 NOTE — PROGRESS NOTE ADULT - PROBLEM SELECTOR PLAN 2
- official dysphagia eval +/- modified barium swallow  - NPO for now pending work up above  - crush all medications if patient is cleared for diet as per daughter, changed depakote to sprinkle formulation - crush all medications if patient is cleared for diet as per daughter, changed depakote to sprinkle formulation  - discussed with daughter - since the last 2 months pt has become paranoid about food being poisoned.  Swallow studies done at assisted living facility did not reveal any etiology - up until presentation to the hospital pt was tolerating a pureed diet (pureed as pt would not eat any consistency not due to any actual dysphagia issue)   - would continue with pureed diet, await swallow eval and MBS to rule out gi etiologies   - if workup neg then likely poor po intake due to psychiatric issues

## 2023-05-31 NOTE — BH CONSULTATION LIAISON ASSESSMENT NOTE - HPI (INCLUDE ILLNESS QUALITY, SEVERITY, DURATION, TIMING, CONTEXT, MODIFYING FACTORS, ASSOCIATED SIGNS AND SYMPTOMS)
81 year old , retired female, domiciled at the [facility], PPH of late onset Bipolar I disorder, PTSD and Cluster B Personality traits, 2x prior psych adm last in 2022 at [facility], presents with worsening paranoia, anxiety, FTT (weight loss of 20lb since march), confusion - psych c/s for psychosis/AMS.    Patient extremely anxious, histrionic, perseverates on an imagined painful procedure that she thinks the hospital is going to do to her to unclear ends, thinks that people at her [facility] are stealing her things and poisoning her food, denies SI/HI here and is somewhat less paranoid about food here, no clear AH/VH noted. Mildy hyperverbal but also very hard of hearing, able to be redirected. AOx2, recall 3/3 but patient repeats words for several minutes in anxious state, attention fair, 2/3 on change making exercise, makes task switching errors on luria exam, naming intact.    Spoke to daughter Marcelina [phone] - states that patient has been decompensating since numerous medical issues (uncontrolled epistaxis, then UTI with multiple abx trials and eventual need of ferro for 1 week) starting in March 2023. Prior to this had been doing well on the meds that she had continued since her last psychiatric hospitalization at [facility] in 2022, (gabapentin, depakote) but recently since her medical issues she has gotten more confused, paranoid, refusing food, refusing ADLs, anxious, and confused. Recently had started klonopin which appeared to make confusion worse, but had been useful in treating her anxiety in the past in 2016. Back then patient p/w similar paranoia after death of husbnad, was put on risperdal with minimal effect, livign in [facility] then eventually switched to cymbalta+Klonopin (never beyond 0.5mg QD) and was stable for years until COVID when she decompensated again in [id number], was started on gabapentin+ depakote which helped, and now decompensating. Discussed med changes at length, had not trialed seroquel, zyprexa, abilify or FGA's in past. Risk/benefit of use discussed including black box warning etc, amenable to tapering off of gabapnetin and starting low dose seroquel.

## 2023-05-31 NOTE — BH CONSULTATION LIAISON ASSESSMENT NOTE - CURRENT MEDICATION
MEDICATIONS  (STANDING):  amLODIPine   Tablet 2.5 milliGRAM(s) Oral daily  aspirin  chewable 81 milliGRAM(s) Oral daily  atorvastatin 10 milliGRAM(s) Oral at bedtime  calcium carbonate   1250 mG (OsCal) 1 Tablet(s) Oral two times a day  clonazePAM  Tablet 0.5 milliGRAM(s) Oral daily  dextrose 5%. 1000 milliLiter(s) (50 mL/Hr) IV Continuous <Continuous>  dextrose 5%. 1000 milliLiter(s) (100 mL/Hr) IV Continuous <Continuous>  dextrose 50% Injectable 25 Gram(s) IV Push once  dextrose 50% Injectable 25 Gram(s) IV Push once  dextrose 50% Injectable 12.5 Gram(s) IV Push once  divalproex Sprinkle 250 milliGRAM(s) Oral daily  divalproex Sprinkle 500 milliGRAM(s) Oral at bedtime  folic acid 1 milliGRAM(s) Oral daily  gabapentin 300 milliGRAM(s) Oral <User Schedule>  glucagon  Injectable 1 milliGRAM(s) IntraMuscular once  heparin   Injectable 5000 Unit(s) SubCutaneous every 12 hours  insulin lispro (ADMELOG) corrective regimen sliding scale   SubCutaneous three times a day before meals  insulin lispro (ADMELOG) corrective regimen sliding scale   SubCutaneous at bedtime  losartan 100 milliGRAM(s) Oral daily  pantoprazole   Suspension 40 milliGRAM(s) Oral daily  QUEtiapine 25 milliGRAM(s) Oral <User Schedule>  senna 2 Tablet(s) Oral at bedtime    MEDICATIONS  (PRN):  acetaminophen     Tablet .. 650 milliGRAM(s) Oral every 6 hours PRN Temp greater or equal to 38C (100.4F), Mild Pain (1 - 3)  dextrose Oral Gel 15 Gram(s) Oral once PRN Blood Glucose LESS THAN 70 milliGRAM(s)/deciliter  haloperidol    Injectable 1 milliGRAM(s) IV Push every 6 hours PRN Agitation  melatonin 3 milliGRAM(s) Oral at bedtime PRN Insomnia

## 2023-05-31 NOTE — BH CONSULTATION LIAISON ASSESSMENT NOTE - MSE UNSTRUCTURED FT
Mental Status Exam:  Jacque: well groomed, fair hygiene     Behavior:  psychomotor agitation  Motor: no tremors, EPS, or rigidity  Gait: did not assess, pt in bed  Speech: increased rate, rhythm, prosedy and volume   Mood: "anxious"  Affect: anxious, congruent  Thought process: perseverative   Thought Content: +paranoia, delusions   Perception: denies AH/VH  SI: denies  HI: denies  Insight: poor  Judgment: poor    Cognitive Exam:  Orientation: AOx2  Recall: 3/3 (but with some repetition)  Attention: 2/3 on change making   Abstraction: fair

## 2023-05-31 NOTE — DIETITIAN INITIAL EVALUATION ADULT - PERTINENT LABORATORY DATA
05-30    133<L>  |  98  |  10  ----------------------------<  110<H>  4.5   |  24  |  0.42<L>    Ca    9.9      30 May 2023 17:07    TPro  7.2  /  Alb  4.2  /  TBili  <0.2  /  DBili  x   /  AST  16  /  ALT  12  /  AlkPhos  25<L>  05-30  POCT Blood Glucose.: 111 mg/dL (05-31-23 @ 11:54)

## 2023-05-31 NOTE — PATIENT PROFILE ADULT - NSTOBACCOUNKNOWNSMK_GEN_A_CORE_RD
c/o abd pain since 10:45 p.m. after leaving the gym. "Ate Burrito prior to onset". c/o nausea. "I forced myself to throw up, but didn't feel better". Denies fevers or diarrhea. Tender to LLQ on palpation. Cognitively Impaired

## 2023-05-31 NOTE — PATIENT PROFILE ADULT - PATIENT'S SEXUAL ORIENTATION
----- Message from Jesica Agudelo sent at 5/28/2020  9:29 AM CDT -----  Contact: patient [phone]  Patient spoke with nurse about test results but forgot to ask about cholesterol levels. Please call patient again.  
Tried calling pt phone busy  
Heterosexual

## 2023-05-31 NOTE — H&P ADULT - NSHPLABSRESULTS_GEN_ALL_CORE
11.9   7.99  )-----------( 303      ( 30 May 2023 17:07 )             37.3       05-30    133<L>  |  98  |  10  ----------------------------<  110<H>  4.5   |  24  |  0.42<L>    Ca    9.9      30 May 2023 17:07    TPro  7.2  /  Alb  4.2  /  TBili  <0.2  /  DBili  x   /  AST  16  /  ALT  12  /  AlkPhos  25<L>  05-30            PT/INR - ( 30 May 2023 17:07 )   PT: 12.5 sec;   INR: 1.08 ratio         PTT - ( 30 May 2023 17:07 )  PTT:29.8 sec    15:05 - VBG - pH: 7.36  | pCO2: 48    | pO2: 25    | Lactate: 1.8        Urinalysis Basic - ( 30 May 2023 16:49 )    Color: Light Yellow / Appearance: Clear / S.011 / pH: x  Gluc: x / Ketone: Negative  / Bili: Negative / Urobili: <2 mg/dL   Blood: x / Protein: Negative / Nitrite: Negative   Leuk Esterase: Large / RBC: 0 /HPF / WBC 7 /HPF   Sq Epi: x / Non Sq Epi: x / Bacteria: Negative            CXR: As per my read - no opacities noted, Will wait for prelim/official read    EKG: As per my read - pending

## 2023-05-31 NOTE — ED ADULT NURSE REASSESSMENT NOTE - NS ED NURSE REASSESS COMMENT FT1
PT is resting in stretcher, easily arousable to verbal stimuli. no apparent distress noted. will continue to monitor. [No Acute Distress] : no acute distress [No Respiratory Distress] : no respiratory distress  [de-identified] : See HPI [de-identified] : See hpi

## 2023-05-31 NOTE — DIETITIAN INITIAL EVALUATION ADULT - ADD RECOMMEND
1. Conduct Speech Bedside Swallow Evaluation, as ordered; PO diet/Fluid consistency per SLP rec;  2. Once PO diet initiates, add PO supplement: Glucerna Therapeutic Nutrition 8oz. 2x daily (~440 Kcals, ~20 gm Protein);  3. Add Multivitamins 1 tab daily for micronutrient coverage;   4. Monitor labs, hydration status;

## 2023-05-31 NOTE — PATIENT PROFILE ADULT - FALL HARM RISK - HARM RISK INTERVENTIONS

## 2023-05-31 NOTE — H&P ADULT - NSHPPHYSICALEXAM_GEN_ALL_CORE
PHYSICAL EXAM:  VITALS: Vital Signs Last 24 Hrs  T(C): 36.8 (31 May 2023 02:16), Max: 37 (30 May 2023 12:16)  T(F): 98.3 (31 May 2023 02:16), Max: 98.6 (30 May 2023 12:16)  HR: 88 (31 May 2023 02:16) (80 - 89)  BP: 154/84 (31 May 2023 02:16) (106/48 - 154/84)  BP(mean): --  RR: 18 (31 May 2023 02:16) (16 - 18)  SpO2: 99% (31 May 2023 02:16) (99% - 100%)    Parameters below as of 31 May 2023 02:16  Patient On (Oxygen Delivery Method): room air      GENERAL: NAD, comfortable at bedside, patient getting out of bed and screaming at me, agitated  HEAD:  Atraumatic, Normocephalic  EYES: EOMI, PERRL, conjunctiva and sclera clear  ENT: Moist Mucus Membranes present, no ulcers appreciated  NECK: Supple, No JVD  CHEST/LUNG: Clear to auscultation bilaterally; No wheezes, rales or rhonchi, no accessory muscle use  HEART: Regular rate and rhythm; No murmurs, rubs, or gallops, (+)S1, S2  ABDOMEN: Soft, Nontender, Nondistended; Normal Bowel sounds   EXTREMITIES:  2+ Peripheral Pulses, No clubbing, cyanosis, or edema  PSYCH: labile mood and affect  NEUROLOGY: AAOx0, non-focal  SKIN: No rashes or lesions [address]

## 2023-05-31 NOTE — SWALLOW BEDSIDE ASSESSMENT ADULT - ASR SWALLOW RECOMMEND DIAG
Cinesophagram to objectively assess the swallow given patient reporting "stuck" sensation with solids

## 2023-05-31 NOTE — BH CONSULTATION LIAISON ASSESSMENT NOTE - NSBHCHARTREVIEWVS_PSY_A_CORE FT
Vital Signs Last 24 Hrs  T(C): 36.7 (31 May 2023 11:22), Max: 37 (31 May 2023 05:29)  T(F): 98 (31 May 2023 11:22), Max: 98.6 (31 May 2023 05:29)  HR: 106 (31 May 2023 11:22) (80 - 106)  BP: 140/69 (31 May 2023 11:22) (134/50 - 154/84)  BP(mean): --  RR: 18 (31 May 2023 11:22) (16 - 18)  SpO2: 98% (31 May 2023 11:22) (98% - 100%)    Parameters below as of 31 May 2023 11:22  Patient On (Oxygen Delivery Method): room air

## 2023-05-31 NOTE — H&P ADULT - MLM HIDDEN
PDMP reviewed.     Set to eprescribe pending your approval    LAST SEEN: 3/5/19    NEXT APPOINTMENT: 9/5/19    LAST REFILLED: 2/7/19
yes

## 2023-05-31 NOTE — H&P ADULT - HISTORY OF PRESENT ILLNESS
This is a 81 y/o F with pmhx of dementia, DM2, HTN, anxiety, paranoia presented to the ED for new onset AMS and worsening agitation and paranoia. The patient for the last 2 months has been refusing to eat, saying that people are poisoning her food. The patient at bedside still continues to be agitated, refusing to cooperate or answer questions. Stating that "I want a nurse here at all times". Most of the hx obtained from the daughter who is a geriatric psych social worker who knows her hx well. The patient has a long hx of anxiety, depression and paranoia for which she had been on multiple regimens for it. It got worse recently when she was admitted to the hospital, had an "IV line" infection, for which she had a UTI, complicated by diarrhea and then constipation because the rehab center did not restart her constipation medications. The patient then started complaining of difficulty eating/swallowing. The daughter thinks that this is all secondary to due to anxiety because in the past the patient would be in a room and wants to leave the door open thinking she is suffocating No SOB at bedside. She had a speech and swallow eval  for which they recommended a modified barium swallow which was planned next week. The patient in the past had tolerated klonopin for her anxiety but had an manic episode and was admitted to [facility] and was started on depakote and gabapentin for which controlled her behavior up until now. She was restarted on klonopin a few weeks ago and the daughter thinks it might be making her more confused. This is a 81 y/o F with pmhx of dementia, DM2, HTN, anxiety, paranoia presented to the ED for new onset AMS and worsening agitation and paranoia. The patient for the last 2 months has been refusing to eat, saying that people are poisoning her food. The patient at bedside still continues to be agitated, refusing to cooperate or answer questions. Stating that "I want a nurse here at all times". Most of the hx obtained from the daughter who is a geriatric psych social worker who knows her hx well. The patient has a long hx of anxiety, depression and paranoia for which she had been on multiple regimens for it. It got worse recently when she was admitted to the hospital, had an "IV line" infection, for which she had a UTI, complicated by diarrhea and then constipation because the rehab center did not restart her constipation medications. The patient then started complaining of difficulty eating/swallowing. The daughter thinks that this is all secondary to due to anxiety because in the past the patient would be in a room and wants to leave the door open thinking she is suffocating No SOB at bedside. She had a speech and swallow eval  for which they recommended a modified barium swallow which was planned next week, no obvious symptoms of cough with eating. The patient in the past had tolerated klonopin for her anxiety but had an manic episode and was admitted to [facility] and was started on depakote and gabapentin for which controlled her behavior up until now. She was restarted on klonopin a few weeks ago and the daughter thinks it might be making her more confused.

## 2023-05-31 NOTE — PATIENT PROFILE ADULT - STATED REASON FOR ADMISSION
pt stated that, I wasn't feeling good.   per electronic record- pt admitted for AMS and failure to thrive

## 2023-05-31 NOTE — BH CONSULTATION LIAISON ASSESSMENT NOTE - SUMMARY
81 year old , retired female, domiciled at the [facility], PPH of late onset Bipolar I disorder, PTSD and Cluster B Personality traits, 2x prior psych adm last in 2022 at [facility], presents with worsening paranoia, anxiety, FTT (weight loss of 20lb since march), confusion - psych c/s for psychosis/AMS.    Patient presents with acute decompensation, suspect pseudodementia vs. medication induced but with anxiety as  of cognitive deficits and loss of ADLs, severe paranoia which is dimensionally indistinguishable from psychosis at this time. Warrants med changes as below, will taper gabapentin and begin seroquel given its efficacy in anxiety which appears predominant, no AH/VH noted. No parkinsonian features noted when on Risperdal in past but LBD on ddx, will opt for low EPS burden meds for now, risks/benefits including BBW/mortality risk discussed with family, amenable to use.     Recs:  - Taper Gabapentin:  Today reduce to 300mg BID (8am, 8pm)  Tomorrow reduce to 300mg qHS (8pm alone, remove AM dose), then STOP    - begin Seroquel titration 25mg qHS (8pm), increase tomorrow to 37.5mg qHS  - C/w Depakote ER 250mg qAM + 500mg qHS (8am, 8pm)  - C/w Klonopin 0.5mg QD (8am)  - Consider empiric thiamine repletion given recent loss of PO intake  - PRN for anxiety Seroquel 12.5mg q6h PRN PO  - PRN for severe agitation Haldol 1mg q6h PRN IM/IV/PO (if Zyprexa IM available can do Zyprexa 1.25mg q6h PRN IM instead)  - Cannot leave AMA, may eventually need [facility] admission

## 2023-05-31 NOTE — PATIENT PROFILE ADULT - BILL PAYMENT
December 18, 2019      Tabatha Posey NP  [address] [place] [address] Dr Josi SHE[place]ERD [id number]           [facility]  [address] [place] [address] [address] [place] [address]  Phone: [phone]  Fax: [phone]          Patient: Odin Oliva   MR Number: [id number]   YOB: 1940   Date of Visit: 12/18/2019       Dear Tabatha Posey:    Thank you for referring Odin Oliva to me for evaluation. Attached you will find relevant portions of my assessment and plan of care.    If you have questions, please do not hesitate to call me. I look forward to following Odin Oliva along with you.    Sincerely,    Noman Caceres MD    Enclosure  CC:  No Recipients    If you would like to receive this communication electronically, please contact [email or web address] or [phone] to request more information on [employer] access.    For providers and/or their staff who would like to refer a patient to [facility], please contact us through our one-stop-shop provider referral line, [facility] , at [phone].    If you feel you have received this communication in error or would no longer like to receive these types of communications, please e-mail [email or web address]         
no

## 2023-05-31 NOTE — SWALLOW BEDSIDE ASSESSMENT ADULT - COMMENTS
Hospitalist note 5/31 "83 y/o F with pmhx of dementia, DM2, HTN, anxiety, paranoia presented to the ED for new onset AMS and worsening agitation and paranoia. Admit for behavorial issues and dysphagia work up."    Of Note: Patient is known to this department as patient was seen as an Outpatient for a Cinesophagram on 12/20/2021 with recommendations of Minced and Moist solids and thin liquids. 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    Patient seen at bedside this afternoon for an initial assessment of the swallow function. Patient's daughter Thelma present at bedside for assessment. Per daughter Thelma, her mom had a swallow test done a few years ago which showed a pill getting stuck in her throat, therefore, patient crushes her medication. Thelma further states patient was eating regular foods up until a few weeks ago with patient stating she was scared it was going to "get stuck". Thelma reporting patient has lost weight due to fear of choking. Patient is able to follow directives though requires frequent repetition given difficulty hearing. Patient with consistent report stating "I don't want to choke" when offered regular solids.

## 2023-05-31 NOTE — H&P ADULT - PROBLEM SELECTOR PLAN 2
Assessment:  - patient has hx of difficulty eating, however patient is not compliant with interview and exam  - unclear etiology vs. psychiatric related    Plan:  - official dysphagia eval +/- modified barium swallow  - NPO for now pending work up above  - crush all medications if patient is cleared for diet as per daughter, changed depakote to sprinkle formulation

## 2023-05-31 NOTE — H&P ADULT - ASSESSMENT
83 y/o F with pmhx of dementia, DM2, HTN, anxiety, paranoia presented to the ED for new onset AMS and worsening agitation and paranoia. Admit for behavorial issues and dysphagia work up.

## 2023-05-31 NOTE — DIETITIAN NUTRITION RISK NOTIFICATION - ADDITIONAL COMMENTS/DIETITIAN RECOMMENDATIONS
Once PO diet initiates add PO supplement: Glucerna Therapeutic Nutrition 8oz. 2x daily (~440 Kcals, ~20 gm Protein) to diet rx

## 2023-06-01 ENCOUNTER — TRANSCRIPTION ENCOUNTER (OUTPATIENT)
Age: 83
End: 2023-06-01

## 2023-06-01 LAB
A1C WITH ESTIMATED AVERAGE GLUCOSE RESULT: 5.7 % — HIGH (ref 4–5.6)
ANION GAP SERPL CALC-SCNC: 12 MMOL/L — SIGNIFICANT CHANGE UP (ref 7–14)
BUN SERPL-MCNC: 6 MG/DL — LOW (ref 7–23)
CALCIUM SERPL-MCNC: 9.6 MG/DL — SIGNIFICANT CHANGE UP (ref 8.4–10.5)
CHLORIDE SERPL-SCNC: 98 MMOL/L — SIGNIFICANT CHANGE UP (ref 98–107)
CO2 SERPL-SCNC: 21 MMOL/L — LOW (ref 22–31)
CREAT SERPL-MCNC: 0.41 MG/DL — LOW (ref 0.5–1.3)
EGFR: 99 ML/MIN/1.73M2 — SIGNIFICANT CHANGE UP
ESTIMATED AVERAGE GLUCOSE: 117 — SIGNIFICANT CHANGE UP
GLUCOSE BLDC GLUCOMTR-MCNC: 118 MG/DL — HIGH (ref 70–99)
GLUCOSE BLDC GLUCOMTR-MCNC: 126 MG/DL — HIGH (ref 70–99)
GLUCOSE BLDC GLUCOMTR-MCNC: 172 MG/DL — HIGH (ref 70–99)
GLUCOSE BLDC GLUCOMTR-MCNC: 76 MG/DL — SIGNIFICANT CHANGE UP (ref 70–99)
GLUCOSE SERPL-MCNC: 61 MG/DL — LOW (ref 70–99)
HCT VFR BLD CALC: 38.1 % — SIGNIFICANT CHANGE UP (ref 34.5–45)
HGB BLD-MCNC: 11.8 G/DL — SIGNIFICANT CHANGE UP (ref 11.5–15.5)
MAGNESIUM SERPL-MCNC: 1.9 MG/DL — SIGNIFICANT CHANGE UP (ref 1.6–2.6)
MCHC RBC-ENTMCNC: 26.5 PG — LOW (ref 27–34)
MCHC RBC-ENTMCNC: 31 GM/DL — LOW (ref 32–36)
MCV RBC AUTO: 85.6 FL — SIGNIFICANT CHANGE UP (ref 80–100)
NRBC # BLD: 0 /100 WBCS — SIGNIFICANT CHANGE UP (ref 0–0)
NRBC # FLD: 0 K/UL — SIGNIFICANT CHANGE UP (ref 0–0)
PHOSPHATE SERPL-MCNC: 3.6 MG/DL — SIGNIFICANT CHANGE UP (ref 2.5–4.5)
PLATELET # BLD AUTO: 263 K/UL — SIGNIFICANT CHANGE UP (ref 150–400)
POTASSIUM SERPL-MCNC: 4.1 MMOL/L — SIGNIFICANT CHANGE UP (ref 3.5–5.3)
POTASSIUM SERPL-SCNC: 4.1 MMOL/L — SIGNIFICANT CHANGE UP (ref 3.5–5.3)
RBC # BLD: 4.45 M/UL — SIGNIFICANT CHANGE UP (ref 3.8–5.2)
RBC # FLD: 13.7 % — SIGNIFICANT CHANGE UP (ref 10.3–14.5)
SODIUM SERPL-SCNC: 131 MMOL/L — LOW (ref 135–145)
WBC # BLD: 6.26 K/UL — SIGNIFICANT CHANGE UP (ref 3.8–10.5)
WBC # FLD AUTO: 6.26 K/UL — SIGNIFICANT CHANGE UP (ref 3.8–10.5)

## 2023-06-01 PROCEDURE — 74230 X-RAY XM SWLNG FUNCJ C+: CPT | Mod: 26

## 2023-06-01 PROCEDURE — 99233 SBSQ HOSP IP/OBS HIGH 50: CPT

## 2023-06-01 PROCEDURE — 99232 SBSQ HOSP IP/OBS MODERATE 35: CPT

## 2023-06-01 RX ORDER — QUETIAPINE FUMARATE 200 MG/1
50 TABLET, FILM COATED ORAL AT BEDTIME
Refills: 0 | Status: DISCONTINUED | OUTPATIENT
Start: 2023-06-01 | End: 2023-06-07

## 2023-06-01 RX ORDER — CLONAZEPAM 1 MG
0.5 TABLET ORAL DAILY
Refills: 0 | Status: DISCONTINUED | OUTPATIENT
Start: 2023-06-01 | End: 2023-06-07

## 2023-06-01 RX ADMIN — Medication 1 TABLET(S): at 11:49

## 2023-06-01 RX ADMIN — ATORVASTATIN CALCIUM 10 MILLIGRAM(S): 80 TABLET, FILM COATED ORAL at 21:28

## 2023-06-01 RX ADMIN — DIVALPROEX SODIUM 500 MILLIGRAM(S): 500 TABLET, DELAYED RELEASE ORAL at 21:27

## 2023-06-01 RX ADMIN — Medication 1 MILLIGRAM(S): at 11:49

## 2023-06-01 RX ADMIN — GABAPENTIN 300 MILLIGRAM(S): 400 CAPSULE ORAL at 21:29

## 2023-06-01 RX ADMIN — Medication 1: at 11:55

## 2023-06-01 RX ADMIN — PANTOPRAZOLE SODIUM 40 MILLIGRAM(S): 20 TABLET, DELAYED RELEASE ORAL at 11:55

## 2023-06-01 RX ADMIN — Medication 0.5 MILLIGRAM(S): at 11:54

## 2023-06-01 RX ADMIN — SENNA PLUS 2 TABLET(S): 8.6 TABLET ORAL at 21:28

## 2023-06-01 RX ADMIN — QUETIAPINE FUMARATE 50 MILLIGRAM(S): 200 TABLET, FILM COATED ORAL at 21:29

## 2023-06-01 RX ADMIN — Medication 81 MILLIGRAM(S): at 11:48

## 2023-06-01 RX ADMIN — DIVALPROEX SODIUM 250 MILLIGRAM(S): 500 TABLET, DELAYED RELEASE ORAL at 11:48

## 2023-06-01 NOTE — PHYSICAL THERAPY INITIAL EVALUATION ADULT - PATIENT PROFILE REVIEW, REHAB EVAL
Activity - Ambulate with assistane. Pt cleared for PT evaluation prior to session by ARSEN Oscar./yes

## 2023-06-01 NOTE — BH CONSULTATION LIAISON PROGRESS NOTE - NSBHFUPINTERVALHXFT_PSY_A_CORE
Chart reviewed. Patient received Haldol 1mg PRN at 11:30 and Seroquel 12.5mg PO HS at 6PM. Adherent with oral medication regimen.     Patient appears anxious, thinks people at [facility] are trying to steal her belongings. Feels safe while in hospital. Tolerating meds. Reports waking up at night. Consuming meals adequately.

## 2023-06-01 NOTE — SWALLOW VFSS/MBS ASSESSMENT ADULT - H & P REVIEW
yes Detail Level: Generalized General Sunscreen Counseling: I recommended a broad spectrum sunscreen with a SPF of 30 or higher.  I explained that SPF 30 sunscreens block approximately 97 percent of the sun's harmful rays.  Sunscreens should be applied at least 15 minutes prior to expected sun exposure and then every 2 hours after that as long as sun exposure continues. If swimming or exercising sunscreen should be reapplied every 45 minutes to an hour after getting wet or sweating.  One ounce, or the equivalent of a shot glass full of sunscreen, is adequate to protect the skin not covered by a bathing suit. I also recommended a lip balm with a sunscreen as well. Sun protective clothing can be used in lieu of sunscreen but must be worn the entire time you are exposed to the sun's rays.

## 2023-06-01 NOTE — SWALLOW VFSS/MBS ASSESSMENT ADULT - DIAGNOSTIC IMPRESSIONS
1. Functional oral stage for puree, minced and moist solids, soft and bite sized solids and thin liquids marked by adequate oral acceptance, adequate chewing, adequate tongue motion for anterior to posterior transport. Adequate oral clearance post primary swallow.   2. Mild Pharyngeal dysphagia for puree, minced and moist solids, soft and bite sized solids, and thin liquids marked by a delayed pharyngeal swallow trigger (bolus head at the vallecula) with adequate base of tongue retraction, adequate hyolaryngeal elevation, adequate epiglottic deflection, and adequate pharyngeal contractility. There is adequate pharyngeal clearance post primary swallow. There is Trace Laryngeal Penetration during the swallow above the level of the vocal folds with retrieval and adequate airway protection maintained for thin liquids. No Aspiration observed before, during or after the swallow for puree, minced and moist solids, and/or soft and bite sized solids.    Of Note: Patient self administering PO trials and declined regular solids despite maximum encouragement. 1. Functional oral stage for puree, minced and moist solids, soft and bite sized solids and thin liquids marked by adequate oral acceptance, adequate chewing, adequate tongue motion for anterior to posterior transport. Adequate oral clearance post primary swallow.   2. Mild Pharyngeal dysphagia for puree, minced and moist solids, soft and bite sized solids, and thin liquids marked by a delayed pharyngeal swallow trigger (bolus head at the vallecula) with adequate base of tongue retraction, adequate hyolaryngeal elevation, adequate epiglottic deflection, and adequate pharyngeal contractility. There is adequate pharyngeal clearance post primary swallow. There is Trace Laryngeal Penetration during the swallow above the level of the vocal folds with retrieval and adequate airway protection maintained for thin liquids. No Aspiration observed before, during or after the swallow for puree, minced and moist solids, and/or soft and bite sized solids.    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    Of Note 2:Patient self administering PO trials and declined regular solids stating "No, I'm going to choke" despite maximum encouragement.

## 2023-06-01 NOTE — BH CONSULTATION LIAISON PROGRESS NOTE - NSBHASSESSMENTFT_PSY_ALL_CORE
81 year old , retired female, domiciled at the [facility], PPH of late onset Bipolar I disorder, PTSD and Cluster B Personality traits, 2x prior psych adm last in 2022 at [facility], presents with worsening paranoia, anxiety, FTT (weight loss of 20lb since march), confusion - psych c/s for psychosis/AMS.    Patient presents with acute decompensation, suspect pseudodementia vs. medication induced but with anxiety as  of cognitive deficits and loss of ADLs, severe paranoia which is dimensionally indistinguishable from psychosis at this time. Warrants med changes as below, will taper gabapentin and begin seroquel given its efficacy in anxiety which appears predominant, no AH/VH noted. No parkinsonian features noted when on Risperdal in past but LBD on ddx, will opt for low EPS burden meds for now, risks/benefits including BBW/mortality risk discussed with family, amenable to use.     6/1/23: Remains anxious, paranoid. Continue cross taper from gabapentin to Seroquel. Monitor vitals.    Recs:  - does NOT have capacity to leave AMA  - Taper Gabapentin:  -Reduce gabapentin to 300mg qHS (8pm alone) for tonight, then STOP  - Increase Seroquel to 50mg PO HS  - C/w Depakote ER 250mg qAM + 500mg qHS (8am, 8pm)  - C/w Klonopin 0.5mg QD (8am)  - Consider empiric thiamine repletion given recent loss of PO intake  - PRN for anxiety Seroquel 12.5mg q6h PRN PO  - PRN for severe agitation Haldol 1mg q6h PRN IM/IV/PO (if Zyprexa IM available can do Zyprexa 1.25mg q6h PRN IM instead)  - Dispo: Pending

## 2023-06-01 NOTE — PROGRESS NOTE ADULT - SUBJECTIVE AND OBJECTIVE BOX
[facility] Division of Hospital Medicine  Araceli Han MD  Pager (M-F, 8A-5P): [phone]  Other Times:      Patient is a 82y old  Female who presents with a chief complaint of Altered mental status     (31 May 2023 11:15)      SUBJECTIVE / OVERNIGHT EVENTS:      MEDICATIONS  (STANDING):  amLODIPine   Tablet 2.5 milliGRAM(s) Oral daily  aspirin  chewable 81 milliGRAM(s) Oral daily  atorvastatin 10 milliGRAM(s) Oral at bedtime  calcium carbonate   1250 mG (OsCal) 1 Tablet(s) Oral two times a day  clonazePAM  Tablet 0.5 milliGRAM(s) Oral daily  dextrose 5%. 1000 milliLiter(s) (50 mL/Hr) IV Continuous <Continuous>  dextrose 5%. 1000 milliLiter(s) (100 mL/Hr) IV Continuous <Continuous>  dextrose 50% Injectable 12.5 Gram(s) IV Push once  dextrose 50% Injectable 25 Gram(s) IV Push once  dextrose 50% Injectable 25 Gram(s) IV Push once  divalproex Sprinkle 250 milliGRAM(s) Oral daily  divalproex Sprinkle 500 milliGRAM(s) Oral at bedtime  folic acid 1 milliGRAM(s) Oral daily  gabapentin 300 milliGRAM(s) Oral <User Schedule>  glucagon  Injectable 1 milliGRAM(s) IntraMuscular once  insulin lispro (ADMELOG) corrective regimen sliding scale   SubCutaneous three times a day before meals  insulin lispro (ADMELOG) corrective regimen sliding scale   SubCutaneous at bedtime  losartan 100 milliGRAM(s) Oral daily  multivitamin 1 Tablet(s) Oral daily  pantoprazole   Suspension 40 milliGRAM(s) Oral daily  QUEtiapine 25 milliGRAM(s) Oral <User Schedule>  senna 2 Tablet(s) Oral at bedtime    MEDICATIONS  (PRN):  acetaminophen     Tablet .. 650 milliGRAM(s) Oral every 6 hours PRN Temp greater or equal to 38C (100.4F), Mild Pain (1 - 3)  dextrose Oral Gel 15 Gram(s) Oral once PRN Blood Glucose LESS THAN 70 milliGRAM(s)/deciliter  haloperidol    Injectable 1 milliGRAM(s) IV Push every 6 hours PRN Agitation  melatonin 3 milliGRAM(s) Oral at bedtime PRN Insomnia  QUEtiapine 12.5 milliGRAM(s) Oral every 6 hours PRN agitation      CAPILLARY BLOOD GLUCOSE      POCT Blood Glucose.: 126 mg/dL (2023 07:40)  POCT Blood Glucose.: 105 mg/dL (31 May 2023 22:33)  POCT Blood Glucose.: 135 mg/dL (31 May 2023 16:22)  POCT Blood Glucose.: 111 mg/dL (31 May 2023 11:54)    I&O's Summary      PHYSICAL EXAM:  Vital Signs Last 24 Hrs  T(C): 36.9 (2023 04:40), Max: 36.9 (2023 04:40)  T(F): 98.4 (2023 04:40), Max: 98.4 (2023 04:40)  HR: 84 (2023 04:40) (72 - 106)  BP: 126/55 (2023 04:40) (117/53 - 140/69)  BP(mean): --  RR: 18 (2023 04:40) (17 - 18)  SpO2: 98% (2023 04:40) (97% - 98%)    Parameters below as of 2023 04:40  Patient On (Oxygen Delivery Method): room air        CONSTITUTIONAL: NAD, well-developed, well-groomed  EYES: EOMI; conjunctiva and sclera clear  ENMT: Moist oral mucosa  RESPIRATORY: Normal respiratory effort; lungs are clear to auscultation bilaterally  CARDIOVASCULAR: Regular rate and rhythm, normal S1 and S2, no murmur; No lower extremity edema  ABDOMEN: Nontender to palpation, normoactive bowel sounds, no rebound/guarding  MUSCULOSKELETAL:   no clubbing or cyanosis of digits; no joint swelling or tenderness to palpation  PSYCH: A+O to person, place, and time; affect appropriate  NEUROLOGY: CN 2-12 are intact and symmetric; no gross sensory deficits   SKIN: No rashes; no palpable lesions    LABS:                        11.8   6.26  )-----------( 263      ( 2023 05:10 )             38.1     06-    131<L>  |  98  |  6<L>  ----------------------------<  61<L>  4.1   |  21<L>  |  0.41<L>    Ca    9.6      2023 05:10  Phos  3.6     06-  Mg     1.90     06-    TPro  7.2  /  Alb  4.2  /  TBili  <0.2  /  DBili  x   /  AST  16  /  ALT  12  /  AlkPhos  25<L>      PT/INR - ( 30 May 2023 17:07 )   PT: 12.5 sec;   INR: 1.08 ratio         PTT - ( 30 May 2023 17:07 )  PTT:29.8 sec      Urinalysis Basic - ( 30 May 2023 16:49 )    Color: Light Yellow / Appearance: Clear / S.011 / pH: x  Gluc: x / Ketone: Negative  / Bili: Negative / Urobili: <2 mg/dL   Blood: x / Protein: Negative / Nitrite: Negative   Leuk Esterase: Large / RBC: 0 /HPF / WBC 7 /HPF   Sq Epi: x / Non Sq Epi: x / Bacteria: Negative        Culture - Urine (collected 30 May 2023 21:27)  Source: Clean Catch Clean Catch (Midstream)  Final Report (31 May 2023 20:56):    <10,000 CFU/mL Normal Urogenital Shana      RADIOLOGY & ADDITIONAL TESTS:  Results Reviewed:   Imaging Personally Reviewed:  Electrocardiogram Personally Reviewed:    COORDINATION OF CARE:  Care Discussed with Consultants/Other Providers [Y/N]: Y  Prior or Outpatient Records Reviewed [Y/N]: Y   [facility] Division of Hospital Medicine  Araceli Han MD  Pager (M-F, 8A-5P): [phone]  Other Times:      Patient is a 82y old  Female who presents with a chief complaint of Altered mental status     (31 May 2023 11:15)      SUBJECTIVE / OVERNIGHT EVENTS:  Pt hard of hearing.  She was able to tolerate yogurt.  She denies abd pain.      MEDICATIONS  (STANDING):  amLODIPine   Tablet 2.5 milliGRAM(s) Oral daily  aspirin  chewable 81 milliGRAM(s) Oral daily  atorvastatin 10 milliGRAM(s) Oral at bedtime  calcium carbonate   1250 mG (OsCal) 1 Tablet(s) Oral two times a day  clonazePAM  Tablet 0.5 milliGRAM(s) Oral daily  dextrose 5%. 1000 milliLiter(s) (50 mL/Hr) IV Continuous <Continuous>  dextrose 5%. 1000 milliLiter(s) (100 mL/Hr) IV Continuous <Continuous>  dextrose 50% Injectable 12.5 Gram(s) IV Push once  dextrose 50% Injectable 25 Gram(s) IV Push once  dextrose 50% Injectable 25 Gram(s) IV Push once  divalproex Sprinkle 250 milliGRAM(s) Oral daily  divalproex Sprinkle 500 milliGRAM(s) Oral at bedtime  folic acid 1 milliGRAM(s) Oral daily  gabapentin 300 milliGRAM(s) Oral <User Schedule>  glucagon  Injectable 1 milliGRAM(s) IntraMuscular once  insulin lispro (ADMELOG) corrective regimen sliding scale   SubCutaneous three times a day before meals  insulin lispro (ADMELOG) corrective regimen sliding scale   SubCutaneous at bedtime  losartan 100 milliGRAM(s) Oral daily  multivitamin 1 Tablet(s) Oral daily  pantoprazole   Suspension 40 milliGRAM(s) Oral daily  QUEtiapine 25 milliGRAM(s) Oral <User Schedule>  senna 2 Tablet(s) Oral at bedtime    MEDICATIONS  (PRN):  acetaminophen     Tablet .. 650 milliGRAM(s) Oral every 6 hours PRN Temp greater or equal to 38C (100.4F), Mild Pain (1 - 3)  dextrose Oral Gel 15 Gram(s) Oral once PRN Blood Glucose LESS THAN 70 milliGRAM(s)/deciliter  haloperidol    Injectable 1 milliGRAM(s) IV Push every 6 hours PRN Agitation  melatonin 3 milliGRAM(s) Oral at bedtime PRN Insomnia  QUEtiapine 12.5 milliGRAM(s) Oral every 6 hours PRN agitation      CAPILLARY BLOOD GLUCOSE      POCT Blood Glucose.: 126 mg/dL (2023 07:40)  POCT Blood Glucose.: 105 mg/dL (31 May 2023 22:33)  POCT Blood Glucose.: 135 mg/dL (31 May 2023 16:22)  POCT Blood Glucose.: 111 mg/dL (31 May 2023 11:54)    I&O's Summary      PHYSICAL EXAM:  Vital Signs Last 24 Hrs  T(C): 36.9 (2023 04:40), Max: 36.9 (2023 04:40)  T(F): 98.4 (2023 04:40), Max: 98.4 (2023 04:40)  HR: 84 (2023 04:40) (72 - 106)  BP: 126/55 (2023 04:40) (117/53 - 140/69)  BP(mean): --  RR: 18 (2023 04:40) (17 - 18)  SpO2: 98% (2023 04:40) (97% - 98%)    Parameters below as of 2023 04:40  Patient On (Oxygen Delivery Method): room air        CONSTITUTIONAL: NAD, well-developed, well-groomed  EYES: EOMI; conjunctiva and sclera clear  ENMT: Moist oral mucosa  RESPIRATORY: Normal respiratory effort; lungs are clear to auscultation bilaterally  CARDIOVASCULAR: Regular rate and rhythm, normal S1 and S2, no murmur; No lower extremity edema  ABDOMEN: Nontender to palpation, normoactive bowel sounds, no rebound/guarding  MUSCULOSKELETAL:   no clubbing or cyanosis of digits; no joint swelling or tenderness to palpation  PSYCH: A+O to person, place, and time; affect appropriate  NEUROLOGY: CN 2-12 are intact and symmetric; no gross sensory deficits   SKIN: No rashes; no palpable lesions    LABS:                        11.8   6.26  )-----------( 263      ( 2023 05:10 )             38.1     06-01    131<L>  |  98  |  6<L>  ----------------------------<  61<L>  4.1   |  21<L>  |  0.41<L>    Ca    9.6      2023 05:10  Phos  3.6     06-  Mg     1.90         TPro  7.2  /  Alb  4.2  /  TBili  <0.2  /  DBili  x   /  AST  16  /  ALT  12  /  AlkPhos  25<L>      PT/INR - ( 30 May 2023 17:07 )   PT: 12.5 sec;   INR: 1.08 ratio         PTT - ( 30 May 2023 17:07 )  PTT:29.8 sec      Urinalysis Basic - ( 30 May 2023 16:49 )    Color: Light Yellow / Appearance: Clear / S.011 / pH: x  Gluc: x / Ketone: Negative  / Bili: Negative / Urobili: <2 mg/dL   Blood: x / Protein: Negative / Nitrite: Negative   Leuk Esterase: Large / RBC: 0 /HPF / WBC 7 /HPF   Sq Epi: x / Non Sq Epi: x / Bacteria: Negative        Culture - Urine (collected 30 May 2023 21:27)  Source: Clean Catch Clean Catch (Midstream)  Final Report (31 May 2023 20:56):    <10,000 CFU/mL Normal Urogenital Shana      RADIOLOGY & ADDITIONAL TESTS:  Results Reviewed:   Imaging Personally Reviewed:  Electrocardiogram Personally Reviewed:    COORDINATION OF CARE:  Care Discussed with Consultants/Other Providers [Y/N]: Y  Prior or Outpatient Records Reviewed [Y/N]: Y

## 2023-06-01 NOTE — SWALLOW VFSS/MBS ASSESSMENT ADULT - RECOMMENDED CONSISTENCY
1) Minced and moist solids with thin liquids  2) Feeding/Swallowing Guidelines: Upright postioning, small single sips of thin liquids, Alternate minced and moist solids with thin liquids  3) Aspiration/reflux precautions

## 2023-06-01 NOTE — SWALLOW VFSS/MBS ASSESSMENT ADULT - ADDITIONAL RECOMMENDATIONS
1. This service to follow up as schedule permits. 2. Reconsult if change in medical status and/or tolerance to recommended PO diet.

## 2023-06-01 NOTE — PHYSICAL THERAPY INITIAL EVALUATION ADULT - NSPTDISCHREC_GEN_A_CORE
Pt appears to be at or near functional baseline, will be discharged from PT program./No skilled PT needs

## 2023-06-01 NOTE — SWALLOW VFSS/MBS ASSESSMENT ADULT - COMMENTS
Hospitalist note "81 y/o F with pmhx of dementia, DM2, HTN, anxiety, paranoia presented to the ED for new onset AMS and worsening agitation and paranoia. 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 (please see PACS for full report)    Patient received in Radiology suite this AM for a Cinesophagram. Patient repeating "I'm scared. I can't do this". However, Patient participated in swallow assessment with maximum verbal encouragement and frequent redirection from SLP/Radiology MD/Radiology tech's to participate in swallow assessment.

## 2023-06-01 NOTE — PROGRESS NOTE ADULT - CONVERSATION DETAILS
Pt with MOLST from prior admission - will review.  As per daughter Marcelina, pt is DNR but would want a trial of intubation if she needed.

## 2023-06-01 NOTE — PHYSICAL THERAPY INITIAL EVALUATION ADULT - ADDITIONAL COMMENTS
Pt A&Ox1, unreliable historian. As per care coordination, pt lives in an assisted living facility, was previously independent in ADLs and ambulated with a rolling walker.    Following evaluation, pt was left semireclined in bed in no distress. ARSEN bowman.

## 2023-06-01 NOTE — PROGRESS NOTE ADULT - PROBLEM SELECTOR PLAN 2
- crush all medications if patient is cleared for diet as per daughter, changed depakote to sprinkle formulation  - discussed with daughter - since the last 2 months pt has become paranoid about food being poisoned.  Swallow studies done at assisted living facility did not reveal any etiology - up until presentation to the hospital pt was tolerating a pureed diet (pureed as pt would not eat any consistency not due to any actual dysphagia issue)   - awaiting full cine report; would start minced and moist diet with thin liquids   - if workup neg then likely poor po intake due to psychiatric issues

## 2023-06-01 NOTE — SWALLOW VFSS/MBS ASSESSMENT ADULT - DEMONSTRATES NEED FOR REFERRAL TO ANOTHER SERVICE
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

## 2023-06-01 NOTE — PHYSICAL THERAPY INITIAL EVALUATION ADULT - PERTINENT HX OF CURRENT PROBLEM, REHAB EVAL
82 year old female presented to the ED for new onset of altered mental status and worsening agitation and paranoia. CT Head -  Age-appropriate involutional changes. No acute intracranial pathology.

## 2023-06-02 LAB
ANION GAP SERPL CALC-SCNC: 23 MMOL/L — HIGH (ref 7–14)
BUN SERPL-MCNC: 7 MG/DL — SIGNIFICANT CHANGE UP (ref 7–23)
CALCIUM SERPL-MCNC: 9.8 MG/DL — SIGNIFICANT CHANGE UP (ref 8.4–10.5)
CHLORIDE SERPL-SCNC: 94 MMOL/L — LOW (ref 98–107)
CO2 SERPL-SCNC: 21 MMOL/L — LOW (ref 22–31)
CREAT SERPL-MCNC: 0.48 MG/DL — LOW (ref 0.5–1.3)
EGFR: 95 ML/MIN/1.73M2 — SIGNIFICANT CHANGE UP
GLUCOSE BLDC GLUCOMTR-MCNC: 100 MG/DL — HIGH (ref 70–99)
GLUCOSE BLDC GLUCOMTR-MCNC: 152 MG/DL — HIGH (ref 70–99)
GLUCOSE BLDC GLUCOMTR-MCNC: 90 MG/DL — SIGNIFICANT CHANGE UP (ref 70–99)
GLUCOSE BLDC GLUCOMTR-MCNC: 99 MG/DL — SIGNIFICANT CHANGE UP (ref 70–99)
GLUCOSE SERPL-MCNC: 88 MG/DL — SIGNIFICANT CHANGE UP (ref 70–99)
HCT VFR BLD CALC: 41.4 % — SIGNIFICANT CHANGE UP (ref 34.5–45)
HGB BLD-MCNC: 13.1 G/DL — SIGNIFICANT CHANGE UP (ref 11.5–15.5)
MAGNESIUM SERPL-MCNC: 2 MG/DL — SIGNIFICANT CHANGE UP (ref 1.6–2.6)
MCHC RBC-ENTMCNC: 26.9 PG — LOW (ref 27–34)
MCHC RBC-ENTMCNC: 31.6 GM/DL — LOW (ref 32–36)
MCV RBC AUTO: 85 FL — SIGNIFICANT CHANGE UP (ref 80–100)
NRBC # BLD: 0 /100 WBCS — SIGNIFICANT CHANGE UP (ref 0–0)
NRBC # FLD: 0 K/UL — SIGNIFICANT CHANGE UP (ref 0–0)
PHOSPHATE SERPL-MCNC: 4 MG/DL — SIGNIFICANT CHANGE UP (ref 2.5–4.5)
PLATELET # BLD AUTO: 297 K/UL — SIGNIFICANT CHANGE UP (ref 150–400)
POTASSIUM SERPL-MCNC: 4.4 MMOL/L — SIGNIFICANT CHANGE UP (ref 3.5–5.3)
POTASSIUM SERPL-SCNC: 4.4 MMOL/L — SIGNIFICANT CHANGE UP (ref 3.5–5.3)
RBC # BLD: 4.87 M/UL — SIGNIFICANT CHANGE UP (ref 3.8–5.2)
RBC # FLD: 13.9 % — SIGNIFICANT CHANGE UP (ref 10.3–14.5)
SODIUM SERPL-SCNC: 138 MMOL/L — SIGNIFICANT CHANGE UP (ref 135–145)
WBC # BLD: 8.29 K/UL — SIGNIFICANT CHANGE UP (ref 3.8–10.5)
WBC # FLD AUTO: 8.29 K/UL — SIGNIFICANT CHANGE UP (ref 3.8–10.5)

## 2023-06-02 PROCEDURE — 99222 1ST HOSP IP/OBS MODERATE 55: CPT | Mod: GC

## 2023-06-02 PROCEDURE — 99232 SBSQ HOSP IP/OBS MODERATE 35: CPT

## 2023-06-02 PROCEDURE — 99233 SBSQ HOSP IP/OBS HIGH 50: CPT

## 2023-06-02 RX ADMIN — ATORVASTATIN CALCIUM 10 MILLIGRAM(S): 80 TABLET, FILM COATED ORAL at 21:26

## 2023-06-02 RX ADMIN — Medication 1 TABLET(S): at 12:28

## 2023-06-02 RX ADMIN — Medication 0.25 MILLIGRAM(S): at 12:26

## 2023-06-02 RX ADMIN — SENNA PLUS 2 TABLET(S): 8.6 TABLET ORAL at 21:26

## 2023-06-02 RX ADMIN — QUETIAPINE FUMARATE 50 MILLIGRAM(S): 200 TABLET, FILM COATED ORAL at 21:26

## 2023-06-02 RX ADMIN — Medication 0.5 MILLIGRAM(S): at 12:39

## 2023-06-02 RX ADMIN — QUETIAPINE FUMARATE 12.5 MILLIGRAM(S): 200 TABLET, FILM COATED ORAL at 09:43

## 2023-06-02 RX ADMIN — DIVALPROEX SODIUM 250 MILLIGRAM(S): 500 TABLET, DELAYED RELEASE ORAL at 12:27

## 2023-06-02 RX ADMIN — Medication 1 TABLET(S): at 18:49

## 2023-06-02 RX ADMIN — Medication 1 TABLET(S): at 05:37

## 2023-06-02 RX ADMIN — DIVALPROEX SODIUM 500 MILLIGRAM(S): 500 TABLET, DELAYED RELEASE ORAL at 21:25

## 2023-06-02 RX ADMIN — Medication 81 MILLIGRAM(S): at 12:39

## 2023-06-02 RX ADMIN — Medication 1: at 13:09

## 2023-06-02 RX ADMIN — Medication 1 MILLIGRAM(S): at 12:28

## 2023-06-02 RX ADMIN — LOSARTAN POTASSIUM 100 MILLIGRAM(S): 100 TABLET, FILM COATED ORAL at 05:41

## 2023-06-02 RX ADMIN — PANTOPRAZOLE SODIUM 40 MILLIGRAM(S): 20 TABLET, DELAYED RELEASE ORAL at 12:28

## 2023-06-02 RX ADMIN — AMLODIPINE BESYLATE 2.5 MILLIGRAM(S): 2.5 TABLET ORAL at 05:38

## 2023-06-02 RX ADMIN — Medication 650 MILLIGRAM(S): at 10:44

## 2023-06-02 NOTE — BH CONSULTATION LIAISON PROGRESS NOTE - NSBHASSESSMENTFT_PSY_ALL_CORE
81 year old , retired female, domiciled at the [facility], PPH of late onset Bipolar I disorder, PTSD and Cluster B Personality traits, 2x prior psych adm last in 2022 at [facility], presents with worsening paranoia, anxiety, FTT (weight loss of 20lb since march), confusion - psych c/s for psychosis/AMS.    Patient presents with acute decompensation, suspect pseudodementia vs. medication induced but with anxiety as  of cognitive deficits and loss of ADLs, severe paranoia which is dimensionally indistinguishable from psychosis at this time. Warrants med changes as below, will taper gabapentin and begin seroquel given its efficacy in anxiety which appears predominant, no AH/VH noted. No parkinsonian features noted when on Risperdal in past but LBD on ddx, will opt for low EPS burden meds for now, risks/benefits including BBW/mortality risk discussed with family, amenable to use.     6/1/23: Remains anxious, paranoid. Continue cross taper from gabapentin to Seroquel. Monitor vitals.  6/2/23: Remains anxious though reports to be consuming her meals.     Recs:  - does NOT have capacity to leave AMA  - D/c Gabapentin  - c/w Seroquel to 50mg PO HS  - C/w Depakote ER 250mg qAM + 500mg qHS (8am, 8pm)  - C/w Klonopin 0.5mg QD (8am)  - PRN for anxiety Seroquel 12.5mg q6h PRN PO  - PRN for severe agitation Haldol 1mg q6h PRN IM/IV/PO (if Zyprexa IM available can do Zyprexa 1.25mg q6h PRN IM instead)  - Dispo: Pending. 81 year old , retired female, domiciled at the [facility], PPH of late onset Bipolar I disorder, PTSD and Cluster B Personality traits, 2x prior psych adm last in 2022 at [facility], presents with worsening paranoia, anxiety, FTT (weight loss of 20lb since march), confusion - psych c/s for psychosis/AMS.    Patient presents with acute decompensation, suspect pseudodementia vs. medication induced but with anxiety as  of cognitive deficits and loss of ADLs, severe paranoia which is dimensionally indistinguishable from psychosis at this time. Warrants med changes as below, will taper gabapentin and begin seroquel given its efficacy in anxiety which appears predominant, no AH/VH noted. No parkinsonian features noted when on Risperdal in past but LBD on ddx, will opt for low EPS burden meds for now, risks/benefits including BBW/mortality risk discussed with family, amenable to use.     6/1/23: Remains anxious, paranoid. Continue cross taper from gabapentin to Seroquel. Monitor vitals.  6/2/23: Remains anxious though reports to be consuming her meals.     Recs:  - does NOT have capacity to leave AMA  - D/c Gabapentin  - c/w Seroquel to 50mg PO HS  - C/w Depakote ER 250mg qAM + 500mg qHS (8am, 8pm)  - C/w Klonopin 0.5mg QD (8am)  - Will continue to monitor and reassess.  - PRN for anxiety Seroquel 12.5mg q6h PRN PO  - PRN for severe agitation Haldol 1mg q6h PRN IM/IV/PO (if Zyprexa IM available can do Zyprexa 1.25mg q6h PRN IM instead)  - Dispo: Pending.

## 2023-06-02 NOTE — BH CONSULTATION LIAISON PROGRESS NOTE - NSBHCHARTREVIEWLAB_PSY_A_CORE FT
12-02    139  |  104  |  8   ----------------------------<  149<H>  3.9   |  27  |  0.57                       13.1   8.29  )-----------( 297      ( 02 Jun 2023 05:54 )             41.4   06-02    138  |  94<L>  |  7   ----------------------------<  88  4.4   |  21<L>  |  0.48<L>    Ca    9.8      02 Jun 2023 05:54  Phos  4.0     06-02  Mg     2.00     06-02

## 2023-06-02 NOTE — BH CONSULTATION LIAISON PROGRESS NOTE - NSBHATTESTCOMMENTATTENDFT_PSY_A_CORE
Chart reviewed. I saw and examined the patient with Dr. Menjivar. I agree with his history, MSE, A/P.  Patient appears anxious, but calm and redirectable. She remains confused. Chart reviewed. I saw and examined the patient with Dr. Menjivar. I agree with his history, MSE, A/P.  Patient appears anxious, but calm and redirectable. She remains confused.  Plan per above.  Call with questions.

## 2023-06-02 NOTE — CONSULT NOTE ADULT - SUBJECTIVE AND OBJECTIVE BOX
HPI:    1) Minced and moist solids with thin liquids  2) Feeding/Swallowing Guidelines: Upright postioning, small single sips of thin liquids, Alternate minced and moist solids with thin liquids  3) Aspiration/reflux precautions    Allergies:  Cipro (Hives)  sulfa drugs (Unknown)  penicillin (Rash)  Cipro (Rash)  penicillin (Unknown)  sulfa drugs (Rash)  adhesives (Unknown)      Home Medications:    Hospital Medications:  acetaminophen     Tablet .. 650 milliGRAM(s) Oral every 6 hours PRN  amLODIPine   Tablet 2.5 milliGRAM(s) Oral daily  aspirin  chewable 81 milliGRAM(s) Oral daily  atorvastatin 10 milliGRAM(s) Oral at bedtime  calcium carbonate   1250 mG (OsCal) 1 Tablet(s) Oral two times a day  clonazePAM  Tablet 0.5 milliGRAM(s) Oral daily  dextrose 5%. 1000 milliLiter(s) IV Continuous <Continuous>  dextrose 5%. 1000 milliLiter(s) IV Continuous <Continuous>  dextrose 50% Injectable 12.5 Gram(s) IV Push once  dextrose 50% Injectable 25 Gram(s) IV Push once  dextrose 50% Injectable 25 Gram(s) IV Push once  dextrose Oral Gel 15 Gram(s) Oral once PRN  divalproex Sprinkle 500 milliGRAM(s) Oral at bedtime  divalproex Sprinkle 250 milliGRAM(s) Oral daily  folic acid 1 milliGRAM(s) Oral daily  glucagon  Injectable 1 milliGRAM(s) IntraMuscular once  haloperidol    Injectable 1 milliGRAM(s) IV Push every 6 hours PRN  insulin lispro (ADMELOG) corrective regimen sliding scale   SubCutaneous three times a day before meals  insulin lispro (ADMELOG) corrective regimen sliding scale   SubCutaneous at bedtime  losartan 100 milliGRAM(s) Oral daily  melatonin 3 milliGRAM(s) Oral at bedtime PRN  multivitamin 1 Tablet(s) Oral daily  pantoprazole   Suspension 40 milliGRAM(s) Oral daily  QUEtiapine 12.5 milliGRAM(s) Oral every 6 hours PRN  QUEtiapine 50 milliGRAM(s) Oral at bedtime  senna 2 Tablet(s) Oral at bedtime      PMHX/PSHX:  HTN (hypertension)    DM (diabetes mellitus)    Hyperlipemia    Diabetes mellitus    Dementia    S/P carpal tunnel release    S/P partial hysterectomy    No significant past surgical history        Family history:  No pertinent family history in first degree relatives    No pertinent family history in first degree relatives        Denies family history of colon cancer/polyps, stomach cancer/polyps, pancreatic cancer/masses, liver cancer/disease, ovarian cancer and endometrial cancer.    Social History:   Tob: Denies  EtOH: Denies  Illicit Drugs: Denies    ROS:     General:  No wt loss, fevers, chills, night sweats, fatigue  Eyes:  Good vision, no reported pain  ENT:  No sore throat, pain, runny nose, dysphagia  CV:  No pain, palpitations, hypo/hypertension  Pulm:  No dyspnea, cough, tachypnea, wheezing  GI:  see HPI  :  No pain, bleeding, incontinence, nocturia  Muscle:  No pain, weakness  Neuro:  No weakness, tingling, memory problems  Psych:  No fatigue, insomnia, mood problems, depression  Endocrine:  No polyuria, polydipsia, cold/heat intolerance  Heme:  No petechiae, ecchymosis, easy bruisability  Skin:  No rash, tattoos, scars, edema    PHYSICAL EXAM:     GENERAL:  No acute distress  HEENT:  NCAT, no scleral icterus   CHEST:  no respiratory distress  HEART:  Regular rate and rhythm  ABDOMEN:  Soft, non-tender, non-distended, normoactive bowel sounds,  no masses  EXTREMITIES: No edema  SKIN:  No rash/erythema/ecchymoses/petechiae/wounds/abscess/warm/dry  NEURO:  Alert and oriented x 3, no asterixis    Vital Signs:  Vital Signs Last 24 Hrs  T(C): 36.2 (02 Jun 2023 04:55), Max: 36.9 (01 Jun 2023 11:24)  T(F): 97.1 (02 Jun 2023 04:55), Max: 98.5 (01 Jun 2023 11:24)  HR: 78 (02 Jun 2023 04:55) (65 - 78)  BP: 146/67 (02 Jun 2023 04:55) (122/51 - 150/69)  BP(mean): --  RR: 17 (02 Jun 2023 04:55) (17 - 18)  SpO2: 99% (02 Jun 2023 04:55) (98% - 99%)    Parameters below as of 02 Jun 2023 04:55  Patient On (Oxygen Delivery Method): room air      Daily     Daily     LABS:                        13.1   8.29  )-----------( 297      ( 02 Jun 2023 05:54 )             41.4     Mean Cell Volume: 85.0 fL (06-02-23 @ 05:54)    06-02    138  |  94<L>  |  7   ----------------------------<  88  4.4   |  21<L>  |  0.48<L>    Ca    9.8      02 Jun 2023 05:54  Phos  4.0     06-02  Mg     2.00     06-02                                    13.1   8.29  )-----------( 297      ( 02 Jun 2023 05:54 )             41.4                         11.8   6.26  )-----------( 263      ( 01 Jun 2023 05:10 )             38.1                         11.9   7.99  )-----------( 303      ( 30 May 2023 17:07 )             37.3       Imaging:             HPI: 83 y/o F with pmhx of dementia, DM2, HTN, anxiety, paranoia presented to the ED for new onset AMS and worsening agitation and paranoia.     Patient had been refusing to take PO intake for past 2 months, reporting that people are poisoning her food. In the past the patient would be in a room and wants to leave the door open thinking she is suffocating. he patient in the past had tolerated klonopin for her anxiety but had an manic episode and was admitted to [facility] [place] and was started on depakote and gabapentin for which controlled her behavior up until now. She was restarted on klonopin a few weeks ago and the daughter thinks it might be making her more confused.    Patient evaluated by speech and swallow 6/1 and cleared for 1) Minced and moist solids with thin liquids, 2) Feeding/Swallowing Guidelines: Upright postioning, small single sips of thin liquids, Alternate minced and moist solids with thin liquids, 3) Aspiration/reflux precautions.     Allergies:  Cipro (Hives)  sulfa drugs (Unknown)  penicillin (Rash)  Cipro (Rash)  penicillin (Unknown)  sulfa drugs (Rash)  adhesives (Unknown)      Home Medications:    Hospital Medications:  acetaminophen     Tablet .. 650 milliGRAM(s) Oral every 6 hours PRN  amLODIPine   Tablet 2.5 milliGRAM(s) Oral daily  aspirin  chewable 81 milliGRAM(s) Oral daily  atorvastatin 10 milliGRAM(s) Oral at bedtime  calcium carbonate   1250 mG (OsCal) 1 Tablet(s) Oral two times a day  clonazePAM  Tablet 0.5 milliGRAM(s) Oral daily  dextrose 5%. 1000 milliLiter(s) IV Continuous <Continuous>  dextrose 5%. 1000 milliLiter(s) IV Continuous <Continuous>  dextrose 50% Injectable 12.5 Gram(s) IV Push once  dextrose 50% Injectable 25 Gram(s) IV Push once  dextrose 50% Injectable 25 Gram(s) IV Push once  dextrose Oral Gel 15 Gram(s) Oral once PRN  divalproex Sprinkle 500 milliGRAM(s) Oral at bedtime  divalproex Sprinkle 250 milliGRAM(s) Oral daily  folic acid 1 milliGRAM(s) Oral daily  glucagon  Injectable 1 milliGRAM(s) IntraMuscular once  haloperidol    Injectable 1 milliGRAM(s) IV Push every 6 hours PRN  insulin lispro (ADMELOG) corrective regimen sliding scale   SubCutaneous three times a day before meals  insulin lispro (ADMELOG) corrective regimen sliding scale   SubCutaneous at bedtime  losartan 100 milliGRAM(s) Oral daily  melatonin 3 milliGRAM(s) Oral at bedtime PRN  multivitamin 1 Tablet(s) Oral daily  pantoprazole   Suspension 40 milliGRAM(s) Oral daily  QUEtiapine 12.5 milliGRAM(s) Oral every 6 hours PRN  QUEtiapine 50 milliGRAM(s) Oral at bedtime  senna 2 Tablet(s) Oral at bedtime      PMHX/PSHX:  HTN (hypertension)    DM (diabetes mellitus)    Hyperlipemia    Diabetes mellitus    Dementia    S/P carpal tunnel release    S/P partial hysterectomy    No significant past surgical history        Family history:  No pertinent family history in first degree relatives    No pertinent family history in first degree relatives        Denies family history of colon cancer/polyps, stomach cancer/polyps, pancreatic cancer/masses, liver cancer/disease, ovarian cancer and endometrial cancer.    Social History:   Tob: Denies  EtOH: Denies  Illicit Drugs: Denies    ROS:     General:  No wt loss, fevers, chills, night sweats, fatigue  Eyes:  Good vision, no reported pain  ENT:  No sore throat, pain, runny nose, dysphagia  CV:  No pain, palpitations, hypo/hypertension  Pulm:  No dyspnea, cough, tachypnea, wheezing  GI:  see HPI  :  No pain, bleeding, incontinence, nocturia  Muscle:  No pain, weakness  Neuro:  No weakness, tingling, memory problems  Psych:  No fatigue, insomnia, mood problems, depression  Endocrine:  No polyuria, polydipsia, cold/heat intolerance  Heme:  No petechiae, ecchymosis, easy bruisability  Skin:  No rash, tattoos, scars, edema    PHYSICAL EXAM:     GENERAL:  No acute distress  HEENT:  NCAT, no scleral icterus   CHEST:  no respiratory distress  HEART:  Regular rate and rhythm  ABDOMEN:  Soft, non-tender, non-distended, normoactive bowel sounds,  no masses  EXTREMITIES: No edema  SKIN:  No rash/erythema/ecchymoses/petechiae/wounds/abscess/warm/dry  NEURO:  Alert and oriented x 3, no asterixis    Vital Signs:  Vital Signs Last 24 Hrs  T(C): 36.2 (02 Jun 2023 04:55), Max: 36.9 (01 Jun 2023 11:24)  T(F): 97.1 (02 Jun 2023 04:55), Max: 98.5 (01 Jun 2023 11:24)  HR: 78 (02 Jun 2023 04:55) (65 - 78)  BP: 146/67 (02 Jun 2023 04:55) (122/51 - 150/69)  BP(mean): --  RR: 17 (02 Jun 2023 04:55) (17 - 18)  SpO2: 99% (02 Jun 2023 04:55) (98% - 99%)    Parameters below as of 02 Jun 2023 04:55  Patient On (Oxygen Delivery Method): room air      Daily     Daily     LABS:                        13.1   8.29  )-----------( 297      ( 02 Jun 2023 05:54 )             41.4     Mean Cell Volume: 85.0 fL (06-02-23 @ 05:54)    06-02    138  |  94<L>  |  7   ----------------------------<  88  4.4   |  21<L>  |  0.48<L>    Ca    9.8      02 Jun 2023 05:54  Phos  4.0     06-02  Mg     2.00     06-02                                    13.1   8.29  )-----------( 297      ( 02 Jun 2023 05:54 )             41.4                         11.8   6.26  )-----------( 263      ( 01 Jun 2023 05:10 )             38.1                         11.9   7.99  )-----------( 303      ( 30 May 2023 17:07 )             37.3       Imaging:             HPI: 83 y/o F with pmhx of dementia, DM2, HTN, anxiety, paranoia presented to the ED for new onset AMS and worsening agitation and paranoia.     Patient appears agitated and anxious about any procedures to be completed on her.     Patient had been refusing to take PO intake for past 2 months, reporting that people are poisoning her food. In the past the patient would be in a room and wants to leave the door open thinking she is suffocating. he patient in the past had tolerated klonopin for her anxiety but had an manic episode and was admitted to [facility] [place] and was started on depakote and gabapentin for which controlled her behavior up until now. She was restarted on klonopin a few weeks ago and the daughter thinks it might be making her more confused.    Patient evaluated by speech and swallow 6/1 and cleared for 1) Minced and moist solids with thin liquids, 2) Feeding/Swallowing Guidelines: Upright postioning, small single sips of thin liquids, Alternate minced and moist solids with thin liquids, 3) Aspiration/reflux precautions.     Allergies:  Cipro (Hives)  sulfa drugs (Unknown)  penicillin (Rash)  Cipro (Rash)  penicillin (Unknown)  sulfa drugs (Rash)  adhesives (Unknown)      Home Medications:    Hospital Medications:  acetaminophen     Tablet .. 650 milliGRAM(s) Oral every 6 hours PRN  amLODIPine   Tablet 2.5 milliGRAM(s) Oral daily  aspirin  chewable 81 milliGRAM(s) Oral daily  atorvastatin 10 milliGRAM(s) Oral at bedtime  calcium carbonate   1250 mG (OsCal) 1 Tablet(s) Oral two times a day  clonazePAM  Tablet 0.5 milliGRAM(s) Oral daily  dextrose 5%. 1000 milliLiter(s) IV Continuous <Continuous>  dextrose 5%. 1000 milliLiter(s) IV Continuous <Continuous>  dextrose 50% Injectable 12.5 Gram(s) IV Push once  dextrose 50% Injectable 25 Gram(s) IV Push once  dextrose 50% Injectable 25 Gram(s) IV Push once  dextrose Oral Gel 15 Gram(s) Oral once PRN  divalproex Sprinkle 500 milliGRAM(s) Oral at bedtime  divalproex Sprinkle 250 milliGRAM(s) Oral daily  folic acid 1 milliGRAM(s) Oral daily  glucagon  Injectable 1 milliGRAM(s) IntraMuscular once  haloperidol    Injectable 1 milliGRAM(s) IV Push every 6 hours PRN  insulin lispro (ADMELOG) corrective regimen sliding scale   SubCutaneous three times a day before meals  insulin lispro (ADMELOG) corrective regimen sliding scale   SubCutaneous at bedtime  losartan 100 milliGRAM(s) Oral daily  melatonin 3 milliGRAM(s) Oral at bedtime PRN  multivitamin 1 Tablet(s) Oral daily  pantoprazole   Suspension 40 milliGRAM(s) Oral daily  QUEtiapine 12.5 milliGRAM(s) Oral every 6 hours PRN  QUEtiapine 50 milliGRAM(s) Oral at bedtime  senna 2 Tablet(s) Oral at bedtime      PMHX/PSHX:  HTN (hypertension)    DM (diabetes mellitus)    Hyperlipemia    Diabetes mellitus    Dementia    S/P carpal tunnel release    S/P partial hysterectomy    No significant past surgical history        Family history:  No pertinent family history in first degree relatives    No pertinent family history in first degree relatives        Denies family history of colon cancer/polyps, stomach cancer/polyps, pancreatic cancer/masses, liver cancer/disease, ovarian cancer and endometrial cancer.    Social History:   Tob: Denies  EtOH: Denies  Illicit Drugs: Denies    ROS:     General:  No wt loss, fevers, chills, night sweats, fatigue  Eyes:  Good vision, no reported pain  ENT:  No sore throat, pain, runny nose, dysphagia  CV:  No pain, palpitations, hypo/hypertension  Pulm:  No dyspnea, cough, tachypnea, wheezing  GI:  see HPI  :  No pain, bleeding, incontinence, nocturia  Muscle:  No pain, weakness  Neuro:  No weakness, tingling, memory problems  Psych:  No fatigue, insomnia, mood problems, depression  Endocrine:  No polyuria, polydipsia, cold/heat intolerance  Heme:  No petechiae, ecchymosis, easy bruisability  Skin:  No rash, tattoos, scars, edema    PHYSICAL EXAM:     GENERAL:  No acute distress  HEENT:  NCAT, no scleral icterus   CHEST:  no respiratory distress  HEART:  Regular rate and rhythm  ABDOMEN:  Soft, non-tender, non-distended, normoactive bowel sounds,  no masses  EXTREMITIES: No edema  SKIN:  No rash/erythema/ecchymoses/petechiae/wounds/abscess/warm/dry  NEURO:  Alert and oriented x 3, no asterixis    Vital Signs:  Vital Signs Last 24 Hrs  T(C): 36.2 (02 Jun 2023 04:55), Max: 36.9 (01 Jun 2023 11:24)  T(F): 97.1 (02 Jun 2023 04:55), Max: 98.5 (01 Jun 2023 11:24)  HR: 78 (02 Jun 2023 04:55) (65 - 78)  BP: 146/67 (02 Jun 2023 04:55) (122/51 - 150/69)  BP(mean): --  RR: 17 (02 Jun 2023 04:55) (17 - 18)  SpO2: 99% (02 Jun 2023 04:55) (98% - 99%)    Parameters below as of 02 Jun 2023 04:55  Patient On (Oxygen Delivery Method): room air      Daily     Daily     LABS:                        13.1   8.29  )-----------( 297      ( 02 Jun 2023 05:54 )             41.4     Mean Cell Volume: 85.0 fL (06-02-23 @ 05:54)    06-02    138  |  94<L>  |  7   ----------------------------<  88  4.4   |  21<L>  |  0.48<L>    Ca    9.8      02 Jun 2023 05:54  Phos  4.0     06-02  Mg     2.00     06-02                                    13.1   8.29  )-----------( 297      ( 02 Jun 2023 05:54 )             41.4                         11.8   6.26  )-----------( 263      ( 01 Jun 2023 05:10 )             38.1                         11.9   7.99  )-----------( 303      ( 30 May 2023 17:07 )             37.3       Imaging:             HPI: 83 y/o F with pmhx of dementia, DM2, HTN, anxiety, paranoia presented to the ED for new onset AMS and worsening agitation and paranoia.     Patient appears agitated and anxious about any procedures to be completed on her. History limited from patient given easily distracted.     Patient had been refusing to take PO intake for past 2 months, reporting that people are poisoning her food. In the past the patient would be in a room and wants to leave the door open thinking she is suffocating. he patient in the past had tolerated klonopin for her anxiety but had an manic episode and was admitted to [facility] [place] and was started on depakote and gabapentin for which controlled her behavior up until now. She was restarted on klonopin a few weeks ago and the daughter thinks it might be making her more confused.    Patient evaluated by speech and swallow 6/1 and cleared for 1) Minced and moist solids with thin liquids, 2) Feeding/Swallowing Guidelines: Upright postioning, small single sips of thin liquids, Alternate minced and moist solids with thin liquids, 3) Aspiration/reflux precautions.     Allergies:  Cipro (Hives)  sulfa drugs (Unknown)  penicillin (Rash)  Cipro (Rash)  penicillin (Unknown)  sulfa drugs (Rash)  adhesives (Unknown)      Home Medications:    Hospital Medications:  acetaminophen     Tablet .. 650 milliGRAM(s) Oral every 6 hours PRN  amLODIPine   Tablet 2.5 milliGRAM(s) Oral daily  aspirin  chewable 81 milliGRAM(s) Oral daily  atorvastatin 10 milliGRAM(s) Oral at bedtime  calcium carbonate   1250 mG (OsCal) 1 Tablet(s) Oral two times a day  clonazePAM  Tablet 0.5 milliGRAM(s) Oral daily  dextrose 5%. 1000 milliLiter(s) IV Continuous <Continuous>  dextrose 5%. 1000 milliLiter(s) IV Continuous <Continuous>  dextrose 50% Injectable 12.5 Gram(s) IV Push once  dextrose 50% Injectable 25 Gram(s) IV Push once  dextrose 50% Injectable 25 Gram(s) IV Push once  dextrose Oral Gel 15 Gram(s) Oral once PRN  divalproex Sprinkle 500 milliGRAM(s) Oral at bedtime  divalproex Sprinkle 250 milliGRAM(s) Oral daily  folic acid 1 milliGRAM(s) Oral daily  glucagon  Injectable 1 milliGRAM(s) IntraMuscular once  haloperidol    Injectable 1 milliGRAM(s) IV Push every 6 hours PRN  insulin lispro (ADMELOG) corrective regimen sliding scale   SubCutaneous three times a day before meals  insulin lispro (ADMELOG) corrective regimen sliding scale   SubCutaneous at bedtime  losartan 100 milliGRAM(s) Oral daily  melatonin 3 milliGRAM(s) Oral at bedtime PRN  multivitamin 1 Tablet(s) Oral daily  pantoprazole   Suspension 40 milliGRAM(s) Oral daily  QUEtiapine 12.5 milliGRAM(s) Oral every 6 hours PRN  QUEtiapine 50 milliGRAM(s) Oral at bedtime  senna 2 Tablet(s) Oral at bedtime      PMHX/PSHX:  HTN (hypertension)    DM (diabetes mellitus)    Hyperlipemia    Diabetes mellitus    Dementia    S/P carpal tunnel release    S/P partial hysterectomy    No significant past surgical history        Family history:  No pertinent family history in first degree relatives    No pertinent family history in first degree relatives        Denies family history of colon cancer/polyps, stomach cancer/polyps, pancreatic cancer/masses, liver cancer/disease, ovarian cancer and endometrial cancer.    Social History:   Tob: Denies  EtOH: Denies  Illicit Drugs: Denies    ROS:     General:  No wt loss, fevers, chills, night sweats, fatigue  Eyes:  Good vision, no reported pain  ENT:  No sore throat, pain, runny nose, dysphagia  CV:  No pain, palpitations, hypo/hypertension  Pulm:  No dyspnea, cough, tachypnea, wheezing  GI:  see HPI  :  No pain, bleeding, incontinence, nocturia  Muscle:  No pain, weakness  Neuro:  No weakness, tingling, memory problems  Psych:  No fatigue, insomnia, mood problems, depression  Endocrine:  No polyuria, polydipsia, cold/heat intolerance  Heme:  No petechiae, ecchymosis, easy bruisability  Skin:  No rash, tattoos, scars, edema    PHYSICAL EXAM:     GENERAL:  No acute distress  HEENT:  NCAT, no scleral icterus   CHEST:  no respiratory distress  HEART:  Regular rate and rhythm  ABDOMEN:  Soft, non-tender, non-distended, normoactive bowel sounds,  no masses  EXTREMITIES: No edema  SKIN:  No rash/erythema/ecchymoses/petechiae/wounds/abscess/warm/dry  NEURO:  Alert and oriented x 3, no asterixis    Vital Signs:  Vital Signs Last 24 Hrs  T(C): 36.2 (02 Jun 2023 04:55), Max: 36.9 (01 Jun 2023 11:24)  T(F): 97.1 (02 Jun 2023 04:55), Max: 98.5 (01 Jun 2023 11:24)  HR: 78 (02 Jun 2023 04:55) (65 - 78)  BP: 146/67 (02 Jun 2023 04:55) (122/51 - 150/69)  BP(mean): --  RR: 17 (02 Jun 2023 04:55) (17 - 18)  SpO2: 99% (02 Jun 2023 04:55) (98% - 99%)    Parameters below as of 02 Jun 2023 04:55  Patient On (Oxygen Delivery Method): room air      Daily     Daily     LABS:                        13.1   8.29  )-----------( 297      ( 02 Jun 2023 05:54 )             41.4     Mean Cell Volume: 85.0 fL (06-02-23 @ 05:54)    06-02    138  |  94<L>  |  7   ----------------------------<  88  4.4   |  21<L>  |  0.48<L>    Ca    9.8      02 Jun 2023 05:54  Phos  4.0     06-02  Mg     2.00     06-02                                    13.1   8.29  )-----------( 297      ( 02 Jun 2023 05:54 )             41.4                         11.8   6.26  )-----------( 263      ( 01 Jun 2023 05:10 )             38.1                         11.9   7.99  )-----------( 303      ( 30 May 2023 17:07 )             37.3       Imaging:             HPI: 81 y/o F with pmhx of dementia, DM2, HTN, anxiety, paranoia presented to the ED for new onset AMS and worsening agitation and paranoia.     Patient appears agitated and anxious about any procedures to be completed on her. Majority of history obtained from EMR and discussion with hospitalist.     Patient had been refusing to take PO intake for past 2 months, reporting that people are poisoning her food. In the past the patient would be in a room and wants to leave the door open thinking she is suffocating. Patient in the past had tolerated klonopin for her anxiety but had an manic episode and was admitted to [facility] [place] and was started on depakote and gabapentin for which controlled her behavior up until now. She was restarted on klonopin a few weeks ago and the daughter thinks it might be making her more confused.     Patient evaluated by speech and swallow 6/1 and cleared for 1) Minced and moist solids with thin liquids, 2) Feeding/Swallowing Guidelines: Upright postioning, small single sips of thin liquids, Alternate minced and moist solids with thin liquids, 3) Aspiration/reflux precautions.     Allergies:  Cipro (Hives)  sulfa drugs (Unknown)  penicillin (Rash)  Cipro (Rash)  penicillin (Unknown)  sulfa drugs (Rash)  adhesives (Unknown)      Home Medications:    Hospital Medications:  acetaminophen     Tablet .. 650 milliGRAM(s) Oral every 6 hours PRN  amLODIPine   Tablet 2.5 milliGRAM(s) Oral daily  aspirin  chewable 81 milliGRAM(s) Oral daily  atorvastatin 10 milliGRAM(s) Oral at bedtime  calcium carbonate   1250 mG (OsCal) 1 Tablet(s) Oral two times a day  clonazePAM  Tablet 0.5 milliGRAM(s) Oral daily  dextrose 5%. 1000 milliLiter(s) IV Continuous <Continuous>  dextrose 5%. 1000 milliLiter(s) IV Continuous <Continuous>  dextrose 50% Injectable 12.5 Gram(s) IV Push once  dextrose 50% Injectable 25 Gram(s) IV Push once  dextrose 50% Injectable 25 Gram(s) IV Push once  dextrose Oral Gel 15 Gram(s) Oral once PRN  divalproex Sprinkle 500 milliGRAM(s) Oral at bedtime  divalproex Sprinkle 250 milliGRAM(s) Oral daily  folic acid 1 milliGRAM(s) Oral daily  glucagon  Injectable 1 milliGRAM(s) IntraMuscular once  haloperidol    Injectable 1 milliGRAM(s) IV Push every 6 hours PRN  insulin lispro (ADMELOG) corrective regimen sliding scale   SubCutaneous three times a day before meals  insulin lispro (ADMELOG) corrective regimen sliding scale   SubCutaneous at bedtime  losartan 100 milliGRAM(s) Oral daily  melatonin 3 milliGRAM(s) Oral at bedtime PRN  multivitamin 1 Tablet(s) Oral daily  pantoprazole   Suspension 40 milliGRAM(s) Oral daily  QUEtiapine 12.5 milliGRAM(s) Oral every 6 hours PRN  QUEtiapine 50 milliGRAM(s) Oral at bedtime  senna 2 Tablet(s) Oral at bedtime      PMHX/PSHX:  HTN (hypertension)    DM (diabetes mellitus)    Hyperlipemia    Diabetes mellitus    Dementia    S/P carpal tunnel release    S/P partial hysterectomy    No significant past surgical history        Family history:  No pertinent family history in first degree relatives    No pertinent family history in first degree relatives        Denies family history of colon cancer/polyps, stomach cancer/polyps, pancreatic cancer/masses, liver cancer/disease, ovarian cancer and endometrial cancer.    Social History:   Tob: Denies  EtOH: Denies  Illicit Drugs: Denies    ROS:     General:  No wt loss, fevers, chills, night sweats, fatigue  Eyes:  Good vision, no reported pain  ENT:  No sore throat, pain, runny nose, dysphagia  CV:  No pain, palpitations, hypo/hypertension  Pulm:  No dyspnea, cough, tachypnea, wheezing  GI:  see HPI  :  No pain, bleeding, incontinence, nocturia  Muscle:  No pain, weakness  Neuro:  No weakness, tingling, memory problems  Psych:  No fatigue, insomnia, mood problems, depression  Endocrine:  No polyuria, polydipsia, cold/heat intolerance  Heme:  No petechiae, ecchymosis, easy bruisability  Skin:  No rash, tattoos, scars, edema    PHYSICAL EXAM:     GENERAL:  No acute distress  HEENT:  NCAT, no scleral icterus   CHEST:  no respiratory distress  HEART:  Regular rate and rhythm  ABDOMEN:  Soft, non-tender, non-distended, normoactive bowel sounds,  no masses  EXTREMITIES: No edema  SKIN:  No rash/erythema/ecchymoses/petechiae/wounds/abscess/warm/dry  NEURO:  Alert and oriented x 3, no asterixis    Vital Signs:  Vital Signs Last 24 Hrs  T(C): 36.2 (02 Jun 2023 04:55), Max: 36.9 (01 Jun 2023 11:24)  T(F): 97.1 (02 Jun 2023 04:55), Max: 98.5 (01 Jun 2023 11:24)  HR: 78 (02 Jun 2023 04:55) (65 - 78)  BP: 146/67 (02 Jun 2023 04:55) (122/51 - 150/69)  BP(mean): --  RR: 17 (02 Jun 2023 04:55) (17 - 18)  SpO2: 99% (02 Jun 2023 04:55) (98% - 99%)    Parameters below as of 02 Jun 2023 04:55  Patient On (Oxygen Delivery Method): room air      Daily     Daily     LABS:                        13.1   8.29  )-----------( 297      ( 02 Jun 2023 05:54 )             41.4     Mean Cell Volume: 85.0 fL (06-02-23 @ 05:54)    06-02    138  |  94<L>  |  7   ----------------------------<  88  4.4   |  21<L>  |  0.48<L>    Ca    9.8      02 Jun 2023 05:54  Phos  4.0     06-02  Mg     2.00     06-02                                    13.1   8.29  )-----------( 297      ( 02 Jun 2023 05:54 )             41.4                         11.8   6.26  )-----------( 263      ( 01 Jun 2023 05:10 )             38.1                         11.9   7.99  )-----------( 303      ( 30 May 2023 17:07 )             37.3       Imaging:             HPI: 81 y/o F with pmhx of dementia, DM2, HTN, anxiety, paranoia presented to the ED for new onset AMS and worsening agitation and paranoia.     Patient appears agitated and anxious about any procedures to be completed on her. Majority of history obtained from EMR and discussion with hospitalist. Attempted to get in contact with daughter but no answer on phone.     Patient had been refusing to take PO intake for past 2 months, reporting that people are poisoning her food. In the past the patient would be in a room and wants to leave the door open thinking she is suffocating. Patient in the past had tolerated klonopin for her anxiety but had an manic episode and was admitted to [facility] and was started on depakote and gabapentin for which controlled her behavior up until now. She was restarted on klonopin a few weeks ago and the daughter thinks it might be making her more confused. Patient is currently still having psychiatric medications adjusted.     Patient evaluated by speech and swallow 6/1 and cleared for 1) Minced and moist solids with thin liquids, 2) Feeding/Swallowing Guidelines: Upright postioning, small single sips of thin liquids, Alternate minced and moist solids with thin liquids, 3) Aspiration/reflux precautions. GI consulted because patient with odynophagia at back of throat when swallowing.     Allergies:  Cipro (Hives)  sulfa drugs (Unknown)  penicillin (Rash)  Cipro (Rash)  penicillin (Unknown)  sulfa drugs (Rash)  adhesives (Unknown)      Home Medications:    Hospital Medications:  acetaminophen     Tablet .. 650 milliGRAM(s) Oral every 6 hours PRN  amLODIPine   Tablet 2.5 milliGRAM(s) Oral daily  aspirin  chewable 81 milliGRAM(s) Oral daily  atorvastatin 10 milliGRAM(s) Oral at bedtime  calcium carbonate   1250 mG (OsCal) 1 Tablet(s) Oral two times a day  clonazePAM  Tablet 0.5 milliGRAM(s) Oral daily  dextrose 5%. 1000 milliLiter(s) IV Continuous <Continuous>  dextrose 5%. 1000 milliLiter(s) IV Continuous <Continuous>  dextrose 50% Injectable 12.5 Gram(s) IV Push once  dextrose 50% Injectable 25 Gram(s) IV Push once  dextrose 50% Injectable 25 Gram(s) IV Push once  dextrose Oral Gel 15 Gram(s) Oral once PRN  divalproex Sprinkle 500 milliGRAM(s) Oral at bedtime  divalproex Sprinkle 250 milliGRAM(s) Oral daily  folic acid 1 milliGRAM(s) Oral daily  glucagon  Injectable 1 milliGRAM(s) IntraMuscular once  haloperidol    Injectable 1 milliGRAM(s) IV Push every 6 hours PRN  insulin lispro (ADMELOG) corrective regimen sliding scale   SubCutaneous three times a day before meals  insulin lispro (ADMELOG) corrective regimen sliding scale   SubCutaneous at bedtime  losartan 100 milliGRAM(s) Oral daily  melatonin 3 milliGRAM(s) Oral at bedtime PRN  multivitamin 1 Tablet(s) Oral daily  pantoprazole   Suspension 40 milliGRAM(s) Oral daily  QUEtiapine 12.5 milliGRAM(s) Oral every 6 hours PRN  QUEtiapine 50 milliGRAM(s) Oral at bedtime  senna 2 Tablet(s) Oral at bedtime      PMHX/PSHX:  HTN (hypertension)    DM (diabetes mellitus)    Hyperlipemia    Diabetes mellitus    Dementia    S/P carpal tunnel release    S/P partial hysterectomy    No significant past surgical history        Family history:  No pertinent family history in first degree relatives    No pertinent family history in first degree relatives        Denies family history of colon cancer/polyps, stomach cancer/polyps, pancreatic cancer/masses, liver cancer/disease, ovarian cancer and endometrial cancer.    Social History:   Tob: Denies  EtOH: Denies  Illicit Drugs: Denies    ROS:     General:  No wt loss, fevers, chills, night sweats, fatigue  Eyes:  Good vision, no reported pain  ENT:  No sore throat, pain, runny nose, dysphagia  CV:  No pain, palpitations, hypo/hypertension  Pulm:  No dyspnea, cough, tachypnea, wheezing  GI:  see HPI  :  No pain, bleeding, incontinence, nocturia  Muscle:  No pain, weakness  Neuro:  No weakness, tingling, memory problems  Psych:  No fatigue, insomnia, mood problems, depression  Endocrine:  No polyuria, polydipsia, cold/heat intolerance  Heme:  No petechiae, ecchymosis, easy bruisability  Skin:  No rash, tattoos, scars, edema    PHYSICAL EXAM:     GENERAL:  No acute distress  HEENT:  NCAT, no scleral icterus   CHEST:  no respiratory distress  HEART:  Regular rate and rhythm  ABDOMEN:  Soft, non-tender, non-distended, normoactive bowel sounds,  no masses  EXTREMITIES: No edema  SKIN:  No rash/erythema/ecchymoses/petechiae/wounds/abscess/warm/dry  NEURO:  Alert and oriented x 3, no asterixis    Vital Signs:  Vital Signs Last 24 Hrs  T(C): 36.2 (02 Jun 2023 04:55), Max: 36.9 (01 Jun 2023 11:24)  T(F): 97.1 (02 Jun 2023 04:55), Max: 98.5 (01 Jun 2023 11:24)  HR: 78 (02 Jun 2023 04:55) (65 - 78)  BP: 146/67 (02 Jun 2023 04:55) (122/51 - 150/69)  BP(mean): --  RR: 17 (02 Jun 2023 04:55) (17 - 18)  SpO2: 99% (02 Jun 2023 04:55) (98% - 99%)    Parameters below as of 02 Jun 2023 04:55  Patient On (Oxygen Delivery Method): room air      Daily     Daily     LABS:                        13.1   8.29  )-----------( 297      ( 02 Jun 2023 05:54 )             41.4     Mean Cell Volume: 85.0 fL (06-02-23 @ 05:54)    06-02    138  |  94<L>  |  7   ----------------------------<  88  4.4   |  21<L>  |  0.48<L>    Ca    9.8      02 Jun 2023 05:54  Phos  4.0     06-02  Mg     2.00     06-02                                    13.1   8.29  )-----------( 297      ( 02 Jun 2023 05:54 )             41.4                         11.8   6.26  )-----------( 263      ( 01 Jun 2023 05:10 )             38.1                         11.9   7.99  )-----------( 303      ( 30 May 2023 17:07 )             37.3       Imaging:             HPI: 83 y/o F with pmhx of dementia, DM2, HTN, anxiety, paranoia presented to the ED for new onset AMS and worsening agitation and paranoia.     Patient appears agitated and anxious about any procedures to be completed on her. Majority of history obtained from EMR and discussion with hospitalist. Attempted to get in contact with daughter but no answer on phone.     Patient had been refusing to take PO intake for past 2 months, reporting that people are poisoning her food. In the past the patient would be in a room and wants to leave the door open thinking she is suffocating. Patient in the past had tolerated klonopin for her anxiety but had an manic episode and was [place] [facility] and was started on depakote and gabapentin for which controlled her behavior up until now. She was restarted on klonopin a few weeks ago and the daughter thinks it might be making her more confused. Patient is currently still having psychiatric medications adjusted. At facility, patient had been eating puree diet only and would not attempt any other consistency.     Patient evaluated by speech and swallow 6/1 and cleared for 1) Minced and moist solids with thin liquids, 2) Feeding/Swallowing Guidelines: Upright postioning, small single sips of thin liquids, Alternate minced and moist solids with thin liquids, 3) Aspiration/reflux precautions. GI consulted because patient with odynophagia at back of throat when swallowing with solids.     Allergies:  Cipro (Hives)  sulfa drugs (Unknown)  penicillin (Rash)  Cipro (Rash)  penicillin (Unknown)  sulfa drugs (Rash)  adhesives (Unknown)      Home Medications:    Hospital Medications:  acetaminophen     Tablet .. 650 milliGRAM(s) Oral every 6 hours PRN  amLODIPine   Tablet 2.5 milliGRAM(s) Oral daily  aspirin  chewable 81 milliGRAM(s) Oral daily  atorvastatin 10 milliGRAM(s) Oral at bedtime  calcium carbonate   1250 mG (OsCal) 1 Tablet(s) Oral two times a day  clonazePAM  Tablet 0.5 milliGRAM(s) Oral daily  dextrose 5%. 1000 milliLiter(s) IV Continuous <Continuous>  dextrose 5%. 1000 milliLiter(s) IV Continuous <Continuous>  dextrose 50% Injectable 12.5 Gram(s) IV Push once  dextrose 50% Injectable 25 Gram(s) IV Push once  dextrose 50% Injectable 25 Gram(s) IV Push once  dextrose Oral Gel 15 Gram(s) Oral once PRN  divalproex Sprinkle 500 milliGRAM(s) Oral at bedtime  divalproex Sprinkle 250 milliGRAM(s) Oral daily  folic acid 1 milliGRAM(s) Oral daily  glucagon  Injectable 1 milliGRAM(s) IntraMuscular once  haloperidol    Injectable 1 milliGRAM(s) IV Push every 6 hours PRN  insulin lispro (ADMELOG) corrective regimen sliding scale   SubCutaneous three times a day before meals  insulin lispro (ADMELOG) corrective regimen sliding scale   SubCutaneous at bedtime  losartan 100 milliGRAM(s) Oral daily  melatonin 3 milliGRAM(s) Oral at bedtime PRN  multivitamin 1 Tablet(s) Oral daily  pantoprazole   Suspension 40 milliGRAM(s) Oral daily  QUEtiapine 12.5 milliGRAM(s) Oral every 6 hours PRN  QUEtiapine 50 milliGRAM(s) Oral at bedtime  senna 2 Tablet(s) Oral at bedtime      PMHX/PSHX:  HTN (hypertension)    DM (diabetes mellitus)    Hyperlipemia    Diabetes mellitus    Dementia    S/P carpal tunnel release    S/P partial hysterectomy    No significant past surgical history        Family history:  No pertinent family history in first degree relatives    No pertinent family history in first degree relatives        Denies family history of colon cancer/polyps, stomach cancer/polyps, pancreatic cancer/masses, liver cancer/disease, ovarian cancer and endometrial cancer.    Social History:   Tob: Denies  EtOH: Denies  Illicit Drugs: Denies    ROS:   Unable to assess thoroughly given mental status     PHYSICAL EXAM:     GENERAL:  No acute distress  HEENT:  NCAT, no scleral icterus   CHEST:  no respiratory distress  HEART:  Regular rate and rhythm  ABDOMEN:  Soft, non-tender, non-distended, normoactive bowel sounds,  no masses  EXTREMITIES: No edema  SKIN:  No rash/erythema/ecchymoses/petechiae/wounds/abscess/warm/dry  NEURO:  Alert and oriented x 3, no asterixis    Vital Signs:  Vital Signs Last 24 Hrs  T(C): 36.2 (02 Jun 2023 04:55), Max: 36.9 (01 Jun 2023 11:24)  T(F): 97.1 (02 Jun 2023 04:55), Max: 98.5 (01 Jun 2023 11:24)  HR: 78 (02 Jun 2023 04:55) (65 - 78)  BP: 146/67 (02 Jun 2023 04:55) (122/51 - 150/69)  BP(mean): --  RR: 17 (02 Jun 2023 04:55) (17 - 18)  SpO2: 99% (02 Jun 2023 04:55) (98% - 99%)    Parameters below as of 02 Jun 2023 04:55  Patient On (Oxygen Delivery Method): room air      Daily     Daily     LABS:                        13.1   8.29  )-----------( 297      ( 02 Jun 2023 05:54 )             41.4     Mean Cell Volume: 85.0 fL (06-02-23 @ 05:54)    06-02    138  |  94<L>  |  7   ----------------------------<  88  4.4   |  21<L>  |  0.48<L>    Ca    9.8      02 Jun 2023 05:54  Phos  4.0     06-02  Mg     2.00     06-02                                    13.1   8.29  )-----------( 297      ( 02 Jun 2023 05:54 )             41.4                         11.8   6.26  )-----------( 263      ( 01 Jun 2023 05:10 )             38.1                         11.9   7.99  )-----------( 303      ( 30 May 2023 17:07 )             37.3       Imaging:

## 2023-06-02 NOTE — BH CONSULTATION LIAISON PROGRESS NOTE - NSBHFUPINTERVALHXFT_PSY_A_CORE
Chart reviewed. Patient received Haldol 1mg PRN at 11:30 and Seroquel 12.5mg PO HS at 6PM. Adherent with oral medication regimen.     Patient reports feeling anxious regarding medical workup, however has been consuming meals and passed SLP evaluation. Denies SI, HI.

## 2023-06-02 NOTE — PROGRESS NOTE ADULT - SUBJECTIVE AND OBJECTIVE BOX
[facility] Division of Hospital Medicine  Araceli Han MD  Pager (M-F, 8A-5P): [phone]  Other Times:  [id number]    Patient is a 82y old  Female who presents with a chief complaint of paranoia (02 Jun 2023 00:14)      SUBJECTIVE / OVERNIGHT EVENTS:      MEDICATIONS  (STANDING):  amLODIPine   Tablet 2.5 milliGRAM(s) Oral daily  aspirin  chewable 81 milliGRAM(s) Oral daily  atorvastatin 10 milliGRAM(s) Oral at bedtime  calcium carbonate   1250 mG (OsCal) 1 Tablet(s) Oral two times a day  clonazePAM  Tablet 0.5 milliGRAM(s) Oral daily  dextrose 5%. 1000 milliLiter(s) (50 mL/Hr) IV Continuous <Continuous>  dextrose 5%. 1000 milliLiter(s) (100 mL/Hr) IV Continuous <Continuous>  dextrose 50% Injectable 12.5 Gram(s) IV Push once  dextrose 50% Injectable 25 Gram(s) IV Push once  dextrose 50% Injectable 25 Gram(s) IV Push once  divalproex Sprinkle 500 milliGRAM(s) Oral at bedtime  divalproex Sprinkle 250 milliGRAM(s) Oral daily  folic acid 1 milliGRAM(s) Oral daily  glucagon  Injectable 1 milliGRAM(s) IntraMuscular once  insulin lispro (ADMELOG) corrective regimen sliding scale   SubCutaneous three times a day before meals  insulin lispro (ADMELOG) corrective regimen sliding scale   SubCutaneous at bedtime  losartan 100 milliGRAM(s) Oral daily  multivitamin 1 Tablet(s) Oral daily  pantoprazole   Suspension 40 milliGRAM(s) Oral daily  QUEtiapine 50 milliGRAM(s) Oral at bedtime  senna 2 Tablet(s) Oral at bedtime    MEDICATIONS  (PRN):  acetaminophen     Tablet .. 650 milliGRAM(s) Oral every 6 hours PRN Temp greater or equal to 38C (100.4F), Mild Pain (1 - 3)  dextrose Oral Gel 15 Gram(s) Oral once PRN Blood Glucose LESS THAN 70 milliGRAM(s)/deciliter  haloperidol    Injectable 1 milliGRAM(s) IV Push every 6 hours PRN Agitation  melatonin 3 milliGRAM(s) Oral at bedtime PRN Insomnia  QUEtiapine 12.5 milliGRAM(s) Oral every 6 hours PRN agitation      CAPILLARY BLOOD GLUCOSE      POCT Blood Glucose.: 100 mg/dL (02 Jun 2023 07:49)  POCT Blood Glucose.: 118 mg/dL (01 Jun 2023 22:37)  POCT Blood Glucose.: 76 mg/dL (01 Jun 2023 16:20)  POCT Blood Glucose.: 172 mg/dL (01 Jun 2023 11:09)    I&O's Summary      PHYSICAL EXAM:  Vital Signs Last 24 Hrs  T(C): 36.2 (02 Jun 2023 04:55), Max: 36.9 (01 Jun 2023 11:24)  T(F): 97.1 (02 Jun 2023 04:55), Max: 98.5 (01 Jun 2023 11:24)  HR: 78 (02 Jun 2023 04:55) (65 - 78)  BP: 146/67 (02 Jun 2023 04:55) (122/51 - 150/69)  BP(mean): --  RR: 17 (02 Jun 2023 04:55) (17 - 18)  SpO2: 99% (02 Jun 2023 04:55) (98% - 99%)    Parameters below as of 02 Jun 2023 04:55  Patient On (Oxygen Delivery Method): room air        CONSTITUTIONAL: NAD, well-developed, well-groomed  EYES: EOMI; conjunctiva and sclera clear  ENMT: Moist oral mucosa  RESPIRATORY: Normal respiratory effort; lungs are clear to auscultation bilaterally  CARDIOVASCULAR: Regular rate and rhythm, normal S1 and S2, no murmur; No lower extremity edema  ABDOMEN: Nontender to palpation, normoactive bowel sounds, no rebound/guarding  MUSCULOSKELETAL:   no clubbing or cyanosis of digits; no joint swelling or tenderness to palpation  PSYCH: A+O to person, place, and time; affect appropriate  NEUROLOGY: CN 2-12 are intact and symmetric; no gross sensory deficits   SKIN: No rashes; no palpable lesions    LABS:                        13.1   8.29  )-----------( 297      ( 02 Jun 2023 05:54 )             41.4     06-02    138  |  94<L>  |  7   ----------------------------<  88  4.4   |  21<L>  |  0.48<L>    Ca    9.8      02 Jun 2023 05:54  Phos  4.0     06-02  Mg     2.00     06-02                Culture - Urine (collected 30 May 2023 21:27)  Source: Clean Catch Clean Catch (Midstream)  Final Report (31 May 2023 20:56):    <10,000 CFU/mL Normal Urogenital Shana      RADIOLOGY & ADDITIONAL TESTS:  Results Reviewed:   Imaging Personally Reviewed:  Electrocardiogram Personally Reviewed:    COORDINATION OF CARE:  Care Discussed with Consultants/Other Providers [Y/N]: Y  Prior or Outpatient Records Reviewed [Y/N]: Y   [facility] Division of Hospital Medicine  Araceli Han MD  Pager (M-F, 8A-5P): [phone]  Other Times:  [id number]    Patient is a 82y old  Female who presents with a chief complaint of paranoia (02 Jun 2023 00:14)    SUBJECTIVE / OVERNIGHT EVENTS:  Pt tolerating soft diet.  denies complaints.  slightly hard of hearing.      MEDICATIONS  (STANDING):  amLODIPine   Tablet 2.5 milliGRAM(s) Oral daily  aspirin  chewable 81 milliGRAM(s) Oral daily  atorvastatin 10 milliGRAM(s) Oral at bedtime  calcium carbonate   1250 mG (OsCal) 1 Tablet(s) Oral two times a day  clonazePAM  Tablet 0.5 milliGRAM(s) Oral daily  dextrose 5%. 1000 milliLiter(s) (50 mL/Hr) IV Continuous <Continuous>  dextrose 5%. 1000 milliLiter(s) (100 mL/Hr) IV Continuous <Continuous>  dextrose 50% Injectable 12.5 Gram(s) IV Push once  dextrose 50% Injectable 25 Gram(s) IV Push once  dextrose 50% Injectable 25 Gram(s) IV Push once  divalproex Sprinkle 500 milliGRAM(s) Oral at bedtime  divalproex Sprinkle 250 milliGRAM(s) Oral daily  folic acid 1 milliGRAM(s) Oral daily  glucagon  Injectable 1 milliGRAM(s) IntraMuscular once  insulin lispro (ADMELOG) corrective regimen sliding scale   SubCutaneous three times a day before meals  insulin lispro (ADMELOG) corrective regimen sliding scale   SubCutaneous at bedtime  losartan 100 milliGRAM(s) Oral daily  multivitamin 1 Tablet(s) Oral daily  pantoprazole   Suspension 40 milliGRAM(s) Oral daily  QUEtiapine 50 milliGRAM(s) Oral at bedtime  senna 2 Tablet(s) Oral at bedtime    MEDICATIONS  (PRN):  acetaminophen     Tablet .. 650 milliGRAM(s) Oral every 6 hours PRN Temp greater or equal to 38C (100.4F), Mild Pain (1 - 3)  dextrose Oral Gel 15 Gram(s) Oral once PRN Blood Glucose LESS THAN 70 milliGRAM(s)/deciliter  haloperidol    Injectable 1 milliGRAM(s) IV Push every 6 hours PRN Agitation  melatonin 3 milliGRAM(s) Oral at bedtime PRN Insomnia  QUEtiapine 12.5 milliGRAM(s) Oral every 6 hours PRN agitation      CAPILLARY BLOOD GLUCOSE      POCT Blood Glucose.: 100 mg/dL (02 Jun 2023 07:49)  POCT Blood Glucose.: 118 mg/dL (01 Jun 2023 22:37)  POCT Blood Glucose.: 76 mg/dL (01 Jun 2023 16:20)  POCT Blood Glucose.: 172 mg/dL (01 Jun 2023 11:09)    I&O's Summary      PHYSICAL EXAM:  Vital Signs Last 24 Hrs  T(C): 36.2 (02 Jun 2023 04:55), Max: 36.9 (01 Jun 2023 11:24)  T(F): 97.1 (02 Jun 2023 04:55), Max: 98.5 (01 Jun 2023 11:24)  HR: 78 (02 Jun 2023 04:55) (65 - 78)  BP: 146/67 (02 Jun 2023 04:55) (122/51 - 150/69)  BP(mean): --  RR: 17 (02 Jun 2023 04:55) (17 - 18)  SpO2: 99% (02 Jun 2023 04:55) (98% - 99%)    Parameters below as of 02 Jun 2023 04:55  Patient On (Oxygen Delivery Method): room air        CONSTITUTIONAL: NAD, well-developed, well-groomed  EYES: EOMI; conjunctiva and sclera clear  ENMT: Moist oral mucosa  RESPIRATORY: Normal respiratory effort; lungs are clear to auscultation bilaterally  CARDIOVASCULAR: Regular rate and rhythm, normal S1 and S2, no murmur; No lower extremity edema  ABDOMEN: Nontender to palpation, normoactive bowel sounds, no rebound/guarding  MUSCULOSKELETAL:   no clubbing or cyanosis of digits; no joint swelling or tenderness to palpation  PSYCH: A+O to person, place, and time; affect appropriate  NEUROLOGY: CN 2-12 are intact and symmetric; no gross sensory deficits   SKIN: No rashes; no palpable lesions    LABS:                        13.1   8.29  )-----------( 297      ( 02 Jun 2023 05:54 )             41.4     06-02    138  |  94<L>  |  7   ----------------------------<  88  4.4   |  21<L>  |  0.48<L>    Ca    9.8      02 Jun 2023 05:54  Phos  4.0     06-02  Mg     2.00     06-02        Culture - Urine (collected 30 May 2023 21:27)  Source: Clean Catch Clean Catch (Midstream)  Final Report (31 May 2023 20:56):    <10,000 CFU/mL Normal Urogenital Shana      RADIOLOGY & ADDITIONAL TESTS:  Results Reviewed:   Imaging Personally Reviewed:  Electrocardiogram Personally Reviewed:    COORDINATION OF CARE:  Care Discussed with Consultants/Other Providers [Y/N]: Y  Prior or Outpatient Records Reviewed [Y/N]: Y   done

## 2023-06-02 NOTE — DISCHARGE NOTE PROVIDER - CARE PROVIDER_API CALL
NESTOR HOLLIDAY  [address] [place] [id number]  Phone: [phone]  Fax: [phone]  Established Patient  Follow Up Time: 2 weeks

## 2023-06-02 NOTE — CONSULT NOTE ADULT - ASSESSMENT
Impression:     #esophageal web     Recommendations:     - psychiatric medications per behavioral health   - rest of care per primary team     All recommendations are tentative until note is attested by attending.     Rani Park, PGY-4   Gastroenterology/Hepatology Fellow  Available on [employer] Teams  [phone] ([facility] Short Range Pager)  [phone] ([facility] Long Range Pager)    After 5pm, please contact the on-call GI fellow. [phone] Impression:     #esophageal web     Recommendations:   -   - psychiatric medications per behavioral health   - rest of care per primary team     All recommendations are tentative until note is attested by attending.     Rani Park, PGY-4   Gastroenterology/Hepatology Fellow  Available on [employer] Teams  [phone] ([facility] Short Range Pager)  [phone] ([facility] Long Range Pager)    After 5pm, please contact the on-call GI fellow. [phone] Impression:     #esophageal web   - speech and swallow cleared for 1) Minced and moist solids with thin liquids, 2) Feeding/Swallowing Guidelines: Upright postioning, small single sips of thin liquids, Alternate minced and moist solids with thin liquids, 3) Aspiration/reflux precautions  - esophagram notable for Suspected esophageal web located at the level of C5 vertebral body    Recommendations:   - would recommend optimization of psychiatric medications   - assess dysphagia symptoms when psychiatrically optimized to obtain best description of symptoms outside of paranoia around poisoned food   - psychiatric medications per behavioral health   - rest of care per primary team     All recommendations are tentative until note is attested by attending.     Rani Park, PGY-4   Gastroenterology/Hepatology Fellow  Available on [employer] Teams  [phone] ([facility] Short Range Pager)  [phone] ([facility] Long Range Pager)    After 5pm, please contact the on-call GI fellow. [phone]

## 2023-06-02 NOTE — PROGRESS NOTE ADULT - ASSESSMENT
81 y/o F with pmhx of dementia, DM2, HTN, anxiety, paranoia presented to the ED for new onset AMS and worsening agitation and paranoia. Admit for behavorial issues and dysphagia work up.

## 2023-06-02 NOTE — BH CONSULTATION LIAISON PROGRESS NOTE - NSBHCONSULTPRIMARYDISCUSSYES_PSY_A_CORE FT
may need [facility] admission if meds not helping 
may need [facility] admission if meds not helping

## 2023-06-02 NOTE — PROGRESS NOTE ADULT - PROBLEM SELECTOR PLAN 2
- crush all medications if patient is cleared for diet as per daughter, changed depakote to sprinkle formulation  - discussed with daughter - since the last 2 months pt has become paranoid about food being poisoned.  Swallow studies done at assisted living facility did not reveal any etiology - up until presentation to the hospital pt was tolerating a pureed diet (pureed as pt would not eat any consistency not due to any actual dysphagia issue)   - tolerating minced and moist diet with thin liquids   - there is concern for esophageal web - obtain GI eval

## 2023-06-02 NOTE — DISCHARGE NOTE PROVIDER - NSDCMRMEDTOKEN_GEN_ALL_CORE_FT
aspirin 81 mg oral delayed release tablet: 1 tab(s) orally once a day  Bacid oral capsule: 1 tab(s) orally 3 times a day  Colace 100 mg oral capsule: 1 cap(s) orally 2 times a day  Depakote 500 mg oral delayed release tablet: 1 tab(s) orally once a day (at bedtime)  Depakote  mg oral tablet, extended release: 1 tab(s) orally once a day  folic acid 1 mg oral tablet: 1 tab(s) orally once a day  Glucophage 500 mg oral tablet: 1 tab(s) orally 2 times a day  Januvia 50 mg oral tablet: 1 tab(s) orally once a day  KlonoPIN 0.5 mg oral tablet: 1 tab(s) orally once a day  losartan 100 mg oral tablet: 1 tab(s) orally once a day  Melatonin 3 mg oral tablet: 1 tab(s) orally once a day (at bedtime)  Neurontin 300 mg oral capsule: 1 cap(s) orally once a day  Neurontin 600 mg oral tablet: 1 tab(s) orally once a day (at bedtime)  Norvasc 2.5 mg oral tablet: 1 tab(s) orally once a day  Os-Rolo 500 (1250 mg calcium carbonate) oral tablet: 1 tab(s) orally 2 times a day  Pravachol 20 mg oral tablet: 1 tab(s) orally once a day  PriLOSEC 20 mg oral delayed release capsule: 1 cap(s) orally once a day  Vitamin B12 100 mcg oral tablet: 1 tab(s) orally once a day   aspirin 81 mg oral delayed release tablet: 1 tab(s) orally once a day  Bacid oral capsule: 1 tab(s) orally 3 times a day  cephalexin 500 mg oral capsule: 1 cap(s) orally 4 times a day  Colace 100 mg oral capsule: 1 cap(s) orally 2 times a day  divalproex sodium 125 mg oral delayed release capsule: 4 cap(s) orally once a day (at bedtime)  divalproex sodium 125 mg oral delayed release capsule: 2 cap(s) orally once a day  DULoxetine 30 mg oral delayed release capsule: 1 cap(s) orally once a day  folic acid 1 mg oral tablet: 1 tab(s) orally once a day  Glucophage 500 mg oral tablet: 1 tab(s) orally 2 times a day  Januvia 50 mg oral tablet: 1 tab(s) orally once a day  KlonoPIN 0.5 mg oral tablet: 1 tab(s) orally once a day  losartan 100 mg oral tablet: 1 tab(s) orally once a day  Melatonin 3 mg oral tablet: 1 tab(s) orally once a day (at bedtime)  Multiple Vitamins oral tablet: 1 tab(s) orally once a day  Norvasc 2.5 mg oral tablet: 1 tab(s) orally once a day  Os-Rolo 500 (1250 mg calcium carbonate) oral tablet: 1 tab(s) orally 2 times a day  Pravachol 20 mg oral tablet: 1 tab(s) orally once a day  PriLOSEC 20 mg oral delayed release capsule: 1 cap(s) orally once a day  QUEtiapine 50 mg oral tablet: 1 tab(s) orally once a day (at bedtime)  senna leaf extract oral tablet: 2 tab(s) orally once a day (at bedtime)  Vitamin B12 100 mcg oral tablet: 1 tab(s) orally once a day

## 2023-06-02 NOTE — DISCHARGE NOTE PROVIDER - NSDCCPCAREPLAN_GEN_ALL_CORE_FT
PRINCIPAL DISCHARGE DIAGNOSIS  Diagnosis: AMS (altered mental status)  Assessment and Plan of Treatment: You came in with confusion and changes in behavior. Your medications were optimized while  you were in the hospital. Please continue taking your medications as prescribed.      SECONDARY DISCHARGE DIAGNOSES  Diagnosis: Unexplained dysphagia  Assessment and Plan of Treatment: You complained of trouble swallowing. A swallow study shows you may have an abnormal structure called an esophageal web, which may cause difficulty swallowing. Follow up with the gastroenterology doctors for further testing.    Diagnosis: Phlebitis  Assessment and Plan of Treatment: You developed a skin infection from an IV line. You were treated with antibiotics and improved.     PRINCIPAL DISCHARGE DIAGNOSIS  Diagnosis: AMS (altered mental status)  Assessment and Plan of Treatment: You came in with confusion and changes in behavior. Your medications were optimized while  you were in the hospital. Please continue taking your medications as prescribed.      SECONDARY DISCHARGE DIAGNOSES  Diagnosis: Unexplained dysphagia  Assessment and Plan of Treatment: You complained of trouble swallowing. A swallow study shows you may have an abnormal structure called an esophageal web, which may cause difficulty swallowing. Follow up with the gastroenterology doctors for further testing.    Diagnosis: Phlebitis  Assessment and Plan of Treatment: You developed a skin infection from an IV line. You were treated with antibiotics and improved. You can continue warm compresses for pain relief.

## 2023-06-02 NOTE — DISCHARGE NOTE PROVIDER - HOSPITAL COURSE
83 y/o F with pmhx of dementia, DM2, HTN, anxiety, paranoia presented to the ED for new onset AMS and worsening agitation and paranoia. Admit for behavorial issues and dysphagia work up.  Patient was seen by psychiatry service - they recommended to d/c gabapentin and start seroquel.  She underwent a swallow eval and cine-esophagram to evaluate for medical causes of dysphagia.  She was able to tolerate a minced and moist diet.  Cine showed esophageal webs - GI evaluated patient. 83 y/o F with pmhx of dementia, DM2, HTN, anxiety, paranoia presented to the ED for new onset AMS and worsening agitation and paranoia. Admit for behavorial issues and dysphagia work up.  Patient was seen by psychiatry service - they recommended to d/c gabapentin and start seroquel.  She underwent a swallow eval and cine-esophagram to evaluate for medical causes of dysphagia.  She was able to tolerate a minced and moist diet.  Cine showed esophageal web. GI evaluated pt and recommended full esophagram study, which demonstrated. Recommended outpatient EGD once pt is optimized from a psychiatric standpoint. Hospital course was c/b phlebitis; patient was prescribed 5 day course of Keflex 500 mg q6h. 83 y/o F with pmhx of dementia, DM2, HTN, anxiety, paranoia presented to the ED for new onset AMS and worsening agitation and paranoia. Admit for behavorial issues and dysphagia work up.  Patient was seen by psychiatry service - they recommended to d/c gabapentin and start seroquel.  She underwent a swallow eval and cine-esophagram to evaluate for medical causes of dysphagia.  She was able to tolerate a minced and moist diet.  Cine showed esophageal web. GI evaluated pt and recommended outpatient EGD once pt is optimized from a psychiatric standpoint. Hospital course was c/b phlebitis; patient was prescribed 5 day course of Keflex 500 mg q6h.  Can continue warm compresses on site upon d/c.

## 2023-06-02 NOTE — CONSULT NOTE ADULT - ATTENDING COMMENTS
81 y/o F with pmhx of dementia, DM2, HTN, anxiety, paranoia presented to the ED for new onset AMS and worsening agitation and paranoia  dysphagia  likely esophageal web    Rec  1- as above

## 2023-06-02 NOTE — BH CONSULTATION LIAISON PROGRESS NOTE - NSICDXBHTERTIARYDX_PSY_ALL_CORE
R/O Schizoaffective disorder, bipolar type   F25.0  
R/O Schizoaffective disorder, bipolar type   F25.0

## 2023-06-03 LAB
ANION GAP SERPL CALC-SCNC: 13 MMOL/L — SIGNIFICANT CHANGE UP (ref 7–14)
BUN SERPL-MCNC: 8 MG/DL — SIGNIFICANT CHANGE UP (ref 7–23)
CALCIUM SERPL-MCNC: 10.1 MG/DL — SIGNIFICANT CHANGE UP (ref 8.4–10.5)
CHLORIDE SERPL-SCNC: 97 MMOL/L — LOW (ref 98–107)
CO2 SERPL-SCNC: 22 MMOL/L — SIGNIFICANT CHANGE UP (ref 22–31)
CREAT SERPL-MCNC: 0.62 MG/DL — SIGNIFICANT CHANGE UP (ref 0.5–1.3)
EGFR: 89 ML/MIN/1.73M2 — SIGNIFICANT CHANGE UP
GLUCOSE BLDC GLUCOMTR-MCNC: 112 MG/DL — HIGH (ref 70–99)
GLUCOSE BLDC GLUCOMTR-MCNC: 121 MG/DL — HIGH (ref 70–99)
GLUCOSE BLDC GLUCOMTR-MCNC: 209 MG/DL — HIGH (ref 70–99)
GLUCOSE BLDC GLUCOMTR-MCNC: 94 MG/DL — SIGNIFICANT CHANGE UP (ref 70–99)
GLUCOSE SERPL-MCNC: 81 MG/DL — SIGNIFICANT CHANGE UP (ref 70–99)
HCT VFR BLD CALC: 38.5 % — SIGNIFICANT CHANGE UP (ref 34.5–45)
HGB BLD-MCNC: 12.1 G/DL — SIGNIFICANT CHANGE UP (ref 11.5–15.5)
MAGNESIUM SERPL-MCNC: 1.9 MG/DL — SIGNIFICANT CHANGE UP (ref 1.6–2.6)
MCHC RBC-ENTMCNC: 26.5 PG — LOW (ref 27–34)
MCHC RBC-ENTMCNC: 31.4 GM/DL — LOW (ref 32–36)
MCV RBC AUTO: 84.4 FL — SIGNIFICANT CHANGE UP (ref 80–100)
NRBC # BLD: 0 /100 WBCS — SIGNIFICANT CHANGE UP (ref 0–0)
NRBC # FLD: 0 K/UL — SIGNIFICANT CHANGE UP (ref 0–0)
PHOSPHATE SERPL-MCNC: 4.7 MG/DL — HIGH (ref 2.5–4.5)
PLATELET # BLD AUTO: 292 K/UL — SIGNIFICANT CHANGE UP (ref 150–400)
POTASSIUM SERPL-MCNC: 4.2 MMOL/L — SIGNIFICANT CHANGE UP (ref 3.5–5.3)
POTASSIUM SERPL-SCNC: 4.2 MMOL/L — SIGNIFICANT CHANGE UP (ref 3.5–5.3)
RBC # BLD: 4.56 M/UL — SIGNIFICANT CHANGE UP (ref 3.8–5.2)
RBC # FLD: 13.9 % — SIGNIFICANT CHANGE UP (ref 10.3–14.5)
SODIUM SERPL-SCNC: 132 MMOL/L — LOW (ref 135–145)
WBC # BLD: 7.17 K/UL — SIGNIFICANT CHANGE UP (ref 3.8–10.5)
WBC # FLD AUTO: 7.17 K/UL — SIGNIFICANT CHANGE UP (ref 3.8–10.5)

## 2023-06-03 PROCEDURE — 99232 SBSQ HOSP IP/OBS MODERATE 35: CPT

## 2023-06-03 RX ORDER — SODIUM CHLORIDE 0.65 %
1 AEROSOL, SPRAY (ML) NASAL THREE TIMES A DAY
Refills: 0 | Status: DISCONTINUED | OUTPATIENT
Start: 2023-06-03 | End: 2023-06-05

## 2023-06-03 RX ORDER — QUETIAPINE FUMARATE 200 MG/1
12.5 TABLET, FILM COATED ORAL EVERY 6 HOURS
Refills: 0 | Status: DISCONTINUED | OUTPATIENT
Start: 2023-06-03 | End: 2023-06-07

## 2023-06-03 RX ADMIN — DIVALPROEX SODIUM 250 MILLIGRAM(S): 500 TABLET, DELAYED RELEASE ORAL at 15:30

## 2023-06-03 RX ADMIN — Medication 1 MILLIGRAM(S): at 11:17

## 2023-06-03 RX ADMIN — Medication 0.5 MILLIGRAM(S): at 11:13

## 2023-06-03 RX ADMIN — QUETIAPINE FUMARATE 50 MILLIGRAM(S): 200 TABLET, FILM COATED ORAL at 22:18

## 2023-06-03 RX ADMIN — ATORVASTATIN CALCIUM 10 MILLIGRAM(S): 80 TABLET, FILM COATED ORAL at 22:00

## 2023-06-03 RX ADMIN — PANTOPRAZOLE SODIUM 40 MILLIGRAM(S): 20 TABLET, DELAYED RELEASE ORAL at 15:31

## 2023-06-03 RX ADMIN — Medication 1 TABLET(S): at 11:14

## 2023-06-03 RX ADMIN — SENNA PLUS 2 TABLET(S): 8.6 TABLET ORAL at 22:18

## 2023-06-03 RX ADMIN — HALOPERIDOL DECANOATE 1 MILLIGRAM(S): 100 INJECTION INTRAMUSCULAR at 10:13

## 2023-06-03 RX ADMIN — QUETIAPINE FUMARATE 12.5 MILLIGRAM(S): 200 TABLET, FILM COATED ORAL at 08:46

## 2023-06-03 RX ADMIN — Medication 81 MILLIGRAM(S): at 11:14

## 2023-06-03 RX ADMIN — DIVALPROEX SODIUM 500 MILLIGRAM(S): 500 TABLET, DELAYED RELEASE ORAL at 23:46

## 2023-06-03 RX ADMIN — Medication 2: at 17:43

## 2023-06-03 RX ADMIN — Medication 1 TABLET(S): at 06:14

## 2023-06-03 NOTE — PROGRESS NOTE ADULT - SUBJECTIVE AND OBJECTIVE BOX
[facility] Division of Hospital Medicine  Araceli Han MD  Pager (M-F, 8A-5P): [phone]  Other Times:  [phone]    Patient is a 82y old  Female who presents with a chief complaint of paranoia (02 Jun 2023 10:16)    SUBJECTIVE / OVERNIGHT EVENTS:  Pt tolerating po - worried about the medications that were started for her.  denies pain or difficulty swallowing.     MEDICATIONS  (STANDING):  amLODIPine   Tablet 2.5 milliGRAM(s) Oral daily  aspirin  chewable 81 milliGRAM(s) Oral daily  atorvastatin 10 milliGRAM(s) Oral at bedtime  calcium carbonate   1250 mG (OsCal) 1 Tablet(s) Oral two times a day  clonazePAM  Tablet 0.5 milliGRAM(s) Oral daily  dextrose 5%. 1000 milliLiter(s) (100 mL/Hr) IV Continuous <Continuous>  dextrose 5%. 1000 milliLiter(s) (50 mL/Hr) IV Continuous <Continuous>  dextrose 50% Injectable 12.5 Gram(s) IV Push once  dextrose 50% Injectable 25 Gram(s) IV Push once  dextrose 50% Injectable 25 Gram(s) IV Push once  divalproex Sprinkle 500 milliGRAM(s) Oral at bedtime  divalproex Sprinkle 250 milliGRAM(s) Oral daily  folic acid 1 milliGRAM(s) Oral daily  glucagon  Injectable 1 milliGRAM(s) IntraMuscular once  insulin lispro (ADMELOG) corrective regimen sliding scale   SubCutaneous three times a day before meals  insulin lispro (ADMELOG) corrective regimen sliding scale   SubCutaneous at bedtime  losartan 100 milliGRAM(s) Oral daily  multivitamin 1 Tablet(s) Oral daily  pantoprazole   Suspension 40 milliGRAM(s) Oral daily  QUEtiapine 50 milliGRAM(s) Oral at bedtime  senna 2 Tablet(s) Oral at bedtime    MEDICATIONS  (PRN):  acetaminophen     Tablet .. 650 milliGRAM(s) Oral every 6 hours PRN Temp greater or equal to 38C (100.4F), Mild Pain (1 - 3)  dextrose Oral Gel 15 Gram(s) Oral once PRN Blood Glucose LESS THAN 70 milliGRAM(s)/deciliter  haloperidol    Injectable 1 milliGRAM(s) IV Push every 6 hours PRN Agitation  melatonin 3 milliGRAM(s) Oral at bedtime PRN Insomnia  QUEtiapine 12.5 milliGRAM(s) Oral every 6 hours PRN Anxiety      CAPILLARY BLOOD GLUCOSE      POCT Blood Glucose.: 112 mg/dL (03 Jun 2023 07:25)  POCT Blood Glucose.: 90 mg/dL (02 Jun 2023 21:39)  POCT Blood Glucose.: 99 mg/dL (02 Jun 2023 16:39)  POCT Blood Glucose.: 152 mg/dL (02 Jun 2023 12:19)    I&O's Summary      PHYSICAL EXAM:  Vital Signs Last 24 Hrs  T(C): 36.7 (03 Jun 2023 04:40), Max: 37 (02 Jun 2023 11:40)  T(F): 98 (03 Jun 2023 04:40), Max: 98.6 (02 Jun 2023 11:40)  HR: 77 (03 Jun 2023 04:40) (77 - 80)  BP: 101/56 (03 Jun 2023 04:40) (101/56 - 153/56)  BP(mean): --  RR: 17 (03 Jun 2023 04:40) (17 - 18)  SpO2: 98% (03 Jun 2023 04:40) (98% - 100%)    Parameters below as of 03 Jun 2023 04:40  Patient On (Oxygen Delivery Method): room air    CONSTITUTIONAL: NAD, well-developed, well-groomed  EYES: EOMI; conjunctiva and sclera clear  ENMT: Moist oral mucosa  RESPIRATORY: Normal respiratory effort; lungs are clear to auscultation bilaterally  CARDIOVASCULAR: Regular rate and rhythm, normal S1 and S2, no murmur; No lower extremity edema  ABDOMEN: Nontender to palpation, normoactive bowel sounds, no rebound/guarding  MUSCULOSKELETAL:   no clubbing or cyanosis of digits; no joint swelling or tenderness to palpation  PSYCH: A+O to person and place; affect appropriate  NEUROLOGY: CN 2-12 are intact and symmetric; no gross sensory deficits   SKIN: No rashes; no palpable lesions    LABS:                        12.1   7.17  )-----------( 292      ( 03 Jun 2023 05:18 )             38.5     06-03    132<L>  |  97<L>  |  8   ----------------------------<  81  4.2   |  22  |  0.62    Ca    10.1      03 Jun 2023 05:18  Phos  4.7     06-03  Mg     1.90     06-03                RADIOLOGY & ADDITIONAL TESTS:  Results Reviewed:   Imaging Personally Reviewed:  Electrocardiogram Personally Reviewed:    COORDINATION OF CARE:  Care Discussed with Consultants/Other Providers [Y/N]: Y  Prior or Outpatient Records Reviewed [Y/N]: Y

## 2023-06-03 NOTE — PROGRESS NOTE ADULT - PROBLEM SELECTOR PLAN 2
- crush all medications if patient is cleared for diet as per daughter, changed depakote to sprinkle formulation  - discussed with daughter - since the last 2 months pt has become paranoid about food being poisoned.  Swallow studies done at assisted living facility did not reveal any etiology - up until presentation to the hospital pt was tolerating a pureed diet (pureed as pt would not eat any consistency not due to any actual dysphagia issue)   - appreciate GI resc - would optimize psychiatric issues then consider outpt interventions

## 2023-06-04 LAB
GLUCOSE BLDC GLUCOMTR-MCNC: 104 MG/DL — HIGH (ref 70–99)
GLUCOSE BLDC GLUCOMTR-MCNC: 121 MG/DL — HIGH (ref 70–99)
GLUCOSE BLDC GLUCOMTR-MCNC: 129 MG/DL — HIGH (ref 70–99)
GLUCOSE BLDC GLUCOMTR-MCNC: 94 MG/DL — SIGNIFICANT CHANGE UP (ref 70–99)

## 2023-06-04 PROCEDURE — 99232 SBSQ HOSP IP/OBS MODERATE 35: CPT

## 2023-06-04 RX ORDER — LANOLIN ALCOHOL/MO/W.PET/CERES
9 CREAM (GRAM) TOPICAL ONCE
Refills: 0 | Status: COMPLETED | OUTPATIENT
Start: 2023-06-04 | End: 2023-06-04

## 2023-06-04 RX ADMIN — Medication 1 MILLIGRAM(S): at 12:38

## 2023-06-04 RX ADMIN — Medication 650 MILLIGRAM(S): at 18:58

## 2023-06-04 RX ADMIN — SENNA PLUS 2 TABLET(S): 8.6 TABLET ORAL at 20:28

## 2023-06-04 RX ADMIN — ATORVASTATIN CALCIUM 10 MILLIGRAM(S): 80 TABLET, FILM COATED ORAL at 20:29

## 2023-06-04 RX ADMIN — PANTOPRAZOLE SODIUM 40 MILLIGRAM(S): 20 TABLET, DELAYED RELEASE ORAL at 12:38

## 2023-06-04 RX ADMIN — DIVALPROEX SODIUM 250 MILLIGRAM(S): 500 TABLET, DELAYED RELEASE ORAL at 12:37

## 2023-06-04 RX ADMIN — Medication 1 TABLET(S): at 17:20

## 2023-06-04 RX ADMIN — DIVALPROEX SODIUM 500 MILLIGRAM(S): 500 TABLET, DELAYED RELEASE ORAL at 20:28

## 2023-06-04 RX ADMIN — HALOPERIDOL DECANOATE 1 MILLIGRAM(S): 100 INJECTION INTRAMUSCULAR at 20:29

## 2023-06-04 RX ADMIN — Medication 1 TABLET(S): at 12:38

## 2023-06-04 RX ADMIN — Medication 9 MILLIGRAM(S): at 20:28

## 2023-06-04 RX ADMIN — AMLODIPINE BESYLATE 2.5 MILLIGRAM(S): 2.5 TABLET ORAL at 05:38

## 2023-06-04 RX ADMIN — LOSARTAN POTASSIUM 100 MILLIGRAM(S): 100 TABLET, FILM COATED ORAL at 05:39

## 2023-06-04 RX ADMIN — Medication 650 MILLIGRAM(S): at 19:58

## 2023-06-04 RX ADMIN — Medication 81 MILLIGRAM(S): at 12:37

## 2023-06-04 RX ADMIN — Medication 1 TABLET(S): at 05:40

## 2023-06-04 RX ADMIN — QUETIAPINE FUMARATE 50 MILLIGRAM(S): 200 TABLET, FILM COATED ORAL at 20:28

## 2023-06-04 RX ADMIN — Medication 0.5 MILLIGRAM(S): at 12:37

## 2023-06-04 NOTE — PROGRESS NOTE ADULT - PROBLEM SELECTOR PLAN 2
- crush all medications if patient is cleared for diet as per daughter, changed depakote to sprinkle formulation  - discussed with daughter - since the last 2 months pt has become paranoid about food being poisoned.  Swallow studies done at assisted living facility did not reveal any etiology - up until presentation to the hospital pt was tolerating a pureed diet (pureed as pt would not eat any consistency not due to any actual dysphagia issue)   - appreciate GI recs - would optimize psychiatric issues then consider outpt interventions

## 2023-06-04 NOTE — PROGRESS NOTE ADULT - SUBJECTIVE AND OBJECTIVE BOX
[facility] Division of Hospital Medicine  Araceli Han MD  Pager (M-F, 8A-5P): [phone]  Other Times:  [phone]    Patient is a 82y old  Female who presents with a chief complaint of paranoia (03 Jun 2023 08:36)      SUBJECTIVE / OVERNIGHT EVENTS:  No acute events overnight.     MEDICATIONS  (STANDING):  amLODIPine   Tablet 2.5 milliGRAM(s) Oral daily  aspirin  chewable 81 milliGRAM(s) Oral daily  atorvastatin 10 milliGRAM(s) Oral at bedtime  calcium carbonate   1250 mG (OsCal) 1 Tablet(s) Oral two times a day  clonazePAM  Tablet 0.5 milliGRAM(s) Oral daily  dextrose 5%. 1000 milliLiter(s) (50 mL/Hr) IV Continuous <Continuous>  dextrose 5%. 1000 milliLiter(s) (100 mL/Hr) IV Continuous <Continuous>  dextrose 50% Injectable 12.5 Gram(s) IV Push once  dextrose 50% Injectable 25 Gram(s) IV Push once  dextrose 50% Injectable 25 Gram(s) IV Push once  divalproex Sprinkle 500 milliGRAM(s) Oral at bedtime  divalproex Sprinkle 250 milliGRAM(s) Oral daily  folic acid 1 milliGRAM(s) Oral daily  glucagon  Injectable 1 milliGRAM(s) IntraMuscular once  insulin lispro (ADMELOG) corrective regimen sliding scale   SubCutaneous three times a day before meals  insulin lispro (ADMELOG) corrective regimen sliding scale   SubCutaneous at bedtime  losartan 100 milliGRAM(s) Oral daily  multivitamin 1 Tablet(s) Oral daily  pantoprazole   Suspension 40 milliGRAM(s) Oral daily  QUEtiapine 50 milliGRAM(s) Oral at bedtime  senna 2 Tablet(s) Oral at bedtime    MEDICATIONS  (PRN):  acetaminophen     Tablet .. 650 milliGRAM(s) Oral every 6 hours PRN Temp greater or equal to 38C (100.4F), Mild Pain (1 - 3)  dextrose Oral Gel 15 Gram(s) Oral once PRN Blood Glucose LESS THAN 70 milliGRAM(s)/deciliter  haloperidol    Injectable 1 milliGRAM(s) IV Push every 6 hours PRN Agitation  melatonin 3 milliGRAM(s) Oral at bedtime PRN Insomnia  QUEtiapine 12.5 milliGRAM(s) Oral every 6 hours PRN Anxiety  sodium chloride 0.65% Nasal 1 Spray(s) Both Nostrils three times a day PRN Nasal Congestion      CAPILLARY BLOOD GLUCOSE      POCT Blood Glucose.: 94 mg/dL (04 Jun 2023 07:30)  POCT Blood Glucose.: 94 mg/dL (03 Jun 2023 20:01)  POCT Blood Glucose.: 209 mg/dL (03 Jun 2023 16:54)  POCT Blood Glucose.: 121 mg/dL (03 Jun 2023 11:21)    I&O's Summary      PHYSICAL EXAM:  Vital Signs Last 24 Hrs  T(C): 36.6 (04 Jun 2023 04:45), Max: 36.7 (03 Jun 2023 14:40)  T(F): 97.8 (04 Jun 2023 04:45), Max: 98.1 (03 Jun 2023 14:40)  HR: 98 (04 Jun 2023 04:45) (78 - 98)  BP: 132/65 (04 Jun 2023 04:45) (132/65 - 148/64)  BP(mean): --  RR: 18 (04 Jun 2023 04:45) (17 - 18)  SpO2: 100% (04 Jun 2023 04:45) (99% - 100%)    Parameters below as of 04 Jun 2023 04:45  Patient On (Oxygen Delivery Method): room air      CONSTITUTIONAL: NAD, well-developed, well-groomed  EYES: EOMI; conjunctiva and sclera clear  ENMT: Moist oral mucosa  RESPIRATORY: Normal respiratory effort; lungs are clear to auscultation bilaterally  CARDIOVASCULAR: Regular rate and rhythm, normal S1 and S2, no murmur; No lower extremity edema  ABDOMEN: Nontender to palpation, normoactive bowel sounds, no rebound/guarding  MUSCULOSKELETAL:   no clubbing or cyanosis of digits; no joint swelling or tenderness to palpation  PSYCH: A+O to person, place, and time; affect appropriate  NEUROLOGY: CN 2-12 are intact and symmetric; no gross sensory deficits   SKIN: No rashes; no palpable lesions    LABS:                        12.1   7.17  )-----------( 292      ( 03 Jun 2023 05:18 )             38.5     06-03    132<L>  |  97<L>  |  8   ----------------------------<  81  4.2   |  22  |  0.62    Ca    10.1      03 Jun 2023 05:18  Phos  4.7     06-03  Mg     1.90     06-03                RADIOLOGY & ADDITIONAL TESTS:  Results Reviewed:   Imaging Personally Reviewed:  Electrocardiogram Personally Reviewed:    COORDINATION OF CARE:  Care Discussed with Consultants/Other Providers [Y/N]: Y  Prior or Outpatient Records Reviewed [Y/N]: Y

## 2023-06-05 LAB
ANION GAP SERPL CALC-SCNC: 12 MMOL/L — SIGNIFICANT CHANGE UP (ref 7–14)
BUN SERPL-MCNC: 8 MG/DL — SIGNIFICANT CHANGE UP (ref 7–23)
CALCIUM SERPL-MCNC: 9.9 MG/DL — SIGNIFICANT CHANGE UP (ref 8.4–10.5)
CHLORIDE SERPL-SCNC: 102 MMOL/L — SIGNIFICANT CHANGE UP (ref 98–107)
CO2 SERPL-SCNC: 23 MMOL/L — SIGNIFICANT CHANGE UP (ref 22–31)
CREAT SERPL-MCNC: 0.47 MG/DL — LOW (ref 0.5–1.3)
EGFR: 96 ML/MIN/1.73M2 — SIGNIFICANT CHANGE UP
GLUCOSE BLDC GLUCOMTR-MCNC: 100 MG/DL — HIGH (ref 70–99)
GLUCOSE BLDC GLUCOMTR-MCNC: 105 MG/DL — HIGH (ref 70–99)
GLUCOSE BLDC GLUCOMTR-MCNC: 115 MG/DL — HIGH (ref 70–99)
GLUCOSE BLDC GLUCOMTR-MCNC: 149 MG/DL — HIGH (ref 70–99)
GLUCOSE SERPL-MCNC: 92 MG/DL — SIGNIFICANT CHANGE UP (ref 70–99)
HCT VFR BLD CALC: 34.5 % — SIGNIFICANT CHANGE UP (ref 34.5–45)
HGB BLD-MCNC: 11 G/DL — LOW (ref 11.5–15.5)
MAGNESIUM SERPL-MCNC: 1.7 MG/DL — SIGNIFICANT CHANGE UP (ref 1.6–2.6)
MCHC RBC-ENTMCNC: 25.9 PG — LOW (ref 27–34)
MCHC RBC-ENTMCNC: 31.9 GM/DL — LOW (ref 32–36)
MCV RBC AUTO: 81.4 FL — SIGNIFICANT CHANGE UP (ref 80–100)
NRBC # BLD: 0 /100 WBCS — SIGNIFICANT CHANGE UP (ref 0–0)
NRBC # FLD: 0 K/UL — SIGNIFICANT CHANGE UP (ref 0–0)
PHOSPHATE SERPL-MCNC: 4.3 MG/DL — SIGNIFICANT CHANGE UP (ref 2.5–4.5)
PLATELET # BLD AUTO: 268 K/UL — SIGNIFICANT CHANGE UP (ref 150–400)
POTASSIUM SERPL-MCNC: 4 MMOL/L — SIGNIFICANT CHANGE UP (ref 3.5–5.3)
POTASSIUM SERPL-SCNC: 4 MMOL/L — SIGNIFICANT CHANGE UP (ref 3.5–5.3)
RBC # BLD: 4.24 M/UL — SIGNIFICANT CHANGE UP (ref 3.8–5.2)
RBC # FLD: 13.8 % — SIGNIFICANT CHANGE UP (ref 10.3–14.5)
SODIUM SERPL-SCNC: 137 MMOL/L — SIGNIFICANT CHANGE UP (ref 135–145)
WBC # BLD: 6.65 K/UL — SIGNIFICANT CHANGE UP (ref 3.8–10.5)
WBC # FLD AUTO: 6.65 K/UL — SIGNIFICANT CHANGE UP (ref 3.8–10.5)

## 2023-06-05 PROCEDURE — 99232 SBSQ HOSP IP/OBS MODERATE 35: CPT

## 2023-06-05 PROCEDURE — 99231 SBSQ HOSP IP/OBS SF/LOW 25: CPT

## 2023-06-05 PROCEDURE — 99232 SBSQ HOSP IP/OBS MODERATE 35: CPT | Mod: GC

## 2023-06-05 RX ORDER — SODIUM CHLORIDE 0.65 %
1 AEROSOL, SPRAY (ML) NASAL THREE TIMES A DAY
Refills: 0 | Status: DISCONTINUED | OUTPATIENT
Start: 2023-06-05 | End: 2023-06-07

## 2023-06-05 RX ORDER — DULOXETINE HYDROCHLORIDE 30 MG/1
30 CAPSULE, DELAYED RELEASE ORAL DAILY
Refills: 0 | Status: DISCONTINUED | OUTPATIENT
Start: 2023-06-05 | End: 2023-06-07

## 2023-06-05 RX ORDER — LIDOCAINE 4 G/100G
1 CREAM TOPICAL DAILY
Refills: 0 | Status: DISCONTINUED | OUTPATIENT
Start: 2023-06-05 | End: 2023-06-07

## 2023-06-05 RX ADMIN — DIVALPROEX SODIUM 250 MILLIGRAM(S): 500 TABLET, DELAYED RELEASE ORAL at 12:23

## 2023-06-05 RX ADMIN — DIVALPROEX SODIUM 500 MILLIGRAM(S): 500 TABLET, DELAYED RELEASE ORAL at 21:42

## 2023-06-05 RX ADMIN — Medication 81 MILLIGRAM(S): at 12:23

## 2023-06-05 RX ADMIN — Medication 1 SPRAY(S): at 15:23

## 2023-06-05 RX ADMIN — LIDOCAINE 1 PATCH: 4 CREAM TOPICAL at 12:28

## 2023-06-05 RX ADMIN — ATORVASTATIN CALCIUM 10 MILLIGRAM(S): 80 TABLET, FILM COATED ORAL at 21:42

## 2023-06-05 RX ADMIN — Medication 1 SPRAY(S): at 22:29

## 2023-06-05 RX ADMIN — Medication 650 MILLIGRAM(S): at 02:00

## 2023-06-05 RX ADMIN — LIDOCAINE 1 PATCH: 4 CREAM TOPICAL at 14:39

## 2023-06-05 RX ADMIN — Medication 1 TABLET(S): at 18:16

## 2023-06-05 RX ADMIN — PANTOPRAZOLE SODIUM 40 MILLIGRAM(S): 20 TABLET, DELAYED RELEASE ORAL at 11:13

## 2023-06-05 RX ADMIN — Medication 650 MILLIGRAM(S): at 11:30

## 2023-06-05 RX ADMIN — Medication 0.5 MILLIGRAM(S): at 12:26

## 2023-06-05 RX ADMIN — DULOXETINE HYDROCHLORIDE 30 MILLIGRAM(S): 30 CAPSULE, DELAYED RELEASE ORAL at 18:20

## 2023-06-05 RX ADMIN — Medication 0.5 MILLIGRAM(S): at 10:01

## 2023-06-05 RX ADMIN — Medication 1 MILLIGRAM(S): at 11:12

## 2023-06-05 RX ADMIN — QUETIAPINE FUMARATE 50 MILLIGRAM(S): 200 TABLET, FILM COATED ORAL at 21:45

## 2023-06-05 RX ADMIN — Medication 1 TABLET(S): at 06:39

## 2023-06-05 RX ADMIN — Medication 650 MILLIGRAM(S): at 11:10

## 2023-06-05 RX ADMIN — SENNA PLUS 2 TABLET(S): 8.6 TABLET ORAL at 21:42

## 2023-06-05 RX ADMIN — Medication 1 TABLET(S): at 11:12

## 2023-06-05 RX ADMIN — Medication 650 MILLIGRAM(S): at 01:21

## 2023-06-05 NOTE — PROGRESS NOTE ADULT - ATTENDING COMMENTS
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 Possible inpatient psych admission if no improvement with medication adjustment  --Diet per SLP  --Would defer endoscopic evaluation pending further improvement from psychiatric perspective as difficult to assess current symptoms  --Consider formal esophagram to evaluate distal esophagus while awaiting improvement from psychiatric perspective    Additional recommendations as above.

## 2023-06-05 NOTE — BH CONSULTATION LIAISON PROGRESS NOTE - NSBHASSESSMENTFT_PSY_ALL_CORE
81 year old , retired female, domiciled at the [facility], PPH of late onset Bipolar I disorder, PTSD and Cluster B Personality traits, 2x prior psych adm last in 2022 at [facility], presents with worsening paranoia, anxiety, FTT (weight loss of 20lb since march), confusion - psych c/s for psychosis/AMS.    Patient presents with acute decompensation, suspect pseudodementia vs. medication induced but with anxiety as  of cognitive deficits and loss of ADLs, severe paranoia which is dimensionally indistinguishable from psychosis at this time. Warrants med changes as below, will taper gabapentin and begin seroquel given its efficacy in anxiety which appears predominant, no AH/VH noted. No parkinsonian features noted when on Risperdal in past but LBD on ddx, will opt for low EPS burden meds for now, risks/benefits including BBW/mortality risk discussed with family, amenable to use.     6/1/23: Remains anxious, paranoid. Continue cross taper from gabapentin to Seroquel. Monitor vitals.  6/2/23: Remains anxious though reports to be consuming her meals.   6/5/23: Remains anxious, needing PRNs, poor insight    Recs:  - does NOT have capacity to leave AMA  - Routine observation  - START Cymbalta 30mg daily, first dose now  - c/w Seroquel 50mg PO HS  - C/w Depakote ER 250mg qAM + 500mg qHS (8am, 8pm)  - C/w Klonopin 0.5mg QD (8am)  - Will continue to monitor and reassess.  - PRN for anxiety Seroquel 12.5mg q6h PRN PO  - PRN for severe agitation Haldol 1mg q6h PRN IM/IV/PO (if Zyprexa IM available can do Zyprexa 1.25mg q6h PRN IM instead)  - Dispo: Pending, likely inpt geropsych admission needed if no response to above med changes.   81 year old , retired female, domiciled at the [facility], PPH of late onset Bipolar I disorder, PTSD and Cluster B Personality traits, 2x prior psych adm last in 2022 at [facility], presents with worsening paranoia, anxiety, FTT (weight loss of 20lb since march), confusion - psych c/s for psychosis/AMS.    Patient presents with acute decompensation, suspect pseudodementia vs. medication induced but with anxiety as  of cognitive deficits and loss of ADLs, severe paranoia which is dimensionally indistinguishable from psychosis at this time. Warrants med changes as below, will taper gabapentin and begin seroquel given its efficacy in anxiety which appears predominant, no AH/VH noted. No parkinsonian features noted when on Risperdal in past but LBD on ddx, will opt for low EPS burden meds for now, risks/benefits including BBW/mortality risk discussed with family, amenable to use.     6/1/23: Remains anxious, paranoid. Continue cross taper from gabapentin to Seroquel. Monitor vitals.  6/2/23: Remains anxious though reports to be consuming her meals.   6/5/23: Remains anxious, needing PRNs, poor insight    Recs:  - does NOT have capacity to leave AMA  - Routine observation  - START Cymbalta 30mg daily, first dose now  - c/w Seroquel 50mg PO HS  - C/w Depakote ER 250mg qAM + 500mg qHS (8am, 8pm)  - C/w Klonopin 0.5mg QD (8am)  - Will continue to monitor and reassess.  - PRN for anxiety Seroquel 12.5mg q6h PRN PO  - PRN for severe agitation use Ativan PRN as ordered instead of Haldol PRN  - Dispo: Pending, likely inpt geropsych admission needed if no response to above med changes.

## 2023-06-05 NOTE — PROGRESS NOTE ADULT - SUBJECTIVE AND OBJECTIVE BOX
Gastroenterology/Hepatology Progress Note    Interval Events:   - patient requesting aid to taste her food   - reports no issues with swallowing; reports that it is the food that is bad, "sour"   - alert to name and place, not to year   - otherwise no complaints     Allergies:  Cipro (Hives)  sulfa drugs (Unknown)  penicillin (Rash)  Cipro (Rash)  penicillin (Unknown)  sulfa drugs (Rash)  adhesives (Unknown)      Hospital Medications:  acetaminophen     Tablet .. 650 milliGRAM(s) Oral every 6 hours PRN  amLODIPine   Tablet 2.5 milliGRAM(s) Oral daily  aspirin  chewable 81 milliGRAM(s) Oral daily  atorvastatin 10 milliGRAM(s) Oral at bedtime  calcium carbonate   1250 mG (OsCal) 1 Tablet(s) Oral two times a day  clonazePAM  Tablet 0.5 milliGRAM(s) Oral daily  dextrose 5%. 1000 milliLiter(s) IV Continuous <Continuous>  dextrose 5%. 1000 milliLiter(s) IV Continuous <Continuous>  dextrose 50% Injectable 12.5 Gram(s) IV Push once  dextrose 50% Injectable 25 Gram(s) IV Push once  dextrose 50% Injectable 25 Gram(s) IV Push once  dextrose Oral Gel 15 Gram(s) Oral once PRN  divalproex Sprinkle 500 milliGRAM(s) Oral at bedtime  divalproex Sprinkle 250 milliGRAM(s) Oral daily  folic acid 1 milliGRAM(s) Oral daily  glucagon  Injectable 1 milliGRAM(s) IntraMuscular once  haloperidol    Injectable 1 milliGRAM(s) IV Push every 6 hours PRN  insulin lispro (ADMELOG) corrective regimen sliding scale   SubCutaneous three times a day before meals  insulin lispro (ADMELOG) corrective regimen sliding scale   SubCutaneous at bedtime  losartan 100 milliGRAM(s) Oral daily  melatonin 3 milliGRAM(s) Oral at bedtime PRN  multivitamin 1 Tablet(s) Oral daily  pantoprazole   Suspension 40 milliGRAM(s) Oral daily  QUEtiapine 12.5 milliGRAM(s) Oral every 6 hours PRN  QUEtiapine 50 milliGRAM(s) Oral at bedtime  senna 2 Tablet(s) Oral at bedtime  sodium chloride 0.65% Nasal 1 Spray(s) Both Nostrils three times a day PRN      ROS: 14 point ROS negative but may be limited due to mental status     PHYSICAL EXAM:   Vital Signs:  Vital Signs Last 24 Hrs  T(C): 36.3 (05 Jun 2023 06:07), Max: 36.6 (04 Jun 2023 10:34)  T(F): 97.4 (05 Jun 2023 06:07), Max: 97.9 (04 Jun 2023 10:34)  HR: 74 (05 Jun 2023 06:07) (73 - 88)  BP: 139/58 (05 Jun 2023 06:07) (118/60 - 167/64)  BP(mean): --  RR: 18 (05 Jun 2023 06:07) (18 - 18)  SpO2: 99% (05 Jun 2023 06:07) (99% - 100%)    Parameters below as of 05 Jun 2023 06:07  Patient On (Oxygen Delivery Method): room air      Daily     Daily     GENERAL:  No acute distress  HEENT:  NCAT, no scleral icterus  CHEST: no resp distress  HEART:  RRR  ABDOMEN:  Soft, non-tender, non-distended, no masses  EXTREMITIES:  No cyanosis, clubbing, or edema  SKIN:  No rash/erythema/ecchymoses/petechiae/wounds/abscess/warm/dry  NEURO:  Alert and oriented x 3     LABS:                        11.0   6.65  )-----------( 268      ( 05 Jun 2023 07:02 )             34.5     Mean Cell Volume: 81.4 fL (06-05-23 @ 07:02)    06-05    137  |  102  |  8   ----------------------------<  92  4.0   |  23  |  0.47<L>    Ca    9.9      05 Jun 2023 07:02  Phos  4.3     06-05  Mg     1.70     06-05                  Imaging:           Gastroenterology/Hepatology Progress Note    Interval Events:   - patient requesting aid to taste her food   - reports no issues with swallowing; reports that it is the food that is bad, "sour"   - alert to name and place, not to year   - otherwise no complaints     Allergies:  Cipro (Hives)  sulfa drugs (Unknown)  penicillin (Rash)  Cipro (Rash)  penicillin (Unknown)  sulfa drugs (Rash)  adhesives (Unknown)      Hospital Medications:  acetaminophen     Tablet .. 650 milliGRAM(s) Oral every 6 hours PRN  amLODIPine   Tablet 2.5 milliGRAM(s) Oral daily  aspirin  chewable 81 milliGRAM(s) Oral daily  atorvastatin 10 milliGRAM(s) Oral at bedtime  calcium carbonate   1250 mG (OsCal) 1 Tablet(s) Oral two times a day  clonazePAM  Tablet 0.5 milliGRAM(s) Oral daily  dextrose 5%. 1000 milliLiter(s) IV Continuous <Continuous>  dextrose 5%. 1000 milliLiter(s) IV Continuous <Continuous>  dextrose 50% Injectable 12.5 Gram(s) IV Push once  dextrose 50% Injectable 25 Gram(s) IV Push once  dextrose 50% Injectable 25 Gram(s) IV Push once  dextrose Oral Gel 15 Gram(s) Oral once PRN  divalproex Sprinkle 500 milliGRAM(s) Oral at bedtime  divalproex Sprinkle 250 milliGRAM(s) Oral daily  folic acid 1 milliGRAM(s) Oral daily  glucagon  Injectable 1 milliGRAM(s) IntraMuscular once  haloperidol    Injectable 1 milliGRAM(s) IV Push every 6 hours PRN  insulin lispro (ADMELOG) corrective regimen sliding scale   SubCutaneous three times a day before meals  insulin lispro (ADMELOG) corrective regimen sliding scale   SubCutaneous at bedtime  losartan 100 milliGRAM(s) Oral daily  melatonin 3 milliGRAM(s) Oral at bedtime PRN  multivitamin 1 Tablet(s) Oral daily  pantoprazole   Suspension 40 milliGRAM(s) Oral daily  QUEtiapine 12.5 milliGRAM(s) Oral every 6 hours PRN  QUEtiapine 50 milliGRAM(s) Oral at bedtime  senna 2 Tablet(s) Oral at bedtime  sodium chloride 0.65% Nasal 1 Spray(s) Both Nostrils three times a day PRN      ROS: 14 point ROS negative but may be limited due to mental status     PHYSICAL EXAM:   Vital Signs:  Vital Signs Last 24 Hrs  T(C): 36.3 (05 Jun 2023 06:07), Max: 36.6 (04 Jun 2023 10:34)  T(F): 97.4 (05 Jun 2023 06:07), Max: 97.9 (04 Jun 2023 10:34)  HR: 74 (05 Jun 2023 06:07) (73 - 88)  BP: 139/58 (05 Jun 2023 06:07) (118/60 - 167/64)  BP(mean): --  RR: 18 (05 Jun 2023 06:07) (18 - 18)  SpO2: 99% (05 Jun 2023 06:07) (99% - 100%)    Parameters below as of 05 Jun 2023 06:07  Patient On (Oxygen Delivery Method): room air      Daily     Daily     GENERAL:  No acute distress  HEENT:  NCAT, no scleral icterus  CHEST: no resp distress  HEART:  RRR  ABDOMEN:  Soft, non-tender, non-distended, no masses  EXTREMITIES:  No cyanosis, clubbing, or edema  SKIN:  No rash/erythema/ecchymoses/petechiae/wounds/abscess/warm/dry  NEURO:  Alert and oriented x 2    LABS:                        11.0   6.65  )-----------( 268      ( 05 Jun 2023 07:02 )             34.5     Mean Cell Volume: 81.4 fL (06-05-23 @ 07:02)    06-05    137  |  102  |  8   ----------------------------<  92  4.0   |  23  |  0.47<L>    Ca    9.9      05 Jun 2023 07:02  Phos  4.3     06-05  Mg     1.70     06-05                  Imaging:           Gastroenterology/Hepatology Progress Note    Interval Events:   - patient requesting aid to taste her food   - reports no issues with swallowing; reports that it is the food that is bad, "sour"   - alert to name and place, not to year   - otherwise no complaints     Allergies:  Cipro (Hives)  sulfa drugs (Unknown)  penicillin (Rash)  Cipro (Rash)  penicillin (Unknown)  sulfa drugs (Rash)  adhesives (Unknown)      Hospital Medications:  acetaminophen     Tablet .. 650 milliGRAM(s) Oral every 6 hours PRN  amLODIPine   Tablet 2.5 milliGRAM(s) Oral daily  aspirin  chewable 81 milliGRAM(s) Oral daily  atorvastatin 10 milliGRAM(s) Oral at bedtime  calcium carbonate   1250 mG (OsCal) 1 Tablet(s) Oral two times a day  clonazePAM  Tablet 0.5 milliGRAM(s) Oral daily  dextrose 5%. 1000 milliLiter(s) IV Continuous <Continuous>  dextrose 5%. 1000 milliLiter(s) IV Continuous <Continuous>  dextrose 50% Injectable 12.5 Gram(s) IV Push once  dextrose 50% Injectable 25 Gram(s) IV Push once  dextrose 50% Injectable 25 Gram(s) IV Push once  dextrose Oral Gel 15 Gram(s) Oral once PRN  divalproex Sprinkle 500 milliGRAM(s) Oral at bedtime  divalproex Sprinkle 250 milliGRAM(s) Oral daily  folic acid 1 milliGRAM(s) Oral daily  glucagon  Injectable 1 milliGRAM(s) IntraMuscular once  haloperidol    Injectable 1 milliGRAM(s) IV Push every 6 hours PRN  insulin lispro (ADMELOG) corrective regimen sliding scale   SubCutaneous three times a day before meals  insulin lispro (ADMELOG) corrective regimen sliding scale   SubCutaneous at bedtime  losartan 100 milliGRAM(s) Oral daily  melatonin 3 milliGRAM(s) Oral at bedtime PRN  multivitamin 1 Tablet(s) Oral daily  pantoprazole   Suspension 40 milliGRAM(s) Oral daily  QUEtiapine 12.5 milliGRAM(s) Oral every 6 hours PRN  QUEtiapine 50 milliGRAM(s) Oral at bedtime  senna 2 Tablet(s) Oral at bedtime  sodium chloride 0.65% Nasal 1 Spray(s) Both Nostrils three times a day PRN      ROS: 14 point ROS negative but may be limited due to mental status     PHYSICAL EXAM:   Vital Signs:  Vital Signs Last 24 Hrs  T(C): 36.3 (05 Jun 2023 06:07), Max: 36.6 (04 Jun 2023 10:34)  T(F): 97.4 (05 Jun 2023 06:07), Max: 97.9 (04 Jun 2023 10:34)  HR: 74 (05 Jun 2023 06:07) (73 - 88)  BP: 139/58 (05 Jun 2023 06:07) (118/60 - 167/64)  BP(mean): --  RR: 18 (05 Jun 2023 06:07) (18 - 18)  SpO2: 99% (05 Jun 2023 06:07) (99% - 100%)    Parameters below as of 05 Jun 2023 06:07  Patient On (Oxygen Delivery Method): room air    GENERAL:  No acute distress  HEENT:  NCAT, no scleral icterus  CHEST: no resp distress  HEART:  RRR  ABDOMEN:  Soft, non-tender, non-distended, no masses  EXTREMITIES:  No cyanosis, clubbing, or edema  SKIN:  No rash/erythema/ecchymoses/petechiae/wounds/abscess/warm/dry  NEURO:  Alert and oriented x 2    LABS:                        11.0   6.65  )-----------( 268      ( 05 Jun 2023 07:02 )             34.5     Mean Cell Volume: 81.4 fL (06-05-23 @ 07:02)    06-05    137  |  102  |  8   ----------------------------<  92  4.0   |  23  |  0.47<L>    Ca    9.9      05 Jun 2023 07:02  Phos  4.3     06-05  Mg     1.70     06-05

## 2023-06-05 NOTE — PROGRESS NOTE ADULT - ASSESSMENT
83 y/o F with pmhx of dementia, DM2, HTN, anxiety, paranoia presented to the ED for new onset AMS and worsening agitation and paranoia. Admitted for behavorial issues and dysphagia work up.  Per GI, recommended o/p EGD.

## 2023-06-05 NOTE — PROGRESS NOTE ADULT - ASSESSMENT
Impression:     #esophageal web   - speech and swallow cleared for 1) Minced and moist solids with thin liquids, 2) Feeding/Swallowing Guidelines: Upright postioning, small single sips of thin liquids, Alternate minced and moist solids with thin liquids, 3) Aspiration/reflux precautions  - esophagram notable for suspected esophageal web located at the level of C5 vertebral body    Recommendations:   - would recommend optimization of psychiatric medications   - assess dysphagia symptoms when psychiatrically optimized to obtain best description of symptoms outside of paranoia around poisoned food   - psychiatric medications per behavioral health   - rest of care per primary team     All recommendations are tentative until note is attested by attending.     Rani Park, PGY-4   Gastroenterology/Hepatology Fellow  Available on [employer] Teams  [phone] ([facility] Short Range Pager)  [phone] ([facility] Long Range Pager)    After 5pm, please contact the on-call GI fellow. [phone] Impression:     #esophageal web   - speech and swallow cleared for 1) Minced and moist solids with thin liquids, 2) Feeding/Swallowing Guidelines: Upright postioning, small single sips of thin liquids, Alternate minced and moist solids with thin liquids, 3) Aspiration/reflux precautions  - esophagram notable for suspected esophageal web located at the level of C5 vertebral body    Recommendations:   - would recommend optimization of psychiatric medications   - assess dysphagia symptoms when psychiatrically optimized to obtain best description of symptoms outside of paranoia around poisoned food   - no urgent need for endoscopy at this time   - psychiatric medications per behavioral health   - rest of care per primary team     No further recommendations from Gastroenterology at this point. Gastroenterology will sign off at this time. Please re-consult for any other further questions or when patient is better medically optimized.   All recommendations are tentative until note is attested by attending.     Rani Park, PGY-4   Gastroenterology/Hepatology Fellow  Available on [employer] Teams  [phone] ([facility] Short Range Pager)  [phone] ([facility] Long Range Pager)    After 5pm, please contact the on-call GI fellow. [phone]

## 2023-06-05 NOTE — PROGRESS NOTE ADULT - SUBJECTIVE AND OBJECTIVE BOX
PROGRESS NOTE:   Authored by Dr. Florencia Cruz MD, pager [phone]     Patient is a 82y old  Female who presents with a chief complaint of paranoia (05 Jun 2023 08:47)      SUBJECTIVE / OVERNIGHT EVENTS:  Patient is sitting up in bed. Status she has pain in her back. Taking PO and having BMs. No fevers, chills, N/V/D, dysuria, or hematuria.     ADDITIONAL REVIEW OF SYSTEMS:    MEDICATIONS  (STANDING):  amLODIPine   Tablet 2.5 milliGRAM(s) Oral daily  aspirin  chewable 81 milliGRAM(s) Oral daily  atorvastatin 10 milliGRAM(s) Oral at bedtime  calcium carbonate   1250 mG (OsCal) 1 Tablet(s) Oral two times a day  clonazePAM  Tablet 0.5 milliGRAM(s) Oral daily  dextrose 5%. 1000 milliLiter(s) (50 mL/Hr) IV Continuous <Continuous>  dextrose 5%. 1000 milliLiter(s) (100 mL/Hr) IV Continuous <Continuous>  dextrose 50% Injectable 12.5 Gram(s) IV Push once  dextrose 50% Injectable 25 Gram(s) IV Push once  dextrose 50% Injectable 25 Gram(s) IV Push once  divalproex Sprinkle 500 milliGRAM(s) Oral at bedtime  divalproex Sprinkle 250 milliGRAM(s) Oral daily  folic acid 1 milliGRAM(s) Oral daily  glucagon  Injectable 1 milliGRAM(s) IntraMuscular once  insulin lispro (ADMELOG) corrective regimen sliding scale   SubCutaneous three times a day before meals  insulin lispro (ADMELOG) corrective regimen sliding scale   SubCutaneous at bedtime  lidocaine   4% Patch 1 Patch Transdermal daily  losartan 100 milliGRAM(s) Oral daily  multivitamin 1 Tablet(s) Oral daily  pantoprazole   Suspension 40 milliGRAM(s) Oral daily  QUEtiapine 50 milliGRAM(s) Oral at bedtime  senna 2 Tablet(s) Oral at bedtime  sodium chloride 0.65% Nasal 1 Spray(s) Both Nostrils three times a day    MEDICATIONS  (PRN):  acetaminophen     Tablet .. 650 milliGRAM(s) Oral every 6 hours PRN Temp greater or equal to 38C (100.4F), Mild Pain (1 - 3)  dextrose Oral Gel 15 Gram(s) Oral once PRN Blood Glucose LESS THAN 70 milliGRAM(s)/deciliter  haloperidol    Injectable 1 milliGRAM(s) IV Push every 6 hours PRN Agitation  LORazepam     Tablet 0.5 milliGRAM(s) Oral every 12 hours PRN Anxiety/agitation  melatonin 3 milliGRAM(s) Oral at bedtime PRN Insomnia  QUEtiapine 12.5 milliGRAM(s) Oral every 6 hours PRN Anxiety      CAPILLARY BLOOD GLUCOSE      POCT Blood Glucose.: 149 mg/dL (05 Jun 2023 07:45)  POCT Blood Glucose.: 129 mg/dL (04 Jun 2023 21:00)  POCT Blood Glucose.: 104 mg/dL (04 Jun 2023 16:09)    I&O's Summary    04 Jun 2023 07:01  -  05 Jun 2023 07:00  --------------------------------------------------------  IN: 318 mL / OUT: 0 mL / NET: 318 mL        PHYSICAL EXAM:  Vital Signs Last 24 Hrs  T(C): 36.4 (05 Jun 2023 10:12), Max: 36.4 (05 Jun 2023 10:12)  T(F): 97.5 (05 Jun 2023 10:12), Max: 97.5 (05 Jun 2023 10:12)  HR: 86 (05 Jun 2023 10:12) (73 - 86)  BP: 147/61 (05 Jun 2023 10:12) (139/58 - 167/64)  BP(mean): --  RR: 18 (05 Jun 2023 10:12) (18 - 18)  SpO2: 100% (05 Jun 2023 10:12) (99% - 100%)    Parameters below as of 05 Jun 2023 10:12  Patient On (Oxygen Delivery Method): room air        CONSTITUTIONAL: thin, elderly woman in NAD  RESPIRATORY: Normal respiratory effort; lungs are clear to auscultation bilaterally  CARDIOVASCULAR: Regular rate and rhythm, normal S1 and S2, no murmur/rub/gallop; No lower extremity edema; Peripheral pulses are 2+ bilaterally  ABDOMEN: Nontender to palpation, normoactive bowel sounds, no rebound/guarding; No hepatosplenomegaly  MUSCULOSKELETAL: no clubbing or cyanosis of digits; no joint swelling or tenderness to palpation  PSYCH: Alert and calm    LABS:                        11.0   6.65  )-----------( 268      ( 05 Jun 2023 07:02 )             34.5     06-05    137  |  102  |  8   ----------------------------<  92  4.0   |  23  |  0.47<L>    Ca    9.9      05 Jun 2023 07:02  Phos  4.3     06-05  Mg     1.70     06-05                  RADIOLOGY & ADDITIONAL TESTS:  Results Reviewed:   Imaging Personally Reviewed:  Electrocardiogram Personally Reviewed:    COORDINATION OF CARE:  Care Discussed with Consultants/Other Providers [Y/N]:  Prior or Outpatient Records Reviewed [Y/N]:

## 2023-06-05 NOTE — BH CONSULTATION LIAISON PROGRESS NOTE - MSE UNSTRUCTURED FT
Pt seen, AA o x 2 (couldn't get year right), calmer after Ativan PRN this AM. Did not endorse paranoia/safety concerns. Denied SI, HI, AVH. 
Mental Status Exam:  Jacque: well groomed, fair hygiene     Behavior: normal  Motor: no tremors, EPS, or rigidity  Gait: steady  Speech: mildly increased rate, normal rhythm, prosedy and volume   Mood: "anxious"  Affect: anxious, congruent  Thought process: perseverative   Thought Content: +paranoia, delusions   Perception: denies AH/VH  SI: denies  HI: denies  Insight: poor  Judgment: poor    Cognitive Exam:  Orientation: AOx2  Recall: 3/3 (but with some repetition)  Attention: 2/3 on change making   Abstraction: fair 
Mental Status Exam:  Jacque: well groomed, fair hygiene     Behavior:  increased psychomotor activity  Motor: no tremors, EPS, or rigidity  Gait: steady  Speech: increased rate, rhythm, prosedy and volume   Mood: "anxious"  Affect: anxious, congruent  Thought process: perseverative   Thought Content: +paranoia, delusions   Perception: denies AH/VH  SI: denies  HI: denies  Insight: poor  Judgment: poor    Cognitive Exam:  Orientation: AOx2  Recall: 3/3 (but with some repetition)  Attention: 2/3 on change making   Abstraction: fair

## 2023-06-05 NOTE — BH CONSULTATION LIAISON PROGRESS NOTE - NSBHFUPINTERVALHXFT_PSY_A_CORE
Chart reviewed. Discussed w/ interdisciplinary rounds. Had Haldol overnight. Pt seen, AA o x 2 (couldn't get year right), calmer after Ativan PRN this AM. Did not endorse paranoia/safety concerns. Denied SI, HI, AVH.   Discussed case with daughter Marcelina who is very concerned that pt continues to have high anxiety/panic and is not ready to go back to NH based on PRN/medication use. Is asking if trying Cymbalta (good effects in the past, very helpful).   Chart reviewed. Discussed w/ interdisciplinary rounds. Had Haldol overnight. Pt seen, AA o x 2 (couldn't get year right), calmer after Ativan PRN this AM. Did not endorse paranoia/safety concerns. Denied SI, HI, AVH.   Discussed case with daughter Marcelina who is very concerned that pt continues to have high anxiety/panic and is not ready to go back to NH based on PRN/medication use. She feels that she responds much better to benzos/anxiolytics than antipsychotics for her constellation of sx. Is asking if trying Cymbalta (good effects in the past, very helpful).

## 2023-06-06 DIAGNOSIS — I80.9 PHLEBITIS AND THROMBOPHLEBITIS OF UNSPECIFIED SITE: ICD-10-CM

## 2023-06-06 LAB
GLUCOSE BLDC GLUCOMTR-MCNC: 101 MG/DL — HIGH (ref 70–99)
GLUCOSE BLDC GLUCOMTR-MCNC: 103 MG/DL — HIGH (ref 70–99)
GLUCOSE BLDC GLUCOMTR-MCNC: 141 MG/DL — HIGH (ref 70–99)

## 2023-06-06 PROCEDURE — 99233 SBSQ HOSP IP/OBS HIGH 50: CPT

## 2023-06-06 RX ORDER — LACTOBACILLUS ACIDOPHILUS 100MM CELL
1 CAPSULE ORAL DAILY
Refills: 0 | Status: DISCONTINUED | OUTPATIENT
Start: 2023-06-06 | End: 2023-06-07

## 2023-06-06 RX ORDER — CEPHALEXIN 500 MG
500 CAPSULE ORAL
Refills: 0 | Status: DISCONTINUED | OUTPATIENT
Start: 2023-06-06 | End: 2023-06-07

## 2023-06-06 RX ADMIN — PANTOPRAZOLE SODIUM 40 MILLIGRAM(S): 20 TABLET, DELAYED RELEASE ORAL at 11:25

## 2023-06-06 RX ADMIN — DULOXETINE HYDROCHLORIDE 30 MILLIGRAM(S): 30 CAPSULE, DELAYED RELEASE ORAL at 11:29

## 2023-06-06 RX ADMIN — Medication 1 SPRAY(S): at 14:00

## 2023-06-06 RX ADMIN — QUETIAPINE FUMARATE 50 MILLIGRAM(S): 200 TABLET, FILM COATED ORAL at 22:47

## 2023-06-06 RX ADMIN — Medication 500 MILLIGRAM(S): at 18:47

## 2023-06-06 RX ADMIN — Medication 0.5 MILLIGRAM(S): at 11:22

## 2023-06-06 RX ADMIN — Medication 1 SPRAY(S): at 06:07

## 2023-06-06 RX ADMIN — Medication 650 MILLIGRAM(S): at 10:07

## 2023-06-06 RX ADMIN — Medication 81 MILLIGRAM(S): at 11:26

## 2023-06-06 RX ADMIN — Medication 1 TABLET(S): at 06:07

## 2023-06-06 RX ADMIN — DIVALPROEX SODIUM 500 MILLIGRAM(S): 500 TABLET, DELAYED RELEASE ORAL at 22:46

## 2023-06-06 RX ADMIN — AMLODIPINE BESYLATE 2.5 MILLIGRAM(S): 2.5 TABLET ORAL at 06:08

## 2023-06-06 RX ADMIN — Medication 1 TABLET(S): at 18:48

## 2023-06-06 RX ADMIN — Medication 1 TABLET(S): at 11:22

## 2023-06-06 RX ADMIN — Medication 1 MILLIGRAM(S): at 11:22

## 2023-06-06 RX ADMIN — ATORVASTATIN CALCIUM 10 MILLIGRAM(S): 80 TABLET, FILM COATED ORAL at 22:46

## 2023-06-06 RX ADMIN — Medication 500 MILLIGRAM(S): at 13:57

## 2023-06-06 RX ADMIN — LOSARTAN POTASSIUM 100 MILLIGRAM(S): 100 TABLET, FILM COATED ORAL at 06:08

## 2023-06-06 RX ADMIN — Medication 650 MILLIGRAM(S): at 09:29

## 2023-06-06 RX ADMIN — DIVALPROEX SODIUM 250 MILLIGRAM(S): 500 TABLET, DELAYED RELEASE ORAL at 11:24

## 2023-06-06 RX ADMIN — SENNA PLUS 2 TABLET(S): 8.6 TABLET ORAL at 22:46

## 2023-06-06 NOTE — BH CONSULTATION LIAISON PROGRESS NOTE - NSBHFUPINTERVALHXFT_PSY_A_CORE
Chart reviewed. Discussed w/ interdisciplinary rounds. Pt seen, AA o x 2 (couldn't get year right), anxious about drawing labs. Did not endorse paranoia/safety concerns. Denied SI, HI, AVH.

## 2023-06-06 NOTE — PROGRESS NOTE ADULT - ASSESSMENT
83 y/o F with pmhx of dementia, DM2, HTN, anxiety, paranoia presented to the ED for new onset AMS and worsening agitation and paranoia. Admitted for behavorial issues and dysphagia work up.  Per GI, recommended o/p EGD. Now course c/b by JUAN phleblottie.

## 2023-06-06 NOTE — PROGRESS NOTE ADULT - SUBJECTIVE AND OBJECTIVE BOX
PROGRESS NOTE:   Authored by Dr. Florencia Cruz MD, pager [phone]     Patient is a 82y old  Female who presents with a chief complaint of paranoia (05 Jun 2023 11:33)      SUBJECTIVE / OVERNIGHT EVENTS:  Patient is sitting up in bed. Asks me if I am going to draw blood. States she has pain over the  dorsum of her distal RUE, close to the wrist. No fevers, chills, N/V/D, dysuria, hematuria, CP, or SOB.     ADDITIONAL REVIEW OF SYSTEMS:    MEDICATIONS  (STANDING):  amLODIPine   Tablet 2.5 milliGRAM(s) Oral daily  aspirin  chewable 81 milliGRAM(s) Oral daily  atorvastatin 10 milliGRAM(s) Oral at bedtime  calcium carbonate   1250 mG (OsCal) 1 Tablet(s) Oral two times a day  cephalexin 500 milliGRAM(s) Oral four times a day  clonazePAM  Tablet 0.5 milliGRAM(s) Oral daily  dextrose 5%. 1000 milliLiter(s) (100 mL/Hr) IV Continuous <Continuous>  dextrose 5%. 1000 milliLiter(s) (50 mL/Hr) IV Continuous <Continuous>  dextrose 50% Injectable 12.5 Gram(s) IV Push once  dextrose 50% Injectable 25 Gram(s) IV Push once  dextrose 50% Injectable 25 Gram(s) IV Push once  divalproex Sprinkle 250 milliGRAM(s) Oral daily  divalproex Sprinkle 500 milliGRAM(s) Oral at bedtime  DULoxetine 30 milliGRAM(s) Oral daily  folic acid 1 milliGRAM(s) Oral daily  glucagon  Injectable 1 milliGRAM(s) IntraMuscular once  insulin lispro (ADMELOG) corrective regimen sliding scale   SubCutaneous three times a day before meals  insulin lispro (ADMELOG) corrective regimen sliding scale   SubCutaneous at bedtime  lidocaine   4% Patch 1 Patch Transdermal daily  losartan 100 milliGRAM(s) Oral daily  multivitamin 1 Tablet(s) Oral daily  pantoprazole   Suspension 40 milliGRAM(s) Oral daily  QUEtiapine 50 milliGRAM(s) Oral at bedtime  senna 2 Tablet(s) Oral at bedtime  sodium chloride 0.65% Nasal 1 Spray(s) Both Nostrils three times a day    MEDICATIONS  (PRN):  acetaminophen     Tablet .. 650 milliGRAM(s) Oral every 6 hours PRN Temp greater or equal to 38C (100.4F), Mild Pain (1 - 3)  dextrose Oral Gel 15 Gram(s) Oral once PRN Blood Glucose LESS THAN 70 milliGRAM(s)/deciliter  LORazepam     Tablet 0.5 milliGRAM(s) Oral every 12 hours PRN Anxiety/agitation  melatonin 3 milliGRAM(s) Oral at bedtime PRN Insomnia  QUEtiapine 12.5 milliGRAM(s) Oral every 6 hours PRN Anxiety      CAPILLARY BLOOD GLUCOSE      POCT Blood Glucose.: 141 mg/dL (06 Jun 2023 11:14)  POCT Blood Glucose.: 103 mg/dL (06 Jun 2023 07:11)  POCT Blood Glucose.: 100 mg/dL (05 Jun 2023 21:47)  POCT Blood Glucose.: 115 mg/dL (05 Jun 2023 16:47)    I&O's Summary      PHYSICAL EXAM:  Vital Signs Last 24 Hrs  T(C): 36.4 (06 Jun 2023 11:33), Max: 36.4 (06 Jun 2023 11:33)  T(F): 97.6 (06 Jun 2023 11:33), Max: 97.6 (06 Jun 2023 11:33)  HR: 85 (06 Jun 2023 11:33) (66 - 97)  BP: 131/65 (06 Jun 2023 11:33) (131/65 - 155/80)  BP(mean): --  RR: 17 (06 Jun 2023 11:33) (17 - 18)  SpO2: 99% (06 Jun 2023 11:33) (97% - 100%)    Parameters below as of 06 Jun 2023 11:33  Patient On (Oxygen Delivery Method): room air      CONSTITUTIONAL: thin, elderly woman in NAD  RESPIRATORY: Normal respiratory effort; lungs are clear to auscultation bilaterally  CARDIOVASCULAR: Regular rate and rhythm, normal S1 and S2, no murmur/rub/gallop; No lower extremity edema; Peripheral pulses are 2+ bilaterally  ABDOMEN: Nontender to palpation, normoactive bowel sounds, no rebound/guarding; No hepatosplenomegaly  MUSCULOSKELETAL: no clubbing or cyanosis of digits; no joint swelling or tenderness to palpation  SKIN: erythema and tenderness over R distal UE, near wrist  PSYCH: Alert and calm      LABS:                        11.0   6.65  )-----------( 268      ( 05 Jun 2023 07:02 )             34.5     06-05    137  |  102  |  8   ----------------------------<  92  4.0   |  23  |  0.47<L>    Ca    9.9      05 Jun 2023 07:02  Phos  4.3     06-05  Mg     1.70     06-05                  RADIOLOGY & ADDITIONAL TESTS:  Results Reviewed:   Imaging Personally Reviewed:  Electrocardiogram Personally Reviewed:    COORDINATION OF CARE:  Care Discussed with Consultants/Other Providers [Y/N]:  Prior or Outpatient Records Reviewed [Y/N]:

## 2023-06-06 NOTE — BH CONSULTATION LIAISON PROGRESS NOTE - NSBHATTESTCOMMENTATTENDFT_PSY_A_CORE
Chart reviewed, pt. seen/evaluated with fellow, I agree with above assessment/plan. Patient alert, visibly anxious with preservative thought process, redirectable, denies si or hi, denies AVH, denies feeling unsafe. Plan as above, collateral pending. Will follow

## 2023-06-06 NOTE — BH CONSULTATION LIAISON PROGRESS NOTE - NSBHASSESSMENTFT_PSY_ALL_CORE
81 year old , retired female, domiciled at the [facility], PPH of late onset Bipolar I disorder, PTSD and Cluster B Personality traits, 2x prior psych adm last in 2022 at [facility], presents with worsening paranoia, anxiety, FTT (weight loss of 20lb since march), confusion - psych c/s for psychosis/AMS.    Patient presents with acute decompensation, suspect pseudodementia vs. medication induced but with anxiety as  of cognitive deficits and loss of ADLs, severe paranoia which is dimensionally indistinguishable from psychosis at this time. Warrants med changes as below, will taper gabapentin and begin seroquel given its efficacy in anxiety which appears predominant, no AH/VH noted. No parkinsonian features noted when on Risperdal in past but LBD on ddx, will opt for low EPS burden meds for now, risks/benefits including BBW/mortality risk discussed with family, amenable to use.     6/1/23: Remains anxious, paranoid. Continue cross taper from gabapentin to Seroquel. Monitor vitals.  6/2/23: Remains anxious though reports to be consuming her meals.   6/5/23: Remains anxious, needing PRNs, poor insight  6/6/23: remain anxious, started Cymbalta and required 1 Ativan PRN    Recs:  - does NOT have capacity to leave AMA  - Routine observation  - Continue Cymbalta 30mg daily, first dose now  - c/w Seroquel 50mg PO HS  - C/w Depakote ER 250mg qAM + 500mg qHS (8am, 8pm)  - C/w Klonopin 0.5mg QD (8am)  - Will continue to monitor and reassess.  - PRN for anxiety Seroquel 12.5mg q6h PRN PO  - PRN for severe agitation use Ativan PRN as ordered instead of Haldol PRN  - Dispo: Pending, likely inpt geropsych admission needed if no response to above med changes.   81 year old , retired female, domiciled at the [facility], PPH of late onset Bipolar I disorder, PTSD and Cluster B Personality traits, 2x prior psych adm last in 2022 at [facility], presents with worsening paranoia, anxiety, FTT (weight loss of 20lb since march), confusion - psych c/s for psychosis/AMS.    Patient presents with acute decompensation, suspect pseudodementia vs. medication induced but with anxiety as  of cognitive deficits and loss of ADLs, severe paranoia which is dimensionally indistinguishable from psychosis at this time. Warrants med changes as below, will taper gabapentin and begin seroquel given its efficacy in anxiety which appears predominant, no AH/VH noted. No parkinsonian features noted when on Risperdal in past but LBD on ddx, will opt for low EPS burden meds for now, risks/benefits including BBW/mortality risk discussed with family, amenable to use.     6/1/23: Remains anxious, paranoid. Continue cross taper from gabapentin to Seroquel. Monitor vitals.  6/2/23: Remains anxious though reports to be consuming her meals.   6/5/23: Remains anxious, needing PRNs, poor insight  6/6/23: remain anxious, started Cymbalta and required 1 Ativan PRN, needs collateral from daughter    Recs:  - does NOT have capacity to leave AMA  - Routine observation  - Continue Cymbalta 30mg daily  - c/w Seroquel 50mg PO HS  - C/w Depakote ER 250mg qAM + 500mg qHS (8am, 8pm)  - C/w Klonopin 0.5mg QD (8am)  - Will continue to monitor and reassess.  - PRN for anxiety Seroquel 12.5mg q6h PRN PO  - PRN for severe agitation use Ativan PRN as ordered instead of Haldol PRN  - Dispo: Pending, likely inpt geropsych admission needed if no response to above med changes.

## 2023-06-07 ENCOUNTER — INPATIENT (INPATIENT)
Facility: HOSPITAL | Age: 83
LOS: 131 days | Discharge: HOME | End: 2023-10-17
Attending: PSYCHIATRY & NEUROLOGY | Admitting: PSYCHIATRY & NEUROLOGY
Payer: MEDICARE

## 2023-06-07 ENCOUNTER — TRANSCRIPTION ENCOUNTER (OUTPATIENT)
Age: 83
End: 2023-06-07

## 2023-06-07 VITALS
OXYGEN SATURATION: 98 % | RESPIRATION RATE: 17 BRPM | TEMPERATURE: 98 F | SYSTOLIC BLOOD PRESSURE: 121 MMHG | DIASTOLIC BLOOD PRESSURE: 58 MMHG | HEART RATE: 76 BPM

## 2023-06-07 VITALS — OXYGEN SATURATION: 96 % | RESPIRATION RATE: 16 BRPM | TEMPERATURE: 98 F | HEIGHT: 60 IN | WEIGHT: 117.95 LBS

## 2023-06-07 DIAGNOSIS — F31.9 BIPOLAR DISORDER, UNSPECIFIED: ICD-10-CM

## 2023-06-07 DIAGNOSIS — F03.90 UNSPECIFIED DEMENTIA WITHOUT BEHAVIORAL DISTURBANCE: ICD-10-CM

## 2023-06-07 DIAGNOSIS — Z90.711 ACQUIRED ABSENCE OF UTERUS WITH REMAINING CERVICAL STUMP: Chronic | ICD-10-CM

## 2023-06-07 DIAGNOSIS — Z98.89 OTHER SPECIFIED POSTPROCEDURAL STATES: Chronic | ICD-10-CM

## 2023-06-07 LAB
ANION GAP SERPL CALC-SCNC: 11 MMOL/L — SIGNIFICANT CHANGE UP (ref 7–14)
BUN SERPL-MCNC: 9 MG/DL — SIGNIFICANT CHANGE UP (ref 7–23)
CALCIUM SERPL-MCNC: 9.9 MG/DL — SIGNIFICANT CHANGE UP (ref 8.4–10.5)
CHLORIDE SERPL-SCNC: 101 MMOL/L — SIGNIFICANT CHANGE UP (ref 98–107)
CO2 SERPL-SCNC: 24 MMOL/L — SIGNIFICANT CHANGE UP (ref 22–31)
CREAT SERPL-MCNC: 0.71 MG/DL — SIGNIFICANT CHANGE UP (ref 0.5–1.3)
EGFR: 85 ML/MIN/1.73M2 — SIGNIFICANT CHANGE UP
GLUCOSE BLDC GLUCOMTR-MCNC: 104 MG/DL — HIGH (ref 70–99)
GLUCOSE BLDC GLUCOMTR-MCNC: 137 MG/DL — HIGH (ref 70–99)
GLUCOSE BLDC GLUCOMTR-MCNC: 85 MG/DL — SIGNIFICANT CHANGE UP (ref 70–99)
GLUCOSE BLDC GLUCOMTR-MCNC: 89 MG/DL — SIGNIFICANT CHANGE UP (ref 70–99)
GLUCOSE SERPL-MCNC: 162 MG/DL — HIGH (ref 70–99)
HCT VFR BLD CALC: 37.6 % — SIGNIFICANT CHANGE UP (ref 34.5–45)
HGB BLD-MCNC: 11.5 G/DL — SIGNIFICANT CHANGE UP (ref 11.5–15.5)
MAGNESIUM SERPL-MCNC: 1.8 MG/DL — SIGNIFICANT CHANGE UP (ref 1.6–2.6)
MCHC RBC-ENTMCNC: 26.1 PG — LOW (ref 27–34)
MCHC RBC-ENTMCNC: 30.6 GM/DL — LOW (ref 32–36)
MCV RBC AUTO: 85.3 FL — SIGNIFICANT CHANGE UP (ref 80–100)
NRBC # BLD: 0 /100 WBCS — SIGNIFICANT CHANGE UP (ref 0–0)
NRBC # FLD: 0 K/UL — SIGNIFICANT CHANGE UP (ref 0–0)
PHOSPHATE SERPL-MCNC: 3.6 MG/DL — SIGNIFICANT CHANGE UP (ref 2.5–4.5)
PLATELET # BLD AUTO: 241 K/UL — SIGNIFICANT CHANGE UP (ref 150–400)
POTASSIUM SERPL-MCNC: 4 MMOL/L — SIGNIFICANT CHANGE UP (ref 3.5–5.3)
POTASSIUM SERPL-SCNC: 4 MMOL/L — SIGNIFICANT CHANGE UP (ref 3.5–5.3)
RBC # BLD: 4.41 M/UL — SIGNIFICANT CHANGE UP (ref 3.8–5.2)
RBC # FLD: 14 % — SIGNIFICANT CHANGE UP (ref 10.3–14.5)
SARS-COV-2 RNA SPEC QL NAA+PROBE: SIGNIFICANT CHANGE UP
SODIUM SERPL-SCNC: 136 MMOL/L — SIGNIFICANT CHANGE UP (ref 135–145)
WBC # BLD: 6.19 K/UL — SIGNIFICANT CHANGE UP (ref 3.8–10.5)
WBC # FLD AUTO: 6.19 K/UL — SIGNIFICANT CHANGE UP (ref 3.8–10.5)

## 2023-06-07 PROCEDURE — 99223 1ST HOSP IP/OBS HIGH 75: CPT

## 2023-06-07 PROCEDURE — 99233 SBSQ HOSP IP/OBS HIGH 50: CPT

## 2023-06-07 PROCEDURE — 99239 HOSP IP/OBS DSCHRG MGMT >30: CPT

## 2023-06-07 RX ORDER — AMLODIPINE BESYLATE 2.5 MG/1
2.5 TABLET ORAL DAILY
Refills: 0 | Status: DISCONTINUED | OUTPATIENT
Start: 2023-06-07 | End: 2023-08-24

## 2023-06-07 RX ORDER — INSULIN LISPRO 100/ML
VIAL (ML) SUBCUTANEOUS
Refills: 0 | Status: DISCONTINUED | OUTPATIENT
Start: 2023-06-07 | End: 2023-06-15

## 2023-06-07 RX ORDER — DULOXETINE HYDROCHLORIDE 30 MG/1
30 CAPSULE, DELAYED RELEASE ORAL DAILY
Refills: 0 | Status: DISCONTINUED | OUTPATIENT
Start: 2023-06-07 | End: 2023-06-09

## 2023-06-07 RX ORDER — ATORVASTATIN CALCIUM 80 MG/1
10 TABLET, FILM COATED ORAL AT BEDTIME
Refills: 0 | Status: DISCONTINUED | OUTPATIENT
Start: 2023-06-07 | End: 2023-10-17

## 2023-06-07 RX ORDER — CALCIUM CARBONATE 500(1250)
1 TABLET ORAL
Refills: 0 | Status: DISCONTINUED | OUTPATIENT
Start: 2023-06-07 | End: 2023-07-03

## 2023-06-07 RX ORDER — CLONAZEPAM 1 MG
0.5 TABLET ORAL
Refills: 0 | Status: DISCONTINUED | OUTPATIENT
Start: 2023-06-07 | End: 2023-06-12

## 2023-06-07 RX ORDER — SENNA PLUS 8.6 MG/1
2 TABLET ORAL
Qty: 0 | Refills: 0 | DISCHARGE
Start: 2023-06-07

## 2023-06-07 RX ORDER — DIVALPROEX SODIUM 500 MG/1
1 TABLET, DELAYED RELEASE ORAL
Refills: 0 | DISCHARGE

## 2023-06-07 RX ORDER — CEPHALEXIN 500 MG
500 CAPSULE ORAL
Refills: 0 | Status: COMPLETED | OUTPATIENT
Start: 2023-06-07 | End: 2023-06-11

## 2023-06-07 RX ORDER — SENNA PLUS 8.6 MG/1
2 TABLET ORAL AT BEDTIME
Refills: 0 | Status: DISCONTINUED | OUTPATIENT
Start: 2023-06-07 | End: 2023-07-03

## 2023-06-07 RX ORDER — DIVALPROEX SODIUM 500 MG/1
500 TABLET, DELAYED RELEASE ORAL
Refills: 0 | Status: DISCONTINUED | OUTPATIENT
Start: 2023-06-07 | End: 2023-06-07

## 2023-06-07 RX ORDER — DIVALPROEX SODIUM 500 MG/1
250 TABLET, DELAYED RELEASE ORAL
Refills: 0 | Status: DISCONTINUED | OUTPATIENT
Start: 2023-06-07 | End: 2023-06-07

## 2023-06-07 RX ORDER — DIVALPROEX SODIUM 500 MG/1
2 TABLET, DELAYED RELEASE ORAL
Qty: 0 | Refills: 0 | DISCHARGE
Start: 2023-06-07

## 2023-06-07 RX ORDER — CEPHALEXIN 500 MG
1 CAPSULE ORAL
Qty: 0 | Refills: 0 | DISCHARGE
Start: 2023-06-07 | End: 2023-06-11

## 2023-06-07 RX ORDER — QUETIAPINE FUMARATE 200 MG/1
1 TABLET, FILM COATED ORAL
Qty: 0 | Refills: 0 | DISCHARGE
Start: 2023-06-07

## 2023-06-07 RX ORDER — LOSARTAN POTASSIUM 100 MG/1
100 TABLET, FILM COATED ORAL DAILY
Refills: 0 | Status: DISCONTINUED | OUTPATIENT
Start: 2023-06-07 | End: 2023-06-21

## 2023-06-07 RX ORDER — CEPHALEXIN 500 MG
500 CAPSULE ORAL
Refills: 0 | Status: DISCONTINUED | OUTPATIENT
Start: 2023-06-07 | End: 2023-06-07

## 2023-06-07 RX ORDER — LANOLIN ALCOHOL/MO/W.PET/CERES
3 CREAM (GRAM) TOPICAL AT BEDTIME
Refills: 0 | Status: DISCONTINUED | OUTPATIENT
Start: 2023-06-07 | End: 2023-09-18

## 2023-06-07 RX ORDER — QUETIAPINE FUMARATE 200 MG/1
50 TABLET, FILM COATED ORAL AT BEDTIME
Refills: 0 | Status: DISCONTINUED | OUTPATIENT
Start: 2023-06-07 | End: 2023-06-15

## 2023-06-07 RX ORDER — GABAPENTIN 400 MG/1
1 CAPSULE ORAL
Refills: 0 | DISCHARGE

## 2023-06-07 RX ORDER — GLUCAGON INJECTION, SOLUTION 0.5 MG/.1ML
1 INJECTION, SOLUTION SUBCUTANEOUS ONCE
Refills: 0 | Status: DISCONTINUED | OUTPATIENT
Start: 2023-06-07 | End: 2023-10-17

## 2023-06-07 RX ORDER — DIVALPROEX SODIUM 500 MG/1
500 TABLET, DELAYED RELEASE ORAL AT BEDTIME
Refills: 0 | Status: DISCONTINUED | OUTPATIENT
Start: 2023-06-07 | End: 2023-07-03

## 2023-06-07 RX ORDER — FOLIC ACID 0.8 MG
1 TABLET ORAL DAILY
Refills: 0 | Status: DISCONTINUED | OUTPATIENT
Start: 2023-06-07 | End: 2023-07-03

## 2023-06-07 RX ORDER — ASPIRIN/CALCIUM CARB/MAGNESIUM 324 MG
81 TABLET ORAL DAILY
Refills: 0 | Status: DISCONTINUED | OUTPATIENT
Start: 2023-06-07 | End: 2023-08-23

## 2023-06-07 RX ORDER — DIVALPROEX SODIUM 500 MG/1
4 TABLET, DELAYED RELEASE ORAL
Qty: 0 | Refills: 0 | DISCHARGE
Start: 2023-06-07

## 2023-06-07 RX ORDER — PANTOPRAZOLE SODIUM 20 MG/1
40 TABLET, DELAYED RELEASE ORAL
Refills: 0 | Status: DISCONTINUED | OUTPATIENT
Start: 2023-06-07 | End: 2023-06-12

## 2023-06-07 RX ORDER — QUETIAPINE FUMARATE 200 MG/1
12.5 TABLET, FILM COATED ORAL EVERY 6 HOURS
Refills: 0 | Status: DISCONTINUED | OUTPATIENT
Start: 2023-06-07 | End: 2023-07-05

## 2023-06-07 RX ORDER — DEXTROSE 50 % IN WATER 50 %
15 SYRINGE (ML) INTRAVENOUS ONCE
Refills: 0 | Status: DISCONTINUED | OUTPATIENT
Start: 2023-06-07 | End: 2023-10-17

## 2023-06-07 RX ORDER — LACTOBACILLUS ACIDOPHILUS 100MM CELL
1 CAPSULE ORAL DAILY
Refills: 0 | Status: DISCONTINUED | OUTPATIENT
Start: 2023-06-07 | End: 2023-06-15

## 2023-06-07 RX ORDER — DULOXETINE HYDROCHLORIDE 30 MG/1
1 CAPSULE, DELAYED RELEASE ORAL
Qty: 0 | Refills: 0 | DISCHARGE
Start: 2023-06-07

## 2023-06-07 RX ORDER — DIVALPROEX SODIUM 500 MG/1
250 TABLET, DELAYED RELEASE ORAL DAILY
Refills: 0 | Status: DISCONTINUED | OUTPATIENT
Start: 2023-06-07 | End: 2023-08-10

## 2023-06-07 RX ADMIN — Medication 1 TABLET(S): at 11:59

## 2023-06-07 RX ADMIN — DIVALPROEX SODIUM 500 MILLIGRAM(S): 500 TABLET, DELAYED RELEASE ORAL at 21:56

## 2023-06-07 RX ADMIN — Medication 1 SPRAY(S): at 06:04

## 2023-06-07 RX ADMIN — DULOXETINE HYDROCHLORIDE 30 MILLIGRAM(S): 30 CAPSULE, DELAYED RELEASE ORAL at 12:01

## 2023-06-07 RX ADMIN — Medication 500 MILLIGRAM(S): at 02:16

## 2023-06-07 RX ADMIN — Medication 1 SPRAY(S): at 11:59

## 2023-06-07 RX ADMIN — LOSARTAN POTASSIUM 100 MILLIGRAM(S): 100 TABLET, FILM COATED ORAL at 06:02

## 2023-06-07 RX ADMIN — QUETIAPINE FUMARATE 50 MILLIGRAM(S): 200 TABLET, FILM COATED ORAL at 21:56

## 2023-06-07 RX ADMIN — Medication 500 MILLIGRAM(S): at 12:00

## 2023-06-07 RX ADMIN — PANTOPRAZOLE SODIUM 40 MILLIGRAM(S): 20 TABLET, DELAYED RELEASE ORAL at 11:59

## 2023-06-07 RX ADMIN — Medication 1 TABLET(S): at 17:17

## 2023-06-07 RX ADMIN — ATORVASTATIN CALCIUM 10 MILLIGRAM(S): 80 TABLET, FILM COATED ORAL at 21:54

## 2023-06-07 RX ADMIN — Medication 81 MILLIGRAM(S): at 12:00

## 2023-06-07 RX ADMIN — Medication 500 MILLIGRAM(S): at 17:16

## 2023-06-07 RX ADMIN — DIVALPROEX SODIUM 250 MILLIGRAM(S): 500 TABLET, DELAYED RELEASE ORAL at 12:00

## 2023-06-07 RX ADMIN — Medication 1 TABLET(S): at 06:02

## 2023-06-07 RX ADMIN — Medication 0.5 MILLIGRAM(S): at 08:18

## 2023-06-07 RX ADMIN — AMLODIPINE BESYLATE 2.5 MILLIGRAM(S): 2.5 TABLET ORAL at 06:02

## 2023-06-07 RX ADMIN — Medication 1 MILLIGRAM(S): at 12:00

## 2023-06-07 RX ADMIN — Medication 500 MILLIGRAM(S): at 08:28

## 2023-06-07 RX ADMIN — Medication 0.5 MILLIGRAM(S): at 11:59

## 2023-06-07 RX ADMIN — SENNA PLUS 2 TABLET(S): 8.6 TABLET ORAL at 21:55

## 2023-06-07 RX ADMIN — Medication 1 TABLET(S): at 21:54

## 2023-06-07 RX ADMIN — Medication 500 MILLIGRAM(S): at 21:56

## 2023-06-07 NOTE — PROGRESS NOTE ADULT - PROBLEM SELECTOR PROBLEM 4
HLD (hyperlipidemia)
Benign essential HTN
HLD (hyperlipidemia)
Benign essential HTN

## 2023-06-07 NOTE — BH PATIENT PROFILE - FALL HARM RISK - HARM RISK INTERVENTIONS
Assistance with ambulation/Assistance OOB with selected safe patient handling equipment/Communicate Risk of Fall with Harm to all staff/Discuss with provider need for PT consult/Monitor for mental status changes/Monitor gait and stability/Move patient closer to nurses' station/Provide patient with walking aids - walker, cane, crutches/Reinforce activity limits and safety measures with patient and family/Reorient to person, place and time as needed/Sit up slowly, dangle for a short time, stand at bedside before walking/Tailored Fall Risk Interventions/Toileting schedule using arm’s reach rule for commode and bathroom/Use of alarms - bed, chair and/or voice tab/Visual Cue: Yellow wristband and red socks/Bed in lowest position, wheels locked, appropriate side rails in place/Call bell, personal items and telephone in reach/Instruct patient to call for assistance before getting out of bed or chair/Non-slip footwear when patient is out of bed/[place] to call system/Physically safe environment - no spills, clutter or unnecessary equipment/Purposeful Proactive Rounding/Room/bathroom lighting operational, light cord in reach

## 2023-06-07 NOTE — BH INPATIENT PSYCHIATRY ASSESSMENT NOTE - NSBHMETABOLIC_PSY_ALL_CORE_FT
BMI: BMI (kg/m2): 23 (06-07-23 @ 18:13)  HbA1c: A1C with Estimated Average Glucose Result: 5.7 % (06-01-23 @ 05:10)    Glucose: POCT Blood Glucose.: 89 mg/dL (06-07-23 @ 16:26)    BP: --  Lipid Panel:

## 2023-06-07 NOTE — PROGRESS NOTE ADULT - PROBLEM SELECTOR PLAN 7
- heparin subq for dvt ppx - heparin subq for dvt ppx  Dispo: D/c to inpatient [facility] today - heparin subq for dvt ppx  Dispo: D/c to inpatient [facility] today    Discussed with pt's daughter on 6/7

## 2023-06-07 NOTE — BH CONSULTATION LIAISON PROGRESS NOTE - NSBHATTESTTYPEVISIT_PSY_A_CORE
Attending Only
On-site Attending with Resident/Fellow/Student and VERONICA (99XXX codes)
Attending Only
Attending with Resident/Fellow/Student

## 2023-06-07 NOTE — PROGRESS NOTE ADULT - SUBJECTIVE AND OBJECTIVE BOX
PROGRESS NOTE:   Authored by Dr. Florencia Cruz MD, pager [phone]     Patient is a 82y old  Female who presents with a chief complaint of paranoia (06 Jun 2023 11:40)      SUBJECTIVE / OVERNIGHT EVENTS:  Patient is lying in bed. States her distal RUE is . No fevers, chills, N/V/D, dysuria, hematuria, CP, or SOB.     ADDITIONAL REVIEW OF SYSTEMS:    MEDICATIONS  (STANDING):  amLODIPine   Tablet 2.5 milliGRAM(s) Oral daily  aspirin  chewable 81 milliGRAM(s) Oral daily  atorvastatin 10 milliGRAM(s) Oral at bedtime  calcium carbonate   1250 mG (OsCal) 1 Tablet(s) Oral two times a day  cephalexin 500 milliGRAM(s) Oral four times a day  clonazePAM  Tablet 0.5 milliGRAM(s) Oral daily  dextrose 5%. 1000 milliLiter(s) (100 mL/Hr) IV Continuous <Continuous>  dextrose 5%. 1000 milliLiter(s) (50 mL/Hr) IV Continuous <Continuous>  dextrose 50% Injectable 12.5 Gram(s) IV Push once  dextrose 50% Injectable 25 Gram(s) IV Push once  dextrose 50% Injectable 25 Gram(s) IV Push once  divalproex Sprinkle 500 milliGRAM(s) Oral at bedtime  divalproex Sprinkle 250 milliGRAM(s) Oral daily  DULoxetine 30 milliGRAM(s) Oral daily  folic acid 1 milliGRAM(s) Oral daily  glucagon  Injectable 1 milliGRAM(s) IntraMuscular once  insulin lispro (ADMELOG) corrective regimen sliding scale   SubCutaneous three times a day before meals  insulin lispro (ADMELOG) corrective regimen sliding scale   SubCutaneous at bedtime  lactobacillus acidophilus 1 Tablet(s) Oral daily  lidocaine   4% Patch 1 Patch Transdermal daily  losartan 100 milliGRAM(s) Oral daily  multivitamin 1 Tablet(s) Oral daily  pantoprazole   Suspension 40 milliGRAM(s) Oral daily  QUEtiapine 50 milliGRAM(s) Oral at bedtime  senna 2 Tablet(s) Oral at bedtime  sodium chloride 0.65% Nasal 1 Spray(s) Both Nostrils three times a day    MEDICATIONS  (PRN):  acetaminophen     Tablet .. 650 milliGRAM(s) Oral every 6 hours PRN Temp greater or equal to 38C (100.4F), Mild Pain (1 - 3)  dextrose Oral Gel 15 Gram(s) Oral once PRN Blood Glucose LESS THAN 70 milliGRAM(s)/deciliter  LORazepam     Tablet 0.5 milliGRAM(s) Oral every 12 hours PRN Anxiety/agitation  melatonin 3 milliGRAM(s) Oral at bedtime PRN Insomnia  QUEtiapine 12.5 milliGRAM(s) Oral every 6 hours PRN Anxiety      CAPILLARY BLOOD GLUCOSE      POCT Blood Glucose.: 137 mg/dL (07 Jun 2023 10:59)  POCT Blood Glucose.: 85 mg/dL (07 Jun 2023 07:11)  POCT Blood Glucose.: 101 mg/dL (06 Jun 2023 17:58)    I&O's Summary      PHYSICAL EXAM:  Vital Signs Last 24 Hrs  T(C): 36.6 (07 Jun 2023 11:07), Max: 36.6 (06 Jun 2023 19:49)  T(F): 97.8 (07 Jun 2023 11:07), Max: 97.8 (06 Jun 2023 19:49)  HR: 76 (07 Jun 2023 11:07) (68 - 85)  BP: 121/58 (07 Jun 2023 11:07) (120/46 - 140/65)  BP(mean): --  RR: 17 (07 Jun 2023 11:07) (17 - 18)  SpO2: 98% (07 Jun 2023 11:07) (98% - 99%)    Parameters below as of 07 Jun 2023 11:07  Patient On (Oxygen Delivery Method): room air          LABS:                        11.5   6.19  )-----------( 241      ( 07 Jun 2023 09:35 )             37.6     06-07    136  |  101  |  9   ----------------------------<  162<H>  4.0   |  24  |  0.71    Ca    9.9      07 Jun 2023 09:35  Phos  3.6     06-07  Mg     1.80     06-07        CONSTITUTIONAL: thin, elderly woman in NAD  RESPIRATORY: Normal respiratory effort; lungs are clear to auscultation bilaterally  CARDIOVASCULAR: Regular rate and rhythm, normal S1 and S2, no murmur/rub/gallop; No lower extremity edema; Peripheral pulses are 2+ bilaterally  ABDOMEN: Nontender to palpation, normoactive bowel sounds, no rebound/guarding; No hepatosplenomegaly  MUSCULOSKELETAL: no clubbing or cyanosis of digits; no joint swelling or tenderness to palpation  SKIN: erythema and tenderness over R distal UE, near wrist  PSYCH: Alert and calm            RADIOLOGY & ADDITIONAL TESTS:  Results Reviewed:   Imaging Personally Reviewed:  Electrocardiogram Personally Reviewed:    COORDINATION OF CARE:  Care Discussed with Consultants/Other Providers [Y/N]:  Prior or Outpatient Records Reviewed [Y/N]:

## 2023-06-07 NOTE — BH CONSULTATION LIAISON PROGRESS NOTE - NSBHATTESTBILLING_PSY_A_CORE
04238-Uhotggaccz OBS or IP - high complexity OR 50-79 mins
08482-Kxwdyiwqud OBS or IP - moderate complexity OR 35-49 mins
34490-Dhigbnimxv OBS or IP - moderate complexity OR 35-49 mins
02760-Clbfnjqmva OBS or IP - moderate complexity OR 35-49 mins

## 2023-06-07 NOTE — PROGRESS NOTE ADULT - PROBLEM SELECTOR PLAN 5
- c/w low dose sliding scale insulin
- c/w low dose sliding scale insulin
- c/w BP meds
- c/w low dose sliding scale insulin
- c/w low dose sliding scale insulin
- c/w BP meds
- c/w low dose sliding scale insulin
- c/w low dose sliding scale insulin

## 2023-06-07 NOTE — BH INPATIENT PSYCHIATRY ASSESSMENT NOTE - HPI (INCLUDE ILLNESS QUALITY, SEVERITY, DURATION, TIMING, CONTEXT, MODIFYING FACTORS, ASSOCIATED SIGNS AND SYMPTOMS)
81 year old , retired female, domiciled at the [facility], [facility] [place] of dementia, DM2, and HTN, PPH of late onset Bipolar I disorder, PTSD and Cluster B Personality traits, 2 prior psych adm last in 2022 at [facility], who presented to ED with worsening paranoia, anxiety, FTT (weight loss of 20lb since march), and confusion. Psychiatry was consulted for psychosis/AMS. Patient is now being transferred to [facility] for psychiatric treatment and stabilization.    Per  assessment note on 5/31, "Patient extremely anxious, histrionic, perseverates on an imagined painful procedure that she thinks the hospital is going to do to her to unclear ends, thinks that people at her [facility] are stealing her things and poisoning her food, denies SI/HI here and is somewhat less paranoid about food here, no clear AH/VH noted. Mildly hyperverbal but also very hard of hearing, able to be redirected. AOx2, recall 3/3 but patient repeats words for several minutes in anxious state, attention fair, 2/3 on change making exercise, makes task switching errors on luria exam, naming intact." Collateral from her daughter revealed that she was refusing food and to complete her ADLs. The decompensation started in March 2023 with numerous medical issues (uncontrolled epistaxis, then UTI with multiple abx trials and eventual need of ferro for 1 week), but further declined with start of Klonopin to target her anxiety. On CL, patient was tapered off of Gabapentin and starting low dose Seroquel in addition to her home medications.    On ML5, patient is unable to engage in an interview. She was seen after being evaluated by the nurses, but initially kept her eyes closed and wouldn't respond when writer started the interview. After multiple attempts, she opened her eyes but continued not to answer any questions. Instead, she would continuously interrupt to ask to speak to her daughter, if she'll stay on this floor, or for the unit's visiting hours. She also made multiple attempts to get out of her chair despite redirection.

## 2023-06-07 NOTE — BH CONSULTATION LIAISON PROGRESS NOTE - NSBHASSESSMENTFT_PSY_ALL_CORE
81 year old , retired female, domiciled at the [facility], PPH of late onset Bipolar I disorder, PTSD and Cluster B Personality traits, 2x prior psych adm last in 2022 at [facility], presents with worsening paranoia, anxiety, FTT (weight loss of 20lb since march), confusion - psych c/s for psychosis/AMS.    Patient presents with acute decompensation, suspect pseudodementia vs. medication induced but with anxiety as  of cognitive deficits and loss of ADLs, severe paranoia which is dimensionally indistinguishable from psychosis at this time. Warrants med changes as below, will taper gabapentin and begin seroquel given its efficacy in anxiety which appears predominant, no AH/VH noted. No parkinsonian features noted when on Risperdal in past but LBD on ddx, will opt for low EPS burden meds for now, risks/benefits including BBW/mortality risk discussed with family, amenable to use.     6/1/23: Remains anxious, paranoid. Continue cross taper from gabapentin to Seroquel. Monitor vitals.  6/2/23: Remains anxious though reports to be consuming her meals.   6/5/23: Remains anxious, needing PRNs, poor insight  6/6/23: remain anxious, started Cymbalta and required 1 Ativan PRN, needs collateral from daughter  6/7: Patient seems somewhat better but overall remains anxious/paranoid, tolerating Cymbalta well, denies AVH, denies si and hi, care coordinated with pt.'s daughter.       PLAN;   - does NOT have capacity to leave AMA  - Continue Cymbalta 30mg daily  - c/w Seroquel 50mg PO HS  - C/w Depakote ER 250mg qAM + 500mg qHS (8am, 8pm)  - C/w Klonopin 0.5mg QD (8am)  - PRN for mild anxiety Seroquel 12.5mg q6h PRN PO  - PRN for severe anxiety/severe agitation use Ativan PRN as ordered instead of Haldol PRN  - Hold antipsychotics if qtc >500, monitor LFTs, platelets, VPA level 48.70 on 5/30  - Dispo: Pending, likely inpt geropsych admission needed if no response to above med changes, daughter in agreement.   82 year old , retired female, domiciled at the [facility], PPH of late onset Bipolar I disorder, PTSD and Cluster B Personality traits, 2x prior psych adm last in 2022 at [facility], presents with worsening paranoia, anxiety, FTT (weight loss of 20lb since march), confusion - psych c/s for psychosis/AMS.    Patient presents with acute decompensation, suspect pseudodementia vs. medication induced but with anxiety as  of cognitive deficits and loss of ADLs, severe paranoia which is dimensionally indistinguishable from psychosis at this time. Warrants med changes as below, will taper gabapentin and begin seroquel given its efficacy in anxiety which appears predominant, no AH/VH noted. No parkinsonian features noted when on Risperdal in past but LBD on ddx, will opt for low EPS burden meds for now, risks/benefits including BBW/mortality risk discussed with family, amenable to use.     6/1/23: Remains anxious, paranoid. Continue cross taper from gabapentin to Seroquel. Monitor vitals.  6/2/23: Remains anxious though reports to be consuming her meals.   6/5/23: Remains anxious, needing PRNs, poor insight  6/6/23: remain anxious, started Cymbalta and required 1 Ativan PRN, needs collateral from daughter  6/7: Patient seems somewhat better but overall remains anxious/paranoid, tolerating Cymbalta well, denies AVH, denies si and hi, care coordinated with pt.'s daughter.       PLAN;   - does NOT have capacity to leave AMA  - Continue Cymbalta 30mg daily  - c/w Seroquel 50mg PO HS  - C/w Depakote ER 250mg qAM + 500mg qHS (8am, 8pm)  - C/w Klonopin 0.5mg QD (8am)  - PRN for mild anxiety Seroquel 12.5mg q6h PRN PO  - PRN for severe anxiety/severe agitation use Ativan PRN as ordered instead of Haldol PRN  - Hold antipsychotics if qtc >500, monitor LFTs, platelets, VPA level 48.70 on 5/30  - Dispo: Pending, likely inpt geropsych admission needed if no response to above med changes, daughter in agreement.   82 year old , retired female, domiciled at the [facility], PPH of late onset Bipolar I disorder, PTSD and Cluster B Personality traits, 2x prior psych adm last in 2022 at [facility], presents with worsening paranoia, anxiety, FTT (weight loss of 20lb since march), confusion - psych c/s for psychosis/AMS.    Patient presents with acute decompensation, suspect pseudodementia vs. medication induced but with anxiety as  of cognitive deficits and loss of ADLs, severe paranoia which is dimensionally indistinguishable from psychosis at this time. Warrants med changes as below, will taper gabapentin and begin seroquel given its efficacy in anxiety which appears predominant, no AH/VH noted. No parkinsonian features noted when on Risperdal in past but LBD on ddx, will opt for low EPS burden meds for now, risks/benefits including BBW/mortality risk discussed with family, amenable to use.     6/1/23: Remains anxious, paranoid. Continue cross taper from gabapentin to Seroquel. Monitor vitals.  6/2/23: Remains anxious though reports to be consuming her meals.   6/5/23: Remains anxious, needing PRNs, poor insight  6/6/23: remain anxious, started Cymbalta and required 1 Ativan PRN, needs collateral from daughter  6/7: Patient seems somewhat better but overall remains anxious/paranoid, tolerating Cymbalta well, denies AVH, denies si and hi, care coordinated with pt.'s daughter.       PLAN;   - does NOT have capacity to leave AMA  - Continue Cymbalta 30mg daily  - c/w Seroquel 50mg PO HS  - C/w Depakote ER 250mg qAM + 500mg qHS (8am, 8pm)  - C/w Klonopin 0.5mg QD (8am)  - PRN for mild anxiety Seroquel 12.5mg q6h PRN PO  - PRN for severe anxiety/severe agitation use Ativan PRN as ordered instead of Haldol PRN  - Hold antipsychotics if qtc >500, monitor LFTs, platelets, VPA level 48.70 on 5/30  - Dispo: needs an inpt geropsych admission for further stabilization, daughter in agreement.

## 2023-06-07 NOTE — BH PATIENT PROFILE - HOME MEDICATIONS
Multiple Vitamins oral tablet , 1 tab(s) orally once a day  senna leaf extract oral tablet , 2 tab(s) orally once a day (at bedtime)  cephalexin 500 mg oral capsule , 1 cap(s) orally 4 times a day  QUEtiapine 50 mg oral tablet , 1 tab(s) orally once a day (at bedtime)  DULoxetine 30 mg oral delayed release capsule , 1 cap(s) orally once a day  divalproex sodium 125 mg oral delayed release capsule , 2 cap(s) orally once a day  divalproex sodium 125 mg oral delayed release capsule , 4 cap(s) orally once a day (at bedtime)  Vitamin B12 100 mcg oral tablet , 1 tab(s) orally once a day  PriLOSEC 20 mg oral delayed release capsule , 1 cap(s) orally once a day  Pravachol 20 mg oral tablet , 1 tab(s) orally once a day  Os-Rolo 500 (1250 mg calcium carbonate) oral tablet , 1 tab(s) orally 2 times a day  Norvasc 2.5 mg oral tablet , 1 tab(s) orally once a day  Melatonin 3 mg oral tablet , 1 tab(s) orally once a day (at bedtime)  losartan 100 mg oral tablet , 1 tab(s) orally once a day  KlonoPIN 0.5 mg oral tablet , 1 tab(s) orally once a day  Januvia 50 mg oral tablet , 1 tab(s) orally once a day  Glucophage 500 mg oral tablet , 1 tab(s) orally 2 times a day  folic acid 1 mg oral tablet , 1 tab(s) orally once a day  Colace 100 mg oral capsule , 1 cap(s) orally 2 times a day  Bacid oral capsule , 1 tab(s) orally 3 times a day  aspirin 81 mg oral delayed release tablet , 1 tab(s) orally once a day

## 2023-06-07 NOTE — PROGRESS NOTE ADULT - PROBLEM SELECTOR PROBLEM 5
DM2 (diabetes mellitus, type 2)
Benign essential HTN
Benign essential HTN
DM2 (diabetes mellitus, type 2)
DM2 (diabetes mellitus, type 2)

## 2023-06-07 NOTE — PROGRESS NOTE ADULT - ASSESSMENT
81 y/o F with pmhx of dementia, DM2, HTN, anxiety, paranoia presented to the ED for new onset AMS and worsening agitation and paranoia. Admitted for behavorial issues and dysphagia work up.  Per GI, recommended o/p EGD. Now course c/b by JUAN phleblottie; on Keflex -- initiated 6/6, planning for 5 day course.  81 y/o F with pmhx of dementia, DM2, HTN, anxiety, paranoia presented to the ED for new onset AMS and worsening agitation and paranoia. Admitted for behavorial issues and dysphagia work up.  Per GI, recommended o/p EGD. Now course c/b by JUAN phlebitis; on Keflex -- initiated 6/6, planning for 5 day course. Plan to transfer to [facility]  for further  psychiatric optimization.

## 2023-06-07 NOTE — BH CONSULTATION LIAISON PROGRESS NOTE - NSBHFUPINTERVALCCFT_PSY_A_CORE
"What are you going to do to me"
"I am OK"
"I am eating my food"
"who are you"
"What are they doing to me"

## 2023-06-07 NOTE — BH INPATIENT PSYCHIATRY ASSESSMENT NOTE - NSBHASSESSSUMMFT_PSY_ALL_CORE
81 year old , retired female, domiciled at the [facility] of dementia, DM2, and HTN, PPH of late onset Bipolar I disorder, PTSD and Cluster B Personality traits, 2 prior psych adm last in 2022 at [facility], who presented to ED with worsening paranoia, anxiety, FTT (weight loss of 20lb since march), and confusion. Psychiatry was consulted for psychosis/AMS. Patient is now being transferred to [facility] for psychiatric treatment and stabilization.    Patient presented with worsening confusion, paranoia, and an inability to care for herself. On the unit, she appears anxious with psychomotor agitation and is unable to engage in an interview. Her presentation is concerning for generalized anxiety disorder vs worsening dementia vs pseudodementia vs delirium. She requires inpatient admission for psychiatric stabilization and 1:1 CO for disorganized behavior. Will continue current medication regimen as below.    Plan:  1. Legals: admit on 9.27 status  2. Safety: constant observation for disorganized behavior.  Ativan 0.5 mg q12h prn for anxiety/agitation.  3. Psychiatric: Seroquel 50 mg qHS  	         Depakote 250 mg daily and 500 mg qHS. VPA level of 48.70 on 5/30  	         Duloxetine 30 mg daily  	         Klonopin 0.5 mg daily  	        Seroquel 12.5 mg q6h prn for anxiety  4. Group, milieu, individual therapy as appropriate.  5. Medical: HTN, HLD, DM2. Phlebitis   	Keflex 500 mg qid x 5 days (4 left)  	amlodipine 2.5 mg daily. losartan 100 mg daily. aspirin 81 mg daily. atorvastatin 10 mg qHS.  	Admission labs WNL. Lipid profile pending.  6. Dispo: pending clinical improvement.

## 2023-06-07 NOTE — PROGRESS NOTE ADULT - PROBLEM SELECTOR PLAN 1
- patient presented for new onset acute paranoia  - the patient is known to have behavorial problems for which she needed inpatient kb admission  - enhanced supervision room  - follow up with behavioral recommendations   - kayleigh umaña/w klonopin, depakote
- patient presented for new onset acute paranoia  - the patient is known to have behavorial problems for which she needed inpatient kb admission  - enhanced supervision room  - follow up with behavioral recommendations - now off gabapentin and now on seroquel  - kayleigh c/w klonopin, depakote  - haldol prn
- patient presented for new onset acute paranoia  - the patient is known to have behavorial problems for which she needed inpatient kb admission  - enhanced supervision room  - follow up with behavioral recommendations   - kayleigh umaña/w klonopin, depakote  - hold neurotin for now as per daughter, thinks it is making the confusion worse  - haldol prn
- patient presented for new onset acute paranoia  - the patient is known to have behavorial problems for which she needed inpatient kb admission  - enhanced supervision room  - follow up with behavioral recommendations   - kayleigh umaña/w klonopin, depakote
- patient presented for new onset acute paranoia  - the patient is known to have behavorial problems for which she needed inpatient kb admission  - enhanced supervision room  - follow up with behavioral recommendations - tapering off gabapenting and adding on seroquel  - kayleigh c/w klonopin, depakote  - haldol prn

## 2023-06-07 NOTE — PROGRESS NOTE ADULT - TIME BILLING
Review of laboratory data, radiology results, consultants' recommendations, documentation in [place], discussion with patient/house staff and interdisciplinary staff (such as , social workers, etc). Interventions were performed as documented above.
Review of laboratory data, radiology results, consultants' recommendations, documentation in [place], discussion with patient/house staff and interdisciplinary staff (such as , social workers, etc). Interventions were performed as documented above.
Review of laboratory data, radiology results, consultants' recommendations, documentation in [place], discussion with patient/house staff and interdisciplinary staff (such as , social workers, etc). Interventions were performed as documented above.

## 2023-06-07 NOTE — BH INPATIENT PSYCHIATRY ASSESSMENT NOTE - RISK ASSESSMENT
Patient is at elevated risk  Risk factors include: severe anxiety, impulsivity, active psychosis, low frustration tolerance, limited insight into symptoms, unable to care for self secondary to psychiatric illness.    Chronic risk factors for suicide include: h/o prior psychiatric admissions, diagnosis of bipolar d/o, dementia and anxiety  Protective factors include: social support

## 2023-06-07 NOTE — BH CONSULTATION LIAISON PROGRESS NOTE - NSBHFUPINTERVALHXFT_PSY_A_CORE
Chart reviewed. Received Ativan PRN x1 on 6/6 and x1 on 6/7. Case discussed w/ interdisciplinary rounds. Pt seen, AAOX3, remains anxious, asking " why you are here, what they are doing to me, where is all my stuff ", however  seems more redirectable today, some paranoia noted . Denied SI, HI, AVH.       Care coordinated with pt.'s daughter Marcelina [phone] ( with pt.'s consent), she reports that patient still very anxious/paranoid, although she noted minor improvement when she spoke with her yesterday. She reports that in 2017 patient was tried on Risperdal, Wellbutrin but with no response, however patient was then started on Cymbalta 20mg and Klonopin 0.5mg which she responded very well. Patient usually responds better to PRN benzodiazepine for anxiety. Discussed current psychiatric regimen. She is in agreement with inpatient psychiatric admission if pt. remains anxious/paranoid.

## 2023-06-07 NOTE — BH CONSULTATION LIAISON PROGRESS NOTE - NSBHCHARTREVIEWVS_PSY_A_CORE FT
Vital Signs Last 24 Hrs  T(C): 36.9 (01 Jun 2023 11:24), Max: 36.9 (01 Jun 2023 04:40)  T(F): 98.5 (01 Jun 2023 11:24), Max: 98.5 (01 Jun 2023 11:24)  HR: 69 (01 Jun 2023 11:24) (69 - 84)  BP: 122/51 (01 Jun 2023 11:24) (117/53 - 126/55)  BP(mean): --  RR: 18 (01 Jun 2023 11:24) (17 - 18)  SpO2: 98% (01 Jun 2023 11:24) (97% - 98%)    Parameters below as of 01 Jun 2023 11:24  Patient On (Oxygen Delivery Method): room air    
Vital Signs Last 24 Hrs  T(C): 36.5 (07 Jun 2023 04:30), Max: 36.6 (06 Jun 2023 19:49)  T(F): 97.7 (07 Jun 2023 04:30), Max: 97.8 (06 Jun 2023 19:49)  HR: 68 (07 Jun 2023 04:30) (68 - 85)  BP: 120/46 (07 Jun 2023 04:30) (120/46 - 140/65)  BP(mean): --  RR: 18 (07 Jun 2023 04:30) (17 - 18)  SpO2: 98% (07 Jun 2023 04:30) (98% - 99%)    Parameters below as of 06 Jun 2023 19:49  Patient On (Oxygen Delivery Method): room air    
Vital Signs Last 24 Hrs  T(C): 36.2 (02 Jun 2023 04:55), Max: 36.7 (01 Jun 2023 20:07)  T(F): 97.1 (02 Jun 2023 04:55), Max: 98 (01 Jun 2023 20:07)  HR: 78 (02 Jun 2023 04:55) (65 - 78)  BP: 146/67 (02 Jun 2023 04:55) (146/67 - 150/69)  BP(mean): --  RR: 17 (02 Jun 2023 04:55) (17 - 17)  SpO2: 99% (02 Jun 2023 04:55) (99% - 99%)    Parameters below as of 02 Jun 2023 04:55  Patient On (Oxygen Delivery Method): room air    
Vital Signs Last 24 Hrs  T(C): 36.4 (05 Jun 2023 10:12), Max: 36.4 (05 Jun 2023 10:12)  T(F): 97.5 (05 Jun 2023 10:12), Max: 97.5 (05 Jun 2023 10:12)  HR: 86 (05 Jun 2023 10:12) (73 - 86)  BP: 147/61 (05 Jun 2023 10:12) (139/58 - 167/64)  BP(mean): --  RR: 18 (05 Jun 2023 10:12) (18 - 18)  SpO2: 100% (05 Jun 2023 10:12) (99% - 100%)    Parameters below as of 05 Jun 2023 10:12  Patient On (Oxygen Delivery Method): room air    
Vital Signs Last 24 Hrs  T(C): 36.4 (06 Jun 2023 11:33), Max: 36.4 (06 Jun 2023 11:33)  T(F): 97.6 (06 Jun 2023 11:33), Max: 97.6 (06 Jun 2023 11:33)  HR: 85 (06 Jun 2023 11:33) (66 - 97)  BP: 131/65 (06 Jun 2023 11:33) (131/65 - 155/80)  BP(mean): --  RR: 17 (06 Jun 2023 11:33) (17 - 18)  SpO2: 99% (06 Jun 2023 11:33) (97% - 100%)    Parameters below as of 06 Jun 2023 11:33  Patient On (Oxygen Delivery Method): room air

## 2023-06-07 NOTE — PROGRESS NOTE ADULT - PROBLEM SELECTOR PLAN 4
- c/w BP meds
- c/w statin
- c/w BP meds
- c/w statin

## 2023-06-07 NOTE — BH CONSULTATION LIAISON PROGRESS NOTE - CURRENT MEDICATION
MEDICATIONS  (STANDING):  amLODIPine   Tablet 2.5 milliGRAM(s) Oral daily  aspirin  chewable 81 milliGRAM(s) Oral daily  atorvastatin 10 milliGRAM(s) Oral at bedtime  calcium carbonate   1250 mG (OsCal) 1 Tablet(s) Oral two times a day  clonazePAM  Tablet 0.5 milliGRAM(s) Oral daily  dextrose 5%. 1000 milliLiter(s) (50 mL/Hr) IV Continuous <Continuous>  dextrose 5%. 1000 milliLiter(s) (100 mL/Hr) IV Continuous <Continuous>  dextrose 50% Injectable 12.5 Gram(s) IV Push once  dextrose 50% Injectable 25 Gram(s) IV Push once  dextrose 50% Injectable 25 Gram(s) IV Push once  divalproex Sprinkle 500 milliGRAM(s) Oral at bedtime  divalproex Sprinkle 250 milliGRAM(s) Oral daily  folic acid 1 milliGRAM(s) Oral daily  glucagon  Injectable 1 milliGRAM(s) IntraMuscular once  insulin lispro (ADMELOG) corrective regimen sliding scale   SubCutaneous three times a day before meals  insulin lispro (ADMELOG) corrective regimen sliding scale   SubCutaneous at bedtime  lidocaine   4% Patch 1 Patch Transdermal daily  losartan 100 milliGRAM(s) Oral daily  multivitamin 1 Tablet(s) Oral daily  pantoprazole   Suspension 40 milliGRAM(s) Oral daily  QUEtiapine 50 milliGRAM(s) Oral at bedtime  senna 2 Tablet(s) Oral at bedtime  sodium chloride 0.65% Nasal 1 Spray(s) Both Nostrils three times a day    MEDICATIONS  (PRN):  acetaminophen     Tablet .. 650 milliGRAM(s) Oral every 6 hours PRN Temp greater or equal to 38C (100.4F), Mild Pain (1 - 3)  dextrose Oral Gel 15 Gram(s) Oral once PRN Blood Glucose LESS THAN 70 milliGRAM(s)/deciliter  haloperidol    Injectable 1 milliGRAM(s) IV Push every 6 hours PRN Agitation  LORazepam     Tablet 0.5 milliGRAM(s) Oral every 12 hours PRN Anxiety/agitation  melatonin 3 milliGRAM(s) Oral at bedtime PRN Insomnia  QUEtiapine 12.5 milliGRAM(s) Oral every 6 hours PRN Anxiety  
MEDICATIONS  (STANDING):  amLODIPine   Tablet 2.5 milliGRAM(s) Oral daily  aspirin  chewable 81 milliGRAM(s) Oral daily  atorvastatin 10 milliGRAM(s) Oral at bedtime  calcium carbonate   1250 mG (OsCal) 1 Tablet(s) Oral two times a day  clonazePAM  Tablet 0.5 milliGRAM(s) Oral daily  dextrose 5%. 1000 milliLiter(s) (50 mL/Hr) IV Continuous <Continuous>  dextrose 5%. 1000 milliLiter(s) (100 mL/Hr) IV Continuous <Continuous>  dextrose 50% Injectable 12.5 Gram(s) IV Push once  dextrose 50% Injectable 25 Gram(s) IV Push once  dextrose 50% Injectable 25 Gram(s) IV Push once  divalproex Sprinkle 500 milliGRAM(s) Oral at bedtime  divalproex Sprinkle 250 milliGRAM(s) Oral daily  folic acid 1 milliGRAM(s) Oral daily  gabapentin 300 milliGRAM(s) Oral <User Schedule>  glucagon  Injectable 1 milliGRAM(s) IntraMuscular once  insulin lispro (ADMELOG) corrective regimen sliding scale   SubCutaneous three times a day before meals  insulin lispro (ADMELOG) corrective regimen sliding scale   SubCutaneous at bedtime  losartan 100 milliGRAM(s) Oral daily  multivitamin 1 Tablet(s) Oral daily  pantoprazole   Suspension 40 milliGRAM(s) Oral daily  QUEtiapine 25 milliGRAM(s) Oral <User Schedule>  senna 2 Tablet(s) Oral at bedtime    MEDICATIONS  (PRN):  acetaminophen     Tablet .. 650 milliGRAM(s) Oral every 6 hours PRN Temp greater or equal to 38C (100.4F), Mild Pain (1 - 3)  dextrose Oral Gel 15 Gram(s) Oral once PRN Blood Glucose LESS THAN 70 milliGRAM(s)/deciliter  haloperidol    Injectable 1 milliGRAM(s) IV Push every 6 hours PRN Agitation  melatonin 3 milliGRAM(s) Oral at bedtime PRN Insomnia  QUEtiapine 12.5 milliGRAM(s) Oral every 6 hours PRN agitation  
MEDICATIONS  (STANDING):  amLODIPine   Tablet 2.5 milliGRAM(s) Oral daily  aspirin  chewable 81 milliGRAM(s) Oral daily  atorvastatin 10 milliGRAM(s) Oral at bedtime  calcium carbonate   1250 mG (OsCal) 1 Tablet(s) Oral two times a day  clonazePAM  Tablet 0.5 milliGRAM(s) Oral daily  dextrose 5%. 1000 milliLiter(s) (50 mL/Hr) IV Continuous <Continuous>  dextrose 5%. 1000 milliLiter(s) (100 mL/Hr) IV Continuous <Continuous>  dextrose 50% Injectable 12.5 Gram(s) IV Push once  dextrose 50% Injectable 25 Gram(s) IV Push once  dextrose 50% Injectable 25 Gram(s) IV Push once  divalproex Sprinkle 500 milliGRAM(s) Oral at bedtime  divalproex Sprinkle 250 milliGRAM(s) Oral daily  folic acid 1 milliGRAM(s) Oral daily  glucagon  Injectable 1 milliGRAM(s) IntraMuscular once  insulin lispro (ADMELOG) corrective regimen sliding scale   SubCutaneous three times a day before meals  insulin lispro (ADMELOG) corrective regimen sliding scale   SubCutaneous at bedtime  LORazepam   Injectable 0.25 milliGRAM(s) IV Push once  losartan 100 milliGRAM(s) Oral daily  multivitamin 1 Tablet(s) Oral daily  pantoprazole   Suspension 40 milliGRAM(s) Oral daily  QUEtiapine 50 milliGRAM(s) Oral at bedtime  senna 2 Tablet(s) Oral at bedtime    MEDICATIONS  (PRN):  acetaminophen     Tablet .. 650 milliGRAM(s) Oral every 6 hours PRN Temp greater or equal to 38C (100.4F), Mild Pain (1 - 3)  dextrose Oral Gel 15 Gram(s) Oral once PRN Blood Glucose LESS THAN 70 milliGRAM(s)/deciliter  haloperidol    Injectable 1 milliGRAM(s) IV Push every 6 hours PRN Agitation  melatonin 3 milliGRAM(s) Oral at bedtime PRN Insomnia  QUEtiapine 12.5 milliGRAM(s) Oral every 6 hours PRN agitation  
MEDICATIONS  (STANDING):  amLODIPine   Tablet 2.5 milliGRAM(s) Oral daily  aspirin  chewable 81 milliGRAM(s) Oral daily  atorvastatin 10 milliGRAM(s) Oral at bedtime  calcium carbonate   1250 mG (OsCal) 1 Tablet(s) Oral two times a day  cephalexin 500 milliGRAM(s) Oral four times a day  clonazePAM  Tablet 0.5 milliGRAM(s) Oral daily  dextrose 5%. 1000 milliLiter(s) (50 mL/Hr) IV Continuous <Continuous>  dextrose 5%. 1000 milliLiter(s) (100 mL/Hr) IV Continuous <Continuous>  dextrose 50% Injectable 12.5 Gram(s) IV Push once  dextrose 50% Injectable 25 Gram(s) IV Push once  dextrose 50% Injectable 25 Gram(s) IV Push once  divalproex Sprinkle 500 milliGRAM(s) Oral at bedtime  divalproex Sprinkle 250 milliGRAM(s) Oral daily  DULoxetine 30 milliGRAM(s) Oral daily  folic acid 1 milliGRAM(s) Oral daily  glucagon  Injectable 1 milliGRAM(s) IntraMuscular once  insulin lispro (ADMELOG) corrective regimen sliding scale   SubCutaneous three times a day before meals  insulin lispro (ADMELOG) corrective regimen sliding scale   SubCutaneous at bedtime  lactobacillus acidophilus 1 Tablet(s) Oral daily  lidocaine   4% Patch 1 Patch Transdermal daily  losartan 100 milliGRAM(s) Oral daily  multivitamin 1 Tablet(s) Oral daily  pantoprazole   Suspension 40 milliGRAM(s) Oral daily  QUEtiapine 50 milliGRAM(s) Oral at bedtime  senna 2 Tablet(s) Oral at bedtime  sodium chloride 0.65% Nasal 1 Spray(s) Both Nostrils three times a day    MEDICATIONS  (PRN):  acetaminophen     Tablet .. 650 milliGRAM(s) Oral every 6 hours PRN Temp greater or equal to 38C (100.4F), Mild Pain (1 - 3)  dextrose Oral Gel 15 Gram(s) Oral once PRN Blood Glucose LESS THAN 70 milliGRAM(s)/deciliter  LORazepam     Tablet 0.5 milliGRAM(s) Oral every 12 hours PRN Anxiety/agitation  melatonin 3 milliGRAM(s) Oral at bedtime PRN Insomnia  QUEtiapine 12.5 milliGRAM(s) Oral every 6 hours PRN Anxiety  
MEDICATIONS  (STANDING):  amLODIPine   Tablet 2.5 milliGRAM(s) Oral daily  aspirin  chewable 81 milliGRAM(s) Oral daily  atorvastatin 10 milliGRAM(s) Oral at bedtime  calcium carbonate   1250 mG (OsCal) 1 Tablet(s) Oral two times a day  cephalexin 500 milliGRAM(s) Oral four times a day  clonazePAM  Tablet 0.5 milliGRAM(s) Oral daily  dextrose 5%. 1000 milliLiter(s) (50 mL/Hr) IV Continuous <Continuous>  dextrose 5%. 1000 milliLiter(s) (100 mL/Hr) IV Continuous <Continuous>  dextrose 50% Injectable 12.5 Gram(s) IV Push once  dextrose 50% Injectable 25 Gram(s) IV Push once  dextrose 50% Injectable 25 Gram(s) IV Push once  divalproex Sprinkle 500 milliGRAM(s) Oral at bedtime  divalproex Sprinkle 250 milliGRAM(s) Oral daily  DULoxetine 30 milliGRAM(s) Oral daily  folic acid 1 milliGRAM(s) Oral daily  glucagon  Injectable 1 milliGRAM(s) IntraMuscular once  insulin lispro (ADMELOG) corrective regimen sliding scale   SubCutaneous three times a day before meals  insulin lispro (ADMELOG) corrective regimen sliding scale   SubCutaneous at bedtime  lidocaine   4% Patch 1 Patch Transdermal daily  losartan 100 milliGRAM(s) Oral daily  multivitamin 1 Tablet(s) Oral daily  pantoprazole   Suspension 40 milliGRAM(s) Oral daily  QUEtiapine 50 milliGRAM(s) Oral at bedtime  senna 2 Tablet(s) Oral at bedtime  sodium chloride 0.65% Nasal 1 Spray(s) Both Nostrils three times a day    MEDICATIONS  (PRN):  acetaminophen     Tablet .. 650 milliGRAM(s) Oral every 6 hours PRN Temp greater or equal to 38C (100.4F), Mild Pain (1 - 3)  dextrose Oral Gel 15 Gram(s) Oral once PRN Blood Glucose LESS THAN 70 milliGRAM(s)/deciliter  LORazepam     Tablet 0.5 milliGRAM(s) Oral every 12 hours PRN Anxiety/agitation  melatonin 3 milliGRAM(s) Oral at bedtime PRN Insomnia  QUEtiapine 12.5 milliGRAM(s) Oral every 6 hours PRN Anxiety

## 2023-06-07 NOTE — PROGRESS NOTE ADULT - NUTRITIONAL ASSESSMENT
This patient has been assessed with a concern for Malnutrition and has been determined to have a diagnosis/diagnoses of Severe protein-calorie malnutrition.    This patient is being managed with:   Diet Pureed-  Supplement Feeding Modality:  Oral  Glucerna Shake Cans or Servings Per Day:  1       Frequency:  Two Times a day  Entered: May 31 2023  5:20PM  
This patient has been assessed with a concern for Malnutrition and has been determined to have a diagnosis/diagnoses of Severe protein-calorie malnutrition.    This patient is being managed with:   Diet Minced and Moist-  Entered: Jun 1 2023  1:18PM  

## 2023-06-07 NOTE — PROGRESS NOTE ADULT - PROBLEM SELECTOR PLAN 6
- c/w low dose sliding scale insulin
- heparin subq for dvt ppx
- c/w low dose sliding scale insulin
- heparin subq for dvt ppx

## 2023-06-07 NOTE — BH PATIENT PROFILE - STATED REASON FOR ADMISSION
Patient a 81 y/o female with diagnosis of Dementia, Bipolar disorder and paranoia, treated at  [facility] for acute change in mental status. Now transferred to [facility] to stabilize psychiatric symptoms.

## 2023-06-07 NOTE — PROGRESS NOTE ADULT - PROBLEM SELECTOR PLAN 3
-former site of IV on distal RUE with redness, tenderness, and swelling  -will initiate keflex 500 mg q6h x 5 days for phlebitis
- c/w statin
-former site of IV on distal RUE with redness, tenderness, and swelling  -initiated keflex 500 mg q6h (6/6); plan for 5 day course for phlebitis
- c/w statin

## 2023-06-07 NOTE — PROGRESS NOTE ADULT - PROBLEM SELECTOR PROBLEM 3
Phlebitis
HLD (hyperlipidemia)
Phlebitis
HLD (hyperlipidemia)

## 2023-06-07 NOTE — PROGRESS NOTE ADULT - PROVIDER SPECIALTY LIST ADULT
Hospitalist
Gastroenterology
Hospitalist

## 2023-06-07 NOTE — BH INPATIENT PSYCHIATRY ASSESSMENT NOTE - NSBHCHARTREVIEWVS_PSY_A_CORE FT
Vital Signs Last 24 Hrs  T(C): 36.6 (06-07-23 @ 18:13), Max: 36.6 (06-07-23 @ 11:07)  T(F): 97.8 (06-07-23 @ 18:13), Max: 97.8 (06-07-23 @ 11:07)  HR: 76 (06-07-23 @ 11:07) (68 - 76)  BP: 121/58 (06-07-23 @ 11:07) (120/46 - 121/58)  BP(mean): --  RR: 16 (06-07-23 @ 18:13) (16 - 18)  SpO2: 96% (06-07-23 @ 18:13) (96% - 98%)    Orthostatic VS  06-07-23 @ 18:13  Lying BP: --/-- HR: --  Sitting BP: 148/60 HR: 89  Standing BP: 114/92 HR: 100  Site: upper right arm  Mode: electronic

## 2023-06-07 NOTE — PROGRESS NOTE ADULT - PROBLEM SELECTOR PROBLEM 2
Unexplained dysphagia

## 2023-06-07 NOTE — DISCHARGE NOTE NURSING/CASE MANAGEMENT/SOCIAL WORK - NSDCPEFALRISK_GEN_ALL_CORE
For information on Fall & Injury Prevention, visit: [email or web address] OR  [email or web address] OR  https://www.cdc.gov/steadi/patient.html

## 2023-06-07 NOTE — DISCHARGE NOTE NURSING/CASE MANAGEMENT/SOCIAL WORK - PATIENT PORTAL LINK FT
You can access the FollowMyHealth Patient Portal offered by [facility] by registering at the following website: [email or web address]. By joining [facility]’s FollowMyHealth portal, you will also be able to view your health information using other applications (apps) compatible with our system.

## 2023-06-07 NOTE — PROGRESS NOTE ADULT - NSPROGADDITIONALINFOA_GEN_ALL_CORE
plan of care discussed with daughter Marcelina [phone] on 6/2 at 1pm.
plan of care discussed with daughter Marcelina [phone] on 6/1 at 1:30pm.
plan of care discussed with daughter Marcelina [phone] on 5/31 at 1:30pm.
Spoke to pt's daughter, Marcelina, via telephone on 6/6.
Patient is medically stable for discharge. A total of 38 minutes were spent on discharge coordination.

## 2023-06-07 NOTE — BH INPATIENT PSYCHIATRY ASSESSMENT NOTE - CURRENT MEDICATION
MEDICATIONS  (STANDING):  amLODIPine   Tablet 2.5 milliGRAM(s) Oral daily  aspirin  chewable 81 milliGRAM(s) Oral daily  atorvastatin 10 milliGRAM(s) Oral at bedtime  calcium carbonate   1250 mG (OsCal) 1 Tablet(s) Oral two times a day  cephalexin 500 milliGRAM(s) Oral four times a day  clonazePAM  Tablet 0.5 milliGRAM(s) Oral <User Schedule>  divalproex Sprinkle 500 milliGRAM(s) Oral at bedtime  divalproex Sprinkle 250 milliGRAM(s) Oral daily  DULoxetine 30 milliGRAM(s) Oral daily  folic acid 1 milliGRAM(s) Oral daily  glucagon  Injectable 1 milliGRAM(s) IntraMuscular once  insulin lispro (ADMELOG) corrective regimen sliding scale   SubCutaneous three times a day before meals  lactobacillus acidophilus 1 Tablet(s) Oral daily  losartan 100 milliGRAM(s) Oral daily  pantoprazole    Tablet 40 milliGRAM(s) Oral before breakfast  QUEtiapine 50 milliGRAM(s) Oral at bedtime  senna 2 Tablet(s) Oral at bedtime    MEDICATIONS  (PRN):  dextrose Oral Gel 15 Gram(s) Oral once PRN Blood Glucose LESS THAN 70 milliGRAM(s)/deciliter  LORazepam     Tablet 0.5 milliGRAM(s) Oral every 12 hours PRN anxiety/agitation  melatonin. 3 milliGRAM(s) Oral at bedtime PRN Insomnia  QUEtiapine 12.5 milliGRAM(s) Oral every 6 hours PRN anxiety

## 2023-06-07 NOTE — PROGRESS NOTE ADULT - PROBLEM SELECTOR PROBLEM 1
Acute paranoia

## 2023-06-07 NOTE — PROGRESS NOTE ADULT - PROBLEM SELECTOR PROBLEM 6
Prophylactic measure
DM2 (diabetes mellitus, type 2)
DM2 (diabetes mellitus, type 2)

## 2023-06-08 DIAGNOSIS — E53.8 DEFICIENCY OF OTHER SPECIFIED B GROUP VITAMINS: ICD-10-CM

## 2023-06-08 DIAGNOSIS — Z90.711 ACQUIRED ABSENCE OF UTERUS WITH REMAINING CERVICAL STUMP: ICD-10-CM

## 2023-06-08 DIAGNOSIS — E78.5 HYPERLIPIDEMIA, UNSPECIFIED: ICD-10-CM

## 2023-06-08 DIAGNOSIS — I10 ESSENTIAL (PRIMARY) HYPERTENSION: ICD-10-CM

## 2023-06-08 DIAGNOSIS — E11.9 TYPE 2 DIABETES MELLITUS WITHOUT COMPLICATIONS: ICD-10-CM

## 2023-06-08 DIAGNOSIS — Z98.890 OTHER SPECIFIED POSTPROCEDURAL STATES: ICD-10-CM

## 2023-06-08 LAB
CHOLEST SERPL-MCNC: 119 MG/DL — SIGNIFICANT CHANGE UP
GLUCOSE BLDC GLUCOMTR-MCNC: 74 MG/DL — SIGNIFICANT CHANGE UP (ref 70–99)
GLUCOSE BLDC GLUCOMTR-MCNC: 91 MG/DL — SIGNIFICANT CHANGE UP (ref 70–99)
GLUCOSE BLDC GLUCOMTR-MCNC: 94 MG/DL — SIGNIFICANT CHANGE UP (ref 70–99)
GLUCOSE BLDC GLUCOMTR-MCNC: 94 MG/DL — SIGNIFICANT CHANGE UP (ref 70–99)
HDLC SERPL-MCNC: 47 MG/DL — LOW
LIPID PNL WITH DIRECT LDL SERPL: 61 MG/DL — SIGNIFICANT CHANGE UP
NON HDL CHOLESTEROL: 72 MG/DL — SIGNIFICANT CHANGE UP
TRIGL SERPL-MCNC: 56 MG/DL — SIGNIFICANT CHANGE UP

## 2023-06-08 PROCEDURE — 99223 1ST HOSP IP/OBS HIGH 75: CPT

## 2023-06-08 PROCEDURE — 99232 SBSQ HOSP IP/OBS MODERATE 35: CPT

## 2023-06-08 RX ORDER — METFORMIN HYDROCHLORIDE 850 MG/1
1 TABLET ORAL
Refills: 0 | DISCHARGE

## 2023-06-08 RX ORDER — METFORMIN HYDROCHLORIDE 850 MG/1
500 TABLET ORAL
Refills: 0 | Status: DISCONTINUED | OUTPATIENT
Start: 2023-06-08 | End: 2023-06-11

## 2023-06-08 RX ORDER — SODIUM CHLORIDE 0.65 %
2 AEROSOL, SPRAY (ML) NASAL
Refills: 0 | Status: DISCONTINUED | OUTPATIENT
Start: 2023-06-08 | End: 2023-08-25

## 2023-06-08 RX ORDER — CALCIUM CARBONATE 500(1250)
1 TABLET ORAL
Refills: 0 | DISCHARGE

## 2023-06-08 RX ORDER — ACETAMINOPHEN 500 MG
650 TABLET ORAL EVERY 6 HOURS
Refills: 0 | Status: DISCONTINUED | OUTPATIENT
Start: 2023-06-08 | End: 2023-10-17

## 2023-06-08 RX ORDER — PREGABALIN 225 MG/1
1000 CAPSULE ORAL DAILY
Refills: 0 | Status: DISCONTINUED | OUTPATIENT
Start: 2023-06-08 | End: 2023-07-03

## 2023-06-08 RX ADMIN — DIVALPROEX SODIUM 500 MILLIGRAM(S): 500 TABLET, DELAYED RELEASE ORAL at 20:26

## 2023-06-08 RX ADMIN — AMLODIPINE BESYLATE 2.5 MILLIGRAM(S): 2.5 TABLET ORAL at 09:03

## 2023-06-08 RX ADMIN — Medication 500 MILLIGRAM(S): at 12:43

## 2023-06-08 RX ADMIN — SENNA PLUS 2 TABLET(S): 8.6 TABLET ORAL at 20:26

## 2023-06-08 RX ADMIN — Medication 500 MILLIGRAM(S): at 17:00

## 2023-06-08 RX ADMIN — PANTOPRAZOLE SODIUM 40 MILLIGRAM(S): 20 TABLET, DELAYED RELEASE ORAL at 09:04

## 2023-06-08 RX ADMIN — Medication 81 MILLIGRAM(S): at 09:04

## 2023-06-08 RX ADMIN — LOSARTAN POTASSIUM 100 MILLIGRAM(S): 100 TABLET, FILM COATED ORAL at 09:05

## 2023-06-08 RX ADMIN — Medication 650 MILLIGRAM(S): at 08:15

## 2023-06-08 RX ADMIN — QUETIAPINE FUMARATE 50 MILLIGRAM(S): 200 TABLET, FILM COATED ORAL at 20:26

## 2023-06-08 RX ADMIN — Medication 0.5 MILLIGRAM(S): at 09:04

## 2023-06-08 RX ADMIN — Medication 1 TABLET(S): at 09:05

## 2023-06-08 RX ADMIN — Medication 650 MILLIGRAM(S): at 07:45

## 2023-06-08 RX ADMIN — Medication 500 MILLIGRAM(S): at 09:03

## 2023-06-08 RX ADMIN — DIVALPROEX SODIUM 250 MILLIGRAM(S): 500 TABLET, DELAYED RELEASE ORAL at 09:03

## 2023-06-08 RX ADMIN — Medication 500 MILLIGRAM(S): at 20:25

## 2023-06-08 RX ADMIN — Medication 2 SPRAY(S): at 20:27

## 2023-06-08 RX ADMIN — Medication 1 MILLIGRAM(S): at 09:05

## 2023-06-08 RX ADMIN — DULOXETINE HYDROCHLORIDE 30 MILLIGRAM(S): 30 CAPSULE, DELAYED RELEASE ORAL at 09:04

## 2023-06-08 RX ADMIN — Medication 1 TABLET(S): at 09:03

## 2023-06-08 RX ADMIN — Medication 1 TABLET(S): at 20:27

## 2023-06-08 RX ADMIN — METFORMIN HYDROCHLORIDE 500 MILLIGRAM(S): 850 TABLET ORAL at 20:26

## 2023-06-08 RX ADMIN — ATORVASTATIN CALCIUM 10 MILLIGRAM(S): 80 TABLET, FILM COATED ORAL at 20:26

## 2023-06-08 NOTE — CONSULT NOTE ADULT - TIME BILLING
The necessity of the time spent during the encounter on this date of service was due to:   - Direct patient care ( interview and independently obtaining a review of systems and performing a physical exam)and documentation  - Ordering, reviewing, and interpreting labs, testing, and imaging   - Reviewing prior hospitalization and where necessary, outpatient records.  - Discussion with consultants, other providers, support staff  - Counselling and educating patient and family regarding interpretation of aforementioned items and plan of care.

## 2023-06-08 NOTE — BH SOCIAL WORK INITIAL PSYCHOSOCIAL EVALUATION - OTHER PAST PSYCHIATRIC HISTORY (INCLUDE DETAILS REGARDING ONSET, COURSE OF ILLNESS, INPATIENT/OUTPATIENT TREATMENT)
Pt is an 82 year old  woman with dx of bipolar I d/O, manic phase.  [facility]iding in  [facility]. Admitted [facility] 12/1/2021 [facility],  then  unit 2S with dc plan 1/2022  to [facility] and return Assisted living.    Pt has a previous diagnosis of BAD, PTSD, and personality disorder. Pt is an 82 year old  woman with dx of bipolar I d/o now presents anxiety/ paranoia  Residing [facility] since June 2022 due to change to medicaid funding.  was previously at  [facility]. Admitted [facility] 12/1/2021  (anjelica) [facility]5,  then  unit 2S with dc plan 1/2022  to [facility] and return Assisted living.    Pt has a previous diagnosis of BAD, PTSD, and personality disorder.

## 2023-06-08 NOTE — DIETITIAN INITIAL EVALUATION ADULT - ADD RECOMMEND
1) Defer diet consistency to SLP's recommendation/ MD order. Please consult to SLP for swallow re-eval if patient no longer tolerated current diet consistency. 2) Add daily MVI for micronutrient coverage when medically appropriate. 3) Monitor PO intake/tolerance, weights, labs, BM's, and skin integrity. 4) Encourage po intake as tolerated and honor food preferences PRN. 5) Aspiration/reflux precautions.

## 2023-06-08 NOTE — DIETITIAN INITIAL EVALUATION ADULT - OTHER INFO
Patient is a 83 y/o female, domiciled at the [facility], with PMHx of dementia, DM2, and HTN, PPHx of late onset Bipolar I disorder, PTSD and Cluster B Personality traits, 2 prior psych adm last in 2022 at [facility], who presented to ED with worsening paranoia, anxiety, FTT (weight loss of 20lb since march), and confusion. Psychiatry was consulted for psychosis/AMS. Patient is now being transferred to [facility] for psychiatric treatment and stabilization. On 1:1 CO for disorganized behavior.    Patient was deeply asleep with CO 1:1 in her room at time of visit. Collateral information obtained from pt's medical chart and RN. Noted s/p MBSS in [facility] on 6/1/23 which recommended Minced and moist solids with thin liquids, on aspiration/reflux precautions. Patient c/w Minced and moist at this time. Per RN, patient was observed having her breakfast this morning and tolerated PO intake, no reports of chewing/swallowing difficulties on current diet. NKFA reported. No specific food preferences obtained today. No GI distress (nausea/vomiting/diarrhea/ constipation) reported at this time, on probiotics and senna per MD order. Patient may benefit from adding Glucerna Therapeutic Nutrition Shake 240mls 3x daily (660kcals, 30g protein) to optimize her po intake. Case d/w MD on the unit. Noted patient with hx of DM II, fingersticks  x past 24 hrs, and HgbA1C 5.7%; patient is not a candidate for DM diet education at this time.

## 2023-06-08 NOTE — BH INPATIENT PSYCHIATRY PROGRESS NOTE - NSBHFUPINTERVALHXFT_PSY_A_CORE
Chart reviewed and case discussed with treatment team. Staff report patient has been med compliant, eating adequately but having poor impulse control and unable to respond adequately to redirection about fall risk guidelines. Patient has been started on CO as a result. Has not yet received a walker. Patient was found with her eyes closed. Inquired if fatigued but did not respond with relevant answer. She did complain about "getting hurt" by the staff after which it was clarified she was referring to blood work not making a paranoid statement.     Spoke with daughter Marcelina who is healthcare proxy and next of kin. Reviewed home med list, ambulation status. Daughter states MOLST was done and will send to unit. States patient is DNR/DNI.

## 2023-06-08 NOTE — BH INPATIENT PSYCHIATRY PROGRESS NOTE - MSE UNSTRUCTURED FT
Appearance: limited grooming, thin, no abnormal involuntary movements noted  Behavior: mild psychomotor retardation, poorly engaged, oddly related  Speech: wnl  Mood: anxious  Affect: does not appear to be anxious, constricted, stable  Thought process: somewhat disorganized, vague, concrete  Thought content: no particular delusions elicited at this time; no reports of SI/HI  Perceptual disturbances: no appearance of internal preoccupation  Orientation: adequately oriented  Abstraction: limited  Fund of knowledge: not assessed  Insight: limited  Judgement: poor

## 2023-06-08 NOTE — BH INPATIENT PSYCHIATRY PROGRESS NOTE - NSICDXBHSECONDARYDX_PSY_ALL_CORE
Type II diabetes mellitus   E11.9  Vitamin B12 deficiency   E53.8  Hyperlipidemia   E78.5  Hypertension   I10  S/P partial hysterectomy   Z90.711  S/P carpal tunnel release   Z98.890

## 2023-06-08 NOTE — BH INPATIENT PSYCHIATRY PROGRESS NOTE - CURRENT MEDICATION
MEDICATIONS  (STANDING):  amLODIPine   Tablet 2.5 milliGRAM(s) Oral daily  aspirin  chewable 81 milliGRAM(s) Oral daily  atorvastatin 10 milliGRAM(s) Oral at bedtime  calcium carbonate   1250 mG (OsCal) 1 Tablet(s) Oral two times a day  cephalexin 500 milliGRAM(s) Oral four times a day  clonazePAM  Tablet 0.5 milliGRAM(s) Oral <User Schedule>  cyanocobalamin 1000 MICROGram(s) Oral daily  divalproex Sprinkle 500 milliGRAM(s) Oral at bedtime  divalproex Sprinkle 250 milliGRAM(s) Oral daily  DULoxetine 30 milliGRAM(s) Oral daily  folic acid 1 milliGRAM(s) Oral daily  glucagon  Injectable 1 milliGRAM(s) IntraMuscular once  insulin lispro (ADMELOG) corrective regimen sliding scale   SubCutaneous three times a day before meals  lactobacillus acidophilus 1 Tablet(s) Oral daily  losartan 100 milliGRAM(s) Oral daily  pantoprazole    Tablet 40 milliGRAM(s) Oral before breakfast  QUEtiapine 50 milliGRAM(s) Oral at bedtime  senna 2 Tablet(s) Oral at bedtime  sodium chloride 0.65% Nasal 2 Spray(s) Both Nostrils two times a day    MEDICATIONS  (PRN):  acetaminophen     Tablet .. 650 milliGRAM(s) Oral every 6 hours PRN Temp greater or equal to 38C (100.4F), Mild Pain (1 - 3), Moderate Pain (4 - 6)  dextrose Oral Gel 15 Gram(s) Oral once PRN Blood Glucose LESS THAN 70 milliGRAM(s)/deciliter  LORazepam     Tablet 0.5 milliGRAM(s) Oral every 12 hours PRN anxiety/agitation  melatonin. 3 milliGRAM(s) Oral at bedtime PRN Insomnia  QUEtiapine 12.5 milliGRAM(s) Oral every 6 hours PRN anxiety

## 2023-06-08 NOTE — DIETITIAN INITIAL EVALUATION ADULT - PERTINENT MEDS FT
MEDICATIONS  (STANDING):  amLODIPine   Tablet 2.5 milliGRAM(s) Oral daily  aspirin  chewable 81 milliGRAM(s) Oral daily  atorvastatin 10 milliGRAM(s) Oral at bedtime  calcium carbonate   1250 mG (OsCal) 1 Tablet(s) Oral two times a day  cephalexin 500 milliGRAM(s) Oral four times a day  clonazePAM  Tablet 0.5 milliGRAM(s) Oral <User Schedule>  divalproex Sprinkle 500 milliGRAM(s) Oral at bedtime  divalproex Sprinkle 250 milliGRAM(s) Oral daily  DULoxetine 30 milliGRAM(s) Oral daily  folic acid 1 milliGRAM(s) Oral daily  glucagon  Injectable 1 milliGRAM(s) IntraMuscular once  insulin lispro (ADMELOG) corrective regimen sliding scale   SubCutaneous three times a day before meals  lactobacillus acidophilus 1 Tablet(s) Oral daily  losartan 100 milliGRAM(s) Oral daily  pantoprazole    Tablet 40 milliGRAM(s) Oral before breakfast  QUEtiapine 50 milliGRAM(s) Oral at bedtime  senna 2 Tablet(s) Oral at bedtime  sodium chloride 0.65% Nasal 2 Spray(s) Both Nostrils two times a day    MEDICATIONS  (PRN):  acetaminophen     Tablet .. 650 milliGRAM(s) Oral every 6 hours PRN Temp greater or equal to 38C (100.4F), Mild Pain (1 - 3), Moderate Pain (4 - 6)  dextrose Oral Gel 15 Gram(s) Oral once PRN Blood Glucose LESS THAN 70 milliGRAM(s)/deciliter  LORazepam     Tablet 0.5 milliGRAM(s) Oral every 12 hours PRN anxiety/agitation  melatonin. 3 milliGRAM(s) Oral at bedtime PRN Insomnia  QUEtiapine 12.5 milliGRAM(s) Oral every 6 hours PRN anxiety

## 2023-06-08 NOTE — BH SOCIAL WORK INITIAL PSYCHOSOCIAL EVALUATION - NSHIGHRISKBEHFT_PSY_ALL_CORE
prior admission 1/21: It is reported that pt has been leaving her assisted living for long hours and also after dark.  She walks along [address] [employer] [address] for long distances and will also cross the [address].  She has asked strangers to drive her home and has been spending large amounts of money.

## 2023-06-08 NOTE — DIETITIAN INITIAL EVALUATION ADULT - FUNCTIONAL SCREEN CURRENT LEVEL: SWALLOWING (IF SCORE 2 OR MORE FOR ANY ITEM, CONSULT REHAB SERVICES), MLM)
Progress Notes by Eddy Bolanos PT at 11/13/17 11:10 AM     Author:  Eddy Bolanos PT Service:  (none) Author Type:  Physical Therapist     Filed:  11/13/17 12:21 PM Encounter Date:  11/13/2017 Status:  Signed     :  Eddy Bolanos PT (Physical Therapist)            MEDICARE  PHYSICAL THERAPY DAILY NOTE  DIAGNOSIS:    1. Closed displaced fracture of greater tuberosity of right humerus with routine healing     2. Right shoulder pain, unspecified chronicity         INSURANCE  BENEFITS:   Information from CMS [employer] Access Portal:     Patient Medicare Certificate Number [id number]     Plan: B     Effective Date: 8.1.2008     Patient has met the deductible for 2017 calendar year.     Patient has used $0 towards the Physical Therapy and Occupational Therapy cap in 2017 calendar year    PHYSICIAN RECOMMENDATIONS: Evaluate and Treat     ATTENDANCE: Patient has been seen for[NR1.1C] 8[NR1.1M] visits between 10/18/2017 and[NR1.1C]  11/13/2017[NR1.1T]. Progress summary due on or before 10th visit with G-codes.    SUBJECTIVE:    Patient states[NR1.1C] she worked on her exercises all weekend. States she was able to get the hand to the ground with the cane[NR1.1M] external rotatio[NR1.1T]n stretch.[NR1.1M]    OBJECTIVE:     Short term goals - (4 weeks) -   * Patient will increase active range of motion to 90 flexion; 90 abduction; 45 internal rotation; 45 external rotation to allow patient to complete daily functional tasks with minimal difficulty ([NR1.1C]See gains made in passive range with release of serratus anterior, pectoralis minor, latissimus dorsi, and correct planes of stretching.[NR1.1M] 11/13/2017[NR1.2T])[NR1.1M]  * Patient will sleep in her bed with 2 or less waking episodes from shoulder pain (Patient wakes her 3 times a week due to symptoms. 11/9/2017)  * Patient will be independent with home program for mobility, strengthening and progression ([NR1.1C]Provided new program for stretching and  scapular and rotator cuff strengthening.[NR1.1M] 11/13/2017[NR1.2T])    Long term goals - (8 weeks) -    * Patient will reach to top and back of head for improved ability to groom hair independently   * Patient will report ability to dress and bathe with minimal to no pain in involved arm    * Decrease pain to no more then 2/10 in therapy and throughout the day.   * Patient will demonstrate correct scapulohumeral rhythm with full shoulder range of motion. (Improving - better demonstration of understanding about biomechanics and control of range of motion during exercises. 11/6/2017)  * Patient will increase active range of motion to 150 flexion; 150 abduction; T10 internal rotation; T3 external rotation to allow patient to have full active range of motion for daily functional tasks.   * Patient will demonstrate increase strength to 4/5 in all planes in order to perform work requirements.   * Patient will lift 2# from waist to overhead shelf level 15 times in order to demonstrate improved functional strength and mobility to put away dishes and lift objects overhead.     Range of Motion:   Active shoulder range of motion in degrees (*=pain):   Right  10/24/2017  Left  10/24/2017    Flexion 30 169   Abduction 27 180   External Rotation Able to touch chin with right. T4   Internal Rotation Not tested T7   Extension Not tested 74     Passive shoulder range of motion in degrees (*=pain):   Right  10/24/2017  Right 11/9/2017[NR1.1C] Right[NR1.1M] 11/13/2017[NR1.3T]   Flexion 79 135[NR1.1C] 156[NR1.1M]   Abduction 49 125[NR1.1C] 137[NR1.1M]   External Rotation Lacking 9 degrees to neutral 29[NR1.1C] 38[NR1.1M]   Internal Rotation Hand to stomach 65        Manual Muscle Test:     Shoulder Strength (*=pain):   Right  10/24/2017  Left  10/24/2017    Flexion N/T due to decreased ROM 5/5   Abduction N/T due to decreased ROM 5/5   Horizontal Abduction N/T due to decreased ROM 5/5   Horizontal Adduction N/T due to decreased ROM  5/5   External Rotation 4-/5 5/5   Internal Rotation 4/5 5/5            FUNCTIONAL LIMITATIONS REPORTING: As of evaluation  Carrying, Moving & Handling Objects -  Current Status -  - CM - 80-99% and Projected Goal -  - CI - 1-19%    TREATMENT TODAY:    Time in:[NR1.1C] 11:10 a[NR1.1M]m     Time out:[NR1.1C] 12:29 a[NR1.1M]m    Modalities - Electrical Stimulation to decrease pain and reduce inflammation:  22989 (attended) x[NR1.1C] 1[NR1.1M] units -[NR1.1C] 15[NR1.1M] minutes or 27643 (unattended non-Medicare) or 94256 (non-wound unattended Medicare) and Cold pack to decrease pain and reduce inflammation:  88166 -[NR1.1C] 15[NR1.1M] minutes -[NR1.1C] 1[NR1.1M] units    Manual Therapy to  increase range of motion, decrease myofascial tightness and improve soft tissue and joint mobility:  97140 x 1 units -  15 minutes    Soft tissue mobilization of shoulder complex  Medial scapular release  Lateral scapular release  Pectoralis release in supine  Passive shoulder range of motion with light overpressures - caution with extension and external rotation.[NR1.1C]  Supine shoulder flexion with serratus release[NR1.1M]    Therapeutic Exercise to increase range of motion, improve flexibility, increase strength and instruct in a home exercise program:  02726 x[NR1.1C] 2[NR1.1M] units -[NR1.1C]  45[NR1.1M] minutes (one on one time for[NR1.1C] 2[NR1.1M]5 minutes)    Name: Malgorzata Betancur  Injury: right shoulder anterior dislocation and fracture  Precautions: 1 on 1 due to difficulty with exercises.    Today's exercises:[NR1.1C]  *Complete change of exercises, reviewed proper stretching directions, assessed shoulder range after manual releases and stretching, and changed home exercise program.    Table flexion stretch 10\"x10[NR1.1M]  Side-lying external rotation[NR1.1C] 2x10[NR1.1M]  Supine cane external rotation stretch 10\" hold x10  Active shoulder internal rotation peachTB 2x10[NR1.1C]   New:  Prone I's 2x10  Prone Rows  2x10        Held:[NR1.1M]  Rows 10# 2x10  Bilateral shoulder extension 5# 3x10  Power town slides flexion 30 degrees 2x10  Bilateral shoulder external rotation isometric OTB 3x10  Shoulder abduction table stretch 10\" hold x10  Putty squeezes yellow x10  Wrist flexion and extension stretches 30\"x2 each  Active shoulder flexion, abduction, scaption x10 each  Ball on table x10 each way      Home exercise program (last updated[NR1.1C] 11/13/2017[NR1.4T]): Patient issued written home exercise program.  Patient demonstrated exercises correctly and was instructed to call with questions.     Shoulder pendulums x10  Table flexion[NR1.1C] stretch 10\" hold[NR1.1M] x10   Supine cane external rotation[NR1.1C] 10[NR1.1M]\" hold x10  Active shoulder internal rotation peachTB 2x10   Side-lying external rotation 3x10[NR1.1C]  Prone I's and row's 2x10 each[NR1.1M]  Wrist flexion and extension stretches 30\"x2 each      ASSESSMENT/TREATMENT RESPONSE:    Patient's response to treatment:[NR1.1C] Patient present still doing supine[NR1.1M] external rotation[NR1.1T] wrong. Addressed hand and forearm tightness along with addressing serratus anterior and pectoralis minor. With these releases shoulder flexion, abduction, and[NR1.1M] external rotation[NR1.1T] increased greatly. All exercises and home program was changed due to difficulty with stretching, form, and control of exercises. Patient was able to verbalize understanding and acknowledge proper performance of exercises. Due to hand and forearm tightness scapular exercises were reduced to prone training to better stabilize scapula and avoid wrist and arm compensations.[NR1.1M]     Functional improvement noted:[NR1.1C] Large increase in passive range with correction to stretching, release of latissimus, serratus anterior, and pectoralis minor. No improvement in strength due to the need to regress and change exercises to better isolate scapular control and avoid wrist  compensations.[NR1.1M]    Prognosis for meeting goals:  good.   Treatment will consist of electrical stimulation and cold packs, biomechanical training, home program, manual therapy, neuromuscular re-education, Physical therapy evaluation and therapeutic exercise.  Treatment plan was discussed with the patient and verbal consent was obtained.      PLAN:[NR1.1C]   Reassess next session.[NR1.1M]    Certification Dates:  Medicare certification signed from: 10/18/2017 to 90 days.    Therapist Signature: Electronically Signed by:    Eddy Bolanos PT , 11/9/2017[NR1.1C]       Revision History        User Key Date/Time User Provider Type Action    > NR1.2 11/13/17 12:21 PM Eddy Bolanos, PT Physical Therapist Sign     NR1.4 11/13/17 12:09 PM Eddy Bolanos, PT Physical Therapist      NR1.3 11/13/17 11:37 AM Eddy Bolanos, PT Physical Therapist      NR1.1 11/13/17 11:10 AM Eddy Bolanos, PT Physical Therapist     C - Copied, M - Manual, T - Template             0 = swallows foods/liquids without difficulty

## 2023-06-08 NOTE — DIETITIAN INITIAL EVALUATION ADULT - ORAL INTAKE PTA/DIET HISTORY
As per LENO RD's note 5/31/23 -- Pt was on a pureed diet PTA and patient did not have much appetite/PO intake for 2 months; per daughter patient with unintended weight loss of ~20# in past 3 months. As per chart, patient thinks that people at her intermediate are stealing her things and poisoning her food. As per MD, daughter reports patient takes Multivitamin and B12 at home.

## 2023-06-08 NOTE — CONSULT NOTE ADULT - ASSESSMENT
83 y/o F with pmhx of dementia, DM2, HTN, anxiety, paranoia presented to the ED for new onset AMS and worsening agitation and paranoia. Pt admitted to [facility] for management of behavorial issues     # Dysphagia vs Paranoia about food being poisoned  Reviewed chart- As per previous attending`s discussion with daughter - Pt has become paranoid about food being poisoned since the last 2 months.  Swallow studies done at assisted living facility did not reveal any etiology  and pt was tolerating a pureed diet (pureed as pt would not eat any consistency not due to any actual dysphagia issue)   s/p Speech and swallow and [id number] was able to tolerate a minced and moist diet.  Cine showed esophageal web. Was seen by GI inpt- Appreciate GI recs – Advised to assess dysphagia symptoms when psychiatrically optimized to obtain best description of symptoms outside of paranoia around poisoned food. No urgent need for endoscopy at this time   Advise to crush all medications as tolerated. Continue minced and moist diet        # Phlebitis.- developed at former site of IV on distal RUE with redness, tenderness, and swelling  Was started on Keflex 500 mg q6h (6/6); plan for 5 day course for phlebitis. continue warm compresses on site upon d/c    # Acute paranoia- continue recs as per psych    # HLD- c/w statin.    # Benign essential HTN- SBP in 140-150s. on Norvasc 2.5mg, Losartan 100mg     # DM2- A1C- 5.7. c/w low dose sliding scale insulin.    DW Psych NP Candice

## 2023-06-08 NOTE — DIETITIAN INITIAL EVALUATION ADULT - PERTINENT LABORATORY DATA
06-07    136  |  101  |  9   ----------------------------<  162<H>  4.0   |  24  |  0.71    Ca    9.9      07 Jun 2023 09:35  Phos  3.6     06-07  Mg     1.80     06-07    A1C with Estimated Average Glucose Result: 5.7 % (06-01-23 @ 05:10)    CAPILLARY BLOOD GLUCOSE    POCT Blood Glucose.: 94 mg/dL (08 Jun 2023 12:13)  POCT Blood Glucose.: 74 mg/dL (08 Jun 2023 08:25)  POCT Blood Glucose.: 104 mg/dL (07 Jun 2023 22:49)  POCT Blood Glucose.: 89 mg/dL (07 Jun 2023 16:26)    Lipid Panel: Date/Time: 06-08-23 @ 08:30  Cholesterol, Serum: 119  Direct LDL: --  HDL Cholesterol, Serum: 47  Total Cholesterol/HDL Ration Measurement: --  Triglycerides, Serum: 56

## 2023-06-08 NOTE — PSYCHIATRIC REHAB INITIAL EVALUATION - NSBHPRRECOMMEND_PSY_ALL_CORE
Writer met with patient to orient to unit and introduce self, psychiatric rehabilitation staff and department functions. In response to the COVID-19 outbreak, there has been a shift in hospital wide policies and protocols. As a result, it should be noted that unit programming will be re-evaluated on a consistent basis in effort to maintain safety guidelines. Writer encouraged patient to attend psychiatric rehabilitation groups and engage in treatment. Patient was unable to meaningfully engage in conversation about current presentation and symptoms due to disorganization. Patient was admitted to [facility] due to worsening paranoia and anxiety. Writer and patient were unable to establish a collaborative psychiatric goal, therefore one will be chosen for her. Psychiatric Rehabilitation staff will continue to engage patient daily in order to develop therapeutic rapport.

## 2023-06-08 NOTE — BH INPATIENT PSYCHIATRY PROGRESS NOTE - NSBHASSESSSUMMFT_PSY_ALL_CORE
81 year old , retired female, domiciled at [facility], PMH of DM2, HLD, HTN, PPHx of late onset Bipolar disorder, PTSD and Cluster B Personality traits, 2 prior psych adm last in 2022 at [facility], who presented to ED with worsening paranoia, anxiety, FTT (weight loss of 20lb since march), and confusion. Psychiatry was consulted for psychosis/AMS. Was started on cymbalta as this was helpful for patient in the past. Was continued on home medications klonopin, depakote and seroquel. Transferred to [facility] for ongoing stabilization. Daughter denies patient has dementia. Also states patient responds well to BZD but not well to antipsychotics, which points to possibility of catatonia hx. Patient presented to the unit anxious with psychomotor agitation and unable to engage in an interview. Her presentation is concerning for generalized anxiety disorder vs worsening dementia vs pseudodementia vs delirium. Daughter finds patient is much better at this point. Will need ongoing monitoring.     Plan:  1. Legals: 9.27  2. Safety: constant observation for disorganized behavior.  Ativan 0.5 mg q12h prn for anxiety/agitation.  3. Psychiatric: Continue current medications   	         Seroquel 50 mg qHS  	         Depakote 250 mg daily and 500 mg qHS. VPA level of 48.70 on 5/30  	         Duloxetine 30 mg daily - will eventually titrate  	         Klonopin 0.5 mg daily  	        Seroquel 12.5 mg q6h prn for anxiety  4. Group, milieu, individual therapy as appropriate.  5. Medical: HTN, HLD, DM2. Phlebitis   	Keflex 500 mg qid x 5 days (4 left)  	amlodipine 2.5 mg daily. losartan 100 mg daily. aspirin 81 mg daily. atorvastatin 10 mg qHS.  	Lipid profile wnl other than slightly low HDL  	Reviewed home meds with daughter - will need to start metformin, b12  	Ambulate with walker  DNR/DNI per daughter  6. Dispo: return to [facility] when stable

## 2023-06-08 NOTE — BH INPATIENT PSYCHIATRY PROGRESS NOTE - NSBHMETABOLIC_PSY_ALL_CORE_FT
BMI: BMI (kg/m2): 23 (06-07-23 @ 18:13)  HbA1c: A1C with Estimated Average Glucose Result: 5.7 % (06-01-23 @ 05:10)    Glucose: POCT Blood Glucose.: 94 mg/dL (06-08-23 @ 12:13)    BP: --  Lipid Panel: Date/Time: 06-08-23 @ 08:30  Cholesterol, Serum: 119  Direct LDL: --  HDL Cholesterol, Serum: 47  Total Cholesterol/HDL Ration Measurement: --  Triglycerides, Serum: 56

## 2023-06-09 LAB
GLUCOSE BLDC GLUCOMTR-MCNC: 103 MG/DL — HIGH (ref 70–99)
GLUCOSE BLDC GLUCOMTR-MCNC: 116 MG/DL — HIGH (ref 70–99)
GLUCOSE BLDC GLUCOMTR-MCNC: 79 MG/DL — SIGNIFICANT CHANGE UP (ref 70–99)
GLUCOSE BLDC GLUCOMTR-MCNC: 91 MG/DL — SIGNIFICANT CHANGE UP (ref 70–99)

## 2023-06-09 PROCEDURE — 99232 SBSQ HOSP IP/OBS MODERATE 35: CPT

## 2023-06-09 RX ORDER — VENLAFAXINE HCL 75 MG
37.5 CAPSULE, EXT RELEASE 24 HR ORAL
Refills: 0 | Status: DISCONTINUED | OUTPATIENT
Start: 2023-06-09 | End: 2023-06-12

## 2023-06-09 RX ADMIN — Medication 500 MILLIGRAM(S): at 11:15

## 2023-06-09 RX ADMIN — LOSARTAN POTASSIUM 100 MILLIGRAM(S): 100 TABLET, FILM COATED ORAL at 11:18

## 2023-06-09 RX ADMIN — Medication 500 MILLIGRAM(S): at 21:15

## 2023-06-09 RX ADMIN — METFORMIN HYDROCHLORIDE 500 MILLIGRAM(S): 850 TABLET ORAL at 11:18

## 2023-06-09 RX ADMIN — Medication 1 TABLET(S): at 21:16

## 2023-06-09 RX ADMIN — METFORMIN HYDROCHLORIDE 500 MILLIGRAM(S): 850 TABLET ORAL at 21:16

## 2023-06-09 RX ADMIN — Medication 37.5 MILLIGRAM(S): at 17:11

## 2023-06-09 RX ADMIN — Medication 2 SPRAY(S): at 21:31

## 2023-06-09 RX ADMIN — Medication 0.5 MILLIGRAM(S): at 11:16

## 2023-06-09 RX ADMIN — QUETIAPINE FUMARATE 50 MILLIGRAM(S): 200 TABLET, FILM COATED ORAL at 21:15

## 2023-06-09 RX ADMIN — DIVALPROEX SODIUM 500 MILLIGRAM(S): 500 TABLET, DELAYED RELEASE ORAL at 21:15

## 2023-06-09 RX ADMIN — PREGABALIN 1000 MICROGRAM(S): 225 CAPSULE ORAL at 11:16

## 2023-06-09 RX ADMIN — Medication 2 SPRAY(S): at 09:42

## 2023-06-09 RX ADMIN — Medication 1 TABLET(S): at 11:18

## 2023-06-09 RX ADMIN — DIVALPROEX SODIUM 250 MILLIGRAM(S): 500 TABLET, DELAYED RELEASE ORAL at 11:17

## 2023-06-09 RX ADMIN — Medication 500 MILLIGRAM(S): at 17:11

## 2023-06-09 RX ADMIN — Medication 1 MILLIGRAM(S): at 11:17

## 2023-06-09 RX ADMIN — Medication 81 MILLIGRAM(S): at 11:15

## 2023-06-09 RX ADMIN — Medication 1 TABLET(S): at 11:15

## 2023-06-09 RX ADMIN — Medication 500 MILLIGRAM(S): at 13:22

## 2023-06-09 RX ADMIN — ATORVASTATIN CALCIUM 10 MILLIGRAM(S): 80 TABLET, FILM COATED ORAL at 21:15

## 2023-06-09 RX ADMIN — AMLODIPINE BESYLATE 2.5 MILLIGRAM(S): 2.5 TABLET ORAL at 11:14

## 2023-06-09 NOTE — BH INPATIENT PSYCHIATRY PROGRESS NOTE - MSE UNSTRUCTURED FT
Appearance: poor grooming, thin, no abnormal involuntary movements noted  Behavior: increased psychomotor activity, oddly related, squinting eyes at times  Speech: wnl  Mood: anxious  Affect: congruent, intense, labile  Thought process:  disorganized, vague, concrete  Thought content: no particular delusions elicited at this time; no reports of SI/HI  Perceptual disturbances: no appearance of internal preoccupation  Cognition: loosely oriented, cannot find way back to her own room  Abstraction: limited  Fund of knowledge: not assessed  Insight: limited  Judgement: poor   Appearance: limited grooming, thin, no abnormal involuntary movements noted  Behavior: increased psychomotor activity remains, intrusive, oddly related  Speech: wnl  Mood: anxious  Affect: congruent, intense, labile  Thought process:  disorganized, distractable  Thought content: no particular delusions elicited at this time; no reports of SI/HI  Perceptual disturbances: no appearance of internal preoccupation  Cognition: loosely oriented to time place, poorly oriented to situation  Abstraction: limited  Fund of knowledge: not assessed  Insight: limited  Judgement: poor

## 2023-06-09 NOTE — BH INPATIENT PSYCHIATRY PROGRESS NOTE - NSBHFUPINTERVALHXFT_PSY_A_CORE
Chart reviewed and case discussed with treatment team. Staff report patient slept adequately. VSS. She remains disorganized. On assessment patient is not able to have conversation relevant to treatment, easily distractable and intrusive. No overt psychosis noted. Per RN, there have been issues administering cymbalta as it cannot be crushed. Of note, some of patient's home medications also cannot be crushed liked depakote.     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 Speech/swallow eval was limited due to patient declining testing solids as she feared choking so recommendation defaulted to minced/moist with thin fluids. Per chart, daughter states patient was eating regular solids at home until a few weeks prior to admission.    Chart reviewed and case discussed with treatment team. Staff report patient slept adequately. VSS. She remains disorganized. On assessment patient is not able to have conversation relevant to treatment, easily distractable and intrusive. No overt psychosis noted. Per RN, there have been issues administering cymbalta as it cannot be crushed. Of note, some of patient's home medications also cannot be crushed liked depakote.     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 Work up was initiated due to patient having choked on a pill and requiring a Heimlich while admitted to our unit at the time. 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 Speech/swallow eval was limited due to patient declining testing solids as she feared choking so recommendation defaulted to minced/moist with thin fluids. Per chart, daughter states patient was eating regular solids at home until a few weeks prior to admission. She has since become very fearful and anxious about choking on food and chose to eat only soft or chopped foods at the [facility]. She has lost quite a bit of weight as a result.

## 2023-06-09 NOTE — BH INPATIENT PSYCHIATRY PROGRESS NOTE - NSBHASSESSSUMMFT_PSY_ALL_CORE
81 year old , retired female, domiciled at [facility], PMH of DM2, HLD, HTN, PPHx of late onset Bipolar disorder, PTSD and Cluster B Personality traits, 2 prior psych adm last in 2022 at [facility], who presented to ED with worsening paranoia, anxiety, FTT (weight loss of 20lb since march), and confusion. Psychiatry was consulted for psychosis/AMS. Was started on cymbalta as this was helpful for patient in the past. Was continued on home medications klonopin, depakote and seroquel. Transferred to [facility] for ongoing stabilization. Daughter denies patient has dementia. Also states patient responds well to BZD but not well to antipsychotics, which points to possibility of catatonia hx. Patient presented to the unit anxious with psychomotor agitation and unable to engage in an interview. Her presentation is concerning for generalized anxiety disorder vs worsening dementia vs pseudodementia vs delirium. Daughter finds patient is much better at this point. She is still quite disorganized though without any obvious psychosis. Will need ongoing monitoring.     Plan:  1. Legals: 9.27  2. Safety: constant observation for disorganized behavior.  Ativan 0.5 mg q12h prn for anxiety/agitation.  3. Psychiatric: Continue current medications   	         Seroquel 50 mg qHS  	         Depakote 250 mg daily and 500 mg qHS. VPA level of 48.70 on 5/30  	         Duloxetine 30 mg daily - will eventually titrate (started on 6/7/23 by [facility] team)  	         Klonopin 0.5 mg daily  	        Seroquel 12.5 mg q6h prn for anxiety  4. Group, milieu, individual therapy as appropriate.  5. Medical: HTN, HLD, DM2. Phlebitis   	Keflex 500 mg qid x 5 days (4 left)  	amlodipine 2.5 mg daily. losartan 100 mg daily. aspirin 81 mg daily. atorvastatin 10 mg qHS.  	Lipid profile wnl other than slightly low HDL  	Reviewed home meds with daughter - will need to start metformin, b12  	Ambulate with walker  DNR/DNI per daughter  6. Dispo: return to [facility] when stable   81 year old , retired female, domiciled at [facility], PMH of DM2, HLD, HTN, PPHx of late onset Bipolar disorder, PTSD and Cluster B Personality traits, 2 prior psych adm last in 2022 at [facility], who presented to ED with worsening paranoia, anxiety, FTT (weight loss of 20lb since march), and confusion. Psychiatry was consulted for psychosis/AMS. Was started on cymbalta as this was helpful for patient in the past. Was continued on home medications klonopin, depakote and seroquel. Transferred to [facility] for ongoing stabilization. Daughter denies patient has dementia. Also states patient responds well to BZD but not well to antipsychotics, which points to possibility of catatonia hx. Patient presented to the unit anxious with psychomotor agitation and unable to engage in an interview. Her presentation is concerning for generalized anxiety disorder vs worsening dementia vs pseudodementia vs delirium. Daughter finds patient is much better at this point. She is still quite disorganized though without any obvious psychosis. Will need ongoing monitoring.     Plan:  1. Legals: 9.27  2. Safety: constant observation for disorganized behavior.  Ativan 0.5 mg q12h prn for anxiety/agitation.  3. Psychiatric: Continue current medications   	         Seroquel 50 mg qHS  	         Depakote 250 mg daily and 500 mg qHS. VPA level of 48.70 on 5/30 - will consider tapering off and optimizing SGA in the near future  	         Dsicontinue Duloxetine 30 mg daily due to dysphagia concerns and needing to crush medications - change to effexor 37.5mg po bid with meals  	         Klonopin 0.5 mg daily  	        Seroquel 12.5 mg q6h prn for anxiety  4. Group, milieu, individual therapy as appropriate.  5. Medical: HTN, HLD, DM2. Phlebitis, possibly dysphagia  	Keflex 500 mg qid x 5 days   	amlodipine 2.5 mg daily. losartan 100 mg daily. aspirin 81 mg daily. atorvastatin 10 mg qHS.  	Lipid profile wnl other than slightly low HDL  	Metformin 500mg po bid  	b12 supplementation for deficiency in the past  	Ambulate with walker  	Minced/moist diet with thin liquids for now  DNR/DNI per daughter  6. Dispo: return to [facility] when stable

## 2023-06-09 NOTE — BH INPATIENT PSYCHIATRY PROGRESS NOTE - NSBHMETABOLIC_PSY_ALL_CORE_FT
BMI: BMI (kg/m2): 23 (06-07-23 @ 18:13)  HbA1c: A1C with Estimated Average Glucose Result: 5.7 % (06-01-23 @ 05:10)    Glucose: POCT Blood Glucose.: 79 mg/dL (06-09-23 @ 08:18)    BP: --  Lipid Panel: Date/Time: 06-08-23 @ 08:30  Cholesterol, Serum: 119  Direct LDL: --  HDL Cholesterol, Serum: 47  Total Cholesterol/HDL Ration Measurement: --  Triglycerides, Serum: 56   BMI: BMI (kg/m2): 23 (06-07-23 @ 18:13)  HbA1c: A1C with Estimated Average Glucose Result: 5.7 % (06-01-23 @ 05:10)    Glucose: POCT Blood Glucose.: 116 mg/dL (06-09-23 @ 12:08)    BP: --  Lipid Panel: Date/Time: 06-08-23 @ 08:30  Cholesterol, Serum: 119  Direct LDL: --  HDL Cholesterol, Serum: 47  Total Cholesterol/HDL Ration Measurement: --  Triglycerides, Serum: 56

## 2023-06-09 NOTE — BH INPATIENT PSYCHIATRY PROGRESS NOTE - NSBHCHARTREVIEWVS_PSY_A_CORE FT
Vital Signs Last 24 Hrs  T(C): 36.9 (06-09-23 @ 08:54), Max: 36.9 (06-09-23 @ 08:54)  T(F): 98.5 (06-09-23 @ 08:54), Max: 98.5 (06-09-23 @ 08:54)  HR: --  BP: --  BP(mean): --  RR: 20 (06-09-23 @ 08:54) (20 - 20)  SpO2: 97% (06-09-23 @ 08:54) (97% - 97%)    Orthostatic VS  06-09-23 @ 08:54  Lying BP: --/-- HR: --  Sitting BP: 127/51 HR: 89  Standing BP: 127/54 HR: 71  Site: upper right arm  Mode: electronic  Orthostatic VS  06-08-23 @ 08:15  Lying BP: --/-- HR: --  Sitting BP: 141/64 HR: 80  Standing BP: 152/68 HR: 83  Site: --  Mode: --  Orthostatic VS  06-07-23 @ 18:13  Lying BP: --/-- HR: --  Sitting BP: 148/60 HR: 89  Standing BP: 114/92 HR: 100  Site: upper right arm  Mode: electronic   Vital Signs Last 24 Hrs  T(C): 36.6 (06-09-23 @ 15:51), Max: 36.9 (06-09-23 @ 08:54)  T(F): 97.9 (06-09-23 @ 15:51), Max: 98.5 (06-09-23 @ 08:54)  HR: --  BP: --  BP(mean): --  RR: 20 (06-09-23 @ 08:54) (20 - 20)  SpO2: 95% (06-09-23 @ 15:51) (95% - 97%)    Orthostatic VS  06-09-23 @ 08:54  Lying BP: --/-- HR: --  Sitting BP: 127/51 HR: 89  Standing BP: 127/54 HR: 71  Site: upper right arm  Mode: electronic  Orthostatic VS  06-08-23 @ 08:15  Lying BP: --/-- HR: --  Sitting BP: 141/64 HR: 80  Standing BP: 152/68 HR: 83  Site: --  Mode: --  Orthostatic VS  06-07-23 @ 18:13  Lying BP: --/-- HR: --  Sitting BP: 148/60 HR: 89  Standing BP: 114/92 HR: 100  Site: upper right arm  Mode: electronic

## 2023-06-09 NOTE — BH INPATIENT PSYCHIATRY PROGRESS NOTE - CURRENT MEDICATION
MEDICATIONS  (STANDING):  amLODIPine   Tablet 2.5 milliGRAM(s) Oral daily  aspirin  chewable 81 milliGRAM(s) Oral daily  atorvastatin 10 milliGRAM(s) Oral at bedtime  calcium carbonate   1250 mG (OsCal) 1 Tablet(s) Oral two times a day  cephalexin 500 milliGRAM(s) Oral four times a day  clonazePAM  Tablet 0.5 milliGRAM(s) Oral <User Schedule>  cyanocobalamin 1000 MICROGram(s) Oral daily  divalproex Sprinkle 500 milliGRAM(s) Oral at bedtime  divalproex Sprinkle 250 milliGRAM(s) Oral daily  DULoxetine 30 milliGRAM(s) Oral daily  folic acid 1 milliGRAM(s) Oral daily  glucagon  Injectable 1 milliGRAM(s) IntraMuscular once  insulin lispro (ADMELOG) corrective regimen sliding scale   SubCutaneous three times a day before meals  lactobacillus acidophilus 1 Tablet(s) Oral daily  losartan 100 milliGRAM(s) Oral daily  metFORMIN 500 milliGRAM(s) Oral two times a day  pantoprazole    Tablet 40 milliGRAM(s) Oral before breakfast  QUEtiapine 50 milliGRAM(s) Oral at bedtime  senna 2 Tablet(s) Oral at bedtime  sodium chloride 0.65% Nasal 2 Spray(s) Both Nostrils two times a day    MEDICATIONS  (PRN):  acetaminophen     Tablet .. 650 milliGRAM(s) Oral every 6 hours PRN Temp greater or equal to 38C (100.4F), Mild Pain (1 - 3), Moderate Pain (4 - 6)  dextrose Oral Gel 15 Gram(s) Oral once PRN Blood Glucose LESS THAN 70 milliGRAM(s)/deciliter  LORazepam     Tablet 0.5 milliGRAM(s) Oral every 12 hours PRN anxiety/agitation  melatonin. 3 milliGRAM(s) Oral at bedtime PRN Insomnia  QUEtiapine 12.5 milliGRAM(s) Oral every 6 hours PRN anxiety   MEDICATIONS  (STANDING):  amLODIPine   Tablet 2.5 milliGRAM(s) Oral daily  aspirin  chewable 81 milliGRAM(s) Oral daily  atorvastatin 10 milliGRAM(s) Oral at bedtime  calcium carbonate   1250 mG (OsCal) 1 Tablet(s) Oral two times a day  cephalexin 500 milliGRAM(s) Oral four times a day  clonazePAM  Tablet 0.5 milliGRAM(s) Oral <User Schedule>  cyanocobalamin 1000 MICROGram(s) Oral daily  divalproex Sprinkle 500 milliGRAM(s) Oral at bedtime  divalproex Sprinkle 250 milliGRAM(s) Oral daily  folic acid 1 milliGRAM(s) Oral daily  glucagon  Injectable 1 milliGRAM(s) IntraMuscular once  insulin lispro (ADMELOG) corrective regimen sliding scale   SubCutaneous three times a day before meals  lactobacillus acidophilus 1 Tablet(s) Oral daily  losartan 100 milliGRAM(s) Oral daily  metFORMIN 500 milliGRAM(s) Oral two times a day  pantoprazole    Tablet 40 milliGRAM(s) Oral before breakfast  QUEtiapine 50 milliGRAM(s) Oral at bedtime  senna 2 Tablet(s) Oral at bedtime  sodium chloride 0.65% Nasal 2 Spray(s) Both Nostrils two times a day  venlafaxine 37.5 milliGRAM(s) Oral two times a day with meals    MEDICATIONS  (PRN):  acetaminophen     Tablet .. 650 milliGRAM(s) Oral every 6 hours PRN Temp greater or equal to 38C (100.4F), Mild Pain (1 - 3), Moderate Pain (4 - 6)  dextrose Oral Gel 15 Gram(s) Oral once PRN Blood Glucose LESS THAN 70 milliGRAM(s)/deciliter  LORazepam     Tablet 0.5 milliGRAM(s) Oral every 12 hours PRN anxiety/agitation  melatonin. 3 milliGRAM(s) Oral at bedtime PRN Insomnia  QUEtiapine 12.5 milliGRAM(s) Oral every 6 hours PRN anxiety

## 2023-06-10 LAB
GLUCOSE BLDC GLUCOMTR-MCNC: 120 MG/DL — HIGH (ref 70–99)
GLUCOSE BLDC GLUCOMTR-MCNC: 86 MG/DL — SIGNIFICANT CHANGE UP (ref 70–99)
GLUCOSE BLDC GLUCOMTR-MCNC: 87 MG/DL — SIGNIFICANT CHANGE UP (ref 70–99)
GLUCOSE BLDC GLUCOMTR-MCNC: 99 MG/DL — SIGNIFICANT CHANGE UP (ref 70–99)

## 2023-06-10 RX ADMIN — METFORMIN HYDROCHLORIDE 500 MILLIGRAM(S): 850 TABLET ORAL at 08:20

## 2023-06-10 RX ADMIN — Medication 37.5 MILLIGRAM(S): at 08:20

## 2023-06-10 RX ADMIN — PANTOPRAZOLE SODIUM 40 MILLIGRAM(S): 20 TABLET, DELAYED RELEASE ORAL at 08:20

## 2023-06-10 RX ADMIN — Medication 1 MILLIGRAM(S): at 08:20

## 2023-06-10 RX ADMIN — Medication 37.5 MILLIGRAM(S): at 17:13

## 2023-06-10 RX ADMIN — Medication 1 TABLET(S): at 21:03

## 2023-06-10 RX ADMIN — Medication 2 SPRAY(S): at 22:28

## 2023-06-10 RX ADMIN — Medication 500 MILLIGRAM(S): at 21:03

## 2023-06-10 RX ADMIN — Medication 1 TABLET(S): at 08:20

## 2023-06-10 RX ADMIN — Medication 500 MILLIGRAM(S): at 13:13

## 2023-06-10 RX ADMIN — LOSARTAN POTASSIUM 100 MILLIGRAM(S): 100 TABLET, FILM COATED ORAL at 08:21

## 2023-06-10 RX ADMIN — Medication 81 MILLIGRAM(S): at 08:20

## 2023-06-10 RX ADMIN — PREGABALIN 1000 MICROGRAM(S): 225 CAPSULE ORAL at 08:20

## 2023-06-10 RX ADMIN — METFORMIN HYDROCHLORIDE 500 MILLIGRAM(S): 850 TABLET ORAL at 21:03

## 2023-06-10 RX ADMIN — QUETIAPINE FUMARATE 50 MILLIGRAM(S): 200 TABLET, FILM COATED ORAL at 21:04

## 2023-06-10 RX ADMIN — Medication 500 MILLIGRAM(S): at 10:07

## 2023-06-10 RX ADMIN — ATORVASTATIN CALCIUM 10 MILLIGRAM(S): 80 TABLET, FILM COATED ORAL at 21:03

## 2023-06-10 RX ADMIN — SENNA PLUS 2 TABLET(S): 8.6 TABLET ORAL at 21:04

## 2023-06-10 RX ADMIN — DIVALPROEX SODIUM 500 MILLIGRAM(S): 500 TABLET, DELAYED RELEASE ORAL at 21:03

## 2023-06-10 RX ADMIN — Medication 0.5 MILLIGRAM(S): at 08:21

## 2023-06-10 RX ADMIN — DIVALPROEX SODIUM 250 MILLIGRAM(S): 500 TABLET, DELAYED RELEASE ORAL at 08:19

## 2023-06-10 RX ADMIN — Medication 500 MILLIGRAM(S): at 17:13

## 2023-06-10 RX ADMIN — AMLODIPINE BESYLATE 2.5 MILLIGRAM(S): 2.5 TABLET ORAL at 08:20

## 2023-06-10 RX ADMIN — Medication 2 SPRAY(S): at 08:31

## 2023-06-10 RX ADMIN — Medication 1 TABLET(S): at 08:19

## 2023-06-10 NOTE — BH INPATIENT PSYCHIATRY PROGRESS NOTE - NSBHCHARTREVIEWVS_PSY_A_CORE FT
Vital Signs Last 24 Hrs  T(C): 36.4 (06-10-23 @ 05:58), Max: 36.6 (06-09-23 @ 15:51)  T(F): 97.5 (06-10-23 @ 05:58), Max: 97.9 (06-09-23 @ 15:51)  HR: --  BP: --  BP(mean): --  RR: 17 (06-10-23 @ 05:58) (17 - 17)  SpO2: 95% (06-10-23 @ 05:58) (95% - 95%)    Orthostatic VS  06-10-23 @ 05:58  Lying BP: --/-- HR: --  Sitting BP: 138/58 HR: 88  Standing BP: 130/57 HR: 92  Site: --  Mode: --  Orthostatic VS  06-09-23 @ 08:54  Lying BP: --/-- HR: --  Sitting BP: 127/51 HR: 89  Standing BP: 127/54 HR: 71  Site: upper right arm  Mode: electronic

## 2023-06-10 NOTE — BH INPATIENT PSYCHIATRY PROGRESS NOTE - NSBHASSESSSUMMFT_PSY_ALL_CORE
81 year old , retired female, domiciled at [facility], PMH of DM2, HLD, HTN, PPHx of late onset Bipolar disorder, PTSD and Cluster B Personality traits, 2 prior psych adm last in 2022 at [facility], who presented to ED with worsening paranoia, anxiety, FTT (weight loss of 20lb since march), and confusion. Psychiatry was consulted for psychosis/AMS. Was started on cymbalta as this was helpful for patient in the past. Was continued on home medications klonopin, depakote and seroquel. Transferred to [facility] for ongoing stabilization. Daughter denies patient has dementia. Also states patient responds well to BZD but not well to antipsychotics, which points to possibility of catatonia hx. Patient presented to the unit anxious with psychomotor agitation and unable to engage in an interview. Her presentation is concerning for generalized anxiety disorder vs worsening dementia vs pseudodementia vs delirium. Daughter finds patient is much better at this point. She is still quite disorganized though without any obvious psychosis. Will need ongoing monitoring.     Plan:  1. Legals: 9.27  2. Safety: constant observation for disorganized behavior.  Ativan 0.5 mg q12h prn for anxiety/agitation.  3. Psychiatric: Continue current medications   	         Seroquel 50 mg qHS  	         Depakote 250 mg daily and 500 mg qHS. VPA level of 48.70 on 5/30 - will consider tapering off and optimizing SGA in the near future  	         Dsicontinue Duloxetine 30 mg daily due to dysphagia concerns and needing to crush medications - change to effexor 37.5mg po bid with meals  	         Klonopin 0.5 mg daily  	        Seroquel 12.5 mg q6h prn for anxiety  4. Group, milieu, individual therapy as appropriate.  5. Medical: HTN, HLD, DM2. Phlebitis, possibly dysphagia  	Keflex 500 mg qid x 5 days   	amlodipine 2.5 mg daily. losartan 100 mg daily. aspirin 81 mg daily. atorvastatin 10 mg qHS.  	Lipid profile wnl other than slightly low HDL  	Metformin 500mg po bid  	b12 supplementation for deficiency in the past  	Ambulate with walker  6/10 anxious disorganized , cont current meds and observational status  	Minced/moist diet with thin liquids for now  DNR/DNI per daughter  6. Dispo: return to [facility] when stable

## 2023-06-10 NOTE — BH INPATIENT PSYCHIATRY PROGRESS NOTE - NSBHMETABOLIC_PSY_ALL_CORE_FT
BMI: BMI (kg/m2): 23 (06-07-23 @ 18:13)  HbA1c: A1C with Estimated Average Glucose Result: 5.7 % (06-01-23 @ 05:10)    Glucose: POCT Blood Glucose.: 87 mg/dL (06-10-23 @ 08:08)    BP: --  Lipid Panel: Date/Time: 06-08-23 @ 08:30  Cholesterol, Serum: 119  Direct LDL: --  HDL Cholesterol, Serum: 47  Total Cholesterol/HDL Ration Measurement: --  Triglycerides, Serum: 56

## 2023-06-10 NOTE — BH INPATIENT PSYCHIATRY PROGRESS NOTE - MSE UNSTRUCTURED FT
Appearance: limited grooming, thin, no abnormal involuntary movements noted. [place]  Behavior: increased psychomotor activity remains, intrusive, oddly related  Speech: wnl  Mood: anxious  Affect: congruent, intense, labile  Thought process:  disorganized, distractable  Thought content: no particular delusions elicited at this time; no reports of SI/HI. Worries she may be kicked out of hospital because she is not "perfect"  Perceptual disturbances: no appearance of internal preoccupation  Cognition: loosely oriented to time place, poorly oriented to situation  Abstraction: limited  Fund of knowledge: not assessed  Insight: limited  Judgement: poor

## 2023-06-10 NOTE — BH INPATIENT PSYCHIATRY PROGRESS NOTE - CURRENT MEDICATION
MEDICATIONS  (STANDING):  amLODIPine   Tablet 2.5 milliGRAM(s) Oral daily  aspirin  chewable 81 milliGRAM(s) Oral daily  atorvastatin 10 milliGRAM(s) Oral at bedtime  calcium carbonate   1250 mG (OsCal) 1 Tablet(s) Oral two times a day  cephalexin 500 milliGRAM(s) Oral four times a day  clonazePAM  Tablet 0.5 milliGRAM(s) Oral <User Schedule>  cyanocobalamin 1000 MICROGram(s) Oral daily  divalproex Sprinkle 500 milliGRAM(s) Oral at bedtime  divalproex Sprinkle 250 milliGRAM(s) Oral daily  folic acid 1 milliGRAM(s) Oral daily  glucagon  Injectable 1 milliGRAM(s) IntraMuscular once  insulin lispro (ADMELOG) corrective regimen sliding scale   SubCutaneous three times a day before meals  lactobacillus acidophilus 1 Tablet(s) Oral daily  losartan 100 milliGRAM(s) Oral daily  metFORMIN 500 milliGRAM(s) Oral two times a day  pantoprazole    Tablet 40 milliGRAM(s) Oral before breakfast  QUEtiapine 50 milliGRAM(s) Oral at bedtime  senna 2 Tablet(s) Oral at bedtime  sodium chloride 0.65% Nasal 2 Spray(s) Both Nostrils two times a day  venlafaxine 37.5 milliGRAM(s) Oral two times a day with meals    MEDICATIONS  (PRN):  acetaminophen     Tablet .. 650 milliGRAM(s) Oral every 6 hours PRN Temp greater or equal to 38C (100.4F), Mild Pain (1 - 3), Moderate Pain (4 - 6)  dextrose Oral Gel 15 Gram(s) Oral once PRN Blood Glucose LESS THAN 70 milliGRAM(s)/deciliter  LORazepam     Tablet 0.5 milliGRAM(s) Oral every 12 hours PRN anxiety/agitation  melatonin. 3 milliGRAM(s) Oral at bedtime PRN Insomnia  QUEtiapine 12.5 milliGRAM(s) Oral every 6 hours PRN anxiety

## 2023-06-10 NOTE — BH INPATIENT PSYCHIATRY PROGRESS NOTE - NSBHFUPINTERVALHXFT_PSY_A_CORE
Chart reviewed and case discussed with treatment team. Staff report patient slept adequately. VSS. She remains disorganized. On assessment patient is not able to have conversation relevant to treatment, easily distractable and intrusive. No overt psychosis noted. Per RN, there have been issues administering cymbalta as it cannot be crushed. Of note, some of patient's home medications also cannot be crushed liked depakote.     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 Work up was initiated due to patient having choked on a pill and requiring a Heimlich while admitted to our unit at the time. 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 Speech/swallow eval was limited due to patient declining testing solids as she feared choking so recommendation defaulted to minced/moist with thin fluids. Per chart, daughter states patient was eating regular solids at home until a few weeks prior to admission. She has since become very fearful and anxious about choking on food and chose to eat only soft or chopped foods at the [facility]. She has lost quite a bit of weight as a result.    6/10 Patient seen for depression No major events, VSS. Eating sleeping fiar Patient intrusive going up to many to seek some sort of reassurance.  Chart reviewed and case discussed with treatment team. Staff report patient slept adequately. VSS. She remains disorganized. On assessment patient is not able to have conversation relevant to treatment, easily distractable and intrusive. No overt psychosis noted. Per RN, there have been issues administering cymbalta as it cannot be crushed. Of note, some of patient's home medications also cannot be crushed liked depakote.     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 Work up was initiated due to patient having choked on a pill and requiring a Heimlich while admitted to our unit at the time. 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 Speech/swallow eval was limited due to patient declining testing solids as she feared choking so recommendation defaulted to minced/moist with thin fluids. Per chart, daughter states patient was eating regular solids at home until a few weeks prior to admission. She has since become very fearful and anxious about choking on food and chose to eat only soft or chopped foods at the [facility]. She has lost quite a bit of weight as a result.    6/10 Patient seen for depression No major events, VSS. Eating sleeping fair, no diarrhea todayPatient intrusive going up to many to seek some sort of reassurance.

## 2023-06-11 LAB
GLUCOSE BLDC GLUCOMTR-MCNC: 105 MG/DL — HIGH (ref 70–99)
GLUCOSE BLDC GLUCOMTR-MCNC: 121 MG/DL — HIGH (ref 70–99)
GLUCOSE BLDC GLUCOMTR-MCNC: 123 MG/DL — HIGH (ref 70–99)
GLUCOSE BLDC GLUCOMTR-MCNC: 64 MG/DL — LOW (ref 70–99)
GLUCOSE BLDC GLUCOMTR-MCNC: 68 MG/DL — LOW (ref 70–99)
GLUCOSE BLDC GLUCOMTR-MCNC: 72 MG/DL — SIGNIFICANT CHANGE UP (ref 70–99)
GLUCOSE BLDC GLUCOMTR-MCNC: 88 MG/DL — SIGNIFICANT CHANGE UP (ref 70–99)

## 2023-06-11 PROCEDURE — 99232 SBSQ HOSP IP/OBS MODERATE 35: CPT

## 2023-06-11 RX ORDER — CHOLECALCIFEROL (VITAMIN D3) 125 MCG
1000 CAPSULE ORAL DAILY
Refills: 0 | Status: DISCONTINUED | OUTPATIENT
Start: 2023-06-11 | End: 2023-07-03

## 2023-06-11 RX ADMIN — Medication 1 TABLET(S): at 08:33

## 2023-06-11 RX ADMIN — Medication 37.5 MILLIGRAM(S): at 17:32

## 2023-06-11 RX ADMIN — DIVALPROEX SODIUM 500 MILLIGRAM(S): 500 TABLET, DELAYED RELEASE ORAL at 21:16

## 2023-06-11 RX ADMIN — AMLODIPINE BESYLATE 2.5 MILLIGRAM(S): 2.5 TABLET ORAL at 08:32

## 2023-06-11 RX ADMIN — LOSARTAN POTASSIUM 100 MILLIGRAM(S): 100 TABLET, FILM COATED ORAL at 08:32

## 2023-06-11 RX ADMIN — Medication 1 MILLIGRAM(S): at 08:32

## 2023-06-11 RX ADMIN — Medication 500 MILLIGRAM(S): at 17:29

## 2023-06-11 RX ADMIN — SENNA PLUS 2 TABLET(S): 8.6 TABLET ORAL at 21:16

## 2023-06-11 RX ADMIN — Medication 0.5 MILLIGRAM(S): at 08:32

## 2023-06-11 RX ADMIN — QUETIAPINE FUMARATE 50 MILLIGRAM(S): 200 TABLET, FILM COATED ORAL at 21:17

## 2023-06-11 RX ADMIN — Medication 2 SPRAY(S): at 21:49

## 2023-06-11 RX ADMIN — ATORVASTATIN CALCIUM 10 MILLIGRAM(S): 80 TABLET, FILM COATED ORAL at 21:17

## 2023-06-11 RX ADMIN — Medication 1 TABLET(S): at 21:16

## 2023-06-11 RX ADMIN — Medication 81 MILLIGRAM(S): at 08:33

## 2023-06-11 RX ADMIN — METFORMIN HYDROCHLORIDE 500 MILLIGRAM(S): 850 TABLET ORAL at 08:32

## 2023-06-11 RX ADMIN — Medication 500 MILLIGRAM(S): at 08:32

## 2023-06-11 RX ADMIN — DIVALPROEX SODIUM 250 MILLIGRAM(S): 500 TABLET, DELAYED RELEASE ORAL at 08:32

## 2023-06-11 RX ADMIN — Medication 2 SPRAY(S): at 08:43

## 2023-06-11 RX ADMIN — Medication 500 MILLIGRAM(S): at 12:50

## 2023-06-11 RX ADMIN — Medication 37.5 MILLIGRAM(S): at 08:32

## 2023-06-11 RX ADMIN — PREGABALIN 1000 MICROGRAM(S): 225 CAPSULE ORAL at 08:32

## 2023-06-11 NOTE — BH INPATIENT PSYCHIATRY PROGRESS NOTE - NSBHFUPINTERVALHXFT_PSY_A_CORE
Chart reviewed and case discussed with treatment team. Staff report patient slept adequately. VSS. She remains disorganized. On assessment patient is not able to have conversation relevant to treatment, easily distractable and intrusive. No overt psychosis noted. Per RN, there have been issues administering cymbalta as it cannot be crushed. Of note, some of patient's home medications also cannot be crushed liked depakote.     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 Work up was initiated due to patient having choked on a pill and requiring a Heimlich while admitted to our unit at the time. 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 Speech/swallow eval was limited due to patient declining testing solids as she feared choking so recommendation defaulted to minced/moist with thin fluids. Per chart, daughter states patient was eating regular solids at home until a few weeks prior to admission. She has since become very fearful and anxious about choking on food and chose to eat only soft or chopped foods at the [facility]. She has lost quite a bit of weight as a result.    6/11 Patient seen for depression No major events, VSS. Eating sleeping fair, no diarrhea todayPatient disorganized and intrusive going up to many to seek some sort of reassurance.

## 2023-06-11 NOTE — CHART NOTE - NSCHARTNOTEFT_GEN_A_CORE
Discussed patient's medication regimen with Dr Jones.  Pt with poor oral intake and low blood glucose. HbA1c 5.7.  Agree discontinue metformin and continue to monitor FSBG.  DM2 can likely be adequately managed in this 82 year old patient by diet alone.  Also added vitamin D supplementation which should be given along with calcium supplement.

## 2023-06-11 NOTE — BH INPATIENT PSYCHIATRY PROGRESS NOTE - CURRENT MEDICATION
MEDICATIONS  (STANDING):  amLODIPine   Tablet 2.5 milliGRAM(s) Oral daily  aspirin  chewable 81 milliGRAM(s) Oral daily  atorvastatin 10 milliGRAM(s) Oral at bedtime  calcium carbonate   1250 mG (OsCal) 1 Tablet(s) Oral two times a day  cephalexin 500 milliGRAM(s) Oral four times a day  cholecalciferol 1000 Unit(s) Oral daily  clonazePAM  Tablet 0.5 milliGRAM(s) Oral <User Schedule>  cyanocobalamin 1000 MICROGram(s) Oral daily  divalproex Sprinkle 500 milliGRAM(s) Oral at bedtime  divalproex Sprinkle 250 milliGRAM(s) Oral daily  folic acid 1 milliGRAM(s) Oral daily  glucagon  Injectable 1 milliGRAM(s) IntraMuscular once  insulin lispro (ADMELOG) corrective regimen sliding scale   SubCutaneous three times a day before meals  lactobacillus acidophilus 1 Tablet(s) Oral daily  losartan 100 milliGRAM(s) Oral daily  pantoprazole    Tablet 40 milliGRAM(s) Oral before breakfast  QUEtiapine 50 milliGRAM(s) Oral at bedtime  senna 2 Tablet(s) Oral at bedtime  sodium chloride 0.65% Nasal 2 Spray(s) Both Nostrils two times a day  venlafaxine 37.5 milliGRAM(s) Oral two times a day with meals    MEDICATIONS  (PRN):  acetaminophen     Tablet .. 650 milliGRAM(s) Oral every 6 hours PRN Temp greater or equal to 38C (100.4F), Mild Pain (1 - 3), Moderate Pain (4 - 6)  dextrose Oral Gel 15 Gram(s) Oral once PRN Blood Glucose LESS THAN 70 milliGRAM(s)/deciliter  LORazepam     Tablet 0.5 milliGRAM(s) Oral every 12 hours PRN anxiety/agitation  melatonin. 3 milliGRAM(s) Oral at bedtime PRN Insomnia  QUEtiapine 12.5 milliGRAM(s) Oral every 6 hours PRN anxiety

## 2023-06-11 NOTE — BH INPATIENT PSYCHIATRY PROGRESS NOTE - NSBHCHARTREVIEWVS_PSY_A_CORE FT
Vital Signs Last 24 Hrs  T(C): 36.6 (06-11-23 @ 07:58), Max: 36.6 (06-11-23 @ 07:58)  T(F): 97.9 (06-11-23 @ 07:58), Max: 97.9 (06-11-23 @ 07:58)  HR: --  BP: --  BP(mean): --  RR: 18 (06-11-23 @ 07:58) (18 - 18)  SpO2: 97% (06-11-23 @ 07:58) (97% - 98%)    Orthostatic VS  06-11-23 @ 07:58  Lying BP: 133/58 HR: 72  Sitting BP: 123/96 HR: 78  Standing BP: --/-- HR: --  Site: upper left arm  Mode: electronic  Orthostatic VS  06-10-23 @ 05:58  Lying BP: --/-- HR: --  Sitting BP: 138/58 HR: 88  Standing BP: 130/57 HR: 92  Site: --  Mode: --

## 2023-06-11 NOTE — BH INPATIENT PSYCHIATRY PROGRESS NOTE - NSBHMETABOLIC_PSY_ALL_CORE_FT
BMI: BMI (kg/m2): 23 (06-07-23 @ 18:13)  HbA1c: A1C with Estimated Average Glucose Result: 5.7 % (06-01-23 @ 05:10)    Glucose: POCT Blood Glucose.: 121 mg/dL (06-11-23 @ 09:13)    BP: --  Lipid Panel: Date/Time: 06-08-23 @ 08:30  Cholesterol, Serum: 119  Direct LDL: --  HDL Cholesterol, Serum: 47  Total Cholesterol/HDL Ration Measurement: --  Triglycerides, Serum: 56

## 2023-06-11 NOTE — BH INPATIENT PSYCHIATRY PROGRESS NOTE - NSBHASSESSSUMMFT_PSY_ALL_CORE
81 year old , retired female, domiciled at [facility], [facility] of DM2, HLD, HTN, PPHx of late onset Bipolar disorder, PTSD and Cluster B Personality traits, 2 prior psych adm last in 2022 at [facility], who presented to ED with worsening paranoia, anxiety, FTT (weight loss of 20lb since march), and confusion. Psychiatry was consulted for psychosis/AMS. Was started on cymbalta as this was helpful for patient in the past. Was continued on home medications klonopin, depakote and seroquel. Transferred to [facility] for ongoing stabilization. Daughter denies patient has dementia. Also states patient responds well to BZD but not well to antipsychotics, which points to possibility of catatonia hx. Patient presented to the unit anxious with psychomotor agitation and unable to engage in an interview. Her presentation is concerning for generalized anxiety disorder vs worsening dementia vs pseudodementia vs delirium. Daughter finds patient is much better at this point. She is still quite disorganized though without any obvious psychosis. Will need ongoing monitoring.     Plan:  1. Legals: 9.27  2. Safety: constant observation for disorganized behavior.  Ativan 0.5 mg q12h prn for anxiety/agitation.  3. Psychiatric: Continue current medications   	         Seroquel 50 mg qHS  	         Depakote 250 mg daily and 500 mg qHS. VPA level of 48.70 on 5/30 - will consider tapering off and optimizing SGA in the near future  	         Dsicontinue Duloxetine 30 mg daily due to dysphagia concerns and needing to crush medications - change to effexor 37.5mg po bid with meals  	         Klonopin 0.5 mg daily  	        Seroquel 12.5 mg q6h prn for anxiety  4. Group, milieu, individual therapy as appropriate.  5. Medical: HTN, HLD, DM2. Phlebitis, possibly dysphagia  	Keflex 500 mg qid x 5 days   	amlodipine 2.5 mg daily. losartan 100 mg daily. aspirin 81 mg daily. atorvastatin 10 mg qHS.  	Lipid profile wnl other than slightly low HDL  	Metformin 500mg po bid  	b12 supplementation for deficiency in the past  	Ambulate with walker  6/10 anxious disorganized , cont current meds and observational status  	Minced/moist diet with thin liquids for now  6/11 Anxious dysphoric feelings she will be punished, cont meds and CO for support and redirection  DNR/DNI per daughter  6. Dispo: return to [facility] when stable

## 2023-06-11 NOTE — BH INPATIENT PSYCHIATRY PROGRESS NOTE - MSE UNSTRUCTURED FT
Appearance: limited grooming, thin, no abnormal involuntary movements noted. [place]  Behavior: increased psychomotor activity remains, intrusive, oddly related  Speech: wnl  Mood: anxious  Affect: congruent, intense, labile  Thought process:  disorganized, distractable  Thought content: no particular delusions elicited at this time; no reports of SI/HI. Worries she may be kicked out of hospital because she is did something wrong last night  Perceptual disturbances: no appearance of internal preoccupation  Cognition: loosely oriented to time place, poorly oriented to situation  Abstraction: limited  Fund of knowledge: not assessed  Insight: limited  Judgement: poor

## 2023-06-12 LAB
FOLATE SERPL-MCNC: > 20 NG/ML — SIGNIFICANT CHANGE UP (ref 3.1–17.5)
GLUCOSE BLDC GLUCOMTR-MCNC: 116 MG/DL — HIGH (ref 70–99)
GLUCOSE BLDC GLUCOMTR-MCNC: 77 MG/DL — SIGNIFICANT CHANGE UP (ref 70–99)
GLUCOSE BLDC GLUCOMTR-MCNC: 90 MG/DL — SIGNIFICANT CHANGE UP (ref 70–99)
GLUCOSE BLDC GLUCOMTR-MCNC: 95 MG/DL — SIGNIFICANT CHANGE UP (ref 70–99)
VIT B12 SERPL-MCNC: 971 PG/ML — HIGH (ref 200–900)

## 2023-06-12 PROCEDURE — 99232 SBSQ HOSP IP/OBS MODERATE 35: CPT | Mod: GC

## 2023-06-12 RX ORDER — VENLAFAXINE HCL 75 MG
37.5 CAPSULE, EXT RELEASE 24 HR ORAL
Refills: 0 | Status: DISCONTINUED | OUTPATIENT
Start: 2023-06-12 | End: 2023-06-28

## 2023-06-12 RX ORDER — CLONAZEPAM 1 MG
0.5 TABLET ORAL
Refills: 0 | Status: DISCONTINUED | OUTPATIENT
Start: 2023-06-12 | End: 2023-06-13

## 2023-06-12 RX ORDER — PANTOPRAZOLE SODIUM 20 MG/1
40 TABLET, DELAYED RELEASE ORAL
Refills: 0 | Status: DISCONTINUED | OUTPATIENT
Start: 2023-06-13 | End: 2023-08-24

## 2023-06-12 RX ORDER — VENLAFAXINE HCL 75 MG
75 CAPSULE, EXT RELEASE 24 HR ORAL
Refills: 0 | Status: DISCONTINUED | OUTPATIENT
Start: 2023-06-13 | End: 2023-06-27

## 2023-06-12 RX ADMIN — AMLODIPINE BESYLATE 2.5 MILLIGRAM(S): 2.5 TABLET ORAL at 08:55

## 2023-06-12 RX ADMIN — Medication 2 SPRAY(S): at 09:04

## 2023-06-12 RX ADMIN — PREGABALIN 1000 MICROGRAM(S): 225 CAPSULE ORAL at 08:54

## 2023-06-12 RX ADMIN — Medication 81 MILLIGRAM(S): at 08:55

## 2023-06-12 RX ADMIN — Medication 650 MILLIGRAM(S): at 22:10

## 2023-06-12 RX ADMIN — Medication 1 TABLET(S): at 08:53

## 2023-06-12 RX ADMIN — Medication 1000 UNIT(S): at 08:53

## 2023-06-12 RX ADMIN — PANTOPRAZOLE SODIUM 40 MILLIGRAM(S): 20 TABLET, DELAYED RELEASE ORAL at 08:54

## 2023-06-12 RX ADMIN — Medication 1 TABLET(S): at 21:31

## 2023-06-12 RX ADMIN — Medication 1 TABLET(S): at 08:55

## 2023-06-12 RX ADMIN — DIVALPROEX SODIUM 500 MILLIGRAM(S): 500 TABLET, DELAYED RELEASE ORAL at 21:31

## 2023-06-12 RX ADMIN — Medication 37.5 MILLIGRAM(S): at 08:54

## 2023-06-12 RX ADMIN — Medication 650 MILLIGRAM(S): at 21:31

## 2023-06-12 RX ADMIN — DIVALPROEX SODIUM 250 MILLIGRAM(S): 500 TABLET, DELAYED RELEASE ORAL at 08:59

## 2023-06-12 RX ADMIN — QUETIAPINE FUMARATE 50 MILLIGRAM(S): 200 TABLET, FILM COATED ORAL at 21:31

## 2023-06-12 RX ADMIN — Medication 0.5 MILLIGRAM(S): at 08:55

## 2023-06-12 RX ADMIN — LOSARTAN POTASSIUM 100 MILLIGRAM(S): 100 TABLET, FILM COATED ORAL at 08:54

## 2023-06-12 RX ADMIN — Medication 1 MILLIGRAM(S): at 08:53

## 2023-06-12 RX ADMIN — ATORVASTATIN CALCIUM 10 MILLIGRAM(S): 80 TABLET, FILM COATED ORAL at 21:31

## 2023-06-12 RX ADMIN — Medication 37.5 MILLIGRAM(S): at 16:27

## 2023-06-12 NOTE — BH INPATIENT PSYCHIATRY PROGRESS NOTE - MSE UNSTRUCTURED FT
Appearance: 81 yo F, elderly  F, limited grooming, thin, no abnormal involuntary movements noted.   Behavior: engaging, appropriate eye contact ; increased psychomotor activity remains  Speech: slow, loud   Mood: "okay"  Affect: congruent, intense, labile  Thought process:  disorganized, non-linear   Thought content: no particular delusions elicited at this time; no reports of SI/HI.  Perceptual disturbances: no appearance of internal preoccupation, no AH/VH elicited.   Cognition: loosely oriented to time place, poorly oriented to situation  Abstraction: limited  Fund of knowledge: not assessed  Insight: limited  Judgement: poor

## 2023-06-12 NOTE — BH INPATIENT PSYCHIATRY PROGRESS NOTE - NSBHFUPINTERVALHXFT_PSY_A_CORE
Chart reviewed and case discussed with treatment team. Staff reports that patient is compliant with medications, vitals stable. Patient continues to be disorganized in conversation and not able to stick to a topic. She reports having generalized body aches last night that kept her up. She is otherwise cooperative, visible on the unit, ambulating well with her walker. Reports good appetite. Denies SI/HI. No AH/VH/paranoia elicited.  Chart reviewed and case discussed with treatment team. Staff reports that patient is compliant with medications, vitals stable. Patient continues to be disorganized in conversation and not able to stick to a topic. She reports having generalized body aches and joint pain last night that kept her up and has right shoulder pain today. She is otherwise cooperative, visible on the unit, ambulating well with her walker. Reports good appetite. Denies SI/HI. No AH/VH/paranoia elicited.

## 2023-06-12 NOTE — BH INPATIENT PSYCHIATRY PROGRESS NOTE - NSBHATTESTCOMMENTATTENDFT_PSY_A_CORE
Continues to require CO due to poor boundaries, impulsiveness as patient is very disorganized. Unable to sustain relevant conversation. Tolerating medications well. Will increase effexor to 75mg po daily and 37.5mg po daily with dinner.

## 2023-06-12 NOTE — BH INPATIENT PSYCHIATRY PROGRESS NOTE - NSBHCHARTREVIEWVS_PSY_A_CORE FT
Vital Signs Last 24 Hrs  T(C): 36.6 (06-12-23 @ 08:23), Max: 36.6 (06-12-23 @ 08:23)  T(F): 97.9 (06-12-23 @ 08:23), Max: 97.9 (06-12-23 @ 08:23)  HR: --  BP: --  BP(mean): --  RR: 18 (06-12-23 @ 08:23) (18 - 18)  SpO2: 97% (06-12-23 @ 08:23) (97% - 97%)    Orthostatic VS  06-12-23 @ 08:23  Lying BP: 127/60 HR: 70  Sitting BP: 116/70 HR: 73  Standing BP: --/-- HR: --  Site: upper right arm  Mode: electronic  Orthostatic VS  06-11-23 @ 07:58  Lying BP: 133/58 HR: 72  Sitting BP: 123/96 HR: 78  Standing BP: --/-- HR: --  Site: upper left arm  Mode: electronic   Vital Signs Last 24 Hrs  T(C): 36.9 (06-12-23 @ 15:52), Max: 36.9 (06-12-23 @ 15:52)  T(F): 98.4 (06-12-23 @ 15:52), Max: 98.4 (06-12-23 @ 15:52)  HR: --  BP: --  BP(mean): --  RR: 18 (06-12-23 @ 08:23) (18 - 18)  SpO2: 98% (06-12-23 @ 15:52) (97% - 98%)    Orthostatic VS  06-12-23 @ 08:23  Lying BP: 127/60 HR: 70  Sitting BP: 116/70 HR: 73  Standing BP: --/-- HR: --  Site: upper right arm  Mode: electronic  Orthostatic VS  06-11-23 @ 07:58  Lying BP: 133/58 HR: 72  Sitting BP: 123/96 HR: 78  Standing BP: --/-- HR: --  Site: upper left arm  Mode: electronic

## 2023-06-12 NOTE — BH INPATIENT PSYCHIATRY PROGRESS NOTE - NSBHASSESSSUMMFT_PSY_ALL_CORE
81 year old , retired female, domiciled at [facility], PMH of DM2, HLD, HTN, PPHx of late onset Bipolar disorder, PTSD and Cluster B Personality traits, 2 prior psych adm last in 2022 at [facility], who presented to ED with worsening paranoia, anxiety, FTT (weight loss of 20lb since march), and confusion. Psychiatry was consulted for psychosis/AMS. Was started on cymbalta as this was helpful for patient in the past. Was continued on home medications klonopin, depakote and seroquel. Transferred to [facility] for ongoing stabilization. Daughter denies patient has dementia. Also states patient responds well to BZD but not well to antipsychotics, which points to possibility of catatonia hx. Patient presented to the unit anxious with psychomotor agitation and unable to engage in an interview. Her presentation is concerning for generalized anxiety disorder vs worsening dementia vs pseudodementia vs delirium. Daughter finds patient is much better at this point. She is still quite disorganized though without any obvious psychosis. Will need ongoing monitoring.     Plan:  1. Legals: 9.27  2. Safety: constant observation for disorganized behavior.  Ativan 0.5 mg q12h prn for anxiety/agitation.  3. Psychiatric: Continue current medications   	         Seroquel 50 mg qHS  	         Depakote 250 mg daily and 500 mg qHS. VPA level of 48.70 on 5/30 - will consider tapering off and optimizing SGA in the near future  	         C/w Effexor 37.5mg po bid with meals (cymbalta discontinued due to dysphagia concerns and needing to crush medications)  	         Klonopin 0.5 mg daily  	        Seroquel 12.5 mg q6h prn for anxiety  4. Group, milieu, individual therapy as appropriate.  5. Medical: HTN, HLD, DM2. Phlebitis, possibly dysphagia  	Keflex 500 mg qid x 5 days   	amlodipine 2.5 mg daily. losartan 100 mg daily. aspirin 81 mg daily. atorvastatin 10 mg qHS.  	Lipid profile wnl other than slightly low HDL  	Metformin 500mg po bid  	b12 supplementation for deficiency in the past  	Ambulate with walker  6. Dispo: return to [facility] when stable 81 year old , retired female, domiciled at [facility], PMH of DM2, HLD, HTN, PPHx of late onset Bipolar disorder, PTSD and Cluster B Personality traits, 2 prior psych adm last in 2022 at [facility], who presented to ED with worsening paranoia, anxiety, FTT (weight loss of 20lb since march), and confusion. Psychiatry was consulted for psychosis/AMS. Was started on cymbalta as this was helpful for patient in the past. Was continued on home medications klonopin, depakote and seroquel. Transferred to [facility] for ongoing stabilization. Daughter denies patient has dementia. Also states patient responds well to BZD but not well to antipsychotics, which points to possibility of catatonia hx. Patient presented to the unit anxious with psychomotor agitation and unable to engage in an interview. Her presentation is concerning for generalized anxiety disorder vs worsening dementia vs pseudodementia vs delirium. Daughter finds patient is much better at this point. She is still quite disorganized though without any obvious psychosis. Will need ongoing monitoring.     Plan:  1. Legals: 9.27  2. Safety: constant observation for disorganized behavior.  Ativan 0.5 mg q12h prn for anxiety/agitation.  3. Psychiatric: Continue current medications   	         Seroquel 50 mg qHS  	         Depakote 250 mg daily and 500 mg qHS. VPA level of 48.70 on 5/30 - will consider tapering off and optimizing SGA in the near future  	         C/w Effexor 37.5mg po bid with meals (cymbalta discontinued due to dysphagia concerns and needing to crush medications)  	         Klonopin 0.5 mg daily  	        Seroquel 12.5 mg q6h prn for anxiety  4. Group, milieu, individual therapy as appropriate.  5. Medical: HTN, HLD, DM2. Phlebitis, possibly dysphagia, joint pain  	Keflex 500 mg qid x 5 days   	amlodipine 2.5 mg daily. losartan 100 mg daily. aspirin 81 mg daily. atorvastatin 10 mg qHS.  	Lipid profile wnl other than slightly low HDL  	Metformin 500mg po bid              Naproxen 500mg daily   	b12 supplementation for deficiency in the past  	Ambulate with walker  6. Dispo: return to [facility] when stable

## 2023-06-12 NOTE — BH INPATIENT PSYCHIATRY PROGRESS NOTE - NSBHMETABOLIC_PSY_ALL_CORE_FT
BMI: BMI (kg/m2): 23 (06-07-23 @ 18:13)  HbA1c: A1C with Estimated Average Glucose Result: 5.7 % (06-01-23 @ 05:10)    Glucose: POCT Blood Glucose.: 116 mg/dL (06-12-23 @ 12:10)    BP: --  Lipid Panel: Date/Time: 06-08-23 @ 08:30  Cholesterol, Serum: 119  Direct LDL: --  HDL Cholesterol, Serum: 47  Total Cholesterol/HDL Ration Measurement: --  Triglycerides, Serum: 56   BMI: BMI (kg/m2): 23 (06-07-23 @ 18:13)  HbA1c: A1C with Estimated Average Glucose Result: 5.7 % (06-01-23 @ 05:10)    Glucose: POCT Blood Glucose.: 90 mg/dL (06-12-23 @ 16:36)    BP: --  Lipid Panel: Date/Time: 06-08-23 @ 08:30  Cholesterol, Serum: 119  Direct LDL: --  HDL Cholesterol, Serum: 47  Total Cholesterol/HDL Ration Measurement: --  Triglycerides, Serum: 56

## 2023-06-12 NOTE — BH INPATIENT PSYCHIATRY PROGRESS NOTE - CURRENT MEDICATION
MEDICATIONS  (STANDING):  amLODIPine   Tablet 2.5 milliGRAM(s) Oral daily  aspirin  chewable 81 milliGRAM(s) Oral daily  atorvastatin 10 milliGRAM(s) Oral at bedtime  calcium carbonate   1250 mG (OsCal) 1 Tablet(s) Oral two times a day  cholecalciferol 1000 Unit(s) Oral daily  clonazePAM  Tablet 0.5 milliGRAM(s) Oral <User Schedule>  cyanocobalamin 1000 MICROGram(s) Oral daily  divalproex Sprinkle 500 milliGRAM(s) Oral at bedtime  divalproex Sprinkle 250 milliGRAM(s) Oral daily  folic acid 1 milliGRAM(s) Oral daily  glucagon  Injectable 1 milliGRAM(s) IntraMuscular once  insulin lispro (ADMELOG) corrective regimen sliding scale   SubCutaneous three times a day before meals  lactobacillus acidophilus 1 Tablet(s) Oral daily  losartan 100 milliGRAM(s) Oral daily  pantoprazole    Tablet 40 milliGRAM(s) Oral before breakfast  QUEtiapine 50 milliGRAM(s) Oral at bedtime  senna 2 Tablet(s) Oral at bedtime  sodium chloride 0.65% Nasal 2 Spray(s) Both Nostrils two times a day  venlafaxine 37.5 milliGRAM(s) Oral two times a day with meals    MEDICATIONS  (PRN):  acetaminophen     Tablet .. 650 milliGRAM(s) Oral every 6 hours PRN Temp greater or equal to 38C (100.4F), Mild Pain (1 - 3), Moderate Pain (4 - 6)  dextrose Oral Gel 15 Gram(s) Oral once PRN Blood Glucose LESS THAN 70 milliGRAM(s)/deciliter  LORazepam     Tablet 0.5 milliGRAM(s) Oral every 12 hours PRN anxiety/agitation  melatonin. 3 milliGRAM(s) Oral at bedtime PRN Insomnia  QUEtiapine 12.5 milliGRAM(s) Oral every 6 hours PRN anxiety   MEDICATIONS  (STANDING):  amLODIPine   Tablet 2.5 milliGRAM(s) Oral daily  aspirin  chewable 81 milliGRAM(s) Oral daily  atorvastatin 10 milliGRAM(s) Oral at bedtime  calcium carbonate   1250 mG (OsCal) 1 Tablet(s) Oral two times a day  cholecalciferol 1000 Unit(s) Oral daily  clonazePAM  Tablet 0.5 milliGRAM(s) Oral <User Schedule>  cyanocobalamin 1000 MICROGram(s) Oral daily  divalproex Sprinkle 500 milliGRAM(s) Oral at bedtime  divalproex Sprinkle 250 milliGRAM(s) Oral daily  folic acid 1 milliGRAM(s) Oral daily  glucagon  Injectable 1 milliGRAM(s) IntraMuscular once  insulin lispro (ADMELOG) corrective regimen sliding scale   SubCutaneous three times a day before meals  lactobacillus acidophilus 1 Tablet(s) Oral daily  losartan 100 milliGRAM(s) Oral daily  naproxen 500 milliGRAM(s) Oral once  pantoprazole    Tablet 40 milliGRAM(s) Oral before breakfast  QUEtiapine 50 milliGRAM(s) Oral at bedtime  senna 2 Tablet(s) Oral at bedtime  sodium chloride 0.65% Nasal 2 Spray(s) Both Nostrils two times a day  venlafaxine 37.5 milliGRAM(s) Oral with dinner    MEDICATIONS  (PRN):  acetaminophen     Tablet .. 650 milliGRAM(s) Oral every 6 hours PRN Temp greater or equal to 38C (100.4F), Mild Pain (1 - 3), Moderate Pain (4 - 6)  dextrose Oral Gel 15 Gram(s) Oral once PRN Blood Glucose LESS THAN 70 milliGRAM(s)/deciliter  LORazepam     Tablet 0.5 milliGRAM(s) Oral every 12 hours PRN anxiety/agitation  melatonin. 3 milliGRAM(s) Oral at bedtime PRN Insomnia  QUEtiapine 12.5 milliGRAM(s) Oral every 6 hours PRN anxiety   MEDICATIONS  (STANDING):  amLODIPine   Tablet 2.5 milliGRAM(s) Oral daily  aspirin  chewable 81 milliGRAM(s) Oral daily  atorvastatin 10 milliGRAM(s) Oral at bedtime  calcium carbonate   1250 mG (OsCal) 1 Tablet(s) Oral two times a day  cholecalciferol 1000 Unit(s) Oral daily  clonazePAM  Tablet 0.5 milliGRAM(s) Oral <User Schedule>  cyanocobalamin 1000 MICROGram(s) Oral daily  divalproex Sprinkle 500 milliGRAM(s) Oral at bedtime  divalproex Sprinkle 250 milliGRAM(s) Oral daily  folic acid 1 milliGRAM(s) Oral daily  glucagon  Injectable 1 milliGRAM(s) IntraMuscular once  insulin lispro (ADMELOG) corrective regimen sliding scale   SubCutaneous three times a day before meals  lactobacillus acidophilus 1 Tablet(s) Oral daily  losartan 100 milliGRAM(s) Oral daily  naproxen 500 milliGRAM(s) Oral once  QUEtiapine 50 milliGRAM(s) Oral at bedtime  senna 2 Tablet(s) Oral at bedtime  sodium chloride 0.65% Nasal 2 Spray(s) Both Nostrils two times a day  venlafaxine 37.5 milliGRAM(s) Oral with dinner    MEDICATIONS  (PRN):  acetaminophen     Tablet .. 650 milliGRAM(s) Oral every 6 hours PRN Temp greater or equal to 38C (100.4F), Mild Pain (1 - 3), Moderate Pain (4 - 6)  dextrose Oral Gel 15 Gram(s) Oral once PRN Blood Glucose LESS THAN 70 milliGRAM(s)/deciliter  LORazepam     Tablet 0.5 milliGRAM(s) Oral every 12 hours PRN anxiety/agitation  melatonin. 3 milliGRAM(s) Oral at bedtime PRN Insomnia  QUEtiapine 12.5 milliGRAM(s) Oral every 6 hours PRN anxiety

## 2023-06-13 LAB
GLUCOSE BLDC GLUCOMTR-MCNC: 117 MG/DL — HIGH (ref 70–99)
GLUCOSE BLDC GLUCOMTR-MCNC: 86 MG/DL — SIGNIFICANT CHANGE UP (ref 70–99)
GLUCOSE BLDC GLUCOMTR-MCNC: 89 MG/DL — SIGNIFICANT CHANGE UP (ref 70–99)
GLUCOSE BLDC GLUCOMTR-MCNC: 91 MG/DL — SIGNIFICANT CHANGE UP (ref 70–99)

## 2023-06-13 PROCEDURE — 99232 SBSQ HOSP IP/OBS MODERATE 35: CPT | Mod: GC

## 2023-06-13 RX ADMIN — PANTOPRAZOLE SODIUM 40 MILLIGRAM(S): 20 TABLET, DELAYED RELEASE ORAL at 07:40

## 2023-06-13 RX ADMIN — Medication 81 MILLIGRAM(S): at 08:07

## 2023-06-13 RX ADMIN — PREGABALIN 1000 MICROGRAM(S): 225 CAPSULE ORAL at 08:08

## 2023-06-13 RX ADMIN — Medication 0.5 MILLIGRAM(S): at 19:33

## 2023-06-13 RX ADMIN — Medication 650 MILLIGRAM(S): at 19:33

## 2023-06-13 RX ADMIN — AMLODIPINE BESYLATE 2.5 MILLIGRAM(S): 2.5 TABLET ORAL at 08:08

## 2023-06-13 RX ADMIN — ATORVASTATIN CALCIUM 10 MILLIGRAM(S): 80 TABLET, FILM COATED ORAL at 20:08

## 2023-06-13 RX ADMIN — Medication 0.5 MILLIGRAM(S): at 07:40

## 2023-06-13 RX ADMIN — DIVALPROEX SODIUM 250 MILLIGRAM(S): 500 TABLET, DELAYED RELEASE ORAL at 08:07

## 2023-06-13 RX ADMIN — Medication 500 MILLIGRAM(S): at 08:07

## 2023-06-13 RX ADMIN — Medication 650 MILLIGRAM(S): at 20:10

## 2023-06-13 RX ADMIN — Medication 1 TABLET(S): at 20:08

## 2023-06-13 RX ADMIN — Medication 1 MILLIGRAM(S): at 08:07

## 2023-06-13 RX ADMIN — DIVALPROEX SODIUM 500 MILLIGRAM(S): 500 TABLET, DELAYED RELEASE ORAL at 20:08

## 2023-06-13 RX ADMIN — Medication 1 TABLET(S): at 08:07

## 2023-06-13 RX ADMIN — LOSARTAN POTASSIUM 100 MILLIGRAM(S): 100 TABLET, FILM COATED ORAL at 08:06

## 2023-06-13 RX ADMIN — Medication 75 MILLIGRAM(S): at 08:06

## 2023-06-13 RX ADMIN — Medication 1000 UNIT(S): at 08:06

## 2023-06-13 RX ADMIN — Medication 37.5 MILLIGRAM(S): at 17:04

## 2023-06-13 RX ADMIN — QUETIAPINE FUMARATE 50 MILLIGRAM(S): 200 TABLET, FILM COATED ORAL at 20:08

## 2023-06-13 RX ADMIN — Medication 500 MILLIGRAM(S): at 09:07

## 2023-06-13 NOTE — BH INPATIENT PSYCHIATRY PROGRESS NOTE - MSE UNSTRUCTURED FT
Appearance: 81 yo F, elderly  F, limited grooming, thin, no abnormal involuntary movements noted.   Behavior: engaging, appropriate eye contact ; increased psychomotor activity remains  Speech: slow, loud   Mood: "fine"  Affect: congruent, intense, labile  Thought process:  disorganized, non-linear   Thought content: paranoia elicited; no reports of SI/HI.  Perceptual disturbances: no appearance of internal preoccupation, no AH/VH elicited.   Cognition: loosely oriented to time place, poorly oriented to situation  Abstraction: limited  Fund of knowledge: not assessed  Insight: limited  Judgement: poor

## 2023-06-13 NOTE — BH INPATIENT PSYCHIATRY PROGRESS NOTE - NSBHFUPINTERVALHXFT_PSY_A_CORE
Chart reviewed and case discussed with treatment team. Staff reports that patient is compliant with medications, vitals stable, continues to be disorganized therefore will keep her on CO. She reports she took a shower today with the help of her aid. She would jump from topic to topic but seemed a little more linear compared to yesterday. Body aches have improved though she reports some pain still. She reports that she is upset that staff won't let her test her food before eating it. She wants to make sure the food is okay before eating as she has had an upset stomach in the past from something she ate. She is otherwise cooperative, visible on the unit, ambulating well with her walker. Reports good appetite and sleep. Denies SI/HI. No AH/VH/paranoia elicited.

## 2023-06-13 NOTE — BH INPATIENT PSYCHIATRY PROGRESS NOTE - NSBHASSESSSUMMFT_PSY_ALL_CORE
81 year old , retired female, domiciled at [facility], PMH of DM2, HLD, HTN, PPHx of late onset Bipolar disorder, PTSD and Cluster B Personality traits, 2 prior psych adm last in 2022 at [facility], who presented to ED with worsening paranoia, anxiety, FTT (weight loss of 20lb since march), and confusion. Psychiatry was consulted for psychosis/AMS. Was started on cymbalta as this was helpful for patient in the past. Was continued on home medications klonopin, depakote and seroquel. Transferred to [facility] for ongoing stabilization. Daughter denies patient has dementia. Also states patient responds well to BZD but not well to antipsychotics, which points to possibility of catatonia hx. Patient presented to the unit anxious with psychomotor agitation and unable to engage in an interview. Her presentation is concerning for generalized anxiety disorder vs worsening dementia vs pseudodementia vs delirium. Daughter finds patient is much better at this point. She is still quite disorganized with some paranoia regarding food. Will need ongoing monitoring.     Plan:  1. Legals: 9.27  2. Safety: constant observation for disorganized behavior.  Ativan 0.5 mg q12h prn for anxiety/agitation.  3. Psychiatric: Continue current medications   	         Seroquel 50 mg qHS  	         Depakote 250 mg daily and 500 mg qHS. VPA level of 48.70 on 5/30 - will consider tapering off and optimizing SGA in the near future  	         INCREASE Effexor to 75mg daily and 37.5mg QHS (cymbalta discontinued due to dysphagia concerns and needing to crush medications)  	         Klonopin 0.5 mg daily  	        Seroquel 12.5 mg q6h prn for anxiety  4. Group, milieu, individual therapy as appropriate.  5. Medical: HTN, HLD, DM2. Phlebitis, possibly dysphagia, joint pain  	Keflex 500 mg qid x 5 days   	amlodipine 2.5 mg daily. losartan 100 mg daily. aspirin 81 mg daily. atorvastatin 10 mg qHS.  	Lipid profile wnl other than slightly low HDL  	Metformin 500mg po bid              Naproxen 500mg daily   	b12 supplementation for deficiency in the past  	Ambulate with walker  6. Dispo: return to [facility] when stable

## 2023-06-13 NOTE — BH INPATIENT PSYCHIATRY PROGRESS NOTE - NSBHMETABOLIC_PSY_ALL_CORE_FT
BMI: BMI (kg/m2): 23 (06-07-23 @ 18:13)  HbA1c: A1C with Estimated Average Glucose Result: 5.7 % (06-01-23 @ 05:10)    Glucose: POCT Blood Glucose.: 86 mg/dL (06-13-23 @ 12:07)    BP: 125/65 (06-13-23 @ 08:04) (125/65 - 125/65)  Lipid Panel: Date/Time: 06-08-23 @ 08:30  Cholesterol, Serum: 119  Direct LDL: --  HDL Cholesterol, Serum: 47  Total Cholesterol/HDL Ration Measurement: --  Triglycerides, Serum: 56   BMI: BMI (kg/m2): 23 (06-07-23 @ 18:13)  HbA1c: A1C with Estimated Average Glucose Result: 5.7 % (06-01-23 @ 05:10)    Glucose: POCT Blood Glucose.: 91 mg/dL (06-13-23 @ 16:24)    BP: 125/65 (06-13-23 @ 08:04) (125/65 - 125/65)  Lipid Panel: Date/Time: 06-08-23 @ 08:30  Cholesterol, Serum: 119  Direct LDL: --  HDL Cholesterol, Serum: 47  Total Cholesterol/HDL Ration Measurement: --  Triglycerides, Serum: 56

## 2023-06-13 NOTE — BH INPATIENT PSYCHIATRY PROGRESS NOTE - NSBHCHARTREVIEWVS_PSY_A_CORE FT
Vital Signs Last 24 Hrs  T(C): 36.7 (06-13-23 @ 08:04), Max: 36.9 (06-12-23 @ 15:52)  T(F): 98 (06-13-23 @ 08:04), Max: 98.4 (06-12-23 @ 15:52)  HR: 74 (06-13-23 @ 08:04) (74 - 74)  BP: 125/65 (06-13-23 @ 08:04) (125/65 - 125/65)  BP(mean): --  RR: --  SpO2: 97% (06-13-23 @ 08:04) (97% - 98%)    Orthostatic VS  06-12-23 @ 08:23  Lying BP: 127/60 HR: 70  Sitting BP: 116/70 HR: 73  Standing BP: --/-- HR: --  Site: upper right arm  Mode: electronic   Vital Signs Last 24 Hrs  T(C): 36.2 (06-13-23 @ 15:48), Max: 36.7 (06-13-23 @ 08:04)  T(F): 97.2 (06-13-23 @ 15:48), Max: 98 (06-13-23 @ 08:04)  HR: 74 (06-13-23 @ 08:04) (74 - 74)  BP: 125/65 (06-13-23 @ 08:04) (125/65 - 125/65)  BP(mean): --  RR: --  SpO2: 98% (06-13-23 @ 15:48) (97% - 98%)    Orthostatic VS  06-12-23 @ 08:23  Lying BP: 127/60 HR: 70  Sitting BP: 116/70 HR: 73  Standing BP: --/-- HR: --  Site: upper right arm  Mode: electronic

## 2023-06-13 NOTE — BH INPATIENT PSYCHIATRY PROGRESS NOTE - NSBHATTESTCOMMENTATTENDFT_PSY_A_CORE
No improvement noted. Remains paranoid about food, wants things to be taste tested. Disorganized and has poor boundaries. Needs to continue 1:1. Updated daughter today - she states patient appears to be worse. Discussed possibility of catatonia. Will try ativan challenge tomorrow. Will hold AM klonopin for this purpose.

## 2023-06-13 NOTE — BH INPATIENT PSYCHIATRY PROGRESS NOTE - CURRENT MEDICATION
MEDICATIONS  (STANDING):  amLODIPine   Tablet 2.5 milliGRAM(s) Oral daily  aspirin  chewable 81 milliGRAM(s) Oral daily  atorvastatin 10 milliGRAM(s) Oral at bedtime  calcium carbonate   1250 mG (OsCal) 1 Tablet(s) Oral two times a day  cholecalciferol 1000 Unit(s) Oral daily  clonazePAM  Tablet 0.5 milliGRAM(s) Oral <User Schedule>  cyanocobalamin 1000 MICROGram(s) Oral daily  divalproex Sprinkle 500 milliGRAM(s) Oral at bedtime  divalproex Sprinkle 250 milliGRAM(s) Oral daily  folic acid 1 milliGRAM(s) Oral daily  glucagon  Injectable 1 milliGRAM(s) IntraMuscular once  insulin lispro (ADMELOG) corrective regimen sliding scale   SubCutaneous three times a day before meals  lactobacillus acidophilus 1 Tablet(s) Oral daily  losartan 100 milliGRAM(s) Oral daily  naproxen 500 milliGRAM(s) Oral daily  naproxen 500 milliGRAM(s) Oral once  pantoprazole   Suspension 40 milliGRAM(s) Oral before breakfast  QUEtiapine 50 milliGRAM(s) Oral at bedtime  senna 2 Tablet(s) Oral at bedtime  sodium chloride 0.65% Nasal 2 Spray(s) Both Nostrils two times a day  venlafaxine 37.5 milliGRAM(s) Oral with dinner  venlafaxine 75 milliGRAM(s) Oral with breakfast    MEDICATIONS  (PRN):  acetaminophen     Tablet .. 650 milliGRAM(s) Oral every 6 hours PRN Temp greater or equal to 38C (100.4F), Mild Pain (1 - 3), Moderate Pain (4 - 6)  dextrose Oral Gel 15 Gram(s) Oral once PRN Blood Glucose LESS THAN 70 milliGRAM(s)/deciliter  LORazepam     Tablet 0.5 milliGRAM(s) Oral every 12 hours PRN anxiety/agitation  melatonin. 3 milliGRAM(s) Oral at bedtime PRN Insomnia  QUEtiapine 12.5 milliGRAM(s) Oral every 6 hours PRN anxiety   MEDICATIONS  (STANDING):  amLODIPine   Tablet 2.5 milliGRAM(s) Oral daily  aspirin  chewable 81 milliGRAM(s) Oral daily  atorvastatin 10 milliGRAM(s) Oral at bedtime  calcium carbonate   1250 mG (OsCal) 1 Tablet(s) Oral two times a day  cholecalciferol 1000 Unit(s) Oral daily  clonazePAM  Tablet 0.5 milliGRAM(s) Oral <User Schedule>  cyanocobalamin 1000 MICROGram(s) Oral daily  divalproex Sprinkle 500 milliGRAM(s) Oral at bedtime  divalproex Sprinkle 250 milliGRAM(s) Oral daily  folic acid 1 milliGRAM(s) Oral daily  glucagon  Injectable 1 milliGRAM(s) IntraMuscular once  insulin lispro (ADMELOG) corrective regimen sliding scale   SubCutaneous three times a day before meals  lactobacillus acidophilus 1 Tablet(s) Oral daily  losartan 100 milliGRAM(s) Oral daily  naproxen 500 milliGRAM(s) Oral once  naproxen 500 milliGRAM(s) Oral daily  pantoprazole   Suspension 40 milliGRAM(s) Oral before breakfast  QUEtiapine 50 milliGRAM(s) Oral at bedtime  senna 2 Tablet(s) Oral at bedtime  sodium chloride 0.65% Nasal 2 Spray(s) Both Nostrils two times a day  venlafaxine 75 milliGRAM(s) Oral with breakfast  venlafaxine 37.5 milliGRAM(s) Oral with dinner    MEDICATIONS  (PRN):  acetaminophen     Tablet .. 650 milliGRAM(s) Oral every 6 hours PRN Temp greater or equal to 38C (100.4F), Mild Pain (1 - 3), Moderate Pain (4 - 6)  dextrose Oral Gel 15 Gram(s) Oral once PRN Blood Glucose LESS THAN 70 milliGRAM(s)/deciliter  LORazepam     Tablet 0.5 milliGRAM(s) Oral every 12 hours PRN anxiety/agitation  melatonin. 3 milliGRAM(s) Oral at bedtime PRN Insomnia  QUEtiapine 12.5 milliGRAM(s) Oral every 6 hours PRN anxiety

## 2023-06-14 LAB
GLUCOSE BLDC GLUCOMTR-MCNC: 104 MG/DL — HIGH (ref 70–99)
GLUCOSE BLDC GLUCOMTR-MCNC: 111 MG/DL — HIGH (ref 70–99)
GLUCOSE BLDC GLUCOMTR-MCNC: 76 MG/DL — SIGNIFICANT CHANGE UP (ref 70–99)
GLUCOSE BLDC GLUCOMTR-MCNC: 79 MG/DL — SIGNIFICANT CHANGE UP (ref 70–99)

## 2023-06-14 PROCEDURE — 99232 SBSQ HOSP IP/OBS MODERATE 35: CPT | Mod: GC

## 2023-06-14 RX ADMIN — Medication 1 TABLET(S): at 08:05

## 2023-06-14 RX ADMIN — Medication 0.5 MILLIGRAM(S): at 21:17

## 2023-06-14 RX ADMIN — Medication 2 SPRAY(S): at 21:51

## 2023-06-14 RX ADMIN — Medication 75 MILLIGRAM(S): at 07:59

## 2023-06-14 RX ADMIN — DIVALPROEX SODIUM 500 MILLIGRAM(S): 500 TABLET, DELAYED RELEASE ORAL at 21:17

## 2023-06-14 RX ADMIN — DIVALPROEX SODIUM 250 MILLIGRAM(S): 500 TABLET, DELAYED RELEASE ORAL at 08:05

## 2023-06-14 RX ADMIN — LOSARTAN POTASSIUM 100 MILLIGRAM(S): 100 TABLET, FILM COATED ORAL at 08:04

## 2023-06-14 RX ADMIN — Medication 37.5 MILLIGRAM(S): at 17:47

## 2023-06-14 RX ADMIN — ATORVASTATIN CALCIUM 10 MILLIGRAM(S): 80 TABLET, FILM COATED ORAL at 21:16

## 2023-06-14 RX ADMIN — PREGABALIN 1000 MICROGRAM(S): 225 CAPSULE ORAL at 08:05

## 2023-06-14 RX ADMIN — Medication 500 MILLIGRAM(S): at 09:05

## 2023-06-14 RX ADMIN — PANTOPRAZOLE SODIUM 40 MILLIGRAM(S): 20 TABLET, DELAYED RELEASE ORAL at 07:58

## 2023-06-14 RX ADMIN — SENNA PLUS 2 TABLET(S): 8.6 TABLET ORAL at 21:17

## 2023-06-14 RX ADMIN — Medication 1 MILLIGRAM(S): at 11:12

## 2023-06-14 RX ADMIN — Medication 500 MILLIGRAM(S): at 08:05

## 2023-06-14 RX ADMIN — Medication 81 MILLIGRAM(S): at 08:05

## 2023-06-14 RX ADMIN — QUETIAPINE FUMARATE 50 MILLIGRAM(S): 200 TABLET, FILM COATED ORAL at 21:17

## 2023-06-14 RX ADMIN — Medication 1000 UNIT(S): at 08:04

## 2023-06-14 RX ADMIN — Medication 1 TABLET(S): at 21:16

## 2023-06-14 RX ADMIN — AMLODIPINE BESYLATE 2.5 MILLIGRAM(S): 2.5 TABLET ORAL at 08:04

## 2023-06-14 RX ADMIN — Medication 1 MILLIGRAM(S): at 08:04

## 2023-06-14 NOTE — BH INPATIENT PSYCHIATRY PROGRESS NOTE - NSBHCHARTREVIEWVS_PSY_A_CORE FT
Vital Signs Last 24 Hrs  T(C): 36.6 (06-14-23 @ 07:49), Max: 36.6 (06-14-23 @ 07:49)  T(F): 97.8 (06-14-23 @ 07:49), Max: 97.8 (06-14-23 @ 07:49)  HR: --  BP: --  BP(mean): --  RR: --  SpO2: 96% (06-14-23 @ 07:49) (96% - 98%)    Orthostatic VS  06-14-23 @ 07:49  Lying BP: --/-- HR: --  Sitting BP: 131/62 HR: 79  Standing BP: 110/60 HR: 85  Site: --  Mode: --   Vital Signs Last 24 Hrs  T(C): 37 (06-14-23 @ 15:46), Max: 37 (06-14-23 @ 15:46)  T(F): 98.6 (06-14-23 @ 15:46), Max: 98.6 (06-14-23 @ 15:46)  HR: --  BP: --  BP(mean): --  RR: --  SpO2: 98% (06-14-23 @ 15:46) (96% - 98%)    Orthostatic VS  06-14-23 @ 07:49  Lying BP: --/-- HR: --  Sitting BP: 131/62 HR: 79  Standing BP: 110/60 HR: 85  Site: --  Mode: --

## 2023-06-14 NOTE — BH INPATIENT PSYCHIATRY PROGRESS NOTE - NSBHMETABOLIC_PSY_ALL_CORE_FT
BMI: BMI (kg/m2): 23 (06-07-23 @ 18:13)  HbA1c: A1C with Estimated Average Glucose Result: 5.7 % (06-01-23 @ 05:10)    Glucose: POCT Blood Glucose.: 79 mg/dL (06-14-23 @ 08:06)    BP: 125/65 (06-13-23 @ 08:04) (125/65 - 125/65)  Lipid Panel: Date/Time: 06-08-23 @ 08:30  Cholesterol, Serum: 119  Direct LDL: --  HDL Cholesterol, Serum: 47  Total Cholesterol/HDL Ration Measurement: --  Triglycerides, Serum: 56   BMI: BMI (kg/m2): 23 (06-07-23 @ 18:13)  HbA1c: A1C with Estimated Average Glucose Result: 5.7 % (06-01-23 @ 05:10)    Glucose: POCT Blood Glucose.: 111 mg/dL (06-14-23 @ 16:34)    BP: 125/65 (06-13-23 @ 08:04) (125/65 - 125/65)  Lipid Panel: Date/Time: 06-08-23 @ 08:30  Cholesterol, Serum: 119  Direct LDL: --  HDL Cholesterol, Serum: 47  Total Cholesterol/HDL Ration Measurement: --  Triglycerides, Serum: 56

## 2023-06-14 NOTE — BH INPATIENT PSYCHIATRY PROGRESS NOTE - NSBHFUPINTERVALHXFT_PSY_A_CORE
Chart reviewed and case discussed with treatment team. Staff reports that patient is compliant with medications, vitals stable, yesterday became agitated after daughter didn't visit her yesterday requiring her to receive PO PRN Ativan. She continues to be intrusive and disorganized today, requiring redirection multiple times. She is visible on the unit, ambulating well with her walker. Reports good appetite and sleep. Denies SI/HI. No AH/VH/paranoia elicited.

## 2023-06-14 NOTE — BH INPATIENT PSYCHIATRY PROGRESS NOTE - NSBHASSESSSUMMFT_PSY_ALL_CORE
81 year old , retired female, domiciled at [facility], PMH of DM2, HLD, HTN, PPHx of late onset Bipolar disorder, PTSD and Cluster B Personality traits, 2 prior psych adm last in 2022 at [facility], who presented to ED with worsening paranoia, anxiety, FTT (weight loss of 20lb since march), and confusion. Psychiatry was consulted for psychosis/AMS. Was started on cymbalta as this was helpful for patient in the past. Was continued on home medications klonopin, depakote and seroquel. Transferred to [facility] for ongoing stabilization. Daughter denies patient has dementia. Also states patient responds well to BZD but not well to antipsychotics, which points to possibility of catatonia hx. Patient presented to the unit anxious with psychomotor agitation and unable to engage in an interview. Her presentation is concerning for generalized anxiety disorder vs worsening dementia vs pseudodementia vs delirium. Daughter finds patient is much better at this point. She is still quite disorganized with some paranoia regarding food. Will need ongoing monitoring.     Plan:  1. Legals: 9.27  2. Safety: constant observation for disorganized behavior.  Ativan 0.5 mg q12h prn for anxiety/agitation.  3. Psychiatric: Continue current medications   	         Seroquel 50 mg qHS  	         Depakote 250 mg daily and 500 mg qHS. VPA level of 48.70 on 5/30 - will consider tapering off and optimizing SGA in the near future  	         INCREASE Effexor to 75mg daily and 37.5mg QHS (cymbalta discontinued due to dysphagia concerns and needing to crush medications)  	         DC Klonopin 0.5 mg daily and start Ativan 1mg BID  	         Seroquel 12.5 mg q6h prn for anxiety  4. Group, milieu, individual therapy as appropriate.  5. Medical: HTN, HLD, DM2. Phlebitis, possibly dysphagia, joint pain  	Keflex 500 mg qid x 5 days   	amlodipine 2.5 mg daily. losartan 100 mg daily. aspirin 81 mg daily. atorvastatin 10 mg qHS.  	Lipid profile wnl other than slightly low HDL  	Metformin 500mg po bid              Naproxen 500mg daily   	b12 supplementation for deficiency in the past  	Ambulate with walker  6. Dispo: return to [facility] when stable

## 2023-06-14 NOTE — BH INPATIENT PSYCHIATRY PROGRESS NOTE - MSE UNSTRUCTURED FT
Appearance: 81 yo F, elderly  F, limited grooming, thin, no abnormal involuntary movements noted.   Behavior: intrusive, appropriate eye contact ; increased psychomotor activity remains  Speech: normal rate, loud   Mood: "okay"  Affect: congruent, intense, labile  Thought process:  disorganized, non-linear   Thought content: paranoia elicited; no reports of SI/HI.  Perceptual disturbances: no appearance of internal preoccupation, no AH/VH elicited.   Cognition: loosely oriented to time place, poorly oriented to situation  Abstraction: limited  Insight: limited  Judgement: poor

## 2023-06-14 NOTE — BH INPATIENT PSYCHIATRY PROGRESS NOTE - CURRENT MEDICATION
MEDICATIONS  (STANDING):  amLODIPine   Tablet 2.5 milliGRAM(s) Oral daily  aspirin  chewable 81 milliGRAM(s) Oral daily  atorvastatin 10 milliGRAM(s) Oral at bedtime  calcium carbonate   1250 mG (OsCal) 1 Tablet(s) Oral two times a day  cholecalciferol 1000 Unit(s) Oral daily  cyanocobalamin 1000 MICROGram(s) Oral daily  divalproex Sprinkle 500 milliGRAM(s) Oral at bedtime  divalproex Sprinkle 250 milliGRAM(s) Oral daily  folic acid 1 milliGRAM(s) Oral daily  glucagon  Injectable 1 milliGRAM(s) IntraMuscular once  insulin lispro (ADMELOG) corrective regimen sliding scale   SubCutaneous three times a day before meals  lactobacillus acidophilus 1 Tablet(s) Oral daily  LORazepam     Tablet 1 milliGRAM(s) Oral <User Schedule>  losartan 100 milliGRAM(s) Oral daily  naproxen 500 milliGRAM(s) Oral once  naproxen 500 milliGRAM(s) Oral daily  pantoprazole   Suspension 40 milliGRAM(s) Oral before breakfast  QUEtiapine 50 milliGRAM(s) Oral at bedtime  senna 2 Tablet(s) Oral at bedtime  sodium chloride 0.65% Nasal 2 Spray(s) Both Nostrils two times a day  venlafaxine 37.5 milliGRAM(s) Oral with dinner  venlafaxine 75 milliGRAM(s) Oral with breakfast    MEDICATIONS  (PRN):  acetaminophen     Tablet .. 650 milliGRAM(s) Oral every 6 hours PRN Temp greater or equal to 38C (100.4F), Mild Pain (1 - 3), Moderate Pain (4 - 6)  dextrose Oral Gel 15 Gram(s) Oral once PRN Blood Glucose LESS THAN 70 milliGRAM(s)/deciliter  LORazepam     Tablet 0.5 milliGRAM(s) Oral every 12 hours PRN anxiety/agitation  melatonin. 3 milliGRAM(s) Oral at bedtime PRN Insomnia  QUEtiapine 12.5 milliGRAM(s) Oral every 6 hours PRN anxiety   MEDICATIONS  (STANDING):  amLODIPine   Tablet 2.5 milliGRAM(s) Oral daily  aspirin  chewable 81 milliGRAM(s) Oral daily  atorvastatin 10 milliGRAM(s) Oral at bedtime  calcium carbonate   1250 mG (OsCal) 1 Tablet(s) Oral two times a day  cholecalciferol 1000 Unit(s) Oral daily  cyanocobalamin 1000 MICROGram(s) Oral daily  divalproex Sprinkle 500 milliGRAM(s) Oral at bedtime  divalproex Sprinkle 250 milliGRAM(s) Oral daily  folic acid 1 milliGRAM(s) Oral daily  glucagon  Injectable 1 milliGRAM(s) IntraMuscular once  insulin lispro (ADMELOG) corrective regimen sliding scale   SubCutaneous three times a day before meals  lactobacillus acidophilus 1 Tablet(s) Oral daily  LORazepam     Tablet 1 milliGRAM(s) Oral <User Schedule>  losartan 100 milliGRAM(s) Oral daily  naproxen 500 milliGRAM(s) Oral daily  naproxen 500 milliGRAM(s) Oral once  pantoprazole   Suspension 40 milliGRAM(s) Oral before breakfast  QUEtiapine 50 milliGRAM(s) Oral at bedtime  senna 2 Tablet(s) Oral at bedtime  sodium chloride 0.65% Nasal 2 Spray(s) Both Nostrils two times a day  venlafaxine 37.5 milliGRAM(s) Oral with dinner  venlafaxine 75 milliGRAM(s) Oral with breakfast    MEDICATIONS  (PRN):  acetaminophen     Tablet .. 650 milliGRAM(s) Oral every 6 hours PRN Temp greater or equal to 38C (100.4F), Mild Pain (1 - 3), Moderate Pain (4 - 6)  dextrose Oral Gel 15 Gram(s) Oral once PRN Blood Glucose LESS THAN 70 milliGRAM(s)/deciliter  LORazepam     Tablet 0.5 milliGRAM(s) Oral every 12 hours PRN anxiety/agitation  melatonin. 3 milliGRAM(s) Oral at bedtime PRN Insomnia  QUEtiapine 12.5 milliGRAM(s) Oral every 6 hours PRN anxiety

## 2023-06-14 NOTE — BH INPATIENT PSYCHIATRY PROGRESS NOTE - NSBHATTESTCOMMENTATTENDFT_PSY_A_CORE
Patient with atypical presentation of bipolar depression. She did respond well to ativan prior to transfer from medicine. Prior disorganized behavior described by patient's daughter does leave possibility of catatonia. Will d/c klonopin and change to ativan 1mg po bid.

## 2023-06-15 LAB
GLUCOSE BLDC GLUCOMTR-MCNC: 102 MG/DL — HIGH (ref 70–99)
GLUCOSE BLDC GLUCOMTR-MCNC: 76 MG/DL — SIGNIFICANT CHANGE UP (ref 70–99)
GLUCOSE BLDC GLUCOMTR-MCNC: 83 MG/DL — SIGNIFICANT CHANGE UP (ref 70–99)

## 2023-06-15 PROCEDURE — 99232 SBSQ HOSP IP/OBS MODERATE 35: CPT | Mod: GC

## 2023-06-15 RX ORDER — QUETIAPINE FUMARATE 200 MG/1
50 TABLET, FILM COATED ORAL AT BEDTIME
Refills: 0 | Status: DISCONTINUED | OUTPATIENT
Start: 2023-06-15 | End: 2023-06-19

## 2023-06-15 RX ORDER — QUETIAPINE FUMARATE 200 MG/1
25 TABLET, FILM COATED ORAL DAILY
Refills: 0 | Status: DISCONTINUED | OUTPATIENT
Start: 2023-06-16 | End: 2023-06-19

## 2023-06-15 RX ADMIN — Medication 37.5 MILLIGRAM(S): at 16:57

## 2023-06-15 RX ADMIN — DIVALPROEX SODIUM 500 MILLIGRAM(S): 500 TABLET, DELAYED RELEASE ORAL at 21:58

## 2023-06-15 RX ADMIN — Medication 2 SPRAY(S): at 08:15

## 2023-06-15 RX ADMIN — QUETIAPINE FUMARATE 12.5 MILLIGRAM(S): 200 TABLET, FILM COATED ORAL at 11:28

## 2023-06-15 RX ADMIN — PREGABALIN 1000 MICROGRAM(S): 225 CAPSULE ORAL at 08:14

## 2023-06-15 RX ADMIN — Medication 2 SPRAY(S): at 21:04

## 2023-06-15 RX ADMIN — Medication 1000 UNIT(S): at 08:15

## 2023-06-15 RX ADMIN — SENNA PLUS 2 TABLET(S): 8.6 TABLET ORAL at 20:56

## 2023-06-15 RX ADMIN — Medication 1 TABLET(S): at 08:14

## 2023-06-15 RX ADMIN — Medication 0.5 MILLIGRAM(S): at 17:30

## 2023-06-15 RX ADMIN — Medication 500 MILLIGRAM(S): at 08:14

## 2023-06-15 RX ADMIN — Medication 0.5 MILLIGRAM(S): at 08:17

## 2023-06-15 RX ADMIN — Medication 1 TABLET(S): at 20:56

## 2023-06-15 RX ADMIN — DIVALPROEX SODIUM 250 MILLIGRAM(S): 500 TABLET, DELAYED RELEASE ORAL at 08:16

## 2023-06-15 RX ADMIN — LOSARTAN POTASSIUM 100 MILLIGRAM(S): 100 TABLET, FILM COATED ORAL at 08:15

## 2023-06-15 RX ADMIN — Medication 0.5 MILLIGRAM(S): at 20:55

## 2023-06-15 RX ADMIN — Medication 500 MILLIGRAM(S): at 08:44

## 2023-06-15 RX ADMIN — Medication 1 TABLET(S): at 08:15

## 2023-06-15 RX ADMIN — Medication 1 MILLIGRAM(S): at 08:15

## 2023-06-15 RX ADMIN — AMLODIPINE BESYLATE 2.5 MILLIGRAM(S): 2.5 TABLET ORAL at 08:15

## 2023-06-15 RX ADMIN — Medication 75 MILLIGRAM(S): at 08:15

## 2023-06-15 RX ADMIN — PANTOPRAZOLE SODIUM 40 MILLIGRAM(S): 20 TABLET, DELAYED RELEASE ORAL at 08:14

## 2023-06-15 RX ADMIN — Medication 81 MILLIGRAM(S): at 08:15

## 2023-06-15 RX ADMIN — Medication 650 MILLIGRAM(S): at 12:00

## 2023-06-15 RX ADMIN — QUETIAPINE FUMARATE 50 MILLIGRAM(S): 200 TABLET, FILM COATED ORAL at 20:56

## 2023-06-15 RX ADMIN — ATORVASTATIN CALCIUM 10 MILLIGRAM(S): 80 TABLET, FILM COATED ORAL at 20:55

## 2023-06-15 RX ADMIN — Medication 650 MILLIGRAM(S): at 11:30

## 2023-06-15 NOTE — BH INPATIENT PSYCHIATRY PROGRESS NOTE - NSBHASSESSSUMMFT_PSY_ALL_CORE
81 year old , retired female, domiciled at [facility], PMH of DM2, HLD, HTN, PPHx of late onset Bipolar disorder, PTSD and Cluster B Personality traits, 2 prior psych adm last in 2022 at [facility], who presented to ED with worsening paranoia, anxiety, FTT (weight loss of 20lb since march), and confusion. Psychiatry was consulted for psychosis/AMS. Was started on cymbalta as this was helpful for patient in the past. Was continued on home medications klonopin, depakote and seroquel. Transferred to [facility] for ongoing stabilization. Daughter denies patient has dementia. Also states patient responds well to BZD but not well to antipsychotics, which points to possibility of catatonia hx. Patient presented to the unit anxious with psychomotor agitation and unable to engage in an interview. Her presentation is concerning for generalized anxiety disorder vs worsening dementia vs pseudodementia vs delirium. Daughter finds patient is much better at this point. She is still quite disorganized with some paranoia regarding food. Will need ongoing monitoring.     Plan:  1. Legals: 9.27  2. Safety: constant observation for disorganized behavior.  Ativan 0.5 mg q12h prn for anxiety/agitation.  3. Psychiatric: Continue current medications   	         Increase Seroquel to 75 mg qHS  	         Depakote 250 mg daily and 500 mg qHS. VPA level of 48.70 on 5/30 - will consider tapering off and optimizing SGA in the near future  	         INCREASE Effexor to 75mg daily and 37.5mg QHS (cymbalta discontinued due to dysphagia concerns and needing to crush medications)  	         Decrease Ativan to 0.5mg BID as 1mg was too sedating for patient  	         Seroquel 12.5 mg q6h prn for anxiety  4. Group, milieu, individual therapy as appropriate.  5. Medical: HTN, HLD, DM2. Phlebitis, possibly dysphagia, joint pain  	Keflex 500 mg qid x 5 days   	amlodipine 2.5 mg daily. losartan 100 mg daily. aspirin 81 mg daily. atorvastatin 10 mg qHS.  	Lipid profile wnl other than slightly low HDL  	Metformin 500mg po bid              Naproxen 500mg daily   	b12 supplementation for deficiency in the past  	Ambulate with walker  6. Dispo: return to [facility] when stable 81 year old , retired female, domiciled at [facility], PMH of DM2, HLD, HTN, PPHx of late onset Bipolar disorder, PTSD and Cluster B Personality traits, 2 prior psych adm last in 2022 at [facility], who presented to ED with worsening paranoia, anxiety, FTT (weight loss of 20lb since march), and confusion. Psychiatry was consulted for psychosis/AMS. Was started on cymbalta as this was helpful for patient in the past. Was continued on home medications klonopin, depakote and seroquel. Transferred to [facility] for ongoing stabilization. Daughter denies patient has dementia. Also states patient responds well to BZD but not well to antipsychotics, which points to possibility of catatonia hx. Patient presented to the unit anxious with psychomotor agitation and unable to engage in an interview. Her presentation is concerning for generalized anxiety disorder vs worsening dementia vs pseudodementia vs delirium. Daughter finds patient is much better at this point. She is still quite disorganized with some paranoia regarding food. Will need ongoing monitoring.     Plan:  1. Legals: 9.27  2. Safety: constant observation for disorganized behavior.  Ativan 0.5 mg q12h prn for anxiety/agitation.  3. Psychiatric: Continue current medications   	         Increase Seroquel to 25mg daily and 50mg QHS  	         Depakote 250 mg daily and 500 mg qHS. VPA level of 48.70 on 5/30 - will consider tapering off and optimizing SGA in the near future  	         INCREASE Effexor to 75mg daily and 37.5mg QHS (cymbalta discontinued due to dysphagia concerns and needing to crush medications)  	         Decrease Ativan to 0.5mg BID as 1mg was too sedating for patient  	         Seroquel 12.5 mg q6h prn for anxiety  4. Group, milieu, individual therapy as appropriate.  5. Medical: HTN, HLD, DM2. Phlebitis, possibly dysphagia, joint pain  	Keflex 500 mg qid x 5 days   	amlodipine 2.5 mg daily. losartan 100 mg daily. aspirin 81 mg daily. atorvastatin 10 mg qHS.  	Lipid profile wnl other than slightly low HDL  	Metformin 500mg po bid              Naproxen 500mg daily   	b12 supplementation for deficiency in the past  	Ambulate with walker  6. Dispo: return to [facility] when stable 81 year old , retired female, domiciled at [facility], PMH of DM2, HLD, HTN, PPHx of late onset Bipolar disorder, 2 prior psych adm last in 2022 at [facility], who presented to ED with worsening paranoia, anxiety, FTT (weight loss of 20lb since march), and confusion. Psychiatry was consulted for psychosis/AMS. Was started on cymbalta as this was helpful for patient in the past. Was continued on home medications klonopin, depakote and seroquel. Transferred to [facility] for ongoing stabilization. Daughter denies patient has dementia. Also states patient responds well to BZD but not well to antipsychotics, which points to possibility of catatonia hx. Patient presented to the unit anxious with psychomotor agitation and unable to engage in an interview. Her presentation is concerning for generalized anxiety disorder vs worsening dementia vs pseudodementia vs delirium. Daughter finds patient is much better at this point. She is still quite disorganized with some paranoia regarding food. Will need ongoing monitoring.     Plan:  1. Legals: 9.27  2. Safety: constant observation for disorganized behavior.  Ativan 0.5 mg q12h prn for anxiety/agitation.  3. Psychiatric: Continue current medications   	         Increase Seroquel to 25mg daily and 50mg QHS  	         Depakote 250 mg daily and 500 mg qHS. VPA level of 48.70 on 5/30 - will consider tapering off and optimizing SGA in the near future  	         INCREASE Effexor to 75mg daily and 37.5mg QHS (cymbalta discontinued due to dysphagia concerns and needing to crush medications)  	         Decrease Ativan to 0.5mg BID as 1mg was too sedating for patient  	         Seroquel 12.5 mg q6h prn for anxiety  4. Group, milieu, individual therapy as appropriate.  5. Medical: HTN, HLD, DM2. Phlebitis, possibly dysphagia, joint pain  	Keflex 500 mg qid x 5 days   	amlodipine 2.5 mg daily. losartan 100 mg daily. aspirin 81 mg daily. atorvastatin 10 mg qHS.  	Lipid profile wnl other than slightly low HDL  	Metformin 500mg po bid              Naproxen 500mg daily   	b12 supplementation for deficiency in the past  	Ambulate with walker  6. Dispo: return to [facility] when stable 81 year old , retired female, domiciled at [facility], [facility] of DM2, HLD, HTN, PPHx of late onset Bipolar disorder, 2 prior psych adm last in 2022 at [facility], who presented to ED with worsening paranoia, anxiety, FTT (weight loss of 20lb since march), and confusion. Psychiatry was consulted for psychosis/AMS. Was started on cymbalta as this was helpful for patient in the past. Was continued on home medications klonopin, depakote and seroquel. Transferred to [facility] for ongoing stabilization. Daughter denies patient has dementia. Also states patient responds well to BZD but not well to antipsychotics, which points to possibility of catatonia hx. Patient presented to the unit anxious with psychomotor agitation and unable to engage in an interview. Her presentation is concerning for generalized anxiety disorder vs worsening dementia vs pseudodementia vs delirium. Daughter finds patient is much better at this point. She is still quite disorganized with some paranoia regarding food. Will need ongoing monitoring.     Plan:  1. Legals: 9.27  2. Safety: constant observation for disorganized behavior.  Ativan 0.5 mg q12h prn for anxiety/agitation.  3. Psychiatric: Continue current medications   	         Increase Seroquel to 25mg daily and 50mg QHS  	         Depakote 250 mg daily and 500 mg qHS. VPA level of 48.70 on 5/30 - will consider tapering off and optimizing SGA in the near future  	        Continue Effexor 75mg daily and 37.5mg QHS (cymbalta discontinued due to dysphagia concerns and needing to crush medications)  	         Continue Ativan 0.5mg BID instead of klonopin 0.5mg po daily - per family patient does not respond well to antipsychotics but does well with BZD but there is [facility] service records of patient doing well on ativan.   	         Seroquel 12.5 mg q6h prn for anxiety  4. Group, milieu, individual therapy as appropriate.  5. Medical: HTN, HLD, DM2. Phlebitis, possibly dysphagia, joint pain  	Keflex 500 mg qid x 5 days - complete now  	amlodipine 2.5 mg daily. losartan 100 mg daily. aspirin 81 mg daily. atorvastatin 10 mg qHS.  	Lipid profile wnl other than slightly low HDL  	Metformin 500mg po bid              Naproxen 500mg daily   	b12 supplementation for deficiency in the past  	Ambulate with walker  6. Dispo: return to [facility] when stable

## 2023-06-15 NOTE — BH INPATIENT PSYCHIATRY PROGRESS NOTE - NSBHFUPINTERVALHXFT_PSY_A_CORE
Chart reviewed and case discussed with treatment team. Staff reports that patient is compliant with medications, vitals stable, still disorganized and needs frequent redirection. She continues to be intrusive with treatment team and repeats questions but a little easier to redirect. She reports generalized body aches, but does not specify to any body part. She is visible on the unit, ambulating well with her walker. Reports good appetite and sleep. Denies SI/HI. No AH/VH/paranoia elicited. Chart reviewed and case discussed with treatment team. Staff reports that patient is compliant with medications, vitals stable, still disorganized and needs frequent redirection. She continues to be intrusive with treatment team and repeats questions but a little easier to redirect. She reports generalized body aches, but does not specify to any body part. She is visible on the unit, ambulating well with her walker. Reports good appetite and sleep. Denies SI/HI. No AH/VH/paranoia elicited.    Writer spoke with daughter Marcelina [phone] and provided her with update on patient's progress, medication updates, and answered all questions and concerns.

## 2023-06-15 NOTE — BH INPATIENT PSYCHIATRY PROGRESS NOTE - NSBHATTESTCOMMENTATTENDFT_PSY_A_CORE
Patient remains anxious, illogical, disorganized but less intensely anxious at this time. Continue CO for now due to disorganization. Agree with optimizing seroquel as patient has unclear diagnosis - h/o depression but had anjelica in late 70s but has a h/o good response to cymbalta and BZD. Seroquel can treat bipolar depression as well as serve as adjunct to effexor.

## 2023-06-15 NOTE — BH INPATIENT PSYCHIATRY PROGRESS NOTE - NSBHMETABOLIC_PSY_ALL_CORE_FT
BMI: BMI (kg/m2): 23 (06-07-23 @ 18:13)  HbA1c: A1C with Estimated Average Glucose Result: 5.7 % (06-01-23 @ 05:10)    Glucose: POCT Blood Glucose.: 76 mg/dL (06-15-23 @ 08:16)    BP: 125/65 (06-13-23 @ 08:04) (125/65 - 125/65)  Lipid Panel: Date/Time: 06-08-23 @ 08:30  Cholesterol, Serum: 119  Direct LDL: --  HDL Cholesterol, Serum: 47  Total Cholesterol/HDL Ration Measurement: --  Triglycerides, Serum: 56   BMI: BMI (kg/m2): 23 (06-07-23 @ 18:13)  HbA1c: A1C with Estimated Average Glucose Result: 5.7 % (06-01-23 @ 05:10)    Glucose: POCT Blood Glucose.: 102 mg/dL (06-15-23 @ 12:12)    BP: 125/65 (06-13-23 @ 08:04) (125/65 - 125/65)  Lipid Panel: Date/Time: 06-08-23 @ 08:30  Cholesterol, Serum: 119  Direct LDL: --  HDL Cholesterol, Serum: 47  Total Cholesterol/HDL Ration Measurement: --  Triglycerides, Serum: 56   BMI: BMI (kg/m2): 23 (06-07-23 @ 18:13)  HbA1c: A1C with Estimated Average Glucose Result: 5.7 % (06-01-23 @ 05:10)    Glucose: POCT Blood Glucose.: 83 mg/dL (06-15-23 @ 16:28)    BP: 125/65 (06-13-23 @ 08:04) (125/65 - 125/65)  Lipid Panel: Date/Time: 06-08-23 @ 08:30  Cholesterol, Serum: 119  Direct LDL: --  HDL Cholesterol, Serum: 47  Total Cholesterol/HDL Ration Measurement: --  Triglycerides, Serum: 56

## 2023-06-15 NOTE — BH INPATIENT PSYCHIATRY PROGRESS NOTE - CURRENT MEDICATION
MEDICATIONS  (STANDING):  amLODIPine   Tablet 2.5 milliGRAM(s) Oral daily  aspirin  chewable 81 milliGRAM(s) Oral daily  atorvastatin 10 milliGRAM(s) Oral at bedtime  calcium carbonate   1250 mG (OsCal) 1 Tablet(s) Oral two times a day  cholecalciferol 1000 Unit(s) Oral daily  cyanocobalamin 1000 MICROGram(s) Oral daily  divalproex Sprinkle 500 milliGRAM(s) Oral at bedtime  divalproex Sprinkle 250 milliGRAM(s) Oral daily  folic acid 1 milliGRAM(s) Oral daily  glucagon  Injectable 1 milliGRAM(s) IntraMuscular once  insulin lispro (ADMELOG) corrective regimen sliding scale   SubCutaneous three times a day before meals  lactobacillus acidophilus 1 Tablet(s) Oral daily  LORazepam     Tablet 0.5 milliGRAM(s) Oral two times a day  losartan 100 milliGRAM(s) Oral daily  naproxen 500 milliGRAM(s) Oral once  naproxen 500 milliGRAM(s) Oral daily  pantoprazole   Suspension 40 milliGRAM(s) Oral before breakfast  QUEtiapine 50 milliGRAM(s) Oral at bedtime  senna 2 Tablet(s) Oral at bedtime  sodium chloride 0.65% Nasal 2 Spray(s) Both Nostrils two times a day  venlafaxine 75 milliGRAM(s) Oral with breakfast  venlafaxine 37.5 milliGRAM(s) Oral with dinner    MEDICATIONS  (PRN):  acetaminophen     Tablet .. 650 milliGRAM(s) Oral every 6 hours PRN Temp greater or equal to 38C (100.4F), Mild Pain (1 - 3), Moderate Pain (4 - 6)  dextrose Oral Gel 15 Gram(s) Oral once PRN Blood Glucose LESS THAN 70 milliGRAM(s)/deciliter  LORazepam     Tablet 0.5 milliGRAM(s) Oral every 12 hours PRN anxiety/agitation  melatonin. 3 milliGRAM(s) Oral at bedtime PRN Insomnia  QUEtiapine 12.5 milliGRAM(s) Oral every 6 hours PRN anxiety   MEDICATIONS  (STANDING):  amLODIPine   Tablet 2.5 milliGRAM(s) Oral daily  aspirin  chewable 81 milliGRAM(s) Oral daily  atorvastatin 10 milliGRAM(s) Oral at bedtime  calcium carbonate   1250 mG (OsCal) 1 Tablet(s) Oral two times a day  cholecalciferol 1000 Unit(s) Oral daily  cyanocobalamin 1000 MICROGram(s) Oral daily  divalproex Sprinkle 500 milliGRAM(s) Oral at bedtime  divalproex Sprinkle 250 milliGRAM(s) Oral daily  folic acid 1 milliGRAM(s) Oral daily  glucagon  Injectable 1 milliGRAM(s) IntraMuscular once  insulin lispro (ADMELOG) corrective regimen sliding scale   SubCutaneous three times a day before meals  lactobacillus acidophilus 1 Tablet(s) Oral daily  LORazepam     Tablet 0.5 milliGRAM(s) Oral two times a day  losartan 100 milliGRAM(s) Oral daily  naproxen 500 milliGRAM(s) Oral daily  naproxen 500 milliGRAM(s) Oral once  pantoprazole   Suspension 40 milliGRAM(s) Oral before breakfast  QUEtiapine 50 milliGRAM(s) Oral at bedtime  senna 2 Tablet(s) Oral at bedtime  sodium chloride 0.65% Nasal 2 Spray(s) Both Nostrils two times a day  venlafaxine 75 milliGRAM(s) Oral with breakfast  venlafaxine 37.5 milliGRAM(s) Oral with dinner    MEDICATIONS  (PRN):  acetaminophen     Tablet .. 650 milliGRAM(s) Oral every 6 hours PRN Temp greater or equal to 38C (100.4F), Mild Pain (1 - 3), Moderate Pain (4 - 6)  dextrose Oral Gel 15 Gram(s) Oral once PRN Blood Glucose LESS THAN 70 milliGRAM(s)/deciliter  LORazepam     Tablet 0.5 milliGRAM(s) Oral every 12 hours PRN anxiety/agitation  melatonin. 3 milliGRAM(s) Oral at bedtime PRN Insomnia  QUEtiapine 12.5 milliGRAM(s) Oral every 6 hours PRN anxiety   MEDICATIONS  (STANDING):  amLODIPine   Tablet 2.5 milliGRAM(s) Oral daily  aspirin  chewable 81 milliGRAM(s) Oral daily  atorvastatin 10 milliGRAM(s) Oral at bedtime  calcium carbonate   1250 mG (OsCal) 1 Tablet(s) Oral two times a day  cholecalciferol 1000 Unit(s) Oral daily  cyanocobalamin 1000 MICROGram(s) Oral daily  divalproex Sprinkle 500 milliGRAM(s) Oral at bedtime  divalproex Sprinkle 250 milliGRAM(s) Oral daily  folic acid 1 milliGRAM(s) Oral daily  glucagon  Injectable 1 milliGRAM(s) IntraMuscular once  insulin lispro (ADMELOG) corrective regimen sliding scale   SubCutaneous three times a day before meals  lactobacillus acidophilus 1 Tablet(s) Oral daily  LORazepam     Tablet 0.5 milliGRAM(s) Oral two times a day  losartan 100 milliGRAM(s) Oral daily  naproxen 500 milliGRAM(s) Oral once  naproxen 500 milliGRAM(s) Oral daily  pantoprazole   Suspension 40 milliGRAM(s) Oral before breakfast  QUEtiapine 50 milliGRAM(s) Oral at bedtime  senna 2 Tablet(s) Oral at bedtime  sodium chloride 0.65% Nasal 2 Spray(s) Both Nostrils two times a day  venlafaxine 37.5 milliGRAM(s) Oral with dinner  venlafaxine 75 milliGRAM(s) Oral with breakfast    MEDICATIONS  (PRN):  acetaminophen     Tablet .. 650 milliGRAM(s) Oral every 6 hours PRN Temp greater or equal to 38C (100.4F), Mild Pain (1 - 3), Moderate Pain (4 - 6)  dextrose Oral Gel 15 Gram(s) Oral once PRN Blood Glucose LESS THAN 70 milliGRAM(s)/deciliter  LORazepam     Tablet 0.5 milliGRAM(s) Oral every 12 hours PRN anxiety/agitation  melatonin. 3 milliGRAM(s) Oral at bedtime PRN Insomnia  QUEtiapine 12.5 milliGRAM(s) Oral every 6 hours PRN anxiety   MEDICATIONS  (STANDING):  amLODIPine   Tablet 2.5 milliGRAM(s) Oral daily  aspirin  chewable 81 milliGRAM(s) Oral daily  atorvastatin 10 milliGRAM(s) Oral at bedtime  calcium carbonate   1250 mG (OsCal) 1 Tablet(s) Oral two times a day  cholecalciferol 1000 Unit(s) Oral daily  cyanocobalamin 1000 MICROGram(s) Oral daily  divalproex Sprinkle 500 milliGRAM(s) Oral at bedtime  divalproex Sprinkle 250 milliGRAM(s) Oral daily  folic acid 1 milliGRAM(s) Oral daily  glucagon  Injectable 1 milliGRAM(s) IntraMuscular once  lactobacillus acidophilus 1 Tablet(s) Oral daily  LORazepam     Tablet 0.5 milliGRAM(s) Oral two times a day  losartan 100 milliGRAM(s) Oral daily  naproxen 500 milliGRAM(s) Oral daily  naproxen 500 milliGRAM(s) Oral once  pantoprazole   Suspension 40 milliGRAM(s) Oral before breakfast  QUEtiapine 50 milliGRAM(s) Oral at bedtime  senna 2 Tablet(s) Oral at bedtime  sodium chloride 0.65% Nasal 2 Spray(s) Both Nostrils two times a day  venlafaxine 37.5 milliGRAM(s) Oral with dinner  venlafaxine 75 milliGRAM(s) Oral with breakfast    MEDICATIONS  (PRN):  acetaminophen     Tablet .. 650 milliGRAM(s) Oral every 6 hours PRN Temp greater or equal to 38C (100.4F), Mild Pain (1 - 3), Moderate Pain (4 - 6)  dextrose Oral Gel 15 Gram(s) Oral once PRN Blood Glucose LESS THAN 70 milliGRAM(s)/deciliter  LORazepam     Tablet 0.5 milliGRAM(s) Oral every 12 hours PRN anxiety/agitation  melatonin. 3 milliGRAM(s) Oral at bedtime PRN Insomnia  QUEtiapine 12.5 milliGRAM(s) Oral every 6 hours PRN anxiety

## 2023-06-15 NOTE — BH INPATIENT PSYCHIATRY PROGRESS NOTE - MSE UNSTRUCTURED FT
Appearance: 83 yo F, elderly  F, limited grooming, thin, no abnormal involuntary movements noted.   Behavior: intrusive, appropriate eye contact ; increased psychomotor activity remains  Speech: normal rate, loud   Mood: "fine"   Affect: congruent, intense, labile  Thought process:  disorganized, non-linear   Thought content: paranoia elicited; no reports of SI/HI.  Perceptual disturbances: no appearance of internal preoccupation, no AH/VH elicited.   Cognition: loosely oriented to time place, poorly oriented to situation  Abstraction: limited  Insight: limited  Judgement: poor

## 2023-06-15 NOTE — BH INPATIENT PSYCHIATRY PROGRESS NOTE - NSBHCHARTREVIEWVS_PSY_A_CORE FT
Vital Signs Last 24 Hrs  T(C): 37 (06-14-23 @ 15:46), Max: 37 (06-14-23 @ 15:46)  T(F): 98.6 (06-14-23 @ 15:46), Max: 98.6 (06-14-23 @ 15:46)  HR: --  BP: --  BP(mean): --  RR: 18 (06-15-23 @ 07:59) (18 - 18)  SpO2: 96% (06-15-23 @ 07:59) (96% - 98%)    Orthostatic VS  06-15-23 @ 07:59  Lying BP: 118/69 HR: 62  Sitting BP: 132/58 HR: 73  Standing BP: --/-- HR: --  Site: --  Mode: --  Orthostatic VS  06-14-23 @ 07:49  Lying BP: --/-- HR: --  Sitting BP: 131/62 HR: 79  Standing BP: 110/60 HR: 85  Site: --  Mode: --   Vital Signs Last 24 Hrs  T(C): --  T(F): --  HR: --  BP: --  BP(mean): --  RR: 18 (06-15-23 @ 07:59) (18 - 18)  SpO2: 96% (06-15-23 @ 07:59) (96% - 96%)    Orthostatic VS  06-15-23 @ 07:59  Lying BP: 118/69 HR: 62  Sitting BP: 132/58 HR: 73  Standing BP: --/-- HR: --  Site: --  Mode: --  Orthostatic VS  06-14-23 @ 07:49  Lying BP: --/-- HR: --  Sitting BP: 131/62 HR: 79  Standing BP: 110/60 HR: 85  Site: --  Mode: --

## 2023-06-16 PROCEDURE — 99232 SBSQ HOSP IP/OBS MODERATE 35: CPT

## 2023-06-16 RX ORDER — LIDOCAINE 4 G/100G
1 CREAM TOPICAL ONCE
Refills: 0 | Status: COMPLETED | OUTPATIENT
Start: 2023-06-16 | End: 2023-06-16

## 2023-06-16 RX ADMIN — Medication 500 MILLIGRAM(S): at 08:40

## 2023-06-16 RX ADMIN — PANTOPRAZOLE SODIUM 40 MILLIGRAM(S): 20 TABLET, DELAYED RELEASE ORAL at 08:39

## 2023-06-16 RX ADMIN — Medication 0.5 MILLIGRAM(S): at 08:41

## 2023-06-16 RX ADMIN — Medication 2 SPRAY(S): at 08:41

## 2023-06-16 RX ADMIN — Medication 0.5 MILLIGRAM(S): at 20:29

## 2023-06-16 RX ADMIN — Medication 1 MILLIGRAM(S): at 08:40

## 2023-06-16 RX ADMIN — Medication 81 MILLIGRAM(S): at 08:40

## 2023-06-16 RX ADMIN — Medication 75 MILLIGRAM(S): at 08:40

## 2023-06-16 RX ADMIN — QUETIAPINE FUMARATE 50 MILLIGRAM(S): 200 TABLET, FILM COATED ORAL at 20:29

## 2023-06-16 RX ADMIN — QUETIAPINE FUMARATE 25 MILLIGRAM(S): 200 TABLET, FILM COATED ORAL at 08:40

## 2023-06-16 RX ADMIN — Medication 1 TABLET(S): at 20:28

## 2023-06-16 RX ADMIN — Medication 2 SPRAY(S): at 20:33

## 2023-06-16 RX ADMIN — PREGABALIN 1000 MICROGRAM(S): 225 CAPSULE ORAL at 08:41

## 2023-06-16 RX ADMIN — SENNA PLUS 2 TABLET(S): 8.6 TABLET ORAL at 20:28

## 2023-06-16 RX ADMIN — Medication 1000 UNIT(S): at 08:40

## 2023-06-16 RX ADMIN — Medication 650 MILLIGRAM(S): at 16:55

## 2023-06-16 RX ADMIN — Medication 500 MILLIGRAM(S): at 09:10

## 2023-06-16 RX ADMIN — Medication 650 MILLIGRAM(S): at 16:25

## 2023-06-16 RX ADMIN — AMLODIPINE BESYLATE 2.5 MILLIGRAM(S): 2.5 TABLET ORAL at 08:41

## 2023-06-16 RX ADMIN — ATORVASTATIN CALCIUM 10 MILLIGRAM(S): 80 TABLET, FILM COATED ORAL at 20:28

## 2023-06-16 RX ADMIN — LOSARTAN POTASSIUM 100 MILLIGRAM(S): 100 TABLET, FILM COATED ORAL at 08:40

## 2023-06-16 RX ADMIN — DIVALPROEX SODIUM 250 MILLIGRAM(S): 500 TABLET, DELAYED RELEASE ORAL at 08:40

## 2023-06-16 RX ADMIN — Medication 1 TABLET(S): at 08:40

## 2023-06-16 RX ADMIN — Medication 37.5 MILLIGRAM(S): at 16:25

## 2023-06-16 RX ADMIN — DIVALPROEX SODIUM 500 MILLIGRAM(S): 500 TABLET, DELAYED RELEASE ORAL at 20:28

## 2023-06-16 NOTE — BH INPATIENT PSYCHIATRY PROGRESS NOTE - CURRENT MEDICATION
MEDICATIONS  (STANDING):  amLODIPine   Tablet 2.5 milliGRAM(s) Oral daily  aspirin  chewable 81 milliGRAM(s) Oral daily  atorvastatin 10 milliGRAM(s) Oral at bedtime  calcium carbonate   1250 mG (OsCal) 1 Tablet(s) Oral two times a day  cholecalciferol 1000 Unit(s) Oral daily  cyanocobalamin 1000 MICROGram(s) Oral daily  divalproex Sprinkle 250 milliGRAM(s) Oral daily  divalproex Sprinkle 500 milliGRAM(s) Oral at bedtime  folic acid 1 milliGRAM(s) Oral daily  glucagon  Injectable 1 milliGRAM(s) IntraMuscular once  lidocaine   4% Patch 1 Patch Transdermal once  LORazepam     Tablet 0.5 milliGRAM(s) Oral two times a day  losartan 100 milliGRAM(s) Oral daily  naproxen 500 milliGRAM(s) Oral once  naproxen 500 milliGRAM(s) Oral daily  pantoprazole   Suspension 40 milliGRAM(s) Oral before breakfast  QUEtiapine 25 milliGRAM(s) Oral daily  QUEtiapine 50 milliGRAM(s) Oral at bedtime  senna 2 Tablet(s) Oral at bedtime  sodium chloride 0.65% Nasal 2 Spray(s) Both Nostrils two times a day  venlafaxine 37.5 milliGRAM(s) Oral with dinner  venlafaxine 75 milliGRAM(s) Oral with breakfast    MEDICATIONS  (PRN):  acetaminophen     Tablet .. 650 milliGRAM(s) Oral every 6 hours PRN Temp greater or equal to 38C (100.4F), Mild Pain (1 - 3), Moderate Pain (4 - 6)  dextrose Oral Gel 15 Gram(s) Oral once PRN Blood Glucose LESS THAN 70 milliGRAM(s)/deciliter  LORazepam     Tablet 0.5 milliGRAM(s) Oral every 12 hours PRN anxiety/agitation  melatonin. 3 milliGRAM(s) Oral at bedtime PRN Insomnia  QUEtiapine 12.5 milliGRAM(s) Oral every 6 hours PRN anxiety

## 2023-06-16 NOTE — BH INPATIENT PSYCHIATRY PROGRESS NOTE - NSBHMETABOLIC_PSY_ALL_CORE_FT
BMI: BMI (kg/m2): 23 (06-07-23 @ 18:13)  HbA1c: A1C with Estimated Average Glucose Result: 5.7 % (06-01-23 @ 05:10)    Glucose: POCT Blood Glucose.: 83 mg/dL (06-15-23 @ 16:28)    BP: 136/52 (06-16-23 @ 08:08) (125/77 - 136/52)  Lipid Panel: Date/Time: 06-08-23 @ 08:30  Cholesterol, Serum: 119  Direct LDL: --  HDL Cholesterol, Serum: 47  Total Cholesterol/HDL Ration Measurement: --  Triglycerides, Serum: 56

## 2023-06-16 NOTE — BH INPATIENT PSYCHIATRY PROGRESS NOTE - MSE UNSTRUCTURED FT
Appearance: 83 yo F, elderly  F, limited grooming, thin, no abnormal involuntary movements noted.   Behavior: intrusive, appropriate eye contact ; increased psychomotor activity remains  Speech: normal rate  Mood: "okay"   Affect: constricted  Thought process:  minimally verbal  Thought content:  no reports of SI/HI.  Perceptual disturbances: no appearance of internal preoccupation, no AH/VH elicited.   Cognition: loosely oriented to time place, poorly oriented to situation  Abstraction: limited  Insight: limited  Judgement: poor

## 2023-06-16 NOTE — BH INPATIENT PSYCHIATRY PROGRESS NOTE - NSBHASSESSSUMMFT_PSY_ALL_CORE
81 year old , retired female, domiciled at senior care, [facility] of DM2, HLD, HTN, PPHx of late onset Bipolar disorder, 2 prior psych adm last in 2022 at [facility], who presented to ED with worsening paranoia, anxiety, FTT (weight loss of 20lb since march), and confusion. Psychiatry was consulted for psychosis/AMS. Was started on cymbalta as this was helpful for patient in the past. Was continued on home medications klonopin, depakote and seroquel. Transferred to [facility] for ongoing stabilization. Daughter denies patient has dementia. Also states patient responds well to BZD but not well to antipsychotics, which points to possibility of catatonia hx. Patient presented to the unit anxious with psychomotor agitation and unable to engage in an interview. Her presentation is concerning for generalized anxiety disorder vs worsening dementia vs pseudodementia vs delirium. Daughter finds patient is much better at this point. She is still quite disorganized with some paranoia regarding food. Will need ongoing monitoring.     on assessment, patient was in better behavioral control than previous days, somnolent, denied SI/I/P, with disorganization.     Plan:  1. Legals: 9.27  2. Safety: constant observation for disorganized behavior.  Ativan 0.5 mg q12h prn for anxiety/agitation.  3. Psychiatric: Continue current medications   	         Increase Seroquel to 25mg daily and 50mg QHS  	         Depakote 250 mg daily and 500 mg qHS. VPA level of 48.70 on 5/30 - will consider tapering off and optimizing SGA in the near future  	        Continue Effexor 75mg daily and 37.5mg QHS (cymbalta discontinued due to dysphagia concerns and needing to crush medications)  	         Continue Ativan 0.5mg BID instead of klonopin 0.5mg po daily - per family patient does not respond well to antipsychotics but does well with BZD but there is [facility] service records of patient doing well on ativan.   	         Seroquel 12.5 mg q6h prn for anxiety  4. Group, milieu, individual therapy as appropriate.  5. Medical: HTN, HLD, DM2. Phlebitis, possibly dysphagia, joint pain  	Keflex 500 mg qid x 5 days - complete now  	amlodipine 2.5 mg daily. losartan 100 mg daily. aspirin 81 mg daily. atorvastatin 10 mg qHS.  	Lipid profile wnl other than slightly low HDL  	Metformin 500mg po bid              Naproxen 500mg daily   	b12 supplementation for deficiency in the past  	Ambulate with walker  6. Dispo: return to senior care when stable

## 2023-06-16 NOTE — BH INPATIENT PSYCHIATRY PROGRESS NOTE - NSBHCHARTREVIEWVS_PSY_A_CORE FT
Vital Signs Last 24 Hrs  T(C): --  T(F): --  HR: 71 (06-16-23 @ 08:08) (71 - 71)  BP: 136/52 (06-16-23 @ 08:08) (125/77 - 136/52)  BP(mean): --  RR: --  SpO2: 97% (06-16-23 @ 04:40) (97% - 97%)    Orthostatic VS  06-15-23 @ 07:59  Lying BP: 118/69 HR: 62  Sitting BP: 132/58 HR: 73  Standing BP: --/-- HR: --  Site: --  Mode: --

## 2023-06-16 NOTE — BH INPATIENT PSYCHIATRY PROGRESS NOTE - NSBHFUPINTERVALHXFT_PSY_A_CORE
f/up bipolar 2 w/ anxious distress, care discussed w/ tx team, [facility]. Reported by nursing that patient almost fell off chair, caught by staff, hit her side, assessed by PA. Patient with difficulty hearing, observed in bed, was somnolent. Patient denied pain. Denied SE from medications. Denied dizziness or constipation. Denied SI/I/P. no delusions elicited.

## 2023-06-16 NOTE — CHART NOTE - NSCHARTNOTEFT_GEN_A_CORE
Pt was sitting up, off the side of her bed and almost slid off onto the floor, but did not, was fall was stopped by the pt's  observer and the pt was laid down back in bed. The pt endorses some L sided abdominal/ back pain. No fall, head strike, LOC, dizziness, SOB, chest pain, bowel/ bladder incontinence.    General: NAD, laying in beds asleep, but arousable  for this interview and exam.   VSS  HEENT : Circle, AT. NC, EOMI. No discharge for eyes, ears, nose.  Lung CTA b/l  CV : S1-->S2  Abd: Positive BS, NT throughout.  Back: no CVAT  Skin: No swelling, bruising, bleeding noted.   A/P   63F admitted to [facility] for Bipolar disorder, with L sided abdominal/ back pain s/p almost sliding off her bed. Pain is not reproducible. No signs of trauma noted. Possible muscle strain from the event. But pt currently pain free at his time.   - L sided abdominal/ back pain s/p almost sliding off a bed: NAD, likely muscle strain. NT, No swelling or signs of trauma. No indication for any radiologic studies. Continue Tylenol dosing. Lidoderm patch. Fall precautions, Continue CO.

## 2023-06-17 PROCEDURE — 99231 SBSQ HOSP IP/OBS SF/LOW 25: CPT

## 2023-06-17 RX ADMIN — Medication 0.5 MILLIGRAM(S): at 08:58

## 2023-06-17 RX ADMIN — AMLODIPINE BESYLATE 2.5 MILLIGRAM(S): 2.5 TABLET ORAL at 08:58

## 2023-06-17 RX ADMIN — Medication 500 MILLIGRAM(S): at 09:27

## 2023-06-17 RX ADMIN — QUETIAPINE FUMARATE 50 MILLIGRAM(S): 200 TABLET, FILM COATED ORAL at 19:47

## 2023-06-17 RX ADMIN — Medication 1000 UNIT(S): at 08:57

## 2023-06-17 RX ADMIN — Medication 2 SPRAY(S): at 10:30

## 2023-06-17 RX ADMIN — Medication 75 MILLIGRAM(S): at 08:59

## 2023-06-17 RX ADMIN — Medication 500 MILLIGRAM(S): at 08:57

## 2023-06-17 RX ADMIN — PANTOPRAZOLE SODIUM 40 MILLIGRAM(S): 20 TABLET, DELAYED RELEASE ORAL at 08:54

## 2023-06-17 RX ADMIN — PREGABALIN 1000 MICROGRAM(S): 225 CAPSULE ORAL at 08:58

## 2023-06-17 RX ADMIN — ATORVASTATIN CALCIUM 10 MILLIGRAM(S): 80 TABLET, FILM COATED ORAL at 19:47

## 2023-06-17 RX ADMIN — Medication 37.5 MILLIGRAM(S): at 17:33

## 2023-06-17 RX ADMIN — Medication 1 MILLIGRAM(S): at 08:59

## 2023-06-17 RX ADMIN — DIVALPROEX SODIUM 250 MILLIGRAM(S): 500 TABLET, DELAYED RELEASE ORAL at 09:03

## 2023-06-17 RX ADMIN — Medication 1 ENEMA: at 13:21

## 2023-06-17 RX ADMIN — Medication 1 TABLET(S): at 19:47

## 2023-06-17 RX ADMIN — LOSARTAN POTASSIUM 100 MILLIGRAM(S): 100 TABLET, FILM COATED ORAL at 08:57

## 2023-06-17 RX ADMIN — SENNA PLUS 2 TABLET(S): 8.6 TABLET ORAL at 19:47

## 2023-06-17 RX ADMIN — Medication 2 SPRAY(S): at 19:48

## 2023-06-17 RX ADMIN — QUETIAPINE FUMARATE 25 MILLIGRAM(S): 200 TABLET, FILM COATED ORAL at 08:59

## 2023-06-17 RX ADMIN — Medication 1 TABLET(S): at 08:58

## 2023-06-17 RX ADMIN — Medication 0.5 MILLIGRAM(S): at 19:47

## 2023-06-17 RX ADMIN — DIVALPROEX SODIUM 500 MILLIGRAM(S): 500 TABLET, DELAYED RELEASE ORAL at 19:48

## 2023-06-17 RX ADMIN — Medication 81 MILLIGRAM(S): at 08:58

## 2023-06-17 NOTE — BH INPATIENT PSYCHIATRY PROGRESS NOTE - CURRENT MEDICATION
MEDICATIONS  (STANDING):  amLODIPine   Tablet 2.5 milliGRAM(s) Oral daily  aspirin  chewable 81 milliGRAM(s) Oral daily  atorvastatin 10 milliGRAM(s) Oral at bedtime  calcium carbonate   1250 mG (OsCal) 1 Tablet(s) Oral two times a day  cholecalciferol 1000 Unit(s) Oral daily  cyanocobalamin 1000 MICROGram(s) Oral daily  divalproex Sprinkle 250 milliGRAM(s) Oral daily  divalproex Sprinkle 500 milliGRAM(s) Oral at bedtime  folic acid 1 milliGRAM(s) Oral daily  glucagon  Injectable 1 milliGRAM(s) IntraMuscular once  LORazepam     Tablet 0.5 milliGRAM(s) Oral two times a day  losartan 100 milliGRAM(s) Oral daily  naproxen 500 milliGRAM(s) Oral daily  pantoprazole   Suspension 40 milliGRAM(s) Oral before breakfast  QUEtiapine 25 milliGRAM(s) Oral daily  QUEtiapine 50 milliGRAM(s) Oral at bedtime  saline laxative (FLEET) Rectal Enema 1 Enema Rectal once  senna 2 Tablet(s) Oral at bedtime  sodium chloride 0.65% Nasal 2 Spray(s) Both Nostrils two times a day  venlafaxine 37.5 milliGRAM(s) Oral with dinner  venlafaxine 75 milliGRAM(s) Oral with breakfast    MEDICATIONS  (PRN):  acetaminophen     Tablet .. 650 milliGRAM(s) Oral every 6 hours PRN Temp greater or equal to 38C (100.4F), Mild Pain (1 - 3), Moderate Pain (4 - 6)  dextrose Oral Gel 15 Gram(s) Oral once PRN Blood Glucose LESS THAN 70 milliGRAM(s)/deciliter  LORazepam     Tablet 0.5 milliGRAM(s) Oral every 12 hours PRN anxiety/agitation  melatonin. 3 milliGRAM(s) Oral at bedtime PRN Insomnia  QUEtiapine 12.5 milliGRAM(s) Oral every 6 hours PRN anxiety

## 2023-06-17 NOTE — PHYSICAL THERAPY INITIAL EVALUATION ADULT - PERTINENT HX OF CURRENT PROBLEM, REHAB EVAL
Patient is 81 year old , female, domiciled at [facility], [facility] of DM2, HLD, HTN, PPHx of late onset Bipolar disorder, 2 prior psych admisson last in 2022 at [facility], who presented to ED with worsening paranoia, anxiety, FTT (weight loss of 20lb since march), and confusion.

## 2023-06-17 NOTE — BH INPATIENT PSYCHIATRY PROGRESS NOTE - NSBHASSESSSUMMFT_PSY_ALL_CORE
81 year old , retired female, domiciled at [facility], [facility] of DM2, HLD, HTN, PPHx of late onset Bipolar disorder, 2 prior psych adm last in 2022 at [facility], who presented to ED with worsening paranoia, anxiety, FTT (weight loss of 20lb since march), and confusion. Psychiatry was consulted for psychosis/AMS. Was started on cymbalta as this was helpful for patient in the past. Was continued on home medications klonopin, depakote and seroquel. Transferred to [facility] for ongoing stabilization. Daughter denies patient has dementia. Also states patient responds well to BZD but not well to antipsychotics, which points to possibility of catatonia hx. Patient presented to the unit anxious with psychomotor agitation and unable to engage in an interview. Her presentation is concerning for generalized anxiety disorder vs worsening dementia vs pseudodementia vs delirium. Daughter finds patient is much better at this point. She is still quite disorganized with some paranoia regarding food. Will need ongoing monitoring.     on assessment, patient was in better behavioral control than previous days, somnolent, denied SI/I/P, with disorganization.     Plan:  1. Legals: 9.27  2. Safety: constant observation for disorganized behavior.  Ativan 0.5 mg q12h prn for anxiety/agitation.  3. Psychiatric: Continue current medications   	         Increase Seroquel to 25mg daily and 50mg QHS  	         Depakote 250 mg daily and 500 mg qHS. VPA level of 48.70 on 5/30 - will consider tapering off and optimizing SGA in the near future  	        Continue Effexor 75mg daily and 37.5mg QHS (cymbalta discontinued due to dysphagia concerns and needing to crush medications)  	         Continue Ativan 0.5mg BID instead of klonopin 0.5mg po daily - per family patient does not respond well to antipsychotics but does well with BZD but there is [facility] service records of patient doing well on ativan.   	         Seroquel 12.5 mg q6h prn for anxiety  4. Group, milieu, individual therapy as appropriate.  5. Medical: HTN, HLD, DM2. Phlebitis, possibly dysphagia, joint pain  	Keflex 500 mg qid x 5 days - complete now  	amlodipine 2.5 mg daily. losartan 100 mg daily. aspirin 81 mg daily. atorvastatin 10 mg qHS.  	Lipid profile wnl other than slightly low HDL  	Metformin 500mg po bid              Naproxen 500mg daily   	b12 supplementation for deficiency in the past  	Ambulate with walker  6. Dispo: return to [facility] when stable  6/17 Somewhat calmer, less agitated, less restless cont current rx

## 2023-06-17 NOTE — BH INPATIENT PSYCHIATRY PROGRESS NOTE - NSBHCHARTREVIEWVS_PSY_A_CORE FT
Vital Signs Last 24 Hrs  T(C): 36.7 (06-17-23 @ 08:04), Max: 36.7 (06-16-23 @ 15:37)  T(F): 98 (06-17-23 @ 08:04), Max: 98 (06-16-23 @ 15:37)  HR: --  BP: --  BP(mean): --  RR: 18 (06-17-23 @ 08:04) (18 - 18)  SpO2: 98% (06-17-23 @ 08:04) (98% - 98%)    Orthostatic VS  06-17-23 @ 08:04  Lying BP: 132/62 HR: 66  Sitting BP: 136/60 HR: 67  Standing BP: --/-- HR: --  Site: upper left arm  Mode: electronic

## 2023-06-17 NOTE — BH INPATIENT PSYCHIATRY PROGRESS NOTE - NSBHFUPINTERVALHXFT_PSY_A_CORE
f/up bipolar 2 w/ anxious distress, care discussed w/ tx team, VSS. Reported by nursing that patient almost fell off chair, caught by staff, hit her side, assessed by PA. Patient with difficulty hearing, observed in bed, was somnolent. Patient denied pain. Denied SE from medications. Denied dizziness or constipation. Denied SI/I/P. no delusions elicited.   6/17 Patient seen for BAD depressed, no overnight night events patient repeorting a fall that did not happen as she is on CO 24 hours and no fall seen. Eating, sleeping okay a little more directable

## 2023-06-18 PROCEDURE — 99232 SBSQ HOSP IP/OBS MODERATE 35: CPT

## 2023-06-18 RX ADMIN — Medication 1 TABLET(S): at 09:15

## 2023-06-18 RX ADMIN — LOSARTAN POTASSIUM 100 MILLIGRAM(S): 100 TABLET, FILM COATED ORAL at 09:15

## 2023-06-18 RX ADMIN — PANTOPRAZOLE SODIUM 40 MILLIGRAM(S): 20 TABLET, DELAYED RELEASE ORAL at 09:21

## 2023-06-18 RX ADMIN — Medication 81 MILLIGRAM(S): at 09:15

## 2023-06-18 RX ADMIN — DIVALPROEX SODIUM 500 MILLIGRAM(S): 500 TABLET, DELAYED RELEASE ORAL at 19:36

## 2023-06-18 RX ADMIN — Medication 0.5 MILLIGRAM(S): at 19:36

## 2023-06-18 RX ADMIN — Medication 1000 UNIT(S): at 09:17

## 2023-06-18 RX ADMIN — Medication 75 MILLIGRAM(S): at 09:16

## 2023-06-18 RX ADMIN — DIVALPROEX SODIUM 250 MILLIGRAM(S): 500 TABLET, DELAYED RELEASE ORAL at 09:19

## 2023-06-18 RX ADMIN — Medication 2 SPRAY(S): at 09:16

## 2023-06-18 RX ADMIN — QUETIAPINE FUMARATE 25 MILLIGRAM(S): 200 TABLET, FILM COATED ORAL at 09:16

## 2023-06-18 RX ADMIN — Medication 0.5 MILLIGRAM(S): at 09:16

## 2023-06-18 RX ADMIN — Medication 500 MILLIGRAM(S): at 09:17

## 2023-06-18 RX ADMIN — Medication 1 MILLIGRAM(S): at 09:15

## 2023-06-18 RX ADMIN — QUETIAPINE FUMARATE 50 MILLIGRAM(S): 200 TABLET, FILM COATED ORAL at 19:36

## 2023-06-18 RX ADMIN — Medication 37.5 MILLIGRAM(S): at 16:54

## 2023-06-18 RX ADMIN — AMLODIPINE BESYLATE 2.5 MILLIGRAM(S): 2.5 TABLET ORAL at 09:17

## 2023-06-18 RX ADMIN — PREGABALIN 1000 MICROGRAM(S): 225 CAPSULE ORAL at 09:15

## 2023-06-18 RX ADMIN — ATORVASTATIN CALCIUM 10 MILLIGRAM(S): 80 TABLET, FILM COATED ORAL at 19:36

## 2023-06-18 RX ADMIN — Medication 1 TABLET(S): at 19:36

## 2023-06-18 NOTE — BH INPATIENT PSYCHIATRY PROGRESS NOTE - NSBHASSESSSUMMFT_PSY_ALL_CORE
81 year old , retired female, domiciled at [facility], [facility] of DM2, HLD, HTN, PPHx of late onset Bipolar disorder, 2 prior psych adm last in 2022 at [facility], who presented to ED with worsening paranoia, anxiety, FTT (weight loss of 20lb since march), and confusion. Psychiatry was consulted for psychosis/AMS. Was started on cymbalta as this was helpful for patient in the past. Was continued on home medications klonopin, depakote and seroquel. Transferred to [facility] for ongoing stabilization. Daughter denies patient has dementia. Also states patient responds well to BZD but not well to antipsychotics, which points to possibility of catatonia hx. Patient presented to the unit anxious with psychomotor agitation and unable to engage in an interview. Her presentation is concerning for generalized anxiety disorder vs worsening dementia vs pseudodementia vs delirium. Daughter finds patient is much better at this point. She is still quite disorganized with some paranoia regarding food. Will need ongoing monitoring.     on assessment, patient was in better behavioral control than previous days, somnolent, denied SI/I/P, with disorganization.     Plan:  1. Legals: 9.27  2. Safety: constant observation for disorganized behavior.  Ativan 0.5 mg q12h prn for anxiety/agitation.  3. Psychiatric: Continue current medications   	         Increase Seroquel to 25mg daily and 50mg QHS  	         Depakote 250 mg daily and 500 mg qHS. VPA level of 48.70 on 5/30 - will consider tapering off and optimizing SGA in the near future  	        Continue Effexor 75mg daily and 37.5mg QHS (cymbalta discontinued due to dysphagia concerns and needing to crush medications)  	         Continue Ativan 0.5mg BID instead of klonopin 0.5mg po daily - per family patient does not respond well to antipsychotics but does well with BZD but there is [facility] service records of patient doing well on ativan.   	         Seroquel 12.5 mg q6h prn for anxiety  4. Group, milieu, individual therapy as appropriate.  5. Medical: HTN, HLD, DM2. Phlebitis, possibly dysphagia, joint pain  	Keflex 500 mg qid x 5 days - complete now  	amlodipine 2.5 mg daily. losartan 100 mg daily. aspirin 81 mg daily. atorvastatin 10 mg qHS.  	Lipid profile wnl other than slightly low HDL  	Metformin 500mg po bid              Naproxen 500mg daily   	b12 supplementation for deficiency in the past  	Ambulate with walker  6. Dispo: return to [facility] when stable  6/17 Somewhat calmer, less agitated, less restless cont current rx  6/18 anxious, paranoid, dysphoric, no SI/HI.

## 2023-06-18 NOTE — BH INPATIENT PSYCHIATRY PROGRESS NOTE - MSE UNSTRUCTURED FT
Appearance: 81 yo F, elderly  F, limited grooming, thin, no abnormal involuntary movements noted.   Behavior: intrusive, appropriate eye contact ; increased psychomotor activity remains  Speech: normal rate, volume, fast, repetitious  Mood: "save me"   Affect: anxious, fearful, dysphoric  Thought process:  minimally verbal  Thought content:  paranoid, no reports of SI/HI.  Perceptual disturbances: no appearance of internal preoccupation, no AH/VH elicited.   Cognition: loosely oriented to time place, poorly oriented to situation  Abstraction: limited  Insight: limited  Judgement: poor

## 2023-06-18 NOTE — BH INPATIENT PSYCHIATRY PROGRESS NOTE - CURRENT MEDICATION
MEDICATIONS  (STANDING):  amLODIPine   Tablet 2.5 milliGRAM(s) Oral daily  aspirin  chewable 81 milliGRAM(s) Oral daily  atorvastatin 10 milliGRAM(s) Oral at bedtime  calcium carbonate   1250 mG (OsCal) 1 Tablet(s) Oral two times a day  cholecalciferol 1000 Unit(s) Oral daily  cyanocobalamin 1000 MICROGram(s) Oral daily  divalproex Sprinkle 250 milliGRAM(s) Oral daily  divalproex Sprinkle 500 milliGRAM(s) Oral at bedtime  folic acid 1 milliGRAM(s) Oral daily  glucagon  Injectable 1 milliGRAM(s) IntraMuscular once  LORazepam     Tablet 0.5 milliGRAM(s) Oral two times a day  losartan 100 milliGRAM(s) Oral daily  naproxen 500 milliGRAM(s) Oral daily  pantoprazole   Suspension 40 milliGRAM(s) Oral before breakfast  QUEtiapine 25 milliGRAM(s) Oral daily  QUEtiapine 50 milliGRAM(s) Oral at bedtime  senna 2 Tablet(s) Oral at bedtime  sodium chloride 0.65% Nasal 2 Spray(s) Both Nostrils two times a day  venlafaxine 37.5 milliGRAM(s) Oral with dinner  venlafaxine 75 milliGRAM(s) Oral with breakfast    MEDICATIONS  (PRN):  acetaminophen     Tablet .. 650 milliGRAM(s) Oral every 6 hours PRN Temp greater or equal to 38C (100.4F), Mild Pain (1 - 3), Moderate Pain (4 - 6)  dextrose Oral Gel 15 Gram(s) Oral once PRN Blood Glucose LESS THAN 70 milliGRAM(s)/deciliter  LORazepam     Tablet 0.5 milliGRAM(s) Oral every 12 hours PRN anxiety/agitation  melatonin. 3 milliGRAM(s) Oral at bedtime PRN Insomnia  QUEtiapine 12.5 milliGRAM(s) Oral every 6 hours PRN anxiety

## 2023-06-18 NOTE — BH INPATIENT PSYCHIATRY PROGRESS NOTE - NSBHCHARTREVIEWVS_PSY_A_CORE FT
Vital Signs Last 24 Hrs  T(C): 36.2 (06-18-23 @ 08:38), Max: 36.2 (06-18-23 @ 08:38)  T(F): 97.2 (06-18-23 @ 08:38), Max: 97.2 (06-18-23 @ 08:38)  HR: --  BP: --  BP(mean): --  RR: --  SpO2: 99% (06-17-23 @ 19:26) (99% - 99%)    Orthostatic VS  06-18-23 @ 08:38  Lying BP: --/-- HR: --  Sitting BP: 127/81 HR: 77  Standing BP: 129/54 HR: 82  Site: --  Mode: --  Orthostatic VS  06-17-23 @ 08:04  Lying BP: 132/62 HR: 66  Sitting BP: 136/60 HR: 67  Standing BP: --/-- HR: --  Site: upper left arm  Mode: electronic

## 2023-06-18 NOTE — BH INPATIENT PSYCHIATRY PROGRESS NOTE - NSBHFUPINTERVALHXFT_PSY_A_CORE
Patient seen for bipolar disorder, depressed, anxious, chart reviewed, discussed with nursing staff. Patient is compliant with medications, no acute events/PRN over night. On exam this am, Pt appears anxious, tearful and asking nursing staff to save her. This started after another peer was loud in hallway. Pt reports not feeling good and perseverative about 2 cups taken away. Denies any SI, HI, hallucination. Appears anxious, tearful, paranoid and disorganized.

## 2023-06-19 PROCEDURE — 99232 SBSQ HOSP IP/OBS MODERATE 35: CPT | Mod: GC

## 2023-06-19 RX ORDER — QUETIAPINE FUMARATE 200 MG/1
50 TABLET, FILM COATED ORAL EVERY 12 HOURS
Refills: 0 | Status: DISCONTINUED | OUTPATIENT
Start: 2023-06-19 | End: 2023-06-21

## 2023-06-19 RX ADMIN — AMLODIPINE BESYLATE 2.5 MILLIGRAM(S): 2.5 TABLET ORAL at 09:08

## 2023-06-19 RX ADMIN — Medication 1000 UNIT(S): at 09:08

## 2023-06-19 RX ADMIN — PREGABALIN 1000 MICROGRAM(S): 225 CAPSULE ORAL at 09:09

## 2023-06-19 RX ADMIN — Medication 81 MILLIGRAM(S): at 09:09

## 2023-06-19 RX ADMIN — QUETIAPINE FUMARATE 50 MILLIGRAM(S): 200 TABLET, FILM COATED ORAL at 20:18

## 2023-06-19 RX ADMIN — Medication 37.5 MILLIGRAM(S): at 16:49

## 2023-06-19 RX ADMIN — Medication 1 MILLIGRAM(S): at 09:08

## 2023-06-19 RX ADMIN — Medication 2 SPRAY(S): at 09:32

## 2023-06-19 RX ADMIN — QUETIAPINE FUMARATE 25 MILLIGRAM(S): 200 TABLET, FILM COATED ORAL at 09:08

## 2023-06-19 RX ADMIN — Medication 0.5 MILLIGRAM(S): at 20:18

## 2023-06-19 RX ADMIN — PANTOPRAZOLE SODIUM 40 MILLIGRAM(S): 20 TABLET, DELAYED RELEASE ORAL at 08:47

## 2023-06-19 RX ADMIN — Medication 1 TABLET(S): at 09:09

## 2023-06-19 RX ADMIN — DIVALPROEX SODIUM 500 MILLIGRAM(S): 500 TABLET, DELAYED RELEASE ORAL at 20:19

## 2023-06-19 RX ADMIN — Medication 0.5 MILLIGRAM(S): at 09:07

## 2023-06-19 RX ADMIN — Medication 3 MILLIGRAM(S): at 22:23

## 2023-06-19 RX ADMIN — Medication 500 MILLIGRAM(S): at 10:08

## 2023-06-19 RX ADMIN — ATORVASTATIN CALCIUM 10 MILLIGRAM(S): 80 TABLET, FILM COATED ORAL at 20:18

## 2023-06-19 RX ADMIN — SENNA PLUS 2 TABLET(S): 8.6 TABLET ORAL at 20:29

## 2023-06-19 RX ADMIN — DIVALPROEX SODIUM 250 MILLIGRAM(S): 500 TABLET, DELAYED RELEASE ORAL at 09:09

## 2023-06-19 RX ADMIN — LOSARTAN POTASSIUM 100 MILLIGRAM(S): 100 TABLET, FILM COATED ORAL at 09:08

## 2023-06-19 RX ADMIN — Medication 1 TABLET(S): at 20:19

## 2023-06-19 RX ADMIN — Medication 500 MILLIGRAM(S): at 09:08

## 2023-06-19 RX ADMIN — Medication 75 MILLIGRAM(S): at 09:08

## 2023-06-19 NOTE — BH INPATIENT PSYCHIATRY PROGRESS NOTE - NSBHATTESTCOMMENTATTENDFT_PSY_A_CORE
Still illogical but with less psychomotor agitation, flight of ideas less severe. Agree with seroquel dose increase.  Still illogical but with less psychomotor agitation, flight of ideas less severe. Agree with seroquel dose increase. Reduce CO to 7am-11pm.

## 2023-06-19 NOTE — BH INPATIENT PSYCHIATRY PROGRESS NOTE - MSE UNSTRUCTURED FT
Appearance: 83 yo F, elderly  F, limited grooming, thin, no abnormal involuntary movements noted.   Behavior: intrusive, appropriate eye contact ; increased psychomotor activity remains  Speech: normal rate, volume, fast, repetitious  Mood: "okay"  Affect: congruent to mood ; constricted  Thought process:  minimally verbal  Thought content: less paranoid, no reports of SI/HI.  Perceptual disturbances: no appearance of internal preoccupation, no AH/VH elicited.   Cognition: loosely oriented to time place, poorly oriented to situation  Abstraction: limited  Insight: limited  Judgement: poor

## 2023-06-19 NOTE — BH INPATIENT PSYCHIATRY PROGRESS NOTE - NSBHFUPINTERVALHXFT_PSY_A_CORE
Chart reviewed and case discussed with treatment team. Staff reports that patient is compliant with medications, vitals stable, can be disorganized at times but less so. Patient can be intrusive at times but much more redirectable and less anxious. Per PCA patient is eating well and no longer expresses paranoia regarding her food. She is visible on the unit, ambulating well with her walker. Reports good appetite and sleep. Denies SI/HI. No AH/VH/No paranoia elicited.

## 2023-06-19 NOTE — BH INPATIENT PSYCHIATRY PROGRESS NOTE - NSBHCHARTREVIEWVS_PSY_A_CORE FT
Vital Signs Last 24 Hrs  T(C): 36.6 (06-19-23 @ 07:50), Max: 36.7 (06-18-23 @ 15:29)  T(F): 97.9 (06-19-23 @ 07:50), Max: 98 (06-18-23 @ 15:29)  HR: --  BP: --  BP(mean): --  RR: 17 (06-19-23 @ 07:50) (17 - 17)  SpO2: 98% (06-19-23 @ 07:50) (98% - 98%)    Orthostatic VS  06-19-23 @ 07:50  Lying BP: 151/60 HR: 63  Sitting BP: 140/67 HR: 69  Standing BP: --/-- HR: --  Site: upper right arm  Mode: electronic  Orthostatic VS  06-18-23 @ 08:38  Lying BP: --/-- HR: --  Sitting BP: 127/81 HR: 77  Standing BP: 129/54 HR: 82  Site: --  Mode: --   Vital Signs Last 24 Hrs  T(C): 36.8 (06-19-23 @ 15:54), Max: 36.8 (06-19-23 @ 15:54)  T(F): 98.2 (06-19-23 @ 15:54), Max: 98.2 (06-19-23 @ 15:54)  HR: --  BP: --  BP(mean): --  RR: 17 (06-19-23 @ 07:50) (17 - 17)  SpO2: 100% (06-19-23 @ 15:54) (98% - 100%)    Orthostatic VS  06-19-23 @ 07:50  Lying BP: 151/60 HR: 63  Sitting BP: 140/67 HR: 69  Standing BP: --/-- HR: --  Site: upper right arm  Mode: electronic  Orthostatic VS  06-18-23 @ 08:38  Lying BP: --/-- HR: --  Sitting BP: 127/81 HR: 77  Standing BP: 129/54 HR: 82  Site: --  Mode: --

## 2023-06-19 NOTE — BH INPATIENT PSYCHIATRY PROGRESS NOTE - NSBHMETABOLIC_PSY_ALL_CORE_FT
BMI: BMI (kg/m2): 23 (06-07-23 @ 18:13)  HbA1c: A1C with Estimated Average Glucose Result: 5.7 % (06-01-23 @ 05:10)    Glucose: POCT Blood Glucose.: 83 mg/dL (06-15-23 @ 16:28)    BP: --  Lipid Panel: Date/Time: 06-08-23 @ 08:30  Cholesterol, Serum: 119  Direct LDL: --  HDL Cholesterol, Serum: 47  Total Cholesterol/HDL Ration Measurement: --  Triglycerides, Serum: 56

## 2023-06-19 NOTE — BH INPATIENT PSYCHIATRY PROGRESS NOTE - NSBHASSESSSUMMFT_PSY_ALL_CORE
81 year old , retired female, domiciled at [facility], [facility] of DM2, HLD, HTN, PPHx of late onset Bipolar disorder, 2 prior psych adm last in 2022 at [facility], who presented to ED with worsening paranoia, anxiety, FTT (weight loss of 20lb since march), and confusion. Psychiatry was consulted for psychosis/AMS. Was started on cymbalta as this was helpful for patient in the past. Was continued on home medications klonopin, depakote and seroquel. Transferred to [facility] for ongoing stabilization. Daughter denies patient has dementia. Also states patient responds well to BZD but not well to antipsychotics, which points to possibility of catatonia hx. Patient presented to the unit anxious with psychomotor agitation and unable to engage in an interview. Her presentation is concerning for generalized anxiety disorder vs worsening dementia vs pseudodementia vs delirium. Daughter finds patient is much better at this point. She is less disorganized with improved paranoia regarding food. Will need ongoing monitoring.     Plan:  1. Legals: 9.27  2. Safety: constant observation for disorganized behavior.  Ativan 0.5 mg q12h prn for anxiety/agitation.  3. Psychiatric: Continue current medications   	         Increase Seroquel to 50mg Q12H   	         Depakote 250 mg daily and 500 mg qHS. VPA level of 48.70 on 5/30 - will consider tapering off and optimizing SGA in the near future  	        Continue Effexor 75mg daily and 37.5mg QHS (cymbalta discontinued due to dysphagia concerns and needing to crush medications)  	         Continue Ativan 0.5mg BID instead of klonopin 0.5mg po daily - per family patient does not respond well to antipsychotics but does well with BZD but there is [facility] service records of patient doing well on ativan.   	         Seroquel 12.5 mg q6h prn for anxiety  4. Group, milieu, individual therapy as appropriate.  5. Medical: HTN, HLD, DM2. Phlebitis, possibly dysphagia, joint pain  	Keflex 500 mg qid x 5 days - complete now  	amlodipine 2.5 mg daily. losartan 100 mg daily. aspirin 81 mg daily. atorvastatin 10 mg qHS.  	Lipid profile wnl other than slightly low HDL  	Metformin 500mg po bid              Naproxen 500mg daily   	b12 supplementation for deficiency in the past  	Ambulate with walker  6. Dispo: return to [facility] when stable

## 2023-06-20 LAB — GLUCOSE BLDC GLUCOMTR-MCNC: 91 MG/DL — SIGNIFICANT CHANGE UP (ref 70–99)

## 2023-06-20 PROCEDURE — 99232 SBSQ HOSP IP/OBS MODERATE 35: CPT | Mod: GC

## 2023-06-20 RX ADMIN — DIVALPROEX SODIUM 500 MILLIGRAM(S): 500 TABLET, DELAYED RELEASE ORAL at 21:12

## 2023-06-20 RX ADMIN — Medication 1 TABLET(S): at 21:05

## 2023-06-20 RX ADMIN — Medication 1 TABLET(S): at 08:21

## 2023-06-20 RX ADMIN — Medication 81 MILLIGRAM(S): at 08:21

## 2023-06-20 RX ADMIN — Medication 1000 UNIT(S): at 08:20

## 2023-06-20 RX ADMIN — AMLODIPINE BESYLATE 2.5 MILLIGRAM(S): 2.5 TABLET ORAL at 08:21

## 2023-06-20 RX ADMIN — Medication 650 MILLIGRAM(S): at 06:10

## 2023-06-20 RX ADMIN — SENNA PLUS 2 TABLET(S): 8.6 TABLET ORAL at 21:06

## 2023-06-20 RX ADMIN — QUETIAPINE FUMARATE 50 MILLIGRAM(S): 200 TABLET, FILM COATED ORAL at 21:05

## 2023-06-20 RX ADMIN — Medication 1 MILLIGRAM(S): at 08:19

## 2023-06-20 RX ADMIN — PREGABALIN 1000 MICROGRAM(S): 225 CAPSULE ORAL at 08:20

## 2023-06-20 RX ADMIN — Medication 37.5 MILLIGRAM(S): at 16:29

## 2023-06-20 RX ADMIN — Medication 650 MILLIGRAM(S): at 07:10

## 2023-06-20 RX ADMIN — PANTOPRAZOLE SODIUM 40 MILLIGRAM(S): 20 TABLET, DELAYED RELEASE ORAL at 07:25

## 2023-06-20 RX ADMIN — Medication 75 MILLIGRAM(S): at 08:20

## 2023-06-20 RX ADMIN — DIVALPROEX SODIUM 250 MILLIGRAM(S): 500 TABLET, DELAYED RELEASE ORAL at 08:20

## 2023-06-20 RX ADMIN — Medication 2 SPRAY(S): at 08:21

## 2023-06-20 RX ADMIN — LOSARTAN POTASSIUM 100 MILLIGRAM(S): 100 TABLET, FILM COATED ORAL at 08:20

## 2023-06-20 RX ADMIN — Medication 0.5 MILLIGRAM(S): at 08:19

## 2023-06-20 RX ADMIN — QUETIAPINE FUMARATE 50 MILLIGRAM(S): 200 TABLET, FILM COATED ORAL at 08:19

## 2023-06-20 RX ADMIN — Medication 2 SPRAY(S): at 21:07

## 2023-06-20 RX ADMIN — Medication 3 MILLIGRAM(S): at 21:43

## 2023-06-20 RX ADMIN — Medication 500 MILLIGRAM(S): at 09:20

## 2023-06-20 RX ADMIN — Medication 500 MILLIGRAM(S): at 08:20

## 2023-06-20 RX ADMIN — ATORVASTATIN CALCIUM 10 MILLIGRAM(S): 80 TABLET, FILM COATED ORAL at 21:05

## 2023-06-20 NOTE — BH INPATIENT PSYCHIATRY PROGRESS NOTE - NSBHMETABOLIC_PSY_ALL_CORE_FT
BMI: BMI (kg/m2): 23 (06-07-23 @ 18:13)  HbA1c: A1C with Estimated Average Glucose Result: 5.7 % (06-01-23 @ 05:10)    Glucose: POCT Blood Glucose.: 91 mg/dL (06-20-23 @ 08:28)    BP: --  Lipid Panel: Date/Time: 06-08-23 @ 08:30  Cholesterol, Serum: 119  Direct LDL: --  HDL Cholesterol, Serum: 47  Total Cholesterol/HDL Ration Measurement: --  Triglycerides, Serum: 56

## 2023-06-20 NOTE — BH INPATIENT PSYCHIATRY PROGRESS NOTE - NSBHFUPINTERVALHXFT_PSY_A_CORE
Chart reviewed and case discussed with treatment team. Staff reports that patient is compliant with medications, vitals stable, can be disorganized at times but less so. Patient can be intrusive at times but much more redirectable and less anxious. Per PCA patient is eating well and no longer expresses paranoia regarding her food. She is upset about her room being changed and says she could not sleep as well in her new room. She is visible on the unit, ambulating well with her walker. Reports good appetite and fair sleep. Denies SI/HI. No AH/VH/No paranoia elicited.

## 2023-06-20 NOTE — BH INPATIENT PSYCHIATRY PROGRESS NOTE - CURRENT MEDICATION
MEDICATIONS  (STANDING):  amLODIPine   Tablet 2.5 milliGRAM(s) Oral daily  aspirin  chewable 81 milliGRAM(s) Oral daily  atorvastatin 10 milliGRAM(s) Oral at bedtime  calcium carbonate   1250 mG (OsCal) 1 Tablet(s) Oral two times a day  cholecalciferol 1000 Unit(s) Oral daily  cyanocobalamin 1000 MICROGram(s) Oral daily  divalproex Sprinkle 500 milliGRAM(s) Oral at bedtime  divalproex Sprinkle 250 milliGRAM(s) Oral daily  folic acid 1 milliGRAM(s) Oral daily  glucagon  Injectable 1 milliGRAM(s) IntraMuscular once  losartan 100 milliGRAM(s) Oral daily  naproxen 500 milliGRAM(s) Oral daily  pantoprazole   Suspension 40 milliGRAM(s) Oral before breakfast  QUEtiapine 50 milliGRAM(s) Oral every 12 hours  senna 2 Tablet(s) Oral at bedtime  sodium chloride 0.65% Nasal 2 Spray(s) Both Nostrils two times a day  venlafaxine 37.5 milliGRAM(s) Oral with dinner  venlafaxine 75 milliGRAM(s) Oral with breakfast    MEDICATIONS  (PRN):  acetaminophen     Tablet .. 650 milliGRAM(s) Oral every 6 hours PRN Temp greater or equal to 38C (100.4F), Mild Pain (1 - 3), Moderate Pain (4 - 6)  dextrose Oral Gel 15 Gram(s) Oral once PRN Blood Glucose LESS THAN 70 milliGRAM(s)/deciliter  LORazepam     Tablet 0.5 milliGRAM(s) Oral every 12 hours PRN anxiety/agitation  melatonin. 3 milliGRAM(s) Oral at bedtime PRN Insomnia  QUEtiapine 12.5 milliGRAM(s) Oral every 6 hours PRN anxiety

## 2023-06-20 NOTE — BH INPATIENT PSYCHIATRY PROGRESS NOTE - NSBHCHARTREVIEWVS_PSY_A_CORE FT
Vital Signs Last 24 Hrs  T(C): 36.1 (06-20-23 @ 08:06), Max: 36.8 (06-19-23 @ 15:54)  T(F): 97 (06-20-23 @ 08:06), Max: 98.2 (06-19-23 @ 15:54)  HR: --  BP: --  BP(mean): --  RR: --  SpO2: 97% (06-20-23 @ 08:06) (97% - 100%)    Orthostatic VS  06-20-23 @ 08:06  Lying BP: --/-- HR: --  Sitting BP: 122/64 HR: 63  Standing BP: --/-- HR: --  Site: --  Mode: --  Orthostatic VS  06-19-23 @ 07:50  Lying BP: 151/60 HR: 63  Sitting BP: 140/67 HR: 69  Standing BP: --/-- HR: --  Site: upper right arm  Mode: electronic   Vital Signs Last 24 Hrs  T(C): 36.5 (06-20-23 @ 15:49), Max: 36.5 (06-20-23 @ 15:49)  T(F): 97.7 (06-20-23 @ 15:49), Max: 97.7 (06-20-23 @ 15:49)  HR: --  BP: --  BP(mean): --  RR: --  SpO2: 99% (06-20-23 @ 15:49) (97% - 99%)    Orthostatic VS  06-20-23 @ 08:06  Lying BP: --/-- HR: --  Sitting BP: 122/64 HR: 63  Standing BP: --/-- HR: --  Site: --  Mode: --  Orthostatic VS  06-19-23 @ 07:50  Lying BP: 151/60 HR: 63  Sitting BP: 140/67 HR: 69  Standing BP: --/-- HR: --  Site: upper right arm  Mode: electronic

## 2023-06-20 NOTE — BH INPATIENT PSYCHIATRY PROGRESS NOTE - NSBHASSESSSUMMFT_PSY_ALL_CORE
81 year old , retired female, domiciled at [facility], PMH of DM2, HLD, HTN, PPHx of late onset Bipolar disorder, 2 prior psych adm last in 2022 at [facility], who presented to ED with worsening paranoia, anxiety, FTT (weight loss of 20lb since march), and confusion. Psychiatry was consulted for psychosis/AMS. Was started on cymbalta as this was helpful for patient in the past. Was continued on home medications klonopin, depakote and seroquel. Transferred to [facility] for ongoing stabilization. Daughter denies patient has dementia. Also states patient responds well to BZD but not well to antipsychotics, which points to possibility of catatonia hx. Patient presented to the unit anxious with psychomotor agitation and unable to engage in an interview. Her presentation is concerning for generalized anxiety disorder vs worsening dementia vs pseudodementia vs delirium. Daughter finds patient is much better at this point. She is less disorganized with improved paranoia regarding food. Will need ongoing monitoring.     Plan:  1. Legals: 9.27  2. Safety: constant observation for disorganized behavior.  Ativan 0.5 mg q12h prn for anxiety/agitation.  3. Psychiatric: Continue current medications   	         C/w Seroquel 50mg Q12H   	         Depakote 250 mg daily and 500 mg qHS. VPA level of 48.70 on 5/30 - will consider tapering off and optimizing SGA in the near future  	        Continue Effexor 75mg daily and 37.5mg QHS (cymbalta discontinued due to dysphagia concerns and needing to crush medications)  	        DC Ativan 0.5mg BID and monitor behavior on just Seroquel  	         Seroquel 12.5 mg q6h prn for anxiety  4. Group, milieu, individual therapy as appropriate.  5. Medical: HTN, HLD, DM2. Phlebitis, possibly dysphagia, joint pain  	Keflex 500 mg qid x 5 days - complete now  	amlodipine 2.5 mg daily. losartan 100 mg daily. aspirin 81 mg daily. atorvastatin 10 mg qHS.  	Lipid profile wnl other than slightly low HDL  	Metformin 500mg po bid              Naproxen 500mg daily   	b12 supplementation for deficiency in the past  	Ambulate with walker  6. Dispo: return to [facility] when stable

## 2023-06-20 NOTE — BH INPATIENT PSYCHIATRY PROGRESS NOTE - MSE UNSTRUCTURED FT
Appearance: 83 yo F, elderly  F, limited grooming, thin, no abnormal involuntary movements noted.   Behavior: intrusive, appropriate eye contact ; increased psychomotor activity remains  Speech: normal rate, volume, fast, repetitious  Mood: "fine"  Affect: congruent to mood ; constricted  Thought process:  minimally verbal  Thought content: less paranoid, no reports of SI/HI.  Perceptual disturbances: no appearance of internal preoccupation, no AH/VH elicited.   Cognition: loosely oriented to time place, poorly oriented to situation  Abstraction: limited  Insight: limited  Judgement: poor

## 2023-06-21 PROCEDURE — 99232 SBSQ HOSP IP/OBS MODERATE 35: CPT | Mod: GC

## 2023-06-21 RX ORDER — LOSARTAN POTASSIUM 100 MG/1
75 TABLET, FILM COATED ORAL DAILY
Refills: 0 | Status: DISCONTINUED | OUTPATIENT
Start: 2023-06-22 | End: 2023-08-24

## 2023-06-21 RX ORDER — QUETIAPINE FUMARATE 200 MG/1
75 TABLET, FILM COATED ORAL AT BEDTIME
Refills: 0 | Status: DISCONTINUED | OUTPATIENT
Start: 2023-06-21 | End: 2023-06-28

## 2023-06-21 RX ORDER — QUETIAPINE FUMARATE 200 MG/1
50 TABLET, FILM COATED ORAL DAILY
Refills: 0 | Status: DISCONTINUED | OUTPATIENT
Start: 2023-06-22 | End: 2023-06-24

## 2023-06-21 RX ADMIN — QUETIAPINE FUMARATE 75 MILLIGRAM(S): 200 TABLET, FILM COATED ORAL at 20:35

## 2023-06-21 RX ADMIN — Medication 1 MILLIGRAM(S): at 08:39

## 2023-06-21 RX ADMIN — Medication 1 TABLET(S): at 20:34

## 2023-06-21 RX ADMIN — SENNA PLUS 2 TABLET(S): 8.6 TABLET ORAL at 20:35

## 2023-06-21 RX ADMIN — DIVALPROEX SODIUM 250 MILLIGRAM(S): 500 TABLET, DELAYED RELEASE ORAL at 08:36

## 2023-06-21 RX ADMIN — Medication 1 TABLET(S): at 08:36

## 2023-06-21 RX ADMIN — PANTOPRAZOLE SODIUM 40 MILLIGRAM(S): 20 TABLET, DELAYED RELEASE ORAL at 08:43

## 2023-06-21 RX ADMIN — Medication 3 MILLIGRAM(S): at 20:35

## 2023-06-21 RX ADMIN — DIVALPROEX SODIUM 500 MILLIGRAM(S): 500 TABLET, DELAYED RELEASE ORAL at 20:33

## 2023-06-21 RX ADMIN — Medication 81 MILLIGRAM(S): at 08:38

## 2023-06-21 RX ADMIN — Medication 75 MILLIGRAM(S): at 08:38

## 2023-06-21 RX ADMIN — ATORVASTATIN CALCIUM 10 MILLIGRAM(S): 80 TABLET, FILM COATED ORAL at 20:34

## 2023-06-21 RX ADMIN — Medication 500 MILLIGRAM(S): at 08:38

## 2023-06-21 RX ADMIN — Medication 2 SPRAY(S): at 20:41

## 2023-06-21 RX ADMIN — Medication 37.5 MILLIGRAM(S): at 17:00

## 2023-06-21 RX ADMIN — Medication 0.5 MILLIGRAM(S): at 15:03

## 2023-06-21 RX ADMIN — Medication 2 SPRAY(S): at 08:36

## 2023-06-21 RX ADMIN — LOSARTAN POTASSIUM 100 MILLIGRAM(S): 100 TABLET, FILM COATED ORAL at 08:39

## 2023-06-21 RX ADMIN — QUETIAPINE FUMARATE 50 MILLIGRAM(S): 200 TABLET, FILM COATED ORAL at 08:36

## 2023-06-21 RX ADMIN — PREGABALIN 1000 MICROGRAM(S): 225 CAPSULE ORAL at 08:37

## 2023-06-21 RX ADMIN — Medication 1000 UNIT(S): at 08:38

## 2023-06-21 RX ADMIN — AMLODIPINE BESYLATE 2.5 MILLIGRAM(S): 2.5 TABLET ORAL at 08:38

## 2023-06-21 NOTE — BH INPATIENT PSYCHIATRY PROGRESS NOTE - NSBHFUPINTERVALHXFT_PSY_A_CORE
Chart reviewed and case discussed with treatment team. Staff reports that patient is compliant with medications, vitals stable, can be disorganized at times but less so. Patient can be intrusive at times but much more redirectable and less anxious. Per PCA patient is eating well and no longer expresses paranoia regarding her food. When writer asked patient if she has any issues or concerns about the food, she says no, that she checks expiration dates before eating her food. She denies feeling like food is spoiled or no good. She is visible on the unit, ambulating well with her walker. Reports good appetite and fair sleep. Denies SI/HI. No AH/VH/No paranoia elicited.    Writer spoke with daughter Marcelina yesterday [phone] and daughter reports that patient is reporting to family that she thinks that food is poisoned and no good even though she isn't expressing that to staff. All questions and concerns addressed.

## 2023-06-21 NOTE — BH INPATIENT PSYCHIATRY PROGRESS NOTE - CURRENT MEDICATION
MEDICATIONS  (STANDING):  amLODIPine   Tablet 2.5 milliGRAM(s) Oral daily  aspirin  chewable 81 milliGRAM(s) Oral daily  atorvastatin 10 milliGRAM(s) Oral at bedtime  calcium carbonate   1250 mG (OsCal) 1 Tablet(s) Oral two times a day  cholecalciferol 1000 Unit(s) Oral daily  cyanocobalamin 1000 MICROGram(s) Oral daily  divalproex Sprinkle 250 milliGRAM(s) Oral daily  divalproex Sprinkle 500 milliGRAM(s) Oral at bedtime  folic acid 1 milliGRAM(s) Oral daily  glucagon  Injectable 1 milliGRAM(s) IntraMuscular once  naproxen 500 milliGRAM(s) Oral daily  pantoprazole   Suspension 40 milliGRAM(s) Oral before breakfast  QUEtiapine 75 milliGRAM(s) Oral at bedtime  senna 2 Tablet(s) Oral at bedtime  sodium chloride 0.65% Nasal 2 Spray(s) Both Nostrils two times a day  venlafaxine 37.5 milliGRAM(s) Oral with dinner  venlafaxine 75 milliGRAM(s) Oral with breakfast    MEDICATIONS  (PRN):  acetaminophen     Tablet .. 650 milliGRAM(s) Oral every 6 hours PRN Temp greater or equal to 38C (100.4F), Mild Pain (1 - 3), Moderate Pain (4 - 6)  dextrose Oral Gel 15 Gram(s) Oral once PRN Blood Glucose LESS THAN 70 milliGRAM(s)/deciliter  LORazepam     Tablet 0.5 milliGRAM(s) Oral every 12 hours PRN anxiety/agitation  melatonin. 3 milliGRAM(s) Oral at bedtime PRN Insomnia  QUEtiapine 12.5 milliGRAM(s) Oral every 6 hours PRN anxiety   MEDICATIONS  (STANDING):  amLODIPine   Tablet 2.5 milliGRAM(s) Oral daily  aspirin  chewable 81 milliGRAM(s) Oral daily  atorvastatin 10 milliGRAM(s) Oral at bedtime  calcium carbonate   1250 mG (OsCal) 1 Tablet(s) Oral two times a day  cholecalciferol 1000 Unit(s) Oral daily  cyanocobalamin 1000 MICROGram(s) Oral daily  divalproex Sprinkle 500 milliGRAM(s) Oral at bedtime  divalproex Sprinkle 250 milliGRAM(s) Oral daily  folic acid 1 milliGRAM(s) Oral daily  glucagon  Injectable 1 milliGRAM(s) IntraMuscular once  naproxen 500 milliGRAM(s) Oral daily  pantoprazole   Suspension 40 milliGRAM(s) Oral before breakfast  QUEtiapine 75 milliGRAM(s) Oral at bedtime  senna 2 Tablet(s) Oral at bedtime  sodium chloride 0.65% Nasal 2 Spray(s) Both Nostrils two times a day  venlafaxine 75 milliGRAM(s) Oral with breakfast  venlafaxine 37.5 milliGRAM(s) Oral with dinner    MEDICATIONS  (PRN):  acetaminophen     Tablet .. 650 milliGRAM(s) Oral every 6 hours PRN Temp greater or equal to 38C (100.4F), Mild Pain (1 - 3), Moderate Pain (4 - 6)  dextrose Oral Gel 15 Gram(s) Oral once PRN Blood Glucose LESS THAN 70 milliGRAM(s)/deciliter  LORazepam     Tablet 0.5 milliGRAM(s) Oral every 12 hours PRN anxiety/agitation  melatonin. 3 milliGRAM(s) Oral at bedtime PRN Insomnia  QUEtiapine 12.5 milliGRAM(s) Oral every 6 hours PRN anxiety   MEDICATIONS  (STANDING):  amLODIPine   Tablet 2.5 milliGRAM(s) Oral daily  aspirin  chewable 81 milliGRAM(s) Oral daily  atorvastatin 10 milliGRAM(s) Oral at bedtime  calcium carbonate   1250 mG (OsCal) 1 Tablet(s) Oral two times a day  cholecalciferol 1000 Unit(s) Oral daily  cyanocobalamin 1000 MICROGram(s) Oral daily  divalproex Sprinkle 250 milliGRAM(s) Oral daily  divalproex Sprinkle 500 milliGRAM(s) Oral at bedtime  folic acid 1 milliGRAM(s) Oral daily  glucagon  Injectable 1 milliGRAM(s) IntraMuscular once  naproxen 500 milliGRAM(s) Oral daily  pantoprazole   Suspension 40 milliGRAM(s) Oral before breakfast  QUEtiapine 75 milliGRAM(s) Oral at bedtime  senna 2 Tablet(s) Oral at bedtime  sodium chloride 0.65% Nasal 2 Spray(s) Both Nostrils two times a day  venlafaxine 75 milliGRAM(s) Oral with breakfast  venlafaxine 37.5 milliGRAM(s) Oral with dinner    MEDICATIONS  (PRN):  acetaminophen     Tablet .. 650 milliGRAM(s) Oral every 6 hours PRN Temp greater or equal to 38C (100.4F), Mild Pain (1 - 3), Moderate Pain (4 - 6)  dextrose Oral Gel 15 Gram(s) Oral once PRN Blood Glucose LESS THAN 70 milliGRAM(s)/deciliter  LORazepam     Tablet 0.5 milliGRAM(s) Oral every 12 hours PRN anxiety/agitation  melatonin. 3 milliGRAM(s) Oral at bedtime PRN Insomnia  QUEtiapine 12.5 milliGRAM(s) Oral every 6 hours PRN anxiety

## 2023-06-21 NOTE — BH INPATIENT PSYCHIATRY PROGRESS NOTE - MSE UNSTRUCTURED FT
Appearance: 83 yo F, elderly  F, limited grooming, thin, no abnormal involuntary movements noted.   Behavior: intrusive, appropriate eye contact ; increased psychomotor activity remains  Speech: normal rate, volume, fast, repetitious  Mood: okay"  Affect: congruent to mood ; constricted  Thought process:  concrete   Thought content: less paranoid, no reports of SI/HI.  Perceptual disturbances: no appearance of internal preoccupation, no AH/VH elicited.   Cognition: loosely oriented to time place, poorly oriented to situation  Abstraction: limited  Insight: limited  Judgement: poor

## 2023-06-21 NOTE — BH INPATIENT PSYCHIATRY PROGRESS NOTE - NSBHCHARTREVIEWVS_PSY_A_CORE FT
Vital Signs Last 24 Hrs  T(C): 36.4 (06-21-23 @ 08:16), Max: 36.5 (06-20-23 @ 15:49)  T(F): 97.5 (06-21-23 @ 08:16), Max: 97.7 (06-20-23 @ 15:49)  HR: --  BP: --  BP(mean): --  RR: --  SpO2: 98% (06-21-23 @ 08:16) (98% - 99%)    Orthostatic VS  06-21-23 @ 08:16  Lying BP: --/-- HR: --  Sitting BP: 135/60 HR: 66  Standing BP: 120/60 HR: 78  Site: --  Mode: --  Orthostatic VS  06-20-23 @ 08:06  Lying BP: --/-- HR: --  Sitting BP: 122/64 HR: 63  Standing BP: --/-- HR: --  Site: --  Mode: --   Vital Signs Last 24 Hrs  T(C): 36.7 (06-21-23 @ 15:50), Max: 36.7 (06-21-23 @ 15:50)  T(F): 98.1 (06-21-23 @ 15:50), Max: 98.1 (06-21-23 @ 15:50)  HR: --  BP: --  BP(mean): --  RR: --  SpO2: 98% (06-21-23 @ 15:50) (98% - 98%)    Orthostatic VS  06-21-23 @ 08:16  Lying BP: --/-- HR: --  Sitting BP: 135/60 HR: 66  Standing BP: 120/60 HR: 78  Site: --  Mode: --  Orthostatic VS  06-20-23 @ 08:06  Lying BP: --/-- HR: --  Sitting BP: 122/64 HR: 63  Standing BP: --/-- HR: --  Site: --  Mode: --

## 2023-06-21 NOTE — BH INPATIENT PSYCHIATRY PROGRESS NOTE - NSBHATTESTCOMMENTATTENDFT_PSY_A_CORE
Patient seems to appear improved soon after ECT but severity of symptoms return in the days following. Will continue 3x/week ECT. Scheduled for MRI w/wo contrast at 4pm today due to daughter reporting concerns about patient appearing confused and with onset soon after extensive dental work and prednisone therapy as well as strong family h/o cancer. Low likelihood of +ve findings as  reports presentation similar to prior episode.  Overall improved. Tolerating seroquel well. Will continue titrating to effect.

## 2023-06-21 NOTE — BH INPATIENT PSYCHIATRY PROGRESS NOTE - NSBHASSESSSUMMFT_PSY_ALL_CORE
81 year old , retired female, domiciled at [facility], PMH of DM2, HLD, HTN, PPHx of late onset Bipolar disorder, 2 prior psych adm last in 2022 at [facility], who presented to ED with worsening paranoia, anxiety, FTT (weight loss of 20lb since march), and confusion. Psychiatry was consulted for psychosis/AMS. Was started on cymbalta as this was helpful for patient in the past. Was continued on home medications klonopin, depakote and seroquel. Transferred to [facility] for ongoing stabilization. Daughter denies patient has dementia. Also states patient responds well to BZD but not well to antipsychotics, which points to possibility of catatonia hx. Patient presented to the unit anxious with psychomotor agitation and unable to engage in an interview. Her presentation is concerning for generalized anxiety disorder vs worsening dementia vs pseudodementia vs delirium. Daughter finds patient is much better at this point. She is less disorganized with improved paranoia regarding food. Will need ongoing monitoring.     Plan:  1. Legals: 9.27  2. Safety: constant observation for disorganized behavior.  Ativan 0.5 mg q12h prn for anxiety/agitation.  3. Psychiatric: Continue current medications   	         Increase Seroquel to 50mg daily and 75mg QHS  	         Depakote 250 mg daily and 500 mg qHS. VPA level of 48.70 on 5/30 - will consider tapering off and optimizing SGA in the near future  	        Continue Effexor 75mg daily and 37.5mg QHS (cymbalta discontinued due to dysphagia concerns and needing to crush medications)  	        DC Ativan 0.5mg BID and monitor behavior on just Seroquel  	         Seroquel 12.5 mg q6h prn for anxiety  4. Group, milieu, individual therapy as appropriate.  5. Medical: HTN, HLD, DM2. Phlebitis, possibly dysphagia, joint pain  	Keflex 500 mg qid x 5 days - complete now  	amlodipine 2.5 mg daily. decrease losartan to 75 mg daily due to orthostatic htn. aspirin 81 mg daily. atorvastatin 10 mg qHS.  	Lipid profile wnl other than slightly low HDL  	Metformin 500mg po bid              Naproxen 500mg daily   	b12 supplementation for deficiency in the past  	Ambulate with walker  6. Dispo: return to [facility] when stable

## 2023-06-22 PROCEDURE — 99232 SBSQ HOSP IP/OBS MODERATE 35: CPT | Mod: GC

## 2023-06-22 RX ADMIN — PANTOPRAZOLE SODIUM 40 MILLIGRAM(S): 20 TABLET, DELAYED RELEASE ORAL at 08:18

## 2023-06-22 RX ADMIN — DIVALPROEX SODIUM 500 MILLIGRAM(S): 500 TABLET, DELAYED RELEASE ORAL at 20:57

## 2023-06-22 RX ADMIN — Medication 1 MILLIGRAM(S): at 08:54

## 2023-06-22 RX ADMIN — Medication 1000 UNIT(S): at 08:54

## 2023-06-22 RX ADMIN — Medication 500 MILLIGRAM(S): at 08:53

## 2023-06-22 RX ADMIN — Medication 1 TABLET(S): at 08:54

## 2023-06-22 RX ADMIN — LOSARTAN POTASSIUM 75 MILLIGRAM(S): 100 TABLET, FILM COATED ORAL at 08:53

## 2023-06-22 RX ADMIN — Medication 81 MILLIGRAM(S): at 08:54

## 2023-06-22 RX ADMIN — Medication 75 MILLIGRAM(S): at 08:55

## 2023-06-22 RX ADMIN — PREGABALIN 1000 MICROGRAM(S): 225 CAPSULE ORAL at 08:54

## 2023-06-22 RX ADMIN — ATORVASTATIN CALCIUM 10 MILLIGRAM(S): 80 TABLET, FILM COATED ORAL at 20:57

## 2023-06-22 RX ADMIN — DIVALPROEX SODIUM 250 MILLIGRAM(S): 500 TABLET, DELAYED RELEASE ORAL at 08:54

## 2023-06-22 RX ADMIN — Medication 500 MILLIGRAM(S): at 09:53

## 2023-06-22 RX ADMIN — Medication 37.5 MILLIGRAM(S): at 16:14

## 2023-06-22 RX ADMIN — Medication 1 TABLET(S): at 20:58

## 2023-06-22 RX ADMIN — QUETIAPINE FUMARATE 50 MILLIGRAM(S): 200 TABLET, FILM COATED ORAL at 08:53

## 2023-06-22 RX ADMIN — QUETIAPINE FUMARATE 75 MILLIGRAM(S): 200 TABLET, FILM COATED ORAL at 20:58

## 2023-06-22 RX ADMIN — Medication 2 SPRAY(S): at 08:55

## 2023-06-22 RX ADMIN — Medication 3 MILLIGRAM(S): at 21:03

## 2023-06-22 RX ADMIN — SENNA PLUS 2 TABLET(S): 8.6 TABLET ORAL at 20:58

## 2023-06-22 RX ADMIN — AMLODIPINE BESYLATE 2.5 MILLIGRAM(S): 2.5 TABLET ORAL at 08:54

## 2023-06-22 NOTE — BH INPATIENT PSYCHIATRY PROGRESS NOTE - NSBHFUPINTERVALHXFT_PSY_A_CORE
Chart reviewed and case discussed with treatment team. Staff reports that patient is compliant with medications, vitals stable, can be disorganized at times but less so. Patient can be intrusive at times but much more redirectable and less anxious. Per PCA patient is eating well and no longer expresses paranoia regarding her food. Patient reports she did not sleep too well overnight so she would like to rest more today. She reports eating her food, didn't eat the [place] toast as she thought it was too dry. Otherwise had no issues with the food. She is visible on the unit, ambulating well with her walker. Reports good appetite and fair sleep. Denies SI/HI. No AH/VH/No paranoia elicited.

## 2023-06-22 NOTE — BH INPATIENT PSYCHIATRY PROGRESS NOTE - NSBHATTESTCOMMENTATTENDFT_PSY_A_CORE
Improving, able to have a more logical conversation. Still has some irrational thought and behavior. She was very anxious about being discharged prematurely. she was reassured this would not occur. She is tolerating medications well. Will attempt to reduce CO tomorrow to 3pm to 11pm.

## 2023-06-22 NOTE — BH TREATMENT PLAN - NSTXDISORGINTERPR_PSY_ALL_CORE
Patient has demonstrated some progress towards psychiatric rehabilitation goal of demonstrating related thought for 5 minutes in conversation. Psychiatric rehabilitation recommends patient continue to attend groups, engage in individual sessions, and participate in activities in the milieu to support patient in better organizing thoughts.
Psychiatric rehabilitation recommends patient attends 2-3 groups per day, engages in individual sessions and participates in activities on the milieu in order to explore strategies to organize thoughts.

## 2023-06-22 NOTE — BH INPATIENT PSYCHIATRY PROGRESS NOTE - CURRENT MEDICATION
MEDICATIONS  (STANDING):  amLODIPine   Tablet 2.5 milliGRAM(s) Oral daily  aspirin  chewable 81 milliGRAM(s) Oral daily  atorvastatin 10 milliGRAM(s) Oral at bedtime  calcium carbonate   1250 mG (OsCal) 1 Tablet(s) Oral two times a day  cholecalciferol 1000 Unit(s) Oral daily  cyanocobalamin 1000 MICROGram(s) Oral daily  divalproex Sprinkle 250 milliGRAM(s) Oral daily  divalproex Sprinkle 500 milliGRAM(s) Oral at bedtime  folic acid 1 milliGRAM(s) Oral daily  glucagon  Injectable 1 milliGRAM(s) IntraMuscular once  losartan 75 milliGRAM(s) Oral daily  naproxen 500 milliGRAM(s) Oral daily  pantoprazole   Suspension 40 milliGRAM(s) Oral before breakfast  QUEtiapine 75 milliGRAM(s) Oral at bedtime  QUEtiapine 50 milliGRAM(s) Oral daily  senna 2 Tablet(s) Oral at bedtime  sodium chloride 0.65% Nasal 2 Spray(s) Both Nostrils two times a day  venlafaxine 37.5 milliGRAM(s) Oral with dinner  venlafaxine 75 milliGRAM(s) Oral with breakfast    MEDICATIONS  (PRN):  acetaminophen     Tablet .. 650 milliGRAM(s) Oral every 6 hours PRN Temp greater or equal to 38C (100.4F), Mild Pain (1 - 3), Moderate Pain (4 - 6)  dextrose Oral Gel 15 Gram(s) Oral once PRN Blood Glucose LESS THAN 70 milliGRAM(s)/deciliter  LORazepam     Tablet 0.5 milliGRAM(s) Oral every 12 hours PRN anxiety/agitation  melatonin. 3 milliGRAM(s) Oral at bedtime PRN Insomnia  QUEtiapine 12.5 milliGRAM(s) Oral every 6 hours PRN anxiety

## 2023-06-22 NOTE — BH INPATIENT PSYCHIATRY PROGRESS NOTE - NSBHCHARTREVIEWVS_PSY_A_CORE FT
Vital Signs Last 24 Hrs  T(C): 36.6 (06-22-23 @ 07:51), Max: 36.7 (06-21-23 @ 15:50)  T(F): 97.9 (06-22-23 @ 07:51), Max: 98.1 (06-21-23 @ 15:50)  HR: --  BP: --  BP(mean): --  RR: --  SpO2: 96% (06-22-23 @ 07:51) (96% - 98%)    Orthostatic VS  06-22-23 @ 07:51  Lying BP: 145/60 HR: 64  Sitting BP: --/-- HR: --  Standing BP: --/-- HR: --  Site: --  Mode: --  Orthostatic VS  06-21-23 @ 08:16  Lying BP: --/-- HR: --  Sitting BP: 135/60 HR: 66  Standing BP: 120/60 HR: 78  Site: --  Mode: --   Vital Signs Last 24 Hrs  T(C): 36.7 (06-22-23 @ 15:40), Max: 36.7 (06-22-23 @ 15:40)  T(F): 98 (06-22-23 @ 15:40), Max: 98 (06-22-23 @ 15:40)  HR: --  BP: --  BP(mean): --  RR: --  SpO2: 99% (06-22-23 @ 15:40) (96% - 99%)    Orthostatic VS  06-22-23 @ 07:51  Lying BP: 145/60 HR: 64  Sitting BP: --/-- HR: --  Standing BP: --/-- HR: --  Site: --  Mode: --  Orthostatic VS  06-21-23 @ 08:16  Lying BP: --/-- HR: --  Sitting BP: 135/60 HR: 66  Standing BP: 120/60 HR: 78  Site: --  Mode: --

## 2023-06-22 NOTE — BH INPATIENT PSYCHIATRY PROGRESS NOTE - NSBHASSESSSUMMFT_PSY_ALL_CORE
81 year old , retired female, domiciled at [facility], PMH of DM2, HLD, HTN, PPHx of late onset Bipolar disorder, 2 prior psych adm last in 2022 at [facility], who presented to ED with worsening paranoia, anxiety, FTT (weight loss of 20lb since march), and confusion. Psychiatry was consulted for psychosis/AMS. Was started on cymbalta as this was helpful for patient in the past. Was continued on home medications klonopin, depakote and seroquel. Transferred to [facility] for ongoing stabilization. Daughter denies patient has dementia. Also states patient responds well to BZD but not well to antipsychotics, which points to possibility of catatonia hx. Patient presented to the unit anxious with psychomotor agitation and unable to engage in an interview. Her presentation is concerning for generalized anxiety disorder vs worsening dementia vs pseudodementia vs delirium. Daughter finds patient is much better at this point. She is less disorganized with improved paranoia regarding food. Will need ongoing monitoring.     Plan:  1. Legals: 9.27  2. Safety: constant observation for disorganized behavior.  Ativan 0.5 mg q12h prn for anxiety/agitation.  3. Psychiatric: Continue current medications   	         C/w Seroquel 50mg daily and 75mg QHS  	         C/w Depakote 250 mg daily and 500 mg qHS. VPA level of 48.70 on 5/30 - will consider tapering off and optimizing SGA in the near future  	         C/w Effexor 75mg daily and 37.5mg QHS (cymbalta discontinued due to dysphagia concerns and needing to crush medications)  	        DC Ativan 0.5mg BID and monitor behavior on just Seroquel  	         Seroquel 12.5 mg q6h prn for anxiety  4. Group, milieu, individual therapy as appropriate.  5. Medical: HTN, HLD, DM2. Phlebitis, possibly dysphagia, joint pain  	Keflex 500 mg qid x 5 days - complete now  	amlodipine 2.5 mg daily. decrease losartan to 75 mg daily due to orthostatic htn. aspirin 81 mg daily. atorvastatin 10 mg qHS.  	Lipid profile wnl other than slightly low HDL  	Metformin 500mg po bid              Naproxen 500mg daily   	b12 supplementation for deficiency in the past  	Ambulate with walker  6. Dispo: return to [facility] when stable

## 2023-06-22 NOTE — BH INPATIENT PSYCHIATRY PROGRESS NOTE - MSE UNSTRUCTURED FT
Appearance: 83 yo F, elderly  F, limited grooming, thin, no abnormal involuntary movements noted.   Behavior: intrusive, appropriate eye contact ; increased psychomotor activity remains  Speech: normal rate, volume, fast, repetitious  Mood: "fine"   Affect: congruent to mood ; constricted  Thought process:  concrete   Thought content: less paranoid, no reports of SI/HI.  Perceptual disturbances: no appearance of internal preoccupation, no AH/VH elicited.   Cognition: loosely oriented to time place, poorly oriented to situation  Abstraction: limited  Insight: limited  Judgement: poor

## 2023-06-22 NOTE — BH TREATMENT PLAN - NSTXDISORGINTERRN_PSY_ALL_CORE
Assessed the thought process.Pt ismore alert and orinted x family member.
Reality orietation and redirect patient as needed. Encourage patient to participate in diversional activities.

## 2023-06-23 LAB
ALBUMIN SERPL ELPH-MCNC: 4.4 G/DL — SIGNIFICANT CHANGE UP (ref 3.3–5)
ALP SERPL-CCNC: 21 U/L — LOW (ref 40–120)
ALT FLD-CCNC: 9 U/L — SIGNIFICANT CHANGE UP (ref 4–33)
AMMONIA BLD-MCNC: 14 UMOL/L — SIGNIFICANT CHANGE UP (ref 11–55)
ANION GAP SERPL CALC-SCNC: 11 MMOL/L — SIGNIFICANT CHANGE UP (ref 7–14)
AST SERPL-CCNC: 18 U/L — SIGNIFICANT CHANGE UP (ref 4–32)
BASOPHILS # BLD AUTO: 0.02 K/UL — SIGNIFICANT CHANGE UP (ref 0–0.2)
BASOPHILS NFR BLD AUTO: 0.3 % — SIGNIFICANT CHANGE UP (ref 0–2)
BILIRUB SERPL-MCNC: 0.3 MG/DL — SIGNIFICANT CHANGE UP (ref 0.2–1.2)
BUN SERPL-MCNC: 11 MG/DL — SIGNIFICANT CHANGE UP (ref 7–23)
CALCIUM SERPL-MCNC: 10.5 MG/DL — SIGNIFICANT CHANGE UP (ref 8.4–10.5)
CHLORIDE SERPL-SCNC: 98 MMOL/L — SIGNIFICANT CHANGE UP (ref 98–107)
CO2 SERPL-SCNC: 26 MMOL/L — SIGNIFICANT CHANGE UP (ref 22–31)
CREAT SERPL-MCNC: 0.65 MG/DL — SIGNIFICANT CHANGE UP (ref 0.5–1.3)
EGFR: 88 ML/MIN/1.73M2 — SIGNIFICANT CHANGE UP
EOSINOPHIL # BLD AUTO: 0.07 K/UL — SIGNIFICANT CHANGE UP (ref 0–0.5)
EOSINOPHIL NFR BLD AUTO: 1.1 % — SIGNIFICANT CHANGE UP (ref 0–6)
GLUCOSE SERPL-MCNC: 78 MG/DL — SIGNIFICANT CHANGE UP (ref 70–99)
HCT VFR BLD CALC: 39 % — SIGNIFICANT CHANGE UP (ref 34.5–45)
HGB BLD-MCNC: 12.5 G/DL — SIGNIFICANT CHANGE UP (ref 11.5–15.5)
IANC: 3.93 K/UL — SIGNIFICANT CHANGE UP (ref 1.8–7.4)
IMM GRANULOCYTES NFR BLD AUTO: 0.5 % — SIGNIFICANT CHANGE UP (ref 0–0.9)
LYMPHOCYTES # BLD AUTO: 2.14 K/UL — SIGNIFICANT CHANGE UP (ref 1–3.3)
LYMPHOCYTES # BLD AUTO: 32.2 % — SIGNIFICANT CHANGE UP (ref 13–44)
MAGNESIUM SERPL-MCNC: 1.9 MG/DL — SIGNIFICANT CHANGE UP (ref 1.6–2.6)
MCHC RBC-ENTMCNC: 25.9 PG — LOW (ref 27–34)
MCHC RBC-ENTMCNC: 32.1 GM/DL — SIGNIFICANT CHANGE UP (ref 32–36)
MCV RBC AUTO: 80.7 FL — SIGNIFICANT CHANGE UP (ref 80–100)
MONOCYTES # BLD AUTO: 0.46 K/UL — SIGNIFICANT CHANGE UP (ref 0–0.9)
MONOCYTES NFR BLD AUTO: 6.9 % — SIGNIFICANT CHANGE UP (ref 2–14)
NEUTROPHILS # BLD AUTO: 3.93 K/UL — SIGNIFICANT CHANGE UP (ref 1.8–7.4)
NEUTROPHILS NFR BLD AUTO: 59 % — SIGNIFICANT CHANGE UP (ref 43–77)
NRBC # BLD: 0 /100 WBCS — SIGNIFICANT CHANGE UP (ref 0–0)
NRBC # FLD: 0 K/UL — SIGNIFICANT CHANGE UP (ref 0–0)
PHOSPHATE SERPL-MCNC: 4.3 MG/DL — SIGNIFICANT CHANGE UP (ref 2.5–4.5)
PLATELET # BLD AUTO: 203 K/UL — SIGNIFICANT CHANGE UP (ref 150–400)
POTASSIUM SERPL-MCNC: 4.6 MMOL/L — SIGNIFICANT CHANGE UP (ref 3.5–5.3)
POTASSIUM SERPL-SCNC: 4.6 MMOL/L — SIGNIFICANT CHANGE UP (ref 3.5–5.3)
PROT SERPL-MCNC: 7.3 G/DL — SIGNIFICANT CHANGE UP (ref 6–8.3)
RBC # BLD: 4.83 M/UL — SIGNIFICANT CHANGE UP (ref 3.8–5.2)
RBC # FLD: 14.6 % — HIGH (ref 10.3–14.5)
SODIUM SERPL-SCNC: 135 MMOL/L — SIGNIFICANT CHANGE UP (ref 135–145)
WBC # BLD: 6.65 K/UL — SIGNIFICANT CHANGE UP (ref 3.8–10.5)
WBC # FLD AUTO: 6.65 K/UL — SIGNIFICANT CHANGE UP (ref 3.8–10.5)

## 2023-06-23 PROCEDURE — 99214 OFFICE O/P EST MOD 30 MIN: CPT

## 2023-06-23 RX ADMIN — Medication 0.5 MILLIGRAM(S): at 21:03

## 2023-06-23 RX ADMIN — Medication 1 TABLET(S): at 19:49

## 2023-06-23 RX ADMIN — Medication 0.5 MILLIGRAM(S): at 09:20

## 2023-06-23 RX ADMIN — QUETIAPINE FUMARATE 50 MILLIGRAM(S): 200 TABLET, FILM COATED ORAL at 08:05

## 2023-06-23 RX ADMIN — QUETIAPINE FUMARATE 12.5 MILLIGRAM(S): 200 TABLET, FILM COATED ORAL at 17:55

## 2023-06-23 RX ADMIN — ATORVASTATIN CALCIUM 10 MILLIGRAM(S): 80 TABLET, FILM COATED ORAL at 19:49

## 2023-06-23 RX ADMIN — Medication 1000 UNIT(S): at 08:04

## 2023-06-23 RX ADMIN — Medication 1 TABLET(S): at 08:04

## 2023-06-23 RX ADMIN — Medication 1 MILLIGRAM(S): at 08:05

## 2023-06-23 RX ADMIN — Medication 500 MILLIGRAM(S): at 08:04

## 2023-06-23 RX ADMIN — LOSARTAN POTASSIUM 75 MILLIGRAM(S): 100 TABLET, FILM COATED ORAL at 08:04

## 2023-06-23 RX ADMIN — Medication 81 MILLIGRAM(S): at 08:04

## 2023-06-23 RX ADMIN — PANTOPRAZOLE SODIUM 40 MILLIGRAM(S): 20 TABLET, DELAYED RELEASE ORAL at 08:03

## 2023-06-23 RX ADMIN — Medication 37.5 MILLIGRAM(S): at 17:10

## 2023-06-23 RX ADMIN — QUETIAPINE FUMARATE 75 MILLIGRAM(S): 200 TABLET, FILM COATED ORAL at 19:49

## 2023-06-23 RX ADMIN — Medication 75 MILLIGRAM(S): at 08:05

## 2023-06-23 RX ADMIN — Medication 1 MILLIGRAM(S): at 09:49

## 2023-06-23 RX ADMIN — DIVALPROEX SODIUM 250 MILLIGRAM(S): 500 TABLET, DELAYED RELEASE ORAL at 08:05

## 2023-06-23 RX ADMIN — DIVALPROEX SODIUM 500 MILLIGRAM(S): 500 TABLET, DELAYED RELEASE ORAL at 19:50

## 2023-06-23 RX ADMIN — AMLODIPINE BESYLATE 2.5 MILLIGRAM(S): 2.5 TABLET ORAL at 08:05

## 2023-06-23 RX ADMIN — SENNA PLUS 2 TABLET(S): 8.6 TABLET ORAL at 19:49

## 2023-06-23 RX ADMIN — PREGABALIN 1000 MICROGRAM(S): 225 CAPSULE ORAL at 08:05

## 2023-06-23 NOTE — BH INPATIENT PSYCHIATRY PROGRESS NOTE - NSBHASSESSSUMMFT_PSY_ALL_CORE
81 year old , retired female, domiciled at [facility], [facility] of DM2, HLD, HTN, PPHx of late onset Bipolar disorder, 2 prior psych adm last in 2022 at [facility], who presented to ED with worsening paranoia, anxiety, FTT (weight loss of 20lb since march), and confusion. Psychiatry was consulted for psychosis/AMS. Was started on cymbalta as this was helpful for patient in the past. Was continued on home medications klonopin, depakote and seroquel. Transferred to [facility] for ongoing stabilization. Daughter denies patient has dementia. Also states patient responds well to BZD but not well to antipsychotics, which points to possibility of catatonia hx. Patient presented to the unit anxious with psychomotor agitation and unable to engage in an interview. Her presentation is concerning for generalized anxiety disorder vs worsening dementia vs pseudodementia vs delirium. Has had some improvement though with period of agitation this morning.     Plan:  1. Legals: 9.27  2. Safety: constant observation for disorganized behavior 7am-11pm.  Ativan 0.5 mg q12h prn for anxiety/agitation.  3. Psychiatric: Continue current medications   	         Continue Seroquel 50mg daily and 75mg QHS - schedule dose increase to 62.5mg po daily and 75mg po qhs  	         C/w Depakote 250 mg daily and 500 mg qHS. VPA level of 48.70 on 5/30 - will consider tapering off and optimizing SGA in the near future  	         C/w Effexor 75mg daily and 37.5mg QHS (cymbalta discontinued due to dysphagia concerns and needing to crush medications)  	        	        4. Group, milieu, individual therapy as appropriate.  5. Medical: HTN, HLD, DM2. Phlebitis, possibly dysphagia, joint pain  	Keflex 500 mg qid x 5 days - complete now  	amlodipine 2.5 mg daily. decrease losartan to 75 mg daily due to orthostatic htn. aspirin 81 mg daily. atorvastatin 10 mg qHS.  	Lipid profile wnl other than slightly low HDL  	Metformin 500mg po bid              Naproxen 500mg daily   	b12 supplementation for deficiency in the past  	Ambulate with walker  6. Work up: UA and culture given increased bout of agitation this morning  7. Dispo: return to [facility] when stable

## 2023-06-23 NOTE — BH INPATIENT PSYCHIATRY PROGRESS NOTE - NSBHFUPINTERVALHXFT_PSY_A_CORE
Chart reviewed and case discussed with treatment team. Staff report patient had no adverse events overnight. This morning she was quite agitated, trying to open all the doors and walking into other patient's rooms, including those of male patients. Refusing PO medications to reduce agitation. She was given ativan 1mg IM today and became sedated shortly later. Patient has anxious/paranoid delusions that she'll be discharged prematurely.

## 2023-06-23 NOTE — BH INPATIENT PSYCHIATRY PROGRESS NOTE - MSE UNSTRUCTURED FT
Appearance: fair hygiene and grooming, thin, no abnormal involuntary movements noted, ambulates with walker  Behavior: intrusive, increased psychomotor activity continues  Speech: normal rate, somewhat loud; well articulated  Mood: not providing relevant response   Affect: anxious, irritable, reactive, intense  Thought process:  concrete   Thought content: somewhat paranoid, no reports of SI/HI.  Perceptual disturbances: no appearance of internal preoccupation, no AH/VH elicited.   Cognition: loosely oriented to time place, poorly oriented to situation  Abstraction: limited  Insight: limited  Judgement: poor

## 2023-06-23 NOTE — BH INPATIENT PSYCHIATRY PROGRESS NOTE - CURRENT MEDICATION
MEDICATIONS  (STANDING):  amLODIPine   Tablet 2.5 milliGRAM(s) Oral daily  aspirin  chewable 81 milliGRAM(s) Oral daily  atorvastatin 10 milliGRAM(s) Oral at bedtime  calcium carbonate   1250 mG (OsCal) 1 Tablet(s) Oral two times a day  cholecalciferol 1000 Unit(s) Oral daily  cyanocobalamin 1000 MICROGram(s) Oral daily  divalproex Sprinkle 250 milliGRAM(s) Oral daily  divalproex Sprinkle 500 milliGRAM(s) Oral at bedtime  folic acid 1 milliGRAM(s) Oral daily  glucagon  Injectable 1 milliGRAM(s) IntraMuscular once  losartan 75 milliGRAM(s) Oral daily  naproxen 500 milliGRAM(s) Oral daily  pantoprazole   Suspension 40 milliGRAM(s) Oral before breakfast  QUEtiapine 75 milliGRAM(s) Oral at bedtime  QUEtiapine 50 milliGRAM(s) Oral daily  senna 2 Tablet(s) Oral at bedtime  sodium chloride 0.65% Nasal 2 Spray(s) Both Nostrils two times a day  venlafaxine 37.5 milliGRAM(s) Oral with dinner  venlafaxine 75 milliGRAM(s) Oral with breakfast    MEDICATIONS  (PRN):  acetaminophen     Tablet .. 650 milliGRAM(s) Oral every 6 hours PRN Temp greater or equal to 38C (100.4F), Mild Pain (1 - 3), Moderate Pain (4 - 6)  dextrose Oral Gel 15 Gram(s) Oral once PRN Blood Glucose LESS THAN 70 milliGRAM(s)/deciliter  LORazepam     Tablet 0.5 milliGRAM(s) Oral every 12 hours PRN anxiety/agitation  LORazepam   Injectable 1 milliGRAM(s) IntraMuscular once PRN severe agitation  melatonin. 3 milliGRAM(s) Oral at bedtime PRN Insomnia  QUEtiapine 12.5 milliGRAM(s) Oral every 6 hours PRN anxiety

## 2023-06-23 NOTE — BH INPATIENT PSYCHIATRY PROGRESS NOTE - NSBHCHARTREVIEWVS_PSY_A_CORE FT
Vital Signs Last 24 Hrs  T(C): --  T(F): --  HR: --  BP: --  BP(mean): --  RR: --  SpO2: --    Orthostatic VS  06-22-23 @ 07:51  Lying BP: 145/60 HR: 64  Sitting BP: --/-- HR: --  Standing BP: --/-- HR: --  Site: --  Mode: --

## 2023-06-24 PROCEDURE — 99231 SBSQ HOSP IP/OBS SF/LOW 25: CPT

## 2023-06-24 RX ORDER — QUETIAPINE FUMARATE 200 MG/1
62.5 TABLET, FILM COATED ORAL DAILY
Refills: 0 | Status: DISCONTINUED | OUTPATIENT
Start: 2023-06-26 | End: 2023-06-28

## 2023-06-24 RX ORDER — QUETIAPINE FUMARATE 200 MG/1
50 TABLET, FILM COATED ORAL DAILY
Refills: 0 | Status: COMPLETED | OUTPATIENT
Start: 2023-06-24 | End: 2023-06-25

## 2023-06-24 RX ADMIN — Medication 75 MILLIGRAM(S): at 09:01

## 2023-06-24 RX ADMIN — Medication 2 SPRAY(S): at 21:18

## 2023-06-24 RX ADMIN — Medication 0.5 MILLIGRAM(S): at 15:59

## 2023-06-24 RX ADMIN — DIVALPROEX SODIUM 250 MILLIGRAM(S): 500 TABLET, DELAYED RELEASE ORAL at 09:00

## 2023-06-24 RX ADMIN — PANTOPRAZOLE SODIUM 40 MILLIGRAM(S): 20 TABLET, DELAYED RELEASE ORAL at 08:11

## 2023-06-24 RX ADMIN — Medication 1 TABLET(S): at 21:12

## 2023-06-24 RX ADMIN — SENNA PLUS 2 TABLET(S): 8.6 TABLET ORAL at 21:12

## 2023-06-24 RX ADMIN — DIVALPROEX SODIUM 500 MILLIGRAM(S): 500 TABLET, DELAYED RELEASE ORAL at 21:11

## 2023-06-24 RX ADMIN — QUETIAPINE FUMARATE 12.5 MILLIGRAM(S): 200 TABLET, FILM COATED ORAL at 10:10

## 2023-06-24 RX ADMIN — AMLODIPINE BESYLATE 2.5 MILLIGRAM(S): 2.5 TABLET ORAL at 09:00

## 2023-06-24 RX ADMIN — ATORVASTATIN CALCIUM 10 MILLIGRAM(S): 80 TABLET, FILM COATED ORAL at 21:12

## 2023-06-24 RX ADMIN — QUETIAPINE FUMARATE 75 MILLIGRAM(S): 200 TABLET, FILM COATED ORAL at 21:11

## 2023-06-24 RX ADMIN — Medication 500 MILLIGRAM(S): at 10:14

## 2023-06-24 RX ADMIN — Medication 1 MILLIGRAM(S): at 09:01

## 2023-06-24 RX ADMIN — Medication 1000 UNIT(S): at 09:00

## 2023-06-24 RX ADMIN — Medication 2 SPRAY(S): at 09:06

## 2023-06-24 RX ADMIN — Medication 3 MILLIGRAM(S): at 21:12

## 2023-06-24 RX ADMIN — Medication 500 MILLIGRAM(S): at 09:00

## 2023-06-24 RX ADMIN — LOSARTAN POTASSIUM 75 MILLIGRAM(S): 100 TABLET, FILM COATED ORAL at 09:00

## 2023-06-24 RX ADMIN — Medication 1 TABLET(S): at 09:01

## 2023-06-24 RX ADMIN — Medication 81 MILLIGRAM(S): at 09:01

## 2023-06-24 RX ADMIN — Medication 37.5 MILLIGRAM(S): at 17:18

## 2023-06-24 RX ADMIN — QUETIAPINE FUMARATE 50 MILLIGRAM(S): 200 TABLET, FILM COATED ORAL at 09:00

## 2023-06-24 RX ADMIN — PREGABALIN 1000 MICROGRAM(S): 225 CAPSULE ORAL at 09:01

## 2023-06-24 NOTE — BH INPATIENT PSYCHIATRY PROGRESS NOTE - NSBHCHARTREVIEWVS_PSY_A_CORE FT
Vital Signs Last 24 Hrs  T(C): 36.8 (06-24-23 @ 08:02), Max: 36.8 (06-24-23 @ 08:02)  T(F): 98.2 (06-24-23 @ 08:02), Max: 98.2 (06-24-23 @ 08:02)  HR: --  BP: --  BP(mean): --  RR: --  SpO2: 99% (06-24-23 @ 08:02) (99% - 99%)    Orthostatic VS  06-24-23 @ 08:55  Lying BP: --/-- HR: --  Sitting BP: 131/52 HR: 84  Standing BP: 126/58 HR: 96  Site: --  Mode: --  Orthostatic VS  06-24-23 @ 08:02  Lying BP: --/-- HR: --  Sitting BP: 127/62 HR: 81  Standing BP: 100/64 HR: 90  Site: --  Mode: --

## 2023-06-24 NOTE — BH INPATIENT PSYCHIATRY PROGRESS NOTE - CURRENT MEDICATION
MEDICATIONS  (STANDING):  amLODIPine   Tablet 2.5 milliGRAM(s) Oral daily  aspirin  chewable 81 milliGRAM(s) Oral daily  atorvastatin 10 milliGRAM(s) Oral at bedtime  calcium carbonate   1250 mG (OsCal) 1 Tablet(s) Oral two times a day  cholecalciferol 1000 Unit(s) Oral daily  cyanocobalamin 1000 MICROGram(s) Oral daily  divalproex Sprinkle 500 milliGRAM(s) Oral at bedtime  divalproex Sprinkle 250 milliGRAM(s) Oral daily  folic acid 1 milliGRAM(s) Oral daily  glucagon  Injectable 1 milliGRAM(s) IntraMuscular once  losartan 75 milliGRAM(s) Oral daily  naproxen 500 milliGRAM(s) Oral daily  pantoprazole   Suspension 40 milliGRAM(s) Oral before breakfast  QUEtiapine 75 milliGRAM(s) Oral at bedtime  QUEtiapine 50 milliGRAM(s) Oral daily  senna 2 Tablet(s) Oral at bedtime  sodium chloride 0.65% Nasal 2 Spray(s) Both Nostrils two times a day  venlafaxine 37.5 milliGRAM(s) Oral with dinner  venlafaxine 75 milliGRAM(s) Oral with breakfast    MEDICATIONS  (PRN):  acetaminophen     Tablet .. 650 milliGRAM(s) Oral every 6 hours PRN Temp greater or equal to 38C (100.4F), Mild Pain (1 - 3), Moderate Pain (4 - 6)  dextrose Oral Gel 15 Gram(s) Oral once PRN Blood Glucose LESS THAN 70 milliGRAM(s)/deciliter  LORazepam     Tablet 0.5 milliGRAM(s) Oral every 12 hours PRN anxiety/agitation  LORazepam   Injectable 0.5 milliGRAM(s) IntraMuscular once PRN severe agitation  melatonin. 3 milliGRAM(s) Oral at bedtime PRN Insomnia  QUEtiapine 12.5 milliGRAM(s) Oral every 6 hours PRN anxiety

## 2023-06-25 PROCEDURE — 99231 SBSQ HOSP IP/OBS SF/LOW 25: CPT

## 2023-06-25 RX ORDER — POLYETHYLENE GLYCOL 3350 17 G/17G
17 POWDER, FOR SOLUTION ORAL DAILY
Refills: 0 | Status: DISCONTINUED | OUTPATIENT
Start: 2023-06-25 | End: 2023-07-03

## 2023-06-25 RX ADMIN — PANTOPRAZOLE SODIUM 40 MILLIGRAM(S): 20 TABLET, DELAYED RELEASE ORAL at 08:03

## 2023-06-25 RX ADMIN — Medication 500 MILLIGRAM(S): at 09:30

## 2023-06-25 RX ADMIN — Medication 1 TABLET(S): at 08:37

## 2023-06-25 RX ADMIN — Medication 0.5 MILLIGRAM(S): at 21:34

## 2023-06-25 RX ADMIN — Medication 650 MILLIGRAM(S): at 14:27

## 2023-06-25 RX ADMIN — QUETIAPINE FUMARATE 75 MILLIGRAM(S): 200 TABLET, FILM COATED ORAL at 20:28

## 2023-06-25 RX ADMIN — Medication 37.5 MILLIGRAM(S): at 16:45

## 2023-06-25 RX ADMIN — ATORVASTATIN CALCIUM 10 MILLIGRAM(S): 80 TABLET, FILM COATED ORAL at 20:27

## 2023-06-25 RX ADMIN — Medication 1 TABLET(S): at 20:29

## 2023-06-25 RX ADMIN — Medication 500 MILLIGRAM(S): at 08:38

## 2023-06-25 RX ADMIN — SENNA PLUS 2 TABLET(S): 8.6 TABLET ORAL at 20:28

## 2023-06-25 RX ADMIN — DIVALPROEX SODIUM 250 MILLIGRAM(S): 500 TABLET, DELAYED RELEASE ORAL at 08:36

## 2023-06-25 RX ADMIN — DIVALPROEX SODIUM 500 MILLIGRAM(S): 500 TABLET, DELAYED RELEASE ORAL at 20:29

## 2023-06-25 RX ADMIN — Medication 3 MILLIGRAM(S): at 20:27

## 2023-06-25 RX ADMIN — QUETIAPINE FUMARATE 12.5 MILLIGRAM(S): 200 TABLET, FILM COATED ORAL at 16:45

## 2023-06-25 RX ADMIN — Medication 650 MILLIGRAM(S): at 22:28

## 2023-06-25 RX ADMIN — Medication 1 MILLIGRAM(S): at 08:37

## 2023-06-25 RX ADMIN — AMLODIPINE BESYLATE 2.5 MILLIGRAM(S): 2.5 TABLET ORAL at 08:37

## 2023-06-25 RX ADMIN — Medication 650 MILLIGRAM(S): at 23:18

## 2023-06-25 RX ADMIN — LOSARTAN POTASSIUM 75 MILLIGRAM(S): 100 TABLET, FILM COATED ORAL at 08:37

## 2023-06-25 RX ADMIN — Medication 2 SPRAY(S): at 08:45

## 2023-06-25 RX ADMIN — PREGABALIN 1000 MICROGRAM(S): 225 CAPSULE ORAL at 08:37

## 2023-06-25 RX ADMIN — Medication 1000 UNIT(S): at 08:37

## 2023-06-25 RX ADMIN — QUETIAPINE FUMARATE 50 MILLIGRAM(S): 200 TABLET, FILM COATED ORAL at 08:37

## 2023-06-25 RX ADMIN — Medication 0.5 MILLIGRAM(S): at 08:01

## 2023-06-25 RX ADMIN — Medication 650 MILLIGRAM(S): at 15:30

## 2023-06-25 RX ADMIN — POLYETHYLENE GLYCOL 3350 17 GRAM(S): 17 POWDER, FOR SOLUTION ORAL at 19:29

## 2023-06-25 RX ADMIN — Medication 75 MILLIGRAM(S): at 08:37

## 2023-06-25 RX ADMIN — Medication 81 MILLIGRAM(S): at 08:37

## 2023-06-25 NOTE — BH INPATIENT PSYCHIATRY PROGRESS NOTE - CURRENT MEDICATION
MEDICATIONS  (STANDING):  amLODIPine   Tablet 2.5 milliGRAM(s) Oral daily  aspirin  chewable 81 milliGRAM(s) Oral daily  atorvastatin 10 milliGRAM(s) Oral at bedtime  calcium carbonate   1250 mG (OsCal) 1 Tablet(s) Oral two times a day  cholecalciferol 1000 Unit(s) Oral daily  cyanocobalamin 1000 MICROGram(s) Oral daily  divalproex Sprinkle 500 milliGRAM(s) Oral at bedtime  divalproex Sprinkle 250 milliGRAM(s) Oral daily  folic acid 1 milliGRAM(s) Oral daily  glucagon  Injectable 1 milliGRAM(s) IntraMuscular once  losartan 75 milliGRAM(s) Oral daily  naproxen 500 milliGRAM(s) Oral daily  pantoprazole   Suspension 40 milliGRAM(s) Oral before breakfast  QUEtiapine 75 milliGRAM(s) Oral at bedtime  senna 2 Tablet(s) Oral at bedtime  sodium chloride 0.65% Nasal 2 Spray(s) Both Nostrils two times a day  venlafaxine 75 milliGRAM(s) Oral with breakfast  venlafaxine 37.5 milliGRAM(s) Oral with dinner    MEDICATIONS  (PRN):  acetaminophen     Tablet .. 650 milliGRAM(s) Oral every 6 hours PRN Temp greater or equal to 38C (100.4F), Mild Pain (1 - 3), Moderate Pain (4 - 6)  dextrose Oral Gel 15 Gram(s) Oral once PRN Blood Glucose LESS THAN 70 milliGRAM(s)/deciliter  LORazepam     Tablet 0.5 milliGRAM(s) Oral every 12 hours PRN anxiety/agitation  LORazepam   Injectable 0.5 milliGRAM(s) IntraMuscular once PRN severe agitation  melatonin. 3 milliGRAM(s) Oral at bedtime PRN Insomnia  QUEtiapine 12.5 milliGRAM(s) Oral every 6 hours PRN anxiety

## 2023-06-25 NOTE — BH INPATIENT PSYCHIATRY PROGRESS NOTE - NSBHCHARTREVIEWVS_PSY_A_CORE FT
Vital Signs Last 24 Hrs  T(C): 36.2 (06-25-23 @ 08:15), Max: 37.1 (06-24-23 @ 16:26)  T(F): 97.1 (06-25-23 @ 08:15), Max: 98.8 (06-24-23 @ 16:26)  HR: --  BP: --  BP(mean): --  RR: --  SpO2: 98% (06-25-23 @ 08:15) (98% - 99%)    Orthostatic VS  06-25-23 @ 08:15  Lying BP: --/-- HR: --  Sitting BP: 130/65 HR: 77  Standing BP: 128/60 HR: 85  Site: --  Mode: --  Orthostatic VS  06-24-23 @ 08:55  Lying BP: --/-- HR: --  Sitting BP: 131/52 HR: 84  Standing BP: 126/58 HR: 96  Site: --  Mode: --  Orthostatic VS  06-24-23 @ 08:02  Lying BP: --/-- HR: --  Sitting BP: 127/62 HR: 81  Standing BP: 100/64 HR: 90  Site: --  Mode: --

## 2023-06-26 PROCEDURE — 99232 SBSQ HOSP IP/OBS MODERATE 35: CPT

## 2023-06-26 RX ORDER — IBUPROFEN 200 MG
400 TABLET ORAL ONCE
Refills: 0 | Status: COMPLETED | OUTPATIENT
Start: 2023-06-26 | End: 2023-06-26

## 2023-06-26 RX ADMIN — ATORVASTATIN CALCIUM 10 MILLIGRAM(S): 80 TABLET, FILM COATED ORAL at 20:56

## 2023-06-26 RX ADMIN — Medication 1 MILLIGRAM(S): at 08:36

## 2023-06-26 RX ADMIN — QUETIAPINE FUMARATE 75 MILLIGRAM(S): 200 TABLET, FILM COATED ORAL at 20:56

## 2023-06-26 RX ADMIN — PANTOPRAZOLE SODIUM 40 MILLIGRAM(S): 20 TABLET, DELAYED RELEASE ORAL at 08:35

## 2023-06-26 RX ADMIN — Medication 500 MILLIGRAM(S): at 08:35

## 2023-06-26 RX ADMIN — QUETIAPINE FUMARATE 62.5 MILLIGRAM(S): 200 TABLET, FILM COATED ORAL at 08:32

## 2023-06-26 RX ADMIN — Medication 400 MILLIGRAM(S): at 21:40

## 2023-06-26 RX ADMIN — Medication 81 MILLIGRAM(S): at 08:36

## 2023-06-26 RX ADMIN — Medication 0.5 MILLIGRAM(S): at 14:01

## 2023-06-26 RX ADMIN — PREGABALIN 1000 MICROGRAM(S): 225 CAPSULE ORAL at 08:35

## 2023-06-26 RX ADMIN — Medication 1 TABLET(S): at 20:56

## 2023-06-26 RX ADMIN — Medication 75 MILLIGRAM(S): at 08:36

## 2023-06-26 RX ADMIN — Medication 37.5 MILLIGRAM(S): at 15:29

## 2023-06-26 RX ADMIN — LOSARTAN POTASSIUM 75 MILLIGRAM(S): 100 TABLET, FILM COATED ORAL at 08:35

## 2023-06-26 RX ADMIN — AMLODIPINE BESYLATE 2.5 MILLIGRAM(S): 2.5 TABLET ORAL at 08:36

## 2023-06-26 RX ADMIN — Medication 1 TABLET(S): at 08:35

## 2023-06-26 RX ADMIN — Medication 1000 UNIT(S): at 08:36

## 2023-06-26 RX ADMIN — DIVALPROEX SODIUM 250 MILLIGRAM(S): 500 TABLET, DELAYED RELEASE ORAL at 08:32

## 2023-06-26 RX ADMIN — Medication 0.5 MILLIGRAM(S): at 23:47

## 2023-06-26 RX ADMIN — Medication 400 MILLIGRAM(S): at 22:15

## 2023-06-26 RX ADMIN — DIVALPROEX SODIUM 500 MILLIGRAM(S): 500 TABLET, DELAYED RELEASE ORAL at 20:56

## 2023-06-26 NOTE — BH INPATIENT PSYCHIATRY PROGRESS NOTE - CURRENT MEDICATION
MEDICATIONS  (STANDING):  amLODIPine   Tablet 2.5 milliGRAM(s) Oral daily  aspirin  chewable 81 milliGRAM(s) Oral daily  atorvastatin 10 milliGRAM(s) Oral at bedtime  calcium carbonate   1250 mG (OsCal) 1 Tablet(s) Oral two times a day  cholecalciferol 1000 Unit(s) Oral daily  cyanocobalamin 1000 MICROGram(s) Oral daily  divalproex Sprinkle 500 milliGRAM(s) Oral at bedtime  divalproex Sprinkle 250 milliGRAM(s) Oral daily  folic acid 1 milliGRAM(s) Oral daily  glucagon  Injectable 1 milliGRAM(s) IntraMuscular once  losartan 75 milliGRAM(s) Oral daily  naproxen 500 milliGRAM(s) Oral daily  pantoprazole   Suspension 40 milliGRAM(s) Oral before breakfast  QUEtiapine 75 milliGRAM(s) Oral at bedtime  QUEtiapine 62.5 milliGRAM(s) Oral daily  senna 2 Tablet(s) Oral at bedtime  sodium chloride 0.65% Nasal 2 Spray(s) Both Nostrils two times a day  venlafaxine 37.5 milliGRAM(s) Oral with dinner  venlafaxine 75 milliGRAM(s) Oral with breakfast    MEDICATIONS  (PRN):  acetaminophen     Tablet .. 650 milliGRAM(s) Oral every 6 hours PRN Temp greater or equal to 38C (100.4F), Mild Pain (1 - 3), Moderate Pain (4 - 6)  dextrose Oral Gel 15 Gram(s) Oral once PRN Blood Glucose LESS THAN 70 milliGRAM(s)/deciliter  LORazepam   Injectable 0.5 milliGRAM(s) IntraMuscular once PRN severe agitation  melatonin. 3 milliGRAM(s) Oral at bedtime PRN Insomnia  polyethylene glycol 3350 17 Gram(s) Oral daily PRN constipation  QUEtiapine 12.5 milliGRAM(s) Oral every 6 hours PRN anxiety

## 2023-06-26 NOTE — BH INPATIENT PSYCHIATRY PROGRESS NOTE - MSE UNSTRUCTURED FT
Appearance: fair hygiene and grooming, thin, no abnormal involuntary movements noted, gait is stable when ambulating with walker  Behavior: regressed, mildly increased psychomotor activity  Speech: normal rate, somewhat loud; well articulated  Mood: "good"   Affect: less anxious, stable, reactive  Thought process:  concrete, illogical  Thought content: mildly paranoid, no reports of SI/HI.  Perceptual disturbances: no appearance of internal preoccupation, no AH/VH elicited.   Cognition: loosely oriented to time place, poorly oriented to situation  Abstraction: limited  Insight: limited  Judgement: poor

## 2023-06-26 NOTE — BH INPATIENT PSYCHIATRY PROGRESS NOTE - NSBHASSESSSUMMFT_PSY_ALL_CORE
81 year old , retired female, domiciled at [facility], [facility] of DM2, HLD, HTN, PPHx of late onset Bipolar disorder, 2 prior psych adm last in 2022 at [facility], who presented to ED with worsening paranoia, anxiety, FTT (weight loss of 20lb since march), and confusion. Psychiatry was consulted for psychosis/AMS. Was started on cymbalta as daughter reported this was helpful in 2016/2017 when she had a similar presentation. Was continued on home medications klonopin, depakote then added seroquel due to paranoia. Was on gabapentin 900mg/day which was taperd off. Transferred to [facility] for ongoing stabilization. Daughter denies patient has dementia. Also states patient responded well to BZD back in 2016/2017 but not well to antipsychotics, which led to team considering possibility of catatonia hx. Review of CL notes suggested she responded well to prns of ativan. As a result, klonopin was switched to ativan 1mg po bid briefly but there was no improvement so she was taken off of it. Meanwhile, seroquel was increased. Cymbalta had to be switched to effexor as patient can only take crushed meds. Has had some improvement overall though limited progress for the past few days. Discussed care with daughter today and states mother's presentation is very similar to decompensation in 2016/2017 and rather unlike that in 2021/2022 admission when she was stabilized on depakote and gabapentin. Daughter is strongly advocating for patient to go back on klonopin at a higher dose of 0.5mg po bid. It was also made clear that patient has JOSESITO and has not been on CPAP since March 2023 due to poor compliance 2/2 agitation. Will taper off effexor to minimize polypharmacy after which klonopin can be added back. Seroquel will have to continue given paranoia.     Plan:  1. Legals: 9.27  2. Safety: constant observation for disorganized behavior 7am-11pm.  Ativan 0.5 mg q12h prn for anxiety/agitation.  3. Psychiatric: Continue current medications   	         Continue Seroquel 62.5mg daily and 75mg QHS   	         C/w Depakote 250 mg daily and 500 mg qHS. VPA level of 48.70 on 5/30 - will consider tapering off and optimizing SGA in the near future  	         Decrease Effexor to 37.5mg po bid (cymbalta discontinued due to dysphagia concerns and needing to crush medications) which aim to taper off quickly (has not been on it long).   		Will plan to restart klonopin at 0.25mg po bid in a few days when effexor has been discontinued. Aim for max of 0.5mg po bid x 3 days before ruling out benefit  	        	        4. Group, milieu, individual therapy as appropriate.  5. Medical: HTN, HLD, DM2. Phlebitis, possibly dysphagia, joint pain  	Keflex 500 mg qid x 5 days - complete now  	amlodipine 2.5 mg daily. decrease losartan to 75 mg daily due to orthostatic htn. aspirin 81 mg daily. atorvastatin 10 mg qHS.  	Lipid profile wnl other than slightly low HDL  	Metformin 500mg po bid              Naproxen 500mg daily   	b12 supplementation for deficiency in the past  	Ambulate with walker  	JOSESITO - will consider transfer to  to allow for CPAP use  6. Work up: none  7. Dispo: return to [facility] when stable

## 2023-06-26 NOTE — BH INPATIENT PSYCHIATRY PROGRESS NOTE - NSBHFUPINTERVALHXFT_PSY_A_CORE
Chart reviewed and case discussed with treatment team. Staff report patient continues to have poor boundaries. No overt psychosis though family continues to report that patient is paranoid. She did need a lot of encouragement to eat her meal today, perseverating about the expiration date on the label. Has been med compliant. Continues to have poor boundaries.     Discussed progress with patient's daughter Marcelina. Daughter states patient appearing worse than when she was on medical floor at [facility]. Writer did share observation that she appears better than when she initially presented to our unit. Regardless, daughter strongly advocating for patient to go back to taking klonopin. She was on 0.5mg/day prior to being hospitalized. She is also supposed to be on CPAP for JOSESITO but has been noncompliant since 3/2023 due to agitation - writer informed her this could cause confusion in a patient with low cognitive reserve.

## 2023-06-26 NOTE — BH INPATIENT PSYCHIATRY PROGRESS NOTE - NSBHCHARTREVIEWVS_PSY_A_CORE FT
Vital Signs Last 24 Hrs  T(C): 36.8 (06-26-23 @ 15:37), Max: 36.8 (06-26-23 @ 15:37)  T(F): 98.2 (06-26-23 @ 15:37), Max: 98.2 (06-26-23 @ 15:37)  HR: --  BP: --  BP(mean): --  RR: 18 (06-26-23 @ 08:02) (18 - 18)  SpO2: 97% (06-26-23 @ 15:37) (96% - 97%)    Orthostatic VS  06-26-23 @ 08:02  Lying BP: --/-- HR: --  Sitting BP: 136/67 HR: 85  Standing BP: 145/63 HR: 85  Site: upper right arm  Mode: electronic  Orthostatic VS  06-25-23 @ 08:15  Lying BP: --/-- HR: --  Sitting BP: 130/65 HR: 77  Standing BP: 128/60 HR: 85  Site: --  Mode: --

## 2023-06-27 DIAGNOSIS — Q39.4 ESOPHAGEAL WEB: ICD-10-CM

## 2023-06-27 DIAGNOSIS — F41.1 GENERALIZED ANXIETY DISORDER: ICD-10-CM

## 2023-06-27 PROCEDURE — 99233 SBSQ HOSP IP/OBS HIGH 50: CPT | Mod: GC

## 2023-06-27 RX ORDER — VENLAFAXINE HCL 75 MG
37.5 CAPSULE, EXT RELEASE 24 HR ORAL DAILY
Refills: 0 | Status: DISCONTINUED | OUTPATIENT
Start: 2023-06-27 | End: 2023-06-28

## 2023-06-27 RX ADMIN — Medication 1 TABLET(S): at 08:04

## 2023-06-27 RX ADMIN — QUETIAPINE FUMARATE 62.5 MILLIGRAM(S): 200 TABLET, FILM COATED ORAL at 08:05

## 2023-06-27 RX ADMIN — Medication 37.5 MILLIGRAM(S): at 18:13

## 2023-06-27 RX ADMIN — QUETIAPINE FUMARATE 75 MILLIGRAM(S): 200 TABLET, FILM COATED ORAL at 20:45

## 2023-06-27 RX ADMIN — SENNA PLUS 2 TABLET(S): 8.6 TABLET ORAL at 20:45

## 2023-06-27 RX ADMIN — Medication 2 SPRAY(S): at 08:05

## 2023-06-27 RX ADMIN — DIVALPROEX SODIUM 250 MILLIGRAM(S): 500 TABLET, DELAYED RELEASE ORAL at 08:05

## 2023-06-27 RX ADMIN — Medication 500 MILLIGRAM(S): at 09:04

## 2023-06-27 RX ADMIN — ATORVASTATIN CALCIUM 10 MILLIGRAM(S): 80 TABLET, FILM COATED ORAL at 20:46

## 2023-06-27 RX ADMIN — DIVALPROEX SODIUM 500 MILLIGRAM(S): 500 TABLET, DELAYED RELEASE ORAL at 20:45

## 2023-06-27 RX ADMIN — LOSARTAN POTASSIUM 75 MILLIGRAM(S): 100 TABLET, FILM COATED ORAL at 08:04

## 2023-06-27 RX ADMIN — Medication 1 MILLIGRAM(S): at 08:03

## 2023-06-27 RX ADMIN — PANTOPRAZOLE SODIUM 40 MILLIGRAM(S): 20 TABLET, DELAYED RELEASE ORAL at 08:03

## 2023-06-27 RX ADMIN — Medication 1 TABLET(S): at 20:45

## 2023-06-27 RX ADMIN — Medication 81 MILLIGRAM(S): at 08:04

## 2023-06-27 RX ADMIN — Medication 37.5 MILLIGRAM(S): at 09:09

## 2023-06-27 RX ADMIN — AMLODIPINE BESYLATE 2.5 MILLIGRAM(S): 2.5 TABLET ORAL at 08:04

## 2023-06-27 RX ADMIN — PREGABALIN 1000 MICROGRAM(S): 225 CAPSULE ORAL at 08:04

## 2023-06-27 RX ADMIN — Medication 500 MILLIGRAM(S): at 08:04

## 2023-06-27 RX ADMIN — Medication 1000 UNIT(S): at 08:04

## 2023-06-27 NOTE — BH INPATIENT PSYCHIATRY PROGRESS NOTE - NSBHASSESSSUMMFT_PSY_ALL_CORE
81 year old , retired female, domiciled at [facility], [facility] of DM2, HLD, HTN, PPHx of late onset Bipolar disorder, 2 prior psych adm last in 2022 at [facility], who presented to ED with worsening paranoia, anxiety, FTT (weight loss of 20lb since march), and confusion. Psychiatry was consulted for psychosis/AMS. Was started on cymbalta as daughter reported this was helpful in 2016/2017 when she had a similar presentation. Was continued on home medications klonopin, depakote then added seroquel due to paranoia. Was on gabapentin 900mg/day which was taperd off. Transferred to [facility] for ongoing stabilization. Daughter denies patient has dementia. Also states patient responded well to BZD back in 2016/2017 but not well to antipsychotics, which led to team considering possibility of catatonia hx. Review of CL notes suggested she responded well to prns of ativan. As a result, klonopin was switched to ativan 1mg po bid briefly but there was no improvement so she was taken off of it. Meanwhile, seroquel was increased. Cymbalta had to be switched to effexor as patient can only take crushed meds. Has had some improvement overall though limited progress for the past few days. Discussed care with daughter today and states mother's presentation is very similar to decompensation in 2016/2017 and rather unlike that in 2021/2022 admission when she was stabilized on depakote and gabapentin. Daughter is strongly advocating for patient to go back on klonopin at a higher dose of 0.5mg po bid. It was also made clear that patient has JOSESITO and has not been on CPAP since March 2023 due to poor compliance 2/2 agitation. Will taper off effexor to minimize polypharmacy after which klonopin can be added back. Seroquel will have to continue given paranoia.     Plan:  1. Legals: 9.27  2. Safety: constant observation for disorganized behavior 7am-11pm.  Ativan 0.5 mg q12h prn for anxiety/agitation.  3. Psychiatric: Continue current medications   	         Continue Seroquel 62.5mg daily and 75mg QHS   	         C/w Depakote 250 mg daily and 500 mg qHS. VPA level of 48.70 on 5/30 - will consider tapering off and optimizing SGA in the near future  	         Decrease Effexor to 37.5mg po bid (cymbalta discontinued due to dysphagia concerns and needing to crush medications) which aim to taper off quickly (has not been on it long).   		Will plan to restart klonopin at 0.25mg po bid in a few days when effexor has been discontinued. Aim for max of 0.5mg po bid x 3 days before ruling out benefit  	        	        4. Group, milieu, individual therapy as appropriate.  5. Medical: HTN, HLD, DM2. Phlebitis, possibly dysphagia, joint pain  	Keflex 500 mg qid x 5 days - complete now  	amlodipine 2.5 mg daily. decrease losartan to 75 mg daily due to orthostatic htn. aspirin 81 mg daily. atorvastatin 10 mg qHS.  	Lipid profile wnl other than slightly low HDL  	Metformin 500mg po bid              Naproxen 500mg daily   	b12 supplementation for deficiency in the past  	Ambulate with walker  	JOSESITO - will consider transfer to  to allow for CPAP use  6. Work up: none  7. Dispo: return to [facility] when stable 81 year old , retired female, domiciled at senior living, [facility] of DM2, HLD, HTN, PPHx of late onset Bipolar disorder, 2 prior psych adm last in 2022 at [facility], who presented to ED with worsening paranoia, anxiety, FTT (weight loss of 20lb since march), and confusion. Psychiatry was consulted for psychosis/AMS. Was started on cymbalta as daughter reported this was helpful in 2016/2017 when she had a similar presentation. Was continued on home medications klonopin, depakote then CL team added seroquel due to paranoia. Was on gabapentin 900mg/day at home which was tapered off by CL team. Transferred to [facility] for ongoing stabilization. Daughter denies patient has dementia. Also states patient responded well to BZD back in 2016/2017 but not well to antipsychotics, which led to team considering possibility of catatonia hx. Review of CL notes suggested she responded well to prns of ativan. As a result, klonopin was switched to ativan 1mg po bid briefly but there was no improvement so she was taken off of it. Meanwhile, seroquel was increased to target psychosis. Cymbalta had to be switched to effexor as patient can only take crushed meds. Has had some improvement overall though limited progress for the past few days. Discussed care with daughter again yesterday and states mother's presentation is very similar to decompensation in 2016/2017 and rather unlike that in 2021/2022 admission when she was stabilized on depakote and gabapentin. Daughter is strongly advocating for patient to go back on klonopin at a higher dose of 0.5mg po bid. It was also made clear that patient has JOSESITO and has not been on CPAP since March 2023 due to poor compliance 2/2 agitation. Will taper off effexor to minimize polypharmacy after which klonopin can be added back. Seroquel will have to continue given paranoia. Will transfer patient to  to allow for CPAP therapy.       Plan:  1. Legals: 9.27  2. Safety: constant observation for disorganized behavior 7am-11pm.  Ativan 0.5 mg q12h prn for anxiety/agitation.  3. Psychiatric: Continue current medications   	         Continue Seroquel 62.5mg daily and 75mg QHS   	         C/w Depakote 250 mg daily and 500 mg qHS. VPA level of 48.70 on 5/30 - will consider tapering off and optimizing SGA in the near future  	         Decreased Effexor to 37.5mg po bid (cymbalta discontinued due to dysphagia concerns and needing to crush medications) which aim to taper off quickly (has not been on it long).   		Will plan to restart klonopin at 0.25mg po bid in a few days when effexor has been discontinued and CPAP initiated.   	        	        4. Group, milieu, individual therapy as appropriate.  5. Medical: HTN, HLD, DM2. Phlebitis, possibly dysphagia, joint pain  	Keflex 500 mg qid x 5 days - complete now  	amlodipine 2.5 mg daily. decrease losartan to 75 mg daily due to orthostatic htn. aspirin 81 mg daily. atorvastatin 10 mg qHS.  	Lipid profile wnl other than slightly low HDL  	Metformin 500mg po bid              Naproxen 500mg daily   	b12 supplementation for deficiency in the past  	Ambulate with walker  	JOSESITO - will consider transfer to  to allow for CPAP use  6. Work up: none  7. Dispo: return to senior living when stable

## 2023-06-27 NOTE — BH INPATIENT PSYCHIATRY PROGRESS NOTE - NSICDXBHPRIMARYDX_PSY_ALL_CORE
Moderate bipolar II disorder, depressed, with anxious distress   F31.81   Bipolar I disorder, most recent episode mixed, severe with psychotic features   F31.64

## 2023-06-27 NOTE — BH INPATIENT PSYCHIATRY PROGRESS NOTE - NSBHFUPINTERVALHXFT_PSY_A_CORE
Chart reviewed and case discussed with treatment team. Staff report patient continues to have poor boundaries and is intrusive. No overt psychosis though family continues to report that patient is paranoid. She has no complaints about the food today, says that she isn't too worried about the expiration dates. She c/o difficulty sleeping due to the mattress being uncomfortable, but is going to try to rest during the day. Has been med compliant. Continues to have poor boundaries.

## 2023-06-27 NOTE — BH INPATIENT PSYCHIATRY PROGRESS NOTE - CURRENT MEDICATION
MEDICATIONS  (STANDING):  amLODIPine   Tablet 2.5 milliGRAM(s) Oral daily  aspirin  chewable 81 milliGRAM(s) Oral daily  atorvastatin 10 milliGRAM(s) Oral at bedtime  calcium carbonate   1250 mG (OsCal) 1 Tablet(s) Oral two times a day  cholecalciferol 1000 Unit(s) Oral daily  cyanocobalamin 1000 MICROGram(s) Oral daily  divalproex Sprinkle 250 milliGRAM(s) Oral daily  divalproex Sprinkle 500 milliGRAM(s) Oral at bedtime  folic acid 1 milliGRAM(s) Oral daily  glucagon  Injectable 1 milliGRAM(s) IntraMuscular once  losartan 75 milliGRAM(s) Oral daily  naproxen 500 milliGRAM(s) Oral daily  pantoprazole   Suspension 40 milliGRAM(s) Oral before breakfast  QUEtiapine 75 milliGRAM(s) Oral at bedtime  QUEtiapine 62.5 milliGRAM(s) Oral daily  senna 2 Tablet(s) Oral at bedtime  sodium chloride 0.65% Nasal 2 Spray(s) Both Nostrils two times a day  venlafaxine 37.5 milliGRAM(s) Oral daily  venlafaxine 37.5 milliGRAM(s) Oral with dinner    MEDICATIONS  (PRN):  acetaminophen     Tablet .. 650 milliGRAM(s) Oral every 6 hours PRN Temp greater or equal to 38C (100.4F), Mild Pain (1 - 3), Moderate Pain (4 - 6)  dextrose Oral Gel 15 Gram(s) Oral once PRN Blood Glucose LESS THAN 70 milliGRAM(s)/deciliter  LORazepam     Tablet 0.5 milliGRAM(s) Oral every 12 hours PRN anxiety/agitation  LORazepam   Injectable 1 milliGRAM(s) IntraMuscular once PRN severe agitation  LORazepam   Injectable 0.5 milliGRAM(s) IntraMuscular once PRN severe agitation  melatonin. 3 milliGRAM(s) Oral at bedtime PRN Insomnia  polyethylene glycol 3350 17 Gram(s) Oral daily PRN constipation  QUEtiapine 12.5 milliGRAM(s) Oral every 6 hours PRN anxiety   MEDICATIONS  (STANDING):  amLODIPine   Tablet 2.5 milliGRAM(s) Oral daily  aspirin  chewable 81 milliGRAM(s) Oral daily  atorvastatin 10 milliGRAM(s) Oral at bedtime  calcium carbonate   1250 mG (OsCal) 1 Tablet(s) Oral two times a day  cholecalciferol 1000 Unit(s) Oral daily  cyanocobalamin 1000 MICROGram(s) Oral daily  divalproex Sprinkle 500 milliGRAM(s) Oral at bedtime  divalproex Sprinkle 250 milliGRAM(s) Oral daily  folic acid 1 milliGRAM(s) Oral daily  glucagon  Injectable 1 milliGRAM(s) IntraMuscular once  losartan 75 milliGRAM(s) Oral daily  naproxen 500 milliGRAM(s) Oral daily  pantoprazole   Suspension 40 milliGRAM(s) Oral before breakfast  QUEtiapine 75 milliGRAM(s) Oral at bedtime  QUEtiapine 62.5 milliGRAM(s) Oral daily  senna 2 Tablet(s) Oral at bedtime  sodium chloride 0.65% Nasal 2 Spray(s) Both Nostrils two times a day  venlafaxine 37.5 milliGRAM(s) Oral daily  venlafaxine 37.5 milliGRAM(s) Oral with dinner    MEDICATIONS  (PRN):  acetaminophen     Tablet .. 650 milliGRAM(s) Oral every 6 hours PRN Temp greater or equal to 38C (100.4F), Mild Pain (1 - 3), Moderate Pain (4 - 6)  dextrose Oral Gel 15 Gram(s) Oral once PRN Blood Glucose LESS THAN 70 milliGRAM(s)/deciliter  LORazepam     Tablet 0.5 milliGRAM(s) Oral every 12 hours PRN anxiety/agitation  LORazepam   Injectable 1 milliGRAM(s) IntraMuscular once PRN severe agitation  LORazepam   Injectable 0.5 milliGRAM(s) IntraMuscular once PRN severe agitation  melatonin. 3 milliGRAM(s) Oral at bedtime PRN Insomnia  polyethylene glycol 3350 17 Gram(s) Oral daily PRN constipation  QUEtiapine 12.5 milliGRAM(s) Oral every 6 hours PRN anxiety

## 2023-06-27 NOTE — BH INPATIENT PSYCHIATRY PROGRESS NOTE - NSBHCHARTREVIEWVS_PSY_A_CORE FT
Vital Signs Last 24 Hrs  T(C): 37.1 (06-27-23 @ 07:16), Max: 37.1 (06-27-23 @ 07:16)  T(F): 98.7 (06-27-23 @ 07:16), Max: 98.7 (06-27-23 @ 07:16)  HR: --  BP: --  BP(mean): --  RR: --  SpO2: 97% (06-26-23 @ 15:37) (97% - 97%)    Orthostatic VS  06-27-23 @ 07:16  Lying BP: --/-- HR: --  Sitting BP: 126/59 HR: 77  Standing BP: 116/75 HR: 97  Site: --  Mode: --  Orthostatic VS  06-26-23 @ 08:02  Lying BP: --/-- HR: --  Sitting BP: 136/67 HR: 85  Standing BP: 145/63 HR: 85  Site: upper right arm  Mode: electronic

## 2023-06-27 NOTE — BH INPATIENT PSYCHIATRY PROGRESS NOTE - NSBHATTESTCOMMENTATTENDFT_PSY_A_CORE
Writer informed patient's daughter who is health care proxy regarding decision to transfer due to needing CPAP. She was in agreement. Case discussed with accepting physician on 2S Dr. Jones.

## 2023-06-27 NOTE — BH INPATIENT PSYCHIATRY PROGRESS NOTE - MSE UNSTRUCTURED FT
Appearance: 82 yo  F, fair hygiene and grooming, thin, no abnormal involuntary movements noted, gait is stable when ambulating with walker  Behavior: regressed, mildly increased psychomotor activity  Speech: normal rate, somewhat loud; well articulated  Mood: "fine"  Affect: less anxious, stable, reactive  Thought process:  concrete, illogical  Thought content: mildly paranoid, no reports of SI/HI.  Perceptual disturbances: no appearance of internal preoccupation, no AH/VH elicited.   Cognition: loosely oriented to time place, poorly oriented to situation  Abstraction: limited  Insight: limited  Judgement: poor

## 2023-06-27 NOTE — BH INPATIENT PSYCHIATRY PROGRESS NOTE - NSICDXBHSECONDARYDX_PSY_ALL_CORE
Type II diabetes mellitus   E11.9  Vitamin B12 deficiency   E53.8  Hyperlipidemia   E78.5  Hypertension   I10  S/P partial hysterectomy   Z90.711  S/P carpal tunnel release   Z98.890   Type II diabetes mellitus   E11.9  Vitamin B12 deficiency   E53.8  Hyperlipidemia   E78.5  Hypertension   I10  S/P partial hysterectomy   Z90.711  S/P carpal tunnel release   Z98.890  Anxiety state   F41.1  Esophageal web   Q39.4

## 2023-06-28 LAB — GLUCOSE BLDC GLUCOMTR-MCNC: 80 MG/DL — SIGNIFICANT CHANGE UP (ref 70–99)

## 2023-06-28 PROCEDURE — 99233 SBSQ HOSP IP/OBS HIGH 50: CPT

## 2023-06-28 RX ORDER — VENLAFAXINE HCL 75 MG
50 CAPSULE, EXT RELEASE 24 HR ORAL
Refills: 0 | Status: DISCONTINUED | OUTPATIENT
Start: 2023-06-28 | End: 2023-07-03

## 2023-06-28 RX ORDER — VENLAFAXINE HCL 75 MG
37.5 CAPSULE, EXT RELEASE 24 HR ORAL
Refills: 0 | Status: DISCONTINUED | OUTPATIENT
Start: 2023-06-28 | End: 2023-06-28

## 2023-06-28 RX ORDER — CLONAZEPAM 1 MG
0.25 TABLET ORAL
Refills: 0 | Status: DISCONTINUED | OUTPATIENT
Start: 2023-06-28 | End: 2023-07-03

## 2023-06-28 RX ORDER — QUETIAPINE FUMARATE 200 MG/1
25 TABLET, FILM COATED ORAL
Refills: 0 | Status: DISCONTINUED | OUTPATIENT
Start: 2023-06-28 | End: 2023-07-03

## 2023-06-28 RX ADMIN — Medication 3 MILLIGRAM(S): at 21:14

## 2023-06-28 RX ADMIN — Medication 1000 UNIT(S): at 09:11

## 2023-06-28 RX ADMIN — Medication 500 MILLIGRAM(S): at 09:41

## 2023-06-28 RX ADMIN — Medication 37.5 MILLIGRAM(S): at 09:56

## 2023-06-28 RX ADMIN — Medication 1 TABLET(S): at 09:10

## 2023-06-28 RX ADMIN — PANTOPRAZOLE SODIUM 40 MILLIGRAM(S): 20 TABLET, DELAYED RELEASE ORAL at 06:33

## 2023-06-28 RX ADMIN — SENNA PLUS 2 TABLET(S): 8.6 TABLET ORAL at 21:14

## 2023-06-28 RX ADMIN — QUETIAPINE FUMARATE 62.5 MILLIGRAM(S): 200 TABLET, FILM COATED ORAL at 09:11

## 2023-06-28 RX ADMIN — DIVALPROEX SODIUM 500 MILLIGRAM(S): 500 TABLET, DELAYED RELEASE ORAL at 21:13

## 2023-06-28 RX ADMIN — QUETIAPINE FUMARATE 12.5 MILLIGRAM(S): 200 TABLET, FILM COATED ORAL at 14:10

## 2023-06-28 RX ADMIN — Medication 0.25 MILLIGRAM(S): at 21:14

## 2023-06-28 RX ADMIN — Medication 1 TABLET(S): at 21:14

## 2023-06-28 RX ADMIN — Medication 1 MILLIGRAM(S): at 09:11

## 2023-06-28 RX ADMIN — DIVALPROEX SODIUM 250 MILLIGRAM(S): 500 TABLET, DELAYED RELEASE ORAL at 09:10

## 2023-06-28 RX ADMIN — Medication 81 MILLIGRAM(S): at 09:11

## 2023-06-28 RX ADMIN — Medication 0.5 MILLIGRAM(S): at 09:55

## 2023-06-28 RX ADMIN — PREGABALIN 1000 MICROGRAM(S): 225 CAPSULE ORAL at 09:57

## 2023-06-28 RX ADMIN — QUETIAPINE FUMARATE 25 MILLIGRAM(S): 200 TABLET, FILM COATED ORAL at 21:14

## 2023-06-28 RX ADMIN — AMLODIPINE BESYLATE 2.5 MILLIGRAM(S): 2.5 TABLET ORAL at 09:11

## 2023-06-28 RX ADMIN — Medication 500 MILLIGRAM(S): at 12:22

## 2023-06-28 RX ADMIN — ATORVASTATIN CALCIUM 10 MILLIGRAM(S): 80 TABLET, FILM COATED ORAL at 21:13

## 2023-06-28 RX ADMIN — LOSARTAN POTASSIUM 75 MILLIGRAM(S): 100 TABLET, FILM COATED ORAL at 09:11

## 2023-06-28 NOTE — BH INPATIENT PSYCHIATRY PROGRESS NOTE - NSBHCHARTREVIEWVS_PSY_A_CORE FT
Vital Signs Last 24 Hrs  T(C): 36.7 (06-28-23 @ 08:59), Max: 36.7 (06-28-23 @ 08:59)  T(F): 98 (06-28-23 @ 08:59), Max: 98 (06-28-23 @ 08:59)  HR: --  BP: --  BP(mean): --  RR: --  SpO2: --    Orthostatic VS  06-28-23 @ 08:59  Lying BP: --/-- HR: --  Sitting BP: 161/57 HR: 80  Standing BP: 144/68 HR: 92  Site: --  Mode: --  Orthostatic VS  06-27-23 @ 07:16  Lying BP: --/-- HR: --  Sitting BP: 126/59 HR: 77  Standing BP: 116/75 HR: 97  Site: --  Mode: --

## 2023-06-28 NOTE — BH INPATIENT PSYCHIATRY PROGRESS NOTE - NSBHASSESSSUMMFT_PSY_ALL_CORE
81 year old , retired female, domiciled at [facility], [facility] of DM2, HLD, HTN, PPHx of late onset Bipolar disorder, 2 prior psych adm last in 2022 at [facility], who presented to ED with worsening paranoia, anxiety, FTT (weight loss of 20lb since march), and confusion. Psychiatry was consulted for psychosis/AMS. Was started on cymbalta as daughter reported this was helpful in 2016/2017 when she had a similar presentation. Was continued on home medications klonopin, depakote then CL team added seroquel due to paranoia. Was on gabapentin 900mg/day at home which was tapered off by CL team. Transferred to [facility] for ongoing stabilization. Daughter denies patient has dementia. Also states patient responded well to BZD back in 2016/2017 but not well to antipsychotics, which led to team considering possibility of catatonia hx. Review of CL notes suggested she responded well to prns of ativan. As a result, klonopin was switched to ativan 1mg po bid briefly but there was no improvement so she was taken off of it. Meanwhile, seroquel was increased to target psychosis. Cymbalta had to be switched to effexor as patient can only take crushed meds. Has had some improvement overall though limited progress for the past few days. Discussed care with daughter again yesterday and states mother's presentation is very similar to decompensation in 2016/2017 and rather unlike that in 2021/2022 admission when she was stabilized on depakote and gabapentin. Daughter is strongly advocating for patient to go back on klonopin at a higher dose of 0.5mg po bid. It was also made clear that patient has JOSESITO and has not been on CPAP since March 2023 due to poor compliance 2/2 agitation. Will taper off effexor to minimize polypharmacy after which klonopin can be added back. Seroquel will have to continue given paranoia. Will transfer patient to  to allow for CPAP therapy.       Plan:  1. Legals: 9.27  2. Safety: constant observation for disorganized behavior 7am-11pm.  Ativan 0.5 mg q12h prn for anxiety/agitation.  3. Psychiatric: Continue current medications   	         Continue Seroquel 62.5mg daily and 75mg QHS   	         C/w Depakote 250 mg daily and 500 mg qHS. VPA level of 48.70 on 5/30 - will consider tapering off and optimizing SGA in the near future  	         Decreased Effexor to 37.5mg po bid (cymbalta discontinued due to dysphagia concerns and needing to crush medications) which aim to taper off quickly (has not been on it long).   		Will plan to restart klonopin at 0.25mg po bid in a few days when effexor has been discontinued and CPAP initiated.   	        	        4. Group, milieu, individual therapy as appropriate.  5. Medical: HTN, HLD, DM2. Phlebitis, possibly dysphagia, joint pain  	Keflex 500 mg qid x 5 days - complete now  	amlodipine 2.5 mg daily. decrease losartan to 75 mg daily due to orthostatic htn. aspirin 81 mg daily. atorvastatin 10 mg qHS.  	Lipid profile wnl other than slightly low HDL  	Metformin 500mg po bid              Naproxen 500mg daily   	b12 supplementation for deficiency in the past    	Ambulate with walker  	JOSESITO - will consider transfer to  to allow for CPAP use  6. Work up: none  7. 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 Plan cont CO as patient disorganized walks backward at risk for falls form disorganziation with close monitoring. Based on unusual hx of unique and dramatic response to klonopin and failure on antipsychotics will try klonopin while titrating Effexor and taper off Seroquel . Start on klonopin 0.25mg bid and titrate to 0.5mg bid. Watch BP as has some orthostasis may get better off Seroquel adjust BP meds if required. Cont CPAP  as she was able to use it last night

## 2023-06-28 NOTE — BH INPATIENT PSYCHIATRY PROGRESS NOTE - NSICDXBHSECONDARYDX_PSY_ALL_CORE
Type II diabetes mellitus   E11.9  Vitamin B12 deficiency   E53.8  Hyperlipidemia   E78.5  Hypertension   I10  S/P partial hysterectomy   Z90.711  S/P carpal tunnel release   Z98.890  Anxiety state   F41.1  Esophageal web   Q39.4

## 2023-06-28 NOTE — BH INPATIENT PSYCHIATRY PROGRESS NOTE - NSBHFUPINTERVALHXFT_PSY_A_CORE
Patient seen for  paranoid, agitation, dysphoria. Chart reviewed and case discussed with treatment team. Staff report patient continues to have poor boundaries and is intrusive. VSS. Sleep appetite fair. Behavior on new unit same as previous unit.
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
## 2023-06-28 NOTE — BH INPATIENT PSYCHIATRY PROGRESS NOTE - NSBHMETABOLIC_PSY_ALL_CORE_FT
BMI: BMI (kg/m2): 23 (06-07-23 @ 18:13)  HbA1c: A1C with Estimated Average Glucose Result: 5.7 % (06-01-23 @ 05:10)    Glucose: POCT Blood Glucose.: 80 mg/dL (06-28-23 @ 07:49)    BP: --  Lipid Panel: Date/Time: 06-08-23 @ 08:30  Cholesterol, Serum: 119  Direct LDL: --  HDL Cholesterol, Serum: 47  Total Cholesterol/HDL Ration Measurement: --  Triglycerides, Serum: 56

## 2023-06-28 NOTE — BH INPATIENT PSYCHIATRY PROGRESS NOTE - CURRENT MEDICATION
MEDICATIONS  (STANDING):  amLODIPine   Tablet 2.5 milliGRAM(s) Oral daily  aspirin  chewable 81 milliGRAM(s) Oral daily  atorvastatin 10 milliGRAM(s) Oral at bedtime  calcium carbonate   1250 mG (OsCal) 1 Tablet(s) Oral two times a day  cholecalciferol 1000 Unit(s) Oral daily  clonazePAM Oral Disintegrating Tablet 0.25 milliGRAM(s) Oral two times a day  cyanocobalamin 1000 MICROGram(s) Oral daily  divalproex Sprinkle 250 milliGRAM(s) Oral daily  divalproex Sprinkle 500 milliGRAM(s) Oral at bedtime  folic acid 1 milliGRAM(s) Oral daily  glucagon  Injectable 1 milliGRAM(s) IntraMuscular once  losartan 75 milliGRAM(s) Oral daily  naproxen 500 milliGRAM(s) Oral daily  pantoprazole   Suspension 40 milliGRAM(s) Oral before breakfast  QUEtiapine 25 milliGRAM(s) Oral two times a day  senna 2 Tablet(s) Oral at bedtime  sodium chloride 0.65% Nasal 2 Spray(s) Both Nostrils two times a day  venlafaxine 50 milliGRAM(s) Oral <User Schedule>    MEDICATIONS  (PRN):  acetaminophen     Tablet .. 650 milliGRAM(s) Oral every 6 hours PRN Temp greater or equal to 38C (100.4F), Mild Pain (1 - 3), Moderate Pain (4 - 6)  dextrose Oral Gel 15 Gram(s) Oral once PRN Blood Glucose LESS THAN 70 milliGRAM(s)/deciliter  melatonin. 3 milliGRAM(s) Oral at bedtime PRN Insomnia  polyethylene glycol 3350 17 Gram(s) Oral daily PRN constipation  QUEtiapine 12.5 milliGRAM(s) Oral every 6 hours PRN anxiety

## 2023-06-29 LAB — GLUCOSE BLDC GLUCOMTR-MCNC: 117 MG/DL — HIGH (ref 70–99)

## 2023-06-29 PROCEDURE — 99232 SBSQ HOSP IP/OBS MODERATE 35: CPT

## 2023-06-29 RX ADMIN — PANTOPRAZOLE SODIUM 40 MILLIGRAM(S): 20 TABLET, DELAYED RELEASE ORAL at 06:35

## 2023-06-29 RX ADMIN — QUETIAPINE FUMARATE 12.5 MILLIGRAM(S): 200 TABLET, FILM COATED ORAL at 18:04

## 2023-06-29 RX ADMIN — LOSARTAN POTASSIUM 75 MILLIGRAM(S): 100 TABLET, FILM COATED ORAL at 08:50

## 2023-06-29 RX ADMIN — Medication 500 MILLIGRAM(S): at 10:00

## 2023-06-29 RX ADMIN — Medication 0.25 MILLIGRAM(S): at 22:12

## 2023-06-29 RX ADMIN — Medication 1000 UNIT(S): at 08:51

## 2023-06-29 RX ADMIN — SENNA PLUS 2 TABLET(S): 8.6 TABLET ORAL at 22:13

## 2023-06-29 RX ADMIN — AMLODIPINE BESYLATE 2.5 MILLIGRAM(S): 2.5 TABLET ORAL at 08:50

## 2023-06-29 RX ADMIN — PREGABALIN 1000 MICROGRAM(S): 225 CAPSULE ORAL at 08:50

## 2023-06-29 RX ADMIN — Medication 50 MILLIGRAM(S): at 08:51

## 2023-06-29 RX ADMIN — Medication 1 TABLET(S): at 08:51

## 2023-06-29 RX ADMIN — DIVALPROEX SODIUM 500 MILLIGRAM(S): 500 TABLET, DELAYED RELEASE ORAL at 22:12

## 2023-06-29 RX ADMIN — Medication 0.25 MILLIGRAM(S): at 08:34

## 2023-06-29 RX ADMIN — QUETIAPINE FUMARATE 25 MILLIGRAM(S): 200 TABLET, FILM COATED ORAL at 08:51

## 2023-06-29 RX ADMIN — DIVALPROEX SODIUM 250 MILLIGRAM(S): 500 TABLET, DELAYED RELEASE ORAL at 08:49

## 2023-06-29 RX ADMIN — Medication 50 MILLIGRAM(S): at 22:52

## 2023-06-29 RX ADMIN — Medication 500 MILLIGRAM(S): at 09:02

## 2023-06-29 RX ADMIN — QUETIAPINE FUMARATE 25 MILLIGRAM(S): 200 TABLET, FILM COATED ORAL at 22:13

## 2023-06-29 RX ADMIN — Medication 50 MILLIGRAM(S): at 00:24

## 2023-06-29 RX ADMIN — Medication 81 MILLIGRAM(S): at 08:51

## 2023-06-29 RX ADMIN — ATORVASTATIN CALCIUM 10 MILLIGRAM(S): 80 TABLET, FILM COATED ORAL at 22:12

## 2023-06-29 RX ADMIN — Medication 2 SPRAY(S): at 08:53

## 2023-06-29 RX ADMIN — Medication 1 MILLIGRAM(S): at 08:50

## 2023-06-29 RX ADMIN — Medication 1 TABLET(S): at 22:12

## 2023-06-29 NOTE — BH INPATIENT PSYCHIATRY PROGRESS NOTE - NSBHMETABOLIC_PSY_ALL_CORE_FT
BMI: BMI (kg/m2): 23 (06-07-23 @ 18:13)  HbA1c: A1C with Estimated Average Glucose Result: 5.7 % (06-01-23 @ 05:10)    Glucose: POCT Blood Glucose.: 117 mg/dL (06-29-23 @ 07:40)    BP: --  Lipid Panel: Date/Time: 06-08-23 @ 08:30  Cholesterol, Serum: 119  Direct LDL: --  HDL Cholesterol, Serum: 47  Total Cholesterol/HDL Ration Measurement: --  Triglycerides, Serum: 56
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
## 2023-06-29 NOTE — BH INPATIENT PSYCHIATRY PROGRESS NOTE - NSBHASSESSSUMMFT_PSY_ALL_CORE
81 year old , retired female, domiciled at [facility], [facility] [place] of DM2, HLD, HTN, PPHx of late onset Bipolar disorder, 2 prior psych adm last in 2022 at [facility], who presented to ED with worsening paranoia, anxiety, FTT (weight loss of 20lb since march), and confusion. Psychiatry was consulted for psychosis/AMS. Was started on cymbalta as daughter reported this was helpful in 2016/2017 when she had a similar presentation. Was continued on home medications klonopin, depakote then CL team added seroquel due to paranoia. Was on gabapentin 900mg/day at home which was tapered off by CL team. Transferred to [facility] for ongoing stabilization. Daughter denies patient has dementia. Also states patient responded well to BZD back in 2016/2017 but not well to antipsychotics, which led to team considering possibility of catatonia hx. Review of CL notes suggested she responded well to prns of ativan. As a result, klonopin was switched to ativan 1mg po bid briefly but there was no improvement so she was taken off of it. Meanwhile, seroquel was increased to target psychosis. Cymbalta had to be switched to effexor as patient can only take crushed meds. Has had some improvement overall though limited progress for the past few days. Discussed care with daughter again yesterday and states mother's presentation is very similar to decompensation in 2016/2017 and rather unlike that in 2021/2022 admission when she was stabilized on depakote and gabapentin. Daughter is strongly advocating for patient to go back on klonopin at a higher dose of 0.5mg po bid. It was also made clear that patient has JOSESITO and has not been on CPAP since March 2023 due to poor compliance 2/2 agitation. Will taper off effexor to minimize polypharmacy after which klonopin can be added back. Seroquel will have to continue given paranoia. Will transfer patient to  to allow for CPAP therapy.       Plan:  1. Legals: 9.27  2. Safety: constant observation for disorganized behavior 7am-11pm.  Ativan 0.5 mg q12h prn for anxiety/agitation.  3. Psychiatric: Continue current medications   	         Continue Seroquel 62.5mg daily and 75mg QHS   	         C/w Depakote 250 mg daily and 500 mg qHS. VPA level of 48.70 on 5/30 - will consider tapering off and optimizing SGA in the near future  	         Decreased Effexor to 37.5mg po bid (cymbalta discontinued due to dysphagia concerns and needing to crush medications) which aim to taper off quickly (has not been on it long).   		Will plan to restart klonopin at 0.25mg po bid in a few days when effexor has been discontinued and CPAP initiated.   	        	        4. Group, milieu, individual therapy as appropriate.  5. Medical: HTN, HLD, DM2. Phlebitis, possibly dysphagia, joint pain  	Keflex 500 mg qid x 5 days - complete now  	amlodipine 2.5 mg daily. decrease losartan to 75 mg daily due to orthostatic htn. aspirin 81 mg daily. atorvastatin 10 mg qHS.  	Lipid profile wnl other than slightly low HDL  	Metformin 500mg po bid              Naproxen 500mg daily   	b12 supplementation for deficiency in the past    	Ambulate with walker  	JOSESITO - will consider transfer to  to allow for CPAP use  6. Work up: none  7. 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 Plan cont CO as patient disorganized walks backward at risk for falls form disorganziation with close monitoring. Based on unusual hx of unique and dramatic response to klonopin and failure on antipsychotics will try klonopin while titrating Effexor and taper off Seroquel . Start on klonopin 0.25mg bid and titrate to 0.5mg bid. Watch BP as has some orthostasis may get better off Seroquel adjust BP meds if required. Cont CPAP  as she was able to use it last night  6/29 More linear and better related but paranoia is more apparent. BP stable though with very slight tachycardia, po intake ok, will monitor VS before increasing klonopin

## 2023-06-29 NOTE — BH INPATIENT PSYCHIATRY PROGRESS NOTE - NSBHCHARTREVIEWVS_PSY_A_CORE FT
Vital Signs Last 24 Hrs  T(C): 36.8 (06-29-23 @ 08:03), Max: 36.8 (06-29-23 @ 08:03)  T(F): 98.2 (06-29-23 @ 08:03), Max: 98.2 (06-29-23 @ 08:03)  HR: --  BP: --  BP(mean): --  RR: --  SpO2: --    Orthostatic VS  06-29-23 @ 08:03  Lying BP: --/-- HR: --  Sitting BP: 130/77 HR: 88  Standing BP: 123/69 HR: 104  Site: upper left arm  Mode: electronic  Orthostatic VS  06-28-23 @ 08:59  Lying BP: --/-- HR: --  Sitting BP: 161/57 HR: 80  Standing BP: 144/68 HR: 92  Site: --  Mode: --

## 2023-06-29 NOTE — BH INPATIENT PSYCHIATRY PROGRESS NOTE - MSE UNSTRUCTURED FT
Appearance: intact grooming/hygiene; no abnormal involuntary movements noted  Behavior: less regressed, more appropriately related; still intrusive at times; no psychomotor agitation/retardation  Speech: wnl, not rambling as much  Mood: okay  Affect: neutral, stable, constricted  Thought process: more linear; is illogical  Thought content: Remains paranoid; no reports of SI/HI.  Perceptual disturbances: no appearance of internal preoccupation, no AH/VH elicited.   Cognition: loosely oriented to time place, poorly oriented to situation; seems less confused  Insight: poor  Judgement: poor

## 2023-06-30 LAB — GLUCOSE BLDC GLUCOMTR-MCNC: 116 MG/DL — HIGH (ref 70–99)

## 2023-06-30 PROCEDURE — 99232 SBSQ HOSP IP/OBS MODERATE 35: CPT

## 2023-06-30 RX ADMIN — Medication 0.25 MILLIGRAM(S): at 19:52

## 2023-06-30 RX ADMIN — DIVALPROEX SODIUM 500 MILLIGRAM(S): 500 TABLET, DELAYED RELEASE ORAL at 19:52

## 2023-06-30 RX ADMIN — ATORVASTATIN CALCIUM 10 MILLIGRAM(S): 80 TABLET, FILM COATED ORAL at 19:52

## 2023-06-30 RX ADMIN — Medication 0.5 MILLIGRAM(S): at 11:04

## 2023-06-30 RX ADMIN — SENNA PLUS 2 TABLET(S): 8.6 TABLET ORAL at 19:53

## 2023-06-30 RX ADMIN — Medication 50 MILLIGRAM(S): at 19:52

## 2023-06-30 RX ADMIN — LOSARTAN POTASSIUM 75 MILLIGRAM(S): 100 TABLET, FILM COATED ORAL at 11:17

## 2023-06-30 RX ADMIN — QUETIAPINE FUMARATE 25 MILLIGRAM(S): 200 TABLET, FILM COATED ORAL at 11:26

## 2023-06-30 RX ADMIN — POLYETHYLENE GLYCOL 3350 17 GRAM(S): 17 POWDER, FOR SOLUTION ORAL at 18:06

## 2023-06-30 RX ADMIN — Medication 1 TABLET(S): at 19:52

## 2023-06-30 RX ADMIN — AMLODIPINE BESYLATE 2.5 MILLIGRAM(S): 2.5 TABLET ORAL at 11:17

## 2023-06-30 RX ADMIN — QUETIAPINE FUMARATE 25 MILLIGRAM(S): 200 TABLET, FILM COATED ORAL at 19:53

## 2023-06-30 NOTE — BH INPATIENT PSYCHIATRY PROGRESS NOTE - MSE UNSTRUCTURED FT
Appearance: intact grooming/hygiene; no abnormal involuntary movements noted  Behavior: unable to assess due to sedation from IM medication  Speech: unable to assess due to sedation from IM medication  Mood: unable to assess due to sedation from IM medication  Affect: unable to assess due to sedation from IM medication  Thought process: unable to assess due to sedation from IM medication  Thought content: unable to assess due to sedation from IM medication  Perceptual disturbances: unable to assess due to sedation from IM medication  Cognition: unable to assess due to sedation from IM medication  Insight: poor  Judgement: poor

## 2023-06-30 NOTE — BH INPATIENT PSYCHIATRY PROGRESS NOTE - NSBHMETABOLIC_PSY_ALL_CORE_FT
BMI: BMI (kg/m2): 23 (06-07-23 @ 18:13)  HbA1c: A1C with Estimated Average Glucose Result: 5.7 % (06-01-23 @ 05:10)    Glucose: POCT Blood Glucose.: 116 mg/dL (06-30-23 @ 07:47)    BP: --  Lipid Panel: Date/Time: 06-08-23 @ 08:30  Cholesterol, Serum: 119  Direct LDL: --  HDL Cholesterol, Serum: 47  Total Cholesterol/HDL Ration Measurement: --  Triglycerides, Serum: 56

## 2023-06-30 NOTE — BH INPATIENT PSYCHIATRY PROGRESS NOTE - NSBHASSESSSUMMFT_PSY_ALL_CORE
81 year old , retired female, domiciled at [facility], [facility] of DM2, HLD, HTN, PPHx of late onset Bipolar disorder, 2 prior psych adm last in 2022 at [facility], who presented to ED with worsening paranoia, anxiety, FTT (weight loss of 20lb since march), and confusion. Psychiatry was consulted for psychosis/AMS. Was started on cymbalta as daughter reported this was helpful in 2016/2017 when she had a similar presentation. Was continued on home medications klonopin, depakote then CL team added seroquel due to paranoia. Was on gabapentin 900mg/day at home which was tapered off by CL team. Transferred to [facility] for ongoing stabilization. Daughter denies patient has dementia. Also states patient responded well to BZD back in 2016/2017 but not well to antipsychotics, which led to team considering possibility of catatonia hx. Review of CL notes suggested she responded well to prns of ativan. As a result, klonopin was switched to ativan 1mg po bid briefly but there was no improvement so she was taken off of it. Meanwhile, seroquel was increased to target psychosis. Cymbalta had to be switched to effexor as patient can only take crushed meds. Has had some improvement overall though limited progress for the past few days. Discussed care with daughter again yesterday and states mother's presentation is very similar to decompensation in 2016/2017 and rather unlike that in 2021/2022 admission when she was stabilized on depakote and gabapentin. Daughter is strongly advocating for patient to go back on klonopin at a higher dose of 0.5mg po bid. It was also made clear that patient has JOSESITO and has not been on CPAP since March 2023 due to poor compliance 2/2 agitation. Will taper off effexor to minimize polypharmacy after which klonopin can be added back. Seroquel will have to continue given paranoia. Will transfer patient to  to allow for CPAP therapy.       Plan:  1. Legals: 9.27  2. Safety: constant observation for disorganized behavior 7am-11pm.  Ativan 0.5 mg q12h prn for anxiety/agitation.  3. Psychiatric: Continue current medications   	         Continue Seroquel 62.5mg daily and 75mg QHS   	         C/w Depakote 250 mg daily and 500 mg qHS. VPA level of 48.70 on 5/30 - will consider tapering off and optimizing SGA in the near future  	         Decreased Effexor to 37.5mg po bid (cymbalta discontinued due to dysphagia concerns and needing to crush medications) which aim to taper off quickly (has not been on it long).   		Will plan to restart klonopin at 0.25mg po bid in a few days when effexor has been discontinued and CPAP initiated.   	        	        4. Group, milieu, individual therapy as appropriate.  5. Medical: HTN, HLD, DM2. Phlebitis, possibly dysphagia, joint pain  	Keflex 500 mg qid x 5 days - complete now  	amlodipine 2.5 mg daily. decrease losartan to 75 mg daily due to orthostatic htn. aspirin 81 mg daily. atorvastatin 10 mg qHS.  	Lipid profile wnl other than slightly low HDL  	Metformin 500mg po bid              Naproxen 500mg daily   	b12 supplementation for deficiency in the past    	Ambulate with walker  	JOSESITO - will consider transfer to  to allow for CPAP use  6. Work up: none  7. 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 Plan cont CO as patient disorganized walks backward at risk for falls form disorganziation with close monitoring. Based on unusual hx of unique and dramatic response to klonopin and failure on antipsychotics will try klonopin while titrating Effexor and taper off Seroquel . Start on klonopin 0.25mg bid and titrate to 0.5mg bid. Watch BP as has some orthostasis may get better off Seroquel adjust BP meds if required. Cont CPAP  as she was able to use it last night  6/29 More linear and better related but paranoia is more apparent. BP stable though with very slight tachycardia, po intake ok, will monitor VS before increasing klonopin  6/30 Intrusive and paranoid, difficult to redirect per staff. Got ativan 0.5mg IM once due to agitation. Continue klonopin 0.25mg po bid for now - can increase to 0.5mg po bid if compliant with CPAP; no change in seroquel 25mg po bid, effexor 50mg po bid, depakote sprinkles 750mg/day; family advocating for treatment with klonopin (consider increasing antisychotic, possibly less anticholinergic agent, if daughter agrees) though she refused CPAP last night so it would be concerning to increase dose without CPAP compliance
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
## 2023-06-30 NOTE — BH INPATIENT PSYCHIATRY PROGRESS NOTE - NSBHCHARTREVIEWVS_PSY_A_CORE FT
Vital Signs Last 24 Hrs  T(C): --  T(F): --  HR: --  BP: --  BP(mean): --  RR: --  SpO2: 100% (06-29-23 @ 23:47) (100% - 100%)    Orthostatic VS  06-29-23 @ 08:03  Lying BP: --/-- HR: --  Sitting BP: 130/77 HR: 88  Standing BP: 123/69 HR: 104  Site: upper left arm  Mode: electronic

## 2023-07-01 LAB — GLUCOSE BLDC GLUCOMTR-MCNC: 82 MG/DL — SIGNIFICANT CHANGE UP (ref 70–99)

## 2023-07-01 PROCEDURE — 99231 SBSQ HOSP IP/OBS SF/LOW 25: CPT

## 2023-07-01 RX ADMIN — DIVALPROEX SODIUM 250 MILLIGRAM(S): 500 TABLET, DELAYED RELEASE ORAL at 09:24

## 2023-07-01 RX ADMIN — Medication 0.25 MILLIGRAM(S): at 09:23

## 2023-07-01 RX ADMIN — PREGABALIN 1000 MICROGRAM(S): 225 CAPSULE ORAL at 09:23

## 2023-07-01 RX ADMIN — Medication 500 MILLIGRAM(S): at 17:10

## 2023-07-01 RX ADMIN — Medication 1 TABLET(S): at 18:18

## 2023-07-01 RX ADMIN — Medication 50 MILLIGRAM(S): at 09:22

## 2023-07-01 RX ADMIN — LOSARTAN POTASSIUM 75 MILLIGRAM(S): 100 TABLET, FILM COATED ORAL at 09:23

## 2023-07-01 RX ADMIN — Medication 1000 UNIT(S): at 09:23

## 2023-07-01 RX ADMIN — AMLODIPINE BESYLATE 2.5 MILLIGRAM(S): 2.5 TABLET ORAL at 09:24

## 2023-07-01 RX ADMIN — ATORVASTATIN CALCIUM 10 MILLIGRAM(S): 80 TABLET, FILM COATED ORAL at 18:17

## 2023-07-01 RX ADMIN — Medication 2 SPRAY(S): at 18:18

## 2023-07-01 RX ADMIN — Medication 500 MILLIGRAM(S): at 09:23

## 2023-07-01 RX ADMIN — Medication 1 TABLET(S): at 09:23

## 2023-07-01 RX ADMIN — SENNA PLUS 2 TABLET(S): 8.6 TABLET ORAL at 18:17

## 2023-07-01 RX ADMIN — Medication 50 MILLIGRAM(S): at 18:17

## 2023-07-01 RX ADMIN — Medication 81 MILLIGRAM(S): at 09:23

## 2023-07-01 RX ADMIN — DIVALPROEX SODIUM 500 MILLIGRAM(S): 500 TABLET, DELAYED RELEASE ORAL at 18:17

## 2023-07-01 RX ADMIN — Medication 1 MILLIGRAM(S): at 09:24

## 2023-07-01 RX ADMIN — QUETIAPINE FUMARATE 25 MILLIGRAM(S): 200 TABLET, FILM COATED ORAL at 18:17

## 2023-07-01 RX ADMIN — QUETIAPINE FUMARATE 25 MILLIGRAM(S): 200 TABLET, FILM COATED ORAL at 09:22

## 2023-07-01 RX ADMIN — Medication 0.25 MILLIGRAM(S): at 18:17

## 2023-07-01 NOTE — BH INPATIENT PSYCHIATRY PROGRESS NOTE - NSBHMETABOLIC_PSY_ALL_CORE_FT
BMI: BMI (kg/m2): 23 (06-07-23 @ 18:13)  HbA1c: A1C with Estimated Average Glucose Result: 5.7 % (06-01-23 @ 05:10)    Glucose: POCT Blood Glucose.: 82 mg/dL (07-01-23 @ 07:35)    BP: --  Lipid Panel: Date/Time: 06-08-23 @ 08:30  Cholesterol, Serum: 119  Direct LDL: --  HDL Cholesterol, Serum: 47  Total Cholesterol/HDL Ration Measurement: --  Triglycerides, Serum: 56

## 2023-07-01 NOTE — BH INPATIENT PSYCHIATRY PROGRESS NOTE - NSBHASSESSSUMMFT_PSY_ALL_CORE
81 year old , retired female, domiciled at [facility], [facility] of DM2, HLD, HTN, PPHx of late onset Bipolar disorder, 2 prior psych adm last in 2022 at [facility], who presented to ED with worsening paranoia, anxiety, FTT (weight loss of 20lb since march), and confusion. Psychiatry was consulted for psychosis/AMS. Was started on cymbalta as daughter reported this was helpful in 2016/2017 when she had a similar presentation. Was continued on home medications klonopin, depakote then CL team added seroquel due to paranoia. Was on gabapentin 900mg/day at home which was tapered off by CL team. Transferred to [facility] for ongoing stabilization. Daughter denies patient has dementia. Also states patient responded well to BZD back in 2016/2017 but not well to antipsychotics, which led to team considering possibility of catatonia hx. Review of CL notes suggested she responded well to prns of ativan. As a result, klonopin was switched to ativan 1mg po bid briefly but there was no improvement so she was taken off of it. Meanwhile, seroquel was increased to target psychosis. Cymbalta had to be switched to effexor as patient can only take crushed meds. Has had some improvement overall though limited progress for the past few days. Discussed care with daughter again yesterday and states mother's presentation is very similar to decompensation in 2016/2017 and rather unlike that in 2021/2022 admission when she was stabilized on depakote and gabapentin. Daughter is strongly advocating for patient to go back on klonopin at a higher dose of 0.5mg po bid. It was also made clear that patient has JOSESITO and has not been on CPAP since March 2023 due to poor compliance 2/2 agitation. Will taper off effexor to minimize polypharmacy after which klonopin can be added back. Seroquel will have to continue given paranoia. Will transfer patient to  to allow for CPAP therapy.       Plan:  1. Legals: 9.27  2. Safety: constant observation for disorganized behavior 7am-11pm.  Ativan 0.5 mg q12h prn for anxiety/agitation.  3. Psychiatric: Continue current medications   	         Continue Seroquel 62.5mg daily and 75mg QHS   	         C/w Depakote 250 mg daily and 500 mg qHS. VPA level of 48.70 on 5/30 - will consider tapering off and optimizing SGA in the near future  	         Decreased Effexor to 37.5mg po bid (cymbalta discontinued due to dysphagia concerns and needing to crush medications) which aim to taper off quickly (has not been on it long).   		Will plan to restart klonopin at 0.25mg po bid in a few days when effexor has been discontinued and CPAP initiated.   	        	        4. Group, milieu, individual therapy as appropriate.  5. Medical: HTN, HLD, DM2. Phlebitis, possibly dysphagia, joint pain  	Keflex 500 mg qid x 5 days - complete now  	amlodipine 2.5 mg daily. decrease losartan to 75 mg daily due to orthostatic htn. aspirin 81 mg daily. atorvastatin 10 mg qHS.  	Lipid profile wnl other than slightly low HDL  	Metformin 500mg po bid              Naproxen 500mg daily   	b12 supplementation for deficiency in the past    	Ambulate with walker  	JOSESITO - will consider transfer to  to allow for CPAP use  6. Work up: none  7. 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 Plan cont CO as patient disorganized walks backward at risk for falls form disorganziation with close monitoring. Based on unusual hx of unique and dramatic response to klonopin and failure on antipsychotics will try klonopin while titrating Effexor and taper off Seroquel . Start on klonopin 0.25mg bid and titrate to 0.5mg bid. Watch BP as has some orthostasis may get better off Seroquel adjust BP meds if required. Cont CPAP  as she was able to use it last night  6/29 More linear and better related but paranoia is more apparent. BP stable though with very slight tachycardia, po intake ok, will monitor VS before increasing klonopin  6/30 Intrusive and paranoid, difficult to redirect per staff. Got ativan 0.5mg IM once due to agitation. Continue klonopin 0.25mg po bid for now - can increase to 0.5mg po bid if compliant with CPAP; no change in seroquel 25mg po bid, effexor 50mg po bid, depakote sprinkles 750mg/day; family advocating for treatment with klonopin (consider increasing antisychotic, possibly less anticholinergic agent, if daughter agrees) though she refused CPAP last night so it would be concerning to increase dose without CPAP compliance  7/1: More calm today, not agitated, able to be redirected

## 2023-07-01 NOTE — BH INPATIENT PSYCHIATRY PROGRESS NOTE - NSBHCHARTREVIEWVS_PSY_A_CORE FT
Vital Signs Last 24 Hrs  T(C): 36.5 (07-01-23 @ 06:36), Max: 36.5 (07-01-23 @ 06:36)  T(F): 97.7 (07-01-23 @ 06:36), Max: 97.7 (07-01-23 @ 06:36)  HR: --  BP: --  BP(mean): --  RR: --  SpO2: --    Orthostatic VS  07-01-23 @ 06:36  Lying BP: --/-- HR: --  Sitting BP: 143/66 HR: 85  Standing BP: 145/81 HR: 91  Site: --  Mode: --  Orthostatic VS  06-30-23 @ 14:28  Lying BP: --/-- HR: --  Sitting BP: 120/70 HR: 86  Standing BP: 113/68 HR: 98  Site: --  Mode: --

## 2023-07-01 NOTE — BH INPATIENT PSYCHIATRY PROGRESS NOTE - MSE UNSTRUCTURED FT
Patient is awake and alert. Affect is neutral, slightly irritable. Hard of hearing. Insists that she has constipation ,although she did have a bowel movement today. TP logical. No delusions. No perceptual disturbance. No suicidal ideations.

## 2023-07-02 LAB — GLUCOSE BLDC GLUCOMTR-MCNC: 89 MG/DL — SIGNIFICANT CHANGE UP (ref 70–99)

## 2023-07-02 PROCEDURE — 99231 SBSQ HOSP IP/OBS SF/LOW 25: CPT

## 2023-07-02 RX ADMIN — PANTOPRAZOLE SODIUM 40 MILLIGRAM(S): 20 TABLET, DELAYED RELEASE ORAL at 08:34

## 2023-07-02 RX ADMIN — PREGABALIN 1000 MICROGRAM(S): 225 CAPSULE ORAL at 08:35

## 2023-07-02 RX ADMIN — Medication 500 MILLIGRAM(S): at 08:35

## 2023-07-02 RX ADMIN — Medication 2 SPRAY(S): at 08:36

## 2023-07-02 RX ADMIN — Medication 1 MILLIGRAM(S): at 08:35

## 2023-07-02 RX ADMIN — AMLODIPINE BESYLATE 2.5 MILLIGRAM(S): 2.5 TABLET ORAL at 08:35

## 2023-07-02 RX ADMIN — DIVALPROEX SODIUM 250 MILLIGRAM(S): 500 TABLET, DELAYED RELEASE ORAL at 08:33

## 2023-07-02 RX ADMIN — Medication 1000 UNIT(S): at 08:35

## 2023-07-02 RX ADMIN — LOSARTAN POTASSIUM 75 MILLIGRAM(S): 100 TABLET, FILM COATED ORAL at 08:34

## 2023-07-02 RX ADMIN — QUETIAPINE FUMARATE 12.5 MILLIGRAM(S): 200 TABLET, FILM COATED ORAL at 11:14

## 2023-07-02 RX ADMIN — Medication 500 MILLIGRAM(S): at 09:53

## 2023-07-02 RX ADMIN — Medication 1 TABLET(S): at 08:34

## 2023-07-02 RX ADMIN — QUETIAPINE FUMARATE 25 MILLIGRAM(S): 200 TABLET, FILM COATED ORAL at 08:34

## 2023-07-02 RX ADMIN — Medication 0.5 MILLIGRAM(S): at 23:38

## 2023-07-02 RX ADMIN — Medication 0.25 MILLIGRAM(S): at 08:34

## 2023-07-02 RX ADMIN — Medication 50 MILLIGRAM(S): at 08:39

## 2023-07-02 RX ADMIN — Medication 3 MILLIGRAM(S): at 21:44

## 2023-07-02 RX ADMIN — Medication 81 MILLIGRAM(S): at 08:34

## 2023-07-02 RX ADMIN — Medication 0.5 MILLIGRAM(S): at 13:35

## 2023-07-02 NOTE — CHART NOTE - NSCHARTNOTEFT_GEN_A_CORE
Per nursing patient agitated, not receptive to limit setting and approaching peers trying to kiss them.     0.5 mg IM Ativan given.

## 2023-07-02 NOTE — BH INPATIENT PSYCHIATRY PROGRESS NOTE - NSBHCHARTREVIEWVS_PSY_A_CORE FT
Vital Signs Last 24 Hrs  T(C): 36.6 (07-02-23 @ 06:39), Max: 36.6 (07-02-23 @ 06:39)  T(F): 97.9 (07-02-23 @ 06:39), Max: 97.9 (07-02-23 @ 06:39)  HR: --  BP: --  BP(mean): --  RR: --  SpO2: --    Orthostatic VS  07-02-23 @ 06:39  Lying BP: --/-- HR: --  Sitting BP: 129/69 HR: 72  Standing BP: 129/66 HR: 90  Site: --  Mode: --  Orthostatic VS  07-01-23 @ 06:36  Lying BP: --/-- HR: --  Sitting BP: 143/66 HR: 85  Standing BP: 145/81 HR: 91  Site: --  Mode: --

## 2023-07-02 NOTE — BH INPATIENT PSYCHIATRY PROGRESS NOTE - NSBHFUPINTERVALHXFT_PSY_A_CORE
Patient followed for bipolar mixed. AVSS. Received prn quetiapine once today. Generally calm but demanding at times. No longer insisting that she is being poisoned.  Seems improved.

## 2023-07-02 NOTE — BH INPATIENT PSYCHIATRY PROGRESS NOTE - NSBHMETABOLIC_PSY_ALL_CORE_FT
BMI: BMI (kg/m2): 23 (06-07-23 @ 18:13)  HbA1c: A1C with Estimated Average Glucose Result: 5.7 % (06-01-23 @ 05:10)    Glucose: POCT Blood Glucose.: 89 mg/dL (07-02-23 @ 07:48)    BP: --  Lipid Panel: Date/Time: 06-08-23 @ 08:30  Cholesterol, Serum: 119  Direct LDL: --  HDL Cholesterol, Serum: 47  Total Cholesterol/HDL Ration Measurement: --  Triglycerides, Serum: 56

## 2023-07-02 NOTE — BH INPATIENT PSYCHIATRY PROGRESS NOTE - CURRENT MEDICATION
MEDICATIONS  (STANDING):  amLODIPine   Tablet 2.5 milliGRAM(s) Oral daily  aspirin  chewable 81 milliGRAM(s) Oral daily  atorvastatin 10 milliGRAM(s) Oral at bedtime  calcium carbonate   1250 mG (OsCal) 1 Tablet(s) Oral two times a day  cholecalciferol 1000 Unit(s) Oral daily  clonazePAM Oral Disintegrating Tablet 0.25 milliGRAM(s) Oral two times a day  cyanocobalamin 1000 MICROGram(s) Oral daily  divalproex Sprinkle 500 milliGRAM(s) Oral at bedtime  divalproex Sprinkle 250 milliGRAM(s) Oral daily  folic acid 1 milliGRAM(s) Oral daily  glucagon  Injectable 1 milliGRAM(s) IntraMuscular once  losartan 75 milliGRAM(s) Oral daily  naproxen 500 milliGRAM(s) Oral daily  pantoprazole   Suspension 40 milliGRAM(s) Oral before breakfast  QUEtiapine 25 milliGRAM(s) Oral two times a day  senna 2 Tablet(s) Oral at bedtime  sodium chloride 0.65% Nasal 2 Spray(s) Both Nostrils two times a day  venlafaxine 50 milliGRAM(s) Oral <User Schedule>    MEDICATIONS  (PRN):  acetaminophen     Tablet .. 650 milliGRAM(s) Oral every 6 hours PRN Temp greater or equal to 38C (100.4F), Mild Pain (1 - 3), Moderate Pain (4 - 6)  dextrose Oral Gel 15 Gram(s) Oral once PRN Blood Glucose LESS THAN 70 milliGRAM(s)/deciliter  LORazepam   Injectable 0.5 milliGRAM(s) IntraMuscular once PRN Severe agitation  melatonin. 3 milliGRAM(s) Oral at bedtime PRN Insomnia  polyethylene glycol 3350 17 Gram(s) Oral daily PRN constipation  QUEtiapine 12.5 milliGRAM(s) Oral every 6 hours PRN anxiety

## 2023-07-02 NOTE — BH INPATIENT PSYCHIATRY PROGRESS NOTE - MSE UNSTRUCTURED FT
Patient is awake and alert. Affect is neutral, slightly irritable. Hard of hearing. Denies paranoid ideations or persecutory delusions.  TP logical. No perceptual disturbance. Denies feeling sad or depressive symptoms.  No suicidal ideations.

## 2023-07-02 NOTE — BH INPATIENT PSYCHIATRY PROGRESS NOTE - NSBHASSESSSUMMFT_PSY_ALL_CORE
81 year old , retired female, domiciled at [facility], [facility] of DM2, HLD, HTN, PPHx of late onset Bipolar disorder, 2 prior psych adm last in 2022 at [facility], who presented to ED with worsening paranoia, anxiety, FTT (weight loss of 20lb since march), and confusion. Psychiatry was consulted for psychosis/AMS. Was started on cymbalta as daughter reported this was helpful in 2016/2017 when she had a similar presentation. Was continued on home medications klonopin, depakote then CL team added seroquel due to paranoia. Was on gabapentin 900mg/day at home which was tapered off by CL team. Transferred to [facility] for ongoing stabilization. Daughter denies patient has dementia. Also states patient responded well to BZD back in 2016/2017 but not well to antipsychotics, which led to team considering possibility of catatonia hx. Review of CL notes suggested she responded well to prns of ativan. As a result, klonopin was switched to ativan 1mg po bid briefly but there was no improvement so she was taken off of it. Meanwhile, seroquel was increased to target psychosis. Cymbalta had to be switched to effexor as patient can only take crushed meds. Has had some improvement overall though limited progress for the past few days. Discussed care with daughter again yesterday and states mother's presentation is very similar to decompensation in 2016/2017 and rather unlike that in 2021/2022 admission when she was stabilized on depakote and gabapentin. Daughter is strongly advocating for patient to go back on klonopin at a higher dose of 0.5mg po bid. It was also made clear that patient has JOSESITO and has not been on CPAP since March 2023 due to poor compliance 2/2 agitation. Will taper off effexor to minimize polypharmacy after which klonopin can be added back. Seroquel will have to continue given paranoia. Will transfer patient to  to allow for CPAP therapy.       Plan:  1. Legals: 9.27  2. Safety: constant observation for disorganized behavior 7am-11pm.  Ativan 0.5 mg q12h prn for anxiety/agitation.  3. Psychiatric: Continue current medications   	         Continue Seroquel 62.5mg daily and 75mg QHS   	         C/w Depakote 250 mg daily and 500 mg qHS. VPA level of 48.70 on 5/30 - will consider tapering off and optimizing SGA in the near future  	         Decreased Effexor to 37.5mg po bid (cymbalta discontinued due to dysphagia concerns and needing to crush medications) which aim to taper off quickly (has not been on it long).   		Will plan to restart klonopin at 0.25mg po bid in a few days when effexor has been discontinued and CPAP initiated.   	        	        4. Group, milieu, individual therapy as appropriate.  5. Medical: HTN, HLD, DM2. Phlebitis, possibly dysphagia, joint pain  	Keflex 500 mg qid x 5 days - complete now  	amlodipine 2.5 mg daily. decrease losartan to 75 mg daily due to orthostatic htn. aspirin 81 mg daily. atorvastatin 10 mg qHS.  	Lipid profile wnl other than slightly low HDL  	Metformin 500mg po bid              Naproxen 500mg daily   	b12 supplementation for deficiency in the past    	Ambulate with walker  	JOSESITO - will consider transfer to  to allow for CPAP use  6. Work up: none  7. 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 Plan cont CO as patient disorganized walks backward at risk for falls form disorganziation with close monitoring. Based on unusual hx of unique and dramatic response to klonopin and failure on antipsychotics will try klonopin while titrating Effexor and taper off Seroquel . Start on klonopin 0.25mg bid and titrate to 0.5mg bid. Watch BP as has some orthostasis may get better off Seroquel adjust BP meds if required. Cont CPAP  as she was able to use it last night  6/29 More linear and better related but paranoia is more apparent. BP stable though with very slight tachycardia, po intake ok, will monitor VS before increasing klonopin  6/30 Intrusive and paranoid, difficult to redirect per staff. Got ativan 0.5mg IM once due to agitation. Continue klonopin 0.25mg po bid for now - can increase to 0.5mg po bid if compliant with CPAP; no change in seroquel 25mg po bid, effexor 50mg po bid, depakote sprinkles 750mg/day; family advocating for treatment with klonopin (consider increasing antisychotic, possibly less anticholinergic agent, if daughter agrees) though she refused CPAP last night so it would be concerning to increase dose without CPAP compliance  7/1: More calm today, not agitated, able to be redirected  7/2: Some mild agitation, no persecutory delusions expressed, got prn quetiapine today, generally calm this AM

## 2023-07-03 LAB — GLUCOSE BLDC GLUCOMTR-MCNC: 86 MG/DL — SIGNIFICANT CHANGE UP (ref 70–99)

## 2023-07-03 PROCEDURE — 99232 SBSQ HOSP IP/OBS MODERATE 35: CPT

## 2023-07-03 RX ORDER — VENLAFAXINE HCL 75 MG
50 CAPSULE, EXT RELEASE 24 HR ORAL
Refills: 0 | Status: COMPLETED | OUTPATIENT
Start: 2023-07-03 | End: 2023-07-03

## 2023-07-03 RX ORDER — SENNA PLUS 8.6 MG/1
2 TABLET ORAL DAILY
Refills: 0 | Status: DISCONTINUED | OUTPATIENT
Start: 2023-07-03 | End: 2023-10-17

## 2023-07-03 RX ORDER — POLYETHYLENE GLYCOL 3350 17 G/17G
17 POWDER, FOR SOLUTION ORAL DAILY
Refills: 0 | Status: DISCONTINUED | OUTPATIENT
Start: 2023-07-03 | End: 2023-07-05

## 2023-07-03 RX ORDER — CLONAZEPAM 1 MG
0.5 TABLET ORAL DAILY
Refills: 0 | Status: DISCONTINUED | OUTPATIENT
Start: 2023-07-03 | End: 2023-07-07

## 2023-07-03 RX ORDER — DIVALPROEX SODIUM 500 MG/1
500 TABLET, DELAYED RELEASE ORAL
Refills: 0 | Status: DISCONTINUED | OUTPATIENT
Start: 2023-07-03 | End: 2023-07-25

## 2023-07-03 RX ORDER — VENLAFAXINE HCL 75 MG
100 CAPSULE, EXT RELEASE 24 HR ORAL DAILY
Refills: 0 | Status: DISCONTINUED | OUTPATIENT
Start: 2023-07-03 | End: 2023-07-10

## 2023-07-03 RX ORDER — CLONAZEPAM 1 MG
0.5 TABLET ORAL
Refills: 0 | Status: DISCONTINUED | OUTPATIENT
Start: 2023-07-03 | End: 2023-07-03

## 2023-07-03 RX ORDER — SENNA PLUS 8.6 MG/1
2 TABLET ORAL ONCE
Refills: 0 | Status: COMPLETED | OUTPATIENT
Start: 2023-07-03 | End: 2023-07-03

## 2023-07-03 RX ADMIN — Medication 50 MILLIGRAM(S): at 20:46

## 2023-07-03 RX ADMIN — Medication 2 SPRAY(S): at 20:47

## 2023-07-03 RX ADMIN — Medication 1000 UNIT(S): at 08:21

## 2023-07-03 RX ADMIN — Medication 0.5 MILLIGRAM(S): at 08:24

## 2023-07-03 RX ADMIN — Medication 1 TABLET(S): at 08:20

## 2023-07-03 RX ADMIN — QUETIAPINE FUMARATE 25 MILLIGRAM(S): 200 TABLET, FILM COATED ORAL at 08:21

## 2023-07-03 RX ADMIN — Medication 1 MILLIGRAM(S): at 08:23

## 2023-07-03 RX ADMIN — Medication 500 MILLIGRAM(S): at 08:21

## 2023-07-03 RX ADMIN — DIVALPROEX SODIUM 250 MILLIGRAM(S): 500 TABLET, DELAYED RELEASE ORAL at 08:21

## 2023-07-03 RX ADMIN — PANTOPRAZOLE SODIUM 40 MILLIGRAM(S): 20 TABLET, DELAYED RELEASE ORAL at 08:20

## 2023-07-03 RX ADMIN — ATORVASTATIN CALCIUM 10 MILLIGRAM(S): 80 TABLET, FILM COATED ORAL at 20:46

## 2023-07-03 RX ADMIN — LOSARTAN POTASSIUM 75 MILLIGRAM(S): 100 TABLET, FILM COATED ORAL at 08:21

## 2023-07-03 RX ADMIN — PREGABALIN 1000 MICROGRAM(S): 225 CAPSULE ORAL at 08:21

## 2023-07-03 RX ADMIN — AMLODIPINE BESYLATE 2.5 MILLIGRAM(S): 2.5 TABLET ORAL at 08:20

## 2023-07-03 RX ADMIN — Medication 500 MILLIGRAM(S): at 09:00

## 2023-07-03 RX ADMIN — POLYETHYLENE GLYCOL 3350 17 GRAM(S): 17 POWDER, FOR SOLUTION ORAL at 10:13

## 2023-07-03 RX ADMIN — Medication 50 MILLIGRAM(S): at 08:24

## 2023-07-03 RX ADMIN — Medication 81 MILLIGRAM(S): at 08:22

## 2023-07-03 NOTE — BH INPATIENT PSYCHIATRY PROGRESS NOTE - NSBHMETABOLIC_PSY_ALL_CORE_FT
BMI: BMI (kg/m2): 23 (06-07-23 @ 18:13)  HbA1c: A1C with Estimated Average Glucose Result: 5.7 % (06-01-23 @ 05:10)    Glucose: POCT Blood Glucose.: 86 mg/dL (07-03-23 @ 07:24)    BP: --  Lipid Panel: Date/Time: 06-08-23 @ 08:30  Cholesterol, Serum: 119  Direct LDL: --  HDL Cholesterol, Serum: 47  Total Cholesterol/HDL Ration Measurement: --  Triglycerides, Serum: 56

## 2023-07-03 NOTE — BH INPATIENT PSYCHIATRY PROGRESS NOTE - NSBHCHARTREVIEWVS_PSY_A_CORE FT
Vital Signs Last 24 Hrs  T(C): 36.2 (07-03-23 @ 07:39), Max: 36.2 (07-03-23 @ 07:39)  T(F): 97.2 (07-03-23 @ 07:39), Max: 97.2 (07-03-23 @ 07:39)  HR: --  BP: --  BP(mean): --  RR: --  SpO2: 99% (07-03-23 @ 05:30) (98% - 99%)    Orthostatic VS  07-03-23 @ 07:39  Lying BP: --/-- HR: --  Sitting BP: 140/72 HR: 82  Standing BP: 155/61 HR: 87  Site: upper left arm  Mode: electronic  Orthostatic VS  07-02-23 @ 06:39  Lying BP: --/-- HR: --  Sitting BP: 129/69 HR: 72  Standing BP: 129/66 HR: 90  Site: --  Mode: --

## 2023-07-03 NOTE — PATIENT PROFILE ADULT - FUNCTIONAL ASSESSMENT - BASIC MOBILITY 1.
Other (Free Text): Encouraged patient to continue using medication twice a day everyday since he is noticing relief and less redness. Patient states he seen Urologist Dr. Rosa M Rico 1 month ago and was told his testicles were normal per patient. I discussed with patient to bring medical records at next visit. If symptoms worsen he was advised to go to the ER. Note Text (......Xxx Chief Complaint.): This diagnosis correlates with the Render Risk Assessment In Note?: no Detail Level: Zone Other (Free Text): Patient states due to friction his skin was becoming raw with a burning sensation. States since then he has been using a diaper but noticed a rash to the area. Advised to use tacrolimus to the area. He may apply Vaseline as barrier protection. 3 = A little assistance

## 2023-07-03 NOTE — BH INPATIENT PSYCHIATRY PROGRESS NOTE - MSE UNSTRUCTURED FT
Patient is awake and alert. Mood dypshoric less distraught, also anxious. Patient with affect c/w mood. Hard of hearing. Still feels she may be harmed but not kidnapped. No SI, voices. Thinking perseverative asks same questions over and over. Orientation improved. Poor insight

## 2023-07-03 NOTE — BH INPATIENT PSYCHIATRY PROGRESS NOTE - NSBHFUPINTERVALHXFT_PSY_A_CORE
Patient followed for bipolar with likely agitated depression vs mixed state. VSS. Generally calmer but demanding at times. No longer insisting that she is being poisoned. Family sees improvement no longer reporting to them that she will be taken away

## 2023-07-03 NOTE — BH INPATIENT PSYCHIATRY PROGRESS NOTE - CURRENT MEDICATION
MEDICATIONS  (STANDING):  amLODIPine   Tablet 2.5 milliGRAM(s) Oral daily  aspirin  chewable 81 milliGRAM(s) Oral daily  atorvastatin 10 milliGRAM(s) Oral at bedtime  clonazePAM Oral Disintegrating Tablet 0.5 milliGRAM(s) Oral daily  divalproex Sprinkle 500 milliGRAM(s) Oral at bedtime  divalproex Sprinkle 250 milliGRAM(s) Oral daily  folic acid 1 milliGRAM(s) Oral daily  glucagon  Injectable 1 milliGRAM(s) IntraMuscular once  losartan 75 milliGRAM(s) Oral daily  naproxen 500 milliGRAM(s) Oral daily  pantoprazole   Suspension 40 milliGRAM(s) Oral before breakfast  polyethylene glycol 3350 17 Gram(s) Oral daily  senna 2 Tablet(s) Oral daily  sodium chloride 0.65% Nasal 2 Spray(s) Both Nostrils two times a day  venlafaxine 100 milliGRAM(s) Oral daily  venlafaxine 50 milliGRAM(s) Oral <User Schedule>    MEDICATIONS  (PRN):  acetaminophen     Tablet .. 650 milliGRAM(s) Oral every 6 hours PRN Temp greater or equal to 38C (100.4F), Mild Pain (1 - 3), Moderate Pain (4 - 6)  bisacodyl 5 milliGRAM(s) Oral daily PRN constipation  bisacodyl Suppository 10 milliGRAM(s) Rectal daily PRN constipation  dextrose Oral Gel 15 Gram(s) Oral once PRN Blood Glucose LESS THAN 70 milliGRAM(s)/deciliter  LORazepam   Injectable 0.5 milliGRAM(s) IntraMuscular once PRN severe agitation  melatonin. 3 milliGRAM(s) Oral at bedtime PRN Insomnia  QUEtiapine 12.5 milliGRAM(s) Oral every 6 hours PRN anxiety

## 2023-07-03 NOTE — BH INPATIENT PSYCHIATRY PROGRESS NOTE - NSBHASSESSSUMMFT_PSY_ALL_CORE
81 year old , retired female, domiciled at [facility], [facility] of DM2, HLD, HTN, PPHx of late onset Bipolar disorder, 2 prior psych adm last in 2022 at [facility], who presented to ED with worsening paranoia, anxiety, FTT (weight loss of 20lb since march), and confusion. Psychiatry was consulted for psychosis/AMS. Was started on cymbalta as daughter reported this was helpful in 2016/2017 when she had a similar presentation. Was continued on home medications klonopin, depakote then CL team added seroquel due to paranoia. Was on gabapentin 900mg/day at home which was tapered off by CL team. Transferred to [facility] for ongoing stabilization. Daughter denies patient has dementia. Also states patient responded well to BZD back in 2016/2017 but not well to antipsychotics, which led to team considering possibility of catatonia hx. Review of CL notes suggested she responded well to prns of ativan. As a result, klonopin was switched to ativan 1mg po bid briefly but there was no improvement so she was taken off of it. Meanwhile, seroquel was increased to target psychosis. Cymbalta had to be switched to effexor as patient can only take crushed meds. Has had some improvement overall though limited progress for the past few days. Discussed care with daughter again yesterday and states mother's presentation is very similar to decompensation in 2016/2017 and rather unlike that in 2021/2022 admission when she was stabilized on depakote and gabapentin. Daughter is strongly advocating for patient to go back on klonopin at a higher dose of 0.5mg po bid. It was also made clear that patient has JOSESITO and has not been on CPAP since March 2023 due to poor compliance 2/2 agitation. Will taper off effexor to minimize polypharmacy after which klonopin can be added back. Seroquel will have to continue given paranoia. Will transfer patient to  to allow for CPAP therapy.       Plan:  1. Legals: 9.27  2. Safety: constant observation for disorganized behavior 7am-11pm.  Ativan 0.5 mg q12h prn for anxiety/agitation.  3. Psychiatric: Continue current medications   	         Continue Seroquel 62.5mg daily and 75mg QHS   	         C/w Depakote 250 mg daily and 500 mg qHS. VPA level of 48.70 on 5/30 - will consider tapering off and optimizing SGA in the near future  	         Decreased Effexor to 37.5mg po bid (cymbalta discontinued due to dysphagia concerns and needing to crush medications) which aim to taper off quickly (has not been on it long).   		Will plan to restart klonopin at 0.25mg po bid in a few days when effexor has been discontinued and CPAP initiated.   	        	        4. Group, milieu, individual therapy as appropriate.  5. Medical: HTN, HLD, DM2. Phlebitis, possibly dysphagia, joint pain  	Keflex 500 mg qid x 5 days - complete now  	amlodipine 2.5 mg daily. decrease losartan to 75 mg daily due to orthostatic htn. aspirin 81 mg daily. atorvastatin 10 mg qHS.  	Lipid profile wnl other than slightly low HDL  	Metformin 500mg po bid              Naproxen 500mg daily   	b12 supplementation for deficiency in the past    	Ambulate with walker  	JOSESITO - will consider transfer to  to allow for CPAP use  6. Work up: none  7. 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 Plan cont CO as patient disorganized walks backward at risk for falls form disorganziation with close monitoring. Based on unusual hx of unique and dramatic response to klonopin and failure on antipsychotics will try klonopin while titrating Effexor and taper off Seroquel . Start on klonopin 0.25mg bid and titrate to 0.5mg bid. Watch BP as has some orthostasis may get better off Seroquel adjust BP meds if required. Cont CPAP  as she was able to use it last night  6/29 More linear and better related but paranoia is more apparent. BP stable though with very slight tachycardia, po intake ok, will monitor VS before increasing klonopin  6/30 Intrusive and paranoid, difficult to redirect per staff. Got ativan 0.5mg IM once due to agitation. Continue klonopin 0.25mg po bid for now - can increase to 0.5mg po bid if compliant with CPAP; no change in seroquel 25mg po bid, effexor 50mg po bid, depakote sprinkles 750mg/day; family advocating for treatment   with klonopin (consider increasing antisychotic, possibly less anticholinergic agent, if daughter agrees) though she refused CPAP last night so it would be   concerning to increase dose without CPAP compliance  7/1: More calm today, not agitated, able to be redirected  7/2: Some mild agitation, no persecutory delusions expressed, got prn quetiapine today, generally calm this AM  7/3 Some improvement. Still anxiouas agitated suspicious. Will change meds to daytime dosing with option to give later such as during visiting when family can encourage. Will also get rid of non urgent meds such as vitamins. Will increase laxatives as patient has hx constipation leading to retention. Recheck labs

## 2023-07-04 LAB — GLUCOSE BLDC GLUCOMTR-MCNC: 116 MG/DL — HIGH (ref 70–99)

## 2023-07-04 PROCEDURE — 99231 SBSQ HOSP IP/OBS SF/LOW 25: CPT

## 2023-07-04 RX ADMIN — Medication 500 MILLIGRAM(S): at 10:00

## 2023-07-04 RX ADMIN — Medication 0.5 MILLIGRAM(S): at 17:14

## 2023-07-04 RX ADMIN — Medication 81 MILLIGRAM(S): at 10:00

## 2023-07-04 RX ADMIN — SENNA PLUS 2 TABLET(S): 8.6 TABLET ORAL at 10:00

## 2023-07-04 RX ADMIN — Medication 0.5 MILLIGRAM(S): at 10:00

## 2023-07-04 RX ADMIN — PANTOPRAZOLE SODIUM 40 MILLIGRAM(S): 20 TABLET, DELAYED RELEASE ORAL at 07:00

## 2023-07-04 RX ADMIN — AMLODIPINE BESYLATE 2.5 MILLIGRAM(S): 2.5 TABLET ORAL at 10:00

## 2023-07-04 RX ADMIN — ATORVASTATIN CALCIUM 10 MILLIGRAM(S): 80 TABLET, FILM COATED ORAL at 22:00

## 2023-07-04 RX ADMIN — QUETIAPINE FUMARATE 12.5 MILLIGRAM(S): 200 TABLET, FILM COATED ORAL at 16:30

## 2023-07-04 RX ADMIN — POLYETHYLENE GLYCOL 3350 17 GRAM(S): 17 POWDER, FOR SOLUTION ORAL at 10:51

## 2023-07-04 RX ADMIN — DIVALPROEX SODIUM 250 MILLIGRAM(S): 500 TABLET, DELAYED RELEASE ORAL at 09:59

## 2023-07-04 RX ADMIN — Medication 100 MILLIGRAM(S): at 10:51

## 2023-07-04 RX ADMIN — DIVALPROEX SODIUM 500 MILLIGRAM(S): 500 TABLET, DELAYED RELEASE ORAL at 17:02

## 2023-07-04 RX ADMIN — LOSARTAN POTASSIUM 75 MILLIGRAM(S): 100 TABLET, FILM COATED ORAL at 10:00

## 2023-07-04 NOTE — BH INPATIENT PSYCHIATRY PROGRESS NOTE - NSBHMETABOLIC_PSY_ALL_CORE_FT
BMI: BMI (kg/m2): 23 (06-07-23 @ 18:13)  HbA1c: A1C with Estimated Average Glucose Result: 5.7 % (06-01-23 @ 05:10)    Glucose: POCT Blood Glucose.: 116 mg/dL (07-04-23 @ 07:59)    BP: --  Lipid Panel: Date/Time: 06-08-23 @ 08:30  Cholesterol, Serum: 119  Direct LDL: --  HDL Cholesterol, Serum: 47  Total Cholesterol/HDL Ration Measurement: --  Triglycerides, Serum: 56

## 2023-07-04 NOTE — BH INPATIENT PSYCHIATRY PROGRESS NOTE - NSBHASSESSSUMMFT_PSY_ALL_CORE
81 year old , retired female, domiciled at [facility], [facility] of DM2, HLD, HTN, PPHx of late onset Bipolar disorder, 2 prior psych adm last in 2022 at [facility], who presented to ED with worsening paranoia, anxiety, FTT (weight loss of 20lb since march), and confusion. Psychiatry was consulted for psychosis/AMS. Was started on cymbalta as daughter reported this was helpful in 2016/2017 when she had a similar presentation. Was continued on home medications klonopin, depakote then CL team added seroquel due to paranoia. Was on gabapentin 900mg/day at home which was tapered off by CL team. Transferred to [facility] for ongoing stabilization. Daughter denies patient has dementia. Also states patient responded well to BZD back in 2016/2017 but not well to antipsychotics, which led to team considering possibility of catatonia hx. Review of CL notes suggested she responded well to prns of ativan. As a result, klonopin was switched to ativan 1mg po bid briefly but there was no improvement so she was taken off of it. Meanwhile, seroquel was increased to target psychosis. Cymbalta had to be switched to effexor as patient can only take crushed meds. Has had some improvement overall though limited progress for the past few days. Discussed care with daughter again yesterday and states mother's presentation is very similar to decompensation in 2016/2017 and rather unlike that in 2021/2022 admission when she was stabilized on depakote and gabapentin. Daughter is strongly advocating for patient to go back on klonopin at a higher dose of 0.5mg po bid. It was also made clear that patient has JOSESITO and has not been on CPAP since March 2023 due to poor compliance 2/2 agitation. Will taper off effexor to minimize polypharmacy after which klonopin can be added back. Seroquel will have to continue given paranoia. Will transfer patient to  to allow for CPAP therapy.       Plan:  1. Legals: 9.27  2. Safety: constant observation for disorganized behavior 7am-11pm.  Ativan 0.5 mg q12h prn for anxiety/agitation.  3. Psychiatric: Continue current medications   	         Continue Seroquel 62.5mg daily and 75mg QHS   	         C/w Depakote 250 mg daily and 500 mg qHS. VPA level of 48.70 on 5/30 - will consider tapering off and optimizing SGA in the near future  	         Decreased Effexor to 37.5mg po bid (cymbalta discontinued due to dysphagia concerns and needing to crush medications) which aim to taper off quickly (has not been on it long).   		Will plan to restart klonopin at 0.25mg po bid in a few days when effexor has been discontinued and CPAP initiated.   	        	        4. Group, milieu, individual therapy as appropriate.  5. Medical: HTN, HLD, DM2. Phlebitis, possibly dysphagia, joint pain  	Keflex 500 mg qid x 5 days - complete now  	amlodipine 2.5 mg daily. decrease losartan to 75 mg daily due to orthostatic htn. aspirin 81 mg daily. atorvastatin 10 mg qHS.  	Lipid profile wnl other than slightly low HDL  	Metformin 500mg po bid              Naproxen 500mg daily   	b12 supplementation for deficiency in the past    	Ambulate with walker  	JOSESITO - will consider transfer to  to allow for CPAP use  6. Work up: none  7. 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 Plan cont CO as patient disorganized walks backward at risk for falls form disorganziation with close monitoring. Based on unusual hx of unique and dramatic response to klonopin and failure on antipsychotics will try klonopin while titrating Effexor and taper off Seroquel . Start on klonopin 0.25mg bid and titrate to 0.5mg bid. Watch BP as has some orthostasis may get better off Seroquel adjust BP meds if required. Cont CPAP  as she was able to use it last night  6/29 More linear and better related but paranoia is more apparent. BP stable though with very slight tachycardia, po intake ok, will monitor VS before increasing klonopin  6/30 Intrusive and paranoid, difficult to redirect per staff. Got ativan 0.5mg IM once due to agitation. Continue klonopin 0.25mg po bid for now - can increase to 0.5mg po bid if compliant with CPAP; no change in seroquel 25mg po bid, effexor 50mg po bid, depakote sprinkles 750mg/day; family advocating for treatment   with klonopin (consider increasing antisychotic, possibly less anticholinergic agent, if daughter agrees) though she refused CPAP last night so it would be   concerning to increase dose without CPAP compliance  7/1: More calm today, not agitated, able to be redirected  7/2: Some mild agitation, no persecutory delusions expressed, got prn quetiapine today, generally calm this AM  7/3 Some improvement. Still anxiouas agitated suspicious. Will change meds to daytime dosing with option to give later such as during visiting when family can encourage. Will also get rid of non urgent meds such as vitamins. Will increase laxatives as patient has hx constipation leading to retention. Recheck labs  7/4: Improved, no nihilistic delusions expressed, remains depressed, continue antidepressant/antipsychotic

## 2023-07-04 NOTE — BH INPATIENT PSYCHIATRY PROGRESS NOTE - NSBHFUPINTERVALHXFT_PSY_A_CORE
Patient followed for bipolar with likely agitated depression vs mixed state. Mildly hypertensive. Improved, nihilistic delusions resolving, remains depressed.

## 2023-07-04 NOTE — BH INPATIENT PSYCHIATRY PROGRESS NOTE - CURRENT MEDICATION
MEDICATIONS  (STANDING):  amLODIPine   Tablet 2.5 milliGRAM(s) Oral daily  aspirin  chewable 81 milliGRAM(s) Oral daily  atorvastatin 10 milliGRAM(s) Oral at bedtime  clonazePAM Oral Disintegrating Tablet 0.5 milliGRAM(s) Oral daily  divalproex Sprinkle 250 milliGRAM(s) Oral daily  divalproex Sprinkle 500 milliGRAM(s) Oral <User Schedule>  glucagon  Injectable 1 milliGRAM(s) IntraMuscular once  losartan 75 milliGRAM(s) Oral daily  naproxen 500 milliGRAM(s) Oral daily  pantoprazole   Suspension 40 milliGRAM(s) Oral before breakfast  polyethylene glycol 3350 17 Gram(s) Oral daily  senna 2 Tablet(s) Oral daily  sodium chloride 0.65% Nasal 2 Spray(s) Both Nostrils two times a day  venlafaxine 100 milliGRAM(s) Oral daily    MEDICATIONS  (PRN):  acetaminophen     Tablet .. 650 milliGRAM(s) Oral every 6 hours PRN Temp greater or equal to 38C (100.4F), Mild Pain (1 - 3), Moderate Pain (4 - 6)  bisacodyl 5 milliGRAM(s) Oral daily PRN constipation  bisacodyl Suppository 10 milliGRAM(s) Rectal daily PRN constipation  dextrose Oral Gel 15 Gram(s) Oral once PRN Blood Glucose LESS THAN 70 milliGRAM(s)/deciliter  LORazepam   Injectable 0.5 milliGRAM(s) IntraMuscular once PRN severe agitation  melatonin. 3 milliGRAM(s) Oral at bedtime PRN Insomnia  QUEtiapine 12.5 milliGRAM(s) Oral every 6 hours PRN anxiety

## 2023-07-04 NOTE — BH INPATIENT PSYCHIATRY PROGRESS NOTE - MSE UNSTRUCTURED FT
Patient is awake and alert. Mood "unhappy," petulant affect. Speech productive. Reaches out to hold examiner's hand, appears sad, +expressed loneliness, no delusions. No perceptual disturbance. Denies suicidal ideations.

## 2023-07-04 NOTE — BH INPATIENT PSYCHIATRY PROGRESS NOTE - NSBHCHARTREVIEWVS_PSY_A_CORE FT
Vital Signs Last 24 Hrs  T(C): 36.5 (07-04-23 @ 07:45), Max: 36.5 (07-04-23 @ 07:45)  T(F): 97.7 (07-04-23 @ 07:45), Max: 97.7 (07-04-23 @ 07:45)  HR: --  BP: --  BP(mean): --  RR: --  SpO2: --    Orthostatic VS  07-04-23 @ 07:45  Lying BP: --/-- HR: --  Sitting BP: 159/83 HR: 104  Standing BP: 151/73 HR: 114  Site: upper left arm  Mode: electronic  Orthostatic VS  07-03-23 @ 07:39  Lying BP: --/-- HR: --  Sitting BP: 140/72 HR: 82  Standing BP: 155/61 HR: 87  Site: upper left arm  Mode: electronic

## 2023-07-05 LAB
ANION GAP SERPL CALC-SCNC: 11 MMOL/L — SIGNIFICANT CHANGE UP (ref 7–14)
BUN SERPL-MCNC: 14 MG/DL — SIGNIFICANT CHANGE UP (ref 7–23)
CALCIUM SERPL-MCNC: 9.8 MG/DL — SIGNIFICANT CHANGE UP (ref 8.4–10.5)
CHLORIDE SERPL-SCNC: 97 MMOL/L — LOW (ref 98–107)
CO2 SERPL-SCNC: 24 MMOL/L — SIGNIFICANT CHANGE UP (ref 22–31)
CREAT SERPL-MCNC: 0.66 MG/DL — SIGNIFICANT CHANGE UP (ref 0.5–1.3)
EGFR: 88 ML/MIN/1.73M2 — SIGNIFICANT CHANGE UP
GLUCOSE BLDC GLUCOMTR-MCNC: 116 MG/DL — HIGH (ref 70–99)
GLUCOSE SERPL-MCNC: 231 MG/DL — HIGH (ref 70–99)
POTASSIUM SERPL-MCNC: 4.9 MMOL/L — SIGNIFICANT CHANGE UP (ref 3.5–5.3)
POTASSIUM SERPL-SCNC: 4.9 MMOL/L — SIGNIFICANT CHANGE UP (ref 3.5–5.3)
SODIUM SERPL-SCNC: 132 MMOL/L — LOW (ref 135–145)

## 2023-07-05 PROCEDURE — 99232 SBSQ HOSP IP/OBS MODERATE 35: CPT

## 2023-07-05 RX ORDER — CLONAZEPAM 1 MG
0.25 TABLET ORAL
Refills: 0 | Status: DISCONTINUED | OUTPATIENT
Start: 2023-07-05 | End: 2023-07-07

## 2023-07-05 RX ORDER — HALOPERIDOL DECANOATE 100 MG/ML
1 INJECTION INTRAMUSCULAR EVERY 6 HOURS
Refills: 0 | Status: DISCONTINUED | OUTPATIENT
Start: 2023-07-05 | End: 2023-07-13

## 2023-07-05 RX ORDER — HALOPERIDOL DECANOATE 100 MG/ML
1 INJECTION INTRAMUSCULAR ONCE
Refills: 0 | Status: DISCONTINUED | OUTPATIENT
Start: 2023-07-05 | End: 2023-10-17

## 2023-07-05 RX ORDER — CLONAZEPAM 1 MG
0.25 TABLET ORAL ONCE
Refills: 0 | Status: DISCONTINUED | OUTPATIENT
Start: 2023-07-05 | End: 2023-07-05

## 2023-07-05 RX ORDER — LACTULOSE 10 G/15ML
10 SOLUTION ORAL ONCE
Refills: 0 | Status: COMPLETED | OUTPATIENT
Start: 2023-07-05 | End: 2023-07-05

## 2023-07-05 RX ORDER — LACTULOSE 10 G/15ML
10 SOLUTION ORAL DAILY
Refills: 0 | Status: DISCONTINUED | OUTPATIENT
Start: 2023-07-05 | End: 2023-08-10

## 2023-07-05 RX ORDER — QUETIAPINE FUMARATE 200 MG/1
25 TABLET, FILM COATED ORAL
Refills: 0 | Status: DISCONTINUED | OUTPATIENT
Start: 2023-07-05 | End: 2023-07-07

## 2023-07-05 RX ORDER — HALOPERIDOL DECANOATE 100 MG/ML
1 INJECTION INTRAMUSCULAR ONCE
Refills: 0 | Status: COMPLETED | OUTPATIENT
Start: 2023-07-05 | End: 2023-07-05

## 2023-07-05 RX ADMIN — LACTULOSE 10 GRAM(S): 10 SOLUTION ORAL at 10:57

## 2023-07-05 RX ADMIN — DIVALPROEX SODIUM 250 MILLIGRAM(S): 500 TABLET, DELAYED RELEASE ORAL at 09:27

## 2023-07-05 RX ADMIN — POLYETHYLENE GLYCOL 3350 17 GRAM(S): 17 POWDER, FOR SOLUTION ORAL at 09:27

## 2023-07-05 RX ADMIN — Medication 500 MILLIGRAM(S): at 09:26

## 2023-07-05 RX ADMIN — Medication 0.25 MILLIGRAM(S): at 17:53

## 2023-07-05 RX ADMIN — ATORVASTATIN CALCIUM 10 MILLIGRAM(S): 80 TABLET, FILM COATED ORAL at 22:46

## 2023-07-05 RX ADMIN — Medication 81 MILLIGRAM(S): at 09:26

## 2023-07-05 RX ADMIN — AMLODIPINE BESYLATE 2.5 MILLIGRAM(S): 2.5 TABLET ORAL at 09:26

## 2023-07-05 RX ADMIN — DIVALPROEX SODIUM 500 MILLIGRAM(S): 500 TABLET, DELAYED RELEASE ORAL at 17:53

## 2023-07-05 RX ADMIN — LOSARTAN POTASSIUM 75 MILLIGRAM(S): 100 TABLET, FILM COATED ORAL at 09:26

## 2023-07-05 RX ADMIN — HALOPERIDOL DECANOATE 1 MILLIGRAM(S): 100 INJECTION INTRAMUSCULAR at 15:49

## 2023-07-05 RX ADMIN — QUETIAPINE FUMARATE 25 MILLIGRAM(S): 200 TABLET, FILM COATED ORAL at 22:47

## 2023-07-05 RX ADMIN — Medication 2 SPRAY(S): at 22:53

## 2023-07-05 RX ADMIN — Medication 0.25 MILLIGRAM(S): at 14:56

## 2023-07-05 RX ADMIN — Medication 0.5 MILLIGRAM(S): at 08:21

## 2023-07-05 RX ADMIN — Medication 0.5 MILLIGRAM(S): at 14:55

## 2023-07-05 NOTE — BH INPATIENT PSYCHIATRY PROGRESS NOTE - CURRENT MEDICATION
MEDICATIONS  (STANDING):  amLODIPine   Tablet 2.5 milliGRAM(s) Oral daily  aspirin  chewable 81 milliGRAM(s) Oral daily  atorvastatin 10 milliGRAM(s) Oral at bedtime  clonazePAM Oral Disintegrating Tablet 0.5 milliGRAM(s) Oral daily  clonazePAM Oral Disintegrating Tablet 0.25 milliGRAM(s) Oral once  clonazePAM Oral Disintegrating Tablet 0.25 milliGRAM(s) Oral <User Schedule>  divalproex Sprinkle 500 milliGRAM(s) Oral <User Schedule>  divalproex Sprinkle 250 milliGRAM(s) Oral daily  glucagon  Injectable 1 milliGRAM(s) IntraMuscular once  lactulose Syrup 10 Gram(s) Oral once  lactulose Syrup 10 Gram(s) Oral daily  losartan 75 milliGRAM(s) Oral daily  naproxen 500 milliGRAM(s) Oral daily  pantoprazole   Suspension 40 milliGRAM(s) Oral before breakfast  senna 2 Tablet(s) Oral daily  sodium chloride 0.65% Nasal 2 Spray(s) Both Nostrils two times a day  venlafaxine 100 milliGRAM(s) Oral daily    MEDICATIONS  (PRN):  acetaminophen     Tablet .. 650 milliGRAM(s) Oral every 6 hours PRN Temp greater or equal to 38C (100.4F), Mild Pain (1 - 3), Moderate Pain (4 - 6)  bisacodyl 5 milliGRAM(s) Oral daily PRN constipation  bisacodyl Suppository 10 milliGRAM(s) Rectal daily PRN constipation  dextrose Oral Gel 15 Gram(s) Oral once PRN Blood Glucose LESS THAN 70 milliGRAM(s)/deciliter  LORazepam   Injectable 0.5 milliGRAM(s) IntraMuscular once PRN severe agitation  melatonin. 3 milliGRAM(s) Oral at bedtime PRN Insomnia  QUEtiapine 12.5 milliGRAM(s) Oral every 6 hours PRN anxiety

## 2023-07-05 NOTE — UM REPORT PROGRESS NOTE - NSUMSWACTION_GEN_A_CORE FT
SW provides pt support and psychoeducation and encourages participation in self care and unit milieu. SW maintains contact with pt's daughter who is a psychiatric SW and involved in treatment and discharge planning discussions. Daughter wants the pt to return to [facility] where the pt has a bedhold and [facility].  SW provides pt support and psychoeducation and encourages participation in self care and unit milieu. SW maintains contact with pt's daughter who is a psychiatric SW and involved in treatment and discharge planning discussions. Daughter wants the pt to return to [facility] where the pt has a bedhold and daughter pays for a private room.  Daughter now indicates wish to be followed by the facility consultant psychiatrist. SW provides pt support and psychoeducation and encourages participation in self care and unit milieu. SW maintains contact with pt's daughter who is a psychiatric SW and involved in treatment and discharge planning discussions. Daughter wants the pt to return to [facility] where the pt has a bedhold and daughter pays for a private room.  Daughter indicates wish for the pt to be followed by the [facility] consultant psychiatrist. SW provides pt support and psychoeducation about depression, anxiety, and ECT. SW maintains contact with pt's daughter who is a psychiatric SW and involved in treatment and discharge planning discussions. Daughter wants the pt to return to [facility] where the pt has a bedhold and daughter pays for a private room.  Daughter indicates wish for the pt to be followed by the [facility] consultant psychiatrist. BABATUNDE has maintained contact with [facility] admissions representative.

## 2023-07-05 NOTE — BH INPATIENT PSYCHIATRY PROGRESS NOTE - NSBHMETABOLIC_PSY_ALL_CORE_FT
BMI: BMI (kg/m2): 23 (06-07-23 @ 18:13)  HbA1c: A1C with Estimated Average Glucose Result: 5.7 % (06-01-23 @ 05:10)    Glucose: POCT Blood Glucose.: 116 mg/dL (07-05-23 @ 07:49)    BP: --  Lipid Panel: Date/Time: 06-08-23 @ 08:30  Cholesterol, Serum: 119  Direct LDL: --  HDL Cholesterol, Serum: 47  Total Cholesterol/HDL Ration Measurement: --  Triglycerides, Serum: 56

## 2023-07-05 NOTE — UM REPORT PROGRESS NOTE - NSUMSWNOTE_GEN_A_CORE FT
The pt is an 84 yo  woman with a dx BAD mixed with psychotic features, 2 [facility] stays in 2022, living in [facility] since 6/22. The pt was admitted due to worsening symptoms and anxiety and paranoia, initially on ML5 but transferred to  due to need for CPAP. The pt is intensely paranoid that she will be killed, refuses to sleep in her room, and sleeps in a chair in the dayroom. The pt is being treated with Klonopin, Seroquel, Effexor, and Depakote, is slightly calmer but continues to be paranoid. The pt may be switched to a more portent antipsychotic.  The pt is an 82 yo  woman with a dx BAD mixed with psychotic features, 2 [facility] stays in 2022, living in [facility] since 6/22. The pt was admitted due to worsening symptoms and anxiety and paranoia, initially on ML5 but transferred to  due to need for CPAP. The pt is intensely paranoid that she will be killed, refuses to sleep in her room, and sleeps in a chair in the dayroom. The pt showed limited benefit from Klonopin and Seroquel trial, slightly calmer but still paranoid and refusing to sleep in her room. Abilify trial was started, dose being titrated, and treatment response monitored. The pt still refuses to sleep in her room and refuses to use CPAP.  The pt is an 84 yo  woman with a dx BAD mixed with psychotic features, 2 [facility] stays in 2022, living in [facility] since 6/22. The pt was admitted due to worsening symptoms and anxiety and paranoia, initially on ML5 but transferred to  due to need for CPAP. The pt is intensely paranoid that she will be killed, refuses to sleep in her room, and sleeps in a chair in the dayroom. The pt showed limited benefit from Klonopin and Seroquel trial, currently is being treated with Abilify with dose being titrated, but still is paranoid and refuses to sleep in her room. Consideration of ECT is being addressed with pt's daughter. The pt is an 84 yo  woman with a dx BAD mixed with psychotic features, 2 [facility] stays in 2022, living in [facility] since 6/22. The pt was admitted due to worsening symptoms and anxiety and paranoia, initially on ML5 but transferred to  due to need for CPAP. The pt has been compliant with medication, eating adequately, now less intensely paranoid that she will be killed, and able to sleep in her room. The pt is highly anxious and dysphoric, frequently requesting prns and staff assistance. The pt has shown limited benefit from Seroquel  and Abilify trials, now is being treated with Latuda, and consideration of ECT is being addressed with pt. The pt's family are in support of ECT if the pt will consent to it and willing to accompany pt to ECT if needed.

## 2023-07-05 NOTE — BH INPATIENT PSYCHIATRY PROGRESS NOTE - MSE UNSTRUCTURED FT
Patient is awake and alert. Mood "unhappy," distressed affect Speech productive. Reaches out to hold examiner's hand No perceptual disturbance. Denies suicidal ideations. Patient was picking at fingernail causing bleeding  and patient said staff caused this. Requests that wirter be present when all meds dispensed unclear if realted to paranoia or need for reassurance.  Thinking very perseverative. Oriented to month year . Poor insight

## 2023-07-05 NOTE — BH INPATIENT PSYCHIATRY PROGRESS NOTE - NSBHASSESSSUMMFT_PSY_ALL_CORE
81 year old , retired female, domiciled at [facility], [facility] of DM2, HLD, HTN, PPHx of late onset Bipolar disorder, 2 prior psych adm last in 2022 at [facility], who presented to ED with worsening paranoia, anxiety, FTT (weight loss of 20lb since march), and confusion. Psychiatry was consulted for psychosis/AMS. Was started on cymbalta as daughter reported this was helpful in 2016/2017 when she had a similar presentation. Was continued on home medications klonopin, depakote then CL team added seroquel due to paranoia. Was on gabapentin 900mg/day at home which was tapered off by CL team. Transferred to [facility] for ongoing stabilization. Daughter denies patient has dementia. Also states patient responded well to BZD back in 2016/2017 but not well to antipsychotics, which led to team considering possibility of catatonia hx. Review of CL notes suggested she responded well to prns of ativan. As a result, klonopin was switched to ativan 1mg po bid briefly but there was no improvement so she was taken off of it. Meanwhile, seroquel was increased to target psychosis. Cymbalta had to be switched to effexor as patient can only take crushed meds. Has had some improvement overall though limited progress for the past few days. Discussed care with daughter again yesterday and states mother's presentation is very similar to decompensation in 2016/2017 and rather unlike that in 2021/2022 admission when she was stabilized on depakote and gabapentin. Daughter is strongly advocating for patient to go back on klonopin at a higher dose of 0.5mg po bid. It was also made clear that patient has JOSESITO and has not been on CPAP since March 2023 due to poor compliance 2/2 agitation. Will taper off effexor to minimize polypharmacy after which klonopin can be added back. Seroquel will have to continue given paranoia. Will transfer patient to  to allow for CPAP therapy.       Plan:  1. Legals: 9.27  2. Safety: constant observation for disorganized behavior 7am-11pm.  Ativan 0.5 mg q12h prn for anxiety/agitation.  3. Psychiatric: Continue current medications   	         Continue Seroquel 62.5mg daily and 75mg QHS   	         C/w Depakote 250 mg daily and 500 mg qHS. VPA level of 48.70 on 5/30 - will consider tapering off and optimizing SGA in the near future  	         Decreased Effexor to 37.5mg po bid (cymbalta discontinued due to dysphagia concerns and needing to crush medications) which aim to taper off quickly (has not been on it long).   		Will plan to restart klonopin at 0.25mg po bid in a few days when effexor has been discontinued and CPAP initiated.   	        	        4. Group, milieu, individual therapy as appropriate.  5. Medical: HTN, HLD, DM2. Phlebitis, possibly dysphagia, joint pain  	Keflex 500 mg qid x 5 days - complete now  	amlodipine 2.5 mg daily. decrease losartan to 75 mg daily due to orthostatic htn. aspirin 81 mg daily. atorvastatin 10 mg qHS.  	Lipid profile wnl other than slightly low HDL  	Metformin 500mg po bid              Naproxen 500mg daily   	b12 supplementation for deficiency in the past    	Ambulate with walker  	JOSESITO - will consider transfer to  to allow for CPAP use  6. Work up: none  7. 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 Plan cont CO as patient disorganized walks backward at risk for falls form disorganziation with close monitoring. Based on unusual hx of unique and dramatic response to klonopin and failure on antipsychotics will try klonopin while titrating Effexor and taper off Seroquel . Start on klonopin 0.25mg bid and titrate to 0.5mg bid. Watch BP as has some orthostasis may get better off Seroquel adjust BP meds if required. Cont CPAP  as she was able to use it last night  6/29 More linear and better related but paranoia is more apparent. BP stable though with very slight tachycardia, po intake ok, will monitor VS before increasing klonopin  6/30 Intrusive and paranoid, difficult to redirect per staff. Got ativan 0.5mg IM once due to agitation. Continue klonopin 0.25mg po bid for now - can increase to 0.5mg po bid if compliant with CPAP; no change in seroquel 25mg po bid, effexor 50mg po bid, depakote sprinkles 750mg/day; family advocating for treatment   with klonopin (consider increasing antisychotic, possibly less anticholinergic agent, if daughter agrees) though she refused CPAP last night so it would be   concerning to increase dose without CPAP compliance  7/1: More calm today, not agitated, able to be redirected  7/2: Some mild agitation, no persecutory delusions expressed, got prn quetiapine today, generally calm this AM  7/3 Some improvement. Still anxiouas agitated suspicious. Will change meds to daytime dosing with option to give later such as during visiting when family can encourage. Will also get rid of non urgent meds such as vitamins. Will increase laxatives as patient has hx constipation leading to retention. Recheck labs  7/4: Improved, no nihilistic delusions expressed, remains depressed, continue antidepressant/antipsychotic   7/5 Sl less delusional still terribly anxious restless receiving prns. Will increase klonopin, will change Miralax to lactulose as difficult to get patient to drink full cup of Miralax. Cont CO. Moniotr NA for hyponatremia  has been in this range before w/o dipping lower

## 2023-07-05 NOTE — BH INPATIENT PSYCHIATRY PROGRESS NOTE - NSBHFUPINTERVALHXFT_PSY_A_CORE
Patient followed for bipolar with likely agitated depression vs mixed state. Mildly hypertensive. Eating sleeping okay. Very restless, desperately seeking reassurance, contact with family, worse in PM. Na 132 would be 133 with glucose correction. Getting frequent prns for severe resptless agitation where concern is fall could occur despite co from restlesseness

## 2023-07-05 NOTE — BH INPATIENT PSYCHIATRY PROGRESS NOTE - NSBHCHARTREVIEWVS_PSY_A_CORE FT
Vital Signs Last 24 Hrs  T(C): 36.8 (07-05-23 @ 08:07), Max: 36.8 (07-05-23 @ 08:07)  T(F): 98.3 (07-05-23 @ 08:07), Max: 98.3 (07-05-23 @ 08:07)  HR: --  BP: --  BP(mean): --  RR: --  SpO2: --    Orthostatic VS  07-05-23 @ 08:07  Lying BP: --/-- HR: --  Sitting BP: 162/85 HR: 77  Standing BP: 160/75 HR: 96  Site: upper right arm  Mode: electronic  Orthostatic VS  07-04-23 @ 07:45  Lying BP: --/-- HR: --  Sitting BP: 159/83 HR: 104  Standing BP: 151/73 HR: 114  Site: upper left arm  Mode: electronic

## 2023-07-06 PROCEDURE — 99232 SBSQ HOSP IP/OBS MODERATE 35: CPT

## 2023-07-06 RX ORDER — LOPERAMIDE HCL 2 MG
2 TABLET ORAL ONCE
Refills: 0 | Status: COMPLETED | OUTPATIENT
Start: 2023-07-06 | End: 2023-07-06

## 2023-07-06 RX ORDER — LACTULOSE 10 G/15ML
10 SOLUTION ORAL
Refills: 0 | Status: DISCONTINUED | OUTPATIENT
Start: 2023-07-06 | End: 2023-07-08

## 2023-07-06 RX ADMIN — Medication 2 MILLIGRAM(S): at 23:44

## 2023-07-06 RX ADMIN — ATORVASTATIN CALCIUM 10 MILLIGRAM(S): 80 TABLET, FILM COATED ORAL at 21:39

## 2023-07-06 RX ADMIN — DIVALPROEX SODIUM 250 MILLIGRAM(S): 500 TABLET, DELAYED RELEASE ORAL at 07:43

## 2023-07-06 RX ADMIN — Medication 5 MILLIGRAM(S): at 13:26

## 2023-07-06 RX ADMIN — Medication 100 MILLIGRAM(S): at 07:44

## 2023-07-06 RX ADMIN — SENNA PLUS 2 TABLET(S): 8.6 TABLET ORAL at 07:43

## 2023-07-06 RX ADMIN — Medication 0.5 MILLIGRAM(S): at 07:44

## 2023-07-06 RX ADMIN — DIVALPROEX SODIUM 500 MILLIGRAM(S): 500 TABLET, DELAYED RELEASE ORAL at 17:03

## 2023-07-06 RX ADMIN — Medication 81 MILLIGRAM(S): at 07:44

## 2023-07-06 RX ADMIN — Medication 0.25 MILLIGRAM(S): at 13:05

## 2023-07-06 RX ADMIN — QUETIAPINE FUMARATE 25 MILLIGRAM(S): 200 TABLET, FILM COATED ORAL at 21:39

## 2023-07-06 RX ADMIN — AMLODIPINE BESYLATE 2.5 MILLIGRAM(S): 2.5 TABLET ORAL at 07:43

## 2023-07-06 RX ADMIN — QUETIAPINE FUMARATE 25 MILLIGRAM(S): 200 TABLET, FILM COATED ORAL at 07:43

## 2023-07-06 RX ADMIN — PANTOPRAZOLE SODIUM 40 MILLIGRAM(S): 20 TABLET, DELAYED RELEASE ORAL at 06:52

## 2023-07-06 RX ADMIN — LACTULOSE 10 GRAM(S): 10 SOLUTION ORAL at 08:15

## 2023-07-06 RX ADMIN — LOSARTAN POTASSIUM 75 MILLIGRAM(S): 100 TABLET, FILM COATED ORAL at 07:45

## 2023-07-06 RX ADMIN — Medication 0.25 MILLIGRAM(S): at 17:03

## 2023-07-06 NOTE — BH INPATIENT PSYCHIATRY PROGRESS NOTE - CURRENT MEDICATION
MEDICATIONS  (STANDING):  amLODIPine   Tablet 2.5 milliGRAM(s) Oral daily  aspirin  chewable 81 milliGRAM(s) Oral daily  atorvastatin 10 milliGRAM(s) Oral at bedtime  clonazePAM Oral Disintegrating Tablet 0.25 milliGRAM(s) Oral <User Schedule>  clonazePAM Oral Disintegrating Tablet 0.5 milliGRAM(s) Oral daily  divalproex Sprinkle 250 milliGRAM(s) Oral daily  divalproex Sprinkle 500 milliGRAM(s) Oral <User Schedule>  glucagon  Injectable 1 milliGRAM(s) IntraMuscular once  lactulose Syrup 10 Gram(s) Oral <User Schedule>  lactulose Syrup 10 Gram(s) Oral daily  losartan 75 milliGRAM(s) Oral daily  naproxen 500 milliGRAM(s) Oral daily  pantoprazole   Suspension 40 milliGRAM(s) Oral before breakfast  QUEtiapine 25 milliGRAM(s) Oral two times a day  senna 2 Tablet(s) Oral daily  sodium chloride 0.65% Nasal 2 Spray(s) Both Nostrils two times a day  venlafaxine 100 milliGRAM(s) Oral daily    MEDICATIONS  (PRN):  acetaminophen     Tablet .. 650 milliGRAM(s) Oral every 6 hours PRN Temp greater or equal to 38C (100.4F), Mild Pain (1 - 3), Moderate Pain (4 - 6)  bisacodyl 5 milliGRAM(s) Oral daily PRN constipation  bisacodyl Suppository 10 milliGRAM(s) Rectal daily PRN constipation  dextrose Oral Gel 15 Gram(s) Oral once PRN Blood Glucose LESS THAN 70 milliGRAM(s)/deciliter  haloperidol     Tablet 1 milliGRAM(s) Oral every 6 hours PRN agitation  haloperidol    Injectable 1 milliGRAM(s) IntraMuscular once PRN agitation  LORazepam     Tablet 0.5 milliGRAM(s) Oral every 6 hours PRN agitation  LORazepam   Injectable 0.5 milliGRAM(s) IntraMuscular once PRN severe agitation  melatonin. 3 milliGRAM(s) Oral at bedtime PRN Insomnia

## 2023-07-06 NOTE — BH INPATIENT PSYCHIATRY PROGRESS NOTE - NSBHCHARTREVIEWVS_PSY_A_CORE FT
Vital Signs Last 24 Hrs  T(C): 36.8 (07-06-23 @ 08:13), Max: 36.8 (07-06-23 @ 08:13)  T(F): 98.2 (07-06-23 @ 08:13), Max: 98.2 (07-06-23 @ 08:13)  HR: --  BP: --  BP(mean): --  RR: 19 (07-06-23 @ 08:13) (18 - 19)  SpO2: 96% (07-06-23 @ 08:13) (95% - 96%)    Orthostatic VS  07-06-23 @ 08:13  Lying BP: --/-- HR: --  Sitting BP: 168/74 HR: 97  Standing BP: 162/78 HR: 99  Site: upper left arm  Mode: electronic  Orthostatic VS  07-05-23 @ 08:07  Lying BP: --/-- HR: --  Sitting BP: 162/85 HR: 77  Standing BP: 160/75 HR: 96  Site: upper right arm  Mode: electronic

## 2023-07-06 NOTE — BH INPATIENT PSYCHIATRY PROGRESS NOTE - NSBHASSESSSUMMFT_PSY_ALL_CORE
81 year old , retired female, domiciled at [facility], [facility] of DM2, HLD, HTN, PPHx of late onset Bipolar disorder, 2 prior psych adm last in 2022 at [facility], who presented to ED with worsening paranoia, anxiety, FTT (weight loss of 20lb since march), and confusion. Psychiatry was consulted for psychosis/AMS. Was started on cymbalta as daughter reported this was helpful in 2016/2017 when she had a similar presentation. Was continued on home medications klonopin, depakote then CL team added seroquel due to paranoia. Was on gabapentin 900mg/day at home which was tapered off by CL team. Transferred to [facility] for ongoing stabilization. Daughter denies patient has dementia. Also states patient responded well to BZD back in 2016/2017 but not well to antipsychotics, which led to team considering possibility of catatonia hx. Review of CL notes suggested she responded well to prns of ativan. As a result, klonopin was switched to ativan 1mg po bid briefly but there was no improvement so she was taken off of it. Meanwhile, seroquel was increased to target psychosis. Cymbalta had to be switched to effexor as patient can only take crushed meds. Has had some improvement overall though limited progress for the past few days. Discussed care with daughter again yesterday and states mother's presentation is very similar to decompensation in 2016/2017 and rather unlike that in 2021/2022 admission when she was stabilized on depakote and gabapentin. Daughter is strongly advocating for patient to go back on klonopin at a higher dose of 0.5mg po bid. It was also made clear that patient has JOSESITO and has not been on CPAP since March 2023 due to poor compliance 2/2 agitation. Will taper off effexor to minimize polypharmacy after which klonopin can be added back. Seroquel will have to continue given paranoia. Will transfer patient to  to allow for CPAP therapy.       Plan:  1. Legals: 9.27  2. Safety: constant observation for disorganized behavior 7am-11pm.  Ativan 0.5 mg q12h prn for anxiety/agitation.  3. Psychiatric: Continue current medications   	         Continue Seroquel 62.5mg daily and 75mg QHS   	         C/w Depakote 250 mg daily and 500 mg qHS. VPA level of 48.70 on 5/30 - will consider tapering off and optimizing SGA in the near future  	         Decreased Effexor to 37.5mg po bid (cymbalta discontinued due to dysphagia concerns and needing to crush medications) which aim to taper off quickly (has not been on it long).   		Will plan to restart klonopin at 0.25mg po bid in a few days when effexor has been discontinued and CPAP initiated.   	        	        4. Group, milieu, individual therapy as appropriate.  5. Medical: HTN, HLD, DM2. Phlebitis, possibly dysphagia, joint pain  	Keflex 500 mg qid x 5 days - complete now  	amlodipine 2.5 mg daily. decrease losartan to 75 mg daily due to orthostatic htn. aspirin 81 mg daily. atorvastatin 10 mg qHS.  	Lipid profile wnl other than slightly low HDL  	Metformin 500mg po bid              Naproxen 500mg daily   	b12 supplementation for deficiency in the past    	Ambulate with walker  	JOSESITO - will consider transfer to  to allow for CPAP use  6. Work up: none  7. 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 Plan cont CO as patient disorganized walks backward at risk for falls form disorganziation with close monitoring. Based on unusual hx of unique and dramatic response to klonopin and failure on antipsychotics will try klonopin while titrating Effexor and taper off Seroquel . Start on klonopin 0.25mg bid and titrate to 0.5mg bid. Watch BP as has some orthostasis may get better off Seroquel adjust BP meds if required. Cont CPAP  as she was able to use it last night  6/29 More linear and better related but paranoia is more apparent. BP stable though with very slight tachycardia, po intake ok, will monitor VS before increasing klonopin  6/30 Intrusive and paranoid, difficult to redirect per staff. Got ativan 0.5mg IM once due to agitation. Continue klonopin 0.25mg po bid for now - can increase to 0.5mg po bid if compliant with CPAP; no change in seroquel 25mg po bid, effexor 50mg po bid, depakote sprinkles 750mg/day; family advocating for treatment   with klonopin (consider increasing antisychotic, possibly less anticholinergic agent, if daughter agrees) though she refused CPAP last night so it would be   concerning to increase dose without CPAP compliance  7/1: More calm today, not agitated, able to be redirected  7/2: Some mild agitation, no persecutory delusions expressed, got prn quetiapine today, generally calm this AM  7/3 Some improvement. Still anxiouas agitated suspicious. Will change meds to daytime dosing with option to give later such as during visiting when family can encourage. Will also get rid of non urgent meds such as vitamins. Will increase laxatives as patient has hx constipation leading to retention. Recheck labs  7/4: Improved, no nihilistic delusions expressed, remains depressed, continue antidepressant/antipsychotic   7/5 Sl less delusional still terribly anxious restless receiving prns. Will increase klonopin, will change Miralax to lactulose as difficult to get patient to drink full cup of Miralax. Cont CO. Moniotr NA for hyponatremia  has been in this range before w/o dipping lower  7/6 Agitation restlessness less acute still paranoid about being harmed and about meds. Cont meds, plan increase Seroquel  increase laxatives

## 2023-07-06 NOTE — BH INPATIENT PSYCHIATRY PROGRESS NOTE - NSBHFUPINTERVALHXFT_PSY_A_CORE
Patient followed for bipolar with likely agitated depression vs mixed state.Hypertensive but likely false reading to be repeated Eating  okay sleep was poor last night [place] today then yesterdya still paranoid wants MD to supervise dispensing of all meds. Na 132 would be 133 with glucose correction.

## 2023-07-06 NOTE — BH INPATIENT PSYCHIATRY PROGRESS NOTE - MSE UNSTRUCTURED FT
Patient is awake and alert. [place]  Mood "unhappy," distressed affect Speech productive. Reaches out to hold examiner's hand No perceptual disturbance. Requests that wirter be present when all meds likely related to paranoia. No SI, voices  Thinking very perseverative. Oriented to month year . Poor insight

## 2023-07-07 LAB — GLUCOSE BLDC GLUCOMTR-MCNC: 98 MG/DL — SIGNIFICANT CHANGE UP (ref 70–99)

## 2023-07-07 PROCEDURE — 99232 SBSQ HOSP IP/OBS MODERATE 35: CPT

## 2023-07-07 RX ORDER — CLONAZEPAM 1 MG
0.5 TABLET ORAL DAILY
Refills: 0 | Status: DISCONTINUED | OUTPATIENT
Start: 2023-07-07 | End: 2023-07-09

## 2023-07-07 RX ORDER — QUETIAPINE FUMARATE 200 MG/1
37.5 TABLET, FILM COATED ORAL
Refills: 0 | Status: DISCONTINUED | OUTPATIENT
Start: 2023-07-07 | End: 2023-07-08

## 2023-07-07 RX ADMIN — SENNA PLUS 2 TABLET(S): 8.6 TABLET ORAL at 09:34

## 2023-07-07 RX ADMIN — Medication 0.25 MILLIGRAM(S): at 12:32

## 2023-07-07 RX ADMIN — Medication 100 MILLIGRAM(S): at 09:37

## 2023-07-07 RX ADMIN — LOSARTAN POTASSIUM 75 MILLIGRAM(S): 100 TABLET, FILM COATED ORAL at 09:36

## 2023-07-07 RX ADMIN — Medication 0.5 MILLIGRAM(S): at 09:35

## 2023-07-07 RX ADMIN — ATORVASTATIN CALCIUM 10 MILLIGRAM(S): 80 TABLET, FILM COATED ORAL at 21:29

## 2023-07-07 RX ADMIN — AMLODIPINE BESYLATE 2.5 MILLIGRAM(S): 2.5 TABLET ORAL at 09:37

## 2023-07-07 RX ADMIN — QUETIAPINE FUMARATE 37.5 MILLIGRAM(S): 200 TABLET, FILM COATED ORAL at 21:30

## 2023-07-07 RX ADMIN — Medication 500 MILLIGRAM(S): at 09:37

## 2023-07-07 RX ADMIN — Medication 81 MILLIGRAM(S): at 09:35

## 2023-07-07 RX ADMIN — QUETIAPINE FUMARATE 25 MILLIGRAM(S): 200 TABLET, FILM COATED ORAL at 09:36

## 2023-07-07 RX ADMIN — DIVALPROEX SODIUM 250 MILLIGRAM(S): 500 TABLET, DELAYED RELEASE ORAL at 09:34

## 2023-07-07 RX ADMIN — Medication 500 MILLIGRAM(S): at 10:35

## 2023-07-07 RX ADMIN — DIVALPROEX SODIUM 500 MILLIGRAM(S): 500 TABLET, DELAYED RELEASE ORAL at 17:09

## 2023-07-07 NOTE — BH INPATIENT PSYCHIATRY PROGRESS NOTE - NSBHCHARTREVIEWVS_PSY_A_CORE FT
Vital Signs Last 24 Hrs  T(C): 36.4 (07-07-23 @ 07:41), Max: 36.4 (07-07-23 @ 07:41)  T(F): 97.6 (07-07-23 @ 07:41), Max: 97.6 (07-07-23 @ 07:41)  HR: --  BP: --  BP(mean): --  RR: --  SpO2: --    Orthostatic VS  07-07-23 @ 07:41  Lying BP: --/-- HR: --  Sitting BP: 158/68 HR: 86  Standing BP: 149/65 HR: 89  Site: upper left arm  Mode: electronic  Orthostatic VS  07-06-23 @ 08:13  Lying BP: --/-- HR: --  Sitting BP: 168/74 HR: 97  Standing BP: 162/78 HR: 99  Site: upper left arm  Mode: electronic

## 2023-07-07 NOTE — BH INPATIENT PSYCHIATRY PROGRESS NOTE - NSBHMETABOLIC_PSY_ALL_CORE_FT
BMI: BMI (kg/m2): 23 (06-07-23 @ 18:13)  HbA1c: A1C with Estimated Average Glucose Result: 5.7 % (06-01-23 @ 05:10)    Glucose: POCT Blood Glucose.: 98 mg/dL (07-07-23 @ 07:33)    BP: --  Lipid Panel: Date/Time: 06-08-23 @ 08:30  Cholesterol, Serum: 119  Direct LDL: --  HDL Cholesterol, Serum: 47  Total Cholesterol/HDL Ration Measurement: --  Triglycerides, Serum: 56

## 2023-07-07 NOTE — BH INPATIENT PSYCHIATRY PROGRESS NOTE - NSBHASSESSSUMMFT_PSY_ALL_CORE
81 year old , retired female, domiciled at [facility], [facility] of DM2, HLD, HTN, PPHx of late onset Bipolar disorder, 2 prior psych adm last in 2022 at [facility], who presented to ED with worsening paranoia, anxiety, FTT (weight loss of 20lb since march), and confusion. Psychiatry was consulted for psychosis/AMS. Was started on cymbalta as daughter reported this was helpful in 2016/2017 when she had a similar presentation. Was continued on home medications klonopin, depakote then CL team added seroquel due to paranoia. Was on gabapentin 900mg/day at home which was tapered off by CL team. Transferred to [facility] for ongoing stabilization. Daughter denies patient has dementia. Also states patient responded well to BZD back in 2016/2017 but not well to antipsychotics, which led to team considering possibility of catatonia hx. Review of CL notes suggested she responded well to prns of ativan. As a result, klonopin was switched to ativan 1mg po bid briefly but there was no improvement so she was taken off of it. Meanwhile, seroquel was increased to target psychosis. Cymbalta had to be switched to effexor as patient can only take crushed meds. Has had some improvement overall though limited progress for the past few days. Discussed care with daughter again yesterday and states mother's presentation is very similar to decompensation in 2016/2017 and rather unlike that in 2021/2022 admission when she was stabilized on depakote and gabapentin. Daughter is strongly advocating for patient to go back on klonopin at a higher dose of 0.5mg po bid. It was also made clear that patient has JOSESITO and has not been on CPAP since March 2023 due to poor compliance 2/2 agitation. Will taper off effexor to minimize polypharmacy after which klonopin can be added back. Seroquel will have to continue given paranoia. Will transfer patient to  to allow for CPAP therapy.       Plan:  1. Legals: 9.27  2. Safety: constant observation for disorganized behavior 7am-11pm.  Ativan 0.5 mg q12h prn for anxiety/agitation.  3. Psychiatric: Continue current medications   	         Continue Seroquel 62.5mg daily and 75mg QHS   	         C/w Depakote 250 mg daily and 500 mg qHS. VPA level of 48.70 on 5/30 - will consider tapering off and optimizing SGA in the near future  	         Decreased Effexor to 37.5mg po bid (cymbalta discontinued due to dysphagia concerns and needing to crush medications) which aim to taper off quickly (has not been on it long).   		Will plan to restart klonopin at 0.25mg po bid in a few days when effexor has been discontinued and CPAP initiated.   	        	        4. Group, milieu, individual therapy as appropriate.  5. Medical: HTN, HLD, DM2. Phlebitis, possibly dysphagia, joint pain  	Keflex 500 mg qid x 5 days - complete now  	amlodipine 2.5 mg daily. decrease losartan to 75 mg daily due to orthostatic htn. aspirin 81 mg daily. atorvastatin 10 mg qHS.  	Lipid profile wnl other than slightly low HDL  	Metformin 500mg po bid              Naproxen 500mg daily   	b12 supplementation for deficiency in the past    	Ambulate with walker  	JOSESITO - will consider transfer to  to allow for CPAP use  6. Work up: none  7. 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 Plan cont CO as patient disorganized walks backward at risk for falls form disorganziation with close monitoring. Based on unusual hx of unique and dramatic response to klonopin and failure on antipsychotics will try klonopin while titrating Effexor and taper off Seroquel . Start on klonopin 0.25mg bid and titrate to 0.5mg bid. Watch BP as has some orthostasis may get better off Seroquel adjust BP meds if required. Cont CPAP  as she was able to use it last night  6/29 More linear and better related but paranoia is more apparent. BP stable though with very slight tachycardia, po intake ok, will monitor VS before increasing klonopin  6/30 Intrusive and paranoid, difficult to redirect per staff. Got ativan 0.5mg IM once due to agitation. Continue klonopin 0.25mg po bid for now - can increase to 0.5mg po bid if compliant with CPAP; no change in seroquel 25mg po bid, effexor 50mg po bid, depakote sprinkles 750mg/day; family advocating for treatment   with klonopin (consider increasing antisychotic, possibly less anticholinergic agent, if daughter agrees) though she refused CPAP last night so it would be   concerning to increase dose without CPAP compliance  7/1: More calm today, not agitated, able to be redirected  7/2: Some mild agitation, no persecutory delusions expressed, got prn quetiapine today, generally calm this AM  7/3 Some improvement. Still anxiouas agitated suspicious. Will change meds to daytime dosing with option to give later such as during visiting when family can encourage. Will also get rid of non urgent meds such as vitamins. Will increase laxatives as patient has hx constipation leading to retention. Recheck labs  7/4: Improved, no nihilistic delusions expressed, remains depressed, continue antidepressant/antipsychotic   7/5 Sl less delusional still terribly anxious restless receiving prns. Will increase klonopin, will change Miralax to lactulose as difficult to get patient to drink full cup of Miralax. Cont CO. Moniotr NA for hyponatremia  has been in this range before w/o dipping lower  7/6 Agitation restlessness less acute still paranoid about being harmed and about meds. Cont meds, plan increase Seroquel  increase laxatives  7/7 Depressed psychotic some sedation . Will lower Klonopin and titrate Seroquel to dose on which she was doing better. If combined treatment is too sedating may need to try less sedating antipsychotic. Will hold Lactulose

## 2023-07-07 NOTE — BH INPATIENT PSYCHIATRY PROGRESS NOTE - CURRENT MEDICATION
MEDICATIONS  (STANDING):  amLODIPine   Tablet 2.5 milliGRAM(s) Oral daily  aspirin  chewable 81 milliGRAM(s) Oral daily  atorvastatin 10 milliGRAM(s) Oral at bedtime  clonazePAM Oral Disintegrating Tablet 0.5 milliGRAM(s) Oral daily  divalproex Sprinkle 250 milliGRAM(s) Oral daily  divalproex Sprinkle 500 milliGRAM(s) Oral <User Schedule>  glucagon  Injectable 1 milliGRAM(s) IntraMuscular once  lactulose Syrup 10 Gram(s) Oral <User Schedule>  lactulose Syrup 10 Gram(s) Oral daily  loperamide 2 milliGRAM(s) Oral once  losartan 75 milliGRAM(s) Oral daily  naproxen 500 milliGRAM(s) Oral daily  pantoprazole   Suspension 40 milliGRAM(s) Oral before breakfast  QUEtiapine 37.5 milliGRAM(s) Oral two times a day  senna 2 Tablet(s) Oral daily  sodium chloride 0.65% Nasal 2 Spray(s) Both Nostrils two times a day  venlafaxine 100 milliGRAM(s) Oral daily    MEDICATIONS  (PRN):  acetaminophen     Tablet .. 650 milliGRAM(s) Oral every 6 hours PRN Temp greater or equal to 38C (100.4F), Mild Pain (1 - 3), Moderate Pain (4 - 6)  bisacodyl 5 milliGRAM(s) Oral daily PRN constipation  bisacodyl Suppository 10 milliGRAM(s) Rectal daily PRN constipation  dextrose Oral Gel 15 Gram(s) Oral once PRN Blood Glucose LESS THAN 70 milliGRAM(s)/deciliter  haloperidol     Tablet 1 milliGRAM(s) Oral every 6 hours PRN agitation  haloperidol    Injectable 1 milliGRAM(s) IntraMuscular once PRN agitation  LORazepam     Tablet 0.5 milliGRAM(s) Oral every 6 hours PRN agitation  LORazepam   Injectable 0.5 milliGRAM(s) IntraMuscular once PRN severe agitation  melatonin. 3 milliGRAM(s) Oral at bedtime PRN Insomnia

## 2023-07-07 NOTE — BH INPATIENT PSYCHIATRY PROGRESS NOTE - NSBHFUPINTERVALHXFT_PSY_A_CORE
Patient followed for bipolar with likely agitated depression vs mixed state.VSS.Eating well slept fair. Had diarrhea after series of laxatives.Less agitated and restless some napping during day   2 seconds or less

## 2023-07-07 NOTE — BH INPATIENT PSYCHIATRY PROGRESS NOTE - MSE UNSTRUCTURED FT
Patient is awake and alert. [place]  Mood "unhappy," distressed affect Speech productive.Less restless Reaches out to hold examiner's hand No perceptual disturbance.Believes her life is in danger   No SI, voices  Thinking very perseverative. Oriented to month year . Poor insight

## 2023-07-08 LAB — GLUCOSE BLDC GLUCOMTR-MCNC: 85 MG/DL — SIGNIFICANT CHANGE UP (ref 70–99)

## 2023-07-08 PROCEDURE — 99232 SBSQ HOSP IP/OBS MODERATE 35: CPT

## 2023-07-08 RX ORDER — QUETIAPINE FUMARATE 200 MG/1
37.5 TABLET, FILM COATED ORAL
Refills: 0 | Status: DISCONTINUED | OUTPATIENT
Start: 2023-07-08 | End: 2023-07-10

## 2023-07-08 RX ORDER — QUETIAPINE FUMARATE 200 MG/1
37.5 TABLET, FILM COATED ORAL
Refills: 0 | Status: DISCONTINUED | OUTPATIENT
Start: 2023-07-08 | End: 2023-07-08

## 2023-07-08 RX ADMIN — Medication 500 MILLIGRAM(S): at 09:42

## 2023-07-08 RX ADMIN — QUETIAPINE FUMARATE 37.5 MILLIGRAM(S): 200 TABLET, FILM COATED ORAL at 17:19

## 2023-07-08 RX ADMIN — Medication 500 MILLIGRAM(S): at 09:12

## 2023-07-08 RX ADMIN — Medication 100 MILLIGRAM(S): at 09:13

## 2023-07-08 RX ADMIN — DIVALPROEX SODIUM 250 MILLIGRAM(S): 500 TABLET, DELAYED RELEASE ORAL at 09:12

## 2023-07-08 RX ADMIN — SENNA PLUS 2 TABLET(S): 8.6 TABLET ORAL at 09:13

## 2023-07-08 RX ADMIN — QUETIAPINE FUMARATE 37.5 MILLIGRAM(S): 200 TABLET, FILM COATED ORAL at 09:13

## 2023-07-08 RX ADMIN — AMLODIPINE BESYLATE 2.5 MILLIGRAM(S): 2.5 TABLET ORAL at 09:12

## 2023-07-08 RX ADMIN — LOSARTAN POTASSIUM 75 MILLIGRAM(S): 100 TABLET, FILM COATED ORAL at 09:12

## 2023-07-08 RX ADMIN — Medication 81 MILLIGRAM(S): at 09:13

## 2023-07-08 RX ADMIN — DIVALPROEX SODIUM 500 MILLIGRAM(S): 500 TABLET, DELAYED RELEASE ORAL at 17:18

## 2023-07-08 RX ADMIN — Medication 0.5 MILLIGRAM(S): at 09:12

## 2023-07-08 NOTE — BH INPATIENT PSYCHIATRY PROGRESS NOTE - MSE UNSTRUCTURED FT
Patient is awake and alert. [place]  Mood "unhappy," distressed affect Speech productive.Less restless Reaches out to hold examiner's hand No perceptual disturbance.Believes her life is in danger will be killed tonight want writer to personally give all her meds for safety   No SI, voices  Thinking very perseverative. Oriented to month year . Poor insight

## 2023-07-08 NOTE — BH INPATIENT PSYCHIATRY PROGRESS NOTE - NSBHFUPINTERVALHXFT_PSY_A_CORE
Patient seen for psychotic depression. Patient w/o overnight events except refusing hs Seroquel. VSS. Patient anxious help reassurance seeking

## 2023-07-08 NOTE — BH INPATIENT PSYCHIATRY PROGRESS NOTE - NSBHASSESSSUMMFT_PSY_ALL_CORE
81 year old , retired female, domiciled at [facility], [facility] of DM2, HLD, HTN, PPHx of late onset Bipolar disorder, 2 prior psych adm last in 2022 at [facility], who presented to ED with worsening paranoia, anxiety, FTT (weight loss of 20lb since march), and confusion. Psychiatry was consulted for psychosis/AMS. Was started on cymbalta as daughter reported this was helpful in 2016/2017 when she had a similar presentation. Was continued on home medications klonopin, depakote then CL team added seroquel due to paranoia. Was on gabapentin 900mg/day at home which was tapered off by CL team. Transferred to [facility] for ongoing stabilization. Daughter denies patient has dementia. Also states patient responded well to BZD back in 2016/2017 but not well to antipsychotics, which led to team considering possibility of catatonia hx. Review of CL notes suggested she responded well to prns of ativan. As a result, klonopin was switched to ativan 1mg po bid briefly but there was no improvement so she was taken off of it. Meanwhile, seroquel was increased to target psychosis. Cymbalta had to be switched to effexor as patient can only take crushed meds. Has had some improvement overall though limited progress for the past few days. Discussed care with daughter again yesterday and states mother's presentation is very similar to decompensation in 2016/2017 and rather unlike that in 2021/2022 admission when she was stabilized on depakote and gabapentin. Daughter is strongly advocating for patient to go back on klonopin at a higher dose of 0.5mg po bid. It was also made clear that patient has JOSESITO and has not been on CPAP since March 2023 due to poor compliance 2/2 agitation. Will taper off effexor to minimize polypharmacy after which klonopin can be added back. Seroquel will have to continue given paranoia. Will transfer patient to  to allow for CPAP therapy.       Plan:  1. Legals: 9.27  2. Safety: constant observation for disorganized behavior 7am-11pm.  Ativan 0.5 mg q12h prn for anxiety/agitation.  3. Psychiatric: Continue current medications   	         Continue Seroquel 62.5mg daily and 75mg QHS   	         C/w Depakote 250 mg daily and 500 mg qHS. VPA level of 48.70 on 5/30 - will consider tapering off and optimizing SGA in the near future  	         Decreased Effexor to 37.5mg po bid (cymbalta discontinued due to dysphagia concerns and needing to crush medications) which aim to taper off quickly (has not been on it long).   		Will plan to restart klonopin at 0.25mg po bid in a few days when effexor has been discontinued and CPAP initiated.   	        	        4. Group, milieu, individual therapy as appropriate.  5. Medical: HTN, HLD, DM2. Phlebitis, possibly dysphagia, joint pain  	Keflex 500 mg qid x 5 days - complete now  	amlodipine 2.5 mg daily. decrease losartan to 75 mg daily due to orthostatic htn. aspirin 81 mg daily. atorvastatin 10 mg qHS.  	Lipid profile wnl other than slightly low HDL  	Metformin 500mg po bid              Naproxen 500mg daily   	b12 supplementation for deficiency in the past    	Ambulate with walker  	JOSESITO - will consider transfer to  to allow for CPAP use  6. Work up: none  7. 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 Plan cont CO as patient disorganized walks backward at risk for falls form disorganziation with close monitoring. Based on unusual hx of unique and dramatic response to klonopin and failure on antipsychotics will try klonopin while titrating Effexor and taper off Seroquel . Start on klonopin 0.25mg bid and titrate to 0.5mg bid. Watch BP as has some orthostasis may get better off Seroquel adjust BP meds if required. Cont CPAP  as she was able to use it last night  6/29 More linear and better related but paranoia is more apparent. BP stable though with very slight tachycardia, po intake ok, will monitor VS before increasing klonopin  6/30 Intrusive and paranoid, difficult to redirect per staff. Got ativan 0.5mg IM once due to agitation. Continue klonopin 0.25mg po bid for now - can increase to 0.5mg po bid if compliant with CPAP; no change in seroquel 25mg po bid, effexor 50mg po bid, depakote sprinkles 750mg/day; family advocating for treatment   with klonopin (consider increasing antisychotic, possibly less anticholinergic agent, if daughter agrees) though she refused CPAP last night so it would be   concerning to increase dose without CPAP compliance  7/1: More calm today, not agitated, able to be redirected  7/2: Some mild agitation, no persecutory delusions expressed, got prn quetiapine today, generally calm this AM  7/3 Some improvement. Still anxiouas agitated suspicious. Will change meds to daytime dosing with option to give later such as during visiting when family can encourage. Will also get rid of non urgent meds such as vitamins. Will increase laxatives as patient has hx constipation leading to retention. Recheck labs  7/4: Improved, no nihilistic delusions expressed, remains depressed, continue antidepressant/antipsychotic   7/5 Sl less delusional still terribly anxious restless receiving prns. Will increase klonopin, will change Miralax to lactulose as difficult to get patient to drink full cup of Miralax. Cont CO. Moniotr NA for hyponatremia  has been in this range before w/o dipping lower  7/6 Agitation restlessness less acute still paranoid about being harmed and about meds. Cont meds, plan increase Seroquel  increase laxatives  7/7 Depressed psychotic some sedation . Will lower Klonopin and titrate Seroquel to dose on which she was doing better. If combined treatment is too sedating may need to try less sedating antipsychotic. Will hold Lactulose  7/8 Depressed very delusional but calmer less restless and disorganzed. Will cont Klonopin change Seroquel to earlier to help complinace consider change to Zyprexa for more potent antipsychotic with once a day dosing

## 2023-07-08 NOTE — BH INPATIENT PSYCHIATRY PROGRESS NOTE - CURRENT MEDICATION
MEDICATIONS  (STANDING):  amLODIPine   Tablet 2.5 milliGRAM(s) Oral daily  aspirin  chewable 81 milliGRAM(s) Oral daily  atorvastatin 10 milliGRAM(s) Oral at bedtime  clonazePAM Oral Disintegrating Tablet 0.5 milliGRAM(s) Oral daily  divalproex Sprinkle 500 milliGRAM(s) Oral <User Schedule>  divalproex Sprinkle 250 milliGRAM(s) Oral daily  glucagon  Injectable 1 milliGRAM(s) IntraMuscular once  lactulose Syrup 10 Gram(s) Oral daily  loperamide 2 milliGRAM(s) Oral once  losartan 75 milliGRAM(s) Oral daily  naproxen 500 milliGRAM(s) Oral daily  pantoprazole   Suspension 40 milliGRAM(s) Oral before breakfast  QUEtiapine 37.5 milliGRAM(s) Oral <User Schedule>  senna 2 Tablet(s) Oral daily  sodium chloride 0.65% Nasal 2 Spray(s) Both Nostrils two times a day  venlafaxine 100 milliGRAM(s) Oral daily    MEDICATIONS  (PRN):  acetaminophen     Tablet .. 650 milliGRAM(s) Oral every 6 hours PRN Temp greater or equal to 38C (100.4F), Mild Pain (1 - 3), Moderate Pain (4 - 6)  bisacodyl 5 milliGRAM(s) Oral daily PRN constipation  bisacodyl Suppository 10 milliGRAM(s) Rectal daily PRN constipation  dextrose Oral Gel 15 Gram(s) Oral once PRN Blood Glucose LESS THAN 70 milliGRAM(s)/deciliter  haloperidol     Tablet 1 milliGRAM(s) Oral every 6 hours PRN agitation  haloperidol    Injectable 1 milliGRAM(s) IntraMuscular once PRN agitation  LORazepam     Tablet 0.5 milliGRAM(s) Oral every 6 hours PRN agitation  LORazepam   Injectable 0.5 milliGRAM(s) IntraMuscular once PRN severe agitation  melatonin. 3 milliGRAM(s) Oral at bedtime PRN Insomnia

## 2023-07-08 NOTE — BH INPATIENT PSYCHIATRY PROGRESS NOTE - NSBHCHARTREVIEWVS_PSY_A_CORE FT
Vital Signs Last 24 Hrs  T(C): 36.3 (07-08-23 @ 07:33), Max: 36.3 (07-08-23 @ 07:33)  T(F): 97.4 (07-08-23 @ 07:33), Max: 97.4 (07-08-23 @ 07:33)  HR: --  BP: --  BP(mean): --  RR: --  SpO2: --    Orthostatic VS  07-08-23 @ 07:33  Lying BP: --/-- HR: --  Sitting BP: 130/79 HR: 85  Standing BP: 150/111 HR: 112  Site: --  Mode: --  Orthostatic VS  07-07-23 @ 07:41  Lying BP: --/-- HR: --  Sitting BP: 158/68 HR: 86  Standing BP: 149/65 HR: 89  Site: upper left arm  Mode: electronic

## 2023-07-08 NOTE — BH INPATIENT PSYCHIATRY PROGRESS NOTE - NSBHMETABOLIC_PSY_ALL_CORE_FT
BMI: BMI (kg/m2): 23 (06-07-23 @ 18:13)  HbA1c: A1C with Estimated Average Glucose Result: 5.7 % (06-01-23 @ 05:10)    Glucose: POCT Blood Glucose.: 85 mg/dL (07-08-23 @ 07:34)    BP: --  Lipid Panel: Date/Time: 06-08-23 @ 08:30  Cholesterol, Serum: 119  Direct LDL: --  HDL Cholesterol, Serum: 47  Total Cholesterol/HDL Ration Measurement: --  Triglycerides, Serum: 56

## 2023-07-09 LAB — GLUCOSE BLDC GLUCOMTR-MCNC: 108 MG/DL — HIGH (ref 70–99)

## 2023-07-09 PROCEDURE — 99232 SBSQ HOSP IP/OBS MODERATE 35: CPT

## 2023-07-09 RX ORDER — CLONAZEPAM 1 MG
0.75 TABLET ORAL DAILY
Refills: 0 | Status: DISCONTINUED | OUTPATIENT
Start: 2023-07-09 | End: 2023-07-10

## 2023-07-09 RX ADMIN — Medication 0.75 MILLIGRAM(S): at 08:12

## 2023-07-09 RX ADMIN — Medication 2 SPRAY(S): at 08:13

## 2023-07-09 RX ADMIN — LOSARTAN POTASSIUM 75 MILLIGRAM(S): 100 TABLET, FILM COATED ORAL at 08:06

## 2023-07-09 RX ADMIN — AMLODIPINE BESYLATE 2.5 MILLIGRAM(S): 2.5 TABLET ORAL at 08:13

## 2023-07-09 RX ADMIN — Medication 81 MILLIGRAM(S): at 08:06

## 2023-07-09 RX ADMIN — QUETIAPINE FUMARATE 37.5 MILLIGRAM(S): 200 TABLET, FILM COATED ORAL at 08:14

## 2023-07-09 RX ADMIN — Medication 100 MILLIGRAM(S): at 08:06

## 2023-07-09 RX ADMIN — DIVALPROEX SODIUM 250 MILLIGRAM(S): 500 TABLET, DELAYED RELEASE ORAL at 08:05

## 2023-07-09 RX ADMIN — Medication 2 SPRAY(S): at 21:30

## 2023-07-09 RX ADMIN — ATORVASTATIN CALCIUM 10 MILLIGRAM(S): 80 TABLET, FILM COATED ORAL at 22:57

## 2023-07-09 RX ADMIN — SENNA PLUS 2 TABLET(S): 8.6 TABLET ORAL at 08:05

## 2023-07-09 RX ADMIN — DIVALPROEX SODIUM 500 MILLIGRAM(S): 500 TABLET, DELAYED RELEASE ORAL at 17:09

## 2023-07-09 RX ADMIN — Medication 500 MILLIGRAM(S): at 08:05

## 2023-07-09 RX ADMIN — QUETIAPINE FUMARATE 37.5 MILLIGRAM(S): 200 TABLET, FILM COATED ORAL at 17:09

## 2023-07-09 RX ADMIN — Medication 500 MILLIGRAM(S): at 17:26

## 2023-07-09 RX ADMIN — LACTULOSE 10 GRAM(S): 10 SOLUTION ORAL at 08:13

## 2023-07-09 NOTE — BH INPATIENT PSYCHIATRY PROGRESS NOTE - CURRENT MEDICATION
MEDICATIONS  (STANDING):  amLODIPine   Tablet 2.5 milliGRAM(s) Oral daily  aspirin  chewable 81 milliGRAM(s) Oral daily  atorvastatin 10 milliGRAM(s) Oral at bedtime  clonazePAM Oral Disintegrating Tablet 0.75 milliGRAM(s) Oral daily  divalproex Sprinkle 250 milliGRAM(s) Oral daily  divalproex Sprinkle 500 milliGRAM(s) Oral <User Schedule>  glucagon  Injectable 1 milliGRAM(s) IntraMuscular once  lactulose Syrup 10 Gram(s) Oral daily  losartan 75 milliGRAM(s) Oral daily  naproxen 500 milliGRAM(s) Oral daily  pantoprazole   Suspension 40 milliGRAM(s) Oral before breakfast  QUEtiapine 37.5 milliGRAM(s) Oral <User Schedule>  senna 2 Tablet(s) Oral daily  sodium chloride 0.65% Nasal 2 Spray(s) Both Nostrils two times a day  venlafaxine 100 milliGRAM(s) Oral daily    MEDICATIONS  (PRN):  acetaminophen     Tablet .. 650 milliGRAM(s) Oral every 6 hours PRN Temp greater or equal to 38C (100.4F), Mild Pain (1 - 3), Moderate Pain (4 - 6)  bisacodyl 5 milliGRAM(s) Oral daily PRN constipation  bisacodyl Suppository 10 milliGRAM(s) Rectal daily PRN constipation  dextrose Oral Gel 15 Gram(s) Oral once PRN Blood Glucose LESS THAN 70 milliGRAM(s)/deciliter  haloperidol     Tablet 1 milliGRAM(s) Oral every 6 hours PRN agitation  haloperidol    Injectable 1 milliGRAM(s) IntraMuscular once PRN agitation  LORazepam     Tablet 0.5 milliGRAM(s) Oral every 6 hours PRN agitation  LORazepam   Injectable 0.5 milliGRAM(s) IntraMuscular once PRN severe agitation  melatonin. 3 milliGRAM(s) Oral at bedtime PRN Insomnia

## 2023-07-09 NOTE — BH INPATIENT PSYCHIATRY PROGRESS NOTE - NSBHCHARTREVIEWVS_PSY_A_CORE FT
Vital Signs Last 24 Hrs  T(C): 36.6 (07-09-23 @ 07:40), Max: 36.6 (07-09-23 @ 07:40)  T(F): 97.9 (07-09-23 @ 07:40), Max: 97.9 (07-09-23 @ 07:40)  HR: --  BP: --  BP(mean): --  RR: --  SpO2: --    Orthostatic VS  07-09-23 @ 07:40  Lying BP: --/-- HR: --  Sitting BP: 174/51 HR: 68  Standing BP: 163/75 HR: 86  Site: --  Mode: --  Orthostatic VS  07-08-23 @ 09:50  Lying BP: --/-- HR: --  Sitting BP: 130/80 HR: 86  Standing BP: 140/80 HR: 88  Site: --  Mode: --  Orthostatic VS  07-08-23 @ 07:33  Lying BP: --/-- HR: --  Sitting BP: 130/79 HR: 85  Standing BP: 150/111 HR: 112  Site: --  Mode: --

## 2023-07-09 NOTE — BH INPATIENT PSYCHIATRY PROGRESS NOTE - NSBHFUPINTERVALHXFT_PSY_A_CORE
Patient seen for psychotic depression. Patient w/o overnight events except refusing 5PM Seroquel. VSS. Patient anxious help reassurance seeking. Slept in chair fearful of going to room

## 2023-07-09 NOTE — BH INPATIENT PSYCHIATRY PROGRESS NOTE - NSBHASSESSSUMMFT_PSY_ALL_CORE
81 year old , retired female, domiciled at [facility], [facility] of DM2, HLD, HTN, PPHx of late onset Bipolar disorder, 2 prior psych adm last in 2022 at [facility], who presented to ED with worsening paranoia, anxiety, FTT (weight loss of 20lb since march), and confusion. Psychiatry was consulted for psychosis/AMS. Was started on cymbalta as daughter reported this was helpful in 2016/2017 when she had a similar presentation. Was continued on home medications klonopin, depakote then CL team added seroquel due to paranoia. Was on gabapentin 900mg/day at home which was tapered off by CL team. Transferred to [facility] for ongoing stabilization. Daughter denies patient has dementia. Also states patient responded well to BZD back in 2016/2017 but not well to antipsychotics, which led to team considering possibility of catatonia hx. Review of CL notes suggested she responded well to prns of ativan. As a result, klonopin was switched to ativan 1mg po bid briefly but there was no improvement so she was taken off of it. Meanwhile, seroquel was increased to target psychosis. Cymbalta had to be switched to effexor as patient can only take crushed meds. Has had some improvement overall though limited progress for the past few days. Discussed care with daughter again yesterday and states mother's presentation is very similar to decompensation in 2016/2017 and rather unlike that in 2021/2022 admission when she was stabilized on depakote and gabapentin. Daughter is strongly advocating for patient to go back on klonopin at a higher dose of 0.5mg po bid. It was also made clear that patient has JOSESITO and has not been on CPAP since March 2023 due to poor compliance 2/2 agitation. Will taper off effexor to minimize polypharmacy after which klonopin can be added back. Seroquel will have to continue given paranoia. Will transfer patient to  to allow for CPAP therapy.       Plan:  1. Legals: 9.27  2. Safety: constant observation for disorganized behavior 7am-11pm.  Ativan 0.5 mg q12h prn for anxiety/agitation.  3. Psychiatric: Continue current medications   	         Continue Seroquel 62.5mg daily and 75mg QHS   	         C/w Depakote 250 mg daily and 500 mg qHS. VPA level of 48.70 on 5/30 - will consider tapering off and optimizing SGA in the near future  	         Decreased Effexor to 37.5mg po bid (cymbalta discontinued due to dysphagia concerns and needing to crush medications) which aim to taper off quickly (has not been on it long).   		Will plan to restart klonopin at 0.25mg po bid in a few days when effexor has been discontinued and CPAP initiated.   	        	        4. Group, milieu, individual therapy as appropriate.  5. Medical: HTN, HLD, DM2. Phlebitis, possibly dysphagia, joint pain  	Keflex 500 mg qid x 5 days - complete now  	amlodipine 2.5 mg daily. decrease losartan to 75 mg daily due to orthostatic htn. aspirin 81 mg daily. atorvastatin 10 mg qHS.  	Lipid profile wnl other than slightly low HDL  	Metformin 500mg po bid              Naproxen 500mg daily   	b12 supplementation for deficiency in the past    	Ambulate with walker  	JOSESITO - will consider transfer to  to allow for CPAP use  6. Work up: none  7. 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 Plan cont CO as patient disorganized walks backward at risk for falls form disorganziation with close monitoring. Based on unusual hx of unique and dramatic response to klonopin and failure on antipsychotics will try klonopin while titrating Effexor and taper off Seroquel . Start on klonopin 0.25mg bid and titrate to 0.5mg bid. Watch BP as has some orthostasis may get better off Seroquel adjust BP meds if required. Cont CPAP  as she was able to use it last night  6/29 More linear and better related but paranoia is more apparent. BP stable though with very slight tachycardia, po intake ok, will monitor VS before increasing klonopin  6/30 Intrusive and paranoid, difficult to redirect per staff. Got ativan 0.5mg IM once due to agitation. Continue klonopin 0.25mg po bid for now - can increase to 0.5mg po bid if compliant with CPAP; no change in seroquel 25mg po bid, effexor 50mg po bid, depakote sprinkles 750mg/day; family advocating for treatment   with klonopin (consider increasing antisychotic, possibly less anticholinergic agent, if daughter agrees) though she refused CPAP last night so it would be   concerning to increase dose without CPAP compliance  7/1: More calm today, not agitated, able to be redirected  7/2: Some mild agitation, no persecutory delusions expressed, got prn quetiapine today, generally calm this AM  7/3 Some improvement. Still anxiouas agitated suspicious. Will change meds to daytime dosing with option to give later such as during visiting when family can encourage. Will also get rid of non urgent meds such as vitamins. Will increase laxatives as patient has hx constipation leading to retention. Recheck labs  7/4: Improved, no nihilistic delusions expressed, remains depressed, continue antidepressant/antipsychotic   7/5 Sl less delusional still terribly anxious restless receiving prns. Will increase klonopin, will change Miralax to lactulose as difficult to get patient to drink full cup of Miralax. Cont CO. Moniotr NA for hyponatremia  has been in this range before w/o dipping lower  7/6 Agitation restlessness less acute still paranoid about being harmed and about meds. Cont meds, plan increase Seroquel  increase laxatives  7/7 Depressed psychotic some sedation . Will lower Klonopin and titrate Seroquel to dose on which she was doing better. If combined treatment is too sedating may need to try less sedating antipsychotic. Will hold Lactulose  7/8 Depressed very delusional but calmer less restless and disorganzed. Will cont Klonopin change Seroquel to earlier to help complinace consider change to Zyprexa for more potent antipsychotic with once a day dosing  7/9 Calmer but very paranoid. Will increase Klonopin and consider change to olanzapine after discussing with daughter

## 2023-07-09 NOTE — BH INPATIENT PSYCHIATRY PROGRESS NOTE - NSBHMETABOLIC_PSY_ALL_CORE_FT
BMI: BMI (kg/m2): 23 (06-07-23 @ 18:13)  HbA1c: A1C with Estimated Average Glucose Result: 5.7 % (06-01-23 @ 05:10)    Glucose: POCT Blood Glucose.: 108 mg/dL (07-09-23 @ 07:41)    BP: --  Lipid Panel: Date/Time: 06-08-23 @ 08:30  Cholesterol, Serum: 119  Direct LDL: --  HDL Cholesterol, Serum: 47  Total Cholesterol/HDL Ration Measurement: --  Triglycerides, Serum: 56

## 2023-07-09 NOTE — BH INPATIENT PSYCHIATRY PROGRESS NOTE - NSICDXBHPRIMARYDX_PSY_ALL_CORE
Ms Sarina Moser presents for her first post-operative visit following right upper lobectomy for known lung cancer  Her final pathology demonstrates T2N1 disease, or Stage IIB  She will require adjuvant chemotherapy for this, will place medical oncology referral to discuss  Her CXR looks appropriate post-operatively  She is still experiencing some incisional pain for which we recommended continuing gabapentin and ibuprofen  She has no formal activity restrictions but should listen to her body for pain  She will return in 4 weeks with another CXR for a second post-operative visit  Bipolar I disorder, most recent episode mixed, severe with psychotic features   F31.64

## 2023-07-09 NOTE — BH INPATIENT PSYCHIATRY PROGRESS NOTE - MSE UNSTRUCTURED FT
Patient is awake and alert. [place]  Mood "unhappy," distressed affect Speech productive.Less restless Reaches out to hold examiner's hand No perceptual disturbance.Believes her life is in danger will be killed tonight want writer to personally give all her meds for safety   No SI, voices  Thinking very perseverative. Oriented to month year . Poor insight

## 2023-07-10 LAB — GLUCOSE BLDC GLUCOMTR-MCNC: 80 MG/DL — SIGNIFICANT CHANGE UP (ref 70–99)

## 2023-07-10 PROCEDURE — 99232 SBSQ HOSP IP/OBS MODERATE 35: CPT

## 2023-07-10 RX ORDER — QUETIAPINE FUMARATE 200 MG/1
25 TABLET, FILM COATED ORAL ONCE
Refills: 0 | Status: DISCONTINUED | OUTPATIENT
Start: 2023-07-10 | End: 2023-07-11

## 2023-07-10 RX ORDER — QUETIAPINE FUMARATE 200 MG/1
25 TABLET, FILM COATED ORAL
Refills: 0 | Status: DISCONTINUED | OUTPATIENT
Start: 2023-07-11 | End: 2023-07-11

## 2023-07-10 RX ORDER — CLONAZEPAM 1 MG
0.25 TABLET ORAL ONCE
Refills: 0 | Status: DISCONTINUED | OUTPATIENT
Start: 2023-07-10 | End: 2023-07-13

## 2023-07-10 RX ORDER — VENLAFAXINE HCL 75 MG
125 CAPSULE, EXT RELEASE 24 HR ORAL DAILY
Refills: 0 | Status: DISCONTINUED | OUTPATIENT
Start: 2023-07-10 | End: 2023-07-12

## 2023-07-10 RX ORDER — CLONAZEPAM 1 MG
1 TABLET ORAL DAILY
Refills: 0 | Status: DISCONTINUED | OUTPATIENT
Start: 2023-07-11 | End: 2023-07-18

## 2023-07-10 RX ADMIN — Medication 0.5 MILLIGRAM(S): at 21:43

## 2023-07-10 RX ADMIN — SENNA PLUS 2 TABLET(S): 8.6 TABLET ORAL at 11:01

## 2023-07-10 RX ADMIN — Medication 3 MILLIGRAM(S): at 21:44

## 2023-07-10 RX ADMIN — DIVALPROEX SODIUM 250 MILLIGRAM(S): 500 TABLET, DELAYED RELEASE ORAL at 11:01

## 2023-07-10 RX ADMIN — AMLODIPINE BESYLATE 2.5 MILLIGRAM(S): 2.5 TABLET ORAL at 11:00

## 2023-07-10 RX ADMIN — HALOPERIDOL DECANOATE 1 MILLIGRAM(S): 100 INJECTION INTRAMUSCULAR at 21:44

## 2023-07-10 RX ADMIN — Medication 0.75 MILLIGRAM(S): at 09:08

## 2023-07-10 RX ADMIN — LACTULOSE 10 GRAM(S): 10 SOLUTION ORAL at 11:01

## 2023-07-10 RX ADMIN — Medication 500 MILLIGRAM(S): at 11:45

## 2023-07-10 RX ADMIN — QUETIAPINE FUMARATE 37.5 MILLIGRAM(S): 200 TABLET, FILM COATED ORAL at 10:59

## 2023-07-10 RX ADMIN — LOSARTAN POTASSIUM 75 MILLIGRAM(S): 100 TABLET, FILM COATED ORAL at 11:01

## 2023-07-10 RX ADMIN — Medication 100 MILLIGRAM(S): at 11:00

## 2023-07-10 RX ADMIN — Medication 81 MILLIGRAM(S): at 11:01

## 2023-07-10 RX ADMIN — ATORVASTATIN CALCIUM 10 MILLIGRAM(S): 80 TABLET, FILM COATED ORAL at 21:44

## 2023-07-10 RX ADMIN — Medication 500 MILLIGRAM(S): at 11:00

## 2023-07-10 RX ADMIN — Medication 2 SPRAY(S): at 11:00

## 2023-07-10 NOTE — BH INPATIENT PSYCHIATRY PROGRESS NOTE - MSE UNSTRUCTURED FT
Patient is awake and alert. [place]  Mood "not good" distressed affect Speech productive.Less restless Reaches out to hold examiner's hand No perceptual disturbance.Believes her life is in danger will be killed tonight want writer to personally give all her meds for safety   No SI, voices  Thinking very perseverative. Oriented to month year . Poor insight

## 2023-07-10 NOTE — BH INPATIENT PSYCHIATRY PROGRESS NOTE - NSBHASSESSSUMMFT_PSY_ALL_CORE
81 year old , retired female, domiciled at [facility], [facility] of DM2, HLD, HTN, PPHx of late onset Bipolar disorder, 2 prior psych adm last in 2022 at [facility], who presented to ED with worsening paranoia, anxiety, FTT (weight loss of 20lb since march), and confusion. Psychiatry was consulted for psychosis/AMS. Was started on cymbalta as daughter reported this was helpful in 2016/2017 when she had a similar presentation. Was continued on home medications klonopin, depakote then CL team added seroquel due to paranoia. Was on gabapentin 900mg/day at home which was tapered off by CL team. Transferred to [facility] for ongoing stabilization. Daughter denies patient has dementia. Also states patient responded well to BZD back in 2016/2017 but not well to antipsychotics, which led to team considering possibility of catatonia hx. Review of CL notes suggested she responded well to prns of ativan. As a result, klonopin was switched to ativan 1mg po bid briefly but there was no improvement so she was taken off of it. Meanwhile, seroquel was increased to target psychosis. Cymbalta had to be switched to effexor as patient can only take crushed meds. Has had some improvement overall though limited progress for the past few days. Discussed care with daughter again yesterday and states mother's presentation is very similar to decompensation in 2016/2017 and rather unlike that in 2021/2022 admission when she was stabilized on depakote and gabapentin. Daughter is strongly advocating for patient to go back on klonopin at a higher dose of 0.5mg po bid. It was also made clear that patient has JOSESITO and has not been on CPAP since March 2023 due to poor compliance 2/2 agitation. Will taper off effexor to minimize polypharmacy after which klonopin can be added back. Seroquel will have to continue given paranoia. Will transfer patient to  to allow for CPAP therapy.       Plan:  1. Legals: 9.27  2. Safety: constant observation for disorganized behavior 7am-11pm.  Ativan 0.5 mg q12h prn for anxiety/agitation.  3. Psychiatric: Continue current medications   	         Continue Seroquel 62.5mg daily and 75mg QHS   	         C/w Depakote 250 mg daily and 500 mg qHS. VPA level of 48.70 on 5/30 - will consider tapering off and optimizing SGA in the near future  	         Decreased Effexor to 37.5mg po bid (cymbalta discontinued due to dysphagia concerns and needing to crush medications) which aim to taper off quickly (has not been on it long).   		Will plan to restart klonopin at 0.25mg po bid in a few days when effexor has been discontinued and CPAP initiated.   	        	        4. Group, milieu, individual therapy as appropriate.  5. Medical: HTN, HLD, DM2. Phlebitis, possibly dysphagia, joint pain  	Keflex 500 mg qid x 5 days - complete now  	amlodipine 2.5 mg daily. decrease losartan to 75 mg daily due to orthostatic htn. aspirin 81 mg daily. atorvastatin 10 mg qHS.  	Lipid profile wnl other than slightly low HDL  	Metformin 500mg po bid              Naproxen 500mg daily   	b12 supplementation for deficiency in the past    	Ambulate with walker  	JOSESITO - will consider transfer to  to allow for CPAP use  6. Work up: none  7. 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 Plan cont CO as patient disorganized walks backward at risk for falls form disorganziation with close monitoring. Based on unusual hx of unique and dramatic response to klonopin and failure on antipsychotics will try klonopin while titrating Effexor and taper off Seroquel . Start on klonopin 0.25mg bid and titrate to 0.5mg bid. Watch BP as has some orthostasis may get better off Seroquel adjust BP meds if required. Cont CPAP  as she was able to use it last night  6/29 More linear and better related but paranoia is more apparent. BP stable though with very slight tachycardia, po intake ok, will monitor VS before increasing klonopin  6/30 Intrusive and paranoid, difficult to redirect per staff. Got ativan 0.5mg IM once due to agitation. Continue klonopin 0.25mg po bid for now - can increase to 0.5mg po bid if compliant with CPAP; no change in seroquel 25mg po bid, effexor 50mg po bid, depakote sprinkles 750mg/day; family advocating for treatment   with klonopin (consider increasing antisychotic, possibly less anticholinergic agent, if daughter agrees) though she refused CPAP last night so it would be   concerning to increase dose without CPAP compliance  7/1: More calm today, not agitated, able to be redirected  7/2: Some mild agitation, no persecutory delusions expressed, got prn quetiapine today, generally calm this AM  7/3 Some improvement. Still anxiouas agitated suspicious. Will change meds to daytime dosing with option to give later such as during visiting when family can encourage. Will also get rid of non urgent meds such as vitamins. Will increase laxatives as patient has hx constipation leading to retention. Recheck labs  7/4: Improved, no nihilistic delusions expressed, remains depressed, continue antidepressant/antipsychotic   7/5 Sl less delusional still terribly anxious restless receiving prns. Will increase klonopin, will change Miralax to lactulose as difficult to get patient to drink full cup of Miralax. Cont CO. Moniotr NA for hyponatremia  has been in this range before w/o dipping lower  7/6 Agitation restlessness less acute still paranoid about being harmed and about meds. Cont meds, plan increase Seroquel  increase laxatives  7/7 Depressed psychotic some sedation . Will lower Klonopin and titrate Seroquel to dose on which she was doing better. If combined treatment is too sedating may need to try less sedating antipsychotic. Will hold Lactulose  7/8 Depressed very delusional but calmer less restless and disorganzed. Will cont Klonopin change Seroquel to earlier to help compliance consider change to Zyprexa for more potent antipsychotic with once a day dosing  7/9 Calmer but very paranoid. Will increase Klonopin and consider change to olanzapine after discussing with daughter  7/10 Dysphoric paranoid only improvement is motor agitation. Afternoon compliance poor. Will cont Seorquel Klonopin but give eariler will increase Effexor . Asked medicine to review ASA and Naprosyn, the later will be d./declan

## 2023-07-10 NOTE — BH INPATIENT PSYCHIATRY PROGRESS NOTE - NSBHCHARTREVIEWVS_PSY_A_CORE FT
Vital Signs Last 24 Hrs  T(C): 36.2 (07-10-23 @ 07:36), Max: 36.2 (07-10-23 @ 07:36)  T(F): 97.1 (07-10-23 @ 07:36), Max: 97.1 (07-10-23 @ 07:36)  HR: --  BP: --  BP(mean): --  RR: --  SpO2: --    Orthostatic VS  07-10-23 @ 07:36  Lying BP: --/-- HR: --  Sitting BP: 158/57 HR: 71  Standing BP: 138/101 HR: 85  Site: --  Mode: --  Orthostatic VS  07-09-23 @ 07:40  Lying BP: --/-- HR: --  Sitting BP: 174/51 HR: 68  Standing BP: 163/75 HR: 86  Site: --  Mode: --

## 2023-07-10 NOTE — CHART NOTE - NSCHARTNOTEFT_GEN_A_CORE
Called by psych team as patient on regimen on aspirin, naproxen, and effexor.  Concerned for elevated risk of GI bleeding as can be caused by each of these medications.    On chart review, naproxen was initiated for joint pain. Would avoid concurrent use of NSAIDs and aspirin.  Discontinue naproxen. Can offer topical diclofenac if needed for joint pain to prevent systemic absorption.

## 2023-07-10 NOTE — BH INPATIENT PSYCHIATRY PROGRESS NOTE - NSBHMETABOLIC_PSY_ALL_CORE_FT
BMI: BMI (kg/m2): 23 (06-07-23 @ 18:13)  HbA1c: A1C with Estimated Average Glucose Result: 5.7 % (06-01-23 @ 05:10)    Glucose: POCT Blood Glucose.: 80 mg/dL (07-10-23 @ 08:02)    BP: --  Lipid Panel: Date/Time: 06-08-23 @ 08:30  Cholesterol, Serum: 119  Direct LDL: --  HDL Cholesterol, Serum: 47  Total Cholesterol/HDL Ration Measurement: --  Triglycerides, Serum: 56

## 2023-07-10 NOTE — BH INPATIENT PSYCHIATRY PROGRESS NOTE - CURRENT MEDICATION
MEDICATIONS  (STANDING):  amLODIPine   Tablet 2.5 milliGRAM(s) Oral daily  aspirin  chewable 81 milliGRAM(s) Oral daily  atorvastatin 10 milliGRAM(s) Oral at bedtime  clonazePAM Oral Disintegrating Tablet 0.25 milliGRAM(s) Oral once  divalproex Sprinkle 250 milliGRAM(s) Oral daily  divalproex Sprinkle 500 milliGRAM(s) Oral <User Schedule>  glucagon  Injectable 1 milliGRAM(s) IntraMuscular once  lactulose Syrup 10 Gram(s) Oral daily  losartan 75 milliGRAM(s) Oral daily  naproxen 500 milliGRAM(s) Oral daily  pantoprazole   Suspension 40 milliGRAM(s) Oral before breakfast  QUEtiapine 25 milliGRAM(s) Oral once  senna 2 Tablet(s) Oral daily  sodium chloride 0.65% Nasal 2 Spray(s) Both Nostrils two times a day  venlafaxine 125 milliGRAM(s) Oral daily    MEDICATIONS  (PRN):  acetaminophen     Tablet .. 650 milliGRAM(s) Oral every 6 hours PRN Temp greater or equal to 38C (100.4F), Mild Pain (1 - 3), Moderate Pain (4 - 6)  bisacodyl 5 milliGRAM(s) Oral daily PRN constipation  bisacodyl Suppository 10 milliGRAM(s) Rectal daily PRN constipation  dextrose Oral Gel 15 Gram(s) Oral once PRN Blood Glucose LESS THAN 70 milliGRAM(s)/deciliter  haloperidol     Tablet 1 milliGRAM(s) Oral every 6 hours PRN agitation  haloperidol    Injectable 1 milliGRAM(s) IntraMuscular once PRN agitation  LORazepam     Tablet 0.5 milliGRAM(s) Oral every 6 hours PRN agitation  melatonin. 3 milliGRAM(s) Oral at bedtime PRN Insomnia

## 2023-07-11 LAB — GLUCOSE BLDC GLUCOMTR-MCNC: 142 MG/DL — HIGH (ref 70–99)

## 2023-07-11 PROCEDURE — 99232 SBSQ HOSP IP/OBS MODERATE 35: CPT

## 2023-07-11 RX ORDER — QUETIAPINE FUMARATE 200 MG/1
50 TABLET, FILM COATED ORAL DAILY
Refills: 0 | Status: DISCONTINUED | OUTPATIENT
Start: 2023-07-11 | End: 2023-07-13

## 2023-07-11 RX ORDER — QUETIAPINE FUMARATE 200 MG/1
25 TABLET, FILM COATED ORAL ONCE
Refills: 0 | Status: COMPLETED | OUTPATIENT
Start: 2023-07-11 | End: 2023-07-11

## 2023-07-11 RX ADMIN — QUETIAPINE FUMARATE 25 MILLIGRAM(S): 200 TABLET, FILM COATED ORAL at 19:35

## 2023-07-11 RX ADMIN — LACTULOSE 10 GRAM(S): 10 SOLUTION ORAL at 08:26

## 2023-07-11 RX ADMIN — Medication 1 MILLIGRAM(S): at 08:28

## 2023-07-11 RX ADMIN — ATORVASTATIN CALCIUM 10 MILLIGRAM(S): 80 TABLET, FILM COATED ORAL at 20:08

## 2023-07-11 RX ADMIN — DIVALPROEX SODIUM 500 MILLIGRAM(S): 500 TABLET, DELAYED RELEASE ORAL at 19:34

## 2023-07-11 RX ADMIN — SENNA PLUS 1 TABLET(S): 8.6 TABLET ORAL at 08:27

## 2023-07-11 RX ADMIN — AMLODIPINE BESYLATE 2.5 MILLIGRAM(S): 2.5 TABLET ORAL at 08:28

## 2023-07-11 RX ADMIN — Medication 100 MILLIGRAM(S): at 08:27

## 2023-07-11 RX ADMIN — HALOPERIDOL DECANOATE 1 MILLIGRAM(S): 100 INJECTION INTRAMUSCULAR at 19:34

## 2023-07-11 RX ADMIN — QUETIAPINE FUMARATE 25 MILLIGRAM(S): 200 TABLET, FILM COATED ORAL at 08:26

## 2023-07-11 RX ADMIN — Medication 3 MILLIGRAM(S): at 19:35

## 2023-07-11 RX ADMIN — Medication 0.5 MILLIGRAM(S): at 19:35

## 2023-07-11 NOTE — BH INPATIENT PSYCHIATRY PROGRESS NOTE - CURRENT MEDICATION
MEDICATIONS  (STANDING):  amLODIPine   Tablet 2.5 milliGRAM(s) Oral daily  aspirin  chewable 81 milliGRAM(s) Oral daily  atorvastatin 10 milliGRAM(s) Oral at bedtime  clonazePAM Oral Disintegrating Tablet 0.25 milliGRAM(s) Oral once  clonazePAM Oral Disintegrating Tablet 1 milliGRAM(s) Oral daily  divalproex Sprinkle 250 milliGRAM(s) Oral daily  divalproex Sprinkle 500 milliGRAM(s) Oral <User Schedule>  glucagon  Injectable 1 milliGRAM(s) IntraMuscular once  lactulose Syrup 10 Gram(s) Oral daily  losartan 75 milliGRAM(s) Oral daily  pantoprazole   Suspension 40 milliGRAM(s) Oral before breakfast  QUEtiapine 25 milliGRAM(s) Oral once  QUEtiapine 50 milliGRAM(s) Oral daily  senna 2 Tablet(s) Oral daily  sodium chloride 0.65% Nasal 2 Spray(s) Both Nostrils two times a day  venlafaxine 125 milliGRAM(s) Oral daily    MEDICATIONS  (PRN):  acetaminophen     Tablet .. 650 milliGRAM(s) Oral every 6 hours PRN Temp greater or equal to 38C (100.4F), Mild Pain (1 - 3), Moderate Pain (4 - 6)  bisacodyl 5 milliGRAM(s) Oral daily PRN constipation  bisacodyl Suppository 10 milliGRAM(s) Rectal daily PRN constipation  dextrose Oral Gel 15 Gram(s) Oral once PRN Blood Glucose LESS THAN 70 milliGRAM(s)/deciliter  haloperidol     Tablet 1 milliGRAM(s) Oral every 6 hours PRN agitation  haloperidol    Injectable 1 milliGRAM(s) IntraMuscular once PRN agitation  LORazepam     Tablet 0.5 milliGRAM(s) Oral every 6 hours PRN agitation  melatonin. 3 milliGRAM(s) Oral at bedtime PRN Insomnia

## 2023-07-11 NOTE — BH INPATIENT PSYCHIATRY PROGRESS NOTE - MSE UNSTRUCTURED FT
Patient is awake and alert. [place]  Mood "not good" distressed affect Speech productive.Less restless Reaches out to hold examiner's hand No perceptual disturbance.Believes her life is in danger will be killed tonight in her room want writer to personally give all her meds for safety   No SI, voices  Thinking very perseverative. Oriented to month year . Poor insight

## 2023-07-11 NOTE — BH INPATIENT PSYCHIATRY PROGRESS NOTE - NSBHFUPINTERVALHXFT_PSY_A_CORE
Patient seen for psychotic depression. Patient w/o overnight events  VSS. Patient anxious help reassurance seeking. Slept in chair fearful of going to room. Took meds only with MD present to reassure her

## 2023-07-11 NOTE — BH INPATIENT PSYCHIATRY PROGRESS NOTE - NSBHMETABOLIC_PSY_ALL_CORE_FT
BMI: BMI (kg/m2): 23 (06-07-23 @ 18:13)  HbA1c: A1C with Estimated Average Glucose Result: 5.7 % (06-01-23 @ 05:10)    Glucose: POCT Blood Glucose.: 142 mg/dL (07-11-23 @ 07:33)    BP: 110/60 (07-11-23 @ 10:08) (110/60 - 110/60)  Lipid Panel: Date/Time: 06-08-23 @ 08:30  Cholesterol, Serum: 119  Direct LDL: --  HDL Cholesterol, Serum: 47  Total Cholesterol/HDL Ration Measurement: --  Triglycerides, Serum: 56

## 2023-07-11 NOTE — BH INPATIENT PSYCHIATRY PROGRESS NOTE - NSBHCHARTREVIEWVS_PSY_A_CORE FT
Vital Signs Last 24 Hrs  T(C): 36.6 (07-11-23 @ 10:08), Max: 36.6 (07-11-23 @ 10:08)  T(F): 97.8 (07-11-23 @ 10:08), Max: 97.8 (07-11-23 @ 10:08)  HR: --  BP: 110/60 (07-11-23 @ 10:08) (110/60 - 110/60)  BP(mean): 68 (07-11-23 @ 10:08) (68 - 68)  RR: --  SpO2: --    Orthostatic VS  07-10-23 @ 07:36  Lying BP: --/-- HR: --  Sitting BP: 158/57 HR: 71  Standing BP: 138/101 HR: 85  Site: --  Mode: --

## 2023-07-11 NOTE — BH INPATIENT PSYCHIATRY PROGRESS NOTE - NSBHASSESSSUMMFT_PSY_ALL_CORE
81 year old , retired female, domiciled at [facility], [facility] of DM2, HLD, HTN, PPHx of late onset Bipolar disorder, 2 prior psych adm last in 2022 at [facility], who presented to ED with worsening paranoia, anxiety, FTT (weight loss of 20lb since march), and confusion. Psychiatry was consulted for psychosis/AMS. Was started on cymbalta as daughter reported this was helpful in 2016/2017 when she had a similar presentation. Was continued on home medications klonopin, depakote then CL team added seroquel due to paranoia. Was on gabapentin 900mg/day at home which was tapered off by CL team. Transferred to [facility] for ongoing stabilization. Daughter denies patient has dementia. Also states patient responded well to BZD back in 2016/2017 but not well to antipsychotics, which led to team considering possibility of catatonia hx. Review of CL notes suggested she responded well to prns of ativan. As a result, klonopin was switched to ativan 1mg po bid briefly but there was no improvement so she was taken off of it. Meanwhile, seroquel was increased to target psychosis. Cymbalta had to be switched to effexor as patient can only take crushed meds. Has had some improvement overall though limited progress for the past few days. Discussed care with daughter again yesterday and states mother's presentation is very similar to decompensation in 2016/2017 and rather unlike that in 2021/2022 admission when she was stabilized on depakote and gabapentin. Daughter is strongly advocating for patient to go back on klonopin at a higher dose of 0.5mg po bid. It was also made clear that patient has JOSESITO and has not been on CPAP since March 2023 due to poor compliance 2/2 agitation. Will taper off effexor to minimize polypharmacy after which klonopin can be added back. Seroquel will have to continue given paranoia. Will transfer patient to  to allow for CPAP therapy.       Plan:  1. Legals: 9.27  2. Safety: constant observation for disorganized behavior 7am-11pm.  Ativan 0.5 mg q12h prn for anxiety/agitation.  3. Psychiatric: Continue current medications   	         Continue Seroquel 62.5mg daily and 75mg QHS   	         C/w Depakote 250 mg daily and 500 mg qHS. VPA level of 48.70 on 5/30 - will consider tapering off and optimizing SGA in the near future  	         Decreased Effexor to 37.5mg po bid (cymbalta discontinued due to dysphagia concerns and needing to crush medications) which aim to taper off quickly (has not been on it long).   		Will plan to restart klonopin at 0.25mg po bid in a few days when effexor has been discontinued and CPAP initiated.   	        	        4. Group, milieu, individual therapy as appropriate.  5. Medical: HTN, HLD, DM2. Phlebitis, possibly dysphagia, joint pain  	Keflex 500 mg qid x 5 days - complete now  	amlodipine 2.5 mg daily. decrease losartan to 75 mg daily due to orthostatic htn. aspirin 81 mg daily. atorvastatin 10 mg qHS.  	Lipid profile wnl other than slightly low HDL  	Metformin 500mg po bid              Naproxen 500mg daily   	b12 supplementation for deficiency in the past    	Ambulate with walker  	JOSESITO - will consider transfer to  to allow for CPAP use  6. Work up: none  7. 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 Plan cont CO as patient disorganized walks backward at risk for falls form disorganziation with close monitoring. Based on unusual hx of unique and dramatic response to klonopin and failure on antipsychotics will try klonopin while titrating Effexor and taper off Seroquel . Start on klonopin 0.25mg bid and titrate to 0.5mg bid. Watch BP as has some orthostasis may get better off Seroquel adjust BP meds if required. Cont CPAP  as she was able to use it last night  6/29 More linear and better related but paranoia is more apparent. BP stable though with very slight tachycardia, po intake ok, will monitor VS before increasing klonopin  6/30 Intrusive and paranoid, difficult to redirect per staff. Got ativan 0.5mg IM once due to agitation. Continue klonopin 0.25mg po bid for now - can increase to 0.5mg po bid if compliant with CPAP; no change in seroquel 25mg po bid, effexor 50mg po bid, depakote sprinkles 750mg/day; family advocating for treatment   with klonopin (consider increasing antisychotic, possibly less anticholinergic agent, if daughter agrees) though she refused CPAP last night so it would be   concerning to increase dose without CPAP compliance  7/1: More calm today, not agitated, able to be redirected  7/2: Some mild agitation, no persecutory delusions expressed, got prn quetiapine today, generally calm this AM  7/3 Some improvement. Still anxiouas agitated suspicious. Will change meds to daytime dosing with option to give later such as during visiting when family can encourage. Will also get rid of non urgent meds such as vitamins. Will increase laxatives as patient has hx constipation leading to retention. Recheck labs  7/4: Improved, no nihilistic delusions expressed, remains depressed, continue antidepressant/antipsychotic   7/5 Sl less delusional still terribly anxious restless receiving prns. Will increase klonopin, will change Miralax to lactulose as difficult to get patient to drink full cup of Miralax. Cont CO. Moniotr NA for hyponatremia  has been in this range before w/o dipping lower  7/6 Agitation restlessness less acute still paranoid about being harmed and about meds. Cont meds, plan increase Seroquel  increase laxatives  7/7 Depressed psychotic some sedation . Will lower Klonopin and titrate Seroquel to dose on which she was doing better. If combined treatment is too sedating may need to try less sedating antipsychotic. Will hold Lactulose  7/8 Depressed very delusional but calmer less restless and disorganzed. Will cont Klonopin change Seroquel to earlier to help compliance consider change to Zyprexa for more potent antipsychotic with once a day dosing  7/9 Calmer but very paranoid. Will increase Klonopin and consider change to olanzapine after discussing with daughter  7/10 Dysphoric paranoid only improvement is motor agitation. Afternoon compliance poor. Will cont Seorquel Klonopin but give eariler will increase Effexor . Asked medicine to review ASA and Naprosyn, the later will be d./declan  7/11 Depressed very psychotic cont treatment except consolidate Seroquel to once a day dosing in AM. Likely need to change Seroquel to another agent. COnt CO monitor bowel function took lactulose and senna today so will see if she has BM

## 2023-07-12 LAB — GLUCOSE BLDC GLUCOMTR-MCNC: 90 MG/DL — SIGNIFICANT CHANGE UP (ref 70–99)

## 2023-07-12 PROCEDURE — 99232 SBSQ HOSP IP/OBS MODERATE 35: CPT

## 2023-07-12 RX ORDER — VENLAFAXINE HCL 75 MG
150 CAPSULE, EXT RELEASE 24 HR ORAL DAILY
Refills: 0 | Status: DISCONTINUED | OUTPATIENT
Start: 2023-07-12 | End: 2023-08-01

## 2023-07-12 RX ADMIN — Medication 125 MILLIGRAM(S): at 08:30

## 2023-07-12 RX ADMIN — Medication 2 SPRAY(S): at 08:25

## 2023-07-12 RX ADMIN — Medication 0.5 MILLIGRAM(S): at 21:28

## 2023-07-12 RX ADMIN — LOSARTAN POTASSIUM 75 MILLIGRAM(S): 100 TABLET, FILM COATED ORAL at 08:26

## 2023-07-12 RX ADMIN — Medication 1 MILLIGRAM(S): at 08:25

## 2023-07-12 RX ADMIN — Medication 5 MILLIGRAM(S): at 08:32

## 2023-07-12 RX ADMIN — ATORVASTATIN CALCIUM 10 MILLIGRAM(S): 80 TABLET, FILM COATED ORAL at 21:28

## 2023-07-12 RX ADMIN — DIVALPROEX SODIUM 250 MILLIGRAM(S): 500 TABLET, DELAYED RELEASE ORAL at 08:25

## 2023-07-12 RX ADMIN — AMLODIPINE BESYLATE 2.5 MILLIGRAM(S): 2.5 TABLET ORAL at 08:25

## 2023-07-12 RX ADMIN — QUETIAPINE FUMARATE 50 MILLIGRAM(S): 200 TABLET, FILM COATED ORAL at 08:25

## 2023-07-12 RX ADMIN — LACTULOSE 10 GRAM(S): 10 SOLUTION ORAL at 08:31

## 2023-07-12 RX ADMIN — Medication 3 MILLIGRAM(S): at 21:28

## 2023-07-12 RX ADMIN — HALOPERIDOL DECANOATE 1 MILLIGRAM(S): 100 INJECTION INTRAMUSCULAR at 21:28

## 2023-07-12 RX ADMIN — SENNA PLUS 2 TABLET(S): 8.6 TABLET ORAL at 08:25

## 2023-07-12 RX ADMIN — DIVALPROEX SODIUM 500 MILLIGRAM(S): 500 TABLET, DELAYED RELEASE ORAL at 17:21

## 2023-07-12 RX ADMIN — Medication 81 MILLIGRAM(S): at 08:25

## 2023-07-12 NOTE — BH INPATIENT PSYCHIATRY PROGRESS NOTE - NSBHMETABOLIC_PSY_ALL_CORE_FT
BMI: BMI (kg/m2): 23 (06-07-23 @ 18:13)  HbA1c: A1C with Estimated Average Glucose Result: 5.7 % (06-01-23 @ 05:10)    Glucose: POCT Blood Glucose.: 90 mg/dL (07-12-23 @ 07:54)    BP: 153/65 (07-12-23 @ 08:21) (110/60 - 153/65)  Lipid Panel: Date/Time: 06-08-23 @ 08:30  Cholesterol, Serum: 119  Direct LDL: --  HDL Cholesterol, Serum: 47  Total Cholesterol/HDL Ration Measurement: --  Triglycerides, Serum: 56

## 2023-07-12 NOTE — BH INPATIENT PSYCHIATRY PROGRESS NOTE - CURRENT MEDICATION
MEDICATIONS  (STANDING):  amLODIPine   Tablet 2.5 milliGRAM(s) Oral daily  aspirin  chewable 81 milliGRAM(s) Oral daily  atorvastatin 10 milliGRAM(s) Oral at bedtime  clonazePAM Oral Disintegrating Tablet 0.25 milliGRAM(s) Oral once  clonazePAM Oral Disintegrating Tablet 1 milliGRAM(s) Oral daily  divalproex Sprinkle 250 milliGRAM(s) Oral daily  divalproex Sprinkle 500 milliGRAM(s) Oral <User Schedule>  glucagon  Injectable 1 milliGRAM(s) IntraMuscular once  lactulose Syrup 10 Gram(s) Oral daily  losartan 75 milliGRAM(s) Oral daily  pantoprazole   Suspension 40 milliGRAM(s) Oral before breakfast  QUEtiapine 50 milliGRAM(s) Oral daily  senna 2 Tablet(s) Oral daily  sodium chloride 0.65% Nasal 2 Spray(s) Both Nostrils two times a day  venlafaxine 150 milliGRAM(s) Oral daily    MEDICATIONS  (PRN):  acetaminophen     Tablet .. 650 milliGRAM(s) Oral every 6 hours PRN Temp greater or equal to 38C (100.4F), Mild Pain (1 - 3), Moderate Pain (4 - 6)  bisacodyl 5 milliGRAM(s) Oral daily PRN constipation  bisacodyl Suppository 10 milliGRAM(s) Rectal daily PRN constipation  dextrose Oral Gel 15 Gram(s) Oral once PRN Blood Glucose LESS THAN 70 milliGRAM(s)/deciliter  haloperidol     Tablet 1 milliGRAM(s) Oral every 6 hours PRN agitation  haloperidol    Injectable 1 milliGRAM(s) IntraMuscular once PRN agitation  LORazepam     Tablet 0.5 milliGRAM(s) Oral every 6 hours PRN agitation  melatonin. 3 milliGRAM(s) Oral at bedtime PRN Insomnia

## 2023-07-12 NOTE — BH INPATIENT PSYCHIATRY PROGRESS NOTE - NSBHFUPINTERVALHXFT_PSY_A_CORE
Patient seen for psychotic depression. Patient w/o overnight events  VSS. Patient anxious help reassurance seeking. Slept in chair fearful of going to room. Took meds with nursing staff today, had recent BM.

## 2023-07-12 NOTE — BH INPATIENT PSYCHIATRY PROGRESS NOTE - NSBHCHARTREVIEWVS_PSY_A_CORE FT
Vital Signs Last 24 Hrs  T(C): 36.7 (07-12-23 @ 08:21), Max: 36.7 (07-12-23 @ 08:21)  T(F): 98.1 (07-12-23 @ 08:21), Max: 98.1 (07-12-23 @ 08:21)  HR: 79 (07-12-23 @ 08:21) (79 - 79)  BP: 153/65 (07-12-23 @ 08:21) (153/65 - 153/65)  BP(mean): --  RR: --  SpO2: --

## 2023-07-12 NOTE — BH INPATIENT PSYCHIATRY PROGRESS NOTE - MSE UNSTRUCTURED FT
Patient is awake and alert. [place]  Mood "not good" distressed affect Speech productive.Less restless and appearing less desperately frightened.  No perceptual disturbance.Believes her life is in danger if she goes to room but then says she may be willing to try going there on gradual basis   No SI, voices  Thinking very perseverative. Oriented to month year . Poor insight

## 2023-07-12 NOTE — BH INPATIENT PSYCHIATRY PROGRESS NOTE - NSBHASSESSSUMMFT_PSY_ALL_CORE
81 year old , retired female, domiciled at [facility], [facility] of DM2, HLD, HTN, PPHx of late onset Bipolar disorder, 2 prior psych adm last in 2022 at [facility], who presented to ED with worsening paranoia, anxiety, FTT (weight loss of 20lb since march), and confusion. Psychiatry was consulted for psychosis/AMS. Was started on cymbalta as daughter reported this was helpful in 2016/2017 when she had a similar presentation. Was continued on home medications klonopin, depakote then CL team added seroquel due to paranoia. Was on gabapentin 900mg/day at home which was tapered off by CL team. Transferred to [facility] for ongoing stabilization. Daughter denies patient has dementia. Also states patient responded well to BZD back in 2016/2017 but not well to antipsychotics, which led to team considering possibility of catatonia hx. Review of CL notes suggested she responded well to prns of ativan. As a result, klonopin was switched to ativan 1mg po bid briefly but there was no improvement so she was taken off of it. Meanwhile, seroquel was increased to target psychosis. Cymbalta had to be switched to effexor as patient can only take crushed meds. Has had some improvement overall though limited progress for the past few days. Discussed care with daughter again yesterday and states mother's presentation is very similar to decompensation in 2016/2017 and rather unlike that in 2021/2022 admission when she was stabilized on depakote and gabapentin. Daughter is strongly advocating for patient to go back on klonopin at a higher dose of 0.5mg po bid. It was also made clear that patient has JOSESITO and has not been on CPAP since March 2023 due to poor compliance 2/2 agitation. Will taper off effexor to minimize polypharmacy after which klonopin can be added back. Seroquel will have to continue given paranoia. Will transfer patient to  to allow for CPAP therapy.       Plan:  1. Legals: 9.27  2. Safety: constant observation for disorganized behavior 7am-11pm.  Ativan 0.5 mg q12h prn for anxiety/agitation.  3. Psychiatric: Continue current medications   	         Continue Seroquel 62.5mg daily and 75mg QHS   	         C/w Depakote 250 mg daily and 500 mg qHS. VPA level of 48.70 on 5/30 - will consider tapering off and optimizing SGA in the near future  	         Decreased Effexor to 37.5mg po bid (cymbalta discontinued due to dysphagia concerns and needing to crush medications) which aim to taper off quickly (has not been on it long).   		Will plan to restart klonopin at 0.25mg po bid in a few days when effexor has been discontinued and CPAP initiated.   	        	        4. Group, milieu, individual therapy as appropriate.  5. Medical: HTN, HLD, DM2. Phlebitis, possibly dysphagia, joint pain  	Keflex 500 mg qid x 5 days - complete now  	amlodipine 2.5 mg daily. decrease losartan to 75 mg daily due to orthostatic htn. aspirin 81 mg daily. atorvastatin 10 mg qHS.  	Lipid profile wnl other than slightly low HDL  	Metformin 500mg po bid              Naproxen 500mg daily   	b12 supplementation for deficiency in the past    	Ambulate with walker  	JOSESITO - will consider transfer to  to allow for CPAP use  6. Work up: none  7. 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 Plan cont CO as patient disorganized walks backward at risk for falls form disorganziation with close monitoring. Based on unusual hx of unique and dramatic response to klonopin and failure on antipsychotics will try klonopin while titrating Effexor and taper off Seroquel . Start on klonopin 0.25mg bid and titrate to 0.5mg bid. Watch BP as has some orthostasis may get better off Seroquel adjust BP meds if required. Cont CPAP  as she was able to use it last night  6/29 More linear and better related but paranoia is more apparent. BP stable though with very slight tachycardia, po intake ok, will monitor VS before increasing klonopin  6/30 Intrusive and paranoid, difficult to redirect per staff. Got ativan 0.5mg IM once due to agitation. Continue klonopin 0.25mg po bid for now - can increase to 0.5mg po bid if compliant with CPAP; no change in seroquel 25mg po bid, effexor 50mg po bid, depakote sprinkles 750mg/day; family advocating for treatment   with klonopin (consider increasing antisychotic, possibly less anticholinergic agent, if daughter agrees) though she refused CPAP last night so it would be   concerning to increase dose without CPAP compliance  7/1: More calm today, not agitated, able to be redirected  7/2: Some mild agitation, no persecutory delusions expressed, got prn quetiapine today, generally calm this AM  7/3 Some improvement. Still anxiouas agitated suspicious. Will change meds to daytime dosing with option to give later such as during visiting when family can encourage. Will also get rid of non urgent meds such as vitamins. Will increase laxatives as patient has hx constipation leading to retention. Recheck labs  7/4: Improved, no nihilistic delusions expressed, remains depressed, continue antidepressant/antipsychotic   7/5 Sl less delusional still terribly anxious restless receiving prns. Will increase klonopin, will change Miralax to lactulose as difficult to get patient to drink full cup of Miralax. Cont CO. Moniotr NA for hyponatremia  has been in this range before w/o dipping lower  7/6 Agitation restlessness less acute still paranoid about being harmed and about meds. Cont meds, plan increase Seroquel  increase laxatives  7/7 Depressed psychotic some sedation . Will lower Klonopin and titrate Seroquel to dose on which she was doing better. If combined treatment is too sedating may need to try less sedating antipsychotic. Will hold Lactulose  7/8 Depressed very delusional but calmer less restless and disorganzed. Will cont Klonopin change Seroquel to earlier to help compliance consider change to Zyprexa for more potent antipsychotic with once a day dosing  7/9 Calmer but very paranoid. Will increase Klonopin and consider change to olanzapine after discussing with daughter  7/10 Dysphoric paranoid only improvement is motor agitation. Afternoon compliance poor. Will cont Seorquel Klonopin but give eariler will increase Effexor . Asked medicine to review ASA and Naprosyn, the later will be d./declan  7/11 Depressed very psychotic cont treatment except consolidate Seroquel to once a day dosing in AM. Likely need to change Seroquel to another agent. COnt CO monitor bowel function took lactulose and senna today so will see if she has BM  7/12 Sl less distressed and paranoid but still will not stay in room. Cont meds as there are signs of improvement. If compliance is more reliable can change to PM meds to avoid daytime sedation

## 2023-07-13 LAB — GLUCOSE BLDC GLUCOMTR-MCNC: 98 MG/DL — SIGNIFICANT CHANGE UP (ref 70–99)

## 2023-07-13 PROCEDURE — 99231 SBSQ HOSP IP/OBS SF/LOW 25: CPT | Mod: FS

## 2023-07-13 RX ORDER — HALOPERIDOL DECANOATE 100 MG/ML
0.5 INJECTION INTRAMUSCULAR EVERY 6 HOURS
Refills: 0 | Status: DISCONTINUED | OUTPATIENT
Start: 2023-07-13 | End: 2023-08-01

## 2023-07-13 RX ADMIN — AMLODIPINE BESYLATE 2.5 MILLIGRAM(S): 2.5 TABLET ORAL at 08:01

## 2023-07-13 RX ADMIN — Medication 150 MILLIGRAM(S): at 08:01

## 2023-07-13 RX ADMIN — ATORVASTATIN CALCIUM 10 MILLIGRAM(S): 80 TABLET, FILM COATED ORAL at 20:50

## 2023-07-13 RX ADMIN — DIVALPROEX SODIUM 250 MILLIGRAM(S): 500 TABLET, DELAYED RELEASE ORAL at 08:01

## 2023-07-13 RX ADMIN — SENNA PLUS 2 TABLET(S): 8.6 TABLET ORAL at 08:02

## 2023-07-13 RX ADMIN — LACTULOSE 10 GRAM(S): 10 SOLUTION ORAL at 08:02

## 2023-07-13 RX ADMIN — Medication 1 MILLIGRAM(S): at 08:02

## 2023-07-13 RX ADMIN — QUETIAPINE FUMARATE 50 MILLIGRAM(S): 200 TABLET, FILM COATED ORAL at 08:02

## 2023-07-13 RX ADMIN — LOSARTAN POTASSIUM 75 MILLIGRAM(S): 100 TABLET, FILM COATED ORAL at 08:01

## 2023-07-13 RX ADMIN — Medication 81 MILLIGRAM(S): at 08:02

## 2023-07-13 NOTE — BH INPATIENT PSYCHIATRY PROGRESS NOTE - NSBHMETABOLIC_PSY_ALL_CORE_FT
BMI: BMI (kg/m2): 23 (06-07-23 @ 18:13)  HbA1c: A1C with Estimated Average Glucose Result: 5.7 % (06-01-23 @ 05:10)    Glucose: POCT Blood Glucose.: 98 mg/dL (07-13-23 @ 07:39)    BP: 149/73 (07-13-23 @ 07:35) (110/60 - 153/65)  Lipid Panel: Date/Time: 06-08-23 @ 08:30  Cholesterol, Serum: 119  Direct LDL: --  HDL Cholesterol, Serum: 47  Total Cholesterol/HDL Ration Measurement: --  Triglycerides, Serum: 56

## 2023-07-13 NOTE — CHART NOTE - NSCHARTNOTEFT_GEN_A_CORE
Nutrition follow up assessment for severe malnutrition. Patient is a 81 y/o female, domiciled at the [facility], with PMHx of dementia, DM2, and HTN, PPHx of late onset Bipolar I disorder, PTSD and Cluster B Personality traits, 2 prior psych adm last in 2022 at [facility], who presented to ED with worsening paranoia, anxiety, FTT (weight loss of 20lb since march), and confusion. Psychiatry was consulted for psychosis/AMS. Patient is now being transferred to [facility] for psychiatric treatment and stabilization. On 1:1 CO for disorganized behavior 7am-11pm.    Patient was sleeping at time of visit. Noted patient remains on Minced and moist diet per MD order. As per nursing staff, patient has been having good appetite with mostly 100% on in-house meals. No reports of chewing/swallowing difficulties on current diet order. Patient dislikes seafood casserole, likes yogurt and pudding per nursing. Food preferences taken and implemented on Cboard. Noted patient has constipation, observed last BM 2 days ago per nursing, on senna, lactulose, dulcolax PRN, and suppository PRN per MD order for bowel regularity, patient has been drinking at least 6-8 cups of water a day per staff. Will add yogurt daily to improve gut rahul. May benefit from adding Metamucil for added fiber. Otherwise no N/V/D reported at this time. Patient is not a candidate for diet education at this time due to disorganization and anxiety.    Diet : Diet, Minced and Moist:   Supplement Feeding Modality:  Oral  Glucerna Shake Cans or Servings Per Day:  3       Frequency:  Three Times a day (06-08-23 @ 15:47) [Active]    PO intake:  [ ] < 50%  [ ] 50-75%  [X] %  [ ] other :    Height: 152.4 cm  Weight: 53.5kg (6/7), 55.3kg (7/9)  -- gained 3.4% in 1 month, desirable  BMI: 23.8kg/m^2 (7/9)    Skin: no edema    Edema: no edema    Pertinent Medications: MEDICATIONS  (STANDING):  amLODIPine   Tablet 2.5 milliGRAM(s) Oral daily  aspirin  chewable 81 milliGRAM(s) Oral daily  atorvastatin 10 milliGRAM(s) Oral at bedtime  clonazePAM Oral Disintegrating Tablet 0.25 milliGRAM(s) Oral once  clonazePAM Oral Disintegrating Tablet 1 milliGRAM(s) Oral daily  divalproex Sprinkle 250 milliGRAM(s) Oral daily  divalproex Sprinkle 500 milliGRAM(s) Oral <User Schedule>  glucagon  Injectable 1 milliGRAM(s) IntraMuscular once  lactulose Syrup 10 Gram(s) Oral daily  losartan 75 milliGRAM(s) Oral daily  pantoprazole   Suspension 40 milliGRAM(s) Oral before breakfast  QUEtiapine 50 milliGRAM(s) Oral daily  senna 2 Tablet(s) Oral daily  sodium chloride 0.65% Nasal 2 Spray(s) Both Nostrils two times a day  venlafaxine 150 milliGRAM(s) Oral daily    MEDICATIONS  (PRN):  acetaminophen     Tablet .. 650 milliGRAM(s) Oral every 6 hours PRN Temp greater or equal to 38C (100.4F), Mild Pain (1 - 3), Moderate Pain (4 - 6)  bisacodyl 5 milliGRAM(s) Oral daily PRN constipation  bisacodyl Suppository 10 milliGRAM(s) Rectal daily PRN constipation  dextrose Oral Gel 15 Gram(s) Oral once PRN Blood Glucose LESS THAN 70 milliGRAM(s)/deciliter  haloperidol     Tablet 1 milliGRAM(s) Oral every 6 hours PRN agitation  haloperidol    Injectable 1 milliGRAM(s) IntraMuscular once PRN agitation  LORazepam     Tablet 0.5 milliGRAM(s) Oral every 6 hours PRN agitation  melatonin. 3 milliGRAM(s) Oral at bedtime PRN Insomnia    Pertinent Labs:  HbA1c: A1C with Estimated Average Glucose Result: 5.7 % (06-01-23 @ 05:10)  Glucose: 231 mg/dL (07.05.23 @ 08:00)     CAPILLARY BLOOD GLUCOSE    POCT Blood Glucose.: 98 mg/dL (07.13.23 @ 07:39)   POCT Blood Glucose.: 90 mg/dL (07.12.23 @ 07:54)   POCT Blood Glucose.: 142 mg/dL (07.11.23 @ 07:33)   POCT Blood Glucose.: 80 mg/dL (07.10.23 @ 08:02)       Estimated Needs:   [X] no change since previous assessment  [ ] recalculated:       Previous Nutrition Diagnosis: Severe malnutrition in context of chronic illness    Nutrition Diagnosis is [ ] ongoing  [X] resolved [ ] not applicable        New Nutrition Diagnosis: [X] not applicable      Recommendations:  1. Diet consistency to SLP's recommendation/ MD order. Please consult to SLP for swallow re-eval if patient no longer tolerated current diet consistency.  2. Continue bowel regimen per MD order. Suggest add Metamucil to aid in bowel movement.  3. Encourage po intake as tolerated and honor food preferences PRN.  4. Monitor PO intake/tolerance, weights, labs, BM's, and skin integrity.     -- Tiffanie Lozano, MS, RDN, Pager # [phone]

## 2023-07-13 NOTE — BH INPATIENT PSYCHIATRY PROGRESS NOTE - NSBHASSESSSUMMFT_PSY_ALL_CORE
81 year old , retired female, domiciled at [facility], [facility] of DM2, HLD, HTN, PPHx of late onset Bipolar disorder, 2 prior psych adm last in 2022 at [facility], who presented to ED with worsening paranoia, anxiety, FTT (weight loss of 20lb since march), and confusion. Psychiatry was consulted for psychosis/AMS. Was started on cymbalta as daughter reported this was helpful in 2016/2017 when she had a similar presentation. Was continued on home medications klonopin, depakote then CL team added seroquel due to paranoia. Was on gabapentin 900mg/day at home which was tapered off by CL team. Transferred to [facility] for ongoing stabilization. Daughter denies patient has dementia. Also states patient responded well to BZD back in 2016/2017 but not well to antipsychotics, which led to team considering possibility of catatonia hx. Review of CL notes suggested she responded well to prns of ativan. As a result, klonopin was switched to ativan 1mg po bid briefly but there was no improvement so she was taken off of it. Meanwhile, seroquel was increased to target psychosis. Cymbalta had to be switched to effexor as patient can only take crushed meds. Has had some improvement overall though limited progress for the past few days. Discussed care with daughter again yesterday and states mother's presentation is very similar to decompensation in 2016/2017 and rather unlike that in 2021/2022 admission when she was stabilized on depakote and gabapentin. Daughter is strongly advocating for patient to go back on klonopin at a higher dose of 0.5mg po bid. It was also made clear that patient has JOSESITO and has not been on CPAP since March 2023 due to poor compliance 2/2 agitation. Will taper off effexor to minimize polypharmacy after which klonopin can be added back. Seroquel will have to continue given paranoia. Will transfer patient to  to allow for CPAP therapy.       Plan:  1. Legals: 9.27  2. Safety: constant observation for disorganized behavior 7am-11pm.  Ativan 0.5 mg q12h prn for anxiety/agitation.  3. Psychiatric: Continue current medications   	         Continue Seroquel 62.5mg daily and 75mg QHS   	         C/w Depakote 250 mg daily and 500 mg qHS. VPA level of 48.70 on 5/30 - will consider tapering off and optimizing SGA in the near future  	         Decreased Effexor to 37.5mg po bid (cymbalta discontinued due to dysphagia concerns and needing to crush medications) which aim to taper off quickly (has not been on it long).   		Will plan to restart klonopin at 0.25mg po bid in a few days when effexor has been discontinued and CPAP initiated.   	        	        4. Group, milieu, individual therapy as appropriate.  5. Medical: HTN, HLD, DM2. Phlebitis, possibly dysphagia, joint pain  	Keflex 500 mg qid x 5 days - complete now  	amlodipine 2.5 mg daily. decrease losartan to 75 mg daily due to orthostatic htn. aspirin 81 mg daily. atorvastatin 10 mg qHS.  	Lipid profile wnl other than slightly low HDL  	Metformin 500mg po bid              Naproxen 500mg daily   	b12 supplementation for deficiency in the past    	Ambulate with walker  	JOSESITO - will consider transfer to  to allow for CPAP use  6. Work up: none  7. 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 Plan cont CO as patient disorganized walks backward at risk for falls form disorganziation with close monitoring. Based on unusual hx of unique and dramatic response to klonopin and failure on antipsychotics will try klonopin while titrating Effexor and taper off Seroquel . Start on klonopin 0.25mg bid and titrate to 0.5mg bid. Watch BP as has some orthostasis may get better off Seroquel adjust BP meds if required. Cont CPAP  as she was able to use it last night  6/29 More linear and better related but paranoia is more apparent. BP stable though with very slight tachycardia, po intake ok, will monitor VS before increasing klonopin  6/30 Intrusive and paranoid, difficult to redirect per staff. Got ativan 0.5mg IM once due to agitation. Continue klonopin 0.25mg po bid for now - can increase to 0.5mg po bid if compliant with CPAP; no change in seroquel 25mg po bid, effexor 50mg po bid, depakote sprinkles 750mg/day; family advocating for treatment   with klonopin (consider increasing antisychotic, possibly less anticholinergic agent, if daughter agrees) though she refused CPAP last night so it would be   concerning to increase dose without CPAP compliance  7/1: More calm today, not agitated, able to be redirected  7/2: Some mild agitation, no persecutory delusions expressed, got prn quetiapine today, generally calm this AM  7/3 Some improvement. Still anxiouas agitated suspicious. Will change meds to daytime dosing with option to give later such as during visiting when family can encourage. Will also get rid of non urgent meds such as vitamins. Will increase laxatives as patient has hx constipation leading to retention. Recheck labs  7/4: Improved, no nihilistic delusions expressed, remains depressed, continue antidepressant/antipsychotic   7/5 Sl less delusional still terribly anxious restless receiving prns. Will increase klonopin, will change Miralax to lactulose as difficult to get patient to drink full cup of Miralax. Cont CO. Moniotr NA for hyponatremia  has been in this range before w/o dipping lower  7/6 Agitation restlessness less acute still paranoid about being harmed and about meds. Cont meds, plan increase Seroquel  increase laxatives  7/7 Depressed psychotic some sedation . Will lower Klonopin and titrate Seroquel to dose on which she was doing better. If combined treatment is too sedating may need to try less sedating antipsychotic. Will hold Lactulose  7/8 Depressed very delusional but calmer less restless and disorganzed. Will cont Klonopin change Seroquel to earlier to help compliance consider change to Zyprexa for more potent antipsychotic with once a day dosing  7/9 Calmer but very paranoid. Will increase Klonopin and consider change to olanzapine after discussing with daughter  7/10 Dysphoric paranoid only improvement is motor agitation. Afternoon compliance poor. Will cont Seorquel Klonopin but give eariler will increase Effexor . Asked medicine to review ASA and Naprosyn, the later will be d./declan  7/11 Depressed very psychotic cont treatment except consolidate Seroquel to once a day dosing in AM. Likely need to change Seroquel to another agent. COnt CO monitor bowel function took lactulose and senna today so will see if she has BM  7/12 Sl less distressed and paranoid but still will not stay in room. Cont meds as there are signs of improvement. If compliance is more reliable can change to PM meds to avoid daytime sedation  7/13 Still quite paranoid. Will reduce prns to avoid oversedation will speak with family to consider changing Seroquel to Abilify as Seroquel maybe be sedating w/o other benefit

## 2023-07-13 NOTE — BH INPATIENT PSYCHIATRY PROGRESS NOTE - NSBHFUPINTERVALHXFT_PSY_A_CORE
Patient seen for psychotic depression. Patient agitated received prn VSS. Patient alert this Am then slept long time after AM meds. Eating sleeping okay Had BM

## 2023-07-13 NOTE — BH INPATIENT PSYCHIATRY PROGRESS NOTE - NSBHCHARTREVIEWVS_PSY_A_CORE FT
Vital Signs Last 24 Hrs  T(C): 36.9 (07-13-23 @ 07:35), Max: 36.9 (07-13-23 @ 07:35)  T(F): 98.4 (07-13-23 @ 07:35), Max: 98.4 (07-13-23 @ 07:35)  HR: --  BP: 149/73 (07-13-23 @ 07:35) (149/73 - 149/73)  BP(mean): 85 (07-13-23 @ 07:35) (85 - 85)  RR: --  SpO2: --

## 2023-07-13 NOTE — BH INPATIENT PSYCHIATRY PROGRESS NOTE - MSE UNSTRUCTURED FT
Patient is awake and alert. [place]  Mood "not good" distressed affect Speech productive.Less restless and appearing less desperately frightened.  No perceptual disturbance.Believes her life is in danger if she goes to room but then says she may be willing to try going there on gradual basis   No SI, voices  Thinking very perseverative. Of by a day on the date Poor insight

## 2023-07-13 NOTE — BH INPATIENT PSYCHIATRY PROGRESS NOTE - CURRENT MEDICATION
MEDICATIONS  (STANDING):  amLODIPine   Tablet 2.5 milliGRAM(s) Oral daily  aspirin  chewable 81 milliGRAM(s) Oral daily  atorvastatin 10 milliGRAM(s) Oral at bedtime  clonazePAM Oral Disintegrating Tablet 1 milliGRAM(s) Oral daily  divalproex Sprinkle 250 milliGRAM(s) Oral daily  divalproex Sprinkle 500 milliGRAM(s) Oral <User Schedule>  glucagon  Injectable 1 milliGRAM(s) IntraMuscular once  lactulose Syrup 10 Gram(s) Oral daily  losartan 75 milliGRAM(s) Oral daily  pantoprazole   Suspension 40 milliGRAM(s) Oral before breakfast  senna 2 Tablet(s) Oral daily  sodium chloride 0.65% Nasal 2 Spray(s) Both Nostrils two times a day  venlafaxine 150 milliGRAM(s) Oral daily    MEDICATIONS  (PRN):  acetaminophen     Tablet .. 650 milliGRAM(s) Oral every 6 hours PRN Temp greater or equal to 38C (100.4F), Mild Pain (1 - 3), Moderate Pain (4 - 6)  bisacodyl 5 milliGRAM(s) Oral daily PRN constipation  bisacodyl Suppository 10 milliGRAM(s) Rectal daily PRN constipation  dextrose Oral Gel 15 Gram(s) Oral once PRN Blood Glucose LESS THAN 70 milliGRAM(s)/deciliter  haloperidol     Tablet 0.5 milliGRAM(s) Oral every 6 hours PRN agitation  haloperidol    Injectable 1 milliGRAM(s) IntraMuscular once PRN agitation  melatonin. 3 milliGRAM(s) Oral at bedtime PRN Insomnia

## 2023-07-14 LAB — GLUCOSE BLDC GLUCOMTR-MCNC: 83 MG/DL — SIGNIFICANT CHANGE UP (ref 70–99)

## 2023-07-14 PROCEDURE — 99232 SBSQ HOSP IP/OBS MODERATE 35: CPT

## 2023-07-14 RX ORDER — ARIPIPRAZOLE 15 MG/1
2 TABLET ORAL ONCE
Refills: 0 | Status: COMPLETED | OUTPATIENT
Start: 2023-07-14 | End: 2023-07-14

## 2023-07-14 RX ORDER — ARIPIPRAZOLE 15 MG/1
2 TABLET ORAL DAILY
Refills: 0 | Status: DISCONTINUED | OUTPATIENT
Start: 2023-07-14 | End: 2023-07-24

## 2023-07-14 RX ADMIN — Medication 1 MILLIGRAM(S): at 09:00

## 2023-07-14 RX ADMIN — LOSARTAN POTASSIUM 75 MILLIGRAM(S): 100 TABLET, FILM COATED ORAL at 09:00

## 2023-07-14 RX ADMIN — ARIPIPRAZOLE 2 MILLIGRAM(S): 15 TABLET ORAL at 16:20

## 2023-07-14 RX ADMIN — DIVALPROEX SODIUM 500 MILLIGRAM(S): 500 TABLET, DELAYED RELEASE ORAL at 16:21

## 2023-07-14 RX ADMIN — SENNA PLUS 2 TABLET(S): 8.6 TABLET ORAL at 09:04

## 2023-07-14 RX ADMIN — Medication 150 MILLIGRAM(S): at 09:00

## 2023-07-14 RX ADMIN — Medication 3 MILLIGRAM(S): at 22:36

## 2023-07-14 RX ADMIN — DIVALPROEX SODIUM 250 MILLIGRAM(S): 500 TABLET, DELAYED RELEASE ORAL at 08:59

## 2023-07-14 RX ADMIN — Medication 81 MILLIGRAM(S): at 09:00

## 2023-07-14 RX ADMIN — ATORVASTATIN CALCIUM 10 MILLIGRAM(S): 80 TABLET, FILM COATED ORAL at 20:39

## 2023-07-14 RX ADMIN — AMLODIPINE BESYLATE 2.5 MILLIGRAM(S): 2.5 TABLET ORAL at 09:00

## 2023-07-14 RX ADMIN — Medication 2 SPRAY(S): at 09:01

## 2023-07-14 NOTE — BH INPATIENT PSYCHIATRY PROGRESS NOTE - NSBHCHARTREVIEWVS_PSY_A_CORE FT
Vital Signs Last 24 Hrs  T(C): 36.7 (07-14-23 @ 07:51), Max: 36.7 (07-14-23 @ 07:51)  T(F): 98 (07-14-23 @ 07:51), Max: 98 (07-14-23 @ 07:51)  HR: --  BP: --  BP(mean): --  RR: --  SpO2: --    Orthostatic VS  07-14-23 @ 07:51  Lying BP: --/-- HR: --  Sitting BP: 154/97 HR: 67  Standing BP: 147/86 HR: 83  Site: upper left arm  Mode: electronic

## 2023-07-14 NOTE — BH INPATIENT PSYCHIATRY PROGRESS NOTE - NSBHMETABOLIC_PSY_ALL_CORE_FT
BMI: BMI (kg/m2): 23 (06-07-23 @ 18:13)  HbA1c: A1C with Estimated Average Glucose Result: 5.7 % (06-01-23 @ 05:10)    Glucose: POCT Blood Glucose.: 83 mg/dL (07-14-23 @ 07:50)    BP: 149/73 (07-13-23 @ 07:35) (149/73 - 153/65)  Lipid Panel: Date/Time: 06-08-23 @ 08:30  Cholesterol, Serum: 119  Direct LDL: --  HDL Cholesterol, Serum: 47  Total Cholesterol/HDL Ration Measurement: --  Triglycerides, Serum: 56

## 2023-07-14 NOTE — BH INPATIENT PSYCHIATRY PROGRESS NOTE - CURRENT MEDICATION
MEDICATIONS  (STANDING):  amLODIPine   Tablet 2.5 milliGRAM(s) Oral daily  aspirin  chewable 81 milliGRAM(s) Oral daily  atorvastatin 10 milliGRAM(s) Oral at bedtime  clonazePAM Oral Disintegrating Tablet 1 milliGRAM(s) Oral daily  divalproex Sprinkle 250 milliGRAM(s) Oral daily  divalproex Sprinkle 500 milliGRAM(s) Oral <User Schedule>  glucagon  Injectable 1 milliGRAM(s) IntraMuscular once  lactulose Syrup 10 Gram(s) Oral daily  losartan 75 milliGRAM(s) Oral daily  pantoprazole   Suspension 40 milliGRAM(s) Oral before breakfast  senna 2 Tablet(s) Oral daily  sodium chloride 0.65% Nasal 2 Spray(s) Both Nostrils two times a day  venlafaxine 150 milliGRAM(s) Oral daily    MEDICATIONS  (PRN):  acetaminophen     Tablet .. 650 milliGRAM(s) Oral every 6 hours PRN Temp greater or equal to 38C (100.4F), Mild Pain (1 - 3), Moderate Pain (4 - 6)  bisacodyl 5 milliGRAM(s) Oral daily PRN constipation  bisacodyl Suppository 10 milliGRAM(s) Rectal daily PRN constipation  dextrose Oral Gel 15 Gram(s) Oral once PRN Blood Glucose LESS THAN 70 milliGRAM(s)/deciliter  haloperidol     Tablet 0.5 milliGRAM(s) Oral every 6 hours PRN agitation  haloperidol    Injectable 1 milliGRAM(s) IntraMuscular once PRN agitation  melatonin. 3 milliGRAM(s) Oral at bedtime PRN Insomnia   MEDICATIONS  (STANDING):  amLODIPine   Tablet 2.5 milliGRAM(s) Oral daily  aspirin  chewable 81 milliGRAM(s) Oral daily  atorvastatin 10 milliGRAM(s) Oral at bedtime  clonazePAM Oral Disintegrating Tablet 1 milliGRAM(s) Oral daily  divalproex Sprinkle 500 milliGRAM(s) Oral <User Schedule>  divalproex Sprinkle 250 milliGRAM(s) Oral daily  glucagon  Injectable 1 milliGRAM(s) IntraMuscular once  lactulose Syrup 10 Gram(s) Oral daily  losartan 75 milliGRAM(s) Oral daily  pantoprazole   Suspension 40 milliGRAM(s) Oral before breakfast  senna 2 Tablet(s) Oral daily  sodium chloride 0.65% Nasal 2 Spray(s) Both Nostrils two times a day  venlafaxine 150 milliGRAM(s) Oral daily    MEDICATIONS  (PRN):  acetaminophen     Tablet .. 650 milliGRAM(s) Oral every 6 hours PRN Temp greater or equal to 38C (100.4F), Mild Pain (1 - 3), Moderate Pain (4 - 6)  bisacodyl 5 milliGRAM(s) Oral daily PRN constipation  bisacodyl Suppository 10 milliGRAM(s) Rectal daily PRN constipation  dextrose Oral Gel 15 Gram(s) Oral once PRN Blood Glucose LESS THAN 70 milliGRAM(s)/deciliter  haloperidol     Tablet 0.5 milliGRAM(s) Oral every 6 hours PRN agitation  haloperidol    Injectable 1 milliGRAM(s) IntraMuscular once PRN agitation  melatonin. 3 milliGRAM(s) Oral at bedtime PRN Insomnia

## 2023-07-14 NOTE — BH INPATIENT PSYCHIATRY PROGRESS NOTE - NSBHASSESSSUMMFT_PSY_ALL_CORE
81 year old , retired female, domiciled at [facility], [facility] of DM2, HLD, HTN, PPHx of late onset Bipolar disorder, 2 prior psych adm last in 2022 at [facility], who presented to ED with worsening paranoia, anxiety, FTT (weight loss of 20lb since march), and confusion. Psychiatry was consulted for psychosis/AMS. Was started on cymbalta as daughter reported this was helpful in 2016/2017 when she had a similar presentation. Was continued on home medications klonopin, depakote then CL team added seroquel due to paranoia. Was on gabapentin 900mg/day at home which was tapered off by CL team. Transferred to [facility] for ongoing stabilization. Daughter denies patient has dementia. Also states patient responded well to BZD back in 2016/2017 but not well to antipsychotics, which led to team considering possibility of catatonia hx. Review of CL notes suggested she responded well to prns of ativan. As a result, klonopin was switched to ativan 1mg po bid briefly but there was no improvement so she was taken off of it. Meanwhile, seroquel was increased to target psychosis. Cymbalta had to be switched to effexor as patient can only take crushed meds. Has had some improvement overall though limited progress for the past few days. Discussed care with daughter again yesterday and states mother's presentation is very similar to decompensation in 2016/2017 and rather unlike that in 2021/2022 admission when she was stabilized on depakote and gabapentin. Daughter is strongly advocating for patient to go back on klonopin at a higher dose of 0.5mg po bid. It was also made clear that patient has JOSESITO and has not been on CPAP since March 2023 due to poor compliance 2/2 agitation. Will taper off effexor to minimize polypharmacy after which klonopin can be added back. Seroquel will have to continue given paranoia. Will transfer patient to  to allow for CPAP therapy.       Plan:  1. Legals: 9.27  2. Safety: constant observation for disorganized behavior 7am-11pm.  Ativan 0.5 mg q12h prn for anxiety/agitation.  3. Psychiatric: Continue current medications   	         Continue Seroquel 62.5mg daily and 75mg QHS   	         C/w Depakote 250 mg daily and 500 mg qHS. VPA level of 48.70 on 5/30 - will consider tapering off and optimizing SGA in the near future  	         Decreased Effexor to 37.5mg po bid (cymbalta discontinued due to dysphagia concerns and needing to crush medications) which aim to taper off quickly (has not been on it long).   		Will plan to restart klonopin at 0.25mg po bid in a few days when effexor has been discontinued and CPAP initiated.   	        	        4. Group, milieu, individual therapy as appropriate.  5. Medical: HTN, HLD, DM2. Phlebitis, possibly dysphagia, joint pain  	Keflex 500 mg qid x 5 days - complete now  	amlodipine 2.5 mg daily. decrease losartan to 75 mg daily due to orthostatic htn. aspirin 81 mg daily. atorvastatin 10 mg qHS.  	Lipid profile wnl other than slightly low HDL  	Metformin 500mg po bid              Naproxen 500mg daily   	b12 supplementation for deficiency in the past    	Ambulate with walker  	JOSESITO - will consider transfer to  to allow for CPAP use  6. Work up: none  7. 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 Plan cont CO as patient disorganized walks backward at risk for falls form disorganziation with close monitoring. Based on unusual hx of unique and dramatic response to klonopin and failure on antipsychotics will try klonopin while titrating Effexor and taper off Seroquel . Start on klonopin 0.25mg bid and titrate to 0.5mg bid. Watch BP as has some orthostasis may get better off Seroquel adjust BP meds if required. Cont CPAP  as she was able to use it last night  6/29 More linear and better related but paranoia is more apparent. BP stable though with very slight tachycardia, po intake ok, will monitor VS before increasing klonopin  6/30 Intrusive and paranoid, difficult to redirect per staff. Got ativan 0.5mg IM once due to agitation. Continue klonopin 0.25mg po bid for now - can increase to 0.5mg po bid if compliant with CPAP; no change in seroquel 25mg po bid, effexor 50mg po bid, depakote sprinkles 750mg/day; family advocating for treatment   with klonopin (consider increasing antisychotic, possibly less anticholinergic agent, if daughter agrees) though she refused CPAP last night so it would be   concerning to increase dose without CPAP compliance  7/1: More calm today, not agitated, able to be redirected  7/2: Some mild agitation, no persecutory delusions expressed, got prn quetiapine today, generally calm this AM  7/3 Some improvement. Still anxiouas agitated suspicious. Will change meds to daytime dosing with option to give later such as during visiting when family can encourage. Will also get rid of non urgent meds such as vitamins. Will increase laxatives as patient has hx constipation leading to retention. Recheck labs  7/4: Improved, no nihilistic delusions expressed, remains depressed, continue antidepressant/antipsychotic   7/5 Sl less delusional still terribly anxious restless receiving prns. Will increase klonopin, will change Miralax to lactulose as difficult to get patient to drink full cup of Miralax. Cont CO. Moniotr NA for hyponatremia  has been in this range before w/o dipping lower  7/6 Agitation restlessness less acute still paranoid about being harmed and about meds. Cont meds, plan increase Seroquel  increase laxatives  7/7 Depressed psychotic some sedation . Will lower Klonopin and titrate Seroquel to dose on which she was doing better. If combined treatment is too sedating may need to try less sedating antipsychotic. Will hold Lactulose  7/8 Depressed very delusional but calmer less restless and disorganzed. Will cont Klonopin change Seroquel to earlier to help compliance consider change to Zyprexa for more potent antipsychotic with once a day dosing  7/9 Calmer but very paranoid. Will increase Klonopin and consider change to olanzapine after discussing with daughter  7/10 Dysphoric paranoid only improvement is motor agitation. Afternoon compliance poor. Will cont Seorquel Klonopin but give eariler will increase Effexor . Asked medicine to review ASA and Naprosyn, the later will be d./declan  7/11 Depressed very psychotic cont treatment except consolidate Seroquel to once a day dosing in AM. Likely need to change Seroquel to another agent. COnt CO monitor bowel function took lactulose and senna today so will see if she has BM  7/12 Sl less distressed and paranoid but still will not stay in room. Cont meds as there are signs of improvement. If compliance is more reliable can change to PM meds to avoid daytime sedation  7/13 Still quite paranoid. Will reduce prns to avoid oversedation will speak with family to consider changing Seroquel to Abilify as Seroquel maybe be sedating w/o other benefit  7/14 Patient not responding to Klonopin like she did in past will discuss changingh Seroquel to Abilify with daughter.    81 year old , retired female, domiciled at [facility], [facility] of DM2, HLD, HTN, PPHx of late onset Bipolar disorder, 2 prior psych adm last in 2022 at [facility], who presented to ED with worsening paranoia, anxiety, FTT (weight loss of 20lb since march), and confusion. Psychiatry was consulted for psychosis/AMS. Was started on cymbalta as daughter reported this was helpful in 2016/2017 when she had a similar presentation. Was continued on home medications klonopin, depakote then CL team added seroquel due to paranoia. Was on gabapentin 900mg/day at home which was tapered off by CL team. Transferred to [facility] for ongoing stabilization. Daughter denies patient has dementia. Also states patient responded well to BZD back in 2016/2017 but not well to antipsychotics, which led to team considering possibility of catatonia hx. Review of CL notes suggested she responded well to prns of ativan. As a result, klonopin was switched to ativan 1mg po bid briefly but there was no improvement so she was taken off of it. Meanwhile, seroquel was increased to target psychosis. Cymbalta had to be switched to effexor as patient can only take crushed meds. Has had some improvement overall though limited progress for the past few days. Discussed care with daughter again yesterday and states mother's presentation is very similar to decompensation in 2016/2017 and rather unlike that in 2021/2022 admission when she was stabilized on depakote and gabapentin. Daughter is strongly advocating for patient to go back on klonopin at a higher dose of 0.5mg po bid. It was also made clear that patient has JOSESITO and has not been on CPAP since March 2023 due to poor compliance 2/2 agitation. Will taper off effexor to minimize polypharmacy after which klonopin can be added back. Seroquel will have to continue given paranoia. Will transfer patient to  to allow for CPAP therapy.       Plan:  1. Legals: 9.27  2. Safety: constant observation for disorganized behavior 7am-11pm.  Ativan 0.5 mg q12h prn for anxiety/agitation.  3. Psychiatric: Continue current medications   	         Continue Seroquel 62.5mg daily and 75mg QHS   	         C/w Depakote 250 mg daily and 500 mg qHS. VPA level of 48.70 on 5/30 - will consider tapering off and optimizing SGA in the near future  	         Decreased Effexor to 37.5mg po bid (cymbalta discontinued due to dysphagia concerns and needing to crush medications) which aim to taper off quickly (has not been on it long).   		Will plan to restart klonopin at 0.25mg po bid in a few days when effexor has been discontinued and CPAP initiated.   	        	        4. Group, milieu, individual therapy as appropriate.  5. Medical: HTN, HLD, DM2. Phlebitis, possibly dysphagia, joint pain  	Keflex 500 mg qid x 5 days - complete now  	amlodipine 2.5 mg daily. decrease losartan to 75 mg daily due to orthostatic htn. aspirin 81 mg daily. atorvastatin 10 mg qHS.  	Lipid profile wnl other than slightly low HDL  	Metformin 500mg po bid              Naproxen 500mg daily   	b12 supplementation for deficiency in the past    	Ambulate with walker  	JOSESITO - will consider transfer to  to allow for CPAP use  6. Work up: none  7. 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 Plan cont CO as patient disorganized walks backward at risk for falls form disorganziation with close monitoring. Based on unusual hx of unique and dramatic response to klonopin and failure on antipsychotics will try klonopin while titrating Effexor and taper off Seroquel . Start on klonopin 0.25mg bid and titrate to 0.5mg bid. Watch BP as has some orthostasis may get better off Seroquel adjust BP meds if required. Cont CPAP  as she was able to use it last night  6/29 More linear and better related but paranoia is more apparent. BP stable though with very slight tachycardia, po intake ok, will monitor VS before increasing klonopin  6/30 Intrusive and paranoid, difficult to redirect per staff. Got ativan 0.5mg IM once due to agitation. Continue klonopin 0.25mg po bid for now - can increase to 0.5mg po bid if compliant with CPAP; no change in seroquel 25mg po bid, effexor 50mg po bid, depakote sprinkles 750mg/day; family advocating for treatment   with klonopin (consider increasing antisychotic, possibly less anticholinergic agent, if daughter agrees) though she refused CPAP last night so it would be   concerning to increase dose without CPAP compliance  7/1: More calm today, not agitated, able to be redirected  7/2: Some mild agitation, no persecutory delusions expressed, got prn quetiapine today, generally calm this AM  7/3 Some improvement. Still anxiouas agitated suspicious. Will change meds to daytime dosing with option to give later such as during visiting when family can encourage. Will also get rid of non urgent meds such as vitamins. Will increase laxatives as patient has hx constipation leading to retention. Recheck labs  7/4: Improved, no nihilistic delusions expressed, remains depressed, continue antidepressant/antipsychotic   7/5 Sl less delusional still terribly anxious restless receiving prns. Will increase klonopin, will change Miralax to lactulose as difficult to get patient to drink full cup of Miralax. Cont CO. Moniotr NA for hyponatremia  has been in this range before w/o dipping lower  7/6 Agitation restlessness less acute still paranoid about being harmed and about meds. Cont meds, plan increase Seroquel  increase laxatives  7/7 Depressed psychotic some sedation . Will lower Klonopin and titrate Seroquel to dose on which she was doing better. If combined treatment is too sedating may need to try less sedating antipsychotic. Will hold Lactulose  7/8 Depressed very delusional but calmer less restless and disorganzed. Will cont Klonopin change Seroquel to earlier to help compliance consider change to Zyprexa for more potent antipsychotic with once a day dosing  7/9 Calmer but very paranoid. Will increase Klonopin and consider change to olanzapine after discussing with daughter  7/10 Dysphoric paranoid only improvement is motor agitation. Afternoon compliance poor. Will cont Seorquel Klonopin but give eariler will increase Effexor . Asked medicine to review ASA and Naprosyn, the later will be d./declan  7/11 Depressed very psychotic cont treatment except consolidate Seroquel to once a day dosing in AM. Likely need to change Seroquel to another agent. COnt CO monitor bowel function took lactulose and senna today so will see if she has BM  7/12 Sl less distressed and paranoid but still will not stay in room. Cont meds as there are signs of improvement. If compliance is more reliable can change to PM meds to avoid daytime sedation  7/13 Still quite paranoid. Will reduce prns to avoid oversedation will speak with family to consider changing Seroquel to Abilify as Seroquel maybe be sedating w/o other benefit  7/14 Patient not responding to Klonopin like she did in past will discuss changing Seroquel to Abilify with daughter. Will dc fs as patient on no diabetic meds and fs  have been nl

## 2023-07-14 NOTE — BH INPATIENT PSYCHIATRY PROGRESS NOTE - NSBHFUPINTERVALHXFT_PSY_A_CORE
Patient seen for psychotic depression. Patient agitated received prn VSS. Patient more alert today. Eating fair cont to sleep out of room

## 2023-07-14 NOTE — BH INPATIENT PSYCHIATRY PROGRESS NOTE - NSBHATTESTBILLING_PSY_A_CORE
55933-[id number] OBS or IP - low complexity OR 25-34 mins 37916-Aqaqnjpnod OBS or IP - moderate complexity OR 35-49 mins

## 2023-07-14 NOTE — BH INPATIENT PSYCHIATRY PROGRESS NOTE - MSE UNSTRUCTURED FT
Patient is awake and alert. [place]  Mood "not good" distressed affect Speech productive.Less restless and appearing less desperately frightened.  No perceptual disturbance.Believes her life is in danger if she goes to room , does not feel she will be alive when writer retruns after weekend   No SI, voices  Thinking very perseverative. Of by a day on the date Poor insight

## 2023-07-15 PROCEDURE — 99232 SBSQ HOSP IP/OBS MODERATE 35: CPT

## 2023-07-15 RX ADMIN — LOSARTAN POTASSIUM 75 MILLIGRAM(S): 100 TABLET, FILM COATED ORAL at 08:34

## 2023-07-15 RX ADMIN — DIVALPROEX SODIUM 500 MILLIGRAM(S): 500 TABLET, DELAYED RELEASE ORAL at 18:25

## 2023-07-15 RX ADMIN — AMLODIPINE BESYLATE 2.5 MILLIGRAM(S): 2.5 TABLET ORAL at 08:34

## 2023-07-15 RX ADMIN — SENNA PLUS 2 TABLET(S): 8.6 TABLET ORAL at 08:35

## 2023-07-15 RX ADMIN — Medication 150 MILLIGRAM(S): at 08:35

## 2023-07-15 RX ADMIN — DIVALPROEX SODIUM 250 MILLIGRAM(S): 500 TABLET, DELAYED RELEASE ORAL at 08:35

## 2023-07-15 RX ADMIN — Medication 1 MILLIGRAM(S): at 08:35

## 2023-07-15 RX ADMIN — Medication 81 MILLIGRAM(S): at 08:34

## 2023-07-15 RX ADMIN — ARIPIPRAZOLE 2 MILLIGRAM(S): 15 TABLET ORAL at 08:34

## 2023-07-15 RX ADMIN — ATORVASTATIN CALCIUM 10 MILLIGRAM(S): 80 TABLET, FILM COATED ORAL at 20:52

## 2023-07-15 RX ADMIN — Medication 3 MILLIGRAM(S): at 20:52

## 2023-07-15 RX ADMIN — PANTOPRAZOLE SODIUM 40 MILLIGRAM(S): 20 TABLET, DELAYED RELEASE ORAL at 05:51

## 2023-07-15 RX ADMIN — HALOPERIDOL DECANOATE 0.5 MILLIGRAM(S): 100 INJECTION INTRAMUSCULAR at 10:44

## 2023-07-15 NOTE — BH INPATIENT PSYCHIATRY PROGRESS NOTE - NSBHFUPINTERVALHXFT_PSY_A_CORE
Patient seen for psychotic depression, chart reviewed and discussed with nursing staff. Per staff, Pt is disorganized, loud, received PRN melatonin last night. Patient is compliant with medications, received PRN po haldol around 11 am today. On exam, Pt noted sleeping on chair in day area, then woke up in 15 minutes, walking around the unit, using walker, but disorganized, intrusive and requiring constant redirection by CO staff. Pt approaching many staff to wake her up if she doses off. She does not engage interview meaningfully. Patient seen for psychotic depression, chart reviewed and discussed with nursing staff. Per staff, Pt is disorganized, loud, received PRN melatonin last night. Patient is compliant with medications, received PRN po haldol around 11 am today. On exam, Pt noted sleeping on chair in day area, then woke up in 15 minutes, walking around the unit, using walker, but disorganized, intrusive and requiring constant redirection by CO staff. Pt approaching many staff to wake her up if she dozes off. She does not engage interview meaningfully.

## 2023-07-15 NOTE — BH INPATIENT PSYCHIATRY PROGRESS NOTE - NSBHCHARTREVIEWVS_PSY_A_CORE FT
Vital Signs Last 24 Hrs  T(C): 36.3 (07-15-23 @ 07:40), Max: 36.3 (07-15-23 @ 07:40)  T(F): 97.4 (07-15-23 @ 07:40), Max: 97.4 (07-15-23 @ 07:40)  HR: --  BP: --  BP(mean): --  RR: --  SpO2: --    Orthostatic VS  07-15-23 @ 07:40  Lying BP: --/-- HR: --  Sitting BP: 132/53 HR: 68  Standing BP: 128/80 HR: 78  Site: --  Mode: --  Orthostatic VS  07-14-23 @ 07:51  Lying BP: --/-- HR: --  Sitting BP: 154/97 HR: 67  Standing BP: 147/86 HR: 83  Site: upper left arm  Mode: electronic

## 2023-07-15 NOTE — BH INPATIENT PSYCHIATRY PROGRESS NOTE - NSBHMETABOLIC_PSY_ALL_CORE_FT
BMI: BMI (kg/m2): 23 (06-07-23 @ 18:13)  HbA1c: A1C with Estimated Average Glucose Result: 5.7 % (06-01-23 @ 05:10)    Glucose: POCT Blood Glucose.: 83 mg/dL (07-14-23 @ 07:50)    BP: 149/73 (07-13-23 @ 07:35) (149/73 - 149/73)  Lipid Panel: Date/Time: 06-08-23 @ 08:30  Cholesterol, Serum: 119  Direct LDL: --  HDL Cholesterol, Serum: 47  Total Cholesterol/HDL Ration Measurement: --  Triglycerides, Serum: 56

## 2023-07-15 NOTE — BH INPATIENT PSYCHIATRY PROGRESS NOTE - OTHER
USES walker erythematous lesion near R wrist 'please wake me up if I dose off' 'please wake me up if I doze off'

## 2023-07-15 NOTE — BH INPATIENT PSYCHIATRY PROGRESS NOTE - CURRENT MEDICATION
MEDICATIONS  (STANDING):  amLODIPine   Tablet 2.5 milliGRAM(s) Oral daily  ARIPiprazole 2 milliGRAM(s) Oral daily  aspirin  chewable 81 milliGRAM(s) Oral daily  atorvastatin 10 milliGRAM(s) Oral at bedtime  clonazePAM Oral Disintegrating Tablet 1 milliGRAM(s) Oral daily  divalproex Sprinkle 250 milliGRAM(s) Oral daily  divalproex Sprinkle 500 milliGRAM(s) Oral <User Schedule>  glucagon  Injectable 1 milliGRAM(s) IntraMuscular once  lactulose Syrup 10 Gram(s) Oral daily  losartan 75 milliGRAM(s) Oral daily  pantoprazole   Suspension 40 milliGRAM(s) Oral before breakfast  senna 2 Tablet(s) Oral daily  sodium chloride 0.65% Nasal 2 Spray(s) Both Nostrils two times a day  venlafaxine 150 milliGRAM(s) Oral daily    MEDICATIONS  (PRN):  acetaminophen     Tablet .. 650 milliGRAM(s) Oral every 6 hours PRN Temp greater or equal to 38C (100.4F), Mild Pain (1 - 3), Moderate Pain (4 - 6)  bisacodyl 5 milliGRAM(s) Oral daily PRN constipation  bisacodyl Suppository 10 milliGRAM(s) Rectal daily PRN constipation  dextrose Oral Gel 15 Gram(s) Oral once PRN Blood Glucose LESS THAN 70 milliGRAM(s)/deciliter  haloperidol     Tablet 0.5 milliGRAM(s) Oral every 6 hours PRN agitation  haloperidol    Injectable 1 milliGRAM(s) IntraMuscular once PRN agitation  melatonin. 3 milliGRAM(s) Oral at bedtime PRN Insomnia

## 2023-07-15 NOTE — BH INPATIENT PSYCHIATRY PROGRESS NOTE - NSBHASSESSSUMMFT_PSY_ALL_CORE
81 year old , retired female, domiciled at [facility], [facility] of DM2, HLD, HTN, PPHx of late onset Bipolar disorder, 2 prior psych adm last in 2022 at [facility], who presented to ED with worsening paranoia, anxiety, FTT (weight loss of 20lb since march), and confusion. Psychiatry was consulted for psychosis/AMS. Was started on cymbalta as daughter reported this was helpful in 2016/2017 when she had a similar presentation. Was continued on home medications klonopin, depakote then CL team added seroquel due to paranoia. Was on gabapentin 900mg/day at home which was tapered off by CL team. Transferred to [facility] for ongoing stabilization. Daughter denies patient has dementia. Also states patient responded well to BZD back in 2016/2017 but not well to antipsychotics, which led to team considering possibility of catatonia hx. Review of CL notes suggested she responded well to prns of ativan. As a result, klonopin was switched to ativan 1mg po bid briefly but there was no improvement so she was taken off of it. Meanwhile, seroquel was increased to target psychosis. Cymbalta had to be switched to effexor as patient can only take crushed meds. Has had some improvement overall though limited progress for the past few days. Discussed care with daughter again yesterday and states mother's presentation is very similar to decompensation in 2016/2017 and rather unlike that in 2021/2022 admission when she was stabilized on depakote and gabapentin. Daughter is strongly advocating for patient to go back on klonopin at a higher dose of 0.5mg po bid. It was also made clear that patient has JOSESITO and has not been on CPAP since March 2023 due to poor compliance 2/2 agitation. Will taper off effexor to minimize polypharmacy after which klonopin can be added back. Seroquel will have to continue given paranoia. Will transfer patient to  to allow for CPAP therapy.       Plan:  1. Legals: 9.27  2. Safety: constant observation for disorganized behavior 7am-11pm.  Ativan 0.5 mg q12h prn for anxiety/agitation.  3. Psychiatric: Continue current medications   	         Continue Seroquel 62.5mg daily and 75mg QHS   	         C/w Depakote 250 mg daily and 500 mg qHS. VPA level of 48.70 on 5/30 - will consider tapering off and optimizing SGA in the near future  	         Decreased Effexor to 37.5mg po bid (cymbalta discontinued due to dysphagia concerns and needing to crush medications) which aim to taper off quickly (has not been on it long).   		Will plan to restart klonopin at 0.25mg po bid in a few days when effexor has been discontinued and CPAP initiated.   	        	        4. Group, milieu, individual therapy as appropriate.  5. Medical: HTN, HLD, DM2. Phlebitis, possibly dysphagia, joint pain  	Keflex 500 mg qid x 5 days - complete now  	amlodipine 2.5 mg daily. decrease losartan to 75 mg daily due to orthostatic htn. aspirin 81 mg daily. atorvastatin 10 mg qHS.  	Lipid profile wnl other than slightly low HDL  	Metformin 500mg po bid              Naproxen 500mg daily   	b12 supplementation for deficiency in the past    	Ambulate with walker  	JOSESITO - will consider transfer to  to allow for CPAP use  6. Work up: none  7. 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 Plan cont CO as patient disorganized walks backward at risk for falls form disorganziation with close monitoring. Based on unusual hx of unique and dramatic response to klonopin and failure on antipsychotics will try klonopin while titrating Effexor and taper off Seroquel . Start on klonopin 0.25mg bid and titrate to 0.5mg bid. Watch BP as has some orthostasis may get better off Seroquel adjust BP meds if required. Cont CPAP  as she was able to use it last night  6/29 More linear and better related but paranoia is more apparent. BP stable though with very slight tachycardia, po intake ok, will monitor VS before increasing klonopin  6/30 Intrusive and paranoid, difficult to redirect per staff. Got ativan 0.5mg IM once due to agitation. Continue klonopin 0.25mg po bid for now - can increase to 0.5mg po bid if compliant with CPAP; no change in seroquel 25mg po bid, effexor 50mg po bid, depakote sprinkles 750mg/day; family advocating for treatment   with klonopin (consider increasing antisychotic, possibly less anticholinergic agent, if daughter agrees) though she refused CPAP last night so it would be   concerning to increase dose without CPAP compliance  7/1: More calm today, not agitated, able to be redirected  7/2: Some mild agitation, no persecutory delusions expressed, got prn quetiapine today, generally calm this AM  7/3 Some improvement. Still anxiouas agitated suspicious. Will change meds to daytime dosing with option to give later such as during visiting when family can encourage. Will also get rid of non urgent meds such as vitamins. Will increase laxatives as patient has hx constipation leading to retention. Recheck labs  7/4: Improved, no nihilistic delusions expressed, remains depressed, continue antidepressant/antipsychotic   7/5 Sl less delusional still terribly anxious restless receiving prns. Will increase klonopin, will change Miralax to lactulose as difficult to get patient to drink full cup of Miralax. Cont CO. Moniotr NA for hyponatremia  has been in this range before w/o dipping lower  7/6 Agitation restlessness less acute still paranoid about being harmed and about meds. Cont meds, plan increase Seroquel  increase laxatives  7/7 Depressed psychotic some sedation . Will lower Klonopin and titrate Seroquel to dose on which she was doing better. If combined treatment is too sedating may need to try less sedating antipsychotic. Will hold Lactulose  7/8 Depressed very delusional but calmer less restless and disorganzed. Will cont Klonopin change Seroquel to earlier to help compliance consider change to Zyprexa for more potent antipsychotic with once a day dosing  7/9 Calmer but very paranoid. Will increase Klonopin and consider change to olanzapine after discussing with daughter  7/10 Dysphoric paranoid only improvement is motor agitation. Afternoon compliance poor. Will cont Seorquel Klonopin but give eariler will increase Effexor . Asked medicine to review ASA and Naprosyn, the later will be d./declan  7/11 Depressed very psychotic cont treatment except consolidate Seroquel to once a day dosing in AM. Likely need to change Seroquel to another agent. COnt CO monitor bowel function took lactulose and senna today so will see if she has BM  7/12 Sl less distressed and paranoid but still will not stay in room. Cont meds as there are signs of improvement. If compliance is more reliable can change to PM meds to avoid daytime sedation  7/13 Still quite paranoid. Will reduce prns to avoid oversedation will speak with family to consider changing Seroquel to Abilify as Seroquel maybe be sedating w/o other benefit  7/14 Patient not responding to Klonopin like she did in past will discuss changing Seroquel to Abilify with daughter. Will dc fs as patient on no diabetic meds and fs  have been nl  7/15 disorganized, intrusive, requiring PRN medications. Tolerating abilify.

## 2023-07-16 PROCEDURE — 99232 SBSQ HOSP IP/OBS MODERATE 35: CPT

## 2023-07-16 RX ADMIN — Medication 81 MILLIGRAM(S): at 08:18

## 2023-07-16 RX ADMIN — HALOPERIDOL DECANOATE 0.5 MILLIGRAM(S): 100 INJECTION INTRAMUSCULAR at 21:22

## 2023-07-16 RX ADMIN — LACTULOSE 10 GRAM(S): 10 SOLUTION ORAL at 08:15

## 2023-07-16 RX ADMIN — AMLODIPINE BESYLATE 2.5 MILLIGRAM(S): 2.5 TABLET ORAL at 08:17

## 2023-07-16 RX ADMIN — HALOPERIDOL DECANOATE 0.5 MILLIGRAM(S): 100 INJECTION INTRAMUSCULAR at 15:04

## 2023-07-16 RX ADMIN — LOSARTAN POTASSIUM 75 MILLIGRAM(S): 100 TABLET, FILM COATED ORAL at 08:17

## 2023-07-16 RX ADMIN — ARIPIPRAZOLE 2 MILLIGRAM(S): 15 TABLET ORAL at 08:17

## 2023-07-16 RX ADMIN — Medication 150 MILLIGRAM(S): at 08:18

## 2023-07-16 RX ADMIN — Medication 2 SPRAY(S): at 09:04

## 2023-07-16 RX ADMIN — Medication 3 MILLIGRAM(S): at 21:23

## 2023-07-16 RX ADMIN — ATORVASTATIN CALCIUM 10 MILLIGRAM(S): 80 TABLET, FILM COATED ORAL at 21:43

## 2023-07-16 RX ADMIN — DIVALPROEX SODIUM 250 MILLIGRAM(S): 500 TABLET, DELAYED RELEASE ORAL at 08:16

## 2023-07-16 RX ADMIN — Medication 1 MILLIGRAM(S): at 08:17

## 2023-07-16 RX ADMIN — DIVALPROEX SODIUM 500 MILLIGRAM(S): 500 TABLET, DELAYED RELEASE ORAL at 15:08

## 2023-07-16 RX ADMIN — SENNA PLUS 2 TABLET(S): 8.6 TABLET ORAL at 08:16

## 2023-07-16 NOTE — BH INPATIENT PSYCHIATRY PROGRESS NOTE - NSBHMETABOLIC_PSY_ALL_CORE_FT
Charity Ring RDN, CDN #[id number] / preferred TEAMS  BMI: BMI (kg/m2): 23 (06-07-23 @ 18:13)  HbA1c: A1C with Estimated Average Glucose Result: 5.7 % (06-01-23 @ 05:10)    Glucose: POCT Blood Glucose.: 83 mg/dL (07-14-23 @ 07:50)    BP: --  Lipid Panel: Date/Time: 06-08-23 @ 08:30  Cholesterol, Serum: 119  Direct LDL: --  HDL Cholesterol, Serum: 47  Total Cholesterol/HDL Ration Measurement: --  Triglycerides, Serum: 56

## 2023-07-16 NOTE — BH INPATIENT PSYCHIATRY PROGRESS NOTE - NSBHASSESSSUMMFT_PSY_ALL_CORE
81 year old , retired female, domiciled at [facility], [facility] of DM2, HLD, HTN, PPHx of late onset Bipolar disorder, 2 prior psych adm last in 2022 at [facility], who presented to ED with worsening paranoia, anxiety, FTT (weight loss of 20lb since march), and confusion. Psychiatry was consulted for psychosis/AMS. Was started on cymbalta as daughter reported this was helpful in 2016/2017 when she had a similar presentation. Was continued on home medications klonopin, depakote then CL team added seroquel due to paranoia. Was on gabapentin 900mg/day at home which was tapered off by CL team. Transferred to [facility] for ongoing stabilization. Daughter denies patient has dementia. Also states patient responded well to BZD back in 2016/2017 but not well to antipsychotics, which led to team considering possibility of catatonia hx. Review of CL notes suggested she responded well to prns of ativan. As a result, klonopin was switched to ativan 1mg po bid briefly but there was no improvement so she was taken off of it. Meanwhile, seroquel was increased to target psychosis. Cymbalta had to be switched to effexor as patient can only take crushed meds. Has had some improvement overall though limited progress for the past few days. Discussed care with daughter again yesterday and states mother's presentation is very similar to decompensation in 2016/2017 and rather unlike that in 2021/2022 admission when she was stabilized on depakote and gabapentin. Daughter is strongly advocating for patient to go back on klonopin at a higher dose of 0.5mg po bid. It was also made clear that patient has JOSESITO and has not been on CPAP since March 2023 due to poor compliance 2/2 agitation. Will taper off effexor to minimize polypharmacy after which klonopin can be added back. Seroquel will have to continue given paranoia. Will transfer patient to  to allow for CPAP therapy.       Plan:  1. Legals: 9.27  2. Safety: constant observation for disorganized behavior 7am-11pm.  Ativan 0.5 mg q12h prn for anxiety/agitation.  3. Psychiatric: Continue current medications   	         Continue Seroquel 62.5mg daily and 75mg QHS   	         C/w Depakote 250 mg daily and 500 mg qHS. VPA level of 48.70 on 5/30 - will consider tapering off and optimizing SGA in the near future  	         Decreased Effexor to 37.5mg po bid (cymbalta discontinued due to dysphagia concerns and needing to crush medications) which aim to taper off quickly (has not been on it long).   		Will plan to restart klonopin at 0.25mg po bid in a few days when effexor has been discontinued and CPAP initiated.   	        	        4. Group, milieu, individual therapy as appropriate.  5. Medical: HTN, HLD, DM2. Phlebitis, possibly dysphagia, joint pain  	Keflex 500 mg qid x 5 days - complete now  	amlodipine 2.5 mg daily. decrease losartan to 75 mg daily due to orthostatic htn. aspirin 81 mg daily. atorvastatin 10 mg qHS.  	Lipid profile wnl other than slightly low HDL  	Metformin 500mg po bid              Naproxen 500mg daily   	b12 supplementation for deficiency in the past    	Ambulate with walker  	JOSESITO - will consider transfer to  to allow for CPAP use  6. Work up: none  7. 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 Plan cont CO as patient disorganized walks backward at risk for falls form disorganziation with close monitoring. Based on unusual hx of unique and dramatic response to klonopin and failure on antipsychotics will try klonopin while titrating Effexor and taper off Seroquel . Start on klonopin 0.25mg bid and titrate to 0.5mg bid. Watch BP as has some orthostasis may get better off Seroquel adjust BP meds if required. Cont CPAP  as she was able to use it last night  6/29 More linear and better related but paranoia is more apparent. BP stable though with very slight tachycardia, po intake ok, will monitor VS before increasing klonopin  6/30 Intrusive and paranoid, difficult to redirect per staff. Got ativan 0.5mg IM once due to agitation. Continue klonopin 0.25mg po bid for now - can increase to 0.5mg po bid if compliant with CPAP; no change in seroquel 25mg po bid, effexor 50mg po bid, depakote sprinkles 750mg/day; family advocating for treatment   with klonopin (consider increasing antisychotic, possibly less anticholinergic agent, if daughter agrees) though she refused CPAP last night so it would be   concerning to increase dose without CPAP compliance  7/1: More calm today, not agitated, able to be redirected  7/2: Some mild agitation, no persecutory delusions expressed, got prn quetiapine today, generally calm this AM  7/3 Some improvement. Still anxiouas agitated suspicious. Will change meds to daytime dosing with option to give later such as during visiting when family can encourage. Will also get rid of non urgent meds such as vitamins. Will increase laxatives as patient has hx constipation leading to retention. Recheck labs  7/4: Improved, no nihilistic delusions expressed, remains depressed, continue antidepressant/antipsychotic   7/5 Sl less delusional still terribly anxious restless receiving prns. Will increase klonopin, will change Miralax to lactulose as difficult to get patient to drink full cup of Miralax. Cont CO. Moniotr NA for hyponatremia  has been in this range before w/o dipping lower  7/6 Agitation restlessness less acute still paranoid about being harmed and about meds. Cont meds, plan increase Seroquel  increase laxatives  7/7 Depressed psychotic some sedation . Will lower Klonopin and titrate Seroquel to dose on which she was doing better. If combined treatment is too sedating may need to try less sedating antipsychotic. Will hold Lactulose  7/8 Depressed very delusional but calmer less restless and disorganzed. Will cont Klonopin change Seroquel to earlier to help compliance consider change to Zyprexa for more potent antipsychotic with once a day dosing  7/9 Calmer but very paranoid. Will increase Klonopin and consider change to olanzapine after discussing with daughter  7/10 Dysphoric paranoid only improvement is motor agitation. Afternoon compliance poor. Will cont Seorquel Klonopin but give eariler will increase Effexor . Asked medicine to review ASA and Naprosyn, the later will be d./declan  7/11 Depressed very psychotic cont treatment except consolidate Seroquel to once a day dosing in AM. Likely need to change Seroquel to another agent. COnt CO monitor bowel function took lactulose and senna today so will see if she has BM  7/12 Sl less distressed and paranoid but still will not stay in room. Cont meds as there are signs of improvement. If compliance is more reliable can change to PM meds to avoid daytime sedation  7/13 Still quite paranoid. Will reduce prns to avoid oversedation will speak with family to consider changing Seroquel to Abilify as Seroquel maybe be sedating w/o other benefit  7/14 Patient not responding to Klonopin like she did in past will discuss changing Seroquel to Abilify with daughter. Will dc fs as patient on no diabetic meds and fs  have been nl  7/15 disorganized, intrusive, requiring PRN medications. Tolerating abilify.   7/16 Improving mood, still confused with intermittent agitation, paranoia, PRN melatonin last night, no other PRN today

## 2023-07-16 NOTE — BH INPATIENT PSYCHIATRY PROGRESS NOTE - NSBHFUPINTERVALHXFT_PSY_A_CORE
Patient seen for psychotic depression, chart reviewed and discussed with nursing staff. Per staff, Pt remains disorganized, loud, paranoid, received PRN melatonin last night. Patient is compliant with medications, no side effects reported. On exam, Pt noted sleeping on lluvia chair in day area,  woke up on verbal command, and engaged in interview.  Reports feeling ok, sleep and appetite are alright. She reports she is forgetful at times. When asked about hospitalization, she reports that she is in hospital as she needs help. She  says she gets 'all different kinds of treatment and people talk to her'. Denies any depression, anxiety, paranoia, SI, HI or physical distress'. Vitals noted elevatd BP. NOT SYMPTOMATIC.

## 2023-07-16 NOTE — BH INPATIENT PSYCHIATRY PROGRESS NOTE - NSBHCHARTREVIEWVS_PSY_A_CORE FT
Vital Signs Last 24 Hrs  T(C): 36.2 (07-16-23 @ 05:52), Max: 36.2 (07-16-23 @ 05:52)  T(F): 97.1 (07-16-23 @ 05:52), Max: 97.1 (07-16-23 @ 05:52)  HR: --  BP: --  BP(mean): --  RR: --  SpO2: --    Orthostatic VS  07-16-23 @ 05:52  Lying BP: --/-- HR: --  Sitting BP: 168/61 HR: 62  Standing BP: 159/63 HR: 75  Site: --  Mode: --  Orthostatic VS  07-15-23 @ 07:40  Lying BP: --/-- HR: --  Sitting BP: 132/53 HR: 68  Standing BP: 128/80 HR: 78  Site: --  Mode: --

## 2023-07-17 LAB — GLUCOSE BLDC GLUCOMTR-MCNC: 90 MG/DL — SIGNIFICANT CHANGE UP (ref 70–99)

## 2023-07-17 PROCEDURE — 99231 SBSQ HOSP IP/OBS SF/LOW 25: CPT

## 2023-07-17 RX ADMIN — SENNA PLUS 2 TABLET(S): 8.6 TABLET ORAL at 09:08

## 2023-07-17 RX ADMIN — Medication 1 MILLIGRAM(S): at 08:40

## 2023-07-17 RX ADMIN — HALOPERIDOL DECANOATE 0.5 MILLIGRAM(S): 100 INJECTION INTRAMUSCULAR at 10:09

## 2023-07-17 RX ADMIN — AMLODIPINE BESYLATE 2.5 MILLIGRAM(S): 2.5 TABLET ORAL at 09:43

## 2023-07-17 RX ADMIN — DIVALPROEX SODIUM 250 MILLIGRAM(S): 500 TABLET, DELAYED RELEASE ORAL at 09:07

## 2023-07-17 RX ADMIN — ATORVASTATIN CALCIUM 10 MILLIGRAM(S): 80 TABLET, FILM COATED ORAL at 21:03

## 2023-07-17 RX ADMIN — HALOPERIDOL DECANOATE 0.5 MILLIGRAM(S): 100 INJECTION INTRAMUSCULAR at 22:09

## 2023-07-17 RX ADMIN — DIVALPROEX SODIUM 500 MILLIGRAM(S): 500 TABLET, DELAYED RELEASE ORAL at 16:31

## 2023-07-17 RX ADMIN — Medication 81 MILLIGRAM(S): at 09:09

## 2023-07-17 RX ADMIN — LACTULOSE 10 GRAM(S): 10 SOLUTION ORAL at 08:40

## 2023-07-17 RX ADMIN — LOSARTAN POTASSIUM 75 MILLIGRAM(S): 100 TABLET, FILM COATED ORAL at 09:44

## 2023-07-17 RX ADMIN — Medication 150 MILLIGRAM(S): at 09:09

## 2023-07-17 RX ADMIN — ARIPIPRAZOLE 2 MILLIGRAM(S): 15 TABLET ORAL at 09:08

## 2023-07-17 RX ADMIN — Medication 5 MILLIGRAM(S): at 10:09

## 2023-07-17 RX ADMIN — Medication 3 MILLIGRAM(S): at 21:03

## 2023-07-17 NOTE — BH INPATIENT PSYCHIATRY PROGRESS NOTE - MSE UNSTRUCTURED FT
Appearance: Dressed appropriately.  Behavior: Cooperative.    Motor: No abnormal movements, no psychomotor slowing or activation.  Speech: Regular rate.  Mood: "Fine."  Affect: Neutral.  Thought Process: Repetitive  Associations: Fair.  Thought Content: Poverty of TC.  Insight: Limited.  Judgment: Limited.  Attention: Limited.   Language: Fluent.  Gait/station: seated.

## 2023-07-17 NOTE — BH INPATIENT PSYCHIATRY PROGRESS NOTE - NSBHASSESSSUMMFT_PSY_ALL_CORE
81 year old , retired female, domiciled at [facility], [facility] of DM2, HLD, HTN, PPHx of late onset Bipolar disorder, 2 prior psych adm last in 2022 at [facility], who presented to ED with worsening paranoia, anxiety, FTT (weight loss of 20lb since march), and confusion. Psychiatry was consulted for psychosis/AMS. Was started on cymbalta as daughter reported this was helpful in 2016/2017 when she had a similar presentation. Was continued on home medications klonopin, depakote then CL team added seroquel due to paranoia. Was on gabapentin 900mg/day at home which was tapered off by CL team. Transferred to [facility] for ongoing stabilization. Daughter denies patient has dementia. Also states patient responded well to BZD back in 2016/2017 but not well to antipsychotics, which led to team considering possibility of catatonia hx. Review of CL notes suggested she responded well to prns of ativan. As a result, klonopin was switched to ativan 1mg po bid briefly but there was no improvement so she was taken off of it. Meanwhile, seroquel was increased to target psychosis. Cymbalta had to be switched to effexor as patient can only take crushed meds. Has had some improvement overall though limited progress for the past few days. Discussed care with daughter again yesterday and states mother's presentation is very similar to decompensation in 2016/2017 and rather unlike that in 2021/2022 admission when she was stabilized on depakote and gabapentin. Daughter is strongly advocating for patient to go back on klonopin at a higher dose of 0.5mg po bid. It was also made clear that patient has JOSESITO and has not been on CPAP since March 2023 due to poor compliance 2/2 agitation. Will taper off effexor to minimize polypharmacy after which klonopin can be added back. Seroquel will have to continue given paranoia. Will transfer patient to  to allow for CPAP therapy.       Plan:  1. Legals: 9.27  2. Safety: constant observation for disorganized behavior 7am-11pm.  Ativan 0.5 mg q12h prn for anxiety/agitation.  3. Psychiatric: Continue current medications   	         Continue Seroquel 62.5mg daily and 75mg QHS   	         C/w Depakote 250 mg daily and 500 mg qHS. VPA level of 48.70 on 5/30 - will consider tapering off and optimizing SGA in the near future  	         Decreased Effexor to 37.5mg po bid (cymbalta discontinued due to dysphagia concerns and needing to crush medications) which aim to taper off quickly (has not been on it long).   		Will plan to restart klonopin at 0.25mg po bid in a few days when effexor has been discontinued and CPAP initiated.   	        	        4. Group, milieu, individual therapy as appropriate.  5. Medical: HTN, HLD, DM2. Phlebitis, possibly dysphagia, joint pain  	Keflex 500 mg qid x 5 days - complete now  	amlodipine 2.5 mg daily. decrease losartan to 75 mg daily due to orthostatic htn. aspirin 81 mg daily. atorvastatin 10 mg qHS.  	Lipid profile wnl other than slightly low HDL  	Metformin 500mg po bid              Naproxen 500mg daily   	b12 supplementation for deficiency in the past    	Ambulate with walker  	JOSESITO - will consider transfer to  to allow for CPAP use  6. Work up: none  7. 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 Plan cont CO as patient disorganized walks backward at risk for falls form disorganziation with close monitoring. Based on unusual hx of unique and dramatic response to klonopin and failure on antipsychotics will try klonopin while titrating Effexor and taper off Seroquel . Start on klonopin 0.25mg bid and titrate to 0.5mg bid. Watch BP as has some orthostasis may get better off Seroquel adjust BP meds if required. Cont CPAP  as she was able to use it last night  6/29 More linear and better related but paranoia is more apparent. BP stable though with very slight tachycardia, po intake ok, will monitor VS before increasing klonopin  6/30 Intrusive and paranoid, difficult to redirect per staff. Got ativan 0.5mg IM once due to agitation. Continue klonopin 0.25mg po bid for now - can increase to 0.5mg po bid if compliant with CPAP; no change in seroquel 25mg po bid, effexor 50mg po bid, depakote sprinkles 750mg/day; family advocating for treatment   with klonopin (consider increasing antisychotic, possibly less anticholinergic agent, if daughter agrees) though she refused CPAP last night so it would be   concerning to increase dose without CPAP compliance  7/1: More calm today, not agitated, able to be redirected  7/2: Some mild agitation, no persecutory delusions expressed, got prn quetiapine today, generally calm this AM  7/3 Some improvement. Still anxiouas agitated suspicious. Will change meds to daytime dosing with option to give later such as during visiting when family can encourage. Will also get rid of non urgent meds such as vitamins. Will increase laxatives as patient has hx constipation leading to retention. Recheck labs  7/4: Improved, no nihilistic delusions expressed, remains depressed, continue antidepressant/antipsychotic   7/5 Sl less delusional still terribly anxious restless receiving prns. Will increase klonopin, will change Miralax to lactulose as difficult to get patient to drink full cup of Miralax. Cont CO. Moniotr NA for hyponatremia  has been in this range before w/o dipping lower  7/6 Agitation restlessness less acute still paranoid about being harmed and about meds. Cont meds, plan increase Seroquel  increase laxatives  7/7 Depressed psychotic some sedation . Will lower Klonopin and titrate Seroquel to dose on which she was doing better. If combined treatment is too sedating may need to try less sedating antipsychotic. Will hold Lactulose  7/8 Depressed very delusional but calmer less restless and disorganzed. Will cont Klonopin change Seroquel to earlier to help compliance consider change to Zyprexa for more potent antipsychotic with once a day dosing  7/9 Calmer but very paranoid. Will increase Klonopin and consider change to olanzapine after discussing with daughter  7/10 Dysphoric paranoid only improvement is motor agitation. Afternoon compliance poor. Will cont Seorquel Klonopin but give eariler will increase Effexor . Asked medicine to review ASA and Naprosyn, the later will be d./declan  7/11 Depressed very psychotic cont treatment except consolidate Seroquel to once a day dosing in AM. Likely need to change Seroquel to another agent. COnt CO monitor bowel function took lactulose and senna today so will see if she has BM  7/12 Sl less distressed and paranoid but still will not stay in room. Cont meds as there are signs of improvement. If compliance is more reliable can change to PM meds to avoid daytime sedation  7/13 Still quite paranoid. Will reduce prns to avoid oversedation will speak with family to consider changing Seroquel to Abilify as Seroquel maybe be sedating w/o other benefit  7/14 Patient not responding to Klonopin like she did in past will discuss changing Seroquel to Abilify with daughter. Will dc fs as patient on no diabetic meds and fs  have been nl  7/15 disorganized, intrusive, requiring PRN medications. Tolerating abilify.   7/16 Improving mood, still confused with intermittent agitation, paranoia, PRN melatonin last night, no other PRN today  7/17 Disorganized but pleasant. 81 year old , retired female, domiciled at [facility], [facility] of DM2, HLD, HTN, PPHx of late onset Bipolar disorder, 2 prior psych adm last in 2022 at [facility], who presented to ED with worsening paranoia, anxiety, FTT (weight loss of 20lb since march), and confusion. Psychiatry was consulted for psychosis/AMS. Was started on cymbalta as daughter reported this was helpful in 2016/2017 when she had a similar presentation. Was continued on home medications klonopin, depakote then CL team added seroquel due to paranoia. Was on gabapentin 900mg/day at home which was tapered off by CL team. Transferred to [facility] for ongoing stabilization. Daughter denies patient has dementia. Also states patient responded well to BZD back in 2016/2017 but not well to antipsychotics, which led to team considering possibility of catatonia hx. Review of CL notes suggested she responded well to prns of ativan. As a result, klonopin was switched to ativan 1mg po bid briefly but there was no improvement so she was taken off of it. Meanwhile, seroquel was increased to target psychosis. Cymbalta had to be switched to effexor as patient can only take crushed meds. Has had some improvement overall though limited progress for the past few days. Discussed care with daughter again yesterday and states mother's presentation is very similar to decompensation in 2016/2017 and rather unlike that in 2021/2022 admission when she was stabilized on depakote and gabapentin. Daughter is strongly advocating for patient to go back on klonopin at a higher dose of 0.5mg po bid. It was also made clear that patient has JOSESITO and has not been on CPAP since March 2023 due to poor compliance 2/2 agitation. Will taper off effexor to minimize polypharmacy after which klonopin can be added back. Seroquel will have to continue given paranoia. Will transfer patient to  to allow for CPAP therapy.     Plan:  1. Legals: 9.27  2. Safety: constant observation for disorganized behavior 7am-11pm.  Ativan 0.5 mg q12h prn for anxiety/agitation.  3. Psychiatric: Continue current medications: clonazepam 1 mg daily, aripiprazole 2 mg daily (switched from quetiapine which was ineffective), depakote sprinkle 250 mg daily, and venlafaxine  mg daily.         	        4. Group, milieu, individual therapy as appropriate.  5. Medical: HTN, HLD, DM2. Phlebitis, possibly dysphagia, joint pain  	Keflex 500 mg qid x 5 days - complete now  	amlodipine 2.5 mg daily. decrease losartan to 75 mg daily due to orthostatic htn. aspirin 81 mg daily. atorvastatin 10 mg qHS.  	Lipid profile wnl other than slightly low HDL  	Metformin 500mg po bid              Naproxen 500mg daily   	b12 supplementation for deficiency in the past    	Ambulate with walker  	JOSESITO - will consider transfer to  to allow for CPAP use  6. Work up: none  7. 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 Plan cont CO as patient disorganized walks backward at risk for falls form disorganziation with close monitoring. Based on unusual hx of unique and dramatic response to klonopin and failure on antipsychotics will try klonopin while titrating Effexor and taper off Seroquel . Start on klonopin 0.25mg bid and titrate to 0.5mg bid. Watch BP as has some orthostasis may get better off Seroquel adjust BP meds if required. Cont CPAP  as she was able to use it last night  6/29 More linear and better related but paranoia is more apparent. BP stable though with very slight tachycardia, po intake ok, will monitor VS before increasing klonopin  6/30 Intrusive and paranoid, difficult to redirect per staff. Got ativan 0.5mg IM once due to agitation. Continue klonopin 0.25mg po bid for now - can increase to 0.5mg po bid if compliant with CPAP; no change in seroquel 25mg po bid, effexor 50mg po bid, depakote sprinkles 750mg/day; family advocating for treatment   with klonopin (consider increasing antisychotic, possibly less anticholinergic agent, if daughter agrees) though she refused CPAP last night so it would be   concerning to increase dose without CPAP compliance  7/1: More calm today, not agitated, able to be redirected  7/2: Some mild agitation, no persecutory delusions expressed, got prn quetiapine today, generally calm this AM  7/3 Some improvement. Still anxiouas agitated suspicious. Will change meds to daytime dosing with option to give later such as during visiting when family can encourage. Will also get rid of non urgent meds such as vitamins. Will increase laxatives as patient has hx constipation leading to retention. Recheck labs  7/4: Improved, no nihilistic delusions expressed, remains depressed, continue antidepressant/antipsychotic   7/5 Sl less delusional still terribly anxious restless receiving prns. Will increase klonopin, will change Miralax to lactulose as difficult to get patient to drink full cup of Miralax. Cont CO. Moniotr NA for hyponatremia  has been in this range before w/o dipping lower  7/6 Agitation restlessness less acute still paranoid about being harmed and about meds. Cont meds, plan increase Seroquel  increase laxatives  7/7 Depressed psychotic some sedation . Will lower Klonopin and titrate Seroquel to dose on which she was doing better. If combined treatment is too sedating may need to try less sedating antipsychotic. Will hold Lactulose  7/8 Depressed very delusional but calmer less restless and disorganzed. Will cont Klonopin change Seroquel to earlier to help compliance consider change to Zyprexa for more potent antipsychotic with once a day dosing  7/9 Calmer but very paranoid. Will increase Klonopin and consider change to olanzapine after discussing with daughter  7/10 Dysphoric paranoid only improvement is motor agitation. Afternoon compliance poor. Will cont Seorquel Klonopin but give eariler will increase Effexor . Asked medicine to review ASA and Naprosyn, the later will be d./declan  7/11 Depressed very psychotic cont treatment except consolidate Seroquel to once a day dosing in AM. Likely need to change Seroquel to another agent. COnt CO monitor bowel function took lactulose and senna today so will see if she has BM  7/12 Sl less distressed and paranoid but still will not stay in room. Cont meds as there are signs of improvement. If compliance is more reliable can change to PM meds to avoid daytime sedation  7/13 Still quite paranoid. Will reduce prns to avoid oversedation will speak with family to consider changing Seroquel to Abilify as Seroquel maybe be sedating w/o other benefit  7/14 Patient not responding to Klonopin like she did in past will discuss changing Seroquel to Abilify with daughter. Will dc fs as patient on no diabetic meds and fs  have been nl  7/15 disorganized, intrusive, requiring PRN medications. Tolerating abilify.   7/16 Improving mood, still confused with intermittent agitation, paranoia, PRN melatonin last night, no other PRN today  7/17 Disorganized but pleasant. 81 year old , retired female, domiciled at residential, [facility] of DM2, HLD, HTN, PPHx of late onset Bipolar disorder, 2 prior psych adm last in 2022 at [facility], who presented to ED with worsening paranoia, anxiety, FTT (weight loss of 20lb since march), and confusion. Psychiatry was consulted for psychosis/AMS. Was started on cymbalta as daughter reported this was helpful in 2016/2017 when she had a similar presentation. Was continued on home medications klonopin, depakote then CL team added seroquel due to paranoia. Was on gabapentin 900mg/day at home which was tapered off by CL team. Transferred to [facility] for ongoing stabilization. Daughter denies patient has dementia. Also states patient responded well to BZD back in 2016/2017 but not well to antipsychotics, which led to team considering possibility of catatonia hx. Review of CL notes suggested she responded well to prns of ativan. As a result, klonopin was switched to ativan 1mg po bid briefly but there was no improvement so she was taken off of it. Meanwhile, seroquel was increased to target psychosis. Cymbalta had to be switched to effexor as patient can only take crushed meds. Has had some improvement overall though limited progress for the past few days. Discussed care with daughter again yesterday and states mother's presentation is very similar to decompensation in 2016/2017 and rather unlike that in 2021/2022 admission when she was stabilized on depakote and gabapentin. Daughter is strongly advocating for patient to go back on klonopin at a higher dose of 0.5mg po bid. It was also made clear that patient has JOSESITO and has not been on CPAP since March 2023 due to poor compliance 2/2 agitation. Will taper off effexor to minimize polypharmacy after which klonopin can be added back. Seroquel will have to continue given paranoia. Will transfer patient to  to allow for CPAP therapy.     Plan:  1. Legals: 9.27  2. Safety: constant observation for disorganized behavior 7am-11pm.  Ativan 0.5 mg q12h prn for anxiety/agitation.  3. Psychiatric: Continue current medications: clonazepam 1 mg daily, aripiprazole 2 mg daily (switched from quetiapine which was ineffective), depakote sprinkle 250 mg daily and 500 mg at 17:00, and venlafaxine  mg daily. 	        4. Group, milieu, individual therapy as appropriate.  5. Medical: HTN, HLD, DM2. Phlebitis, possibly dysphagia, joint pain  	Keflex 500 mg qid x 5 days - complete now  	amlodipine 2.5 mg daily. decrease losartan to 75 mg daily due to orthostatic htn. aspirin 81 mg daily. atorvastatin 10 mg qHS.  	Lipid profile wnl other than slightly low HDL  	Metformin 500mg po bid              Naproxen 500mg daily   	b12 supplementation for deficiency in the past    	Ambulate with walker  	JOSESITO - will consider transfer to  to allow for CPAP use  6. Work up: none  7. Dispo: return to residential when stable  6/28 Pssible agitated and psychotic depression as part of recurrent depression or BAD. Plan cont CO as patient disorganized walks backward at risk for falls form disorganziation with close monitoring. Based on unusual hx of unique and dramatic response to klonopin and failure on antipsychotics will try klonopin while titrating Effexor and taper off Seroquel . Start on klonopin 0.25mg bid and titrate to 0.5mg bid. Watch BP as has some orthostasis may get better off Seroquel adjust BP meds if required. Cont CPAP  as she was able to use it last night  6/29 More linear and better related but paranoia is more apparent. BP stable though with very slight tachycardia, po intake ok, will monitor VS before increasing klonopin  6/30 Intrusive and paranoid, difficult to redirect per staff. Got ativan 0.5mg IM once due to agitation. Continue klonopin 0.25mg po bid for now - can increase to 0.5mg po bid if compliant with CPAP; no change in seroquel 25mg po bid, effexor 50mg po bid, depakote sprinkles 750mg/day; family advocating for treatment   with klonopin (consider increasing antisychotic, possibly less anticholinergic agent, if daughter agrees) though she refused CPAP last night so it would be   concerning to increase dose without CPAP compliance  7/1: More calm today, not agitated, able to be redirected  7/2: Some mild agitation, no persecutory delusions expressed, got prn quetiapine today, generally calm this AM  7/3 Some improvement. Still anxiouas agitated suspicious. Will change meds to daytime dosing with option to give later such as during visiting when family can encourage. Will also get rid of non urgent meds such as vitamins. Will increase laxatives as patient has hx constipation leading to retention. Recheck labs  7/4: Improved, no nihilistic delusions expressed, remains depressed, continue antidepressant/antipsychotic   7/5 Sl less delusional still terribly anxious restless receiving prns. Will increase klonopin, will change Miralax to lactulose as difficult to get patient to drink full cup of Miralax. Cont CO. Moniotr NA for hyponatremia  has been in this range before w/o dipping lower  7/6 Agitation restlessness less acute still paranoid about being harmed and about meds. Cont meds, plan increase Seroquel  increase laxatives  7/7 Depressed psychotic some sedation . Will lower Klonopin and titrate Seroquel to dose on which she was doing better. If combined treatment is too sedating may need to try less sedating antipsychotic. Will hold Lactulose  7/8 Depressed very delusional but calmer less restless and disorganzed. Will cont Klonopin change Seroquel to earlier to help compliance consider change to Zyprexa for more potent antipsychotic with once a day dosing  7/9 Calmer but very paranoid. Will increase Klonopin and consider change to olanzapine after discussing with daughter  7/10 Dysphoric paranoid only improvement is motor agitation. Afternoon compliance poor. Will cont Seorquel Klonopin but give eariler will increase Effexor . Asked medicine to review ASA and Naprosyn, the later will be d./declan  7/11 Depressed very psychotic cont treatment except consolidate Seroquel to once a day dosing in AM. Likely need to change Seroquel to another agent. COnt CO monitor bowel function took lactulose and senna today so will see if she has BM  7/12 Sl less distressed and paranoid but still will not stay in room. Cont meds as there are signs of improvement. If compliance is more reliable can change to PM meds to avoid daytime sedation  7/13 Still quite paranoid. Will reduce prns to avoid oversedation will speak with family to consider changing Seroquel to Abilify as Seroquel maybe be sedating w/o other benefit  7/14 Patient not responding to Klonopin like she did in past will discuss changing Seroquel to Abilify with daughter. Will dc fs as patient on no diabetic meds and fs  have been nl  7/15 disorganized, intrusive, requiring PRN medications. Tolerating abilify.   7/16 Improving mood, still confused with intermittent agitation, paranoia, PRN melatonin last night, no other PRN today  7/17 Disorganized but pleasant.

## 2023-07-17 NOTE — BH INPATIENT PSYCHIATRY PROGRESS NOTE - NSBHFUPINTERVALHXFT_PSY_A_CORE
No overnight events.  Pt found resting in day room.  Pt repeats that she is doing ok and that she has been thinking about a book that is sitting on her table.

## 2023-07-17 NOTE — BH INPATIENT PSYCHIATRY PROGRESS NOTE - NSBHCHARTREVIEWVS_PSY_A_CORE FT
Vital Signs Last 24 Hrs  T(C): 36.8 (07-17-23 @ 09:35), Max: 36.8 (07-17-23 @ 09:35)  T(F): 98.2 (07-17-23 @ 09:35), Max: 98.2 (07-17-23 @ 09:35)  HR: --  BP: --  BP(mean): --  RR: --  SpO2: --    Orthostatic VS  07-17-23 @ 09:35  Lying BP: --/-- HR: --  Sitting BP: 146/65 HR: 101  Standing BP: 135/73 HR: 108  Site: upper right arm  Mode: electronic  Orthostatic VS  07-16-23 @ 05:52  Lying BP: --/-- HR: --  Sitting BP: 168/61 HR: 62  Standing BP: 159/63 HR: 75  Site: --  Mode: --   Vital Signs Last 24 Hrs  T(C): 36.7 (07-21-23 @ 07:02), Max: 36.7 (07-21-23 @ 07:02)  T(F): 98.1 (07-21-23 @ 07:02), Max: 98.1 (07-21-23 @ 07:02)  HR: --  BP: --  BP(mean): --  RR: --  SpO2: --    Orthostatic VS  07-21-23 @ 07:02  Lying BP: --/-- HR: --  Sitting BP: 104/49 HR: 78  Standing BP: 131/59 HR: 97  Site: --  Mode: --

## 2023-07-17 NOTE — BH INPATIENT PSYCHIATRY PROGRESS NOTE - NSBHMETABOLIC_PSY_ALL_CORE_FT
BMI: BMI (kg/m2): 23 (06-07-23 @ 18:13)  HbA1c: A1C with Estimated Average Glucose Result: 5.7 % (06-01-23 @ 05:10)    Glucose: POCT Blood Glucose.: 90 mg/dL (07-17-23 @ 07:45)    BP: --  Lipid Panel: Date/Time: 06-08-23 @ 08:30  Cholesterol, Serum: 119  Direct LDL: --  HDL Cholesterol, Serum: 47  Total Cholesterol/HDL Ration Measurement: --  Triglycerides, Serum: 56   BMI: BMI (kg/m2): 23 (06-07-23 @ 18:13)  HbA1c: A1C with Estimated Average Glucose Result: 5.7 % (06-01-23 @ 05:10)    Glucose: POCT Blood Glucose.: 93 mg/dL (07-21-23 @ 07:45)    BP: 148/83 (07-20-23 @ 07:53) (148/83 - 148/83)  Lipid Panel: Date/Time: 06-08-23 @ 08:30  Cholesterol, Serum: 119  Direct LDL: --  HDL Cholesterol, Serum: 47  Total Cholesterol/HDL Ration Measurement: --  Triglycerides, Serum: 56

## 2023-07-18 LAB — GLUCOSE BLDC GLUCOMTR-MCNC: 106 MG/DL — HIGH (ref 70–99)

## 2023-07-18 PROCEDURE — 99231 SBSQ HOSP IP/OBS SF/LOW 25: CPT

## 2023-07-18 RX ADMIN — LOSARTAN POTASSIUM 75 MILLIGRAM(S): 100 TABLET, FILM COATED ORAL at 08:00

## 2023-07-18 RX ADMIN — Medication 81 MILLIGRAM(S): at 08:01

## 2023-07-18 RX ADMIN — DIVALPROEX SODIUM 250 MILLIGRAM(S): 500 TABLET, DELAYED RELEASE ORAL at 08:00

## 2023-07-18 RX ADMIN — ARIPIPRAZOLE 2 MILLIGRAM(S): 15 TABLET ORAL at 08:00

## 2023-07-18 RX ADMIN — HALOPERIDOL DECANOATE 0.5 MILLIGRAM(S): 100 INJECTION INTRAMUSCULAR at 15:43

## 2023-07-18 RX ADMIN — AMLODIPINE BESYLATE 2.5 MILLIGRAM(S): 2.5 TABLET ORAL at 08:01

## 2023-07-18 RX ADMIN — Medication 150 MILLIGRAM(S): at 08:01

## 2023-07-18 RX ADMIN — ATORVASTATIN CALCIUM 10 MILLIGRAM(S): 80 TABLET, FILM COATED ORAL at 21:31

## 2023-07-18 RX ADMIN — LACTULOSE 10 GRAM(S): 10 SOLUTION ORAL at 08:01

## 2023-07-18 RX ADMIN — HALOPERIDOL DECANOATE 0.5 MILLIGRAM(S): 100 INJECTION INTRAMUSCULAR at 22:14

## 2023-07-18 RX ADMIN — SENNA PLUS 2 TABLET(S): 8.6 TABLET ORAL at 08:01

## 2023-07-18 RX ADMIN — Medication 1 MILLIGRAM(S): at 08:00

## 2023-07-18 RX ADMIN — DIVALPROEX SODIUM 500 MILLIGRAM(S): 500 TABLET, DELAYED RELEASE ORAL at 15:43

## 2023-07-18 RX ADMIN — Medication 3 MILLIGRAM(S): at 21:31

## 2023-07-18 NOTE — BH INPATIENT PSYCHIATRY PROGRESS NOTE - NSBHMETABOLIC_PSY_ALL_CORE_FT
BMI: BMI (kg/m2): 23 (06-07-23 @ 18:13)  HbA1c: A1C with Estimated Average Glucose Result: 5.7 % (06-01-23 @ 05:10)    Glucose: POCT Blood Glucose.: 106 mg/dL (07-18-23 @ 07:44)    BP: --  Lipid Panel: Date/Time: 06-08-23 @ 08:30  Cholesterol, Serum: 119  Direct LDL: --  HDL Cholesterol, Serum: 47  Total Cholesterol/HDL Ration Measurement: --  Triglycerides, Serum: 56   BMI: BMI (kg/m2): 23 (06-07-23 @ 18:13)  HbA1c: A1C with Estimated Average Glucose Result: 5.7 % (06-01-23 @ 05:10)    Glucose: POCT Blood Glucose.: 93 mg/dL (07-21-23 @ 07:45)    BP: 148/83 (07-20-23 @ 07:53) (148/83 - 148/83)  Lipid Panel: Date/Time: 06-08-23 @ 08:30  Cholesterol, Serum: 119  Direct LDL: --  HDL Cholesterol, Serum: 47  Total Cholesterol/HDL Ration Measurement: --  Triglycerides, Serum: 56

## 2023-07-18 NOTE — BH INPATIENT PSYCHIATRY PROGRESS NOTE - NSBHCHARTREVIEWVS_PSY_A_CORE FT
Vital Signs Last 24 Hrs  T(C): 36.2 (07-18-23 @ 08:10), Max: 36.2 (07-18-23 @ 08:10)  T(F): 97.2 (07-18-23 @ 08:10), Max: 97.2 (07-18-23 @ 08:10)  HR: --  BP: --  BP(mean): --  RR: --  SpO2: --    Orthostatic VS  07-18-23 @ 08:10  Lying BP: --/-- HR: --  Sitting BP: 142/60 HR: 74  Standing BP: 129/59 HR: 95  Site: upper left arm  Mode: electronic  Orthostatic VS  07-17-23 @ 09:35  Lying BP: --/-- HR: --  Sitting BP: 146/65 HR: 101  Standing BP: 135/73 HR: 108  Site: upper right arm  Mode: electronic   Vital Signs Last 24 Hrs  T(C): 36.7 (07-21-23 @ 07:02), Max: 36.7 (07-21-23 @ 07:02)  T(F): 98.1 (07-21-23 @ 07:02), Max: 98.1 (07-21-23 @ 07:02)  HR: --  BP: --  BP(mean): --  RR: --  SpO2: --    Orthostatic VS  07-21-23 @ 07:02  Lying BP: --/-- HR: --  Sitting BP: 104/49 HR: 78  Standing BP: 131/59 HR: 97  Site: --  Mode: --

## 2023-07-18 NOTE — BH INPATIENT PSYCHIATRY PROGRESS NOTE - MSE UNSTRUCTURED FT
Appearance: Dressed appropriately.  Behavior: Cooperative.    Motor: No abnormal movements, no psychomotor slowing or activation.  Speech: Regular rate.  Mood: "Fine."  Affect: Neutral.  Thought Process: Overinclusive.  Associations: Fair.  Thought Content: Ruminations on unit.  No paranoia elicited.  No SIIP or HIIP on interview.  Insight: Limited.  Judgment: Limited.  Attention: Fair.    Language: Fluent.  Gait/station: seated.

## 2023-07-18 NOTE — BH INPATIENT PSYCHIATRY PROGRESS NOTE - CURRENT MEDICATION
MEDICATIONS  (STANDING):  amLODIPine   Tablet 2.5 milliGRAM(s) Oral daily  ARIPiprazole 2 milliGRAM(s) Oral daily  aspirin  chewable 81 milliGRAM(s) Oral daily  atorvastatin 10 milliGRAM(s) Oral at bedtime  divalproex Sprinkle 500 milliGRAM(s) Oral <User Schedule>  divalproex Sprinkle 250 milliGRAM(s) Oral daily  glucagon  Injectable 1 milliGRAM(s) IntraMuscular once  lactulose Syrup 10 Gram(s) Oral daily  losartan 75 milliGRAM(s) Oral daily  pantoprazole   Suspension 40 milliGRAM(s) Oral before breakfast  senna 2 Tablet(s) Oral daily  sodium chloride 0.65% Nasal 2 Spray(s) Both Nostrils two times a day  venlafaxine 150 milliGRAM(s) Oral daily    MEDICATIONS  (PRN):  acetaminophen     Tablet .. 650 milliGRAM(s) Oral every 6 hours PRN Temp greater or equal to 38C (100.4F), Mild Pain (1 - 3), Moderate Pain (4 - 6)  bisacodyl 5 milliGRAM(s) Oral daily PRN constipation  bisacodyl Suppository 10 milliGRAM(s) Rectal daily PRN constipation  dextrose Oral Gel 15 Gram(s) Oral once PRN Blood Glucose LESS THAN 70 milliGRAM(s)/deciliter  haloperidol     Tablet 0.5 milliGRAM(s) Oral every 6 hours PRN agitation  haloperidol    Injectable 1 milliGRAM(s) IntraMuscular once PRN agitation  melatonin. 3 milliGRAM(s) Oral at bedtime PRN Insomnia   MEDICATIONS  (STANDING):  amLODIPine   Tablet 2.5 milliGRAM(s) Oral daily  ARIPiprazole 2 milliGRAM(s) Oral daily  aspirin  chewable 81 milliGRAM(s) Oral daily  atorvastatin 10 milliGRAM(s) Oral at bedtime  clonazePAM Oral Disintegrating Tablet 1 milliGRAM(s) Oral daily  divalproex Sprinkle 250 milliGRAM(s) Oral daily  divalproex Sprinkle 500 milliGRAM(s) Oral <User Schedule>  glucagon  Injectable 1 milliGRAM(s) IntraMuscular once  lactulose Syrup 10 Gram(s) Oral daily  losartan 75 milliGRAM(s) Oral daily  pantoprazole   Suspension 40 milliGRAM(s) Oral before breakfast  senna 2 Tablet(s) Oral daily  sodium chloride 0.65% Nasal 2 Spray(s) Both Nostrils two times a day  venlafaxine 150 milliGRAM(s) Oral daily    MEDICATIONS  (PRN):  acetaminophen     Tablet .. 650 milliGRAM(s) Oral every 6 hours PRN Temp greater or equal to 38C (100.4F), Mild Pain (1 - 3), Moderate Pain (4 - 6)  bisacodyl 5 milliGRAM(s) Oral daily PRN constipation  bisacodyl Suppository 10 milliGRAM(s) Rectal daily PRN constipation  dextrose Oral Gel 15 Gram(s) Oral once PRN Blood Glucose LESS THAN 70 milliGRAM(s)/deciliter  haloperidol     Tablet 0.5 milliGRAM(s) Oral every 6 hours PRN agitation  haloperidol    Injectable 1 milliGRAM(s) IntraMuscular once PRN agitation  melatonin. 3 milliGRAM(s) Oral at bedtime PRN Insomnia   MEDICATIONS  (STANDING):  amLODIPine   Tablet 2.5 milliGRAM(s) Oral daily  ARIPiprazole 2 milliGRAM(s) Oral daily  aspirin  chewable 81 milliGRAM(s) Oral daily  atorvastatin 10 milliGRAM(s) Oral at bedtime  clonazePAM Oral Disintegrating Tablet 1 milliGRAM(s) Oral daily  divalproex Sprinkle 500 milliGRAM(s) Oral <User Schedule>  divalproex Sprinkle 250 milliGRAM(s) Oral daily  glucagon  Injectable 1 milliGRAM(s) IntraMuscular once  lactulose Syrup 10 Gram(s) Oral daily  losartan 75 milliGRAM(s) Oral daily  pantoprazole   Suspension 40 milliGRAM(s) Oral before breakfast  senna 2 Tablet(s) Oral daily  sodium chloride 0.65% Nasal 2 Spray(s) Both Nostrils two times a day  venlafaxine 150 milliGRAM(s) Oral daily    MEDICATIONS  (PRN):  acetaminophen     Tablet .. 650 milliGRAM(s) Oral every 6 hours PRN Temp greater or equal to 38C (100.4F), Mild Pain (1 - 3), Moderate Pain (4 - 6)  bisacodyl 5 milliGRAM(s) Oral daily PRN constipation  bisacodyl Suppository 10 milliGRAM(s) Rectal daily PRN constipation  dextrose Oral Gel 15 Gram(s) Oral once PRN Blood Glucose LESS THAN 70 milliGRAM(s)/deciliter  haloperidol     Tablet 0.5 milliGRAM(s) Oral every 6 hours PRN agitation  haloperidol    Injectable 1 milliGRAM(s) IntraMuscular once PRN agitation  melatonin. 3 milliGRAM(s) Oral at bedtime PRN Insomnia

## 2023-07-18 NOTE — BH INPATIENT PSYCHIATRY PROGRESS NOTE - NSBHFUPINTERVALHXFT_PSY_A_CORE
No overnight events.  Pt found sitting in day room.  She states that she fell asleep during movement group.  States she was told the bathroom doesn't work.  She moved over one chair to talk to a patient.  That patient fell asleep.  States she does not need anything and is doing ok.

## 2023-07-18 NOTE — BH INPATIENT PSYCHIATRY PROGRESS NOTE - NSBHASSESSSUMMFT_PSY_ALL_CORE
81 year old , retired female, domiciled at [facility], [facility] of DM2, HLD, HTN, PPHx of late onset Bipolar disorder, 2 prior psych adm last in 2022 at [facility], who presented to ED with worsening paranoia, anxiety, FTT (weight loss of 20lb since march), and confusion. Psychiatry was consulted for psychosis/AMS. Was started on cymbalta as daughter reported this was helpful in 2016/2017 when she had a similar presentation. Was continued on home medications klonopin, depakote then CL team added seroquel due to paranoia. Was on gabapentin 900mg/day at home which was tapered off by CL team. Transferred to [facility] for ongoing stabilization. Daughter denies patient has dementia. Also states patient responded well to BZD back in 2016/2017 but not well to antipsychotics, which led to team considering possibility of catatonia hx. Review of CL notes suggested she responded well to prns of ativan. As a result, klonopin was switched to ativan 1mg po bid briefly but there was no improvement so she was taken off of it. Meanwhile, seroquel was increased to target psychosis. Cymbalta had to be switched to effexor as patient can only take crushed meds. Has had some improvement overall though limited progress for the past few days. Discussed care with daughter again yesterday and states mother's presentation is very similar to decompensation in 2016/2017 and rather unlike that in 2021/2022 admission when she was stabilized on depakote and gabapentin. Daughter is strongly advocating for patient to go back on klonopin at a higher dose of 0.5mg po bid. It was also made clear that patient has JOSESITO and has not been on CPAP since March 2023 due to poor compliance 2/2 agitation. Will taper off effexor to minimize polypharmacy after which klonopin can be added back. Seroquel will have to continue given paranoia. Will transfer patient to  to allow for CPAP therapy.       Plan:  1. Legals: 9.27  2. Safety: constant observation for disorganized behavior 7am-11pm.  Ativan 0.5 mg q12h prn for anxiety/agitation.  3. Psychiatric: Continue current medications   	         Continue Seroquel 62.5mg daily and 75mg QHS   	         C/w Depakote 250 mg daily and 500 mg qHS. VPA level of 48.70 on 5/30 - will consider tapering off and optimizing SGA in the near future  	         Decreased Effexor to 37.5mg po bid (cymbalta discontinued due to dysphagia concerns and needing to crush medications) which aim to taper off quickly (has not been on it long).   		Will plan to restart klonopin at 0.25mg po bid in a few days when effexor has been discontinued and CPAP initiated.   	        	        4. Group, milieu, individual therapy as appropriate.  5. Medical: HTN, HLD, DM2. Phlebitis, possibly dysphagia, joint pain  	Keflex 500 mg qid x 5 days - complete now  	amlodipine 2.5 mg daily. decrease losartan to 75 mg daily due to orthostatic htn. aspirin 81 mg daily. atorvastatin 10 mg qHS.  	Lipid profile wnl other than slightly low HDL  	Metformin 500mg po bid              Naproxen 500mg daily   	b12 supplementation for deficiency in the past    	Ambulate with walker  	JOSESITO - will consider transfer to  to allow for CPAP use  6. Work up: none  7. 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 Plan cont CO as patient disorganized walks backward at risk for falls form disorganziation with close monitoring. Based on unusual hx of unique and dramatic response to klonopin and failure on antipsychotics will try klonopin while titrating Effexor and taper off Seroquel . Start on klonopin 0.25mg bid and titrate to 0.5mg bid. Watch BP as has some orthostasis may get better off Seroquel adjust BP meds if required. Cont CPAP  as she was able to use it last night  6/29 More linear and better related but paranoia is more apparent. BP stable though with very slight tachycardia, po intake ok, will monitor VS before increasing klonopin  6/30 Intrusive and paranoid, difficult to redirect per staff. Got ativan 0.5mg IM once due to agitation. Continue klonopin 0.25mg po bid for now - can increase to 0.5mg po bid if compliant with CPAP; no change in seroquel 25mg po bid, effexor 50mg po bid, depakote sprinkles 750mg/day; family advocating for treatment   with klonopin (consider increasing antisychotic, possibly less anticholinergic agent, if daughter agrees) though she refused CPAP last night so it would be   concerning to increase dose without CPAP compliance  7/1: More calm today, not agitated, able to be redirected  7/2: Some mild agitation, no persecutory delusions expressed, got prn quetiapine today, generally calm this AM  7/3 Some improvement. Still anxiouas agitated suspicious. Will change meds to daytime dosing with option to give later such as during visiting when family can encourage. Will also get rid of non urgent meds such as vitamins. Will increase laxatives as patient has hx constipation leading to retention. Recheck labs  7/4: Improved, no nihilistic delusions expressed, remains depressed, continue antidepressant/antipsychotic   7/5 Sl less delusional still terribly anxious restless receiving prns. Will increase klonopin, will change Miralax to lactulose as difficult to get patient to drink full cup of Miralax. Cont CO. Moniotr NA for hyponatremia  has been in this range before w/o dipping lower  7/6 Agitation restlessness less acute still paranoid about being harmed and about meds. Cont meds, plan increase Seroquel  increase laxatives  7/7 Depressed psychotic some sedation . Will lower Klonopin and titrate Seroquel to dose on which she was doing better. If combined treatment is too sedating may need to try less sedating antipsychotic. Will hold Lactulose  7/8 Depressed very delusional but calmer less restless and disorganzed. Will cont Klonopin change Seroquel to earlier to help compliance consider change to Zyprexa for more potent antipsychotic with once a day dosing  7/9 Calmer but very paranoid. Will increase Klonopin and consider change to olanzapine after discussing with daughter  7/10 Dysphoric paranoid only improvement is motor agitation. Afternoon compliance poor. Will cont Seorquel Klonopin but give eariler will increase Effexor . Asked medicine to review ASA and Naprosyn, the later will be d./declan  7/11 Depressed very psychotic cont treatment except consolidate Seroquel to once a day dosing in AM. Likely need to change Seroquel to another agent. COnt CO monitor bowel function took lactulose and senna today so will see if she has BM  7/12 Sl less distressed and paranoid but still will not stay in room. Cont meds as there are signs of improvement. If compliance is more reliable can change to PM meds to avoid daytime sedation  7/13 Still quite paranoid. Will reduce prns to avoid oversedation will speak with family to consider changing Seroquel to Abilify as Seroquel maybe be sedating w/o other benefit  7/14 Patient not responding to Klonopin like she did in past will discuss changing Seroquel to Abilify with daughter. Will dc fs as patient on no diabetic meds and fs  have been nl  7/15 disorganized, intrusive, requiring PRN medications. Tolerating abilify.   7/16 Improving mood, still confused with intermittent agitation, paranoia, PRN melatonin last night, no other PRN today  7/17 Disorganized but pleasant.  7/18 Overinclusive, emotionally regulated 81 year old , retired female, domiciled at [facility], [facility] of DM2, HLD, HTN, PPHx of late onset Bipolar disorder, 2 prior psych adm last in 2022 at [facility], who presented to ED with worsening paranoia, anxiety, FTT (weight loss of 20lb since march), and confusion. Psychiatry was consulted for psychosis/AMS. Was started on cymbalta as daughter reported this was helpful in 2016/2017 when she had a similar presentation. Was continued on home medications klonopin, depakote then CL team added seroquel due to paranoia. Was on gabapentin 900mg/day at home which was tapered off by CL team. Transferred to [facility] for ongoing stabilization. Daughter denies patient has dementia. Also states patient responded well to BZD back in 2016/2017 but not well to antipsychotics, which led to team considering possibility of catatonia hx. Review of CL notes suggested she responded well to prns of ativan. As a result, klonopin was switched to ativan 1mg po bid briefly but there was no improvement so she was taken off of it. Meanwhile, seroquel was increased to target psychosis. Cymbalta had to be switched to effexor as patient can only take crushed meds. Has had some improvement overall though limited progress for the past few days. Discussed care with daughter again yesterday and states mother's presentation is very similar to decompensation in 2016/2017 and rather unlike that in 2021/2022 admission when she was stabilized on depakote and gabapentin. Daughter is strongly advocating for patient to go back on klonopin at a higher dose of 0.5mg po bid. It was also made clear that patient has JOSESITO and has not been on CPAP since March 2023 due to poor compliance 2/2 agitation. Will taper off effexor to minimize polypharmacy after which klonopin can be added back. Seroquel will have to continue given paranoia. Will transfer patient to  to allow for CPAP therapy.     Plan:  1. Legals: 9.27  2. Safety: constant observation for disorganized behavior 7am-11pm.  Ativan 0.5 mg q12h prn for anxiety/agitation.  3. Psychiatric: Continue current medications: clonazepam 1 mg, aripiprazole 2 mg (switched from quetiapine which was ineffective), depakote sprinkle 250 mg, and venlafaxine  mg daily.   4. Group, milieu, individual therapy as appropriate.  5. Medical: HTN, HLD, DM2. Phlebitis, possibly dysphagia, joint pain  	Keflex 500 mg qid x 5 days - complete now  	amlodipine 2.5 mg daily. decrease losartan to 75 mg daily due to orthostatic htn. aspirin 81 mg daily. atorvastatin 10 mg qHS.  	Lipid profile wnl other than slightly low HDL  	Metformin 500mg po bid              Naproxen 500mg daily   	b12 supplementation for deficiency in the past    	Ambulate with walker  	JOSESITO - will consider transfer to  to allow for CPAP use  6. Work up: none  7. 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 Plan cont CO as patient disorganized walks backward at risk for falls form disorganziation with close monitoring. Based on unusual hx of unique and dramatic response to klonopin and failure on antipsychotics will try klonopin while titrating Effexor and taper off Seroquel . Start on klonopin 0.25mg bid and titrate to 0.5mg bid. Watch BP as has some orthostasis may get better off Seroquel adjust BP meds if required. Cont CPAP  as she was able to use it last night  6/29 More linear and better related but paranoia is more apparent. BP stable though with very slight tachycardia, po intake ok, will monitor VS before increasing klonopin  6/30 Intrusive and paranoid, difficult to redirect per staff. Got ativan 0.5mg IM once due to agitation. Continue klonopin 0.25mg po bid for now - can increase to 0.5mg po bid if compliant with CPAP; no change in seroquel 25mg po bid, effexor 50mg po bid, depakote sprinkles 750mg/day; family advocating for treatment   with klonopin (consider increasing antisychotic, possibly less anticholinergic agent, if daughter agrees) though she refused CPAP last night so it would be   concerning to increase dose without CPAP compliance  7/1: More calm today, not agitated, able to be redirected  7/2: Some mild agitation, no persecutory delusions expressed, got prn quetiapine today, generally calm this AM  7/3 Some improvement. Still anxiouas agitated suspicious. Will change meds to daytime dosing with option to give later such as during visiting when family can encourage. Will also get rid of non urgent meds such as vitamins. Will increase laxatives as patient has hx constipation leading to retention. Recheck labs  7/4: Improved, no nihilistic delusions expressed, remains depressed, continue antidepressant/antipsychotic   7/5 Sl less delusional still terribly anxious restless receiving prns. Will increase klonopin, will change Miralax to lactulose as difficult to get patient to drink full cup of Miralax. Cont CO. Moniotr NA for hyponatremia  has been in this range before w/o dipping lower  7/6 Agitation restlessness less acute still paranoid about being harmed and about meds. Cont meds, plan increase Seroquel  increase laxatives  7/7 Depressed psychotic some sedation . Will lower Klonopin and titrate Seroquel to dose on which she was doing better. If combined treatment is too sedating may need to try less sedating antipsychotic. Will hold Lactulose  7/8 Depressed very delusional but calmer less restless and disorganzed. Will cont Klonopin change Seroquel to earlier to help compliance consider change to Zyprexa for more potent antipsychotic with once a day dosing  7/9 Calmer but very paranoid. Will increase Klonopin and consider change to olanzapine after discussing with daughter  7/10 Dysphoric paranoid only improvement is motor agitation. Afternoon compliance poor. Will cont Seorquel Klonopin but give eariler will increase Effexor . Asked medicine to review ASA and Naprosyn, the later will be d./declan  7/11 Depressed very psychotic cont treatment except consolidate Seroquel to once a day dosing in AM. Likely need to change Seroquel to another agent. COnt CO monitor bowel function took lactulose and senna today so will see if she has BM  7/12 Sl less distressed and paranoid but still will not stay in room. Cont meds as there are signs of improvement. If compliance is more reliable can change to PM meds to avoid daytime sedation  7/13 Still quite paranoid. Will reduce prns to avoid oversedation will speak with family to consider changing Seroquel to Abilify as Seroquel maybe be sedating w/o other benefit  7/14 Patient not responding to Klonopin like she did in past will discuss changing Seroquel to Abilify with daughter. Will dc fs as patient on no diabetic meds and fs  have been nl  7/15 disorganized, intrusive, requiring PRN medications. Tolerating abilify.   7/16 Improving mood, still confused with intermittent agitation, paranoia, PRN melatonin last night, no other PRN today  7/17 Disorganized but pleasant.  7/18 Overinclusive, emotionally regulated 81 year old , retired female, domiciled at [facility], [facility] of DM2, HLD, HTN, PPHx of late onset Bipolar disorder, 2 prior psych adm last in 2022 at [facility], who presented to ED with worsening paranoia, anxiety, FTT (weight loss of 20lb since march), and confusion. Psychiatry was consulted for psychosis/AMS. Was started on cymbalta as daughter reported this was helpful in 2016/2017 when she had a similar presentation. Was continued on home medications klonopin, depakote then CL team added seroquel due to paranoia. Was on gabapentin 900mg/day at home which was tapered off by CL team. Transferred to [facility] for ongoing stabilization. Daughter denies patient has dementia. Also states patient responded well to BZD back in 2016/2017 but not well to antipsychotics, which led to team considering possibility of catatonia hx. Review of CL notes suggested she responded well to prns of ativan. As a result, klonopin was switched to ativan 1mg po bid briefly but there was no improvement so she was taken off of it. Meanwhile, seroquel was increased to target psychosis. Cymbalta had to be switched to effexor as patient can only take crushed meds. Has had some improvement overall though limited progress for the past few days. Discussed care with daughter again yesterday and states mother's presentation is very similar to decompensation in 2016/2017 and rather unlike that in 2021/2022 admission when she was stabilized on depakote and gabapentin. Daughter is strongly advocating for patient to go back on klonopin at a higher dose of 0.5mg po bid. It was also made clear that patient has JOSESITO and has not been on CPAP since March 2023 due to poor compliance 2/2 agitation. Will taper off effexor to minimize polypharmacy after which klonopin can be added back. Seroquel will have to continue given paranoia. Will transfer patient to  to allow for CPAP therapy.     Plan:  1. Legals: 9.27  2. Safety: constant observation for disorganized behavior 7am-11pm.  Ativan 0.5 mg q12h prn for anxiety/agitation.  3. Psychiatric: Continue current medications: clonazepam 1 mg daily, aripiprazole 2 mg daily (switched from quetiapine which was ineffective), depakote sprinkle 250 mg daily, and venlafaxine  mg daily.   4. Group, milieu, individual therapy as appropriate.  5. Medical: HTN, HLD, DM2. Phlebitis, possibly dysphagia, joint pain  	Keflex 500 mg qid x 5 days - complete now  	amlodipine 2.5 mg daily. decrease losartan to 75 mg daily due to orthostatic htn. aspirin 81 mg daily. atorvastatin 10 mg qHS.  	Lipid profile wnl other than slightly low HDL  	Metformin 500mg po bid              Naproxen 500mg daily   	b12 supplementation for deficiency in the past    	Ambulate with walker  	JOSESITO - will consider transfer to  to allow for CPAP use  6. Work up: none  7. 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 Plan cont CO as patient disorganized walks backward at risk for falls form disorganziation with close monitoring. Based on unusual hx of unique and dramatic response to klonopin and failure on antipsychotics will try klonopin while titrating Effexor and taper off Seroquel . Start on klonopin 0.25mg bid and titrate to 0.5mg bid. Watch BP as has some orthostasis may get better off Seroquel adjust BP meds if required. Cont CPAP  as she was able to use it last night  6/29 More linear and better related but paranoia is more apparent. BP stable though with very slight tachycardia, po intake ok, will monitor VS before increasing klonopin  6/30 Intrusive and paranoid, difficult to redirect per staff. Got ativan 0.5mg IM once due to agitation. Continue klonopin 0.25mg po bid for now - can increase to 0.5mg po bid if compliant with CPAP; no change in seroquel 25mg po bid, effexor 50mg po bid, depakote sprinkles 750mg/day; family advocating for treatment   with klonopin (consider increasing antisychotic, possibly less anticholinergic agent, if daughter agrees) though she refused CPAP last night so it would be   concerning to increase dose without CPAP compliance  7/1: More calm today, not agitated, able to be redirected  7/2: Some mild agitation, no persecutory delusions expressed, got prn quetiapine today, generally calm this AM  7/3 Some improvement. Still anxiouas agitated suspicious. Will change meds to daytime dosing with option to give later such as during visiting when family can encourage. Will also get rid of non urgent meds such as vitamins. Will increase laxatives as patient has hx constipation leading to retention. Recheck labs  7/4: Improved, no nihilistic delusions expressed, remains depressed, continue antidepressant/antipsychotic   7/5 Sl less delusional still terribly anxious restless receiving prns. Will increase klonopin, will change Miralax to lactulose as difficult to get patient to drink full cup of Miralax. Cont CO. Moniotr NA for hyponatremia  has been in this range before w/o dipping lower  7/6 Agitation restlessness less acute still paranoid about being harmed and about meds. Cont meds, plan increase Seroquel  increase laxatives  7/7 Depressed psychotic some sedation . Will lower Klonopin and titrate Seroquel to dose on which she was doing better. If combined treatment is too sedating may need to try less sedating antipsychotic. Will hold Lactulose  7/8 Depressed very delusional but calmer less restless and disorganzed. Will cont Klonopin change Seroquel to earlier to help compliance consider change to Zyprexa for more potent antipsychotic with once a day dosing  7/9 Calmer but very paranoid. Will increase Klonopin and consider change to olanzapine after discussing with daughter  7/10 Dysphoric paranoid only improvement is motor agitation. Afternoon compliance poor. Will cont Seorquel Klonopin but give eariler will increase Effexor . Asked medicine to review ASA and Naprosyn, the later will be d./declan  7/11 Depressed very psychotic cont treatment except consolidate Seroquel to once a day dosing in AM. Likely need to change Seroquel to another agent. COnt CO monitor bowel function took lactulose and senna today so will see if she has BM  7/12 Sl less distressed and paranoid but still will not stay in room. Cont meds as there are signs of improvement. If compliance is more reliable can change to PM meds to avoid daytime sedation  7/13 Still quite paranoid. Will reduce prns to avoid oversedation will speak with family to consider changing Seroquel to Abilify as Seroquel maybe be sedating w/o other benefit  7/14 Patient not responding to Klonopin like she did in past will discuss changing Seroquel to Abilify with daughter. Will dc fs as patient on no diabetic meds and fs  have been nl  7/15 disorganized, intrusive, requiring PRN medications. Tolerating abilify.   7/16 Improving mood, still confused with intermittent agitation, paranoia, PRN melatonin last night, no other PRN today  7/17 Disorganized but pleasant.  7/18 Overinclusive, emotionally regulated 81 year old , retired female, domiciled at [facility], [facility] of DM2, HLD, HTN, PPHx of late onset Bipolar disorder, 2 prior psych adm last in 2022 at [facility], who presented to ED with worsening paranoia, anxiety, FTT (weight loss of 20lb since march), and confusion. Psychiatry was consulted for psychosis/AMS. Was started on cymbalta as daughter reported this was helpful in 2016/2017 when she had a similar presentation. Was continued on home medications klonopin, depakote then CL team added seroquel due to paranoia. Was on gabapentin 900mg/day at home which was tapered off by CL team. Transferred to [facility] for ongoing stabilization. Daughter denies patient has dementia. Also states patient responded well to BZD back in 2016/2017 but not well to antipsychotics, which led to team considering possibility of catatonia hx. Review of CL notes suggested she responded well to prns of ativan. As a result, klonopin was switched to ativan 1mg po bid briefly but there was no improvement so she was taken off of it. Meanwhile, seroquel was increased to target psychosis. Cymbalta had to be switched to effexor as patient can only take crushed meds. Has had some improvement overall though limited progress for the past few days. Discussed care with daughter again yesterday and states mother's presentation is very similar to decompensation in 2016/2017 and rather unlike that in 2021/2022 admission when she was stabilized on depakote and gabapentin. Daughter is strongly advocating for patient to go back on klonopin at a higher dose of 0.5mg po bid. It was also made clear that patient has JOSESITO and has not been on CPAP since March 2023 due to poor compliance 2/2 agitation. Will taper off effexor to minimize polypharmacy after which klonopin can be added back. Seroquel will have to continue given paranoia. Will transfer patient to  to allow for CPAP therapy.     Plan:  1. Legals: 9.27  2. Safety: constant observation for disorganized behavior 7am-11pm.  Ativan 0.5 mg q12h prn for anxiety/agitation.  3. Psychiatric: Continue current medications: clonazepam 1 mg daily, aripiprazole 2 mg daily (switched from quetiapine which was ineffective), depakote sprinkle 250 mg daily and 500 mg at 17:00, and venlafaxine  mg daily.    4. Group, milieu, individual therapy as appropriate.  5. Medical: HTN, HLD, DM2. Phlebitis, possibly dysphagia, joint pain  	Keflex 500 mg qid x 5 days - complete now  	amlodipine 2.5 mg daily. decrease losartan to 75 mg daily due to orthostatic htn. aspirin 81 mg daily. atorvastatin 10 mg qHS.  	Lipid profile wnl other than slightly low HDL  	Metformin 500mg po bid              Naproxen 500mg daily   	b12 supplementation for deficiency in the past    	Ambulate with walker  	[facility] - will consider transfer to  to allow for CPAP use  6. Work up: none  7. 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 Plan cont CO as patient disorganized walks backward at risk for falls form disorganziation with close monitoring. Based on unusual hx of unique and dramatic response to klonopin and failure on antipsychotics will try klonopin while titrating Effexor and taper off Seroquel . Start on klonopin 0.25mg bid and titrate to 0.5mg bid. Watch BP as has some orthostasis may get better off Seroquel adjust BP meds if required. Cont CPAP  as she was able to use it last night  6/29 More linear and better related but paranoia is more apparent. BP stable though with very slight tachycardia, po intake ok, will monitor VS before increasing klonopin  6/30 Intrusive and paranoid, difficult to redirect per staff. Got ativan 0.5mg IM once due to agitation. Continue klonopin 0.25mg po bid for now - can increase to 0.5mg po bid if compliant with CPAP; no change in seroquel 25mg po bid, effexor 50mg po bid, depakote sprinkles 750mg/day; family advocating for treatment   with klonopin (consider increasing antisychotic, possibly less anticholinergic agent, if daughter agrees) though she refused CPAP last night so it would be   concerning to increase dose without CPAP compliance  7/1: More calm today, not agitated, able to be redirected  7/2: Some mild agitation, no persecutory delusions expressed, got prn quetiapine today, generally calm this AM  7/3 Some improvement. Still anxiouas agitated suspicious. Will change meds to daytime dosing with option to give later such as during visiting when family can encourage. Will also get rid of non urgent meds such as vitamins. Will increase laxatives as patient has hx constipation leading to retention. Recheck labs  7/4: Improved, no nihilistic delusions expressed, remains depressed, continue antidepressant/antipsychotic   7/5 Sl less delusional still terribly anxious restless receiving prns. Will increase klonopin, will change Miralax to lactulose as difficult to get patient to drink full cup of Miralax. Cont CO. Moniotr NA for hyponatremia  has been in this range before w/o dipping lower  7/6 Agitation restlessness less acute still paranoid about being harmed and about meds. Cont meds, plan increase Seroquel  increase laxatives  7/7 Depressed psychotic some sedation . Will lower Klonopin and titrate Seroquel to dose on which she was doing better. If combined treatment is too sedating may need to try less sedating antipsychotic. Will hold Lactulose  7/8 Depressed very delusional but calmer less restless and disorganzed. Will cont Klonopin change Seroquel to earlier to help compliance consider change to Zyprexa for more potent antipsychotic with once a day dosing  7/9 Calmer but very paranoid. Will increase Klonopin and consider change to olanzapine after discussing with daughter  7/10 Dysphoric paranoid only improvement is motor agitation. Afternoon compliance poor. Will cont Seorquel Klonopin but give eariler will increase Effexor . Asked medicine to review ASA and Naprosyn, the later will be d./declan  7/11 Depressed very psychotic cont treatment except consolidate Seroquel to once a day dosing in AM. Likely need to change Seroquel to another agent. COnt CO monitor bowel function took lactulose and senna today so will see if she has BM  7/12 Sl less distressed and paranoid but still will not stay in room. Cont meds as there are signs of improvement. If compliance is more reliable can change to PM meds to avoid daytime sedation  7/13 Still quite paranoid. Will reduce prns to avoid oversedation will speak with family to consider changing Seroquel to Abilify as Seroquel maybe be sedating w/o other benefit  7/14 Patient not responding to Klonopin like she did in past will discuss changing Seroquel to Abilify with daughter. Will dc fs as patient on no diabetic meds and fs  have been nl  7/15 disorganized, intrusive, requiring PRN medications. Tolerating abilify.   7/16 Improving mood, still confused with intermittent agitation, paranoia, PRN melatonin last night, no other PRN today  7/17 Disorganized but pleasant.  7/18 Overinclusive, emotionally regulated

## 2023-07-19 LAB — GLUCOSE BLDC GLUCOMTR-MCNC: 100 MG/DL — HIGH (ref 70–99)

## 2023-07-19 PROCEDURE — 99231 SBSQ HOSP IP/OBS SF/LOW 25: CPT

## 2023-07-19 RX ADMIN — DIVALPROEX SODIUM 500 MILLIGRAM(S): 500 TABLET, DELAYED RELEASE ORAL at 17:42

## 2023-07-19 RX ADMIN — AMLODIPINE BESYLATE 2.5 MILLIGRAM(S): 2.5 TABLET ORAL at 08:58

## 2023-07-19 RX ADMIN — HALOPERIDOL DECANOATE 0.5 MILLIGRAM(S): 100 INJECTION INTRAMUSCULAR at 11:47

## 2023-07-19 RX ADMIN — LOSARTAN POTASSIUM 75 MILLIGRAM(S): 100 TABLET, FILM COATED ORAL at 09:00

## 2023-07-19 RX ADMIN — DIVALPROEX SODIUM 250 MILLIGRAM(S): 500 TABLET, DELAYED RELEASE ORAL at 08:58

## 2023-07-19 RX ADMIN — Medication 81 MILLIGRAM(S): at 08:58

## 2023-07-19 RX ADMIN — SENNA PLUS 2 TABLET(S): 8.6 TABLET ORAL at 08:57

## 2023-07-19 RX ADMIN — HALOPERIDOL DECANOATE 0.5 MILLIGRAM(S): 100 INJECTION INTRAMUSCULAR at 21:56

## 2023-07-19 RX ADMIN — LACTULOSE 10 GRAM(S): 10 SOLUTION ORAL at 08:55

## 2023-07-19 RX ADMIN — ARIPIPRAZOLE 2 MILLIGRAM(S): 15 TABLET ORAL at 08:58

## 2023-07-19 RX ADMIN — Medication 3 MILLIGRAM(S): at 21:31

## 2023-07-19 RX ADMIN — Medication 150 MILLIGRAM(S): at 08:59

## 2023-07-19 RX ADMIN — ATORVASTATIN CALCIUM 10 MILLIGRAM(S): 80 TABLET, FILM COATED ORAL at 21:31

## 2023-07-19 NOTE — BH INPATIENT PSYCHIATRY PROGRESS NOTE - MSE UNSTRUCTURED FT
Appearance: Dressed appropriately in gowns.  Behavior: Cooperative.  Calm.   Motor: No abnormal movements, no psychomotor slowing or activation.  Speech: Regular rate.  Mood: "Fine."  Affect: Neutral.  Thought Process: Overinclusive, at times repetitive.  Associations: Fair.  Thought Content: No paranoia elicited.  No SIIP or HIIP on interview.  Insight: Limited.  Judgment: Limited.  Attention: Fair.    Language: Fluent.  Gait/station: Slow with walker.

## 2023-07-19 NOTE — BH INPATIENT PSYCHIATRY PROGRESS NOTE - NSBHMETABOLIC_PSY_ALL_CORE_FT
BMI: BMI (kg/m2): 23 (06-07-23 @ 18:13)  HbA1c: A1C with Estimated Average Glucose Result: 5.7 % (06-01-23 @ 05:10)    Glucose: POCT Blood Glucose.: 100 mg/dL (07-19-23 @ 07:56)    BP: --  Lipid Panel: Date/Time: 06-08-23 @ 08:30  Cholesterol, Serum: 119  Direct LDL: --  HDL Cholesterol, Serum: 47  Total Cholesterol/HDL Ration Measurement: --  Triglycerides, Serum: 56   BMI: BMI (kg/m2): 23 (06-07-23 @ 18:13)  HbA1c: A1C with Estimated Average Glucose Result: 5.7 % (06-01-23 @ 05:10)    Glucose: POCT Blood Glucose.: 93 mg/dL (07-21-23 @ 07:45)    BP: 148/83 (07-20-23 @ 07:53) (148/83 - 148/83)  Lipid Panel: Date/Time: 06-08-23 @ 08:30  Cholesterol, Serum: 119  Direct LDL: --  HDL Cholesterol, Serum: 47  Total Cholesterol/HDL Ration Measurement: --  Triglycerides, Serum: 56

## 2023-07-19 NOTE — BH INPATIENT PSYCHIATRY PROGRESS NOTE - NSBHASSESSSUMMFT_PSY_ALL_CORE
81 year old , retired female, domiciled at [facility], [facility] of DM2, HLD, HTN, PPHx of late onset Bipolar disorder, 2 prior psych adm last in 2022 at [facility], who presented to ED with worsening paranoia, anxiety, FTT (weight loss of 20lb since march), and confusion. Psychiatry was consulted for psychosis/AMS. Was started on cymbalta as daughter reported this was helpful in 2016/2017 when she had a similar presentation. Was continued on home medications klonopin, depakote then CL team added seroquel due to paranoia. Was on gabapentin 900mg/day at home which was tapered off by CL team. Transferred to [facility] for ongoing stabilization. Daughter denies patient has dementia. Also states patient responded well to BZD back in 2016/2017 but not well to antipsychotics, which led to team considering possibility of catatonia hx. Review of CL notes suggested she responded well to prns of ativan. As a result, klonopin was switched to ativan 1mg po bid briefly but there was no improvement so she was taken off of it. Meanwhile, seroquel was increased to target psychosis. Cymbalta had to be switched to effexor as patient can only take crushed meds. Has had some improvement overall though limited progress for the past few days. Discussed care with daughter again yesterday and states mother's presentation is very similar to decompensation in 2016/2017 and rather unlike that in 2021/2022 admission when she was stabilized on depakote and gabapentin. Daughter is strongly advocating for patient to go back on klonopin at a higher dose of 0.5mg po bid. It was also made clear that patient has JOSESITO and has not been on CPAP since March 2023 due to poor compliance 2/2 agitation. Will taper off effexor to minimize polypharmacy after which klonopin can be added back. Seroquel will have to continue given paranoia. Will transfer patient to  to allow for CPAP therapy.       Plan:  1. Legals: 9.27  2. Safety: constant observation for disorganized behavior 7am-11pm.  Ativan 0.5 mg q12h prn for anxiety/agitation.  3. Psychiatric: Continue current medications   	         Continue Seroquel 62.5mg daily and 75mg QHS   	         C/w Depakote 250 mg daily and 500 mg qHS. VPA level of 48.70 on 5/30 - will consider tapering off and optimizing SGA in the near future  	         Decreased Effexor to 37.5mg po bid (cymbalta discontinued due to dysphagia concerns and needing to crush medications) which aim to taper off quickly (has not been on it long).   		Will plan to restart klonopin at 0.25mg po bid in a few days when effexor has been discontinued and CPAP initiated.   	        	        4. Group, milieu, individual therapy as appropriate.  5. Medical: HTN, HLD, DM2. Phlebitis, possibly dysphagia, joint pain  	Keflex 500 mg qid x 5 days - complete now  	amlodipine 2.5 mg daily. decrease losartan to 75 mg daily due to orthostatic htn. aspirin 81 mg daily. atorvastatin 10 mg qHS.  	Lipid profile wnl other than slightly low HDL  	Metformin 500mg po bid              Naproxen 500mg daily   	b12 supplementation for deficiency in the past    	Ambulate with walker  	JOSESITO - will consider transfer to  to allow for CPAP use  6. Work up: none  7. 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 Plan cont CO as patient disorganized walks backward at risk for falls form disorganziation with close monitoring. Based on unusual hx of unique and dramatic response to klonopin and failure on antipsychotics will try klonopin while titrating Effexor and taper off Seroquel . Start on klonopin 0.25mg bid and titrate to 0.5mg bid. Watch BP as has some orthostasis may get better off Seroquel adjust BP meds if required. Cont CPAP  as she was able to use it last night  6/29 More linear and better related but paranoia is more apparent. BP stable though with very slight tachycardia, po intake ok, will monitor VS before increasing klonopin  6/30 Intrusive and paranoid, difficult to redirect per staff. Got ativan 0.5mg IM once due to agitation. Continue klonopin 0.25mg po bid for now - can increase to 0.5mg po bid if compliant with CPAP; no change in seroquel 25mg po bid, effexor 50mg po bid, depakote sprinkles 750mg/day; family advocating for treatment   with klonopin (consider increasing antisychotic, possibly less anticholinergic agent, if daughter agrees) though she refused CPAP last night so it would be   concerning to increase dose without CPAP compliance  7/1: More calm today, not agitated, able to be redirected  7/2: Some mild agitation, no persecutory delusions expressed, got prn quetiapine today, generally calm this AM  7/3 Some improvement. Still anxiouas agitated suspicious. Will change meds to daytime dosing with option to give later such as during visiting when family can encourage. Will also get rid of non urgent meds such as vitamins. Will increase laxatives as patient has hx constipation leading to retention. Recheck labs  7/4: Improved, no nihilistic delusions expressed, remains depressed, continue antidepressant/antipsychotic   7/5 Sl less delusional still terribly anxious restless receiving prns. Will increase klonopin, will change Miralax to lactulose as difficult to get patient to drink full cup of Miralax. Cont CO. Moniotr NA for hyponatremia  has been in this range before w/o dipping lower  7/6 Agitation restlessness less acute still paranoid about being harmed and about meds. Cont meds, plan increase Seroquel  increase laxatives  7/7 Depressed psychotic some sedation . Will lower Klonopin and titrate Seroquel to dose on which she was doing better. If combined treatment is too sedating may need to try less sedating antipsychotic. Will hold Lactulose  7/8 Depressed very delusional but calmer less restless and disorganzed. Will cont Klonopin change Seroquel to earlier to help compliance consider change to Zyprexa for more potent antipsychotic with once a day dosing  7/9 Calmer but very paranoid. Will increase Klonopin and consider change to olanzapine after discussing with daughter  7/10 Dysphoric paranoid only improvement is motor agitation. Afternoon compliance poor. Will cont Seorquel Klonopin but give eariler will increase Effexor . Asked medicine to review ASA and Naprosyn, the later will be d./declan  7/11 Depressed very psychotic cont treatment except consolidate Seroquel to once a day dosing in AM. Likely need to change Seroquel to another agent. COnt CO monitor bowel function took lactulose and senna today so will see if she has BM  7/12 Sl less distressed and paranoid but still will not stay in room. Cont meds as there are signs of improvement. If compliance is more reliable can change to PM meds to avoid daytime sedation  7/13 Still quite paranoid. Will reduce prns to avoid oversedation will speak with family to consider changing Seroquel to Abilify as Seroquel maybe be sedating w/o other benefit  7/14 Patient not responding to Klonopin like she did in past will discuss changing Seroquel to Abilify with daughter. Will dc fs as patient on no diabetic meds and fs  have been nl  7/15 disorganized, intrusive, requiring PRN medications. Tolerating abilify.   7/16 Improving mood, still confused with intermittent agitation, paranoia, PRN melatonin last night, no other PRN today  7/17 Disorganized but pleasant.  7/18 Overinclusive, emotionally regulated  7/19 Somewhat disorganized, but calm and following instructions. 81 year old , retired female, domiciled at long-term, [facility] of DM2, HLD, HTN, PPHx of late onset Bipolar disorder, 2 prior psych adm last in 2022 at [facility], who presented to ED with worsening paranoia, anxiety, FTT (weight loss of 20lb since march), and confusion. Psychiatry was consulted for psychosis/AMS. Was started on cymbalta as daughter reported this was helpful in 2016/2017 when she had a similar presentation. Was continued on home medications klonopin, depakote then CL team added seroquel due to paranoia. Was on gabapentin 900mg/day at home which was tapered off by CL team. Transferred to [facility] for ongoing stabilization. Daughter denies patient has dementia. Also states patient responded well to BZD back in 2016/2017 but not well to antipsychotics, which led to team considering possibility of catatonia hx. Review of CL notes suggested she responded well to prns of ativan. As a result, klonopin was switched to ativan 1mg po bid briefly but there was no improvement so she was taken off of it. Meanwhile, seroquel was increased to target psychosis. Cymbalta had to be switched to effexor as patient can only take crushed meds. Has had some improvement overall though limited progress for the past few days. Discussed care with daughter again yesterday and states mother's presentation is very similar to decompensation in 2016/2017 and rather unlike that in 2021/2022 admission when she was stabilized on depakote and gabapentin. Daughter is strongly advocating for patient to go back on klonopin at a higher dose of 0.5mg po bid. It was also made clear that patient has JOSESITO and has not been on CPAP since March 2023 due to poor compliance 2/2 agitation. Will taper off effexor to minimize polypharmacy after which klonopin can be added back. Seroquel will have to continue given paranoia. Will transfer patient to  to allow for CPAP therapy.     Plan:  1. Legals: 9.27  2. Safety: constant observation for disorganized behavior 7am-11pm.  Ativan 0.5 mg q12h prn for anxiety/agitation.  3. Psychiatric: Continue current medications: clonazepam 1 mg, aripiprazole 2 mg (switched from quetiapine which was ineffective), depakote sprinkle 250 mg, and venlafaxine  mg daily. 4. Group, milieu, individual therapy as appropriate.  5. Medical: HTN, HLD, DM2. Phlebitis, possibly dysphagia, joint pain  	Keflex 500 mg qid x 5 days - complete now  	amlodipine 2.5 mg daily. decrease losartan to 75 mg daily due to orthostatic htn. aspirin 81 mg daily. atorvastatin 10 mg qHS.  	Lipid profile wnl other than slightly low HDL  	Metformin 500mg po bid              Naproxen 500mg daily   	b12 supplementation for deficiency in the past    	Ambulate with walker  	JOSESITO - will consider transfer to  to allow for CPAP use  6. Work up: none  7. Dispo: return to long-term when stable  6/28 Pssible agitated and psychotic depression as part of recurrent depression or BAD. Plan cont CO as patient disorganized walks backward at risk for falls form disorganziation with close monitoring. Based on unusual hx of unique and dramatic response to klonopin and failure on antipsychotics will try klonopin while titrating Effexor and taper off Seroquel . Start on klonopin 0.25mg bid and titrate to 0.5mg bid. Watch BP as has some orthostasis may get better off Seroquel adjust BP meds if required. Cont CPAP  as she was able to use it last night  6/29 More linear and better related but paranoia is more apparent. BP stable though with very slight tachycardia, po intake ok, will monitor VS before increasing klonopin  6/30 Intrusive and paranoid, difficult to redirect per staff. Got ativan 0.5mg IM once due to agitation. Continue klonopin 0.25mg po bid for now - can increase to 0.5mg po bid if compliant with CPAP; no change in seroquel 25mg po bid, effexor 50mg po bid, depakote sprinkles 750mg/day; family advocating for treatment   with klonopin (consider increasing antisychotic, possibly less anticholinergic agent, if daughter agrees) though she refused CPAP last night so it would be   concerning to increase dose without CPAP compliance  7/1: More calm today, not agitated, able to be redirected  7/2: Some mild agitation, no persecutory delusions expressed, got prn quetiapine today, generally calm this AM  7/3 Some improvement. Still anxiouas agitated suspicious. Will change meds to daytime dosing with option to give later such as during visiting when family can encourage. Will also get rid of non urgent meds such as vitamins. Will increase laxatives as patient has hx constipation leading to retention. Recheck labs  7/4: Improved, no nihilistic delusions expressed, remains depressed, continue antidepressant/antipsychotic   7/5 Sl less delusional still terribly anxious restless receiving prns. Will increase klonopin, will change Miralax to lactulose as difficult to get patient to drink full cup of Miralax. Cont CO. Moniotr NA for hyponatremia  has been in this range before w/o dipping lower  7/6 Agitation restlessness less acute still paranoid about being harmed and about meds. Cont meds, plan increase Seroquel  increase laxatives  7/7 Depressed psychotic some sedation . Will lower Klonopin and titrate Seroquel to dose on which she was doing better. If combined treatment is too sedating may need to try less sedating antipsychotic. Will hold Lactulose  7/8 Depressed very delusional but calmer less restless and disorganzed. Will cont Klonopin change Seroquel to earlier to help compliance consider change to Zyprexa for more potent antipsychotic with once a day dosing  7/9 Calmer but very paranoid. Will increase Klonopin and consider change to olanzapine after discussing with daughter  7/10 Dysphoric paranoid only improvement is motor agitation. Afternoon compliance poor. Will cont Seorquel Klonopin but give eariler will increase Effexor . Asked medicine to review ASA and Naprosyn, the later will be d./declan  7/11 Depressed very psychotic cont treatment except consolidate Seroquel to once a day dosing in AM. Likely need to change Seroquel to another agent. COnt CO monitor bowel function took lactulose and senna today so will see if she has BM  7/12 Sl less distressed and paranoid but still will not stay in room. Cont meds as there are signs of improvement. If compliance is more reliable can change to PM meds to avoid daytime sedation  7/13 Still quite paranoid. Will reduce prns to avoid oversedation will speak with family to consider changing Seroquel to Abilify as Seroquel maybe be sedating w/o other benefit  7/14 Patient not responding to Klonopin like she did in past will discuss changing Seroquel to Abilify with daughter. Will dc fs as patient on no diabetic meds and fs  have been nl  7/15 disorganized, intrusive, requiring PRN medications. Tolerating abilify.   7/16 Improving mood, still confused with intermittent agitation, paranoia, PRN melatonin last night, no other PRN today  7/17 Disorganized but pleasant.  7/18 Overinclusive, emotionally regulated  7/19 Somewhat disorganized, but calm and following instructions. 81 year old , retired female, domiciled at [facility], [facility] of DM2, HLD, HTN, PPHx of late onset Bipolar disorder, 2 prior psych adm last in 2022 at [facility], who presented to ED with worsening paranoia, anxiety, FTT (weight loss of 20lb since march), and confusion. Psychiatry was consulted for psychosis/AMS. Was started on cymbalta as daughter reported this was helpful in 2016/2017 when she had a similar presentation. Was continued on home medications klonopin, depakote then CL team added seroquel due to paranoia. Was on gabapentin 900mg/day at home which was tapered off by CL team. Transferred to [facility] for ongoing stabilization. Daughter denies patient has dementia. Also states patient responded well to BZD back in 2016/2017 but not well to antipsychotics, which led to team considering possibility of catatonia hx. Review of CL notes suggested she responded well to prns of ativan. As a result, klonopin was switched to ativan 1mg po bid briefly but there was no improvement so she was taken off of it. Meanwhile, seroquel was increased to target psychosis. Cymbalta had to be switched to effexor as patient can only take crushed meds. Has had some improvement overall though limited progress for the past few days. Discussed care with daughter again yesterday and states mother's presentation is very similar to decompensation in 2016/2017 and rather unlike that in 2021/2022 admission when she was stabilized on depakote and gabapentin. Daughter is strongly advocating for patient to go back on klonopin at a higher dose of 0.5mg po bid. It was also made clear that patient has JOSESITO and has not been on CPAP since March 2023 due to poor compliance 2/2 agitation. Will taper off effexor to minimize polypharmacy after which klonopin can be added back. Seroquel will have to continue given paranoia. Will transfer patient to  to allow for CPAP therapy.     Plan:  1. Legals: 9.27  2. Safety: constant observation for disorganized behavior 7am-11pm.  Ativan 0.5 mg q12h prn for anxiety/agitation.  3. Psychiatric: Continue current medications: clonazepam 1 mg daily, aripiprazole 2 mg daily (switched from quetiapine which was ineffective), depakote sprinkle 250 mg daily, and venlafaxine  mg daily.   4. Group, milieu, individual therapy as appropriate.  5. Medical: HTN, HLD, DM2. Phlebitis, possibly dysphagia, joint pain  	Keflex 500 mg qid x 5 days - complete now  	amlodipine 2.5 mg daily. decrease losartan to 75 mg daily due to orthostatic htn. aspirin 81 mg daily. atorvastatin 10 mg qHS.  	Lipid profile wnl other than slightly low HDL  	Metformin 500mg po bid              Naproxen 500mg daily   	b12 supplementation for deficiency in the past    	Ambulate with walker  	JOSESITO - will consider transfer to  to allow for CPAP use  6. Work up: none  7. 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 Plan cont CO as patient disorganized walks backward at risk for falls form disorganziation with close monitoring. Based on unusual hx of unique and dramatic response to klonopin and failure on antipsychotics will try klonopin while titrating Effexor and taper off Seroquel . Start on klonopin 0.25mg bid and titrate to 0.5mg bid. Watch BP as has some orthostasis may get better off Seroquel adjust BP meds if required. Cont CPAP  as she was able to use it last night  6/29 More linear and better related but paranoia is more apparent. BP stable though with very slight tachycardia, po intake ok, will monitor VS before increasing klonopin  6/30 Intrusive and paranoid, difficult to redirect per staff. Got ativan 0.5mg IM once due to agitation. Continue klonopin 0.25mg po bid for now - can increase to 0.5mg po bid if compliant with CPAP; no change in seroquel 25mg po bid, effexor 50mg po bid, depakote sprinkles 750mg/day; family advocating for treatment   with klonopin (consider increasing antisychotic, possibly less anticholinergic agent, if daughter agrees) though she refused CPAP last night so it would be   concerning to increase dose without CPAP compliance  7/1: More calm today, not agitated, able to be redirected  7/2: Some mild agitation, no persecutory delusions expressed, got prn quetiapine today, generally calm this AM  7/3 Some improvement. Still anxiouas agitated suspicious. Will change meds to daytime dosing with option to give later such as during visiting when family can encourage. Will also get rid of non urgent meds such as vitamins. Will increase laxatives as patient has hx constipation leading to retention. Recheck labs  7/4: Improved, no nihilistic delusions expressed, remains depressed, continue antidepressant/antipsychotic   7/5 Sl less delusional still terribly anxious restless receiving prns. Will increase klonopin, will change Miralax to lactulose as difficult to get patient to drink full cup of Miralax. Cont CO. Moniotr NA for hyponatremia  has been in this range before w/o dipping lower  7/6 Agitation restlessness less acute still paranoid about being harmed and about meds. Cont meds, plan increase Seroquel  increase laxatives  7/7 Depressed psychotic some sedation . Will lower Klonopin and titrate Seroquel to dose on which she was doing better. If combined treatment is too sedating may need to try less sedating antipsychotic. Will hold Lactulose  7/8 Depressed very delusional but calmer less restless and disorganzed. Will cont Klonopin change Seroquel to earlier to help compliance consider change to Zyprexa for more potent antipsychotic with once a day dosing  7/9 Calmer but very paranoid. Will increase Klonopin and consider change to olanzapine after discussing with daughter  7/10 Dysphoric paranoid only improvement is motor agitation. Afternoon compliance poor. Will cont Seorquel Klonopin but give eariler will increase Effexor . Asked medicine to review ASA and Naprosyn, the later will be d./declan  7/11 Depressed very psychotic cont treatment except consolidate Seroquel to once a day dosing in AM. Likely need to change Seroquel to another agent. COnt CO monitor bowel function took lactulose and senna today so will see if she has BM  7/12 Sl less distressed and paranoid but still will not stay in room. Cont meds as there are signs of improvement. If compliance is more reliable can change to PM meds to avoid daytime sedation  7/13 Still quite paranoid. Will reduce prns to avoid oversedation will speak with family to consider changing Seroquel to Abilify as Seroquel maybe be sedating w/o other benefit  7/14 Patient not responding to Klonopin like she did in past will discuss changing Seroquel to Abilify with daughter. Will dc fs as patient on no diabetic meds and fs  have been nl  7/15 disorganized, intrusive, requiring PRN medications. Tolerating abilify.   7/16 Improving mood, still confused with intermittent agitation, paranoia, PRN melatonin last night, no other PRN today  7/17 Disorganized but pleasant.  7/18 Overinclusive, emotionally regulated  7/19 Somewhat disorganized, but calm and following instructions. 81 year old , retired female, domiciled at [facility], [facility] of DM2, HLD, HTN, PPHx of late onset Bipolar disorder, 2 prior psych adm last in 2022 at [facility], who presented to ED with worsening paranoia, anxiety, FTT (weight loss of 20lb since march), and confusion. Psychiatry was consulted for psychosis/AMS. Was started on cymbalta as daughter reported this was helpful in 2016/2017 when she had a similar presentation. Was continued on home medications klonopin, depakote then CL team added seroquel due to paranoia. Was on gabapentin 900mg/day at home which was tapered off by CL team. Transferred to [facility] for ongoing stabilization. Daughter denies patient has dementia. Also states patient responded well to BZD back in 2016/2017 but not well to antipsychotics, which led to team considering possibility of catatonia hx. Review of CL notes suggested she responded well to prns of ativan. As a result, klonopin was switched to ativan 1mg po bid briefly but there was no improvement so she was taken off of it. Meanwhile, seroquel was increased to target psychosis. Cymbalta had to be switched to effexor as patient can only take crushed meds. Has had some improvement overall though limited progress for the past few days. Discussed care with daughter again yesterday and states mother's presentation is very similar to decompensation in 2016/2017 and rather unlike that in 2021/2022 admission when she was stabilized on depakote and gabapentin. Daughter is strongly advocating for patient to go back on klonopin at a higher dose of 0.5mg po bid. It was also made clear that patient has JOSESITO and has not been on CPAP since March 2023 due to poor compliance 2/2 agitation. Will taper off effexor to minimize polypharmacy after which klonopin can be added back. Seroquel will have to continue given paranoia. Will transfer patient to  to allow for CPAP therapy.     Plan:  1. Legals: 9.27  2. Safety: constant observation for disorganized behavior 7am-11pm.  Ativan 0.5 mg q12h prn for anxiety/agitation.  3. Psychiatric: Continue current medications: clonazepam 1 mg daily, aripiprazole 2 mg daily (switched from quetiapine which was ineffective), depakote sprinkle 250 mg daily and 500 mg at 17:00, and venlafaxine  mg daily.   4. Group, milieu, individual therapy as appropriate.  5. Medical: HTN, HLD, DM2. Phlebitis, possibly dysphagia, joint pain  	Keflex 500 mg qid x 5 days - complete now  	amlodipine 2.5 mg daily. decrease losartan to 75 mg daily due to orthostatic htn. aspirin 81 mg daily. atorvastatin 10 mg qHS.  	Lipid profile wnl other than slightly low HDL  	Metformin 500mg po bid              Naproxen 500mg daily   	b12 supplementation for deficiency in the past    	Ambulate with walker  	[facility] - will consider transfer to  to allow for CPAP use  6. Work up: none  7. 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 Plan cont CO as patient disorganized walks backward at risk for falls form disorganziation with close monitoring. Based on unusual hx of unique and dramatic response to klonopin and failure on antipsychotics will try klonopin while titrating Effexor and taper off Seroquel . Start on klonopin 0.25mg bid and titrate to 0.5mg bid. Watch BP as has some orthostasis may get better off Seroquel adjust BP meds if required. Cont CPAP  as she was able to use it last night  6/29 More linear and better related but paranoia is more apparent. BP stable though with very slight tachycardia, po intake ok, will monitor VS before increasing klonopin  6/30 Intrusive and paranoid, difficult to redirect per staff. Got ativan 0.5mg IM once due to agitation. Continue klonopin 0.25mg po bid for now - can increase to 0.5mg po bid if compliant with CPAP; no change in seroquel 25mg po bid, effexor 50mg po bid, depakote sprinkles 750mg/day; family advocating for treatment   with klonopin (consider increasing antisychotic, possibly less anticholinergic agent, if daughter agrees) though she refused CPAP last night so it would be   concerning to increase dose without CPAP compliance  7/1: More calm today, not agitated, able to be redirected  7/2: Some mild agitation, no persecutory delusions expressed, got prn quetiapine today, generally calm this AM  7/3 Some improvement. Still anxiouas agitated suspicious. Will change meds to daytime dosing with option to give later such as during visiting when family can encourage. Will also get rid of non urgent meds such as vitamins. Will increase laxatives as patient has hx constipation leading to retention. Recheck labs  7/4: Improved, no nihilistic delusions expressed, remains depressed, continue antidepressant/antipsychotic   7/5 Sl less delusional still terribly anxious restless receiving prns. Will increase klonopin, will change Miralax to lactulose as difficult to get patient to drink full cup of Miralax. Cont CO. Moniotr NA for hyponatremia  has been in this range before w/o dipping lower  7/6 Agitation restlessness less acute still paranoid about being harmed and about meds. Cont meds, plan increase Seroquel  increase laxatives  7/7 Depressed psychotic some sedation . Will lower Klonopin and titrate Seroquel to dose on which she was doing better. If combined treatment is too sedating may need to try less sedating antipsychotic. Will hold Lactulose  7/8 Depressed very delusional but calmer less restless and disorganzed. Will cont Klonopin change Seroquel to earlier to help compliance consider change to Zyprexa for more potent antipsychotic with once a day dosing  7/9 Calmer but very paranoid. Will increase Klonopin and consider change to olanzapine after discussing with daughter  7/10 Dysphoric paranoid only improvement is motor agitation. Afternoon compliance poor. Will cont Seorquel Klonopin but give eariler will increase Effexor . Asked medicine to review ASA and Naprosyn, the later will be d./declan  7/11 Depressed very psychotic cont treatment except consolidate Seroquel to once a day dosing in AM. Likely need to change Seroquel to another agent. COnt CO monitor bowel function took lactulose and senna today so will see if she has BM  7/12 Sl less distressed and paranoid but still will not stay in room. Cont meds as there are signs of improvement. If compliance is more reliable can change to PM meds to avoid daytime sedation  7/13 Still quite paranoid. Will reduce prns to avoid oversedation will speak with family to consider changing Seroquel to Abilify as Seroquel maybe be sedating w/o other benefit  7/14 Patient not responding to Klonopin like she did in past will discuss changing Seroquel to Abilify with daughter. Will dc fs as patient on no diabetic meds and fs  have been nl  7/15 disorganized, intrusive, requiring PRN medications. Tolerating abilify.   7/16 Improving mood, still confused with intermittent agitation, paranoia, PRN melatonin last night, no other PRN today  7/17 Disorganized but pleasant.  7/18 Overinclusive, emotionally regulated  7/19 Somewhat disorganized, but calm and following instructions.

## 2023-07-19 NOTE — BH INPATIENT PSYCHIATRY PROGRESS NOTE - NSBHCHARTREVIEWVS_PSY_A_CORE FT
Vital Signs Last 24 Hrs  T(C): 36.7 (07-19-23 @ 07:46), Max: 36.7 (07-19-23 @ 07:46)  T(F): 98.1 (07-19-23 @ 07:46), Max: 98.1 (07-19-23 @ 07:46)  HR: --  BP: --  BP(mean): --  RR: --  SpO2: --    Orthostatic VS  07-19-23 @ 07:46  Lying BP: --/-- HR: --  Sitting BP: 144/51 HR: 68  Standing BP: 133/95 HR: 100  Site: --  Mode: --  Orthostatic VS  07-18-23 @ 08:10  Lying BP: --/-- HR: --  Sitting BP: 142/60 HR: 74  Standing BP: 129/59 HR: 95  Site: upper left arm  Mode: electronic   Vital Signs Last 24 Hrs  T(C): 36.7 (07-21-23 @ 07:02), Max: 36.7 (07-21-23 @ 07:02)  T(F): 98.1 (07-21-23 @ 07:02), Max: 98.1 (07-21-23 @ 07:02)  HR: --  BP: --  BP(mean): --  RR: --  SpO2: --    Orthostatic VS  07-21-23 @ 07:02  Lying BP: --/-- HR: --  Sitting BP: 104/49 HR: 78  Standing BP: 131/59 HR: 97  Site: --  Mode: --

## 2023-07-19 NOTE — BH INPATIENT PSYCHIATRY PROGRESS NOTE - CURRENT MEDICATION
MEDICATIONS  (STANDING):  amLODIPine   Tablet 2.5 milliGRAM(s) Oral daily  ARIPiprazole 2 milliGRAM(s) Oral daily  aspirin  chewable 81 milliGRAM(s) Oral daily  atorvastatin 10 milliGRAM(s) Oral at bedtime  divalproex Sprinkle 250 milliGRAM(s) Oral daily  divalproex Sprinkle 500 milliGRAM(s) Oral <User Schedule>  glucagon  Injectable 1 milliGRAM(s) IntraMuscular once  lactulose Syrup 10 Gram(s) Oral daily  losartan 75 milliGRAM(s) Oral daily  pantoprazole   Suspension 40 milliGRAM(s) Oral before breakfast  senna 2 Tablet(s) Oral daily  sodium chloride 0.65% Nasal 2 Spray(s) Both Nostrils two times a day  venlafaxine 150 milliGRAM(s) Oral daily    MEDICATIONS  (PRN):  acetaminophen     Tablet .. 650 milliGRAM(s) Oral every 6 hours PRN Temp greater or equal to 38C (100.4F), Mild Pain (1 - 3), Moderate Pain (4 - 6)  bisacodyl 5 milliGRAM(s) Oral daily PRN constipation  bisacodyl Suppository 10 milliGRAM(s) Rectal daily PRN constipation  dextrose Oral Gel 15 Gram(s) Oral once PRN Blood Glucose LESS THAN 70 milliGRAM(s)/deciliter  haloperidol     Tablet 0.5 milliGRAM(s) Oral every 6 hours PRN agitation  haloperidol    Injectable 1 milliGRAM(s) IntraMuscular once PRN agitation  melatonin. 3 milliGRAM(s) Oral at bedtime PRN Insomnia   MEDICATIONS  (STANDING):  amLODIPine   Tablet 2.5 milliGRAM(s) Oral daily  ARIPiprazole 2 milliGRAM(s) Oral daily  aspirin  chewable 81 milliGRAM(s) Oral daily  atorvastatin 10 milliGRAM(s) Oral at bedtime  clonazePAM Oral Disintegrating Tablet 1 milliGRAM(s) Oral daily  divalproex Sprinkle 250 milliGRAM(s) Oral daily  divalproex Sprinkle 500 milliGRAM(s) Oral <User Schedule>  glucagon  Injectable 1 milliGRAM(s) IntraMuscular once  lactulose Syrup 10 Gram(s) Oral daily  losartan 75 milliGRAM(s) Oral daily  pantoprazole   Suspension 40 milliGRAM(s) Oral before breakfast  senna 2 Tablet(s) Oral daily  sodium chloride 0.65% Nasal 2 Spray(s) Both Nostrils two times a day  venlafaxine 150 milliGRAM(s) Oral daily    MEDICATIONS  (PRN):  acetaminophen     Tablet .. 650 milliGRAM(s) Oral every 6 hours PRN Temp greater or equal to 38C (100.4F), Mild Pain (1 - 3), Moderate Pain (4 - 6)  bisacodyl 5 milliGRAM(s) Oral daily PRN constipation  bisacodyl Suppository 10 milliGRAM(s) Rectal daily PRN constipation  dextrose Oral Gel 15 Gram(s) Oral once PRN Blood Glucose LESS THAN 70 milliGRAM(s)/deciliter  haloperidol     Tablet 0.5 milliGRAM(s) Oral every 6 hours PRN agitation  haloperidol    Injectable 1 milliGRAM(s) IntraMuscular once PRN agitation  melatonin. 3 milliGRAM(s) Oral at bedtime PRN Insomnia   MEDICATIONS  (STANDING):  amLODIPine   Tablet 2.5 milliGRAM(s) Oral daily  ARIPiprazole 2 milliGRAM(s) Oral daily  aspirin  chewable 81 milliGRAM(s) Oral daily  atorvastatin 10 milliGRAM(s) Oral at bedtime  clonazePAM Oral Disintegrating Tablet 1 milliGRAM(s) Oral daily  divalproex Sprinkle 500 milliGRAM(s) Oral <User Schedule>  divalproex Sprinkle 250 milliGRAM(s) Oral daily  glucagon  Injectable 1 milliGRAM(s) IntraMuscular once  lactulose Syrup 10 Gram(s) Oral daily  losartan 75 milliGRAM(s) Oral daily  pantoprazole   Suspension 40 milliGRAM(s) Oral before breakfast  senna 2 Tablet(s) Oral daily  sodium chloride 0.65% Nasal 2 Spray(s) Both Nostrils two times a day  venlafaxine 150 milliGRAM(s) Oral daily    MEDICATIONS  (PRN):  acetaminophen     Tablet .. 650 milliGRAM(s) Oral every 6 hours PRN Temp greater or equal to 38C (100.4F), Mild Pain (1 - 3), Moderate Pain (4 - 6)  bisacodyl 5 milliGRAM(s) Oral daily PRN constipation  bisacodyl Suppository 10 milliGRAM(s) Rectal daily PRN constipation  dextrose Oral Gel 15 Gram(s) Oral once PRN Blood Glucose LESS THAN 70 milliGRAM(s)/deciliter  haloperidol     Tablet 0.5 milliGRAM(s) Oral every 6 hours PRN agitation  haloperidol    Injectable 1 milliGRAM(s) IntraMuscular once PRN agitation  melatonin. 3 milliGRAM(s) Oral at bedtime PRN Insomnia

## 2023-07-19 NOTE — BH INPATIENT PSYCHIATRY PROGRESS NOTE - NSBHFUPINTERVALHXFT_PSY_A_CORE
No overnight events.  Pt found walking through unit with staff.  Pt states she does not want to go back to her room, wants to go to day room.  Asks writer several times to keep an eye on her and to let her back to the day room.  She states she wants to sit and have an Ensure.

## 2023-07-20 LAB — GLUCOSE BLDC GLUCOMTR-MCNC: 84 MG/DL — SIGNIFICANT CHANGE UP (ref 70–99)

## 2023-07-20 PROCEDURE — 99231 SBSQ HOSP IP/OBS SF/LOW 25: CPT

## 2023-07-20 RX ORDER — CLONAZEPAM 1 MG
1 TABLET ORAL DAILY
Refills: 0 | Status: DISCONTINUED | OUTPATIENT
Start: 2023-07-20 | End: 2023-07-20

## 2023-07-20 RX ORDER — CLONAZEPAM 1 MG
1 TABLET ORAL DAILY
Refills: 0 | Status: DISCONTINUED | OUTPATIENT
Start: 2023-07-21 | End: 2023-07-25

## 2023-07-20 RX ORDER — CLONAZEPAM 1 MG
1 TABLET ORAL ONCE
Refills: 0 | Status: DISCONTINUED | OUTPATIENT
Start: 2023-07-20 | End: 2023-07-20

## 2023-07-20 RX ADMIN — AMLODIPINE BESYLATE 2.5 MILLIGRAM(S): 2.5 TABLET ORAL at 09:58

## 2023-07-20 RX ADMIN — Medication 2 SPRAY(S): at 10:03

## 2023-07-20 RX ADMIN — ARIPIPRAZOLE 2 MILLIGRAM(S): 15 TABLET ORAL at 09:58

## 2023-07-20 RX ADMIN — Medication 81 MILLIGRAM(S): at 10:00

## 2023-07-20 RX ADMIN — LACTULOSE 10 GRAM(S): 10 SOLUTION ORAL at 09:56

## 2023-07-20 RX ADMIN — Medication 150 MILLIGRAM(S): at 10:00

## 2023-07-20 RX ADMIN — Medication 3 MILLIGRAM(S): at 01:48

## 2023-07-20 RX ADMIN — SENNA PLUS 2 TABLET(S): 8.6 TABLET ORAL at 10:00

## 2023-07-20 RX ADMIN — LOSARTAN POTASSIUM 75 MILLIGRAM(S): 100 TABLET, FILM COATED ORAL at 10:05

## 2023-07-20 RX ADMIN — ATORVASTATIN CALCIUM 10 MILLIGRAM(S): 80 TABLET, FILM COATED ORAL at 21:28

## 2023-07-20 RX ADMIN — DIVALPROEX SODIUM 500 MILLIGRAM(S): 500 TABLET, DELAYED RELEASE ORAL at 17:09

## 2023-07-20 RX ADMIN — Medication 1 MILLIGRAM(S): at 09:57

## 2023-07-20 RX ADMIN — DIVALPROEX SODIUM 250 MILLIGRAM(S): 500 TABLET, DELAYED RELEASE ORAL at 09:58

## 2023-07-20 NOTE — BH INPATIENT PSYCHIATRY PROGRESS NOTE - NSBHASSESSSUMMFT_PSY_ALL_CORE
81 year old , retired female, domiciled at [facility], [facility] of DM2, HLD, HTN, PPHx of late onset Bipolar disorder, 2 prior psych adm last in 2022 at [facility], who presented to ED with worsening paranoia, anxiety, FTT (weight loss of 20lb since march), and confusion. Psychiatry was consulted for psychosis/AMS. Was started on cymbalta as daughter reported this was helpful in 2016/2017 when she had a similar presentation. Was continued on home medications klonopin, depakote then CL team added seroquel due to paranoia. Was on gabapentin 900mg/day at home which was tapered off by CL team. Transferred to [facility] for ongoing stabilization. Daughter denies patient has dementia. Also states patient responded well to BZD back in 2016/2017 but not well to antipsychotics, which led to team considering possibility of catatonia hx. Review of CL notes suggested she responded well to prns of ativan. As a result, klonopin was switched to ativan 1mg po bid briefly but there was no improvement so she was taken off of it. Meanwhile, seroquel was increased to target psychosis. Cymbalta had to be switched to effexor as patient can only take crushed meds. Has had some improvement overall though limited progress for the past few days. Discussed care with daughter again yesterday and states mother's presentation is very similar to decompensation in 2016/2017 and rather unlike that in 2021/2022 admission when she was stabilized on depakote and gabapentin. Daughter is strongly advocating for patient to go back on klonopin at a higher dose of 0.5mg po bid. It was also made clear that patient has JOSESITO and has not been on CPAP since March 2023 due to poor compliance 2/2 agitation. Will taper off effexor to minimize polypharmacy after which klonopin can be added back. Seroquel will have to continue given paranoia. Will transfer patient to  to allow for CPAP therapy.       Plan:  1. Legals: 9.27  2. Safety: constant observation for disorganized behavior 7am-11pm.  Ativan 0.5 mg q12h prn for anxiety/agitation.  3. Psychiatric: Continue current medications   	         Continue Seroquel 62.5mg daily and 75mg QHS   	         C/w Depakote 250 mg daily and 500 mg qHS. VPA level of 48.70 on 5/30 - will consider tapering off and optimizing SGA in the near future  	         Decreased Effexor to 37.5mg po bid (cymbalta discontinued due to dysphagia concerns and needing to crush medications) which aim to taper off quickly (has not been on it long).   		Will plan to restart klonopin at 0.25mg po bid in a few days when effexor has been discontinued and CPAP initiated.   	        	        4. Group, milieu, individual therapy as appropriate.  5. Medical: HTN, HLD, DM2. Phlebitis, possibly dysphagia, joint pain  	Keflex 500 mg qid x 5 days - complete now  	amlodipine 2.5 mg daily. decrease losartan to 75 mg daily due to orthostatic htn. aspirin 81 mg daily. atorvastatin 10 mg qHS.  	Lipid profile wnl other than slightly low HDL  	Metformin 500mg po bid              Naproxen 500mg daily   	b12 supplementation for deficiency in the past    	Ambulate with walker  	JOSESITO - will consider transfer to  to allow for CPAP use  6. Work up: none  7. 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 Plan cont CO as patient disorganized walks backward at risk for falls form disorganziation with close monitoring. Based on unusual hx of unique and dramatic response to klonopin and failure on antipsychotics will try klonopin while titrating Effexor and taper off Seroquel . Start on klonopin 0.25mg bid and titrate to 0.5mg bid. Watch BP as has some orthostasis may get better off Seroquel adjust BP meds if required. Cont CPAP  as she was able to use it last night  6/29 More linear and better related but paranoia is more apparent. BP stable though with very slight tachycardia, po intake ok, will monitor VS before increasing klonopin  6/30 Intrusive and paranoid, difficult to redirect per staff. Got ativan 0.5mg IM once due to agitation. Continue klonopin 0.25mg po bid for now - can increase to 0.5mg po bid if compliant with CPAP; no change in seroquel 25mg po bid, effexor 50mg po bid, depakote sprinkles 750mg/day; family advocating for treatment   with klonopin (consider increasing antisychotic, possibly less anticholinergic agent, if daughter agrees) though she refused CPAP last night so it would be   concerning to increase dose without CPAP compliance  7/1: More calm today, not agitated, able to be redirected  7/2: Some mild agitation, no persecutory delusions expressed, got prn quetiapine today, generally calm this AM  7/3 Some improvement. Still anxiouas agitated suspicious. Will change meds to daytime dosing with option to give later such as during visiting when family can encourage. Will also get rid of non urgent meds such as vitamins. Will increase laxatives as patient has hx constipation leading to retention. Recheck labs  7/4: Improved, no nihilistic delusions expressed, remains depressed, continue antidepressant/antipsychotic   7/5 Sl less delusional still terribly anxious restless receiving prns. Will increase klonopin, will change Miralax to lactulose as difficult to get patient to drink full cup of Miralax. Cont CO. Moniotr NA for hyponatremia  has been in this range before w/o dipping lower  7/6 Agitation restlessness less acute still paranoid about being harmed and about meds. Cont meds, plan increase Seroquel  increase laxatives  7/7 Depressed psychotic some sedation . Will lower Klonopin and titrate Seroquel to dose on which she was doing better. If combined treatment is too sedating may need to try less sedating antipsychotic. Will hold Lactulose  7/8 Depressed very delusional but calmer less restless and disorganzed. Will cont Klonopin change Seroquel to earlier to help compliance consider change to Zyprexa for more potent antipsychotic with once a day dosing  7/9 Calmer but very paranoid. Will increase Klonopin and consider change to olanzapine after discussing with daughter  7/10 Dysphoric paranoid only improvement is motor agitation. Afternoon compliance poor. Will cont Seorquel Klonopin but give eariler will increase Effexor . Asked medicine to review ASA and Naprosyn, the later will be d./declan  7/11 Depressed very psychotic cont treatment except consolidate Seroquel to once a day dosing in AM. Likely need to change Seroquel to another agent. COnt CO monitor bowel function took lactulose and senna today so will see if she has BM  7/12 Sl less distressed and paranoid but still will not stay in room. Cont meds as there are signs of improvement. If compliance is more reliable can change to PM meds to avoid daytime sedation  7/13 Still quite paranoid. Will reduce prns to avoid oversedation will speak with family to consider changing Seroquel to Abilify as Seroquel maybe be sedating w/o other benefit  7/14 Patient not responding to Klonopin like she did in past will discuss changing Seroquel to Abilify with daughter. Will dc fs as patient on no diabetic meds and fs  have been nl  7/15 disorganized, intrusive, requiring PRN medications. Tolerating abilify.   7/16 Improving mood, still confused with intermittent agitation, paranoia, PRN melatonin last night, no other PRN today  7/17 Disorganized but pleasant.  7/18 Overinclusive, emotionally regulated  7/19 Somewhat disorganized, but calm and following instructions.  7/20 Still disorganized but pleasant and in behavioral control 81 year old , retired female, domiciled at retirement, [facility] of DM2, HLD, HTN, PPHx of late onset Bipolar disorder, 2 prior psych adm last in 2022 at [facility], who presented to ED with worsening paranoia, anxiety, FTT (weight loss of 20lb since march), and confusion. Psychiatry was consulted for psychosis/AMS. Was started on cymbalta as daughter reported this was helpful in 2016/2017 when she had a similar presentation. Was continued on home medications klonopin, depakote then CL team added seroquel due to paranoia. Was on gabapentin 900mg/day at home which was tapered off by CL team. Transferred to [facility] for ongoing stabilization. Daughter denies patient has dementia. Also states patient responded well to BZD back in 2016/2017 but not well to antipsychotics, which led to team considering possibility of catatonia hx. Review of CL notes suggested she responded well to prns of ativan. As a result, klonopin was switched to ativan 1mg po bid briefly but there was no improvement so she was taken off of it. Meanwhile, seroquel was increased to target psychosis. Cymbalta had to be switched to effexor as patient can only take crushed meds. Has had some improvement overall though limited progress for the past few days. Discussed care with daughter again yesterday and states mother's presentation is very similar to decompensation in 2016/2017 and rather unlike that in 2021/2022 admission when she was stabilized on depakote and gabapentin. Daughter is strongly advocating for patient to go back on klonopin at a higher dose of 0.5mg po bid. It was also made clear that patient has JOSESITO and has not been on CPAP since March 2023 due to poor compliance 2/2 agitation. Will taper off effexor to minimize polypharmacy after which klonopin can be added back. Seroquel will have to continue given paranoia. Will transfer patient to  to allow for CPAP therapy.     Plan:  1. Legals: 9.27  2. Safety: constant observation for disorganized behavior 7am-11pm.  Ativan 0.5 mg q12h prn for anxiety/agitation.  3. Psychiatric: Continue current medications: clonazepam 1 mg, aripiprazole 2 mg (switched from quetiapine which was ineffective), depakote sprinkle 250 mg, and venlafaxine  mg daily.   4. Group, milieu, individual therapy as appropriate.  5. Medical: HTN, HLD, DM2. Phlebitis, possibly dysphagia, joint pain  	Keflex 500 mg qid x 5 days - complete now  	amlodipine 2.5 mg daily. decrease losartan to 75 mg daily due to orthostatic htn. aspirin 81 mg daily. atorvastatin 10 mg qHS.  	Lipid profile wnl other than slightly low HDL  	Metformin 500mg po bid              Naproxen 500mg daily   	b12 supplementation for deficiency in the past    	Ambulate with walker  	JOSESITO - will consider transfer to  to allow for CPAP use  6. Work up: none  7. Dispo: return to retirement when stable  6/28 Pssible agitated and psychotic depression as part of recurrent depression or BAD. Plan cont CO as patient disorganized walks backward at risk for falls form disorganziation with close monitoring. Based on unusual hx of unique and dramatic response to klonopin and failure on antipsychotics will try klonopin while titrating Effexor and taper off Seroquel . Start on klonopin 0.25mg bid and titrate to 0.5mg bid. Watch BP as has some orthostasis may get better off Seroquel adjust BP meds if required. Cont CPAP  as she was able to use it last night  6/29 More linear and better related but paranoia is more apparent. BP stable though with very slight tachycardia, po intake ok, will monitor VS before increasing klonopin  6/30 Intrusive and paranoid, difficult to redirect per staff. Got ativan 0.5mg IM once due to agitation. Continue klonopin 0.25mg po bid for now - can increase to 0.5mg po bid if compliant with CPAP; no change in seroquel 25mg po bid, effexor 50mg po bid, depakote sprinkles 750mg/day; family advocating for treatment   with klonopin (consider increasing antisychotic, possibly less anticholinergic agent, if daughter agrees) though she refused CPAP last night so it would be   concerning to increase dose without CPAP compliance  7/1: More calm today, not agitated, able to be redirected  7/2: Some mild agitation, no persecutory delusions expressed, got prn quetiapine today, generally calm this AM  7/3 Some improvement. Still anxiouas agitated suspicious. Will change meds to daytime dosing with option to give later such as during visiting when family can encourage. Will also get rid of non urgent meds such as vitamins. Will increase laxatives as patient has hx constipation leading to retention. Recheck labs  7/4: Improved, no nihilistic delusions expressed, remains depressed, continue antidepressant/antipsychotic   7/5 Sl less delusional still terribly anxious restless receiving prns. Will increase klonopin, will change Miralax to lactulose as difficult to get patient to drink full cup of Miralax. Cont CO. Moniotr NA for hyponatremia  has been in this range before w/o dipping lower  7/6 Agitation restlessness less acute still paranoid about being harmed and about meds. Cont meds, plan increase Seroquel  increase laxatives  7/7 Depressed psychotic some sedation . Will lower Klonopin and titrate Seroquel to dose on which she was doing better. If combined treatment is too sedating may need to try less sedating antipsychotic. Will hold Lactulose  7/8 Depressed very delusional but calmer less restless and disorganzed. Will cont Klonopin change Seroquel to earlier to help compliance consider change to Zyprexa for more potent antipsychotic with once a day dosing  7/9 Calmer but very paranoid. Will increase Klonopin and consider change to olanzapine after discussing with daughter  7/10 Dysphoric paranoid only improvement is motor agitation. Afternoon compliance poor. Will cont Seorquel Klonopin but give eariler will increase Effexor . Asked medicine to review ASA and Naprosyn, the later will be d./declan  7/11 Depressed very psychotic cont treatment except consolidate Seroquel to once a day dosing in AM. Likely need to change Seroquel to another agent. COnt CO monitor bowel function took lactulose and senna today so will see if she has BM  7/12 Sl less distressed and paranoid but still will not stay in room. Cont meds as there are signs of improvement. If compliance is more reliable can change to PM meds to avoid daytime sedation  7/13 Still quite paranoid. Will reduce prns to avoid oversedation will speak with family to consider changing Seroquel to Abilify as Seroquel maybe be sedating w/o other benefit  7/14 Patient not responding to Klonopin like she did in past will discuss changing Seroquel to Abilify with daughter. Will dc fs as patient on no diabetic meds and fs  have been nl  7/15 disorganized, intrusive, requiring PRN medications. Tolerating abilify.   7/16 Improving mood, still confused with intermittent agitation, paranoia, PRN melatonin last night, no other PRN today  7/17 Disorganized but pleasant.  7/18 Overinclusive, emotionally regulated  7/19 Somewhat disorganized, but calm and following instructions.  7/20 Still disorganized but pleasant and in behavioral control 81 year old , retired female, domiciled at [facility], [facility] of DM2, HLD, HTN, PPHx of late onset Bipolar disorder, 2 prior psych adm last in 2022 at [facility], who presented to ED with worsening paranoia, anxiety, FTT (weight loss of 20lb since march), and confusion. Psychiatry was consulted for psychosis/AMS. Was started on cymbalta as daughter reported this was helpful in 2016/2017 when she had a similar presentation. Was continued on home medications klonopin, depakote then CL team added seroquel due to paranoia. Was on gabapentin 900mg/day at home which was tapered off by CL team. Transferred to [facility] for ongoing stabilization. Daughter denies patient has dementia. Also states patient responded well to BZD back in 2016/2017 but not well to antipsychotics, which led to team considering possibility of catatonia hx. Review of CL notes suggested she responded well to prns of ativan. As a result, klonopin was switched to ativan 1mg po bid briefly but there was no improvement so she was taken off of it. Meanwhile, seroquel was increased to target psychosis. Cymbalta had to be switched to effexor as patient can only take crushed meds. Has had some improvement overall though limited progress for the past few days. Discussed care with daughter again yesterday and states mother's presentation is very similar to decompensation in 2016/2017 and rather unlike that in 2021/2022 admission when she was stabilized on depakote and gabapentin. Daughter is strongly advocating for patient to go back on klonopin at a higher dose of 0.5mg po bid. It was also made clear that patient has JOSESITO and has not been on CPAP since March 2023 due to poor compliance 2/2 agitation. Will taper off effexor to minimize polypharmacy after which klonopin can be added back. Seroquel will have to continue given paranoia. Will transfer patient to  to allow for CPAP therapy.     Plan:  1. Legals: 9.27  2. Safety: constant observation for disorganized behavior 7am-11pm.  Ativan 0.5 mg q12h prn for anxiety/agitation.  3. Psychiatric: Continue current medications: clonazepam 1 mg daily, aripiprazole 2 mg daily (switched from quetiapine which was ineffective), depakote sprinkle 250 mg daily, and venlafaxine  mg daily.   4. Group, milieu, individual therapy as appropriate.  5. Medical: HTN, HLD, DM2. Phlebitis, possibly dysphagia, joint pain  	Keflex 500 mg qid x 5 days - complete now  	amlodipine 2.5 mg daily. decrease losartan to 75 mg daily due to orthostatic htn. aspirin 81 mg daily. atorvastatin 10 mg qHS.  	Lipid profile wnl other than slightly low HDL  	Metformin 500mg po bid              Naproxen 500mg daily   	b12 supplementation for deficiency in the past    	Ambulate with walker  	JOSESITO - will consider transfer to  to allow for CPAP use  6. Work up: none  7. 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 Plan cont CO as patient disorganized walks backward at risk for falls form disorganziation with close monitoring. Based on unusual hx of unique and dramatic response to klonopin and failure on antipsychotics will try klonopin while titrating Effexor and taper off Seroquel . Start on klonopin 0.25mg bid and titrate to 0.5mg bid. Watch BP as has some orthostasis may get better off Seroquel adjust BP meds if required. Cont CPAP  as she was able to use it last night  6/29 More linear and better related but paranoia is more apparent. BP stable though with very slight tachycardia, po intake ok, will monitor VS before increasing klonopin  6/30 Intrusive and paranoid, difficult to redirect per staff. Got ativan 0.5mg IM once due to agitation. Continue klonopin 0.25mg po bid for now - can increase to 0.5mg po bid if compliant with CPAP; no change in seroquel 25mg po bid, effexor 50mg po bid, depakote sprinkles 750mg/day; family advocating for treatment   with klonopin (consider increasing antisychotic, possibly less anticholinergic agent, if daughter agrees) though she refused CPAP last night so it would be   concerning to increase dose without CPAP compliance  7/1: More calm today, not agitated, able to be redirected  7/2: Some mild agitation, no persecutory delusions expressed, got prn quetiapine today, generally calm this AM  7/3 Some improvement. Still anxiouas agitated suspicious. Will change meds to daytime dosing with option to give later such as during visiting when family can encourage. Will also get rid of non urgent meds such as vitamins. Will increase laxatives as patient has hx constipation leading to retention. Recheck labs  7/4: Improved, no nihilistic delusions expressed, remains depressed, continue antidepressant/antipsychotic   7/5 Sl less delusional still terribly anxious restless receiving prns. Will increase klonopin, will change Miralax to lactulose as difficult to get patient to drink full cup of Miralax. Cont CO. Moniotr NA for hyponatremia  has been in this range before w/o dipping lower  7/6 Agitation restlessness less acute still paranoid about being harmed and about meds. Cont meds, plan increase Seroquel  increase laxatives  7/7 Depressed psychotic some sedation . Will lower Klonopin and titrate Seroquel to dose on which she was doing better. If combined treatment is too sedating may need to try less sedating antipsychotic. Will hold Lactulose  7/8 Depressed very delusional but calmer less restless and disorganzed. Will cont Klonopin change Seroquel to earlier to help compliance consider change to Zyprexa for more potent antipsychotic with once a day dosing  7/9 Calmer but very paranoid. Will increase Klonopin and consider change to olanzapine after discussing with daughter  7/10 Dysphoric paranoid only improvement is motor agitation. Afternoon compliance poor. Will cont Seorquel Klonopin but give eariler will increase Effexor . Asked medicine to review ASA and Naprosyn, the later will be d./declan  7/11 Depressed very psychotic cont treatment except consolidate Seroquel to once a day dosing in AM. Likely need to change Seroquel to another agent. COnt CO monitor bowel function took lactulose and senna today so will see if she has BM  7/12 Sl less distressed and paranoid but still will not stay in room. Cont meds as there are signs of improvement. If compliance is more reliable can change to PM meds to avoid daytime sedation  7/13 Still quite paranoid. Will reduce prns to avoid oversedation will speak with family to consider changing Seroquel to Abilify as Seroquel maybe be sedating w/o other benefit  7/14 Patient not responding to Klonopin like she did in past will discuss changing Seroquel to Abilify with daughter. Will dc fs as patient on no diabetic meds and fs  have been nl  7/15 disorganized, intrusive, requiring PRN medications. Tolerating abilify.   7/16 Improving mood, still confused with intermittent agitation, paranoia, PRN melatonin last night, no other PRN today  7/17 Disorganized but pleasant.  7/18 Overinclusive, emotionally regulated  7/19 Somewhat disorganized, but calm and following instructions.  7/20 Still disorganized but pleasant and in behavioral control 81 year old , retired female, domiciled at [facility], [facility] of DM2, HLD, HTN, PPHx of late onset Bipolar disorder, 2 prior psych adm last in 2022 at [facility], who presented to ED with worsening paranoia, anxiety, FTT (weight loss of 20lb since march), and confusion. Psychiatry was consulted for psychosis/AMS. Was started on cymbalta as daughter reported this was helpful in 2016/2017 when she had a similar presentation. Was continued on home medications klonopin, depakote then CL team added seroquel due to paranoia. Was on gabapentin 900mg/day at home which was tapered off by CL team. Transferred to [facility] for ongoing stabilization. Daughter denies patient has dementia. Also states patient responded well to BZD back in 2016/2017 but not well to antipsychotics, which led to team considering possibility of catatonia hx. Review of CL notes suggested she responded well to prns of ativan. As a result, klonopin was switched to ativan 1mg po bid briefly but there was no improvement so she was taken off of it. Meanwhile, seroquel was increased to target psychosis. Cymbalta had to be switched to effexor as patient can only take crushed meds. Has had some improvement overall though limited progress for the past few days. Discussed care with daughter again yesterday and states mother's presentation is very similar to decompensation in 2016/2017 and rather unlike that in 2021/2022 admission when she was stabilized on depakote and gabapentin. Daughter is strongly advocating for patient to go back on klonopin at a higher dose of 0.5mg po bid. It was also made clear that patient has JOSESITO and has not been on CPAP since March 2023 due to poor compliance 2/2 agitation. Will taper off effexor to minimize polypharmacy after which klonopin can be added back. Seroquel will have to continue given paranoia. Will transfer patient to  to allow for CPAP therapy.     Plan:  1. Legals: 9.27  2. Safety: constant observation for disorganized behavior 7am-11pm.  Ativan 0.5 mg q12h prn for anxiety/agitation.  3. Psychiatric: Continue current medications: clonazepam 1 mg daily, aripiprazole 2 mg daily (switched from quetiapine which was ineffective), depakote sprinkle 250 mg daily and 500 mg at 17:00, and venlafaxine  mg daily.   4. Group, milieu, individual therapy as appropriate.  5. Medical: HTN, HLD, DM2. Phlebitis, possibly dysphagia, joint pain  	Keflex 500 mg qid x 5 days - complete now  	amlodipine 2.5 mg daily. decrease losartan to 75 mg daily due to orthostatic htn. aspirin 81 mg daily. atorvastatin 10 mg qHS.  	Lipid profile wnl other than slightly low HDL  	Metformin 500mg po bid              Naproxen 500mg daily   	b12 supplementation for deficiency in the past    	Ambulate with walker  	[facility] - will consider transfer to  to allow for CPAP use  6. Work up: none  7. 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 Plan cont CO as patient disorganized walks backward at risk for falls form disorganziation with close monitoring. Based on unusual hx of unique and dramatic response to klonopin and failure on antipsychotics will try klonopin while titrating Effexor and taper off Seroquel . Start on klonopin 0.25mg bid and titrate to 0.5mg bid. Watch BP as has some orthostasis may get better off Seroquel adjust BP meds if required. Cont CPAP  as she was able to use it last night  6/29 More linear and better related but paranoia is more apparent. BP stable though with very slight tachycardia, po intake ok, will monitor VS before increasing klonopin  6/30 Intrusive and paranoid, difficult to redirect per staff. Got ativan 0.5mg IM once due to agitation. Continue klonopin 0.25mg po bid for now - can increase to 0.5mg po bid if compliant with CPAP; no change in seroquel 25mg po bid, effexor 50mg po bid, depakote sprinkles 750mg/day; family advocating for treatment   with klonopin (consider increasing antisychotic, possibly less anticholinergic agent, if daughter agrees) though she refused CPAP last night so it would be   concerning to increase dose without CPAP compliance  7/1: More calm today, not agitated, able to be redirected  7/2: Some mild agitation, no persecutory delusions expressed, got prn quetiapine today, generally calm this AM  7/3 Some improvement. Still anxiouas agitated suspicious. Will change meds to daytime dosing with option to give later such as during visiting when family can encourage. Will also get rid of non urgent meds such as vitamins. Will increase laxatives as patient has hx constipation leading to retention. Recheck labs  7/4: Improved, no nihilistic delusions expressed, remains depressed, continue antidepressant/antipsychotic   7/5 Sl less delusional still terribly anxious restless receiving prns. Will increase klonopin, will change Miralax to lactulose as difficult to get patient to drink full cup of Miralax. Cont CO. Moniotr NA for hyponatremia  has been in this range before w/o dipping lower  7/6 Agitation restlessness less acute still paranoid about being harmed and about meds. Cont meds, plan increase Seroquel  increase laxatives  7/7 Depressed psychotic some sedation . Will lower Klonopin and titrate Seroquel to dose on which she was doing better. If combined treatment is too sedating may need to try less sedating antipsychotic. Will hold Lactulose  7/8 Depressed very delusional but calmer less restless and disorganzed. Will cont Klonopin change Seroquel to earlier to help compliance consider change to Zyprexa for more potent antipsychotic with once a day dosing  7/9 Calmer but very paranoid. Will increase Klonopin and consider change to olanzapine after discussing with daughter  7/10 Dysphoric paranoid only improvement is motor agitation. Afternoon compliance poor. Will cont Seorquel Klonopin but give eariler will increase Effexor . Asked medicine to review ASA and Naprosyn, the later will be d./declan  7/11 Depressed very psychotic cont treatment except consolidate Seroquel to once a day dosing in AM. Likely need to change Seroquel to another agent. COnt CO monitor bowel function took lactulose and senna today so will see if she has BM  7/12 Sl less distressed and paranoid but still will not stay in room. Cont meds as there are signs of improvement. If compliance is more reliable can change to PM meds to avoid daytime sedation  7/13 Still quite paranoid. Will reduce prns to avoid oversedation will speak with family to consider changing Seroquel to Abilify as Seroquel maybe be sedating w/o other benefit  7/14 Patient not responding to Klonopin like she did in past will discuss changing Seroquel to Abilify with daughter. Will dc fs as patient on no diabetic meds and fs  have been nl  7/15 disorganized, intrusive, requiring PRN medications. Tolerating abilify.   7/16 Improving mood, still confused with intermittent agitation, paranoia, PRN melatonin last night, no other PRN today  7/17 Disorganized but pleasant.  7/18 Overinclusive, emotionally regulated  7/19 Somewhat disorganized, but calm and following instructions.  7/20 Still disorganized but pleasant and in behavioral control

## 2023-07-20 NOTE — BH INPATIENT PSYCHIATRY PROGRESS NOTE - NSBHMETABOLIC_PSY_ALL_CORE_FT
BMI: BMI (kg/m2): 23 (06-07-23 @ 18:13)  HbA1c: A1C with Estimated Average Glucose Result: 5.7 % (06-01-23 @ 05:10)    Glucose: POCT Blood Glucose.: 84 mg/dL (07-20-23 @ 07:42)    BP: 148/83 (07-20-23 @ 07:53) (148/83 - 148/83)  Lipid Panel: Date/Time: 06-08-23 @ 08:30  Cholesterol, Serum: 119  Direct LDL: --  HDL Cholesterol, Serum: 47  Total Cholesterol/HDL Ration Measurement: --  Triglycerides, Serum: 56   BMI: BMI (kg/m2): 23 (06-07-23 @ 18:13)  HbA1c: A1C with Estimated Average Glucose Result: 5.7 % (06-01-23 @ 05:10)    Glucose: POCT Blood Glucose.: 93 mg/dL (07-21-23 @ 07:45)    BP: 148/83 (07-20-23 @ 07:53) (148/83 - 148/83)  Lipid Panel: Date/Time: 06-08-23 @ 08:30  Cholesterol, Serum: 119  Direct LDL: --  HDL Cholesterol, Serum: 47  Total Cholesterol/HDL Ration Measurement: --  Triglycerides, Serum: 56

## 2023-07-20 NOTE — BH INPATIENT PSYCHIATRY PROGRESS NOTE - MSE UNSTRUCTURED FT
Appearance: Dressed appropriately in gowns.  Behavior: Cooperative.  Calm.   Motor: No abnormal movements, no psychomotor slowing or activation.  Speech: Regular rate.  Mood: "Fine."  Affect: Neutral.  Thought Process: Overinclusive, tangential.  Associations: Some loosening.  Thought Content: Random thoughts but no paranoia elicited.  No SIIP or HIIP on interview.  Insight: Limited.  Judgment: Limited.  Attention: Fair.    Language: Fluent.  Gait/station: Slow with walker.

## 2023-07-20 NOTE — BH INPATIENT PSYCHIATRY PROGRESS NOTE - NSBHFUPINTERVALHXFT_PSY_A_CORE
No overnight events.  Pt found walking back to her seat asking for multiple pudding cups and ice water.  Pt states she fell asleep before.  She states she has five children and 13 grandchildren.

## 2023-07-20 NOTE — BH INPATIENT PSYCHIATRY PROGRESS NOTE - CURRENT MEDICATION
MEDICATIONS  (STANDING):  amLODIPine   Tablet 2.5 milliGRAM(s) Oral daily  ARIPiprazole 2 milliGRAM(s) Oral daily  aspirin  chewable 81 milliGRAM(s) Oral daily  atorvastatin 10 milliGRAM(s) Oral at bedtime  divalproex Sprinkle 500 milliGRAM(s) Oral <User Schedule>  divalproex Sprinkle 250 milliGRAM(s) Oral daily  glucagon  Injectable 1 milliGRAM(s) IntraMuscular once  lactulose Syrup 10 Gram(s) Oral daily  losartan 75 milliGRAM(s) Oral daily  pantoprazole   Suspension 40 milliGRAM(s) Oral before breakfast  senna 2 Tablet(s) Oral daily  sodium chloride 0.65% Nasal 2 Spray(s) Both Nostrils two times a day  venlafaxine 150 milliGRAM(s) Oral daily    MEDICATIONS  (PRN):  acetaminophen     Tablet .. 650 milliGRAM(s) Oral every 6 hours PRN Temp greater or equal to 38C (100.4F), Mild Pain (1 - 3), Moderate Pain (4 - 6)  bisacodyl 5 milliGRAM(s) Oral daily PRN constipation  bisacodyl Suppository 10 milliGRAM(s) Rectal daily PRN constipation  dextrose Oral Gel 15 Gram(s) Oral once PRN Blood Glucose LESS THAN 70 milliGRAM(s)/deciliter  haloperidol     Tablet 0.5 milliGRAM(s) Oral every 6 hours PRN agitation  haloperidol    Injectable 1 milliGRAM(s) IntraMuscular once PRN agitation  melatonin. 3 milliGRAM(s) Oral at bedtime PRN Insomnia   MEDICATIONS  (STANDING):  amLODIPine   Tablet 2.5 milliGRAM(s) Oral daily  ARIPiprazole 2 milliGRAM(s) Oral daily  aspirin  chewable 81 milliGRAM(s) Oral daily  atorvastatin 10 milliGRAM(s) Oral at bedtime  clonazePAM Oral Disintegrating Tablet 1 milliGRAM(s) Oral daily  divalproex Sprinkle 250 milliGRAM(s) Oral daily  divalproex Sprinkle 500 milliGRAM(s) Oral <User Schedule>  glucagon  Injectable 1 milliGRAM(s) IntraMuscular once  lactulose Syrup 10 Gram(s) Oral daily  losartan 75 milliGRAM(s) Oral daily  pantoprazole   Suspension 40 milliGRAM(s) Oral before breakfast  senna 2 Tablet(s) Oral daily  sodium chloride 0.65% Nasal 2 Spray(s) Both Nostrils two times a day  venlafaxine 150 milliGRAM(s) Oral daily    MEDICATIONS  (PRN):  acetaminophen     Tablet .. 650 milliGRAM(s) Oral every 6 hours PRN Temp greater or equal to 38C (100.4F), Mild Pain (1 - 3), Moderate Pain (4 - 6)  bisacodyl 5 milliGRAM(s) Oral daily PRN constipation  bisacodyl Suppository 10 milliGRAM(s) Rectal daily PRN constipation  dextrose Oral Gel 15 Gram(s) Oral once PRN Blood Glucose LESS THAN 70 milliGRAM(s)/deciliter  haloperidol     Tablet 0.5 milliGRAM(s) Oral every 6 hours PRN agitation  haloperidol    Injectable 1 milliGRAM(s) IntraMuscular once PRN agitation  melatonin. 3 milliGRAM(s) Oral at bedtime PRN Insomnia

## 2023-07-20 NOTE — BH INPATIENT PSYCHIATRY PROGRESS NOTE - NSBHCHARTREVIEWVS_PSY_A_CORE FT
Vital Signs Last 24 Hrs  T(C): 36.8 (07-20-23 @ 07:53), Max: 36.8 (07-20-23 @ 07:53)  T(F): 98.2 (07-20-23 @ 07:53), Max: 98.2 (07-20-23 @ 07:53)  HR: --  BP: 148/83 (07-20-23 @ 07:53) (148/83 - 148/83)  BP(mean): 78 (07-20-23 @ 07:53) (78 - 78)  RR: --  SpO2: --    Orthostatic VS  07-19-23 @ 07:46  Lying BP: --/-- HR: --  Sitting BP: 144/51 HR: 68  Standing BP: 133/95 HR: 100  Site: --  Mode: --   Vital Signs Last 24 Hrs  T(C): 36.7 (07-21-23 @ 07:02), Max: 36.7 (07-21-23 @ 07:02)  T(F): 98.1 (07-21-23 @ 07:02), Max: 98.1 (07-21-23 @ 07:02)  HR: --  BP: --  BP(mean): --  RR: --  SpO2: --    Orthostatic VS  07-21-23 @ 07:02  Lying BP: --/-- HR: --  Sitting BP: 104/49 HR: 78  Standing BP: 131/59 HR: 97  Site: --  Mode: --

## 2023-07-21 LAB — GLUCOSE BLDC GLUCOMTR-MCNC: 93 MG/DL — SIGNIFICANT CHANGE UP (ref 70–99)

## 2023-07-21 RX ADMIN — ARIPIPRAZOLE 2 MILLIGRAM(S): 15 TABLET ORAL at 08:59

## 2023-07-21 RX ADMIN — HALOPERIDOL DECANOATE 0.5 MILLIGRAM(S): 100 INJECTION INTRAMUSCULAR at 14:23

## 2023-07-21 RX ADMIN — Medication 1 MILLIGRAM(S): at 08:52

## 2023-07-21 RX ADMIN — ATORVASTATIN CALCIUM 10 MILLIGRAM(S): 80 TABLET, FILM COATED ORAL at 21:29

## 2023-07-21 RX ADMIN — Medication 3 MILLIGRAM(S): at 21:28

## 2023-07-21 RX ADMIN — SENNA PLUS 2 TABLET(S): 8.6 TABLET ORAL at 08:58

## 2023-07-21 RX ADMIN — Medication 150 MILLIGRAM(S): at 08:59

## 2023-07-21 RX ADMIN — LACTULOSE 10 GRAM(S): 10 SOLUTION ORAL at 08:52

## 2023-07-21 RX ADMIN — DIVALPROEX SODIUM 250 MILLIGRAM(S): 500 TABLET, DELAYED RELEASE ORAL at 08:58

## 2023-07-21 RX ADMIN — DIVALPROEX SODIUM 500 MILLIGRAM(S): 500 TABLET, DELAYED RELEASE ORAL at 16:57

## 2023-07-21 RX ADMIN — Medication 2 SPRAY(S): at 09:00

## 2023-07-21 RX ADMIN — Medication 81 MILLIGRAM(S): at 08:59

## 2023-07-21 RX ADMIN — LOSARTAN POTASSIUM 75 MILLIGRAM(S): 100 TABLET, FILM COATED ORAL at 08:58

## 2023-07-21 RX ADMIN — AMLODIPINE BESYLATE 2.5 MILLIGRAM(S): 2.5 TABLET ORAL at 08:59

## 2023-07-21 NOTE — BH INPATIENT PSYCHIATRY PROGRESS NOTE - NSBHMETABOLIC_PSY_ALL_CORE_FT
BMI: BMI (kg/m2): 23 (06-07-23 @ 18:13)  HbA1c: A1C with Estimated Average Glucose Result: 5.7 % (06-01-23 @ 05:10)    Glucose: POCT Blood Glucose.: 93 mg/dL (07-21-23 @ 07:45)    BP: 148/83 (07-20-23 @ 07:53) (148/83 - 148/83)  Lipid Panel: Date/Time: 06-08-23 @ 08:30  Cholesterol, Serum: 119  Direct LDL: --  HDL Cholesterol, Serum: 47  Total Cholesterol/HDL Ration Measurement: --  Triglycerides, Serum: 56

## 2023-07-21 NOTE — BH INPATIENT PSYCHIATRY PROGRESS NOTE - NSBHFUPINTERVALHXFT_PSY_A_CORE
As per staff report, pt continues to be confused and disorganized.  Pt today states she was told differing things about whether lunch was here.  States she needs to speak to her daughter.  Then returns to reading a handout about anxiety while feeding herself ice water with a spoon.

## 2023-07-21 NOTE — BH INPATIENT PSYCHIATRY PROGRESS NOTE - MSE UNSTRUCTURED FT
Appearance: Dressed appropriately in gowns.  Behavior: Cooperative.  Calm.   Motor: No abnormal movements, no psychomotor slowing or activation.  Speech: Regular rate.  Mood: "Fine."  Affect: Neutral.  Thought Process: Overinclusive, tangential.  Associations: Loosening.  Thought Content: Random thoughts but no spontaneous paranoia.  No SIIP or HIIP on interview.  Insight: Limited.  Judgment: Limited.  Attention: Fair.    Language: Fluent.  Gait/station: Seated.

## 2023-07-21 NOTE — BH INPATIENT PSYCHIATRY PROGRESS NOTE - NSBHCHARTREVIEWVS_PSY_A_CORE FT
Vital Signs Last 24 Hrs  T(C): 36.7 (07-21-23 @ 07:02), Max: 36.7 (07-21-23 @ 07:02)  T(F): 98.1 (07-21-23 @ 07:02), Max: 98.1 (07-21-23 @ 07:02)  HR: --  BP: --  BP(mean): --  RR: --  SpO2: --    Orthostatic VS  07-21-23 @ 07:02  Lying BP: --/-- HR: --  Sitting BP: 104/49 HR: 78  Standing BP: 131/59 HR: 97  Site: --  Mode: --

## 2023-07-21 NOTE — BH INPATIENT PSYCHIATRY PROGRESS NOTE - NSBHASSESSSUMMFT_PSY_ALL_CORE
81 year old , retired female, [facility], [facility] [place] of DM2, HLD, HTN, PPHx of late onset Bipolar disorder, 2 prior psych adm last in 2022 at [facility], who presented to ED with worsening paranoia, anxiety, FTT (weight loss of 20lb since march), and confusion. Psychiatry was consulted for psychosis/AMS. Was started on cymbalta as daughter reported this was helpful in 2016/2017 when she had a similar presentation. Was continued on home medications klonopin, depakote then CL team added seroquel due to paranoia. Was on gabapentin 900mg/day at home which was tapered off by CL team. Transferred to [facility] for ongoing stabilization. Daughter denies patient has dementia. Also states patient responded well to BZD back in 2016/2017 but not well to antipsychotics, which led to team considering possibility of catatonia hx. Review of CL notes suggested she responded well to prns of ativan. As a result, klonopin was switched to ativan 1mg po bid briefly but there was no improvement so she was taken off of it. Meanwhile, seroquel was increased to target psychosis. Cymbalta had to be switched to effexor as patient can only take crushed meds. Has had some improvement overall though limited progress for the past few days. Discussed care with daughter again yesterday and states mother's presentation is very similar to decompensation in 2016/2017 and rather unlike that in 2021/2022 admission when she was stabilized on depakote and gabapentin. Daughter is strongly advocating for patient to go back on klonopin at a higher dose of 0.5mg po bid. It was also made clear that patient has JOSESITO and has not been on CPAP since March 2023 due to poor compliance 2/2 agitation. Will taper off effexor to minimize polypharmacy after which klonopin can be added back. Seroquel will have to continue given paranoia. Will transfer patient to  to allow for CPAP therapy.       Plan:  1. Legals: 9.27  2. Safety: constant observation for disorganized behavior 7am-11pm.  Ativan 0.5 mg q12h prn for anxiety/agitation.  3. Psychiatric: Continue current medications: clonazepam 1 mg, aripiprazole 2 mg (switched from quetiapine which was ineffective), depakote sprinkle 250 mg, and venlafaxine  mg daily.   4. Group, milieu, individual therapy as appropriate.  5. Medical: HTN, HLD, DM2. Phlebitis, possibly dysphagia, joint pain  	Keflex 500 mg qid x 5 days - complete now  	amlodipine 2.5 mg daily. decrease losartan to 75 mg daily due to orthostatic htn. aspirin 81 mg daily. atorvastatin 10 mg qHS.  	Lipid profile wnl other than slightly low HDL  	Metformin 500mg po bid              Naproxen 500mg daily   	b12 supplementation for deficiency in the past  	Ambulate with walker  	JOSESITO - will consider transfer to  to allow for CPAP use  6. Work up: none  7. [facility]    6/28 Pssible agitated and psychotic depression as part of recurrent depression or BAD. Plan cont CO as patient disorganized walks backward at risk for falls form disorganziation with close monitoring. Based on unusual hx of unique and dramatic response to klonopin and failure on antipsychotics will try klonopin while titrating Effexor and taper off Seroquel . Start on klonopin 0.25mg bid and titrate to 0.5mg bid. Watch BP as has some orthostasis may get better off Seroquel adjust BP meds if required. Cont CPAP  as she was able to use it last night  6/29 More linear and better related but paranoia is more apparent. BP stable though with very slight tachycardia, po intake ok, will monitor VS before increasing klonopin  6/30 Intrusive and paranoid, difficult to redirect per staff. Got ativan 0.5mg IM once due to agitation. Continue klonopin 0.25mg po bid for now - can increase to 0.5mg po bid if compliant with CPAP; no change in seroquel 25mg po bid, effexor 50mg po bid, depakote sprinkles 750mg/day; family advocating for treatment   with klonopin (consider increasing antisychotic, possibly less anticholinergic agent, if daughter agrees) though she refused CPAP last night so it would be   concerning to increase dose without CPAP compliance  7/1: More calm today, not agitated, able to be redirected  7/2: Some mild agitation, no persecutory delusions expressed, got prn quetiapine today, generally calm this AM  7/3 Some improvement. Still anxiouas agitated suspicious. Will change meds to daytime dosing with option to give later such as during visiting when family can encourage. Will also get rid of non urgent meds such as vitamins. Will increase laxatives as patient has hx constipation leading to retention. Recheck labs  7/4: Improved, no nihilistic delusions expressed, remains depressed, continue antidepressant/antipsychotic   7/5 Sl less delusional still terribly anxious restless receiving prns. Will increase klonopin, will change Miralax to lactulose as difficult to get patient to drink full cup of Miralax. Cont CO. Moniotr NA for hyponatremia  has been in this range before w/o dipping lower  7/6 Agitation restlessness less acute still paranoid about being harmed and about meds. Cont meds, plan increase Seroquel  increase laxatives  7/7 Depressed psychotic some sedation . Will lower Klonopin and titrate Seroquel to dose on which she was doing better. If combined treatment is too sedating may need to try less sedating antipsychotic. Will hold Lactulose  7/8 Depressed very delusional but calmer less restless and disorganzed. Will cont Klonopin change Seroquel to earlier to help compliance consider change to Zyprexa for more potent antipsychotic with once a day dosing  7/9 Calmer but very paranoid. Will increase Klonopin and consider change to olanzapine after discussing with daughter  7/10 Dysphoric paranoid only improvement is motor agitation. Afternoon compliance poor. Will cont Seorquel Klonopin but give eariler will increase Effexor . Asked medicine to review ASA and Naprosyn, the later will be d./declan  7/11 Depressed very psychotic cont treatment except consolidate Seroquel to once a day dosing in AM. Likely need to change Seroquel to another agent. COnt CO monitor bowel function took lactulose and senna today so will see if she has BM  7/12 Sl less distressed and paranoid but still will not stay in room. Cont meds as there are signs of improvement. If compliance is more reliable can change to PM meds to avoid daytime sedation  7/13 Still quite paranoid. Will reduce prns to avoid oversedation will speak with family to consider changing Seroquel to Abilify as Seroquel maybe be sedating w/o other benefit  7/14 Patient not responding to Klonopin like she did in past will discuss changing Seroquel to Abilify with daughter. Will dc fs as patient on no diabetic meds and fs  have been nl  7/15 disorganized, intrusive, requiring PRN medications. Tolerating abilify.   7/16 Improving mood, still confused with intermittent agitation, paranoia, PRN melatonin last night, no other PRN today  7/17 Disorganized but pleasant.  7/18 Overinclusive, emotionally regulated  7/19 Somewhat disorganized, but calm and following instructions.  7/20 Still disorganized but pleasant and in behavioral control  7/21 Disorganized but not spontaneously paranoid, continue plan above. 81 year old , retired female, domiciled at [facility] of DM2, HLD, HTN, PPHx of late onset Bipolar disorder, 2 prior psych adm last in 2022 at [facility], who presented to ED with worsening paranoia, anxiety, FTT (weight loss of 20lb since march), and confusion. Psychiatry was consulted for psychosis/AMS. Was started on cymbalta as daughter reported this was helpful in 2016/2017 when she had a similar presentation. Was continued on home medications klonopin, depakote then CL team added seroquel due to paranoia. Was on gabapentin 900mg/day at home which was tapered off by CL team. Transferred to [facility] for ongoing stabilization. Daughter denies patient has dementia. Also states patient responded well to BZD back in 2016/2017 but not well to antipsychotics, which led to team considering possibility of catatonia hx. Review of CL notes suggested she responded well to prns of ativan. As a result, klonopin was switched to ativan 1mg po bid briefly but there was no improvement so she was taken off of it. Meanwhile, seroquel was increased to target psychosis. Cymbalta had to be switched to effexor as patient can only take crushed meds. Has had some improvement overall though limited progress for the past few days. Discussed care with daughter again yesterday and states mother's presentation is very similar to decompensation in 2016/2017 and rather unlike that in 2021/2022 admission when she was stabilized on depakote and gabapentin. Daughter is strongly advocating for patient to go back on klonopin at a higher dose of 0.5mg po bid. It was also made clear that patient has JOSESITO and has not been on CPAP since March 2023 due to poor compliance 2/2 agitation. Will taper off effexor to minimize polypharmacy after which klonopin can be added back. Seroquel will have to continue given paranoia. Will transfer patient to  to allow for CPAP therapy.     Plan:  1. Legals: 9.27  2. Safety: constant observation for disorganized behavior 7am-11pm.  Ativan 0.5 mg q12h prn for anxiety/agitation.  3. Psychiatric: Continue current medications: clonazepam 1 mg daily, aripiprazole 2 mg daily (switched from quetiapine which was ineffective), depakote sprinkle 250 mg daily, and venlafaxine  mg daily.   4. Group, milieu, individual therapy as appropriate.  5. Medical: HTN, HLD, DM2. Phlebitis, possibly dysphagia, joint pain  	Keflex 500 mg qid x 5 days - complete now  	amlodipine 2.5 mg daily. decrease losartan to 75 mg daily due to orthostatic htn. aspirin 81 mg daily. atorvastatin 10 mg qHS.  	Lipid profile wnl other than slightly low HDL  	Metformin 500mg po bid              Naproxen 500mg daily   	b12 supplementation for deficiency in the past  	Ambulate with walker  	JOSESITO - will consider transfer to  to allow for CPAP use  6. Work up: none  7. Dispo: return to [facility] when stable    6/28 Pssible agitated and psychotic depression as part of recurrent depression or BAD. Plan cont CO as patient disorganized walks backward at risk for falls form disorganziation with close monitoring. Based on unusual hx of unique and dramatic response to klonopin and failure on antipsychotics will try klonopin while titrating Effexor and taper off Seroquel . Start on klonopin 0.25mg bid and titrate to 0.5mg bid. Watch BP as has some orthostasis may get better off Seroquel adjust BP meds if required. Cont CPAP  as she was able to use it last night  6/29 More linear and better related but paranoia is more apparent. BP stable though with very slight tachycardia, po intake ok, will monitor VS before increasing klonopin  6/30 Intrusive and paranoid, difficult to redirect per staff. Got ativan 0.5mg IM once due to agitation. Continue klonopin 0.25mg po bid for now - can increase to 0.5mg po bid if compliant with CPAP; no change in seroquel 25mg po bid, effexor 50mg po bid, depakote sprinkles 750mg/day; family advocating for treatment   with klonopin (consider increasing antisychotic, possibly less anticholinergic agent, if daughter agrees) though she refused CPAP last night so it would be   concerning to increase dose without CPAP compliance  7/1: More calm today, not agitated, able to be redirected  7/2: Some mild agitation, no persecutory delusions expressed, got prn quetiapine today, generally calm this AM  7/3 Some improvement. Still anxiouas agitated suspicious. Will change meds to daytime dosing with option to give later such as during visiting when family can encourage. Will also get rid of non urgent meds such as vitamins. Will increase laxatives as patient has hx constipation leading to retention. Recheck labs  7/4: Improved, no nihilistic delusions expressed, remains depressed, continue antidepressant/antipsychotic   7/5 Sl less delusional still terribly anxious restless receiving prns. Will increase klonopin, will change Miralax to lactulose as difficult to get patient to drink full cup of Miralax. Cont CO. Moniotr NA for hyponatremia  has been in this range before w/o dipping lower  7/6 Agitation restlessness less acute still paranoid about being harmed and about meds. Cont meds, plan increase Seroquel  increase laxatives  7/7 Depressed psychotic some sedation . Will lower Klonopin and titrate Seroquel to dose on which she was doing better. If combined treatment is too sedating may need to try less sedating antipsychotic. Will hold Lactulose  7/8 Depressed very delusional but calmer less restless and disorganzed. Will cont Klonopin change Seroquel to earlier to help compliance consider change to Zyprexa for more potent antipsychotic with once a day dosing  7/9 Calmer but very paranoid. Will increase Klonopin and consider change to olanzapine after discussing with daughter  7/10 Dysphoric paranoid only improvement is motor agitation. Afternoon compliance poor. Will cont Seorquel Klonopin but give eariler will increase Effexor . Asked medicine to review ASA and Naprosyn, the later will be d./declan  7/11 Depressed very psychotic cont treatment except consolidate Seroquel to once a day dosing in AM. Likely need to change Seroquel to another agent. COnt CO monitor bowel function took lactulose and senna today so will see if she has BM  7/12 Sl less distressed and paranoid but still will not stay in room. Cont meds as there are signs of improvement. If compliance is more reliable can change to PM meds to avoid daytime sedation  7/13 Still quite paranoid. Will reduce prns to avoid oversedation will speak with family to consider changing Seroquel to Abilify as Seroquel maybe be sedating w/o other benefit  7/14 Patient not responding to Klonopin like she did in past will discuss changing Seroquel to Abilify with daughter. Will dc fs as patient on no diabetic meds and fs  have been nl  7/15 disorganized, intrusive, requiring PRN medications. Tolerating abilify.   7/16 Improving mood, still confused with intermittent agitation, paranoia, PRN melatonin last night, no other PRN today  7/17 Disorganized but pleasant.  7/18 Overinclusive, emotionally regulated  7/19 Somewhat disorganized, but calm and following instructions.  7/20 Still disorganized but pleasant and in behavioral control  7/21 Disorganized but not spontaneously paranoid, continue plan above. 81 year old , retired female, domiciled at skilled nursing, [facility] of DM2, HLD, HTN, PPHx of late onset Bipolar disorder, 2 prior psych adm last in 2022 at [facility], who presented to ED with worsening paranoia, anxiety, FTT (weight loss of 20lb since march), and confusion. Psychiatry was consulted for psychosis/AMS. Was started on cymbalta as daughter reported this was helpful in 2016/2017 when she had a similar presentation. Was continued on home medications klonopin, depakote then CL team added seroquel due to paranoia. Was on gabapentin 900mg/day at home which was tapered off by CL team. Transferred to [facility] for ongoing stabilization. Daughter denies patient has dementia. Also states patient responded well to BZD back in 2016/2017 but not well to antipsychotics, which led to team considering possibility of catatonia hx. Review of CL notes suggested she responded well to prns of ativan. As a result, klonopin was switched to ativan 1mg po bid briefly but there was no improvement so she was taken off of it. Meanwhile, seroquel was increased to target psychosis. Cymbalta had to be switched to effexor as patient can only take crushed meds. Has had some improvement overall though limited progress for the past few days. Discussed care with daughter again yesterday and states mother's presentation is very similar to decompensation in 2016/2017 and rather unlike that in 2021/2022 admission when she was stabilized on depakote and gabapentin. Daughter is strongly advocating for patient to go back on klonopin at a higher dose of 0.5mg po bid. It was also made clear that patient has JOSESITO and has not been on CPAP since March 2023 due to poor compliance 2/2 agitation. Will taper off effexor to minimize polypharmacy after which klonopin can be added back. Seroquel will have to continue given paranoia. Will transfer patient to  to allow for CPAP therapy.     Plan:  1. Legals: 9.27  2. Safety: constant observation for disorganized behavior 7am-11pm.  Ativan 0.5 mg q12h prn for anxiety/agitation.  3. Psychiatric: Continue current medications: clonazepam 1 mg daily, aripiprazole 2 mg daily (switched from quetiapine which was ineffective), depakote sprinkle 250 mg daily and 500 mg at 17:00, and venlafaxine  mg daily.   4. Group, milieu, individual therapy as appropriate.  5. Medical: HTN, HLD, DM2. Phlebitis, possibly dysphagia, joint pain  	Keflex 500 mg qid x 5 days - complete now  	amlodipine 2.5 mg daily. decrease losartan to 75 mg daily due to orthostatic htn. aspirin 81 mg daily. atorvastatin 10 mg qHS.  	Lipid profile wnl other than slightly low HDL  	Metformin 500mg po bid              Naproxen 500mg daily   	b12 supplementation for deficiency in the past  	Ambulate with walker  	JOSESITO - will consider transfer to  to allow for CPAP use  6. Work up: none  7. Dispo: return to skilled nursing when stable    6/28 Pssible agitated and psychotic depression as part of recurrent depression or BAD. Plan cont CO as patient disorganized walks backward at risk for falls form disorganziation with close monitoring. Based on unusual hx of unique and dramatic response to klonopin and failure on antipsychotics will try klonopin while titrating Effexor and taper off Seroquel . Start on klonopin 0.25mg bid and titrate to 0.5mg bid. Watch BP as has some orthostasis may get better off Seroquel adjust BP meds if required. Cont CPAP  as she was able to use it last night  6/29 More linear and better related but paranoia is more apparent. BP stable though with very slight tachycardia, po intake ok, will monitor VS before increasing klonopin  6/30 Intrusive and paranoid, difficult to redirect per staff. Got ativan 0.5mg IM once due to agitation. Continue klonopin 0.25mg po bid for now - can increase to 0.5mg po bid if compliant with CPAP; no change in seroquel 25mg po bid, effexor 50mg po bid, depakote sprinkles 750mg/day; family advocating for treatment   with klonopin (consider increasing antisychotic, possibly less anticholinergic agent, if daughter agrees) though she refused CPAP last night so it would be   concerning to increase dose without CPAP compliance  7/1: More calm today, not agitated, able to be redirected  7/2: Some mild agitation, no persecutory delusions expressed, got prn quetiapine today, generally calm this AM  7/3 Some improvement. Still anxiouas agitated suspicious. Will change meds to daytime dosing with option to give later such as during visiting when family can encourage. Will also get rid of non urgent meds such as vitamins. Will increase laxatives as patient has hx constipation leading to retention. Recheck labs  7/4: Improved, no nihilistic delusions expressed, remains depressed, continue antidepressant/antipsychotic   7/5 Sl less delusional still terribly anxious restless receiving prns. Will increase klonopin, will change Miralax to lactulose as difficult to get patient to drink full cup of Miralax. Cont CO. Moniotr NA for hyponatremia  has been in this range before w/o dipping lower  7/6 Agitation restlessness less acute still paranoid about being harmed and about meds. Cont meds, plan increase Seroquel  increase laxatives  7/7 Depressed psychotic some sedation . Will lower Klonopin and titrate Seroquel to dose on which she was doing better. If combined treatment is too sedating may need to try less sedating antipsychotic. Will hold Lactulose  7/8 Depressed very delusional but calmer less restless and disorganzed. Will cont Klonopin change Seroquel to earlier to help compliance consider change to Zyprexa for more potent antipsychotic with once a day dosing  7/9 Calmer but very paranoid. Will increase Klonopin and consider change to olanzapine after discussing with daughter  7/10 Dysphoric paranoid only improvement is motor agitation. Afternoon compliance poor. Will cont Seorquel Klonopin but give eariler will increase Effexor . Asked medicine to review ASA and Naprosyn, the later will be d./declan  7/11 Depressed very psychotic cont treatment except consolidate Seroquel to once a day dosing in AM. Likely need to change Seroquel to another agent. COnt CO monitor bowel function took lactulose and senna today so will see if she has BM  7/12 Sl less distressed and paranoid but still will not stay in room. Cont meds as there are signs of improvement. If compliance is more reliable can change to PM meds to avoid daytime sedation  7/13 Still quite paranoid. Will reduce prns to avoid oversedation will speak with family to consider changing Seroquel to Abilify as Seroquel maybe be sedating w/o other benefit  7/14 Patient not responding to Klonopin like she did in past will discuss changing Seroquel to Abilify with daughter. Will dc fs as patient on no diabetic meds and fs  have been nl  7/15 disorganized, intrusive, requiring PRN medications. Tolerating abilify.   7/16 Improving mood, still confused with intermittent agitation, paranoia, PRN melatonin last night, no other PRN today  7/17 Disorganized but pleasant.  7/18 Overinclusive, emotionally regulated  7/19 Somewhat disorganized, but calm and following instructions.  7/20 Still disorganized but pleasant and in behavioral control  7/21 Disorganized but not spontaneously paranoid, continue plan above.

## 2023-07-22 LAB — GLUCOSE BLDC GLUCOMTR-MCNC: 90 MG/DL — SIGNIFICANT CHANGE UP (ref 70–99)

## 2023-07-22 PROCEDURE — 99231 SBSQ HOSP IP/OBS SF/LOW 25: CPT

## 2023-07-22 RX ADMIN — Medication 2 SPRAY(S): at 09:38

## 2023-07-22 RX ADMIN — LACTULOSE 10 GRAM(S): 10 SOLUTION ORAL at 09:37

## 2023-07-22 RX ADMIN — ARIPIPRAZOLE 2 MILLIGRAM(S): 15 TABLET ORAL at 09:39

## 2023-07-22 RX ADMIN — LOSARTAN POTASSIUM 75 MILLIGRAM(S): 100 TABLET, FILM COATED ORAL at 09:39

## 2023-07-22 RX ADMIN — Medication 150 MILLIGRAM(S): at 09:38

## 2023-07-22 RX ADMIN — DIVALPROEX SODIUM 500 MILLIGRAM(S): 500 TABLET, DELAYED RELEASE ORAL at 17:10

## 2023-07-22 RX ADMIN — SENNA PLUS 2 TABLET(S): 8.6 TABLET ORAL at 09:39

## 2023-07-22 RX ADMIN — DIVALPROEX SODIUM 250 MILLIGRAM(S): 500 TABLET, DELAYED RELEASE ORAL at 09:39

## 2023-07-22 RX ADMIN — ATORVASTATIN CALCIUM 10 MILLIGRAM(S): 80 TABLET, FILM COATED ORAL at 22:27

## 2023-07-22 RX ADMIN — Medication 3 MILLIGRAM(S): at 22:27

## 2023-07-22 RX ADMIN — Medication 1 MILLIGRAM(S): at 08:35

## 2023-07-22 RX ADMIN — AMLODIPINE BESYLATE 2.5 MILLIGRAM(S): 2.5 TABLET ORAL at 09:40

## 2023-07-22 RX ADMIN — Medication 81 MILLIGRAM(S): at 09:39

## 2023-07-22 NOTE — BH INPATIENT PSYCHIATRY PROGRESS NOTE - NSBHCHARTREVIEWVS_PSY_A_CORE FT
Vital Signs Last 24 Hrs  T(C): --  T(F): --  HR: --  BP: 142/64 (07-22-23 @ 07:29) (142/64 - 142/64)  BP(mean): 77 (07-22-23 @ 07:29) (77 - 77)  RR: --  SpO2: --    Orthostatic VS  07-21-23 @ 07:02  Lying BP: --/-- HR: --  Sitting BP: 104/49 HR: 78  Standing BP: 131/59 HR: 97  Site: --  Mode: --

## 2023-07-22 NOTE — BH INPATIENT PSYCHIATRY PROGRESS NOTE - NSBHMETABOLIC_PSY_ALL_CORE_FT
BMI: BMI (kg/m2): 23 (06-07-23 @ 18:13)  HbA1c: A1C with Estimated Average Glucose Result: 5.7 % (06-01-23 @ 05:10)    Glucose: POCT Blood Glucose.: 90 mg/dL (07-22-23 @ 07:39)    BP: 142/64 (07-22-23 @ 07:29) (142/64 - 148/83)  Lipid Panel: Date/Time: 06-08-23 @ 08:30  Cholesterol, Serum: 119  Direct LDL: --  HDL Cholesterol, Serum: 47  Total Cholesterol/HDL Ration Measurement: --  Triglycerides, Serum: 56

## 2023-07-23 LAB — GLUCOSE BLDC GLUCOMTR-MCNC: 81 MG/DL — SIGNIFICANT CHANGE UP (ref 70–99)

## 2023-07-23 PROCEDURE — 99231 SBSQ HOSP IP/OBS SF/LOW 25: CPT

## 2023-07-23 RX ADMIN — SENNA PLUS 2 TABLET(S): 8.6 TABLET ORAL at 08:57

## 2023-07-23 RX ADMIN — ARIPIPRAZOLE 2 MILLIGRAM(S): 15 TABLET ORAL at 08:59

## 2023-07-23 RX ADMIN — ATORVASTATIN CALCIUM 10 MILLIGRAM(S): 80 TABLET, FILM COATED ORAL at 22:10

## 2023-07-23 RX ADMIN — DIVALPROEX SODIUM 250 MILLIGRAM(S): 500 TABLET, DELAYED RELEASE ORAL at 08:57

## 2023-07-23 RX ADMIN — AMLODIPINE BESYLATE 2.5 MILLIGRAM(S): 2.5 TABLET ORAL at 08:57

## 2023-07-23 RX ADMIN — Medication 2 SPRAY(S): at 22:10

## 2023-07-23 RX ADMIN — DIVALPROEX SODIUM 500 MILLIGRAM(S): 500 TABLET, DELAYED RELEASE ORAL at 17:57

## 2023-07-23 RX ADMIN — Medication 81 MILLIGRAM(S): at 08:58

## 2023-07-23 RX ADMIN — LACTULOSE 10 GRAM(S): 10 SOLUTION ORAL at 08:58

## 2023-07-23 RX ADMIN — Medication 150 MILLIGRAM(S): at 08:57

## 2023-07-23 RX ADMIN — LOSARTAN POTASSIUM 75 MILLIGRAM(S): 100 TABLET, FILM COATED ORAL at 08:58

## 2023-07-23 RX ADMIN — Medication 1 MILLIGRAM(S): at 08:57

## 2023-07-23 NOTE — BH INPATIENT PSYCHIATRY PROGRESS NOTE - NSBHMETABOLIC_PSY_ALL_CORE_FT
BMI: BMI (kg/m2): 23 (06-07-23 @ 18:13)  HbA1c: A1C with Estimated Average Glucose Result: 5.7 % (06-01-23 @ 05:10)    Glucose: POCT Blood Glucose.: 81 mg/dL (07-23-23 @ 07:56)    BP: 142/64 (07-22-23 @ 07:29) (142/64 - 142/64)  Lipid Panel: Date/Time: 06-08-23 @ 08:30  Cholesterol, Serum: 119  Direct LDL: --  HDL Cholesterol, Serum: 47  Total Cholesterol/HDL Ration Measurement: --  Triglycerides, Serum: 56

## 2023-07-23 NOTE — BH INPATIENT PSYCHIATRY PROGRESS NOTE - NSBHCHARTREVIEWVS_PSY_A_CORE FT
Vital Signs Last 24 Hrs  T(C): 36.6 (07-23-23 @ 07:47), Max: 36.6 (07-23-23 @ 07:47)  T(F): 97.8 (07-23-23 @ 07:47), Max: 97.8 (07-23-23 @ 07:47)  HR: --  BP: --  BP(mean): --  RR: --  SpO2: --    Orthostatic VS  07-23-23 @ 07:47  Lying BP: 153/59 HR: 75  Sitting BP: 123/60 HR: 84  Standing BP: --/-- HR: --  Site: --  Mode: --

## 2023-07-24 LAB — GLUCOSE BLDC GLUCOMTR-MCNC: 88 MG/DL — SIGNIFICANT CHANGE UP (ref 70–99)

## 2023-07-24 PROCEDURE — 99232 SBSQ HOSP IP/OBS MODERATE 35: CPT

## 2023-07-24 RX ORDER — ARIPIPRAZOLE 15 MG/1
4 TABLET ORAL DAILY
Refills: 0 | Status: DISCONTINUED | OUTPATIENT
Start: 2023-07-24 | End: 2023-07-26

## 2023-07-24 RX ORDER — ARIPIPRAZOLE 15 MG/1
1 TABLET ORAL ONCE
Refills: 0 | Status: COMPLETED | OUTPATIENT
Start: 2023-07-24 | End: 2023-07-24

## 2023-07-24 RX ADMIN — Medication 650 MILLIGRAM(S): at 12:26

## 2023-07-24 RX ADMIN — ARIPIPRAZOLE 1 MILLIGRAM(S): 15 TABLET ORAL at 14:56

## 2023-07-24 RX ADMIN — Medication 1 MILLIGRAM(S): at 09:45

## 2023-07-24 RX ADMIN — Medication 2 SPRAY(S): at 21:23

## 2023-07-24 RX ADMIN — AMLODIPINE BESYLATE 2.5 MILLIGRAM(S): 2.5 TABLET ORAL at 09:47

## 2023-07-24 RX ADMIN — Medication 81 MILLIGRAM(S): at 10:53

## 2023-07-24 RX ADMIN — Medication 150 MILLIGRAM(S): at 09:46

## 2023-07-24 RX ADMIN — SENNA PLUS 2 TABLET(S): 8.6 TABLET ORAL at 09:47

## 2023-07-24 RX ADMIN — PANTOPRAZOLE SODIUM 40 MILLIGRAM(S): 20 TABLET, DELAYED RELEASE ORAL at 06:39

## 2023-07-24 RX ADMIN — ARIPIPRAZOLE 2 MILLIGRAM(S): 15 TABLET ORAL at 09:45

## 2023-07-24 RX ADMIN — ATORVASTATIN CALCIUM 10 MILLIGRAM(S): 80 TABLET, FILM COATED ORAL at 21:23

## 2023-07-24 RX ADMIN — Medication 2 SPRAY(S): at 08:53

## 2023-07-24 RX ADMIN — DIVALPROEX SODIUM 500 MILLIGRAM(S): 500 TABLET, DELAYED RELEASE ORAL at 19:21

## 2023-07-24 RX ADMIN — LACTULOSE 10 GRAM(S): 10 SOLUTION ORAL at 09:47

## 2023-07-24 RX ADMIN — DIVALPROEX SODIUM 250 MILLIGRAM(S): 500 TABLET, DELAYED RELEASE ORAL at 09:44

## 2023-07-24 RX ADMIN — LOSARTAN POTASSIUM 75 MILLIGRAM(S): 100 TABLET, FILM COATED ORAL at 09:46

## 2023-07-24 NOTE — BH INPATIENT PSYCHIATRY PROGRESS NOTE - CURRENT MEDICATION
MEDICATIONS  (STANDING):  amLODIPine   Tablet 2.5 milliGRAM(s) Oral daily  ARIPiprazole 4 milliGRAM(s) Oral daily  ARIPiprazole 1 milliGRAM(s) Oral once  aspirin  chewable 81 milliGRAM(s) Oral daily  atorvastatin 10 milliGRAM(s) Oral at bedtime  clonazePAM Oral Disintegrating Tablet 1 milliGRAM(s) Oral daily  divalproex Sprinkle 250 milliGRAM(s) Oral daily  divalproex Sprinkle 500 milliGRAM(s) Oral <User Schedule>  glucagon  Injectable 1 milliGRAM(s) IntraMuscular once  lactulose Syrup 10 Gram(s) Oral daily  losartan 75 milliGRAM(s) Oral daily  pantoprazole   Suspension 40 milliGRAM(s) Oral before breakfast  senna 2 Tablet(s) Oral daily  sodium chloride 0.65% Nasal 2 Spray(s) Both Nostrils two times a day  venlafaxine 150 milliGRAM(s) Oral daily    MEDICATIONS  (PRN):  acetaminophen     Tablet .. 650 milliGRAM(s) Oral every 6 hours PRN Temp greater or equal to 38C (100.4F), Mild Pain (1 - 3), Moderate Pain (4 - 6)  bisacodyl 5 milliGRAM(s) Oral daily PRN constipation  bisacodyl Suppository 10 milliGRAM(s) Rectal daily PRN constipation  dextrose Oral Gel 15 Gram(s) Oral once PRN Blood Glucose LESS THAN 70 milliGRAM(s)/deciliter  haloperidol     Tablet 0.5 milliGRAM(s) Oral every 6 hours PRN agitation  haloperidol    Injectable 1 milliGRAM(s) IntraMuscular once PRN agitation  melatonin. 3 milliGRAM(s) Oral at bedtime PRN Insomnia

## 2023-07-24 NOTE — BH INPATIENT PSYCHIATRY PROGRESS NOTE - NSBHFUPINTERVALHXFT_PSY_A_CORE
Patient is seen for MDD with psychosis. No overnight events except patient wont sleep in room, wont use CPAP. Eating, sleeping okay

## 2023-07-24 NOTE — BH INPATIENT PSYCHIATRY PROGRESS NOTE - NSBHMETABOLIC_PSY_ALL_CORE_FT
BMI: BMI (kg/m2): 23 (06-07-23 @ 18:13)  HbA1c: A1C with Estimated Average Glucose Result: 5.7 % (06-01-23 @ 05:10)    Glucose: POCT Blood Glucose.: 88 mg/dL (07-24-23 @ 07:39)    BP: 142/64 (07-22-23 @ 07:29) (142/64 - 142/64)  Lipid Panel: Date/Time: 06-08-23 @ 08:30  Cholesterol, Serum: 119  Direct LDL: --  HDL Cholesterol, Serum: 47  Total Cholesterol/HDL Ration Measurement: --  Triglycerides, Serum: 56

## 2023-07-24 NOTE — BH INPATIENT PSYCHIATRY PROGRESS NOTE - NSBHCHARTREVIEWVS_PSY_A_CORE FT
Vital Signs Last 24 Hrs  T(C): 36.6 (07-24-23 @ 07:43), Max: 36.6 (07-24-23 @ 07:43)  T(F): 97.9 (07-24-23 @ 07:43), Max: 97.9 (07-24-23 @ 07:43)  HR: --  BP: --  BP(mean): --  RR: --  SpO2: --    Orthostatic VS  07-24-23 @ 07:43  Lying BP: --/-- HR: --  Sitting BP: 136/50 HR: 87  Standing BP: 119/64 HR: 91  Site: upper left arm  Mode: electronic  Orthostatic VS  07-23-23 @ 07:47  Lying BP: 153/59 HR: 75  Sitting BP: 123/60 HR: 84  Standing BP: --/-- HR: --  Site: --  Mode: --

## 2023-07-24 NOTE — BH INPATIENT PSYCHIATRY PROGRESS NOTE - MSE UNSTRUCTURED FT
Appearance: Dressed appropriately in gowns.  Behavior: Cooperative.  Calm.   Motor: No abnormal movements, no psychomotor slowing or activation.  Speech: Regular rate.  Mood: "Fine."  Affect: Neutral.  Thought Process: Overinclusive, tangential.  Associations: Loosening.  Thought Content: Patient still worried for hel safety if she sleep in room but will consider trying to sleep there soon  No SIIP or HIIP on interview.  Insight: Limited.  Judgment: Limited.  Attention: Fair.    Language: Fluent.  Gait/station: Seated.

## 2023-07-25 LAB — GLUCOSE BLDC GLUCOMTR-MCNC: 97 MG/DL — SIGNIFICANT CHANGE UP (ref 70–99)

## 2023-07-25 PROCEDURE — 99232 SBSQ HOSP IP/OBS MODERATE 35: CPT

## 2023-07-25 RX ORDER — DIVALPROEX SODIUM 500 MG/1
250 TABLET, DELAYED RELEASE ORAL
Refills: 0 | Status: DISCONTINUED | OUTPATIENT
Start: 2023-07-25 | End: 2023-08-01

## 2023-07-25 RX ORDER — CLONAZEPAM 1 MG
0.5 TABLET ORAL
Refills: 0 | Status: DISCONTINUED | OUTPATIENT
Start: 2023-07-25 | End: 2023-08-01

## 2023-07-25 RX ADMIN — LACTULOSE 10 GRAM(S): 10 SOLUTION ORAL at 09:24

## 2023-07-25 RX ADMIN — AMLODIPINE BESYLATE 2.5 MILLIGRAM(S): 2.5 TABLET ORAL at 09:24

## 2023-07-25 RX ADMIN — PANTOPRAZOLE SODIUM 40 MILLIGRAM(S): 20 TABLET, DELAYED RELEASE ORAL at 06:44

## 2023-07-25 RX ADMIN — SENNA PLUS 2 TABLET(S): 8.6 TABLET ORAL at 09:24

## 2023-07-25 RX ADMIN — DIVALPROEX SODIUM 250 MILLIGRAM(S): 500 TABLET, DELAYED RELEASE ORAL at 09:23

## 2023-07-25 RX ADMIN — ATORVASTATIN CALCIUM 10 MILLIGRAM(S): 80 TABLET, FILM COATED ORAL at 22:11

## 2023-07-25 RX ADMIN — Medication 0.5 MILLIGRAM(S): at 09:36

## 2023-07-25 RX ADMIN — Medication 2 SPRAY(S): at 09:23

## 2023-07-25 RX ADMIN — DIVALPROEX SODIUM 250 MILLIGRAM(S): 500 TABLET, DELAYED RELEASE ORAL at 17:04

## 2023-07-25 RX ADMIN — ARIPIPRAZOLE 4 MILLIGRAM(S): 15 TABLET ORAL at 09:23

## 2023-07-25 RX ADMIN — Medication 2 SPRAY(S): at 22:28

## 2023-07-25 RX ADMIN — Medication 0.5 MILLIGRAM(S): at 17:04

## 2023-07-25 RX ADMIN — Medication 81 MILLIGRAM(S): at 09:23

## 2023-07-25 RX ADMIN — Medication 150 MILLIGRAM(S): at 09:24

## 2023-07-25 RX ADMIN — LOSARTAN POTASSIUM 75 MILLIGRAM(S): 100 TABLET, FILM COATED ORAL at 09:24

## 2023-07-25 NOTE — BH INPATIENT PSYCHIATRY PROGRESS NOTE - NSBHCHARTREVIEWVS_PSY_A_CORE FT
Vital Signs Last 24 Hrs  T(C): 36.6 (07-25-23 @ 07:47), Max: 36.6 (07-25-23 @ 07:47)  T(F): 97.8 (07-25-23 @ 07:47), Max: 97.8 (07-25-23 @ 07:47)  HR: --  BP: --  BP(mean): --  RR: --  SpO2: --    Orthostatic VS  07-25-23 @ 07:47  Lying BP: --/-- HR: --  Sitting BP: 141/60 HR: 86  Standing BP: 141/62 HR: 95  Site: upper left arm  Mode: electronic  Orthostatic VS  07-24-23 @ 07:43  Lying BP: --/-- HR: --  Sitting BP: 136/50 HR: 87  Standing BP: 119/64 HR: 91  Site: upper left arm  Mode: electronic

## 2023-07-25 NOTE — BH INPATIENT PSYCHIATRY PROGRESS NOTE - NSBHMETABOLIC_PSY_ALL_CORE_FT
BMI: BMI (kg/m2): 23 (06-07-23 @ 18:13)  HbA1c: A1C with Estimated Average Glucose Result: 5.7 % (06-01-23 @ 05:10)    Glucose: POCT Blood Glucose.: 97 mg/dL (07-25-23 @ 07:44)    BP: --  Lipid Panel: Date/Time: 06-08-23 @ 08:30  Cholesterol, Serum: 119  Direct LDL: --  HDL Cholesterol, Serum: 47  Total Cholesterol/HDL Ration Measurement: --  Triglycerides, Serum: 56

## 2023-07-25 NOTE — BH INPATIENT PSYCHIATRY PROGRESS NOTE - MSE UNSTRUCTURED FT
Appearance: Dressed appropriately in gowns.  Behavior: Cooperative.  Calm. [place] uses amplifier  Motor: No abnormal movements, no psychomotor slowing or activation.  Speech: Regular rate.  Mood: "Fine."  Affect: Neutral.  Thought Process: Overinclusive, tangential.  Associations: Loosening.  Thought Content: Patient still worried for her safety if she sleep in roomNo SIIP or HIIP on interview.  Insight: Limited.  Judgment: Limited.  Attention: Fair.    Language: Fluent.  Gait/station: Seated.

## 2023-07-25 NOTE — BH INPATIENT PSYCHIATRY PROGRESS NOTE - NSBHASSESSSUMMFT_PSY_ALL_CORE
7/24 More calm, less acutely paranoid. Will increase Abilify will dc CPAP as she never uses  7/25 Still paranoid not able to stay in room for fear for being killed. Cont to titrate Abilify have divided Klonopin to reduce daytime sedation

## 2023-07-25 NOTE — BH INPATIENT PSYCHIATRY PROGRESS NOTE - CURRENT MEDICATION
MEDICATIONS  (STANDING):  amLODIPine   Tablet 2.5 milliGRAM(s) Oral daily  ARIPiprazole 4 milliGRAM(s) Oral daily  aspirin  chewable 81 milliGRAM(s) Oral daily  atorvastatin 10 milliGRAM(s) Oral at bedtime  clonazePAM Oral Disintegrating Tablet 0.5 milliGRAM(s) Oral <User Schedule>  divalproex Sprinkle 250 milliGRAM(s) Oral daily  divalproex Sprinkle 250 milliGRAM(s) Oral <User Schedule>  glucagon  Injectable 1 milliGRAM(s) IntraMuscular once  lactulose Syrup 10 Gram(s) Oral daily  losartan 75 milliGRAM(s) Oral daily  pantoprazole   Suspension 40 milliGRAM(s) Oral before breakfast  senna 2 Tablet(s) Oral daily  sodium chloride 0.65% Nasal 2 Spray(s) Both Nostrils two times a day  venlafaxine 150 milliGRAM(s) Oral daily    MEDICATIONS  (PRN):  acetaminophen     Tablet .. 650 milliGRAM(s) Oral every 6 hours PRN Temp greater or equal to 38C (100.4F), Mild Pain (1 - 3), Moderate Pain (4 - 6)  bisacodyl 5 milliGRAM(s) Oral daily PRN constipation  bisacodyl Suppository 10 milliGRAM(s) Rectal daily PRN constipation  dextrose Oral Gel 15 Gram(s) Oral once PRN Blood Glucose LESS THAN 70 milliGRAM(s)/deciliter  haloperidol     Tablet 0.5 milliGRAM(s) Oral every 6 hours PRN agitation  haloperidol    Injectable 1 milliGRAM(s) IntraMuscular once PRN agitation  melatonin. 3 milliGRAM(s) Oral at bedtime PRN Insomnia

## 2023-07-26 LAB — GLUCOSE BLDC GLUCOMTR-MCNC: 93 MG/DL — SIGNIFICANT CHANGE UP (ref 70–99)

## 2023-07-26 PROCEDURE — 99232 SBSQ HOSP IP/OBS MODERATE 35: CPT

## 2023-07-26 RX ORDER — ARIPIPRAZOLE 15 MG/1
5 TABLET ORAL DAILY
Refills: 0 | Status: DISCONTINUED | OUTPATIENT
Start: 2023-07-26 | End: 2023-07-31

## 2023-07-26 RX ADMIN — Medication 0.5 MILLIGRAM(S): at 17:25

## 2023-07-26 RX ADMIN — Medication 2 SPRAY(S): at 08:53

## 2023-07-26 RX ADMIN — ARIPIPRAZOLE 4 MILLIGRAM(S): 15 TABLET ORAL at 08:54

## 2023-07-26 RX ADMIN — DIVALPROEX SODIUM 250 MILLIGRAM(S): 500 TABLET, DELAYED RELEASE ORAL at 08:54

## 2023-07-26 RX ADMIN — ATORVASTATIN CALCIUM 10 MILLIGRAM(S): 80 TABLET, FILM COATED ORAL at 20:33

## 2023-07-26 RX ADMIN — AMLODIPINE BESYLATE 2.5 MILLIGRAM(S): 2.5 TABLET ORAL at 08:55

## 2023-07-26 RX ADMIN — Medication 2 SPRAY(S): at 20:38

## 2023-07-26 RX ADMIN — PANTOPRAZOLE SODIUM 40 MILLIGRAM(S): 20 TABLET, DELAYED RELEASE ORAL at 06:47

## 2023-07-26 RX ADMIN — DIVALPROEX SODIUM 250 MILLIGRAM(S): 500 TABLET, DELAYED RELEASE ORAL at 17:25

## 2023-07-26 RX ADMIN — LOSARTAN POTASSIUM 75 MILLIGRAM(S): 100 TABLET, FILM COATED ORAL at 08:55

## 2023-07-26 RX ADMIN — SENNA PLUS 2 TABLET(S): 8.6 TABLET ORAL at 08:54

## 2023-07-26 RX ADMIN — Medication 0.5 MILLIGRAM(S): at 08:57

## 2023-07-26 RX ADMIN — Medication 81 MILLIGRAM(S): at 08:54

## 2023-07-26 RX ADMIN — Medication 150 MILLIGRAM(S): at 08:55

## 2023-07-26 RX ADMIN — LACTULOSE 10 GRAM(S): 10 SOLUTION ORAL at 08:55

## 2023-07-26 NOTE — BH INPATIENT PSYCHIATRY PROGRESS NOTE - NSBHMETABOLIC_PSY_ALL_CORE_FT
BMI: BMI (kg/m2): 23 (06-07-23 @ 18:13)  HbA1c: A1C with Estimated Average Glucose Result: 5.7 % (06-01-23 @ 05:10)    Glucose: POCT Blood Glucose.: 93 mg/dL (07-26-23 @ 07:42)    BP: --  Lipid Panel: Date/Time: 06-08-23 @ 08:30  Cholesterol, Serum: 119  Direct LDL: --  HDL Cholesterol, Serum: 47  Total Cholesterol/HDL Ration Measurement: --  Triglycerides, Serum: 56

## 2023-07-26 NOTE — BH INPATIENT PSYCHIATRY PROGRESS NOTE - MSE UNSTRUCTURED FT
Appearance: adequate hygiene and grooming; no abnormal involuntary movements noted  Behavior: guarded, oddly related, mild psychomotor retardation  Speech: wnl  Mood: "upset"  Affect: mildly irritable, stable, constricted  Thought Process: illogical, tangential  Thought Content: paranoid delusions remain; denies SI/HI  Perceptual disturbances: denies AH/VH  Cognition: loosely oriented  Insight: poor  Judgment: Limited.

## 2023-07-26 NOTE — BH INPATIENT PSYCHIATRY PROGRESS NOTE - NSBHFUPINTERVALHXFT_PSY_A_CORE
Chart reviewed and case discussed with treatment team. Staff report patient continues to need 1:1 due to neglecting safety regarding walker use. Patient remains disorganized and paranoid. Believes someone is trying to kill her. Has multiple complaints about staff.

## 2023-07-26 NOTE — BH INPATIENT PSYCHIATRY PROGRESS NOTE - NSBHCHARTREVIEWVS_PSY_A_CORE FT
Vital Signs Last 24 Hrs  T(C): 36.3 (07-26-23 @ 07:46), Max: 36.3 (07-26-23 @ 07:46)  T(F): 97.3 (07-26-23 @ 07:46), Max: 97.3 (07-26-23 @ 07:46)  HR: --  BP: --  BP(mean): --  RR: --  SpO2: --    Orthostatic VS  07-26-23 @ 07:46  Lying BP: --/-- HR: --  Sitting BP: 127/50 HR: 68  Standing BP: 124/51 HR: 71  Site: upper left arm  Mode: electronic  Orthostatic VS  07-25-23 @ 07:47  Lying BP: --/-- HR: --  Sitting BP: 141/60 HR: 86  Standing BP: 141/62 HR: 95  Site: upper left arm  Mode: electronic

## 2023-07-26 NOTE — BH INPATIENT PSYCHIATRY PROGRESS NOTE - NSBHASSESSSUMMFT_PSY_ALL_CORE
7/24 More calm, less acutely paranoid. Will increase Abilify will dc CPAP as she never uses  7/25 Still paranoid not able to stay in room for fear for being killed. Cont to titrate Abilify have divided Klonopin to reduce daytime sedation  7/26 Remains paranoid. Tolerating medications well. Increase abilify to 5mg po daily.

## 2023-07-27 LAB — GLUCOSE BLDC GLUCOMTR-MCNC: 98 MG/DL — SIGNIFICANT CHANGE UP (ref 70–99)

## 2023-07-27 PROCEDURE — 99231 SBSQ HOSP IP/OBS SF/LOW 25: CPT

## 2023-07-27 RX ADMIN — Medication 2 SPRAY(S): at 09:50

## 2023-07-27 RX ADMIN — Medication 150 MILLIGRAM(S): at 09:47

## 2023-07-27 RX ADMIN — LOSARTAN POTASSIUM 75 MILLIGRAM(S): 100 TABLET, FILM COATED ORAL at 09:47

## 2023-07-27 RX ADMIN — SENNA PLUS 2 TABLET(S): 8.6 TABLET ORAL at 09:46

## 2023-07-27 RX ADMIN — Medication 0.5 MILLIGRAM(S): at 17:19

## 2023-07-27 RX ADMIN — Medication 2 SPRAY(S): at 20:56

## 2023-07-27 RX ADMIN — DIVALPROEX SODIUM 250 MILLIGRAM(S): 500 TABLET, DELAYED RELEASE ORAL at 09:46

## 2023-07-27 RX ADMIN — AMLODIPINE BESYLATE 2.5 MILLIGRAM(S): 2.5 TABLET ORAL at 09:45

## 2023-07-27 RX ADMIN — PANTOPRAZOLE SODIUM 40 MILLIGRAM(S): 20 TABLET, DELAYED RELEASE ORAL at 06:31

## 2023-07-27 RX ADMIN — DIVALPROEX SODIUM 250 MILLIGRAM(S): 500 TABLET, DELAYED RELEASE ORAL at 17:20

## 2023-07-27 RX ADMIN — Medication 0.5 MILLIGRAM(S): at 09:45

## 2023-07-27 RX ADMIN — ARIPIPRAZOLE 5 MILLIGRAM(S): 15 TABLET ORAL at 09:49

## 2023-07-27 RX ADMIN — ATORVASTATIN CALCIUM 10 MILLIGRAM(S): 80 TABLET, FILM COATED ORAL at 20:56

## 2023-07-27 RX ADMIN — Medication 81 MILLIGRAM(S): at 09:46

## 2023-07-27 RX ADMIN — LACTULOSE 10 GRAM(S): 10 SOLUTION ORAL at 09:48

## 2023-07-27 NOTE — BH INPATIENT PSYCHIATRY PROGRESS NOTE - NSBHFUPINTERVALHXFT_PSY_A_CORE
Chart reviewed and case discussed with treatment team. Staff report patient continues to be disorganized, having poor boundaries, and restless. Remains paranoid. Tolerating abilify dose increase.

## 2023-07-27 NOTE — BH INPATIENT PSYCHIATRY PROGRESS NOTE - NSBHCHARTREVIEWVS_PSY_A_CORE FT
Vital Signs Last 24 Hrs  T(C): 36.7 (07-27-23 @ 07:40), Max: 36.7 (07-27-23 @ 07:40)  T(F): 98.1 (07-27-23 @ 07:40), Max: 98.1 (07-27-23 @ 07:40)  HR: --  BP: --  BP(mean): --  RR: --  SpO2: --    Orthostatic VS  07-27-23 @ 07:40  Lying BP: --/-- HR: --  Sitting BP: 147/60 HR: 78  Standing BP: 137/68 HR: 88  Site: --  Mode: --  Orthostatic VS  07-26-23 @ 07:46  Lying BP: --/-- HR: --  Sitting BP: 127/50 HR: 68  Standing BP: 124/51 HR: 71  Site: upper left arm  Mode: electronic

## 2023-07-27 NOTE — BH INPATIENT PSYCHIATRY PROGRESS NOTE - NSBHMETABOLIC_PSY_ALL_CORE_FT
BMI: BMI (kg/m2): 23 (06-07-23 @ 18:13)  HbA1c: A1C with Estimated Average Glucose Result: 5.7 % (06-01-23 @ 05:10)    Glucose: POCT Blood Glucose.: 98 mg/dL (07-27-23 @ 07:45)    BP: --  Lipid Panel: Date/Time: 06-08-23 @ 08:30  Cholesterol, Serum: 119  Direct LDL: --  HDL Cholesterol, Serum: 47  Total Cholesterol/HDL Ration Measurement: --  Triglycerides, Serum: 56

## 2023-07-27 NOTE — BH INPATIENT PSYCHIATRY PROGRESS NOTE - NSBHASSESSSUMMFT_PSY_ALL_CORE
7/24 More calm, less acutely paranoid. Will increase Abilify will dc CPAP as she never uses  7/25 Still paranoid not able to stay in room for fear for being killed. Cont to titrate Abilify have divided Klonopin to reduce daytime sedation  7/26 Remains paranoid. Tolerating medications well. Increase abilify to 5mg po daily.   7/27 Paranoia continues, needs encouragement for med compliance, continue current dose.

## 2023-07-27 NOTE — BH INPATIENT PSYCHIATRY PROGRESS NOTE - MSE UNSTRUCTURED FT
Appearance: adequate hygiene and grooming; no abnormal involuntary movements noted  Behavior: intense and oddly related, mild psychomotor retardation  Speech: wnl  Mood: anxious, irritable  Affect: mildly irritable, stable, constricted  Thought Process: illogical, tangential  Thought Content: paranoid delusions remain; denies SI/HI  Perceptual disturbances: denies AH/VH  Cognition: loosely oriented  Insight: poor  Judgment: Limited.

## 2023-07-27 NOTE — BH INPATIENT PSYCHIATRY PROGRESS NOTE - CURRENT MEDICATION
MEDICATIONS  (STANDING):  amLODIPine   Tablet 2.5 milliGRAM(s) Oral daily  ARIPiprazole 5 milliGRAM(s) Oral daily  aspirin  chewable 81 milliGRAM(s) Oral daily  atorvastatin 10 milliGRAM(s) Oral at bedtime  clonazePAM Oral Disintegrating Tablet 0.5 milliGRAM(s) Oral <User Schedule>  divalproex Sprinkle 250 milliGRAM(s) Oral daily  divalproex Sprinkle 250 milliGRAM(s) Oral <User Schedule>  glucagon  Injectable 1 milliGRAM(s) IntraMuscular once  lactulose Syrup 10 Gram(s) Oral daily  losartan 75 milliGRAM(s) Oral daily  pantoprazole   Suspension 40 milliGRAM(s) Oral before breakfast  senna 2 Tablet(s) Oral daily  sodium chloride 0.65% Nasal 2 Spray(s) Both Nostrils two times a day  venlafaxine 150 milliGRAM(s) Oral daily    MEDICATIONS  (PRN):  acetaminophen     Tablet .. 650 milliGRAM(s) Oral every 6 hours PRN Temp greater or equal to 38C (100.4F), Mild Pain (1 - 3), Moderate Pain (4 - 6)  bisacodyl 5 milliGRAM(s) Oral daily PRN constipation  bisacodyl Suppository 10 milliGRAM(s) Rectal daily PRN constipation  dextrose Oral Gel 15 Gram(s) Oral once PRN Blood Glucose LESS THAN 70 milliGRAM(s)/deciliter  haloperidol     Tablet 0.5 milliGRAM(s) Oral every 6 hours PRN agitation  haloperidol    Injectable 1 milliGRAM(s) IntraMuscular once PRN agitation  melatonin. 3 milliGRAM(s) Oral at bedtime PRN Insomnia

## 2023-07-28 PROCEDURE — 99231 SBSQ HOSP IP/OBS SF/LOW 25: CPT

## 2023-07-28 RX ADMIN — PANTOPRAZOLE SODIUM 40 MILLIGRAM(S): 20 TABLET, DELAYED RELEASE ORAL at 06:31

## 2023-07-28 RX ADMIN — DIVALPROEX SODIUM 250 MILLIGRAM(S): 500 TABLET, DELAYED RELEASE ORAL at 09:16

## 2023-07-28 RX ADMIN — ATORVASTATIN CALCIUM 10 MILLIGRAM(S): 80 TABLET, FILM COATED ORAL at 20:48

## 2023-07-28 RX ADMIN — ARIPIPRAZOLE 5 MILLIGRAM(S): 15 TABLET ORAL at 09:16

## 2023-07-28 RX ADMIN — Medication 81 MILLIGRAM(S): at 09:17

## 2023-07-28 RX ADMIN — Medication 0.5 MILLIGRAM(S): at 16:52

## 2023-07-28 RX ADMIN — Medication 0.5 MILLIGRAM(S): at 09:22

## 2023-07-28 RX ADMIN — Medication 2 SPRAY(S): at 20:47

## 2023-07-28 RX ADMIN — LOSARTAN POTASSIUM 75 MILLIGRAM(S): 100 TABLET, FILM COATED ORAL at 09:17

## 2023-07-28 RX ADMIN — AMLODIPINE BESYLATE 2.5 MILLIGRAM(S): 2.5 TABLET ORAL at 09:16

## 2023-07-28 RX ADMIN — Medication 150 MILLIGRAM(S): at 09:16

## 2023-07-28 RX ADMIN — SENNA PLUS 2 TABLET(S): 8.6 TABLET ORAL at 09:17

## 2023-07-28 RX ADMIN — DIVALPROEX SODIUM 250 MILLIGRAM(S): 500 TABLET, DELAYED RELEASE ORAL at 16:52

## 2023-07-28 RX ADMIN — Medication 2 SPRAY(S): at 09:23

## 2023-07-28 NOTE — BH INPATIENT PSYCHIATRY PROGRESS NOTE - NSBHASSESSSUMMFT_PSY_ALL_CORE
7/24 More calm, less acutely paranoid. Will increase Abilify will dc CPAP as she never uses  7/25 Still paranoid not able to stay in room for fear for being killed. Cont to titrate Abilify have divided Klonopin to reduce daytime sedation  7/26 Remains paranoid. Tolerating medications well. Increase abilify to 5mg po daily.   7/27 Paranoia continues, needs encouragement for med compliance, continue current dose.   7/28 Denies feeling unsafe on confrontation but has been noted to be paranoid by other staff. Continue abilify 5mg po daily

## 2023-07-28 NOTE — BH INPATIENT PSYCHIATRY PROGRESS NOTE - NSBHFUPINTERVALHXFT_PSY_A_CORE
Chart reviewed and case discussed with treatment team. Staff report patient remains paranoid about being poisoned. Patient is usually forthcoming with thoughts and feelings but today is guarded and denies all symptoms. Has been med compliant.

## 2023-07-28 NOTE — BH INPATIENT PSYCHIATRY PROGRESS NOTE - MSE UNSTRUCTURED FT
Appearance: adequate hygiene and grooming; no abnormal involuntary movements noted  Behavior: guarded, oddly related, mild psychomotor retardation  Speech: wnl  Mood: denies depressed mood  Affect: neutral, superficial, stable, constricted  Thought Process: illogical, tangential  Thought Content: paranoid delusions remain; denies SI/HI  Perceptual disturbances: denies AH/VH  Cognition: loosely oriented  Insight: poor  Judgment: Limited.

## 2023-07-28 NOTE — BH INPATIENT PSYCHIATRY PROGRESS NOTE - NSBHCHARTREVIEWVS_PSY_A_CORE FT
Vital Signs Last 24 Hrs  T(C): --  T(F): --  HR: --  BP: --  BP(mean): --  RR: --  SpO2: --    Orthostatic VS  07-27-23 @ 07:40  Lying BP: --/-- HR: --  Sitting BP: 147/60 HR: 78  Standing BP: 137/68 HR: 88  Site: --  Mode: --

## 2023-07-29 RX ADMIN — ARIPIPRAZOLE 5 MILLIGRAM(S): 15 TABLET ORAL at 09:36

## 2023-07-29 RX ADMIN — LACTULOSE 10 GRAM(S): 10 SOLUTION ORAL at 09:35

## 2023-07-29 RX ADMIN — Medication 81 MILLIGRAM(S): at 09:33

## 2023-07-29 RX ADMIN — DIVALPROEX SODIUM 250 MILLIGRAM(S): 500 TABLET, DELAYED RELEASE ORAL at 16:09

## 2023-07-29 RX ADMIN — Medication 150 MILLIGRAM(S): at 09:34

## 2023-07-29 RX ADMIN — ATORVASTATIN CALCIUM 10 MILLIGRAM(S): 80 TABLET, FILM COATED ORAL at 20:36

## 2023-07-29 RX ADMIN — PANTOPRAZOLE SODIUM 40 MILLIGRAM(S): 20 TABLET, DELAYED RELEASE ORAL at 06:18

## 2023-07-29 RX ADMIN — LOSARTAN POTASSIUM 75 MILLIGRAM(S): 100 TABLET, FILM COATED ORAL at 09:34

## 2023-07-29 RX ADMIN — AMLODIPINE BESYLATE 2.5 MILLIGRAM(S): 2.5 TABLET ORAL at 09:34

## 2023-07-29 RX ADMIN — Medication 0.5 MILLIGRAM(S): at 09:33

## 2023-07-29 RX ADMIN — Medication 0.5 MILLIGRAM(S): at 16:10

## 2023-07-29 RX ADMIN — Medication 2 SPRAY(S): at 20:56

## 2023-07-29 RX ADMIN — DIVALPROEX SODIUM 250 MILLIGRAM(S): 500 TABLET, DELAYED RELEASE ORAL at 09:36

## 2023-07-29 NOTE — BH INPATIENT PSYCHIATRY PROGRESS NOTE - CURRENT MEDICATION
MEDICATIONS  (STANDING):  amLODIPine   Tablet 2.5 milliGRAM(s) Oral daily  ARIPiprazole 5 milliGRAM(s) Oral daily  aspirin  chewable 81 milliGRAM(s) Oral daily  atorvastatin 10 milliGRAM(s) Oral at bedtime  clonazePAM Oral Disintegrating Tablet 0.5 milliGRAM(s) Oral <User Schedule>  divalproex Sprinkle 250 milliGRAM(s) Oral <User Schedule>  divalproex Sprinkle 250 milliGRAM(s) Oral daily  glucagon  Injectable 1 milliGRAM(s) IntraMuscular once  lactulose Syrup 10 Gram(s) Oral daily  losartan 75 milliGRAM(s) Oral daily  pantoprazole   Suspension 40 milliGRAM(s) Oral before breakfast  senna 2 Tablet(s) Oral daily  sodium chloride 0.65% Nasal 2 Spray(s) Both Nostrils two times a day  venlafaxine 150 milliGRAM(s) Oral daily    MEDICATIONS  (PRN):  acetaminophen     Tablet .. 650 milliGRAM(s) Oral every 6 hours PRN Temp greater or equal to 38C (100.4F), Mild Pain (1 - 3), Moderate Pain (4 - 6)  bisacodyl 5 milliGRAM(s) Oral daily PRN constipation  bisacodyl Suppository 10 milliGRAM(s) Rectal daily PRN constipation  dextrose Oral Gel 15 Gram(s) Oral once PRN Blood Glucose LESS THAN 70 milliGRAM(s)/deciliter  haloperidol     Tablet 0.5 milliGRAM(s) Oral every 6 hours PRN agitation  haloperidol    Injectable 1 milliGRAM(s) IntraMuscular once PRN agitation  melatonin. 3 milliGRAM(s) Oral at bedtime PRN Insomnia

## 2023-07-29 NOTE — BH INPATIENT PSYCHIATRY PROGRESS NOTE - NSBHASSESSSUMMFT_PSY_ALL_CORE
7/24 More calm, less acutely paranoid. Will increase Abilify will dc CPAP as she never uses  7/25 Still paranoid not able to stay in room for fear for being killed. Cont to titrate Abilify have divided Klonopin to reduce daytime sedation  7/26 Remains paranoid. Tolerating medications well. Increase abilify to 5mg po daily.   7/27 Paranoia continues, needs encouragement for med compliance, continue current dose.   7/28 Denies feeling unsafe on confrontation but has been noted to be paranoid by other staff. Continue abilify 5mg po daily  7/29: 7/29: No behavioral events reported overnight. During rounds today pt was calm and pleasant. Denied any complaints. Continue current regimen.

## 2023-07-29 NOTE — BH INPATIENT PSYCHIATRY PROGRESS NOTE - NSBHCHARTREVIEWVS_PSY_A_CORE FT
Vital Signs Last 24 Hrs  T(C): 36.4 (07-29-23 @ 06:38), Max: 36.4 (07-29-23 @ 06:38)  T(F): 97.5 (07-29-23 @ 06:38), Max: 97.5 (07-29-23 @ 06:38)  HR: --  BP: --  BP(mean): --  RR: --  SpO2: --    Orthostatic VS  07-29-23 @ 06:38  Lying BP: --/-- HR: --  Sitting BP: 134/53 HR: 77  Standing BP: 113/62 HR: 88  Site: --  Mode: --

## 2023-07-29 NOTE — BH INPATIENT PSYCHIATRY PROGRESS NOTE - NSBHFUPINTERVALHXFT_PSY_A_CORE
Chart reviewed and case discussed with treatment team. Staff report patient remains paranoid about being poisoned. Patient is usually forthcoming with thoughts and feelings but today is guarded and denies all symptoms. Has been med compliant.     7/29: No behavioral events reported overnight. During rounds today pt was calm and pleasant. Denied any complaints.

## 2023-07-30 PROCEDURE — 99231 SBSQ HOSP IP/OBS SF/LOW 25: CPT

## 2023-07-30 RX ADMIN — DIVALPROEX SODIUM 250 MILLIGRAM(S): 500 TABLET, DELAYED RELEASE ORAL at 08:23

## 2023-07-30 RX ADMIN — AMLODIPINE BESYLATE 2.5 MILLIGRAM(S): 2.5 TABLET ORAL at 08:24

## 2023-07-30 RX ADMIN — Medication 150 MILLIGRAM(S): at 08:23

## 2023-07-30 RX ADMIN — Medication 2 SPRAY(S): at 08:24

## 2023-07-30 RX ADMIN — PANTOPRAZOLE SODIUM 40 MILLIGRAM(S): 20 TABLET, DELAYED RELEASE ORAL at 06:31

## 2023-07-30 RX ADMIN — LOSARTAN POTASSIUM 75 MILLIGRAM(S): 100 TABLET, FILM COATED ORAL at 08:23

## 2023-07-30 RX ADMIN — ARIPIPRAZOLE 5 MILLIGRAM(S): 15 TABLET ORAL at 08:24

## 2023-07-30 RX ADMIN — LACTULOSE 10 GRAM(S): 10 SOLUTION ORAL at 08:22

## 2023-07-30 RX ADMIN — Medication 650 MILLIGRAM(S): at 17:56

## 2023-07-30 RX ADMIN — Medication 81 MILLIGRAM(S): at 08:24

## 2023-07-30 RX ADMIN — Medication 0.5 MILLIGRAM(S): at 08:23

## 2023-07-30 RX ADMIN — Medication 650 MILLIGRAM(S): at 17:28

## 2023-07-30 RX ADMIN — Medication 3 MILLIGRAM(S): at 02:24

## 2023-07-30 RX ADMIN — ATORVASTATIN CALCIUM 10 MILLIGRAM(S): 80 TABLET, FILM COATED ORAL at 21:51

## 2023-07-30 NOTE — BH INPATIENT PSYCHIATRY PROGRESS NOTE - NSBHFUPINTERVALHXFT_PSY_A_CORE
Pt seen for follow up bipolar disorder with psychosis, chart reviewed and discussed with nursing staff. Pt is compliant with medications, received PRN melatonin last night. No acute events overnight.  On exam, Pt reports feeling ok. She reports she can't eat well as it can be painful when she tries to eat, c/o back and neck pain. She reports she ate alright last night. Reports slept in chair last night. Endorses paranoid thoughts about staff telling 'she has plague'. Denies any anjelica, SI, HI, hallucinations. Oriented to person, month, saying date as end of July and year as 3003. Vitals are stable.

## 2023-07-30 NOTE — BH INPATIENT PSYCHIATRY PROGRESS NOTE - NSBHCHARTREVIEWVS_PSY_A_CORE FT
Vital Signs Last 24 Hrs  T(C): 36.5 (07-29-23 @ 13:12), Max: 36.5 (07-29-23 @ 13:12)  T(F): 97.7 (07-29-23 @ 13:12), Max: 97.7 (07-29-23 @ 13:12)  HR: --  BP: --  BP(mean): --  RR: --  SpO2: --    Orthostatic VS  07-30-23 @ 08:21  Lying BP: 145/53 HR: 77  Sitting BP: 131/64 HR: 78  Standing BP: --/-- HR: --  Site: upper right arm  Mode: electronic  Orthostatic VS  07-29-23 @ 06:38  Lying BP: --/-- HR: --  Sitting BP: 134/53 HR: 77  Standing BP: 113/62 HR: 88  Site: --  Mode: --

## 2023-07-30 NOTE — BH INPATIENT PSYCHIATRY PROGRESS NOTE - NSBHASSESSSUMMFT_PSY_ALL_CORE
7/24 More calm, less acutely paranoid. Will increase Abilify will dc CPAP as she never uses  7/25 Still paranoid not able to stay in room for fear for being killed. Cont to titrate Abilify have divided Klonopin to reduce daytime sedation  7/26 Remains paranoid. Tolerating medications well. Increase abilify to 5mg po daily.   7/27 Paranoia continues, needs encouragement for med compliance, continue current dose.   7/28 Denies feeling unsafe on confrontation but has been noted to be paranoid by other staff. Continue abilify 5mg po daily  7/29: 7/29: No behavioral events reported overnight. During rounds today pt was calm and pleasant. Denied any complaints. Continue current regimen.   7/30: no acute events overnight, some paranoia but less, back/neck pain most likely postural per nursing as she sleeps on chair, to f/u, no SI/HI.

## 2023-07-31 PROCEDURE — 99231 SBSQ HOSP IP/OBS SF/LOW 25: CPT

## 2023-07-31 RX ORDER — ARIPIPRAZOLE 15 MG/1
7.5 TABLET ORAL DAILY
Refills: 0 | Status: DISCONTINUED | OUTPATIENT
Start: 2023-07-31 | End: 2023-08-03

## 2023-07-31 RX ADMIN — Medication 0.5 MILLIGRAM(S): at 17:11

## 2023-07-31 RX ADMIN — AMLODIPINE BESYLATE 2.5 MILLIGRAM(S): 2.5 TABLET ORAL at 09:12

## 2023-07-31 RX ADMIN — ATORVASTATIN CALCIUM 10 MILLIGRAM(S): 80 TABLET, FILM COATED ORAL at 21:30

## 2023-07-31 RX ADMIN — Medication 3 MILLIGRAM(S): at 22:50

## 2023-07-31 RX ADMIN — DIVALPROEX SODIUM 250 MILLIGRAM(S): 500 TABLET, DELAYED RELEASE ORAL at 17:11

## 2023-07-31 RX ADMIN — Medication 150 MILLIGRAM(S): at 09:12

## 2023-07-31 RX ADMIN — Medication 81 MILLIGRAM(S): at 09:09

## 2023-07-31 RX ADMIN — HALOPERIDOL DECANOATE 0.5 MILLIGRAM(S): 100 INJECTION INTRAMUSCULAR at 22:50

## 2023-07-31 RX ADMIN — LACTULOSE 10 GRAM(S): 10 SOLUTION ORAL at 09:11

## 2023-07-31 RX ADMIN — Medication 2 SPRAY(S): at 21:33

## 2023-07-31 RX ADMIN — ARIPIPRAZOLE 5 MILLIGRAM(S): 15 TABLET ORAL at 09:09

## 2023-07-31 RX ADMIN — DIVALPROEX SODIUM 250 MILLIGRAM(S): 500 TABLET, DELAYED RELEASE ORAL at 09:09

## 2023-07-31 RX ADMIN — PANTOPRAZOLE SODIUM 40 MILLIGRAM(S): 20 TABLET, DELAYED RELEASE ORAL at 07:18

## 2023-07-31 RX ADMIN — SENNA PLUS 2 TABLET(S): 8.6 TABLET ORAL at 09:12

## 2023-07-31 RX ADMIN — LOSARTAN POTASSIUM 75 MILLIGRAM(S): 100 TABLET, FILM COATED ORAL at 09:10

## 2023-07-31 RX ADMIN — Medication 0.5 MILLIGRAM(S): at 09:13

## 2023-07-31 NOTE — BH INPATIENT PSYCHIATRY PROGRESS NOTE - MSE UNSTRUCTURED FT
Appearance: adequate hygiene and grooming; no abnormal involuntary movements noted  Behavior: superficially friendly, oddly related, mild psychomotor retardation  Speech: wnl  Mood: "fine"  Affect: neutral, superficial, stable, constricted  Thought Process: illogical, tangential  Thought Content: paranoid delusions remain; denies SI/HI  Perceptual disturbances: denies AH/VH  Cognition: loosely oriented  Insight: poor  Judgment: Limited.

## 2023-07-31 NOTE — BH INPATIENT PSYCHIATRY PROGRESS NOTE - NSBHASSESSSUMMFT_PSY_ALL_CORE
7/24 More calm, less acutely paranoid. Will increase Abilify will dc CPAP as she never uses  7/25 Still paranoid not able to stay in room for fear for being killed. Cont to titrate Abilify have divided Klonopin to reduce daytime sedation  7/26 Remains paranoid. Tolerating medications well. Increase abilify to 5mg po daily.   7/27 Paranoia continues, needs encouragement for med compliance, continue current dose.   7/28 Denies feeling unsafe on confrontation but has been noted to be paranoid by other staff. Continue abilify 5mg po daily  7/31 Paranoia remains though not as morbid in content; increase abilify to 7.5mg po daily

## 2023-07-31 NOTE — BH INPATIENT PSYCHIATRY PROGRESS NOTE - NSBHCHARTREVIEWVS_PSY_A_CORE FT
Vital Signs Last 24 Hrs  T(C): 36.7 (07-31-23 @ 08:11), Max: 36.7 (07-31-23 @ 08:11)  T(F): 98 (07-31-23 @ 08:11), Max: 98 (07-31-23 @ 08:11)  HR: --  BP: --  BP(mean): --  RR: --  SpO2: --    Orthostatic VS  07-31-23 @ 08:11  Lying BP: 140/50 HR: 73  Sitting BP: 142/54 HR: 77  Standing BP: --/-- HR: --  Site: upper left arm  Mode: electronic  Orthostatic VS  07-30-23 @ 08:21  Lying BP: 145/53 HR: 77  Sitting BP: 131/64 HR: 78  Standing BP: --/-- HR: --  Site: upper right arm  Mode: electronic

## 2023-07-31 NOTE — BH INPATIENT PSYCHIATRY PROGRESS NOTE - NSBHFUPINTERVALHXFT_PSY_A_CORE
Patient continues to make paranoid statements. She does report feeling well. States she needed prune juice for constipation but that "I think it's dangerous," while referring to a packaging note about pasteurization.

## 2023-07-31 NOTE — BH INPATIENT PSYCHIATRY PROGRESS NOTE - CURRENT MEDICATION
MEDICATIONS  (STANDING):  amLODIPine   Tablet 2.5 milliGRAM(s) Oral daily  ARIPiprazole 7.5 milliGRAM(s) Oral daily  aspirin  chewable 81 milliGRAM(s) Oral daily  atorvastatin 10 milliGRAM(s) Oral at bedtime  clonazePAM Oral Disintegrating Tablet 0.5 milliGRAM(s) Oral <User Schedule>  divalproex Sprinkle 250 milliGRAM(s) Oral daily  divalproex Sprinkle 250 milliGRAM(s) Oral <User Schedule>  glucagon  Injectable 1 milliGRAM(s) IntraMuscular once  lactulose Syrup 10 Gram(s) Oral daily  losartan 75 milliGRAM(s) Oral daily  pantoprazole   Suspension 40 milliGRAM(s) Oral before breakfast  senna 2 Tablet(s) Oral daily  sodium chloride 0.65% Nasal 2 Spray(s) Both Nostrils two times a day  venlafaxine 150 milliGRAM(s) Oral daily    MEDICATIONS  (PRN):  acetaminophen     Tablet .. 650 milliGRAM(s) Oral every 6 hours PRN Temp greater or equal to 38C (100.4F), Mild Pain (1 - 3), Moderate Pain (4 - 6)  bisacodyl 5 milliGRAM(s) Oral daily PRN constipation  bisacodyl Suppository 10 milliGRAM(s) Rectal daily PRN constipation  dextrose Oral Gel 15 Gram(s) Oral once PRN Blood Glucose LESS THAN 70 milliGRAM(s)/deciliter  haloperidol     Tablet 0.5 milliGRAM(s) Oral every 6 hours PRN agitation  haloperidol    Injectable 1 milliGRAM(s) IntraMuscular once PRN agitation  melatonin. 3 milliGRAM(s) Oral at bedtime PRN Insomnia

## 2023-08-01 PROCEDURE — 99232 SBSQ HOSP IP/OBS MODERATE 35: CPT

## 2023-08-01 RX ORDER — VENLAFAXINE HCL 75 MG
187.5 CAPSULE, EXT RELEASE 24 HR ORAL DAILY
Refills: 0 | Status: DISCONTINUED | OUTPATIENT
Start: 2023-08-01 | End: 2023-08-07

## 2023-08-01 RX ORDER — CLONAZEPAM 1 MG
0.5 TABLET ORAL
Refills: 0 | Status: DISCONTINUED | OUTPATIENT
Start: 2023-08-01 | End: 2023-08-08

## 2023-08-01 RX ORDER — HALOPERIDOL DECANOATE 100 MG/ML
1 INJECTION INTRAMUSCULAR EVERY 6 HOURS
Refills: 0 | Status: DISCONTINUED | OUTPATIENT
Start: 2023-08-01 | End: 2023-08-01

## 2023-08-01 RX ORDER — HALOPERIDOL DECANOATE 100 MG/ML
0.5 INJECTION INTRAMUSCULAR EVERY 6 HOURS
Refills: 0 | Status: DISCONTINUED | OUTPATIENT
Start: 2023-08-01 | End: 2023-08-10

## 2023-08-01 RX ADMIN — PANTOPRAZOLE SODIUM 40 MILLIGRAM(S): 20 TABLET, DELAYED RELEASE ORAL at 06:53

## 2023-08-01 RX ADMIN — DIVALPROEX SODIUM 250 MILLIGRAM(S): 500 TABLET, DELAYED RELEASE ORAL at 10:00

## 2023-08-01 RX ADMIN — SENNA PLUS 2 TABLET(S): 8.6 TABLET ORAL at 12:19

## 2023-08-01 RX ADMIN — LOSARTAN POTASSIUM 75 MILLIGRAM(S): 100 TABLET, FILM COATED ORAL at 10:01

## 2023-08-01 RX ADMIN — ARIPIPRAZOLE 7.5 MILLIGRAM(S): 15 TABLET ORAL at 09:59

## 2023-08-01 RX ADMIN — Medication 150 MILLIGRAM(S): at 09:59

## 2023-08-01 RX ADMIN — Medication 0.5 MILLIGRAM(S): at 10:02

## 2023-08-01 RX ADMIN — LACTULOSE 10 GRAM(S): 10 SOLUTION ORAL at 09:59

## 2023-08-01 RX ADMIN — Medication 0.5 MILLIGRAM(S): at 17:21

## 2023-08-01 RX ADMIN — Medication 81 MILLIGRAM(S): at 10:00

## 2023-08-01 RX ADMIN — Medication 3 MILLIGRAM(S): at 21:25

## 2023-08-01 RX ADMIN — ATORVASTATIN CALCIUM 10 MILLIGRAM(S): 80 TABLET, FILM COATED ORAL at 21:25

## 2023-08-01 RX ADMIN — Medication 2 SPRAY(S): at 21:25

## 2023-08-01 RX ADMIN — Medication 2 SPRAY(S): at 10:02

## 2023-08-01 RX ADMIN — AMLODIPINE BESYLATE 2.5 MILLIGRAM(S): 2.5 TABLET ORAL at 10:00

## 2023-08-01 NOTE — BH INPATIENT PSYCHIATRY PROGRESS NOTE - MSE UNSTRUCTURED FT
Appearance: adequate hygiene and grooming; no abnormal involuntary movements noted  Behavior: superficially friendly, oddly related, mild psychomotor retardation  Speech: wnl  Mood: "fine"  Affect: neutral, superficial, stable, constricted  Thought Process: illogical, tangential  Thought Content: paranoid delusions remain feels life is in danger of she were to sleep in her room; denies SI/HI  Perceptual disturbances: denies AH/VH  Cognition: loosely oriented  Insight: poor  Judgment: Limited.

## 2023-08-01 NOTE — BH INPATIENT PSYCHIATRY PROGRESS NOTE - NSBHCHARTREVIEWVS_PSY_A_CORE FT
Vital Signs Last 24 Hrs  T(C): 36.3 (08-01-23 @ 06:31), Max: 36.3 (08-01-23 @ 06:31)  T(F): 97.3 (08-01-23 @ 06:31), Max: 97.3 (08-01-23 @ 06:31)  HR: --  BP: --  BP(mean): --  RR: 18 (08-01-23 @ 06:31) (18 - 18)  SpO2: --    Orthostatic VS  08-01-23 @ 06:31  Lying BP: --/-- HR: --  Sitting BP: 135/57 HR: 83  Standing BP: 133/64 HR: 97  Site: --  Mode: --  Orthostatic VS  07-31-23 @ 08:11  Lying BP: 140/50 HR: 73  Sitting BP: 142/54 HR: 77  Standing BP: --/-- HR: --  Site: upper left arm  Mode: electronic

## 2023-08-01 NOTE — BH INPATIENT PSYCHIATRY PROGRESS NOTE - NSBHFUPINTERVALHXFT_PSY_A_CORE
Patient seen for psychosis and depression. She does report feeling well. Eating, sleeping fair. Needed prn last night for agitation, paranoia> VSS

## 2023-08-01 NOTE — BH INPATIENT PSYCHIATRY PROGRESS NOTE - NSBHASSESSSUMMFT_PSY_ALL_CORE
7/24 More calm, less acutely paranoid. Will increase Abilify will dc CPAP as she never uses  7/25 Still paranoid not able to stay in room for fear for being killed. Cont to titrate Abilify have divided Klonopin to reduce daytime sedation  7/26 Remains paranoid. Tolerating medications well. Increase abilify to 5mg po daily.   7/27 Paranoia continues, needs encouragement for med compliance, continue current dose.   7/28 Denies feeling unsafe on confrontation but has been noted to be paranoid by other staff. Continue abilify 5mg po daily  7/31 Paranoia remains though not as morbid in content; increase abilify to 7.5mg po daily  8/1 Calmer but still psychotic, is better about taking meds. Observe on increase Abilify, began discusion with daughter about ECT if she is not responding. Patient 2PC expiring. Patient remains grossly psychotic, depressed, not able to function, needing CO. Will Apply for further retention

## 2023-08-01 NOTE — BH INPATIENT PSYCHIATRY PROGRESS NOTE - NSBHATTESTBILLING_PSY_A_CORE
Wondering if patient would be safe to use Viagra with his heart condition. Please return call to Dr. Lund at [phone] ask for Megan. Thank you.    [id number] OBS or IP - moderate complexity OR 35-49 mins

## 2023-08-01 NOTE — BH INPATIENT PSYCHIATRY PROGRESS NOTE - CURRENT MEDICATION
MEDICATIONS  (STANDING):  amLODIPine   Tablet 2.5 milliGRAM(s) Oral daily  ARIPiprazole 7.5 milliGRAM(s) Oral daily  aspirin  chewable 81 milliGRAM(s) Oral daily  atorvastatin 10 milliGRAM(s) Oral at bedtime  clonazePAM Oral Disintegrating Tablet 0.5 milliGRAM(s) Oral <User Schedule>  divalproex Sprinkle 250 milliGRAM(s) Oral daily  glucagon  Injectable 1 milliGRAM(s) IntraMuscular once  lactulose Syrup 10 Gram(s) Oral daily  losartan 75 milliGRAM(s) Oral daily  pantoprazole   Suspension 40 milliGRAM(s) Oral before breakfast  senna 2 Tablet(s) Oral daily  sodium chloride 0.65% Nasal 2 Spray(s) Both Nostrils two times a day  venlafaxine 187.5 milliGRAM(s) Oral daily    MEDICATIONS  (PRN):  acetaminophen     Tablet .. 650 milliGRAM(s) Oral every 6 hours PRN Temp greater or equal to 38C (100.4F), Mild Pain (1 - 3), Moderate Pain (4 - 6)  bisacodyl 5 milliGRAM(s) Oral daily PRN constipation  bisacodyl Suppository 10 milliGRAM(s) Rectal daily PRN constipation  dextrose Oral Gel 15 Gram(s) Oral once PRN Blood Glucose LESS THAN 70 milliGRAM(s)/deciliter  haloperidol     Tablet 0.5 milliGRAM(s) Oral every 6 hours PRN agitation  haloperidol    Injectable 1 milliGRAM(s) IntraMuscular once PRN agitation  melatonin. 3 milliGRAM(s) Oral at bedtime PRN Insomnia

## 2023-08-02 PROCEDURE — 99232 SBSQ HOSP IP/OBS MODERATE 35: CPT

## 2023-08-02 RX ADMIN — DIVALPROEX SODIUM 250 MILLIGRAM(S): 500 TABLET, DELAYED RELEASE ORAL at 10:00

## 2023-08-02 RX ADMIN — Medication 187.5 MILLIGRAM(S): at 10:03

## 2023-08-02 RX ADMIN — PANTOPRAZOLE SODIUM 40 MILLIGRAM(S): 20 TABLET, DELAYED RELEASE ORAL at 06:37

## 2023-08-02 RX ADMIN — AMLODIPINE BESYLATE 2.5 MILLIGRAM(S): 2.5 TABLET ORAL at 10:00

## 2023-08-02 RX ADMIN — SENNA PLUS 2 TABLET(S): 8.6 TABLET ORAL at 10:03

## 2023-08-02 RX ADMIN — Medication 5 MILLIGRAM(S): at 13:52

## 2023-08-02 RX ADMIN — Medication 0.5 MILLIGRAM(S): at 10:00

## 2023-08-02 RX ADMIN — ARIPIPRAZOLE 7.5 MILLIGRAM(S): 15 TABLET ORAL at 10:01

## 2023-08-02 RX ADMIN — LOSARTAN POTASSIUM 75 MILLIGRAM(S): 100 TABLET, FILM COATED ORAL at 10:00

## 2023-08-02 RX ADMIN — Medication 0.5 MILLIGRAM(S): at 16:59

## 2023-08-02 RX ADMIN — Medication 81 MILLIGRAM(S): at 10:01

## 2023-08-02 RX ADMIN — LACTULOSE 10 GRAM(S): 10 SOLUTION ORAL at 10:03

## 2023-08-02 RX ADMIN — ATORVASTATIN CALCIUM 10 MILLIGRAM(S): 80 TABLET, FILM COATED ORAL at 21:57

## 2023-08-02 NOTE — BH INPATIENT PSYCHIATRY PROGRESS NOTE - NSBHFUPINTERVALHXFT_PSY_A_CORE
Patient seen for psychosis and depression. Refused all meds then took them nurse felt was due to suspiciousness. Patient slept in bed in room for few hours for first time in a while

## 2023-08-02 NOTE — BH INPATIENT PSYCHIATRY PROGRESS NOTE - NSBHCHARTREVIEWVS_PSY_A_CORE FT
Vital Signs Last 24 Hrs  T(C): --  T(F): --  HR: --  BP: --  BP(mean): --  RR: --  SpO2: --    Orthostatic VS  08-01-23 @ 06:31  Lying BP: --/-- HR: --  Sitting BP: 135/57 HR: 83  Standing BP: 133/64 HR: 97  Site: --  Mode: --

## 2023-08-02 NOTE — BH INPATIENT PSYCHIATRY PROGRESS NOTE - NSBHASSESSSUMMFT_PSY_ALL_CORE
7/24 More calm, less acutely paranoid. Will increase Abilify will dc CPAP as she never uses  7/25 Still paranoid not able to stay in room for fear for being killed. Cont to titrate Abilify have divided Klonopin to reduce daytime sedation  7/26 Remains paranoid. Tolerating medications well. Increase abilify to 5mg po daily.   7/27 Paranoia continues, needs encouragement for med compliance, continue current dose.   7/28 Denies feeling unsafe on confrontation but has been noted to be paranoid by other staff. Continue abilify 5mg po daily  7/31 Paranoia remains though not as morbid in content; increase abilify to 7.5mg po daily  8/1 Calmer but still psychotic, is better about taking meds. Observe on increase Abilify, began discusion with daughter about ECT if she is not responding. Patient 2PC expiring. Patient remains grossly psychotic, depressed, not able to function, needing CO. Will Apply for further retention  8/2 Not much change unclear if sleeping in room is indicator of reduction in paranoia. COnt meds plan titration of Abilify

## 2023-08-03 PROCEDURE — 99232 SBSQ HOSP IP/OBS MODERATE 35: CPT

## 2023-08-03 RX ORDER — ARIPIPRAZOLE 15 MG/1
10 TABLET ORAL DAILY
Refills: 0 | Status: DISCONTINUED | OUTPATIENT
Start: 2023-08-03 | End: 2023-08-08

## 2023-08-03 RX ADMIN — LOSARTAN POTASSIUM 75 MILLIGRAM(S): 100 TABLET, FILM COATED ORAL at 10:27

## 2023-08-03 RX ADMIN — AMLODIPINE BESYLATE 2.5 MILLIGRAM(S): 2.5 TABLET ORAL at 10:25

## 2023-08-03 RX ADMIN — DIVALPROEX SODIUM 250 MILLIGRAM(S): 500 TABLET, DELAYED RELEASE ORAL at 10:26

## 2023-08-03 RX ADMIN — HALOPERIDOL DECANOATE 0.5 MILLIGRAM(S): 100 INJECTION INTRAMUSCULAR at 20:20

## 2023-08-03 RX ADMIN — Medication 187.5 MILLIGRAM(S): at 10:26

## 2023-08-03 RX ADMIN — ARIPIPRAZOLE 7.5 MILLIGRAM(S): 15 TABLET ORAL at 10:26

## 2023-08-03 RX ADMIN — Medication 81 MILLIGRAM(S): at 10:26

## 2023-08-03 RX ADMIN — Medication 0.5 MILLIGRAM(S): at 17:00

## 2023-08-03 RX ADMIN — Medication 3 MILLIGRAM(S): at 00:07

## 2023-08-03 RX ADMIN — SENNA PLUS 2 TABLET(S): 8.6 TABLET ORAL at 10:27

## 2023-08-03 RX ADMIN — ATORVASTATIN CALCIUM 10 MILLIGRAM(S): 80 TABLET, FILM COATED ORAL at 20:20

## 2023-08-03 RX ADMIN — LACTULOSE 10 GRAM(S): 10 SOLUTION ORAL at 10:26

## 2023-08-03 RX ADMIN — Medication 2 SPRAY(S): at 10:27

## 2023-08-03 RX ADMIN — Medication 0.5 MILLIGRAM(S): at 10:26

## 2023-08-03 NOTE — BH INPATIENT PSYCHIATRY PROGRESS NOTE - NSBHASSESSSUMMFT_PSY_ALL_CORE
7/24 More calm, less acutely paranoid. Will increase Abilify will dc CPAP as she never uses  7/25 Still paranoid not able to stay in room for fear for being killed. Cont to titrate Abilify have divided Klonopin to reduce daytime sedation  7/26 Remains paranoid. Tolerating medications well. Increase abilify to 5mg po daily.   7/27 Paranoia continues, needs encouragement for med compliance, continue current dose.   7/28 Denies feeling unsafe on confrontation but has been noted to be paranoid by other staff. Continue abilify 5mg po daily  7/31 Paranoia remains though not as morbid in content; increase abilify to 7.5mg po daily  8/1 Calmer but still psychotic, is better about taking meds. Observe on increase Abilify, began discusion with daughter about ECT if she is not responding. Patient 2PC expiring. Patient remains grossly psychotic, depressed, not able to function, needing CO. Will Apply for further retention  8/2 Not much change unclear if sleeping in room is indicator of reduction in paranoia. COnt meds plan titration of Abilify  8/3 Some behavioral indicator of response-able to sleep in room. Still paranoid, dysphoric. Tolerating meds Will increase Abilify to 10mg/d

## 2023-08-03 NOTE — BH INPATIENT PSYCHIATRY PROGRESS NOTE - NSBHFUPINTERVALHXFT_PSY_A_CORE
Patient seen for psychosis and depression. Took meds today Patient slept in bed last night. Was irritable towards staff but short lived. VSS

## 2023-08-03 NOTE — BH INPATIENT PSYCHIATRY PROGRESS NOTE - NSBHCHARTREVIEWVS_PSY_A_CORE FT
Vital Signs Last 24 Hrs  T(C): 37 (08-03-23 @ 07:49), Max: 37 (08-03-23 @ 07:49)  T(F): 98.6 (08-03-23 @ 07:49), Max: 98.6 (08-03-23 @ 07:49)  HR: --  BP: --  BP(mean): --  RR: --  SpO2: --    Orthostatic VS  08-03-23 @ 07:49  Lying BP: --/-- HR: --  Sitting BP: 131/58 HR: 81  Standing BP: 140/81 HR: 111  Site: upper left arm  Mode: electronic

## 2023-08-03 NOTE — BH INPATIENT PSYCHIATRY PROGRESS NOTE - MSE UNSTRUCTURED FT
Appearance: adequate hygiene and grooming; no abnormal involuntary movements noted  Behavior: superficially friendly, oddly related, mild psychomotor retardation  Speech: wnl  Mood: "fine"  Affect: neutral, superficial, stable, constricted  Thought Process: illogical, tangential  Thought Content: paranoid delusions remain feels life is in danger of she were to sleep in her room but less intense denies SI/HI  Perceptual disturbances: denies AH/VH  Cognition: loosely oriented  Insight: poor  Judgment: Limited.

## 2023-08-03 NOTE — BH INPATIENT PSYCHIATRY PROGRESS NOTE - CURRENT MEDICATION
MEDICATIONS  (STANDING):  amLODIPine   Tablet 2.5 milliGRAM(s) Oral daily  ARIPiprazole 10 milliGRAM(s) Oral daily  aspirin  chewable 81 milliGRAM(s) Oral daily  atorvastatin 10 milliGRAM(s) Oral at bedtime  clonazePAM Oral Disintegrating Tablet 0.5 milliGRAM(s) Oral <User Schedule>  divalproex Sprinkle 250 milliGRAM(s) Oral daily  glucagon  Injectable 1 milliGRAM(s) IntraMuscular once  lactulose Syrup 10 Gram(s) Oral daily  losartan 75 milliGRAM(s) Oral daily  pantoprazole   Suspension 40 milliGRAM(s) Oral before breakfast  senna 2 Tablet(s) Oral daily  sodium chloride 0.65% Nasal 2 Spray(s) Both Nostrils two times a day  venlafaxine 187.5 milliGRAM(s) Oral daily    MEDICATIONS  (PRN):  acetaminophen     Tablet .. 650 milliGRAM(s) Oral every 6 hours PRN Temp greater or equal to 38C (100.4F), Mild Pain (1 - 3), Moderate Pain (4 - 6)  bisacodyl 5 milliGRAM(s) Oral daily PRN constipation  bisacodyl Suppository 10 milliGRAM(s) Rectal daily PRN constipation  dextrose Oral Gel 15 Gram(s) Oral once PRN Blood Glucose LESS THAN 70 milliGRAM(s)/deciliter  haloperidol     Tablet 0.5 milliGRAM(s) Oral every 6 hours PRN agitation  haloperidol    Injectable 1 milliGRAM(s) IntraMuscular once PRN agitation  melatonin. 3 milliGRAM(s) Oral at bedtime PRN Insomnia

## 2023-08-04 PROCEDURE — 99232 SBSQ HOSP IP/OBS MODERATE 35: CPT

## 2023-08-04 RX ORDER — ACETAMINOPHEN 500 MG
650 TABLET ORAL ONCE
Refills: 0 | Status: COMPLETED | OUTPATIENT
Start: 2023-08-04 | End: 2023-08-04

## 2023-08-04 RX ADMIN — Medication 0.5 MILLIGRAM(S): at 09:30

## 2023-08-04 RX ADMIN — AMLODIPINE BESYLATE 2.5 MILLIGRAM(S): 2.5 TABLET ORAL at 09:29

## 2023-08-04 RX ADMIN — LACTULOSE 10 GRAM(S): 10 SOLUTION ORAL at 09:31

## 2023-08-04 RX ADMIN — Medication 81 MILLIGRAM(S): at 09:29

## 2023-08-04 RX ADMIN — Medication 650 MILLIGRAM(S): at 17:38

## 2023-08-04 RX ADMIN — Medication 0.5 MILLIGRAM(S): at 17:39

## 2023-08-04 RX ADMIN — DIVALPROEX SODIUM 250 MILLIGRAM(S): 500 TABLET, DELAYED RELEASE ORAL at 09:29

## 2023-08-04 RX ADMIN — Medication 2 SPRAY(S): at 09:30

## 2023-08-04 RX ADMIN — Medication 650 MILLIGRAM(S): at 21:25

## 2023-08-04 RX ADMIN — Medication 187.5 MILLIGRAM(S): at 09:28

## 2023-08-04 RX ADMIN — Medication 650 MILLIGRAM(S): at 22:25

## 2023-08-04 RX ADMIN — Medication 3 MILLIGRAM(S): at 00:24

## 2023-08-04 RX ADMIN — ARIPIPRAZOLE 10 MILLIGRAM(S): 15 TABLET ORAL at 09:29

## 2023-08-04 RX ADMIN — PANTOPRAZOLE SODIUM 40 MILLIGRAM(S): 20 TABLET, DELAYED RELEASE ORAL at 06:01

## 2023-08-04 RX ADMIN — LOSARTAN POTASSIUM 75 MILLIGRAM(S): 100 TABLET, FILM COATED ORAL at 09:29

## 2023-08-04 RX ADMIN — SENNA PLUS 2 TABLET(S): 8.6 TABLET ORAL at 09:29

## 2023-08-04 RX ADMIN — ATORVASTATIN CALCIUM 10 MILLIGRAM(S): 80 TABLET, FILM COATED ORAL at 20:25

## 2023-08-04 NOTE — BH INPATIENT PSYCHIATRY PROGRESS NOTE - MSE UNSTRUCTURED FT
Appearance: adequate hygiene and grooming;[employer] uses amplification device well  Behavior: no rigidity cogwheeling  Speech: wnl  Mood: okay  Affect: neutral, superficial, stable, constricted  Thought Process: illogical, tangential  Thought Content: denies paranoid delusions but suspected they are present based on behavior.   Perceptual disturbances: denies AH/VH  Cognition: loosely oriented  Insight: poor  Judgment: Limited.

## 2023-08-04 NOTE — BH INPATIENT PSYCHIATRY PROGRESS NOTE - NSBHASSESSSUMMFT_PSY_ALL_CORE
7/24 More calm, less acutely paranoid. Will increase Abilify will dc CPAP as she never uses  7/25 Still paranoid not able to stay in room for fear for being killed. Cont to titrate Abilify have divided Klonopin to reduce daytime sedation  7/26 Remains paranoid. Tolerating medications well. Increase abilify to 5mg po daily.   7/27 Paranoia continues, needs encouragement for med compliance, continue current dose.   7/28 Denies feeling unsafe on confrontation but has been noted to be paranoid by other staff. Continue abilify 5mg po daily  7/31 Paranoia remains though not as morbid in content; increase abilify to 7.5mg po daily  8/1 Calmer but still psychotic, is better about taking meds. Observe on increase Abilify, began discusion with daughter about ECT if she is not responding. Patient 2PC expiring. Patient remains grossly psychotic, depressed, not able to function, needing CO. Will Apply for further retention  8/2 Not much change unclear if sleeping in room is indicator of reduction in paranoia. COnt meds plan titration of Abilify  8/3 Some behavioral indicator of response-able to sleep in room. Still paranoid, dysphoric. Tolerating meds Will increase Abilify to 10mg/d  8/4 Limited response Will  continue to titrate Abilify and Effexor and consider ECT if not improving.

## 2023-08-04 NOTE — BH INPATIENT PSYCHIATRY PROGRESS NOTE - NSBHCHARTREVIEWVS_PSY_A_CORE FT
Vital Signs Last 24 Hrs  T(C): 36.2 (08-04-23 @ 07:38), Max: 36.2 (08-04-23 @ 07:38)  T(F): 97.2 (08-04-23 @ 07:38), Max: 97.2 (08-04-23 @ 07:38)  HR: --  BP: --  BP(mean): --  RR: --  SpO2: --    Orthostatic VS  08-04-23 @ 07:38  Lying BP: --/-- HR: --  Sitting BP: 125/63 HR: 90  Standing BP: 152/62 HR: 87  Site: upper left arm  Mode: electronic  Orthostatic VS  08-03-23 @ 07:49  Lying BP: --/-- HR: --  Sitting BP: 131/58 HR: 81  Standing BP: 140/81 HR: 111  Site: upper left arm  Mode: electronic

## 2023-08-04 NOTE — BH INPATIENT PSYCHIATRY PROGRESS NOTE - NSBHFUPINTERVALHXFT_PSY_A_CORE
Patient seen for psychosis and depression. Took meds today Patient did not sleep in room last night though stated she did VSS. Eating sleeping fair.

## 2023-08-05 PROCEDURE — 99231 SBSQ HOSP IP/OBS SF/LOW 25: CPT

## 2023-08-05 RX ADMIN — ATORVASTATIN CALCIUM 10 MILLIGRAM(S): 80 TABLET, FILM COATED ORAL at 20:49

## 2023-08-05 RX ADMIN — Medication 3 MILLIGRAM(S): at 22:36

## 2023-08-05 RX ADMIN — AMLODIPINE BESYLATE 2.5 MILLIGRAM(S): 2.5 TABLET ORAL at 10:01

## 2023-08-05 RX ADMIN — Medication 2 SPRAY(S): at 20:49

## 2023-08-05 RX ADMIN — Medication 81 MILLIGRAM(S): at 10:01

## 2023-08-05 RX ADMIN — HALOPERIDOL DECANOATE 0.5 MILLIGRAM(S): 100 INJECTION INTRAMUSCULAR at 15:06

## 2023-08-05 RX ADMIN — Medication 0.5 MILLIGRAM(S): at 10:00

## 2023-08-05 RX ADMIN — SENNA PLUS 2 TABLET(S): 8.6 TABLET ORAL at 10:02

## 2023-08-05 RX ADMIN — DIVALPROEX SODIUM 250 MILLIGRAM(S): 500 TABLET, DELAYED RELEASE ORAL at 10:01

## 2023-08-05 RX ADMIN — LOSARTAN POTASSIUM 75 MILLIGRAM(S): 100 TABLET, FILM COATED ORAL at 10:01

## 2023-08-05 RX ADMIN — Medication 0.5 MILLIGRAM(S): at 17:15

## 2023-08-05 RX ADMIN — Medication 187.5 MILLIGRAM(S): at 10:01

## 2023-08-05 RX ADMIN — HALOPERIDOL DECANOATE 0.5 MILLIGRAM(S): 100 INJECTION INTRAMUSCULAR at 22:36

## 2023-08-05 RX ADMIN — ARIPIPRAZOLE 10 MILLIGRAM(S): 15 TABLET ORAL at 10:02

## 2023-08-05 NOTE — BH INPATIENT PSYCHIATRY PROGRESS NOTE - NSBHFUPINTERVALHXFT_PSY_A_CORE
Patient seen for psychosis and depression. Dysphoric, tearful, insists that staff threw away her belongings. NINOSKA

## 2023-08-05 NOTE — BH INPATIENT PSYCHIATRY PROGRESS NOTE - MSE UNSTRUCTURED FT
Patient is awake and alert. Affect is labile, sad at times. Speech is fluent. TP logical. +delusions that her belongings were intentionally discarded by staff. No suicidal ideation. Poor insight.

## 2023-08-05 NOTE — BH INPATIENT PSYCHIATRY PROGRESS NOTE - NSBHCHARTREVIEWVS_PSY_A_CORE FT
Vital Signs Last 24 Hrs  T(C): --  T(F): --  HR: --  BP: --  BP(mean): --  RR: --  SpO2: --    Orthostatic VS  08-05-23 @ 08:04  Lying BP: --/-- HR: --  Sitting BP: 151/70 HR: 105  Standing BP: --/-- HR: --  Site: --  Mode: --  Orthostatic VS  08-04-23 @ 07:38  Lying BP: --/-- HR: --  Sitting BP: 125/63 HR: 90  Standing BP: 152/62 HR: 87  Site: upper left arm  Mode: electronic

## 2023-08-05 NOTE — BH INPATIENT PSYCHIATRY PROGRESS NOTE - NSBHASSESSSUMMFT_PSY_ALL_CORE
7/24 More calm, less acutely paranoid. Will increase Abilify will dc CPAP as she never uses  7/25 Still paranoid not able to stay in room for fear for being killed. Cont to titrate Abilify have divided Klonopin to reduce daytime sedation  7/26 Remains paranoid. Tolerating medications well. Increase abilify to 5mg po daily.   7/27 Paranoia continues, needs encouragement for med compliance, continue current dose.   7/28 Denies feeling unsafe on confrontation but has been noted to be paranoid by other staff. Continue abilify 5mg po daily  7/31 Paranoia remains though not as morbid in content; increase abilify to 7.5mg po daily  8/1 Calmer but still psychotic, is better about taking meds. Observe on increase Abilify, began discusion with daughter about ECT if she is not responding. Patient 2PC expiring. Patient remains grossly psychotic, depressed, not able to function, needing CO. Will Apply for further retention  8/2 Not much change unclear if sleeping in room is indicator of reduction in paranoia. COnt meds plan titration of Abilify  8/3 Some behavioral indicator of response-able to sleep in room. Still paranoid, dysphoric. Tolerating meds Will increase Abilify to 10mg/d  8/4 Limited response Will  continue to titrate Abilify and Effexor and consider ECT if not improving.   8/5 Continues to be depressed, psychotic, consider ECT

## 2023-08-06 PROCEDURE — 99231 SBSQ HOSP IP/OBS SF/LOW 25: CPT

## 2023-08-06 RX ADMIN — Medication 2 SPRAY(S): at 21:27

## 2023-08-06 RX ADMIN — SENNA PLUS 2 TABLET(S): 8.6 TABLET ORAL at 09:07

## 2023-08-06 RX ADMIN — Medication 2 SPRAY(S): at 09:11

## 2023-08-06 RX ADMIN — Medication 187.5 MILLIGRAM(S): at 09:06

## 2023-08-06 RX ADMIN — DIVALPROEX SODIUM 250 MILLIGRAM(S): 500 TABLET, DELAYED RELEASE ORAL at 09:06

## 2023-08-06 RX ADMIN — AMLODIPINE BESYLATE 2.5 MILLIGRAM(S): 2.5 TABLET ORAL at 09:06

## 2023-08-06 RX ADMIN — Medication 0.5 MILLIGRAM(S): at 09:07

## 2023-08-06 RX ADMIN — Medication 0.5 MILLIGRAM(S): at 17:58

## 2023-08-06 RX ADMIN — LOSARTAN POTASSIUM 75 MILLIGRAM(S): 100 TABLET, FILM COATED ORAL at 09:07

## 2023-08-06 RX ADMIN — PANTOPRAZOLE SODIUM 40 MILLIGRAM(S): 20 TABLET, DELAYED RELEASE ORAL at 06:32

## 2023-08-06 RX ADMIN — Medication 81 MILLIGRAM(S): at 09:07

## 2023-08-06 RX ADMIN — ARIPIPRAZOLE 10 MILLIGRAM(S): 15 TABLET ORAL at 09:06

## 2023-08-06 RX ADMIN — ATORVASTATIN CALCIUM 10 MILLIGRAM(S): 80 TABLET, FILM COATED ORAL at 21:27

## 2023-08-06 NOTE — BH INPATIENT PSYCHIATRY PROGRESS NOTE - NSBHASSESSSUMMFT_PSY_ALL_CORE
7/24 More calm, less acutely paranoid. Will increase Abilify will dc CPAP as she never uses  7/25 Still paranoid not able to stay in room for fear for being killed. Cont to titrate Abilify have divided Klonopin to reduce daytime sedation  7/26 Remains paranoid. Tolerating medications well. Increase abilify to 5mg po daily.   7/27 Paranoia continues, needs encouragement for med compliance, continue current dose.   7/28 Denies feeling unsafe on confrontation but has been noted to be paranoid by other staff. Continue abilify 5mg po daily  7/31 Paranoia remains though not as morbid in content; increase abilify to 7.5mg po daily  8/1 Calmer but still psychotic, is better about taking meds. Observe on increase Abilify, began discusion with daughter about ECT if she is not responding. Patient 2PC expiring. Patient remains grossly psychotic, depressed, not able to function, needing CO. Will Apply for further retention  8/2 Not much change unclear if sleeping in room is indicator of reduction in paranoia. COnt meds plan titration of Abilify  8/3 Some behavioral indicator of response-able to sleep in room. Still paranoid, dysphoric. Tolerating meds Will increase Abilify to 10mg/d  8/4 Limited response Will  continue to titrate Abilify and Effexor and consider ECT if not improving.   8/5 Continues to be depressed, psychotic, consider ECT  8/6: Remains depressed, with psychosis, consider ECT

## 2023-08-06 NOTE — BH INPATIENT PSYCHIATRY PROGRESS NOTE - MSE UNSTRUCTURED FT
Patient is awake and alert. Affect is dysphoric, labile. Speech is fluent. TP logical. Delusions regarding staff/persecutory themes. +PMR. No suicidal ideations. Poor insight.

## 2023-08-06 NOTE — BH INPATIENT PSYCHIATRY PROGRESS NOTE - NSBHCHARTREVIEWVS_PSY_A_CORE FT
Vital Signs Last 24 Hrs  T(C): 37 (08-06-23 @ 07:43), Max: 37 (08-06-23 @ 07:43)  T(F): 98.6 (08-06-23 @ 07:43), Max: 98.6 (08-06-23 @ 07:43)  HR: --  BP: --  BP(mean): --  RR: --  SpO2: --    Orthostatic VS  08-06-23 @ 07:43  Lying BP: --/-- HR: --  Sitting BP: 157/76 HR: 99  Standing BP: 140/88 HR: 109  Site: --  Mode: --  Orthostatic VS  08-05-23 @ 08:04  Lying BP: --/-- HR: --  Sitting BP: 151/70 HR: 105  Standing BP: --/-- HR: --  Site: --  Mode: --

## 2023-08-07 PROCEDURE — 99231 SBSQ HOSP IP/OBS SF/LOW 25: CPT

## 2023-08-07 RX ORDER — VENLAFAXINE HCL 75 MG
225 CAPSULE, EXT RELEASE 24 HR ORAL DAILY
Refills: 0 | Status: DISCONTINUED | OUTPATIENT
Start: 2023-08-07 | End: 2023-08-31

## 2023-08-07 RX ADMIN — HALOPERIDOL DECANOATE 0.5 MILLIGRAM(S): 100 INJECTION INTRAMUSCULAR at 00:05

## 2023-08-07 RX ADMIN — SENNA PLUS 2 TABLET(S): 8.6 TABLET ORAL at 10:14

## 2023-08-07 RX ADMIN — Medication 187.5 MILLIGRAM(S): at 10:12

## 2023-08-07 RX ADMIN — LOSARTAN POTASSIUM 75 MILLIGRAM(S): 100 TABLET, FILM COATED ORAL at 10:13

## 2023-08-07 RX ADMIN — DIVALPROEX SODIUM 250 MILLIGRAM(S): 500 TABLET, DELAYED RELEASE ORAL at 10:13

## 2023-08-07 RX ADMIN — Medication 2 SPRAY(S): at 09:42

## 2023-08-07 RX ADMIN — ATORVASTATIN CALCIUM 10 MILLIGRAM(S): 80 TABLET, FILM COATED ORAL at 20:37

## 2023-08-07 RX ADMIN — Medication 3 MILLIGRAM(S): at 00:05

## 2023-08-07 RX ADMIN — AMLODIPINE BESYLATE 2.5 MILLIGRAM(S): 2.5 TABLET ORAL at 10:13

## 2023-08-07 RX ADMIN — Medication 0.5 MILLIGRAM(S): at 10:12

## 2023-08-07 RX ADMIN — LACTULOSE 10 GRAM(S): 10 SOLUTION ORAL at 10:11

## 2023-08-07 RX ADMIN — ARIPIPRAZOLE 10 MILLIGRAM(S): 15 TABLET ORAL at 10:12

## 2023-08-07 RX ADMIN — PANTOPRAZOLE SODIUM 40 MILLIGRAM(S): 20 TABLET, DELAYED RELEASE ORAL at 06:23

## 2023-08-07 RX ADMIN — Medication 81 MILLIGRAM(S): at 10:13

## 2023-08-07 RX ADMIN — Medication 0.5 MILLIGRAM(S): at 17:47

## 2023-08-07 NOTE — PHARMACOTHERAPY INTERVENTION NOTE - COMMENTS
Spoke with Dr. Jones regarding if he would like the XR formulation of this medication. He said the patient needs their meds crushed and he is ok with the med being given all at once.

## 2023-08-07 NOTE — BH INPATIENT PSYCHIATRY PROGRESS NOTE - NSBHASSESSSUMMFT_PSY_ALL_CORE
7/24 More calm, less acutely paranoid. Will increase Abilify will dc CPAP as she never uses  7/25 Still paranoid not able to stay in room for fear for being killed. Cont to titrate Abilify have divided Klonopin to reduce daytime sedation  7/26 Remains paranoid. Tolerating medications well. Increase abilify to 5mg po daily.   7/27 Paranoia continues, needs encouragement for med compliance, continue current dose.   7/28 Denies feeling unsafe on confrontation but has been noted to be paranoid by other staff. Continue abilify 5mg po daily  7/31 Paranoia remains though not as morbid in content; increase abilify to 7.5mg po daily  8/1 Calmer but still psychotic, is better about taking meds. Observe on increase Abilify, began discusion with daughter about ECT if she is not responding. Patient 2PC expiring. Patient remains grossly psychotic, depressed, not able to function, needing CO. Will Apply for further retention  8/2 Not much change unclear if sleeping in room is indicator of reduction in paranoia. COnt meds plan titration of Abilify  8/3 Some behavioral indicator of response-able to sleep in room. Still paranoid, dysphoric. Tolerating meds Will increase Abilify to 10mg/d  8/4 Limited response Will  continue to titrate Abilify and Effexor and consider ECT if not improving.   8/5 Continues to be depressed, psychotic, consider ECT  8/6: Remains depressed, with psychosis, consider ECT  8/7  Improved as less agitated and paranoid but still dysphoric, somatic anheonic. Plan increase Effexor, consider increasing Abilify

## 2023-08-07 NOTE — BH INPATIENT PSYCHIATRY PROGRESS NOTE - CURRENT MEDICATION
MEDICATIONS  (STANDING):  amLODIPine   Tablet 2.5 milliGRAM(s) Oral daily  ARIPiprazole 10 milliGRAM(s) Oral daily  aspirin  chewable 81 milliGRAM(s) Oral daily  atorvastatin 10 milliGRAM(s) Oral at bedtime  clonazePAM Oral Disintegrating Tablet 0.5 milliGRAM(s) Oral <User Schedule>  divalproex Sprinkle 250 milliGRAM(s) Oral daily  glucagon  Injectable 1 milliGRAM(s) IntraMuscular once  lactulose Syrup 10 Gram(s) Oral daily  losartan 75 milliGRAM(s) Oral daily  pantoprazole   Suspension 40 milliGRAM(s) Oral before breakfast  senna 2 Tablet(s) Oral daily  sodium chloride 0.65% Nasal 2 Spray(s) Both Nostrils two times a day  venlafaxine 225 milliGRAM(s) Oral daily    MEDICATIONS  (PRN):  acetaminophen     Tablet .. 650 milliGRAM(s) Oral every 6 hours PRN Temp greater or equal to 38C (100.4F), Mild Pain (1 - 3), Moderate Pain (4 - 6)  bisacodyl 5 milliGRAM(s) Oral daily PRN constipation  bisacodyl Suppository 10 milliGRAM(s) Rectal daily PRN constipation  dextrose Oral Gel 15 Gram(s) Oral once PRN Blood Glucose LESS THAN 70 milliGRAM(s)/deciliter  haloperidol     Tablet 0.5 milliGRAM(s) Oral every 6 hours PRN agitation  haloperidol    Injectable 1 milliGRAM(s) IntraMuscular once PRN agitation  melatonin. 3 milliGRAM(s) Oral at bedtime PRN Insomnia

## 2023-08-07 NOTE — BH INPATIENT PSYCHIATRY PROGRESS NOTE - MSE UNSTRUCTURED FT
Patient is awake and alert. Affect is dysphoric, labile. Speech is fluent. TP logical. Delusions regarding staff/persecutory themes less severe. +PMR. No suicidal ideations. Poor insight.

## 2023-08-07 NOTE — BH INPATIENT PSYCHIATRY PROGRESS NOTE - NSBHCHARTREVIEWVS_PSY_A_CORE FT
Vital Signs Last 24 Hrs  T(C): 36.7 (08-07-23 @ 06:28), Max: 36.7 (08-07-23 @ 06:28)  T(F): 98 (08-07-23 @ 06:28), Max: 98 (08-07-23 @ 06:28)  HR: --  BP: --  BP(mean): --  RR: --  SpO2: --    Orthostatic VS  08-07-23 @ 06:28  Lying BP: --/-- HR: --  Sitting BP: 136/56 HR: 88  Standing BP: 126/67 HR: 97  Site: --  Mode: --  Orthostatic VS  08-06-23 @ 07:43  Lying BP: --/-- HR: --  Sitting BP: 157/76 HR: 99  Standing BP: 140/88 HR: 109  Site: --  Mode: --

## 2023-08-07 NOTE — BH INPATIENT PSYCHIATRY PROGRESS NOTE - NSBHFUPINTERVALHXFT_PSY_A_CORE
Patient seen for psychosis and depression. Patient somatic, not severely agitated, eating, sleeping fair. Sleeping in room more regluarly

## 2023-08-08 PROCEDURE — 99231 SBSQ HOSP IP/OBS SF/LOW 25: CPT

## 2023-08-08 RX ORDER — CLONAZEPAM 1 MG
0.5 TABLET ORAL
Refills: 0 | Status: DISCONTINUED | OUTPATIENT
Start: 2023-08-08 | End: 2023-08-15

## 2023-08-08 RX ORDER — ARIPIPRAZOLE 15 MG/1
12 TABLET ORAL DAILY
Refills: 0 | Status: DISCONTINUED | OUTPATIENT
Start: 2023-08-08 | End: 2023-08-10

## 2023-08-08 RX ORDER — ARIPIPRAZOLE 15 MG/1
2 TABLET ORAL ONCE
Refills: 0 | Status: COMPLETED | OUTPATIENT
Start: 2023-08-08 | End: 2023-08-08

## 2023-08-08 RX ADMIN — Medication 0.5 MILLIGRAM(S): at 07:20

## 2023-08-08 RX ADMIN — Medication 225 MILLIGRAM(S): at 07:21

## 2023-08-08 RX ADMIN — PANTOPRAZOLE SODIUM 40 MILLIGRAM(S): 20 TABLET, DELAYED RELEASE ORAL at 06:38

## 2023-08-08 RX ADMIN — Medication 81 MILLIGRAM(S): at 07:20

## 2023-08-08 RX ADMIN — ARIPIPRAZOLE 10 MILLIGRAM(S): 15 TABLET ORAL at 07:21

## 2023-08-08 RX ADMIN — HALOPERIDOL DECANOATE 0.5 MILLIGRAM(S): 100 INJECTION INTRAMUSCULAR at 17:58

## 2023-08-08 RX ADMIN — AMLODIPINE BESYLATE 2.5 MILLIGRAM(S): 2.5 TABLET ORAL at 07:21

## 2023-08-08 RX ADMIN — LOSARTAN POTASSIUM 75 MILLIGRAM(S): 100 TABLET, FILM COATED ORAL at 07:21

## 2023-08-08 RX ADMIN — DIVALPROEX SODIUM 250 MILLIGRAM(S): 500 TABLET, DELAYED RELEASE ORAL at 07:20

## 2023-08-08 RX ADMIN — ARIPIPRAZOLE 2 MILLIGRAM(S): 15 TABLET ORAL at 17:58

## 2023-08-08 RX ADMIN — Medication 0.5 MILLIGRAM(S): at 16:19

## 2023-08-08 NOTE — BH INPATIENT PSYCHIATRY PROGRESS NOTE - CURRENT MEDICATION
MEDICATIONS  (STANDING):  amLODIPine   Tablet 2.5 milliGRAM(s) Oral daily  ARIPiprazole 12 milliGRAM(s) Oral daily  ARIPiprazole 2 milliGRAM(s) Oral once  aspirin  chewable 81 milliGRAM(s) Oral daily  atorvastatin 10 milliGRAM(s) Oral at bedtime  clonazePAM Oral Disintegrating Tablet 0.5 milliGRAM(s) Oral <User Schedule>  divalproex Sprinkle 250 milliGRAM(s) Oral daily  glucagon  Injectable 1 milliGRAM(s) IntraMuscular once  lactulose Syrup 10 Gram(s) Oral daily  losartan 75 milliGRAM(s) Oral daily  pantoprazole   Suspension 40 milliGRAM(s) Oral before breakfast  senna 2 Tablet(s) Oral daily  sodium chloride 0.65% Nasal 2 Spray(s) Both Nostrils two times a day  venlafaxine 225 milliGRAM(s) Oral daily    MEDICATIONS  (PRN):  acetaminophen     Tablet .. 650 milliGRAM(s) Oral every 6 hours PRN Temp greater or equal to 38C (100.4F), Mild Pain (1 - 3), Moderate Pain (4 - 6)  bisacodyl 5 milliGRAM(s) Oral daily PRN constipation  bisacodyl Suppository 10 milliGRAM(s) Rectal daily PRN constipation  dextrose Oral Gel 15 Gram(s) Oral once PRN Blood Glucose LESS THAN 70 milliGRAM(s)/deciliter  haloperidol     Tablet 0.5 milliGRAM(s) Oral every 6 hours PRN agitation  haloperidol    Injectable 1 milliGRAM(s) IntraMuscular once PRN agitation  melatonin. 3 milliGRAM(s) Oral at bedtime PRN Insomnia

## 2023-08-08 NOTE — BH INPATIENT PSYCHIATRY PROGRESS NOTE - NSBHFUPINTERVALHXFT_PSY_A_CORE
Patient seen for psychosis and depression. Patient somatic, not severely agitated, eating, sleeping fair. Sleeping in room more regularly but declining CPAP

## 2023-08-08 NOTE — BH INPATIENT PSYCHIATRY PROGRESS NOTE - MSE UNSTRUCTURED FT
Patient is awake and alert. Affect is dysphoric, labile. Speech is fluent. No EPS TP logical. Delusions regarding staff/persecutory themes less severe but still present. +PMR. No suicidal ideations. Poor insight. oriented to month year.

## 2023-08-08 NOTE — BH INPATIENT PSYCHIATRY PROGRESS NOTE - NSBHCHARTREVIEWVS_PSY_A_CORE FT
Vital Signs Last 24 Hrs  T(C): 36.7 (08-08-23 @ 07:23), Max: 36.7 (08-08-23 @ 07:23)  T(F): 98.1 (08-08-23 @ 07:23), Max: 98.1 (08-08-23 @ 07:23)  HR: --  BP: --  BP(mean): --  RR: --  SpO2: --    Orthostatic VS  08-08-23 @ 07:23  Lying BP: --/-- HR: --  Sitting BP: 152/65 HR: 103  Standing BP: 148/77 HR: 109  Site: upper left arm  Mode: electronic  Orthostatic VS  08-07-23 @ 06:28  Lying BP: --/-- HR: --  Sitting BP: 136/56 HR: 88  Standing BP: 126/67 HR: 97  Site: --  Mode: --

## 2023-08-08 NOTE — BH INPATIENT PSYCHIATRY PROGRESS NOTE - NSBHASSESSSUMMFT_PSY_ALL_CORE
7/24 More calm, less acutely paranoid. Will increase Abilify will dc CPAP as she never uses  7/25 Still paranoid not able to stay in room for fear for being killed. Cont to titrate Abilify have divided Klonopin to reduce daytime sedation  7/26 Remains paranoid. Tolerating medications well. Increase abilify to 5mg po daily.   7/27 Paranoia continues, needs encouragement for med compliance, continue current dose.   7/28 Denies feeling unsafe on confrontation but has been noted to be paranoid by other staff. Continue abilify 5mg po daily  7/31 Paranoia remains though not as morbid in content; increase abilify to 7.5mg po daily  8/1 Calmer but still psychotic, is better about taking meds. Observe on increase Abilify, began discusion with daughter about ECT if she is not responding. Patient 2PC expiring. Patient remains grossly psychotic, depressed, not able to function, needing CO. Will Apply for further retention  8/2 Not much change unclear if sleeping in room is indicator of reduction in paranoia. COnt meds plan titration of Abilify  8/3 Some behavioral indicator of response-able to sleep in room. Still paranoid, dysphoric. Tolerating meds Will increase Abilify to 10mg/d  8/4 Limited response Will  continue to titrate Abilify and Effexor and consider ECT if not improving.   8/5 Continues to be depressed, psychotic, consider ECT  8/6: Remains depressed, with psychosis, consider ECT  8/7  Improved as less agitated and paranoid but still dysphoric, somatic anhedonic. Plan increase Effexor, consider increasing Abilify  8/8 PArtial reposne less agitated, less intense paranoia but otherwise no major gains will increase Abilify to 12 then 15mg/d

## 2023-08-09 LAB — GLUCOSE BLDC GLUCOMTR-MCNC: 119 MG/DL — HIGH (ref 70–99)

## 2023-08-09 PROCEDURE — 99232 SBSQ HOSP IP/OBS MODERATE 35: CPT

## 2023-08-09 RX ORDER — POLYETHYLENE GLYCOL 3350 17 G/17G
17 POWDER, FOR SOLUTION ORAL DAILY
Refills: 0 | Status: DISCONTINUED | OUTPATIENT
Start: 2023-08-09 | End: 2023-08-10

## 2023-08-09 RX ORDER — LACTULOSE 10 G/15ML
10 SOLUTION ORAL ONCE
Refills: 0 | Status: DISCONTINUED | OUTPATIENT
Start: 2023-08-09 | End: 2023-08-09

## 2023-08-09 RX ORDER — HALOPERIDOL DECANOATE 100 MG/ML
1 INJECTION INTRAMUSCULAR ONCE
Refills: 0 | Status: DISCONTINUED | OUTPATIENT
Start: 2023-08-09 | End: 2023-08-15

## 2023-08-09 RX ADMIN — Medication 0.5 MILLIGRAM(S): at 17:08

## 2023-08-09 RX ADMIN — AMLODIPINE BESYLATE 2.5 MILLIGRAM(S): 2.5 TABLET ORAL at 09:27

## 2023-08-09 RX ADMIN — Medication 0.5 MILLIGRAM(S): at 09:26

## 2023-08-09 RX ADMIN — Medication 81 MILLIGRAM(S): at 09:27

## 2023-08-09 RX ADMIN — Medication 225 MILLIGRAM(S): at 09:26

## 2023-08-09 RX ADMIN — Medication 3 MILLIGRAM(S): at 20:26

## 2023-08-09 RX ADMIN — ATORVASTATIN CALCIUM 10 MILLIGRAM(S): 80 TABLET, FILM COATED ORAL at 20:27

## 2023-08-09 RX ADMIN — DIVALPROEX SODIUM 250 MILLIGRAM(S): 500 TABLET, DELAYED RELEASE ORAL at 09:26

## 2023-08-09 RX ADMIN — PANTOPRAZOLE SODIUM 40 MILLIGRAM(S): 20 TABLET, DELAYED RELEASE ORAL at 06:45

## 2023-08-09 RX ADMIN — ARIPIPRAZOLE 12 MILLIGRAM(S): 15 TABLET ORAL at 09:27

## 2023-08-09 RX ADMIN — LOSARTAN POTASSIUM 75 MILLIGRAM(S): 100 TABLET, FILM COATED ORAL at 09:27

## 2023-08-09 RX ADMIN — POLYETHYLENE GLYCOL 3350 17 GRAM(S): 17 POWDER, FOR SOLUTION ORAL at 17:08

## 2023-08-09 RX ADMIN — HALOPERIDOL DECANOATE 0.5 MILLIGRAM(S): 100 INJECTION INTRAMUSCULAR at 14:20

## 2023-08-09 RX ADMIN — SENNA PLUS 2 TABLET(S): 8.6 TABLET ORAL at 09:28

## 2023-08-09 RX ADMIN — Medication 2 SPRAY(S): at 09:28

## 2023-08-09 NOTE — BH INPATIENT PSYCHIATRY PROGRESS NOTE - NSBHCHARTREVIEWVS_PSY_A_CORE FT
Vital Signs Last 24 Hrs  T(C): 36.8 (08-09-23 @ 08:06), Max: 36.8 (08-09-23 @ 08:06)  T(F): 98.2 (08-09-23 @ 08:06), Max: 98.2 (08-09-23 @ 08:06)  HR: --  BP: --  BP(mean): --  RR: --  SpO2: --    Orthostatic VS  08-09-23 @ 08:06  Lying BP: --/-- HR: --  Sitting BP: 157/69 HR: 91  Standing BP: 137/77 HR: 101  Site: --  Mode: --  Orthostatic VS  08-08-23 @ 07:23  Lying BP: --/-- HR: --  Sitting BP: 152/65 HR: 103  Standing BP: 148/77 HR: 109  Site: upper left arm  Mode: electronic

## 2023-08-09 NOTE — BH INPATIENT PSYCHIATRY PROGRESS NOTE - NSBHFUPINTERVALHXFT_PSY_A_CORE
Patient seen for psychosis and depression. Patient somatic, not severely agitated, eating, sleeping fair. Sleeping in room more regularly but declining CPAP. Refuses lactulose despite c/o constipation

## 2023-08-09 NOTE — BH INPATIENT PSYCHIATRY PROGRESS NOTE - MSE UNSTRUCTURED FT
Patient is awake and alert. [place] uses amplifier Affect is dysphoric, labile. Speech is fluent. No EPS TP logical. Delusions about being taken away or being harmed persist. Showed piece of paper with work saw on it likely referring to fear she will be dismembered with a saw. No voices. No SI. Not hopeless. Thinking perseverative. Orineted to month year. Poor insight

## 2023-08-09 NOTE — BH INPATIENT PSYCHIATRY PROGRESS NOTE - CURRENT MEDICATION
MEDICATIONS  (STANDING):  amLODIPine   Tablet 2.5 milliGRAM(s) Oral daily  ARIPiprazole 12 milliGRAM(s) Oral daily  aspirin  chewable 81 milliGRAM(s) Oral daily  atorvastatin 10 milliGRAM(s) Oral at bedtime  clonazePAM Oral Disintegrating Tablet 0.5 milliGRAM(s) Oral <User Schedule>  divalproex Sprinkle 250 milliGRAM(s) Oral daily  glucagon  Injectable 1 milliGRAM(s) IntraMuscular once  haloperidol     Tablet 1 milliGRAM(s) Oral once  lactulose Syrup 10 Gram(s) Oral daily  losartan 75 milliGRAM(s) Oral daily  pantoprazole   Suspension 40 milliGRAM(s) Oral before breakfast  polyethylene glycol 3350 17 Gram(s) Oral daily  senna 2 Tablet(s) Oral daily  sodium chloride 0.65% Nasal 2 Spray(s) Both Nostrils two times a day  venlafaxine 225 milliGRAM(s) Oral daily    MEDICATIONS  (PRN):  acetaminophen     Tablet .. 650 milliGRAM(s) Oral every 6 hours PRN Temp greater or equal to 38C (100.4F), Mild Pain (1 - 3), Moderate Pain (4 - 6)  bisacodyl 5 milliGRAM(s) Oral daily PRN constipation  bisacodyl Suppository 10 milliGRAM(s) Rectal daily PRN constipation  dextrose Oral Gel 15 Gram(s) Oral once PRN Blood Glucose LESS THAN 70 milliGRAM(s)/deciliter  haloperidol     Tablet 0.5 milliGRAM(s) Oral every 6 hours PRN agitation  haloperidol    Injectable 1 milliGRAM(s) IntraMuscular once PRN agitation  melatonin. 3 milliGRAM(s) Oral at bedtime PRN Insomnia

## 2023-08-09 NOTE — BH INPATIENT PSYCHIATRY PROGRESS NOTE - NSBHASSESSSUMMFT_PSY_ALL_CORE
7/24 More calm, less acutely paranoid. Will increase Abilify will dc CPAP as she never uses  7/25 Still paranoid not able to stay in room for fear for being killed. Cont to titrate Abilify have divided Klonopin to reduce daytime sedation  7/26 Remains paranoid. Tolerating medications well. Increase abilify to 5mg po daily.   7/27 Paranoia continues, needs encouragement for med compliance, continue current dose.   7/28 Denies feeling unsafe on confrontation but has been noted to be paranoid by other staff. Continue abilify 5mg po daily  7/31 Paranoia remains though not as morbid in content; increase abilify to 7.5mg po daily  8/1 Calmer but still psychotic, is better about taking meds. Observe on increase Abilify, began discusion with daughter about ECT if she is not responding. Patient 2PC expiring. Patient remains grossly psychotic, depressed, not able to function, needing CO. Will Apply for further retention  8/2 Not much change unclear if sleeping in room is indicator of reduction in paranoia. COnt meds plan titration of Abilify  8/3 Some behavioral indicator of response-able to sleep in room. Still paranoid, dysphoric. Tolerating meds Will increase Abilify to 10mg/d  8/4 Limited response Will  continue to titrate Abilify and Effexor and consider ECT if not improving.   8/5 Continues to be depressed, psychotic, consider ECT  8/6: Remains depressed, with psychosis, consider ECT  8/7  Improved as less agitated and paranoid but still dysphoric, somatic anhedonic. Plan increase Effexor, consider increasing Abilify  8/8 Partial response less agitated, less intense paranoia but otherwise no major gains will increase Abilify to 12 then 15mg/d  8/9 Partial resposne delusion is dense. WIll titrate Abilify vs consider change antipsychotic. Will change laxative to improve compliance. Spoke with daughter . Will d/c Co as less disorganzized and gait better

## 2023-08-09 NOTE — BH INPATIENT PSYCHIATRY PROGRESS NOTE - NSBHMETABOLIC_PSY_ALL_CORE_FT
BMI: BMI (kg/m2): 23 (06-07-23 @ 18:13)  HbA1c: A1C with Estimated Average Glucose Result: 5.7 % (06-01-23 @ 05:10)    Glucose: POCT Blood Glucose.: 119 mg/dL (08-09-23 @ 08:00)    BP: --  Lipid Panel: Date/Time: 06-08-23 @ 08:30  Cholesterol, Serum: 119  Direct LDL: --  HDL Cholesterol, Serum: 47  Total Cholesterol/HDL Ration Measurement: --  Triglycerides, Serum: 56

## 2023-08-10 PROCEDURE — 99232 SBSQ HOSP IP/OBS MODERATE 35: CPT

## 2023-08-10 RX ORDER — LACTULOSE 10 G/15ML
10 SOLUTION ORAL DAILY
Refills: 0 | Status: DISCONTINUED | OUTPATIENT
Start: 2023-08-10 | End: 2023-08-24

## 2023-08-10 RX ORDER — HALOPERIDOL DECANOATE 100 MG/ML
1 INJECTION INTRAMUSCULAR EVERY 6 HOURS
Refills: 0 | Status: DISCONTINUED | OUTPATIENT
Start: 2023-08-10 | End: 2023-08-16

## 2023-08-10 RX ORDER — ARIPIPRAZOLE 15 MG/1
15 TABLET ORAL DAILY
Refills: 0 | Status: DISCONTINUED | OUTPATIENT
Start: 2023-08-10 | End: 2023-08-14

## 2023-08-10 RX ADMIN — HALOPERIDOL DECANOATE 1 MILLIGRAM(S): 100 INJECTION INTRAMUSCULAR at 20:29

## 2023-08-10 RX ADMIN — DIVALPROEX SODIUM 250 MILLIGRAM(S): 500 TABLET, DELAYED RELEASE ORAL at 09:42

## 2023-08-10 RX ADMIN — PANTOPRAZOLE SODIUM 40 MILLIGRAM(S): 20 TABLET, DELAYED RELEASE ORAL at 09:43

## 2023-08-10 RX ADMIN — LOSARTAN POTASSIUM 75 MILLIGRAM(S): 100 TABLET, FILM COATED ORAL at 09:43

## 2023-08-10 RX ADMIN — SENNA PLUS 2 TABLET(S): 8.6 TABLET ORAL at 09:43

## 2023-08-10 RX ADMIN — ATORVASTATIN CALCIUM 10 MILLIGRAM(S): 80 TABLET, FILM COATED ORAL at 20:29

## 2023-08-10 RX ADMIN — LACTULOSE 10 GRAM(S): 10 SOLUTION ORAL at 09:44

## 2023-08-10 RX ADMIN — AMLODIPINE BESYLATE 2.5 MILLIGRAM(S): 2.5 TABLET ORAL at 09:44

## 2023-08-10 RX ADMIN — Medication 0.5 MILLIGRAM(S): at 16:39

## 2023-08-10 RX ADMIN — Medication 81 MILLIGRAM(S): at 09:43

## 2023-08-10 RX ADMIN — Medication 0.5 MILLIGRAM(S): at 09:42

## 2023-08-10 RX ADMIN — ARIPIPRAZOLE 12 MILLIGRAM(S): 15 TABLET ORAL at 09:42

## 2023-08-10 RX ADMIN — Medication 225 MILLIGRAM(S): at 09:44

## 2023-08-10 NOTE — BH INPATIENT PSYCHIATRY PROGRESS NOTE - MSE UNSTRUCTURED FT
Patient is awake and alert. [place] uses amplifier Affect is dysphoric, labile. Speech is fluent. No EPS TP logical. Delusions about being taken away but less about being harmed No voices. No SI. Not hopeless. Thinking perseverative. Oriented to month year. Poor insight

## 2023-08-10 NOTE — BH INPATIENT PSYCHIATRY PROGRESS NOTE - CURRENT MEDICATION
MEDICATIONS  (STANDING):  amLODIPine   Tablet 2.5 milliGRAM(s) Oral daily  ARIPiprazole 12 milliGRAM(s) Oral daily  aspirin  chewable 81 milliGRAM(s) Oral daily  atorvastatin 10 milliGRAM(s) Oral at bedtime  clonazePAM Oral Disintegrating Tablet 0.5 milliGRAM(s) Oral <User Schedule>  glucagon  Injectable 1 milliGRAM(s) IntraMuscular once  haloperidol     Tablet 1 milliGRAM(s) Oral once  lactulose Syrup 10 Gram(s) Oral daily  losartan 75 milliGRAM(s) Oral daily  pantoprazole   Suspension 40 milliGRAM(s) Oral before breakfast  senna 2 Tablet(s) Oral daily  sodium chloride 0.65% Nasal 2 Spray(s) Both Nostrils two times a day  venlafaxine 225 milliGRAM(s) Oral daily    MEDICATIONS  (PRN):  acetaminophen     Tablet .. 650 milliGRAM(s) Oral every 6 hours PRN Temp greater or equal to 38C (100.4F), Mild Pain (1 - 3), Moderate Pain (4 - 6)  bisacodyl 5 milliGRAM(s) Oral daily PRN constipation  bisacodyl Suppository 10 milliGRAM(s) Rectal daily PRN constipation  dextrose Oral Gel 15 Gram(s) Oral once PRN Blood Glucose LESS THAN 70 milliGRAM(s)/deciliter  haloperidol     Tablet 0.5 milliGRAM(s) Oral every 6 hours PRN agitation  haloperidol    Injectable 1 milliGRAM(s) IntraMuscular once PRN agitation  melatonin. 3 milliGRAM(s) Oral at bedtime PRN Insomnia

## 2023-08-10 NOTE — BH INPATIENT PSYCHIATRY PROGRESS NOTE - NSBHCHARTREVIEWVS_PSY_A_CORE FT
Vital Signs Last 24 Hrs  T(C): 36.8 (08-10-23 @ 07:11), Max: 36.8 (08-10-23 @ 07:11)  T(F): 98.2 (08-10-23 @ 07:11), Max: 98.2 (08-10-23 @ 07:11)  HR: --  BP: --  BP(mean): --  RR: --  SpO2: --    Orthostatic VS  08-10-23 @ 07:11  Lying BP: --/-- HR: --  Sitting BP: 126/75 HR: 96  Standing BP: 132/61 HR: 101  Site: --  Mode: --  Orthostatic VS  08-09-23 @ 08:06  Lying BP: --/-- HR: --  Sitting BP: 157/69 HR: 91  Standing BP: 137/77 HR: 101  Site: --  Mode: --

## 2023-08-10 NOTE — BH INPATIENT PSYCHIATRY PROGRESS NOTE - NSBHASSESSSUMMFT_PSY_ALL_CORE
7/24 More calm, less acutely paranoid. Will increase Abilify will dc CPAP as she never uses  7/25 Still paranoid not able to stay in room for fear for being killed. Cont to titrate Abilify have divided Klonopin to reduce daytime sedation  7/26 Remains paranoid. Tolerating medications well. Increase abilify to 5mg po daily.   7/27 Paranoia continues, needs encouragement for med compliance, continue current dose.   7/28 Denies feeling unsafe on confrontation but has been noted to be paranoid by other staff. Continue abilify 5mg po daily  7/31 Paranoia remains though not as morbid in content; increase abilify to 7.5mg po daily  8/1 Calmer but still psychotic, is better about taking meds. Observe on increase Abilify, began discusion with daughter about ECT if she is not responding. Patient 2PC expiring. Patient remains grossly psychotic, depressed, not able to function, needing CO. Will Apply for further retention  8/2 Not much change unclear if sleeping in room is indicator of reduction in paranoia. COnt meds plan titration of Abilify  8/3 Some behavioral indicator of response-able to sleep in room. Still paranoid, dysphoric. Tolerating meds Will increase Abilify to 10mg/d  8/4 Limited response Will  continue to titrate Abilify and Effexor and consider ECT if not improving.   8/5 Continues to be depressed, psychotic, consider ECT  8/6: Remains depressed, with psychosis, consider ECT  8/7  Improved as less agitated and paranoid but still dysphoric, somatic anhedonic. Plan increase Effexor, consider increasing Abilify  8/8 Partial response less agitated, less intense paranoia but otherwise no major gains will increase Abilify to 12 then 15mg/d  8/9 Partial resposne delusion is dense. WIll titrate Abilify vs consider change antipsychotic. Will change laxative to improve compliance. Spoke with daughter . Will d/c Co as less disorganzized and gait better  8/9 Calmer today may refect beneficial effect of one time haldol dose ccont meds consider changing to haldol or Latuda as may do better with D2 blocker. will d.c liquid laxatives as she always refuses

## 2023-08-10 NOTE — BH INPATIENT PSYCHIATRY PROGRESS NOTE - NSBHFUPINTERVALHXFT_PSY_A_CORE
Patient seen for psychosis and depression. Patient somatic, not severely agitated, eating, sleeping fair. Sleeping in room more regularly but declining CPAP. Refuses lactulose despite c/o constipation. Calmer today

## 2023-08-11 PROCEDURE — 99232 SBSQ HOSP IP/OBS MODERATE 35: CPT

## 2023-08-11 RX ORDER — LURASIDONE HYDROCHLORIDE 40 MG/1
1 TABLET ORAL
Qty: 30 | Refills: 0
Start: 2023-08-11 | End: 2023-09-09

## 2023-08-11 RX ADMIN — LACTULOSE 10 GRAM(S): 10 SOLUTION ORAL at 14:25

## 2023-08-11 RX ADMIN — Medication 0.5 MILLIGRAM(S): at 17:36

## 2023-08-11 RX ADMIN — Medication 0.5 MILLIGRAM(S): at 12:03

## 2023-08-11 RX ADMIN — Medication 225 MILLIGRAM(S): at 12:04

## 2023-08-11 RX ADMIN — SENNA PLUS 2 TABLET(S): 8.6 TABLET ORAL at 12:04

## 2023-08-11 RX ADMIN — ATORVASTATIN CALCIUM 10 MILLIGRAM(S): 80 TABLET, FILM COATED ORAL at 20:42

## 2023-08-11 RX ADMIN — Medication 81 MILLIGRAM(S): at 12:03

## 2023-08-11 RX ADMIN — Medication 2 SPRAY(S): at 12:04

## 2023-08-11 RX ADMIN — LOSARTAN POTASSIUM 75 MILLIGRAM(S): 100 TABLET, FILM COATED ORAL at 12:03

## 2023-08-11 RX ADMIN — AMLODIPINE BESYLATE 2.5 MILLIGRAM(S): 2.5 TABLET ORAL at 12:03

## 2023-08-11 RX ADMIN — Medication 3 MILLIGRAM(S): at 20:42

## 2023-08-11 RX ADMIN — HALOPERIDOL DECANOATE 1 MILLIGRAM(S): 100 INJECTION INTRAMUSCULAR at 20:42

## 2023-08-11 RX ADMIN — ARIPIPRAZOLE 15 MILLIGRAM(S): 15 TABLET ORAL at 12:03

## 2023-08-11 NOTE — BH INPATIENT PSYCHIATRY PROGRESS NOTE - NSBHFUPINTERVALHXFT_PSY_A_CORE
Patient seen for psychosis and depression. Patient somatic, at times anxious and agitated holding on  to others for reassurance. eating, sleeping fair. Sleeping in room more regularly but declining CPAP. Took all meds then claimed she was not given her meds. Needed haldol prn which appeared to have good effect

## 2023-08-11 NOTE — BH INPATIENT PSYCHIATRY PROGRESS NOTE - NSBHCHARTREVIEWVS_PSY_A_CORE FT
Vital Signs Last 24 Hrs  T(C): 36.2 (08-11-23 @ 07:40), Max: 36.2 (08-11-23 @ 07:40)  T(F): 97.2 (08-11-23 @ 07:40), Max: 97.2 (08-11-23 @ 07:40)  HR: --  BP: --  BP(mean): --  RR: --  SpO2: --    Orthostatic VS  08-11-23 @ 07:40  Lying BP: --/-- HR: --  Sitting BP: 154/62 HR: 80  Standing BP: 149/76 HR: 101  Site: --  Mode: --  Orthostatic VS  08-10-23 @ 07:11  Lying BP: --/-- HR: --  Sitting BP: 126/75 HR: 96  Standing BP: 132/61 HR: 101  Site: --  Mode: --

## 2023-08-11 NOTE — BH INPATIENT PSYCHIATRY PROGRESS NOTE - MSE UNSTRUCTURED FT
Patient is awake and alert. [place] uses amplifier Affect is dysphoric, labile. Speech is fluent. No EPS TP logical. Delusions about being taken away but less about being harmed. Feels she was not given her medication No voices. No SI. Not hopeless. Thinking perseverative. Oriented to month year. Poor insight

## 2023-08-11 NOTE — BH INPATIENT PSYCHIATRY PROGRESS NOTE - CURRENT MEDICATION
MEDICATIONS  (STANDING):  amLODIPine   Tablet 2.5 milliGRAM(s) Oral daily  ARIPiprazole 15 milliGRAM(s) Oral daily  aspirin  chewable 81 milliGRAM(s) Oral daily  atorvastatin 10 milliGRAM(s) Oral at bedtime  clonazePAM Oral Disintegrating Tablet 0.5 milliGRAM(s) Oral <User Schedule>  glucagon  Injectable 1 milliGRAM(s) IntraMuscular once  haloperidol     Tablet 1 milliGRAM(s) Oral once  lactulose Syrup 10 Gram(s) Oral daily  losartan 75 milliGRAM(s) Oral daily  pantoprazole   Suspension 40 milliGRAM(s) Oral before breakfast  senna 2 Tablet(s) Oral daily  sodium chloride 0.65% Nasal 2 Spray(s) Both Nostrils two times a day  venlafaxine 225 milliGRAM(s) Oral daily    MEDICATIONS  (PRN):  acetaminophen     Tablet .. 650 milliGRAM(s) Oral every 6 hours PRN Temp greater or equal to 38C (100.4F), Mild Pain (1 - 3), Moderate Pain (4 - 6)  bisacodyl 5 milliGRAM(s) Oral daily PRN constipation  bisacodyl Suppository 10 milliGRAM(s) Rectal daily PRN constipation  dextrose Oral Gel 15 Gram(s) Oral once PRN Blood Glucose LESS THAN 70 milliGRAM(s)/deciliter  haloperidol     Tablet 1 milliGRAM(s) Oral every 6 hours PRN agitation  haloperidol    Injectable 1 milliGRAM(s) IntraMuscular once PRN agitation  melatonin. 3 milliGRAM(s) Oral at bedtime PRN Insomnia

## 2023-08-11 NOTE — BH INPATIENT PSYCHIATRY PROGRESS NOTE - NSBHASSESSSUMMFT_PSY_ALL_CORE
7/24 More calm, less acutely paranoid. Will increase Abilify will dc CPAP as she never uses  7/25 Still paranoid not able to stay in room for fear for being killed. Cont to titrate Abilify have divided Klonopin to reduce daytime sedation  7/26 Remains paranoid. Tolerating medications well. Increase abilify to 5mg po daily.   7/27 Paranoia continues, needs encouragement for med compliance, continue current dose.   7/28 Denies feeling unsafe on confrontation but has been noted to be paranoid by other staff. Continue abilify 5mg po daily  7/31 Paranoia remains though not as morbid in content; increase abilify to 7.5mg po daily  8/1 Calmer but still psychotic, is better about taking meds. Observe on increase Abilify, began discusion with daughter about ECT if she is not responding. Patient 2PC expiring. Patient remains grossly psychotic, depressed, not able to function, needing CO. Will Apply for further retention  8/2 Not much change unclear if sleeping in room is indicator of reduction in paranoia. COnt meds plan titration of Abilify  8/3 Some behavioral indicator of response-able to sleep in room. Still paranoid, dysphoric. Tolerating meds Will increase Abilify to 10mg/d  8/4 Limited response Will  continue to titrate Abilify and Effexor and consider ECT if not improving.   8/5 Continues to be depressed, psychotic, consider ECT  8/6: Remains depressed, with psychosis, consider ECT  8/7  Improved as less agitated and paranoid but still dysphoric, somatic anhedonic. Plan increase Effexor, consider increasing Abilify  8/8 Partial response less agitated, less intense paranoia but otherwise no major gains will increase Abilify to 12 then 15mg/d  8/9 Partial resposne delusion is dense. WIll titrate Abilify vs consider change antipsychotic. Will change laxative to improve compliance. Spoke with daughter . Will d/c Co as less disorganzized and gait better  8/10 [place] today may refect beneficial effect of one time haldol dose ccont meds consider changing to haldol or Latuda as may do better with D2 blocker. will d.c liquid laxatives as she always refuses  8/11 Not much improvement except has better response to haldol than Abilify at least for for some time after dose. COnt Abilify consider change to Latuda vs ECT though question of she would agree

## 2023-08-12 PROCEDURE — 99231 SBSQ HOSP IP/OBS SF/LOW 25: CPT

## 2023-08-12 RX ORDER — LIDOCAINE 4 G/100G
1 CREAM TOPICAL DAILY
Refills: 0 | Status: DISCONTINUED | OUTPATIENT
Start: 2023-08-12 | End: 2023-10-17

## 2023-08-12 RX ADMIN — Medication 81 MILLIGRAM(S): at 09:20

## 2023-08-12 RX ADMIN — Medication 225 MILLIGRAM(S): at 09:22

## 2023-08-12 RX ADMIN — Medication 0.5 MILLIGRAM(S): at 17:21

## 2023-08-12 RX ADMIN — AMLODIPINE BESYLATE 2.5 MILLIGRAM(S): 2.5 TABLET ORAL at 09:23

## 2023-08-12 RX ADMIN — Medication 650 MILLIGRAM(S): at 21:00

## 2023-08-12 RX ADMIN — LOSARTAN POTASSIUM 75 MILLIGRAM(S): 100 TABLET, FILM COATED ORAL at 09:21

## 2023-08-12 RX ADMIN — PANTOPRAZOLE SODIUM 40 MILLIGRAM(S): 20 TABLET, DELAYED RELEASE ORAL at 06:09

## 2023-08-12 RX ADMIN — ATORVASTATIN CALCIUM 10 MILLIGRAM(S): 80 TABLET, FILM COATED ORAL at 20:21

## 2023-08-12 RX ADMIN — Medication 0.5 MILLIGRAM(S): at 09:20

## 2023-08-12 RX ADMIN — LACTULOSE 10 GRAM(S): 10 SOLUTION ORAL at 09:21

## 2023-08-12 RX ADMIN — ARIPIPRAZOLE 15 MILLIGRAM(S): 15 TABLET ORAL at 09:22

## 2023-08-12 RX ADMIN — Medication 650 MILLIGRAM(S): at 20:21

## 2023-08-12 RX ADMIN — SENNA PLUS 2 TABLET(S): 8.6 TABLET ORAL at 09:21

## 2023-08-12 NOTE — BH INPATIENT PSYCHIATRY PROGRESS NOTE - MSE UNSTRUCTURED FT
Patient is awake and alert. [place] uses amplifier Affect is dysphoric, labile. Speech is fluent. No EPS TP logical. Delusions about being taken away but less about being harmed. Feels she was not given her medication No voices. No SI. Not hopeless. Thinking perseverative. Oriented to month year. Poor insight

## 2023-08-12 NOTE — BH INPATIENT PSYCHIATRY PROGRESS NOTE - NSBHCHARTREVIEWVS_PSY_A_CORE FT
Vital Signs Last 24 Hrs  T(C): 36.7 (08-12-23 @ 07:37), Max: 36.7 (08-12-23 @ 07:37)  T(F): 98 (08-12-23 @ 07:37), Max: 98 (08-12-23 @ 07:37)  HR: --  BP: --  BP(mean): --  RR: --  SpO2: --    Orthostatic VS  08-12-23 @ 07:37  Lying BP: 143/50 HR: 73  Sitting BP: 140/45 HR: 70  Standing BP: --/-- HR: --  Site: upper left arm  Mode: electronic  Orthostatic VS  08-11-23 @ 07:40  Lying BP: --/-- HR: --  Sitting BP: 154/62 HR: 80  Standing BP: 149/76 HR: 101  Site: --  Mode: --

## 2023-08-13 RX ADMIN — LACTULOSE 10 GRAM(S): 10 SOLUTION ORAL at 08:52

## 2023-08-13 RX ADMIN — Medication 2 SPRAY(S): at 08:53

## 2023-08-13 RX ADMIN — SENNA PLUS 2 TABLET(S): 8.6 TABLET ORAL at 08:54

## 2023-08-13 RX ADMIN — Medication 81 MILLIGRAM(S): at 08:51

## 2023-08-13 RX ADMIN — LOSARTAN POTASSIUM 75 MILLIGRAM(S): 100 TABLET, FILM COATED ORAL at 08:51

## 2023-08-13 RX ADMIN — Medication 650 MILLIGRAM(S): at 20:32

## 2023-08-13 RX ADMIN — ATORVASTATIN CALCIUM 10 MILLIGRAM(S): 80 TABLET, FILM COATED ORAL at 20:34

## 2023-08-13 RX ADMIN — ARIPIPRAZOLE 15 MILLIGRAM(S): 15 TABLET ORAL at 08:53

## 2023-08-13 RX ADMIN — Medication 0.5 MILLIGRAM(S): at 17:20

## 2023-08-13 RX ADMIN — Medication 0.5 MILLIGRAM(S): at 08:50

## 2023-08-13 RX ADMIN — HALOPERIDOL DECANOATE 1 MILLIGRAM(S): 100 INJECTION INTRAMUSCULAR at 20:34

## 2023-08-13 RX ADMIN — Medication 3 MILLIGRAM(S): at 20:33

## 2023-08-13 RX ADMIN — AMLODIPINE BESYLATE 2.5 MILLIGRAM(S): 2.5 TABLET ORAL at 08:53

## 2023-08-13 RX ADMIN — Medication 225 MILLIGRAM(S): at 08:52

## 2023-08-13 RX ADMIN — Medication 650 MILLIGRAM(S): at 22:25

## 2023-08-13 NOTE — BH INPATIENT PSYCHIATRY PROGRESS NOTE - MSE UNSTRUCTURED FT
Patient is awake and alert. [place] uses amplifier Affect is dysphoric, labile. Speech is fluent. No EPS TP logical. Delusions about being taken away but less about being harmed. Feels she was not given her medication No voices. No SI. Not hopeless. Thinking perseverative. Oriented to month year. Poor insight

## 2023-08-13 NOTE — BH INPATIENT PSYCHIATRY PROGRESS NOTE - CURRENT MEDICATION
MEDICATIONS  (STANDING):  amLODIPine   Tablet 2.5 milliGRAM(s) Oral daily  ARIPiprazole 15 milliGRAM(s) Oral daily  aspirin  chewable 81 milliGRAM(s) Oral daily  atorvastatin 10 milliGRAM(s) Oral at bedtime  clonazePAM Oral Disintegrating Tablet 0.5 milliGRAM(s) Oral <User Schedule>  glucagon  Injectable 1 milliGRAM(s) IntraMuscular once  haloperidol     Tablet 1 milliGRAM(s) Oral once  lactulose Syrup 10 Gram(s) Oral daily  losartan 75 milliGRAM(s) Oral daily  pantoprazole   Suspension 40 milliGRAM(s) Oral before breakfast  senna 2 Tablet(s) Oral daily  sodium chloride 0.65% Nasal 2 Spray(s) Both Nostrils two times a day  venlafaxine 225 milliGRAM(s) Oral daily    MEDICATIONS  (PRN):  acetaminophen     Tablet .. 650 milliGRAM(s) Oral every 6 hours PRN Temp greater or equal to 38C (100.4F), Mild Pain (1 - 3), Moderate Pain (4 - 6)  bisacodyl 5 milliGRAM(s) Oral daily PRN constipation  bisacodyl Suppository 10 milliGRAM(s) Rectal daily PRN constipation  dextrose Oral Gel 15 Gram(s) Oral once PRN Blood Glucose LESS THAN 70 milliGRAM(s)/deciliter  haloperidol     Tablet 1 milliGRAM(s) Oral every 6 hours PRN agitation  haloperidol    Injectable 1 milliGRAM(s) IntraMuscular once PRN agitation  lidocaine   4% Patch 1 Patch Transdermal daily PRN LBP  melatonin. 3 milliGRAM(s) Oral at bedtime PRN Insomnia

## 2023-08-13 NOTE — BH INPATIENT PSYCHIATRY PROGRESS NOTE - NSBHCHARTREVIEWVS_PSY_A_CORE FT
Vital Signs Last 24 Hrs  T(C): --  T(F): --  HR: --  BP: 146/65 (08-12-23 @ 20:54) (146/65 - 146/65)  BP(mean): 88 (08-12-23 @ 20:54) (88 - 88)  RR: --  SpO2: --    Orthostatic VS  08-12-23 @ 07:37  Lying BP: 143/50 HR: 73  Sitting BP: 140/45 HR: 70  Standing BP: --/-- HR: --  Site: upper left arm  Mode: electronic

## 2023-08-13 NOTE — BH INPATIENT PSYCHIATRY PROGRESS NOTE - NSBHMETABOLIC_PSY_ALL_CORE_FT
BMI: BMI (kg/m2): 23 (06-07-23 @ 18:13)  HbA1c: A1C with Estimated Average Glucose Result: 5.7 % (06-01-23 @ 05:10)    Glucose: POCT Blood Glucose.: 119 mg/dL (08-09-23 @ 08:00)    BP: 146/65 (08-12-23 @ 20:54) (146/65 - 146/65)  Lipid Panel: Date/Time: 06-08-23 @ 08:30  Cholesterol, Serum: 119  Direct LDL: --  HDL Cholesterol, Serum: 47  Total Cholesterol/HDL Ration Measurement: --  Triglycerides, Serum: 56

## 2023-08-14 PROCEDURE — 99232 SBSQ HOSP IP/OBS MODERATE 35: CPT

## 2023-08-14 RX ORDER — ARIPIPRAZOLE 15 MG/1
2 TABLET ORAL DAILY
Refills: 0 | Status: DISCONTINUED | OUTPATIENT
Start: 2023-08-14 | End: 2023-08-14

## 2023-08-14 RX ORDER — ARIPIPRAZOLE 15 MG/1
17 TABLET ORAL DAILY
Refills: 0 | Status: DISCONTINUED | OUTPATIENT
Start: 2023-08-14 | End: 2023-08-15

## 2023-08-14 RX ORDER — ARIPIPRAZOLE 15 MG/1
2 TABLET ORAL ONCE
Refills: 0 | Status: DISCONTINUED | OUTPATIENT
Start: 2023-08-14 | End: 2023-08-15

## 2023-08-14 RX ADMIN — LOSARTAN POTASSIUM 75 MILLIGRAM(S): 100 TABLET, FILM COATED ORAL at 09:49

## 2023-08-14 RX ADMIN — SENNA PLUS 2 TABLET(S): 8.6 TABLET ORAL at 09:49

## 2023-08-14 RX ADMIN — Medication 650 MILLIGRAM(S): at 21:05

## 2023-08-14 RX ADMIN — Medication 650 MILLIGRAM(S): at 21:40

## 2023-08-14 RX ADMIN — PANTOPRAZOLE SODIUM 40 MILLIGRAM(S): 20 TABLET, DELAYED RELEASE ORAL at 06:40

## 2023-08-14 RX ADMIN — HALOPERIDOL DECANOATE 1 MILLIGRAM(S): 100 INJECTION INTRAMUSCULAR at 21:05

## 2023-08-14 RX ADMIN — Medication 0.5 MILLIGRAM(S): at 09:48

## 2023-08-14 RX ADMIN — Medication 81 MILLIGRAM(S): at 09:49

## 2023-08-14 RX ADMIN — Medication 2 SPRAY(S): at 09:50

## 2023-08-14 RX ADMIN — Medication 2 SPRAY(S): at 21:15

## 2023-08-14 RX ADMIN — Medication 225 MILLIGRAM(S): at 09:48

## 2023-08-14 RX ADMIN — AMLODIPINE BESYLATE 2.5 MILLIGRAM(S): 2.5 TABLET ORAL at 09:50

## 2023-08-14 RX ADMIN — Medication 650 MILLIGRAM(S): at 18:01

## 2023-08-14 RX ADMIN — Medication 3 MILLIGRAM(S): at 21:05

## 2023-08-14 RX ADMIN — ARIPIPRAZOLE 15 MILLIGRAM(S): 15 TABLET ORAL at 09:49

## 2023-08-14 RX ADMIN — Medication 0.5 MILLIGRAM(S): at 17:47

## 2023-08-14 RX ADMIN — ATORVASTATIN CALCIUM 10 MILLIGRAM(S): 80 TABLET, FILM COATED ORAL at 21:05

## 2023-08-14 NOTE — BH INPATIENT PSYCHIATRY PROGRESS NOTE - MSE UNSTRUCTURED FT
Patient is awake and alert. [place] uses amplifier Affect is dysphoric, labile. Speech is fluent. Mild tremor, not stiff no cogwheeling. TP ruminative, perseverative. Delusions about being given a hard andres but not of serious bodily harm or being taken away from here. No voices. No SI. Not hopeless. Thinking perseverative. Oriented to year, hospital know month and we are in middle of month.

## 2023-08-14 NOTE — BH INPATIENT PSYCHIATRY PROGRESS NOTE - NSBHCHARTREVIEWVS_PSY_A_CORE FT
Vital Signs Last 24 Hrs  T(C): 36.4 (08-14-23 @ 08:12), Max: 36.4 (08-14-23 @ 08:12)  T(F): 97.6 (08-14-23 @ 08:12), Max: 97.6 (08-14-23 @ 08:12)  HR: --  BP: --  BP(mean): --  RR: --  SpO2: --    Orthostatic VS  08-14-23 @ 08:12  Lying BP: --/-- HR: --  Sitting BP: 166/54 HR: 67  Standing BP: 156/53 HR: 67  Site: upper left arm  Mode: electronic

## 2023-08-14 NOTE — BH INPATIENT PSYCHIATRY PROGRESS NOTE - CURRENT MEDICATION
MEDICATIONS  (STANDING):  amLODIPine   Tablet 2.5 milliGRAM(s) Oral daily  ARIPiprazole 17 milliGRAM(s) Oral daily  ARIPiprazole 2 milliGRAM(s) Oral daily  aspirin  chewable 81 milliGRAM(s) Oral daily  atorvastatin 10 milliGRAM(s) Oral at bedtime  clonazePAM Oral Disintegrating Tablet 0.5 milliGRAM(s) Oral <User Schedule>  glucagon  Injectable 1 milliGRAM(s) IntraMuscular once  haloperidol     Tablet 1 milliGRAM(s) Oral once  lactulose Syrup 10 Gram(s) Oral daily  losartan 75 milliGRAM(s) Oral daily  pantoprazole   Suspension 40 milliGRAM(s) Oral before breakfast  senna 2 Tablet(s) Oral daily  sodium chloride 0.65% Nasal 2 Spray(s) Both Nostrils two times a day  venlafaxine 225 milliGRAM(s) Oral daily    MEDICATIONS  (PRN):  acetaminophen     Tablet .. 650 milliGRAM(s) Oral every 6 hours PRN Temp greater or equal to 38C (100.4F), Mild Pain (1 - 3), Moderate Pain (4 - 6)  bisacodyl 5 milliGRAM(s) Oral daily PRN constipation  bisacodyl Suppository 10 milliGRAM(s) Rectal daily PRN constipation  dextrose Oral Gel 15 Gram(s) Oral once PRN Blood Glucose LESS THAN 70 milliGRAM(s)/deciliter  haloperidol     Tablet 1 milliGRAM(s) Oral every 6 hours PRN agitation  haloperidol    Injectable 1 milliGRAM(s) IntraMuscular once PRN agitation  lidocaine   4% Patch 1 Patch Transdermal daily PRN LBP  melatonin. 3 milliGRAM(s) Oral at bedtime PRN Insomnia

## 2023-08-14 NOTE — BH INPATIENT PSYCHIATRY PROGRESS NOTE - NSBHASSESSSUMMFT_PSY_ALL_CORE
8/14 better but now hard to stay if responding to Abilify or prns of haldol will increase Abilify to 17mg then 20mg/d while initiating ECT discussion

## 2023-08-14 NOTE — BH INPATIENT PSYCHIATRY PROGRESS NOTE - NSBHFUPINTERVALHXFT_PSY_A_CORE
patient seen for MDD with psychosis. No overnight events. Patient eating, sleeping okay. Getting prn for agitation about every other day.

## 2023-08-15 RX ORDER — LURASIDONE HYDROCHLORIDE 40 MG/1
20 TABLET ORAL DAILY
Refills: 0 | Status: DISCONTINUED | OUTPATIENT
Start: 2023-08-15 | End: 2023-08-18

## 2023-08-15 RX ORDER — CLONAZEPAM 1 MG
0.5 TABLET ORAL
Refills: 0 | Status: DISCONTINUED | OUTPATIENT
Start: 2023-08-15 | End: 2023-08-22

## 2023-08-15 RX ADMIN — SENNA PLUS 2 TABLET(S): 8.6 TABLET ORAL at 09:32

## 2023-08-15 RX ADMIN — ATORVASTATIN CALCIUM 10 MILLIGRAM(S): 80 TABLET, FILM COATED ORAL at 21:05

## 2023-08-15 RX ADMIN — Medication 0.5 MILLIGRAM(S): at 17:08

## 2023-08-15 RX ADMIN — Medication 0.5 MILLIGRAM(S): at 09:31

## 2023-08-15 RX ADMIN — Medication 3 MILLIGRAM(S): at 21:06

## 2023-08-15 RX ADMIN — ARIPIPRAZOLE 17 MILLIGRAM(S): 15 TABLET ORAL at 09:31

## 2023-08-15 RX ADMIN — Medication 225 MILLIGRAM(S): at 09:31

## 2023-08-15 RX ADMIN — Medication 2 SPRAY(S): at 21:05

## 2023-08-15 RX ADMIN — HALOPERIDOL DECANOATE 1 MILLIGRAM(S): 100 INJECTION INTRAMUSCULAR at 22:38

## 2023-08-15 RX ADMIN — PANTOPRAZOLE SODIUM 40 MILLIGRAM(S): 20 TABLET, DELAYED RELEASE ORAL at 09:29

## 2023-08-15 RX ADMIN — LOSARTAN POTASSIUM 75 MILLIGRAM(S): 100 TABLET, FILM COATED ORAL at 09:32

## 2023-08-15 RX ADMIN — Medication 81 MILLIGRAM(S): at 09:32

## 2023-08-15 RX ADMIN — Medication 2 SPRAY(S): at 09:32

## 2023-08-15 RX ADMIN — AMLODIPINE BESYLATE 2.5 MILLIGRAM(S): 2.5 TABLET ORAL at 09:31

## 2023-08-15 RX ADMIN — HALOPERIDOL DECANOATE 1 MILLIGRAM(S): 100 INJECTION INTRAMUSCULAR at 11:01

## 2023-08-15 NOTE — BH INPATIENT PSYCHIATRY PROGRESS NOTE - CURRENT MEDICATION
MEDICATIONS  (STANDING):  amLODIPine   Tablet 2.5 milliGRAM(s) Oral daily  aspirin  chewable 81 milliGRAM(s) Oral daily  atorvastatin 10 milliGRAM(s) Oral at bedtime  clonazePAM Oral Disintegrating Tablet 0.5 milliGRAM(s) Oral <User Schedule>  glucagon  Injectable 1 milliGRAM(s) IntraMuscular once  lactulose Syrup 10 Gram(s) Oral daily  losartan 75 milliGRAM(s) Oral daily  lurasidone 20 milliGRAM(s) Oral daily  pantoprazole   Suspension 40 milliGRAM(s) Oral before breakfast  senna 2 Tablet(s) Oral daily  sodium chloride 0.65% Nasal 2 Spray(s) Both Nostrils two times a day  venlafaxine 225 milliGRAM(s) Oral daily    MEDICATIONS  (PRN):  acetaminophen     Tablet .. 650 milliGRAM(s) Oral every 6 hours PRN Temp greater or equal to 38C (100.4F), Mild Pain (1 - 3), Moderate Pain (4 - 6)  bisacodyl 5 milliGRAM(s) Oral daily PRN constipation  bisacodyl Suppository 10 milliGRAM(s) Rectal daily PRN constipation  dextrose Oral Gel 15 Gram(s) Oral once PRN Blood Glucose LESS THAN 70 milliGRAM(s)/deciliter  haloperidol     Tablet 1 milliGRAM(s) Oral every 6 hours PRN agitation  haloperidol    Injectable 1 milliGRAM(s) IntraMuscular once PRN agitation  lidocaine   4% Patch 1 Patch Transdermal daily PRN LBP  melatonin. 3 milliGRAM(s) Oral at bedtime PRN Insomnia

## 2023-08-15 NOTE — BH INPATIENT PSYCHIATRY PROGRESS NOTE - NSBHASSESSSUMMFT_PSY_ALL_CORE
8/14 better but now hard to stay if responding to Abilify or prns of haldol will increase Abilify to 17mg then 20mg/d while initiating ECT discussion  8/15 Not much improvement   except less paranoid which may be attributable to haldol prn. Will d/c Abilify and start  Latuda will discuss ECT option

## 2023-08-15 NOTE — BH INPATIENT PSYCHIATRY PROGRESS NOTE - NSBHCHARTREVIEWVS_PSY_A_CORE FT
Vital Signs Last 24 Hrs  T(C): 36.3 (08-15-23 @ 06:03), Max: 36.3 (08-15-23 @ 06:03)  T(F): 97.3 (08-15-23 @ 06:03), Max: 97.3 (08-15-23 @ 06:03)  HR: --  BP: --  BP(mean): --  RR: --  SpO2: --    Orthostatic VS  08-15-23 @ 06:03  Lying BP: --/-- HR: --  Sitting BP: 122/54 HR: 71  Standing BP: 118/69 HR: 93  Site: --  Mode: --  Orthostatic VS  08-14-23 @ 08:12  Lying BP: --/-- HR: --  Sitting BP: 166/54 HR: 67  Standing BP: 156/53 HR: 67  Site: upper left arm  Mode: electronic

## 2023-08-15 NOTE — BH INPATIENT PSYCHIATRY PROGRESS NOTE - NSICDXBHSECONDARYDX_PSY_ALL_CORE
Type II diabetes mellitus   E11.9  Vitamin B12 deficiency   E53.8  Hyperlipidemia   E78.5  Hypertension   I10  S/P partial hysterectomy   Z90.711  S/P carpal tunnel release   Z98.890  Anxiety state   F41.1  Esophageal web   Q39.4   Patient's belongings returned

## 2023-08-15 NOTE — BH INPATIENT PSYCHIATRY PROGRESS NOTE - MSE UNSTRUCTURED FT
Patient is awake and alert. Mood anxious, dysphoric. Affect is distressed appearing. Speech is fluent. Mild tremor, not stiff no cogwheeling. TP ruminative, perseverative. Delusions about being given a hard time or intentionally ignored but not of serious bodily harm or being taken away from here. No voices. No SI. Not hopeless. Thinking perseverative. Oriented to year, hospital know month and we are in middle of month.

## 2023-08-15 NOTE — BH INPATIENT PSYCHIATRY PROGRESS NOTE - NSBHFUPINTERVALHXFT_PSY_A_CORE
patient seen for MDD with psychosis. No overnight events. Patient eating, sleeping okay. Getting prn for agitation often with postive effect

## 2023-08-16 PROCEDURE — 99232 SBSQ HOSP IP/OBS MODERATE 35: CPT

## 2023-08-16 RX ORDER — HALOPERIDOL DECANOATE 100 MG/ML
0.5 INJECTION INTRAMUSCULAR EVERY 6 HOURS
Refills: 0 | Status: DISCONTINUED | OUTPATIENT
Start: 2023-08-16 | End: 2023-10-17

## 2023-08-16 RX ADMIN — AMLODIPINE BESYLATE 2.5 MILLIGRAM(S): 2.5 TABLET ORAL at 09:27

## 2023-08-16 RX ADMIN — Medication 81 MILLIGRAM(S): at 09:27

## 2023-08-16 RX ADMIN — Medication 2 SPRAY(S): at 21:35

## 2023-08-16 RX ADMIN — ATORVASTATIN CALCIUM 10 MILLIGRAM(S): 80 TABLET, FILM COATED ORAL at 21:35

## 2023-08-16 RX ADMIN — Medication 225 MILLIGRAM(S): at 09:27

## 2023-08-16 RX ADMIN — LOSARTAN POTASSIUM 75 MILLIGRAM(S): 100 TABLET, FILM COATED ORAL at 09:26

## 2023-08-16 RX ADMIN — SENNA PLUS 2 TABLET(S): 8.6 TABLET ORAL at 09:26

## 2023-08-16 RX ADMIN — PANTOPRAZOLE SODIUM 40 MILLIGRAM(S): 20 TABLET, DELAYED RELEASE ORAL at 06:45

## 2023-08-16 RX ADMIN — Medication 0.5 MILLIGRAM(S): at 16:51

## 2023-08-16 RX ADMIN — LURASIDONE HYDROCHLORIDE 20 MILLIGRAM(S): 40 TABLET ORAL at 09:26

## 2023-08-16 RX ADMIN — Medication 2 SPRAY(S): at 09:28

## 2023-08-16 RX ADMIN — Medication 3 MILLIGRAM(S): at 21:41

## 2023-08-16 RX ADMIN — Medication 0.5 MILLIGRAM(S): at 09:27

## 2023-08-16 NOTE — BH INPATIENT PSYCHIATRY PROGRESS NOTE - MSE UNSTRUCTURED FT
Patient is awake and alert. Mood anxious, dysphoric. Affect is distressed appearing. Speech is fluent. Mild tremor, not stiff no cogwheeling. TP ruminative, perseverative. Delusions about being given a hard time or intentionally ignored but not of serious bodily harm or being taken away from here. After ECT discussion expressed fear if taken to ECT she would not be brought back to unit.  No voices. No SI. Not hopeless. Thinking perseverative. Oriented to year, hospital know month and we are in middle of month.

## 2023-08-16 NOTE — BH INPATIENT PSYCHIATRY PROGRESS NOTE - NSBHASSESSSUMMFT_PSY_ALL_CORE
Short Acting Inhaled Beta-Agonists Refill Protocol Passed              8/14 better but now hard to stay if responding to Abilify or prns of haldol will increase Abilify to 17mg then 20mg/d while initiating ECT discussion  8/15 Not much improvement   except less paranoid which may be attributable to haldol prn. Will d/c Abilify and start  Latuda will discuss ECT option  8/16 COnt Latuda trial while entering into discussion with patient re ECT

## 2023-08-16 NOTE — BH INPATIENT PSYCHIATRY PROGRESS NOTE - NSBHCHARTREVIEWVS_PSY_A_CORE FT
Vital Signs Last 24 Hrs  T(C): 36.6 (08-16-23 @ 09:18), Max: 36.6 (08-16-23 @ 09:18)  T(F): 97.8 (08-16-23 @ 09:18), Max: 97.8 (08-16-23 @ 09:18)  HR: --  BP: --  BP(mean): --  RR: --  SpO2: --    Orthostatic VS  08-16-23 @ 09:18  Lying BP: --/-- HR: --  Sitting BP: 145/53 HR: 74  Standing BP: --/-- HR: --  Site: --  Mode: --  Orthostatic VS  08-15-23 @ 06:03  Lying BP: --/-- HR: --  Sitting BP: 122/54 HR: 71  Standing BP: 118/69 HR: 93  Site: --  Mode: --

## 2023-08-16 NOTE — BH INPATIENT PSYCHIATRY PROGRESS NOTE - CURRENT MEDICATION
MEDICATIONS  (STANDING):  amLODIPine   Tablet 2.5 milliGRAM(s) Oral daily  aspirin  chewable 81 milliGRAM(s) Oral daily  atorvastatin 10 milliGRAM(s) Oral at bedtime  clonazePAM Oral Disintegrating Tablet 0.5 milliGRAM(s) Oral <User Schedule>  glucagon  Injectable 1 milliGRAM(s) IntraMuscular once  lactulose Syrup 10 Gram(s) Oral daily  losartan 75 milliGRAM(s) Oral daily  lurasidone 20 milliGRAM(s) Oral daily  pantoprazole   Suspension 40 milliGRAM(s) Oral before breakfast  senna 2 Tablet(s) Oral daily  sodium chloride 0.65% Nasal 2 Spray(s) Both Nostrils two times a day  venlafaxine 225 milliGRAM(s) Oral daily    MEDICATIONS  (PRN):  acetaminophen     Tablet .. 650 milliGRAM(s) Oral every 6 hours PRN Temp greater or equal to 38C (100.4F), Mild Pain (1 - 3), Moderate Pain (4 - 6)  bisacodyl 5 milliGRAM(s) Oral daily PRN constipation  bisacodyl Suppository 10 milliGRAM(s) Rectal daily PRN constipation  dextrose Oral Gel 15 Gram(s) Oral once PRN Blood Glucose LESS THAN 70 milliGRAM(s)/deciliter  haloperidol     Tablet 0.5 milliGRAM(s) Oral every 6 hours PRN agitation  haloperidol    Injectable 1 milliGRAM(s) IntraMuscular once PRN agitation  lidocaine   4% Patch 1 Patch Transdermal daily PRN LBP  melatonin. 3 milliGRAM(s) Oral at bedtime PRN Insomnia

## 2023-08-16 NOTE — BH INPATIENT PSYCHIATRY PROGRESS NOTE - NSBHFUPINTERVALHXFT_PSY_A_CORE
patient seen for MDD with psychosis. Had some distress overnight and requested prn with positive effect Patient eating, sleeping okay. VSS.

## 2023-08-17 PROCEDURE — 99231 SBSQ HOSP IP/OBS SF/LOW 25: CPT

## 2023-08-17 RX ADMIN — LURASIDONE HYDROCHLORIDE 20 MILLIGRAM(S): 40 TABLET ORAL at 08:07

## 2023-08-17 RX ADMIN — LOSARTAN POTASSIUM 75 MILLIGRAM(S): 100 TABLET, FILM COATED ORAL at 08:08

## 2023-08-17 RX ADMIN — LACTULOSE 10 GRAM(S): 10 SOLUTION ORAL at 08:08

## 2023-08-17 RX ADMIN — Medication 2 SPRAY(S): at 09:36

## 2023-08-17 RX ADMIN — AMLODIPINE BESYLATE 2.5 MILLIGRAM(S): 2.5 TABLET ORAL at 08:07

## 2023-08-17 RX ADMIN — Medication 3 MILLIGRAM(S): at 20:45

## 2023-08-17 RX ADMIN — PANTOPRAZOLE SODIUM 40 MILLIGRAM(S): 20 TABLET, DELAYED RELEASE ORAL at 06:28

## 2023-08-17 RX ADMIN — ATORVASTATIN CALCIUM 10 MILLIGRAM(S): 80 TABLET, FILM COATED ORAL at 20:45

## 2023-08-17 RX ADMIN — Medication 0.5 MILLIGRAM(S): at 09:36

## 2023-08-17 RX ADMIN — SENNA PLUS 2 TABLET(S): 8.6 TABLET ORAL at 08:07

## 2023-08-17 RX ADMIN — Medication 81 MILLIGRAM(S): at 08:08

## 2023-08-17 RX ADMIN — Medication 0.5 MILLIGRAM(S): at 17:48

## 2023-08-17 RX ADMIN — Medication 225 MILLIGRAM(S): at 08:07

## 2023-08-17 NOTE — CHART NOTE - NSCHARTNOTEFT_GEN_A_CORE
Nutrition follow up assessment for severe malnutrition. Patient is a 83 y/o female, domiciled at the [facility], with PMHx of dementia, DM2, and HTN, PPHx of late onset Bipolar I disorder, PTSD and Cluster B Personality traits, 2 prior psych adm last in 2022 at [facility], who presented to ED with worsening paranoia, anxiety, FTT (weight loss of 20lb since march), and confusion. Psychiatry was consulted for psychosis/AMS. Patient is now being transferred to [facility] for psychiatric treatment and stabilization. 1:1 CO for disorganization.     Patient unable to engage in a meaningful conversation today due to disorganization. Remains on Minced and moist diet per MD order. As per [facility], patient has been having good appetite and PO intake at meals. No reports of chewing/swallowing difficulties on current diet order. Food preferences already implemented on Cboard; no new food preferences obtained today. Patient c/o constipation, currently has BMs every 1-2 days per nursing, continue on senna, lactulose, dulcolax PRN, and suppository PRN per MD order for bowel regularity, patient has been drinking at least 6-8 cups of water a day per staff, and consumes yogurt daily to improve gut rahul. May benefit from adding Metamucil for added fiber. No N/V/D reported at this time. Patient is not a candidate for diet education at this time.    Diet : Diet, Minced and Moist:   Supplement Feeding Modality:  Oral  Glucerna Shake Cans or Servings Per Day:  3       Frequency:  Three Times a day (06-08-23 @ 15:47) [Active]    PO intake:  [ ] < 50%  [ ] 50-75%  [X] %  [ ] other :      Height: 152.4 cm  Weight: 53.5kg (6/7), 55.3kg (7/9), 54.9kg (8/12)  -- overall gained 2.5% in ~2 month, desirable  BMI: 23.6kg/m^2 (8/12)    Skin: intact    Edema: no edema    Pertinent Medications: MEDICATIONS  (STANDING):  amLODIPine   Tablet 2.5 milliGRAM(s) Oral daily  aspirin  chewable 81 milliGRAM(s) Oral daily  atorvastatin 10 milliGRAM(s) Oral at bedtime  clonazePAM Oral Disintegrating Tablet 0.5 milliGRAM(s) Oral <User Schedule>  glucagon  Injectable 1 milliGRAM(s) IntraMuscular once  lactulose Syrup 10 Gram(s) Oral daily  losartan 75 milliGRAM(s) Oral daily  lurasidone 20 milliGRAM(s) Oral daily  pantoprazole   Suspension 40 milliGRAM(s) Oral before breakfast  senna 2 Tablet(s) Oral daily  sodium chloride 0.65% Nasal 2 Spray(s) Both Nostrils two times a day  venlafaxine 225 milliGRAM(s) Oral daily    MEDICATIONS  (PRN):  acetaminophen     Tablet .. 650 milliGRAM(s) Oral every 6 hours PRN Temp greater or equal to 38C (100.4F), Mild Pain (1 - 3), Moderate Pain (4 - 6)  bisacodyl 5 milliGRAM(s) Oral daily PRN constipation  bisacodyl Suppository 10 milliGRAM(s) Rectal daily PRN constipation  dextrose Oral Gel 15 Gram(s) Oral once PRN Blood Glucose LESS THAN 70 milliGRAM(s)/deciliter  haloperidol     Tablet 0.5 milliGRAM(s) Oral every 6 hours PRN agitation  haloperidol    Injectable 1 milliGRAM(s) IntraMuscular once PRN agitation  lidocaine   4% Patch 1 Patch Transdermal daily PRN LBP  melatonin. 3 milliGRAM(s) Oral at bedtime PRN Insomnia    Pertinent Labs:  Glucose: POCT Blood Glucose.: 119 mg/dL (08-09-23 @ 08:00)    HbA1c: A1C with Estimated Average Glucose Result: 5.7 % (06-01-23 @ 05:10)        Estimated Needs:   [X] no change since previous assessment  [ ] recalculated:       Previous Nutrition Diagnosis: Severe malnutrition in context of chronic illness    Nutrition Diagnosis is [ ] ongoing  [X] resolved [ ] not applicable         Recommendations:  1. Diet consistency to SLP's recommendation/ MD order. Please consult to SLP for swallow re-eval if patient no longer tolerated current diet consistency.  2. Continue bowel regimen per MD order. Suggest add Metamucil to aid in bowel movement.  3. Encourage po intake as tolerated and honor food preferences PRN.  4. Monitor PO intake/tolerance, weights, labs, BM's, and skin integrity.     -- Tiffanie Lozano, MS, RDN, Pager # [phone]

## 2023-08-17 NOTE — BH INPATIENT PSYCHIATRY PROGRESS NOTE - NSBHASSESSSUMMFT_PSY_ALL_CORE
8/14 better but now hard to stay if responding to Abilify or prns of haldol will increase Abilify to 17mg then 20mg/d while initiating ECT discussion  8/15 Not much improvement   except less paranoid which may be attributable to haldol prn. Will d/c Abilify and start  Latuda will discuss ECT option  8/16 COnt Latuda trial while entering into discussion with patient re ECT  8/17 Tolerating latuda well; paranoia remains

## 2023-08-17 NOTE — BH INPATIENT PSYCHIATRY PROGRESS NOTE - NSBHCHARTREVIEWVS_PSY_A_CORE FT
Vital Signs Last 24 Hrs  T(C): 36.4 (08-17-23 @ 06:48), Max: 36.4 (08-17-23 @ 06:48)  T(F): 97.5 (08-17-23 @ 06:48), Max: 97.5 (08-17-23 @ 06:48)  HR: --  BP: --  BP(mean): --  RR: --  SpO2: --    Orthostatic VS  08-17-23 @ 06:48  Lying BP: --/-- HR: --  Sitting BP: 130/59 HR: 94  Standing BP: 130/57 HR: 113  Site: --  Mode: --  Orthostatic VS  08-16-23 @ 09:18  Lying BP: --/-- HR: --  Sitting BP: 145/53 HR: 74  Standing BP: --/-- HR: --  Site: --  Mode: --

## 2023-08-17 NOTE — BH INPATIENT PSYCHIATRY PROGRESS NOTE - MSE UNSTRUCTURED FT
Appearance: fair grooming, adequate hygiene; no abnormal involuntary movements noted  Behavior: some limited boundaries, overly familiar with writer, very mild psychomotor retardation  Speech: wnl  Mood: mildly anxious  Affect: congruent, slightly irritable  Thought process: illogical, tangential  Thought content: paranoid delusions elicited; denies SI/HI  Perceptual disturbances: denies Ah/VH  Orientation: partially intact  Insight: poor  Judgement: poor

## 2023-08-17 NOTE — BH INPATIENT PSYCHIATRY PROGRESS NOTE - NSBHFUPINTERVALHXFT_PSY_A_CORE
Chart reviewed and case discussed with treatment team. Staff report patient had no adverse events overnight. Got melatonin prn only, nothing for agitation. Med compliant. Adequate PO intake. Remains paranoid - pointed out another patient that she believes is following her. No mention of criticism towards staff.

## 2023-08-18 PROCEDURE — 99232 SBSQ HOSP IP/OBS MODERATE 35: CPT

## 2023-08-18 RX ORDER — LURASIDONE HYDROCHLORIDE 40 MG/1
40 TABLET ORAL DAILY
Refills: 0 | Status: DISCONTINUED | OUTPATIENT
Start: 2023-08-18 | End: 2023-08-22

## 2023-08-18 RX ORDER — SIMETHICONE 80 MG/1
80 TABLET, CHEWABLE ORAL EVERY 4 HOURS
Refills: 0 | Status: DISCONTINUED | OUTPATIENT
Start: 2023-08-18 | End: 2023-10-17

## 2023-08-18 RX ADMIN — SIMETHICONE 80 MILLIGRAM(S): 80 TABLET, CHEWABLE ORAL at 13:30

## 2023-08-18 RX ADMIN — Medication 81 MILLIGRAM(S): at 09:25

## 2023-08-18 RX ADMIN — Medication 3 MILLIGRAM(S): at 19:54

## 2023-08-18 RX ADMIN — SENNA PLUS 2 TABLET(S): 8.6 TABLET ORAL at 09:23

## 2023-08-18 RX ADMIN — Medication 0.5 MILLIGRAM(S): at 09:24

## 2023-08-18 RX ADMIN — LURASIDONE HYDROCHLORIDE 20 MILLIGRAM(S): 40 TABLET ORAL at 09:25

## 2023-08-18 RX ADMIN — Medication 0.5 MILLIGRAM(S): at 17:00

## 2023-08-18 RX ADMIN — Medication 225 MILLIGRAM(S): at 09:24

## 2023-08-18 RX ADMIN — LACTULOSE 10 GRAM(S): 10 SOLUTION ORAL at 09:25

## 2023-08-18 RX ADMIN — ATORVASTATIN CALCIUM 10 MILLIGRAM(S): 80 TABLET, FILM COATED ORAL at 19:54

## 2023-08-18 RX ADMIN — Medication 2 SPRAY(S): at 09:27

## 2023-08-18 RX ADMIN — AMLODIPINE BESYLATE 2.5 MILLIGRAM(S): 2.5 TABLET ORAL at 09:25

## 2023-08-18 RX ADMIN — LOSARTAN POTASSIUM 75 MILLIGRAM(S): 100 TABLET, FILM COATED ORAL at 09:25

## 2023-08-18 NOTE — BH INPATIENT PSYCHIATRY PROGRESS NOTE - CURRENT MEDICATION
MEDICATIONS  (STANDING):  amLODIPine   Tablet 2.5 milliGRAM(s) Oral daily  aspirin  chewable 81 milliGRAM(s) Oral daily  atorvastatin 10 milliGRAM(s) Oral at bedtime  clonazePAM Oral Disintegrating Tablet 0.5 milliGRAM(s) Oral <User Schedule>  glucagon  Injectable 1 milliGRAM(s) IntraMuscular once  lactulose Syrup 10 Gram(s) Oral daily  losartan 75 milliGRAM(s) Oral daily  lurasidone 40 milliGRAM(s) Oral daily  pantoprazole   Suspension 40 milliGRAM(s) Oral before breakfast  senna 2 Tablet(s) Oral daily  sodium chloride 0.65% Nasal 2 Spray(s) Both Nostrils two times a day  venlafaxine 225 milliGRAM(s) Oral daily    MEDICATIONS  (PRN):  acetaminophen     Tablet .. 650 milliGRAM(s) Oral every 6 hours PRN Temp greater or equal to 38C (100.4F), Mild Pain (1 - 3), Moderate Pain (4 - 6)  bisacodyl 5 milliGRAM(s) Oral daily PRN constipation  bisacodyl Suppository 10 milliGRAM(s) Rectal daily PRN constipation  dextrose Oral Gel 15 Gram(s) Oral once PRN Blood Glucose LESS THAN 70 milliGRAM(s)/deciliter  haloperidol     Tablet 0.5 milliGRAM(s) Oral every 6 hours PRN agitation  haloperidol    Injectable 1 milliGRAM(s) IntraMuscular once PRN agitation  lidocaine   4% Patch 1 Patch Transdermal daily PRN LBP  melatonin. 3 milliGRAM(s) Oral at bedtime PRN Insomnia  simethicone 80 milliGRAM(s) Chew every 4 hours PRN abdominal discomfort

## 2023-08-18 NOTE — BH INPATIENT PSYCHIATRY PROGRESS NOTE - MSE UNSTRUCTURED FT
Appearance: fair grooming, adequate hygiene; no abnormal involuntary movements noted  Behavior: adequate boundaries, very mild psychomotor retardation, attentive, oddly related  Speech: somewhat slowed but otherwise wnl  Mood: mildly anxious  Affect: congruent, slightly irritable  Thought process: illogical, tangential  Thought content: paranoid delusions elicited but less severe; denies SI/HI  Perceptual disturbances: denies Ah/VH  Orientation: partially intact  Insight: poor  Judgement: poor

## 2023-08-18 NOTE — BH INPATIENT PSYCHIATRY PROGRESS NOTE - NSBHCHARTREVIEWVS_PSY_A_CORE FT
Vital Signs Last 24 Hrs  T(C): 36.8 (08-18-23 @ 07:31), Max: 36.8 (08-18-23 @ 07:31)  T(F): 98.2 (08-18-23 @ 07:31), Max: 98.2 (08-18-23 @ 07:31)  HR: --  BP: --  BP(mean): --  RR: --  SpO2: --    Orthostatic VS  08-18-23 @ 07:31  Lying BP: --/-- HR: --  Sitting BP: 140/65 HR: 97  Standing BP: 142/75 HR: 100  Site: --  Mode: --  Orthostatic VS  08-17-23 @ 06:48  Lying BP: --/-- HR: --  Sitting BP: 130/59 HR: 94  Standing BP: 130/57 HR: 113  Site: --  Mode: --

## 2023-08-18 NOTE — BH INPATIENT PSYCHIATRY PROGRESS NOTE - NSBHASSESSSUMMFT_PSY_ALL_CORE
8/14 better but now hard to stay if responding to Abilify or prns of haldol will increase Abilify to 17mg then 20mg/d while initiating ECT discussion  8/15 Not much improvement   except less paranoid which may be attributable to haldol prn. Will d/c Abilify and start  Latuda will discuss ECT option  8/16 COnt Latuda trial while entering into discussion with patient re ECT  8/17 Tolerating latuda well; paranoia remains  8/18 Paranoia continues though improved; increase latuda to 40mg po daily - to be administered with food

## 2023-08-18 NOTE — BH INPATIENT PSYCHIATRY PROGRESS NOTE - NSBHFUPINTERVALHXFT_PSY_A_CORE
Chart reviewed and case discussed with treatment team. Staff report patient has been eating adequately, med compliant, inconsistent use of walker. Patient has not been paranoid about staff but seems to be paranoid about a particular patient stating again that she is following her.

## 2023-08-19 PROCEDURE — 99231 SBSQ HOSP IP/OBS SF/LOW 25: CPT

## 2023-08-19 RX ADMIN — Medication 81 MILLIGRAM(S): at 08:47

## 2023-08-19 RX ADMIN — Medication 0.5 MILLIGRAM(S): at 08:51

## 2023-08-19 RX ADMIN — Medication 225 MILLIGRAM(S): at 08:46

## 2023-08-19 RX ADMIN — ATORVASTATIN CALCIUM 10 MILLIGRAM(S): 80 TABLET, FILM COATED ORAL at 22:51

## 2023-08-19 RX ADMIN — SENNA PLUS 2 TABLET(S): 8.6 TABLET ORAL at 08:47

## 2023-08-19 RX ADMIN — PANTOPRAZOLE SODIUM 40 MILLIGRAM(S): 20 TABLET, DELAYED RELEASE ORAL at 08:48

## 2023-08-19 RX ADMIN — LURASIDONE HYDROCHLORIDE 40 MILLIGRAM(S): 40 TABLET ORAL at 08:48

## 2023-08-19 RX ADMIN — Medication 3 MILLIGRAM(S): at 22:46

## 2023-08-19 RX ADMIN — AMLODIPINE BESYLATE 2.5 MILLIGRAM(S): 2.5 TABLET ORAL at 08:48

## 2023-08-19 RX ADMIN — Medication 2 SPRAY(S): at 08:53

## 2023-08-19 RX ADMIN — Medication 0.5 MILLIGRAM(S): at 17:32

## 2023-08-19 RX ADMIN — HALOPERIDOL DECANOATE 0.5 MILLIGRAM(S): 100 INJECTION INTRAMUSCULAR at 22:46

## 2023-08-19 RX ADMIN — LOSARTAN POTASSIUM 75 MILLIGRAM(S): 100 TABLET, FILM COATED ORAL at 08:47

## 2023-08-19 NOTE — BH INPATIENT PSYCHIATRY PROGRESS NOTE - NSBHCHARTREVIEWVS_PSY_A_CORE FT
Vital Signs Last 24 Hrs  T(C): 36.4 (08-19-23 @ 06:43), Max: 36.4 (08-19-23 @ 06:43)  T(F): 97.6 (08-19-23 @ 06:43), Max: 97.6 (08-19-23 @ 06:43)  HR: --  BP: --  BP(mean): --  RR: --  SpO2: --    Orthostatic VS  08-19-23 @ 06:43  Lying BP: --/-- HR: --  Sitting BP: 150/77 HR: 83  Standing BP: 132/69 HR: 99  Site: --  Mode: --  Orthostatic VS  08-18-23 @ 07:31  Lying BP: --/-- HR: --  Sitting BP: 140/65 HR: 97  Standing BP: 142/75 HR: 100  Site: --  Mode: --

## 2023-08-19 NOTE — BH INPATIENT PSYCHIATRY PROGRESS NOTE - NSBHASSESSSUMMFT_PSY_ALL_CORE
8/14 better but now hard to stay if responding to Abilify or prns of haldol will increase Abilify to 17mg then 20mg/d while initiating ECT discussion  8/15 Not much improvement   except less paranoid which may be attributable to haldol prn. Will d/c Abilify and start  Latuda will discuss ECT option  8/16 COnt Latuda trial while entering into discussion with patient re ECT  8/17 Tolerating latuda well; paranoia remains  8/18 Paranoia continues though improved; increase latuda to 40mg po daily - to be administered with food  8/19: On encounter today pt was seen chatting with one of her peers. Pt denied any complaints and reported feeling well. Denies paranoid thoughts or feeling afraid someone was going to hurt her. Still needing frequent redirection from staff to use her walker.

## 2023-08-19 NOTE — BH INPATIENT PSYCHIATRY PROGRESS NOTE - NSBHFUPINTERVALHXFT_PSY_A_CORE
Chart reviewed and case discussed with treatment team. Staff report patient has been eating adequately, med compliant, inconsistent use of walker. As per sleep log slept in the dayroom last night. On encounter today pt was seen chatting with one of her peers. Pt denied any complaints and reported feeling well. Denies paranoid thoughts or feeling afraid someone was going to hurt her. Still needing frequent redirection from staff to use her walker.

## 2023-08-20 PROCEDURE — 99231 SBSQ HOSP IP/OBS SF/LOW 25: CPT

## 2023-08-20 RX ORDER — HALOPERIDOL DECANOATE 100 MG/ML
1 INJECTION INTRAMUSCULAR ONCE
Refills: 0 | Status: DISCONTINUED | OUTPATIENT
Start: 2023-08-20 | End: 2023-08-20

## 2023-08-20 RX ADMIN — LURASIDONE HYDROCHLORIDE 40 MILLIGRAM(S): 40 TABLET ORAL at 08:47

## 2023-08-20 RX ADMIN — Medication 0.5 MILLIGRAM(S): at 09:00

## 2023-08-20 RX ADMIN — Medication 81 MILLIGRAM(S): at 08:49

## 2023-08-20 RX ADMIN — HALOPERIDOL DECANOATE 0.5 MILLIGRAM(S): 100 INJECTION INTRAMUSCULAR at 13:50

## 2023-08-20 RX ADMIN — Medication 225 MILLIGRAM(S): at 08:48

## 2023-08-20 RX ADMIN — Medication 2 SPRAY(S): at 20:29

## 2023-08-20 RX ADMIN — Medication 0.5 MILLIGRAM(S): at 16:57

## 2023-08-20 RX ADMIN — ATORVASTATIN CALCIUM 10 MILLIGRAM(S): 80 TABLET, FILM COATED ORAL at 20:29

## 2023-08-20 RX ADMIN — AMLODIPINE BESYLATE 2.5 MILLIGRAM(S): 2.5 TABLET ORAL at 08:47

## 2023-08-20 RX ADMIN — Medication 2 SPRAY(S): at 08:54

## 2023-08-20 RX ADMIN — SENNA PLUS 2 TABLET(S): 8.6 TABLET ORAL at 08:47

## 2023-08-20 RX ADMIN — Medication 3 MILLIGRAM(S): at 23:10

## 2023-08-20 RX ADMIN — LOSARTAN POTASSIUM 75 MILLIGRAM(S): 100 TABLET, FILM COATED ORAL at 08:47

## 2023-08-20 NOTE — BH INPATIENT PSYCHIATRY PROGRESS NOTE - NSBHASSESSSUMMFT_PSY_ALL_CORE
8/14 better but now hard to stay if responding to Abilify or prns of haldol will increase Abilify to 17mg then 20mg/d while initiating ECT discussion  8/15 Not much improvement   except less paranoid which may be attributable to haldol prn. Will d/c Abilify and start  Latuda will discuss ECT option  8/16 COnt Latuda trial while entering into discussion with patient re ECT  8/17 Tolerating latuda well; paranoia remains  8/18 Paranoia continues though improved; increase latuda to 40mg po daily - to be administered with food  8/19: On encounter today pt was seen chatting with one of her peers. Pt denied any complaints and reported feeling well. Denies paranoid thoughts or feeling afraid someone was going to hurt her. Still needing frequent redirection from staff to use her walker.   8/20 Depressed anxious fearful disorganized. Cont Latuda trial will cont to discuss ECT with patient, family is supportive

## 2023-08-20 NOTE — BH INPATIENT PSYCHIATRY PROGRESS NOTE - MSE UNSTRUCTURED FT
Appearance: fair grooming, adequate hygiene; no abnormal involuntary movements noted  Behavior: adequate boundaries, very mild psychomotor retardation, attentive, oddly related  Speech: somewhat slowed but otherwise wnl  Mood: anxious dyssphoric.   Affect: congruent appears distressed  Thought process: illogical, tangential  Thought content: paranoid delusions elicited but less severe; denies SI/HI  Perceptual disturbances: denies Ah/VH  Orientation: partially intact  Insight: poor  Judgement: poor

## 2023-08-20 NOTE — BH INPATIENT PSYCHIATRY PROGRESS NOTE - NSBHCHARTREVIEWVS_PSY_A_CORE FT
Vital Signs Last 24 Hrs  T(C): 36.2 (08-20-23 @ 06:52), Max: 36.2 (08-20-23 @ 06:52)  T(F): 97.2 (08-20-23 @ 06:52), Max: 97.2 (08-20-23 @ 06:52)  HR: --  BP: --  BP(mean): --  RR: 18 (08-20-23 @ 06:52) (18 - 18)  SpO2: --    Orthostatic VS  08-20-23 @ 06:52  Lying BP: --/-- HR: --  Sitting BP: 166/65 HR: 77  Standing BP: 155/64 HR: 99  Site: upper right arm  Mode: electronic  Orthostatic VS  08-19-23 @ 06:43  Lying BP: --/-- HR: --  Sitting BP: 150/77 HR: 83  Standing BP: 132/69 HR: 99  Site: --  Mode: --

## 2023-08-21 PROCEDURE — 99232 SBSQ HOSP IP/OBS MODERATE 35: CPT

## 2023-08-21 PROCEDURE — 93010 ELECTROCARDIOGRAM REPORT: CPT

## 2023-08-21 RX ADMIN — ATORVASTATIN CALCIUM 10 MILLIGRAM(S): 80 TABLET, FILM COATED ORAL at 20:52

## 2023-08-21 RX ADMIN — Medication 2 SPRAY(S): at 20:51

## 2023-08-21 RX ADMIN — Medication 0.5 MILLIGRAM(S): at 08:13

## 2023-08-21 RX ADMIN — LURASIDONE HYDROCHLORIDE 40 MILLIGRAM(S): 40 TABLET ORAL at 08:12

## 2023-08-21 RX ADMIN — HALOPERIDOL DECANOATE 0.5 MILLIGRAM(S): 100 INJECTION INTRAMUSCULAR at 12:38

## 2023-08-21 RX ADMIN — Medication 0.5 MILLIGRAM(S): at 17:23

## 2023-08-21 RX ADMIN — LACTULOSE 10 GRAM(S): 10 SOLUTION ORAL at 08:14

## 2023-08-21 RX ADMIN — Medication 3 MILLIGRAM(S): at 20:52

## 2023-08-21 RX ADMIN — Medication 5 MILLIGRAM(S): at 20:53

## 2023-08-21 RX ADMIN — LOSARTAN POTASSIUM 75 MILLIGRAM(S): 100 TABLET, FILM COATED ORAL at 08:13

## 2023-08-21 RX ADMIN — PANTOPRAZOLE SODIUM 40 MILLIGRAM(S): 20 TABLET, DELAYED RELEASE ORAL at 06:31

## 2023-08-21 RX ADMIN — HALOPERIDOL DECANOATE 0.5 MILLIGRAM(S): 100 INJECTION INTRAMUSCULAR at 21:46

## 2023-08-21 RX ADMIN — Medication 225 MILLIGRAM(S): at 08:13

## 2023-08-21 RX ADMIN — AMLODIPINE BESYLATE 2.5 MILLIGRAM(S): 2.5 TABLET ORAL at 08:13

## 2023-08-21 RX ADMIN — SENNA PLUS 2 TABLET(S): 8.6 TABLET ORAL at 08:12

## 2023-08-21 RX ADMIN — Medication 2 SPRAY(S): at 08:14

## 2023-08-21 RX ADMIN — Medication 81 MILLIGRAM(S): at 08:13

## 2023-08-21 NOTE — BH INPATIENT PSYCHIATRY PROGRESS NOTE - NSBHCHARTREVIEWVS_PSY_A_CORE FT
Vital Signs Last 24 Hrs  T(C): 36.4 (08-21-23 @ 08:00), Max: 36.4 (08-21-23 @ 08:00)  T(F): 97.6 (08-21-23 @ 08:00), Max: 97.6 (08-21-23 @ 08:00)  HR: --  BP: --  BP(mean): --  RR: --  SpO2: --    Orthostatic VS  08-21-23 @ 08:00  Lying BP: --/-- HR: --  Sitting BP: 164/52 HR: 83  Standing BP: 147/75 HR: 90  Site: --  Mode: --  Orthostatic VS  08-20-23 @ 06:52  Lying BP: --/-- HR: --  Sitting BP: 166/65 HR: 77  Standing BP: 155/64 HR: 99  Site: upper right arm  Mode: electronic

## 2023-08-21 NOTE — BH INPATIENT PSYCHIATRY PROGRESS NOTE - NSBHFUPINTERVALHXFT_PSY_A_CORE
Patient seen for psychotic depression. No overnight events but this AM agitated, hard to redirect, needed prn. VSS sl elevated. Patient not sleeping in bed for three days

## 2023-08-21 NOTE — BH INPATIENT PSYCHIATRY PROGRESS NOTE - MSE UNSTRUCTURED FT
Appearance: fair grooming, adequate hygiene; mild tremor, not stiff. Does better with voice amplifier  Behavior: adequate boundaries, very mild psychomotor retardation, attentive  Speech: somewhat slowed but otherwise wnl  Mood: anxious dysphoric.   Affect: congruent appears distressed  Thought process: illogical, tangential  Thought content: paranoid delusions elicited but less severe; denies SI/HI. Says there was a smell coming from bed though deneid feeling it indicated a toxic substance   Perceptual disturbances: denies Ah/VH  Orientation: partially intact  Insight: poor  Judgement: poor

## 2023-08-21 NOTE — BH INPATIENT PSYCHIATRY PROGRESS NOTE - NSBHASSESSSUMMFT_PSY_ALL_CORE
8/14 better but now hard to stay if responding to Abilify or prns of haldol will increase Abilify to 17mg then 20mg/d while initiating ECT discussion  8/15 Not much improvement   except less paranoid which may be attributable to haldol prn. Will d/c Abilify and start  Latuda will discuss ECT option  8/16 COnt Latuda trial while entering into discussion with patient re ECT  8/17 Tolerating latuda well; paranoia remains  8/18 Paranoia continues though improved; increase latuda to 40mg po daily - to be administered with food  8/19: On encounter today pt was seen chatting with one of her peers. Pt denied any complaints and reported feeling well. Denies paranoid thoughts or feeling afraid someone was going to hurt her. Still needing frequent redirection from staff to use her walker.   8/20 Depressed anxious fearful disorganized. Cont Latuda trial will cont to discuss ECT with patient, family is supportive  8/21 Depressed psychotic partial response will cont with ECT patient amenable as long as a family member goes with her for fear she will go off unit and never return. Will get labs ask medicine to see

## 2023-08-22 LAB
ANION GAP SERPL CALC-SCNC: 12 MMOL/L — SIGNIFICANT CHANGE UP (ref 7–14)
BASOPHILS # BLD AUTO: 0.02 K/UL — SIGNIFICANT CHANGE UP (ref 0–0.2)
BASOPHILS NFR BLD AUTO: 0.2 % — SIGNIFICANT CHANGE UP (ref 0–2)
BUN SERPL-MCNC: 8 MG/DL — SIGNIFICANT CHANGE UP (ref 7–23)
CALCIUM SERPL-MCNC: 10.1 MG/DL — SIGNIFICANT CHANGE UP (ref 8.4–10.5)
CHLORIDE SERPL-SCNC: 95 MMOL/L — LOW (ref 98–107)
CO2 SERPL-SCNC: 24 MMOL/L — SIGNIFICANT CHANGE UP (ref 22–31)
CREAT SERPL-MCNC: 0.64 MG/DL — SIGNIFICANT CHANGE UP (ref 0.5–1.3)
EGFR: 88 ML/MIN/1.73M2 — SIGNIFICANT CHANGE UP
EOSINOPHIL # BLD AUTO: 0.11 K/UL — SIGNIFICANT CHANGE UP (ref 0–0.5)
EOSINOPHIL NFR BLD AUTO: 1.3 % — SIGNIFICANT CHANGE UP (ref 0–6)
GLUCOSE SERPL-MCNC: 111 MG/DL — HIGH (ref 70–99)
HCT VFR BLD CALC: 37.6 % — SIGNIFICANT CHANGE UP (ref 34.5–45)
HGB BLD-MCNC: 12 G/DL — SIGNIFICANT CHANGE UP (ref 11.5–15.5)
IANC: 5.48 K/UL — SIGNIFICANT CHANGE UP (ref 1.8–7.4)
IMM GRANULOCYTES NFR BLD AUTO: 0.4 % — SIGNIFICANT CHANGE UP (ref 0–0.9)
LYMPHOCYTES # BLD AUTO: 1.96 K/UL — SIGNIFICANT CHANGE UP (ref 1–3.3)
LYMPHOCYTES # BLD AUTO: 23.5 % — SIGNIFICANT CHANGE UP (ref 13–44)
MCHC RBC-ENTMCNC: 26.5 PG — LOW (ref 27–34)
MCHC RBC-ENTMCNC: 31.9 GM/DL — LOW (ref 32–36)
MCV RBC AUTO: 83.2 FL — SIGNIFICANT CHANGE UP (ref 80–100)
MONOCYTES # BLD AUTO: 0.73 K/UL — SIGNIFICANT CHANGE UP (ref 0–0.9)
MONOCYTES NFR BLD AUTO: 8.8 % — SIGNIFICANT CHANGE UP (ref 2–14)
NEUTROPHILS # BLD AUTO: 5.48 K/UL — SIGNIFICANT CHANGE UP (ref 1.8–7.4)
NEUTROPHILS NFR BLD AUTO: 65.8 % — SIGNIFICANT CHANGE UP (ref 43–77)
NRBC # BLD: 0 /100 WBCS — SIGNIFICANT CHANGE UP (ref 0–0)
NRBC # FLD: 0 K/UL — SIGNIFICANT CHANGE UP (ref 0–0)
PLATELET # BLD AUTO: 242 K/UL — SIGNIFICANT CHANGE UP (ref 150–400)
POTASSIUM SERPL-MCNC: 4.8 MMOL/L — SIGNIFICANT CHANGE UP (ref 3.5–5.3)
POTASSIUM SERPL-SCNC: 4.8 MMOL/L — SIGNIFICANT CHANGE UP (ref 3.5–5.3)
RBC # BLD: 4.52 M/UL — SIGNIFICANT CHANGE UP (ref 3.8–5.2)
RBC # FLD: 15.9 % — HIGH (ref 10.3–14.5)
SODIUM SERPL-SCNC: 131 MMOL/L — LOW (ref 135–145)
WBC # BLD: 8.33 K/UL — SIGNIFICANT CHANGE UP (ref 3.8–10.5)
WBC # FLD AUTO: 8.33 K/UL — SIGNIFICANT CHANGE UP (ref 3.8–10.5)

## 2023-08-22 PROCEDURE — 99232 SBSQ HOSP IP/OBS MODERATE 35: CPT

## 2023-08-22 RX ORDER — CLONAZEPAM 1 MG
0.5 TABLET ORAL
Refills: 0 | Status: DISCONTINUED | OUTPATIENT
Start: 2023-08-22 | End: 2023-08-26

## 2023-08-22 RX ORDER — LURASIDONE HYDROCHLORIDE 40 MG/1
60 TABLET ORAL DAILY
Refills: 0 | Status: DISCONTINUED | OUTPATIENT
Start: 2023-08-22 | End: 2023-08-24

## 2023-08-22 RX ADMIN — ATORVASTATIN CALCIUM 10 MILLIGRAM(S): 80 TABLET, FILM COATED ORAL at 21:25

## 2023-08-22 RX ADMIN — Medication 81 MILLIGRAM(S): at 09:37

## 2023-08-22 RX ADMIN — LOSARTAN POTASSIUM 75 MILLIGRAM(S): 100 TABLET, FILM COATED ORAL at 09:37

## 2023-08-22 RX ADMIN — Medication 650 MILLIGRAM(S): at 22:03

## 2023-08-22 RX ADMIN — Medication 0.5 MILLIGRAM(S): at 09:37

## 2023-08-22 RX ADMIN — Medication 2 SPRAY(S): at 21:24

## 2023-08-22 RX ADMIN — LURASIDONE HYDROCHLORIDE 40 MILLIGRAM(S): 40 TABLET ORAL at 09:37

## 2023-08-22 RX ADMIN — Medication 3 MILLIGRAM(S): at 21:25

## 2023-08-22 RX ADMIN — HALOPERIDOL DECANOATE 0.5 MILLIGRAM(S): 100 INJECTION INTRAMUSCULAR at 21:26

## 2023-08-22 RX ADMIN — Medication 5 MILLIGRAM(S): at 22:00

## 2023-08-22 RX ADMIN — Medication 0.5 MILLIGRAM(S): at 16:54

## 2023-08-22 RX ADMIN — AMLODIPINE BESYLATE 2.5 MILLIGRAM(S): 2.5 TABLET ORAL at 09:37

## 2023-08-22 RX ADMIN — Medication 225 MILLIGRAM(S): at 09:37

## 2023-08-22 RX ADMIN — LACTULOSE 10 GRAM(S): 10 SOLUTION ORAL at 09:38

## 2023-08-22 RX ADMIN — SENNA PLUS 2 TABLET(S): 8.6 TABLET ORAL at 09:38

## 2023-08-22 RX ADMIN — PANTOPRAZOLE SODIUM 40 MILLIGRAM(S): 20 TABLET, DELAYED RELEASE ORAL at 06:43

## 2023-08-22 RX ADMIN — Medication 650 MILLIGRAM(S): at 23:03

## 2023-08-22 RX ADMIN — Medication 2 SPRAY(S): at 09:36

## 2023-08-22 NOTE — BH INPATIENT PSYCHIATRY PROGRESS NOTE - NSBHASSESSSUMMFT_PSY_ALL_CORE
8/14 better but now hard to stay if responding to Abilify or prns of haldol will increase Abilify to 17mg then 20mg/d while initiating ECT discussion  8/15 Not much improvement   except less paranoid which may be attributable to haldol prn. Will d/c Abilify and start  Latuda will discuss ECT option  8/16 COnt Latuda trial while entering into discussion with patient re ECT  8/17 Tolerating latuda well; paranoia remains  8/18 Paranoia continues though improved; increase latuda to 40mg po daily - to be administered with food  8/19: On encounter today pt was seen chatting with one of her peers. Pt denied any complaints and reported feeling well. Denies paranoid thoughts or feeling afraid someone was going to hurt her. Still needing frequent redirection from staff to use her walker.   8/20 Depressed anxious fearful disorganized. Cont Latuda trial will cont to discuss ECT with patient, family is supportive  8/21 Depressed psychotic partial response will cont with ECT patient amenable as long as a family member goes with her for fear she will go off unit and never return. Will get labs ask medicine to see  8/22 Depressed not responded well to meds will increase Latuda while prepping for ECT. Will clarify need for ASA given sevwere noselbleed hx will discuss nosebleed issue with ECT

## 2023-08-22 NOTE — BH INPATIENT PSYCHIATRY PROGRESS NOTE - NSBHCHARTREVIEWVS_PSY_A_CORE FT
Vital Signs Last 24 Hrs  T(C): 36.6 (08-22-23 @ 05:35), Max: 36.6 (08-22-23 @ 05:35)  T(F): 97.8 (08-22-23 @ 05:35), Max: 97.8 (08-22-23 @ 05:35)  HR: --  BP: --  BP(mean): --  RR: 19 (08-22-23 @ 05:35) (19 - 19)  SpO2: --    Orthostatic VS  08-22-23 @ 05:35  Lying BP: --/-- HR: --  Sitting BP: 139/65 HR: 101  Standing BP: 131/67 HR: 104  Site: --  Mode: --  Orthostatic VS  08-21-23 @ 08:00  Lying BP: --/-- HR: --  Sitting BP: 164/52 HR: 83  Standing BP: 147/75 HR: 90  Site: --  Mode: --

## 2023-08-22 NOTE — BH INPATIENT PSYCHIATRY PROGRESS NOTE - NSBHFUPINTERVALHXFT_PSY_A_CORE
Patient seen for psychotic depression. No overnight events , calmer today. VSS sl elevated. Patient not sleeping in bed for three days. Had brief nosebleed stop on its own. Did not sleep in bed til later at night

## 2023-08-22 NOTE — BH INPATIENT PSYCHIATRY PROGRESS NOTE - MSE UNSTRUCTURED FT
Appearance: fair grooming, adequate hygiene; mild tremor, not stiff. Does better with voice amplifier  Behavior: adequate boundaries, very mild psychomotor retardation, attentive  Speech: somewhat slowed but otherwise wnl  Mood: anxious dysphoric.   Affect: congruent appears distressed  Thought process: illogical, tangential  Thought content: paranoid delusions elicited but less severe; denies SI/HI. substance   Perceptual disturbances: denies Ah/VH  Orientation: partially intact  Insight: poor  Judgement: poor

## 2023-08-22 NOTE — BH INPATIENT PSYCHIATRY PROGRESS NOTE - CURRENT MEDICATION
MEDICATIONS  (STANDING):  amLODIPine   Tablet 2.5 milliGRAM(s) Oral daily  aspirin  chewable 81 milliGRAM(s) Oral daily  atorvastatin 10 milliGRAM(s) Oral at bedtime  clonazePAM Oral Disintegrating Tablet 0.5 milliGRAM(s) Oral <User Schedule>  glucagon  Injectable 1 milliGRAM(s) IntraMuscular once  lactulose Syrup 10 Gram(s) Oral daily  losartan 75 milliGRAM(s) Oral daily  lurasidone 60 milliGRAM(s) Oral daily  pantoprazole   Suspension 40 milliGRAM(s) Oral before breakfast  senna 2 Tablet(s) Oral daily  sodium chloride 0.65% Nasal 2 Spray(s) Both Nostrils two times a day  venlafaxine 225 milliGRAM(s) Oral daily    MEDICATIONS  (PRN):  acetaminophen     Tablet .. 650 milliGRAM(s) Oral every 6 hours PRN Temp greater or equal to 38C (100.4F), Mild Pain (1 - 3), Moderate Pain (4 - 6)  bisacodyl 5 milliGRAM(s) Oral daily PRN constipation  bisacodyl Suppository 10 milliGRAM(s) Rectal daily PRN constipation  dextrose Oral Gel 15 Gram(s) Oral once PRN Blood Glucose LESS THAN 70 milliGRAM(s)/deciliter  haloperidol     Tablet 0.5 milliGRAM(s) Oral every 6 hours PRN agitation  haloperidol    Injectable 1 milliGRAM(s) IntraMuscular once PRN agitation  lidocaine   4% Patch 1 Patch Transdermal daily PRN LBP  melatonin. 3 milliGRAM(s) Oral at bedtime PRN Insomnia  simethicone 80 milliGRAM(s) Chew every 4 hours PRN abdominal discomfort

## 2023-08-23 PROCEDURE — 99232 SBSQ HOSP IP/OBS MODERATE 35: CPT

## 2023-08-23 RX ORDER — ASPIRIN/CALCIUM CARB/MAGNESIUM 324 MG
81 TABLET ORAL ONCE
Refills: 0 | Status: DISCONTINUED | OUTPATIENT
Start: 2023-08-23 | End: 2023-08-24

## 2023-08-23 RX ORDER — ASPIRIN/CALCIUM CARB/MAGNESIUM 324 MG
81 TABLET ORAL DAILY
Refills: 0 | Status: DISCONTINUED | OUTPATIENT
Start: 2023-08-23 | End: 2023-08-28

## 2023-08-23 RX ADMIN — LOSARTAN POTASSIUM 75 MILLIGRAM(S): 100 TABLET, FILM COATED ORAL at 09:48

## 2023-08-23 RX ADMIN — LACTULOSE 10 GRAM(S): 10 SOLUTION ORAL at 09:49

## 2023-08-23 RX ADMIN — Medication 2 SPRAY(S): at 09:49

## 2023-08-23 RX ADMIN — Medication 225 MILLIGRAM(S): at 09:48

## 2023-08-23 RX ADMIN — Medication 0.5 MILLIGRAM(S): at 17:40

## 2023-08-23 RX ADMIN — HALOPERIDOL DECANOATE 0.5 MILLIGRAM(S): 100 INJECTION INTRAMUSCULAR at 21:40

## 2023-08-23 RX ADMIN — SENNA PLUS 2 TABLET(S): 8.6 TABLET ORAL at 09:49

## 2023-08-23 RX ADMIN — AMLODIPINE BESYLATE 2.5 MILLIGRAM(S): 2.5 TABLET ORAL at 09:48

## 2023-08-23 RX ADMIN — PANTOPRAZOLE SODIUM 40 MILLIGRAM(S): 20 TABLET, DELAYED RELEASE ORAL at 06:28

## 2023-08-23 RX ADMIN — Medication 0.5 MILLIGRAM(S): at 09:46

## 2023-08-23 RX ADMIN — ATORVASTATIN CALCIUM 10 MILLIGRAM(S): 80 TABLET, FILM COATED ORAL at 21:42

## 2023-08-23 RX ADMIN — Medication 2 SPRAY(S): at 21:38

## 2023-08-23 RX ADMIN — Medication 650 MILLIGRAM(S): at 21:39

## 2023-08-23 RX ADMIN — Medication 650 MILLIGRAM(S): at 22:39

## 2023-08-23 RX ADMIN — Medication 3 MILLIGRAM(S): at 21:41

## 2023-08-23 RX ADMIN — LURASIDONE HYDROCHLORIDE 60 MILLIGRAM(S): 40 TABLET ORAL at 09:48

## 2023-08-23 NOTE — CHART NOTE - NSCHARTNOTEFT_GEN_A_CORE
Ms. Olga Jones is a patient with dementia, DM2, HTN and Anxiety now deemed to have need for ECT. Currently planned for ECT on 8/25.     Pre-procedure evaluation: See Internal Medicine Progress Note from 8/22 for details.    ECT specific risk assessment: Pt without history of increased ICP, aneurysm, active pulmonary disease, valvular disease, CAD, cerebral vascular disease.     Cardiovascular risk assessment: Based on RCRI, patient is class I risk, with a 3.9%, 30 day risk of death, MI, or cardiac arrest. The patient is at low risk for cardiovascular complications from the low risk procedure, ECT.     Risk for complication is low. Patient is medically optimized for ECT treatment without further intervention and can proceed with treatment.    Patient is otherwise medically optimized to proceed with ECT without any further cardiac or pulmonary testing. Ms. Olga Jones is a patient with dementia, DM2, HTN and Anxiety now deemed to have need for ECT. Currently planned for ECT on 8/25.     Pre-procedure evaluation: See Internal Medicine Progress Note from 8/22 for details.    ECT specific risk assessment: Pt without history of increased ICP, aneurysm, active pulmonary disease, valvular disease, CAD, cerebral vascular disease.     EKG reviewed: NSR with QTc of 462ms. No ischemic changes noted.    Cardiovascular risk assessment: Based on RCRI, patient is class I risk, with a 3.9%, 30 day risk of death, MI, or cardiac arrest. The patient is at low risk for cardiovascular complications from the low risk procedure, ECT.     Risk for complication is low. Patient is medically optimized for ECT treatment without further intervention and can proceed with treatment.    Patient is otherwise medically optimized to proceed with ECT without any further cardiac or pulmonary testing.

## 2023-08-23 NOTE — BH INPATIENT PSYCHIATRY PROGRESS NOTE - CURRENT MEDICATION
MEDICATIONS  (STANDING):  amLODIPine   Tablet 2.5 milliGRAM(s) Oral daily  aspirin  chewable 81 milliGRAM(s) Oral once  aspirin  chewable 81 milliGRAM(s) Oral daily  atorvastatin 10 milliGRAM(s) Oral at bedtime  clonazePAM Oral Disintegrating Tablet 0.5 milliGRAM(s) Oral <User Schedule>  glucagon  Injectable 1 milliGRAM(s) IntraMuscular once  lactulose Syrup 10 Gram(s) Oral daily  losartan 75 milliGRAM(s) Oral daily  lurasidone 60 milliGRAM(s) Oral daily  pantoprazole   Suspension 40 milliGRAM(s) Oral before breakfast  senna 2 Tablet(s) Oral daily  sodium chloride 0.65% Nasal 2 Spray(s) Both Nostrils two times a day  venlafaxine 225 milliGRAM(s) Oral daily    MEDICATIONS  (PRN):  acetaminophen     Tablet .. 650 milliGRAM(s) Oral every 6 hours PRN Temp greater or equal to 38C (100.4F), Mild Pain (1 - 3), Moderate Pain (4 - 6)  bisacodyl 5 milliGRAM(s) Oral daily PRN constipation  bisacodyl Suppository 10 milliGRAM(s) Rectal daily PRN constipation  dextrose Oral Gel 15 Gram(s) Oral once PRN Blood Glucose LESS THAN 70 milliGRAM(s)/deciliter  haloperidol     Tablet 0.5 milliGRAM(s) Oral every 6 hours PRN agitation  haloperidol    Injectable 1 milliGRAM(s) IntraMuscular once PRN agitation  lidocaine   4% Patch 1 Patch Transdermal daily PRN LBP  melatonin. 3 milliGRAM(s) Oral at bedtime PRN Insomnia  simethicone 80 milliGRAM(s) Chew every 4 hours PRN abdominal discomfort

## 2023-08-23 NOTE — BH INPATIENT PSYCHIATRY PROGRESS NOTE - NSBHASSESSSUMMFT_PSY_ALL_CORE
8/14 better but now hard to stay if responding to Abilify or prns of haldol will increase Abilify to 17mg then 20mg/d while initiating ECT discussion  8/15 Not much improvement   except less paranoid which may be attributable to haldol prn. Will d/c Abilify and start  Latuda will discuss ECT option  8/16 COnt Latuda trial while entering into discussion with patient re ECT  8/17 Tolerating latuda well; paranoia remains  8/18 Paranoia continues though improved; increase latuda to 40mg po daily - to be administered with food  8/19: On encounter today pt was seen chatting with one of her peers. Pt denied any complaints and reported feeling well. Denies paranoid thoughts or feeling afraid someone was going to hurt her. Still needing frequent redirection from staff to use her walker.   8/20 Depressed anxious fearful disorganized. Cont Latuda trial will cont to discuss ECT with patient, family is supportive  8/21 Depressed psychotic partial response will cont with ECT patient amenable as long as a family member goes with her for fear she will go off unit and never return. Will get labs ask medicine to see  8/22 Depressed not responded well to meds will increase Latuda while prepping for ECT. Will clarify need for ASA given severe noselbleed hx will discuss nosebleed issue with ECT   8/23 Cont ECT w/u discussed with medicine will   cont ASA

## 2023-08-23 NOTE — BH INPATIENT PSYCHIATRY PROGRESS NOTE - NSBHFUPINTERVALHXFT_PSY_A_CORE
Patient seen for psychotic depression. No overnight events , calmer today. VSS sl elevated. Patient slept in bed  No further nosebleeds. Eating okay.

## 2023-08-23 NOTE — BH INPATIENT PSYCHIATRY PROGRESS NOTE - NSBHCHARTREVIEWVS_PSY_A_CORE FT
Vital Signs Last 24 Hrs  T(C): --  T(F): --  HR: --  BP: --  BP(mean): --  RR: --  SpO2: --    Orthostatic VS  08-23-23 @ 09:28  Lying BP: --/-- HR: --  Sitting BP: 158/62 HR: 96  Standing BP: 162/68 HR: 108  Site: upper left arm  Mode: auscultated w/ stethoscope  Orthostatic VS  08-22-23 @ 05:35  Lying BP: --/-- HR: --  Sitting BP: 139/65 HR: 101  Standing BP: 131/67 HR: 104  Site: --  Mode: --

## 2023-08-23 NOTE — BH INPATIENT PSYCHIATRY PROGRESS NOTE - MSE UNSTRUCTURED FT
Appearance: fair grooming, adequate hygiene; mild tremor, not stiff. Does better with voice amplifier  Behavior: adequate boundaries, very mild psychomotor retardation, attentive  Speech: somewhat slowed but otherwise wnl  Mood: anxious dysphoric.   Affect: congruent appears distressed  Thought process: illogical, tangential  Thought content: paranoid delusions elicited but less severe remains fearful of going off unit as scared she will be moved permanently; denies SI/HI. substance   Perceptual disturbances: denies Ah/VH  Orientation: partially intact. MMSE 18/30  Insight: poor  Judgement: poor

## 2023-08-24 PROCEDURE — 99232 SBSQ HOSP IP/OBS MODERATE 35: CPT

## 2023-08-24 RX ORDER — FAMOTIDINE 10 MG/ML
20 INJECTION INTRAVENOUS
Refills: 0 | Status: DISCONTINUED | OUTPATIENT
Start: 2023-08-24 | End: 2023-10-13

## 2023-08-24 RX ORDER — AMLODIPINE BESYLATE 2.5 MG/1
2.5 TABLET ORAL
Refills: 0 | Status: DISCONTINUED | OUTPATIENT
Start: 2023-08-24 | End: 2023-10-17

## 2023-08-24 RX ORDER — LACTULOSE 10 G/15ML
10 SOLUTION ORAL
Refills: 0 | Status: DISCONTINUED | OUTPATIENT
Start: 2023-08-25 | End: 2023-10-17

## 2023-08-24 RX ORDER — LURASIDONE HYDROCHLORIDE 40 MG/1
40 TABLET ORAL DAILY
Refills: 0 | Status: DISCONTINUED | OUTPATIENT
Start: 2023-08-24 | End: 2023-08-31

## 2023-08-24 RX ORDER — LOSARTAN POTASSIUM 100 MG/1
75 TABLET, FILM COATED ORAL
Refills: 0 | Status: DISCONTINUED | OUTPATIENT
Start: 2023-08-24 | End: 2023-10-17

## 2023-08-24 RX ADMIN — Medication 0.5 MILLIGRAM(S): at 09:37

## 2023-08-24 RX ADMIN — AMLODIPINE BESYLATE 2.5 MILLIGRAM(S): 2.5 TABLET ORAL at 09:36

## 2023-08-24 RX ADMIN — LOSARTAN POTASSIUM 75 MILLIGRAM(S): 100 TABLET, FILM COATED ORAL at 09:36

## 2023-08-24 RX ADMIN — Medication 0.5 MILLIGRAM(S): at 17:10

## 2023-08-24 RX ADMIN — Medication 81 MILLIGRAM(S): at 09:36

## 2023-08-24 RX ADMIN — Medication 3 MILLIGRAM(S): at 21:37

## 2023-08-24 RX ADMIN — Medication 225 MILLIGRAM(S): at 09:36

## 2023-08-24 RX ADMIN — LURASIDONE HYDROCHLORIDE 60 MILLIGRAM(S): 40 TABLET ORAL at 09:36

## 2023-08-24 RX ADMIN — ATORVASTATIN CALCIUM 10 MILLIGRAM(S): 80 TABLET, FILM COATED ORAL at 21:21

## 2023-08-24 RX ADMIN — PANTOPRAZOLE SODIUM 40 MILLIGRAM(S): 20 TABLET, DELAYED RELEASE ORAL at 06:30

## 2023-08-24 RX ADMIN — Medication 2 SPRAY(S): at 21:22

## 2023-08-24 NOTE — BH INPATIENT PSYCHIATRY PROGRESS NOTE - NSBHASSESSSUMMFT_PSY_ALL_CORE
8/14 better but now hard to stay if responding to Abilify or prns of haldol will increase Abilify to 17mg then 20mg/d while initiating ECT discussion  8/15 Not much improvement   except less paranoid which may be attributable to haldol prn. Will d/c Abilify and start  Latuda will discuss ECT option  8/16 COnt Latuda trial while entering into discussion with patient re ECT  8/17 Tolerating latuda well; paranoia remains  8/18 Paranoia continues though improved; increase latuda to 40mg po daily - to be administered with food  8/19: On encounter today pt was seen chatting with one of her peers. Pt denied any complaints and reported feeling well. Denies paranoid thoughts or feeling afraid someone was going to hurt her. Still needing frequent redirection from staff to use her walker.   8/20 Depressed anxious fearful disorganized. Cont Latuda trial will cont to discuss ECT with patient, family is supportive  8/21 Depressed psychotic partial response will cont with ECT patient amenable as long as a family member goes with her for fear she will go off unit and never return. Will get labs ask medicine to see  8/22 Depressed not responded well to meds will increase Latuda while prepping for ECT. Will clarify need for ASA given severe nosebleed hx will discuss nosebleed issue with ECT   8/23 Cont ECT w/u discussed with medicine will   cont ASA  8/24 Psychotic depression with limited response to  multiple medication trials. Calmer less floridly delusion but anhedonia in state of constant distress. Patient assents to ECT, daughter Marcelina who is her HCP will provide consent. Patient requests family accompany her to ECT  for support as well as likely afraid she will not come back to unit if taken off. Will d/c Protonix suspension and use Pepcid as the former cannot be give while patient NPO. will lower Latuda as want to begin to reduce meds concurrent with ECT

## 2023-08-24 NOTE — ECT CONSULT NOTE - NSECTMENTALSTATUSEXAM_PSY_ALL_CORE
A, Ox3, cooperative. Speech is fluent, low volume and reduced production. Thought is linear, delusions of reference and persecution. Denies current SI, no perceptual disturbances. Mood is “down”, affect is restricted.  poor insight and judgment.

## 2023-08-24 NOTE — BH INPATIENT PSYCHIATRY PROGRESS NOTE - NSBHFUPINTERVALHXFT_PSY_A_CORE
Patient seen for psychotic depression. No overnight events , overall calmer. VSS . Patient slept in bed  No further nosebleeds. Eating okay.

## 2023-08-24 NOTE — BH INPATIENT PSYCHIATRY PROGRESS NOTE - CURRENT MEDICATION
MEDICATIONS  (STANDING):  amLODIPine   Tablet 2.5 milliGRAM(s) Oral <User Schedule>  aspirin  chewable 81 milliGRAM(s) Oral daily  atorvastatin 10 milliGRAM(s) Oral at bedtime  clonazePAM Oral Disintegrating Tablet 0.5 milliGRAM(s) Oral <User Schedule>  famotidine    Tablet 20 milliGRAM(s) Oral <User Schedule>  glucagon  Injectable 1 milliGRAM(s) IntraMuscular once  losartan 75 milliGRAM(s) Oral <User Schedule>  lurasidone 40 milliGRAM(s) Oral daily  senna 2 Tablet(s) Oral daily  sodium chloride 0.65% Nasal 2 Spray(s) Both Nostrils two times a day  venlafaxine 225 milliGRAM(s) Oral daily    MEDICATIONS  (PRN):  acetaminophen     Tablet .. 650 milliGRAM(s) Oral every 6 hours PRN Temp greater or equal to 38C (100.4F), Mild Pain (1 - 3), Moderate Pain (4 - 6)  bisacodyl 5 milliGRAM(s) Oral daily PRN constipation  bisacodyl Suppository 10 milliGRAM(s) Rectal daily PRN constipation  dextrose Oral Gel 15 Gram(s) Oral once PRN Blood Glucose LESS THAN 70 milliGRAM(s)/deciliter  haloperidol     Tablet 0.5 milliGRAM(s) Oral every 6 hours PRN agitation  haloperidol    Injectable 1 milliGRAM(s) IntraMuscular once PRN agitation  lidocaine   4% Patch 1 Patch Transdermal daily PRN LBP  melatonin. 3 milliGRAM(s) Oral at bedtime PRN Insomnia  simethicone 80 milliGRAM(s) Chew every 4 hours PRN abdominal discomfort

## 2023-08-24 NOTE — BH INPATIENT PSYCHIATRY PROGRESS NOTE - NSBHCHARTREVIEWVS_PSY_A_CORE FT
Vital Signs Last 24 Hrs  T(C): 36.8 (08-24-23 @ 06:56), Max: 36.8 (08-24-23 @ 06:56)  T(F): 98.2 (08-24-23 @ 06:56), Max: 98.2 (08-24-23 @ 06:56)  HR: --  BP: --  BP(mean): --  RR: --  SpO2: --    Orthostatic VS  08-24-23 @ 06:56  Lying BP: 141/53 HR: 78  Sitting BP: 128/53 HR: 84  Standing BP: --/-- HR: --  Site: --  Mode: --  Orthostatic VS  08-23-23 @ 09:28  Lying BP: --/-- HR: --  Sitting BP: 158/62 HR: 96  Standing BP: 162/68 HR: 108  Site: upper left arm  Mode: auscultated w/ stethoscope

## 2023-08-24 NOTE — BH INPATIENT PSYCHIATRY PROGRESS NOTE - MSE UNSTRUCTURED FT
Appearance: fair grooming, adequate hygiene; mild tremor, not stiff. Does better with voice amplifier  Behavior: adequate boundaries, very mild psychomotor retardation, attentive  Speech: somewhat slowed but otherwise wnl  Mood: anxious dysphoric.   Affect: congruent appears distressed  Thought process: illogical, tangential  Thought content: paranoid delusions elicited but less severe remains fearful of going off unit as scared she will be moved permanently though does not reveal this directly; denies SI/HI  Perceptual disturbances: denies Ah/VH  Orientation: partially intact. MMSE 18/30  Insight: has awareness that she has depression causing her to feel badly  Judgement: decreased

## 2023-08-24 NOTE — ECT CONSULT NOTE - OTHER PAST PSYCHIATRIC HISTORY (INCLUDE DETAILS REGARDING ONSET, COURSE OF ILLNESS, INPATIENT/OUTPATIENT TREATMENT)
81-year-old , retired female, domiciled at the [facility], [facility] of dementia, DM2, and HTN, PPH of late onset Bipolar I disorder, PTSD and Cluster B Personality traits, 2 prior psych adm last in 2022 at [facility], who presented to ED with worsening paranoia, anxiety, FTT (weight loss of 20lb since march), and confusion. Psychiatry was consulted for psychosis/AMS. Patient is now being transferred to [facility] for psychiatric treatment and stabilization.    Per CL assessment note on 5/31, "Patient extremely anxious, histrionic, perseverates on an imagined painful procedure that she thinks the hospital is going to do to her to unclear ends, thinks that people at her [employer] are stealing her things and poisoning her food, denies SI/HI here and is somewhat less paranoid about food here, no clear AH/VH noted. Mildly hyperverbal but also very hard of hearing, able to be redirected. AOx2, recall 3/3 but patient repeats words for several minutes in anxious state, attention fair, 2/3 on change making exercise, makes task switching errors on luria exam, naming intact." Collateral from her daughter revealed that she was refusing food and to complete her ADLs. The decompensation started in March 2023 with numerous medical issues (uncontrolled epistaxis, then UTI with multiple abx trials and eventual need of ferro for 1 week), but further declined with start of Klonopin to target her anxiety. On CL, patient was tapered off of Gabapentin and starting low dose Seroquel in addition to her home medication.  During admission, pt presented with marked suspiciousness, frequently talks about being taken “somewhere” or being dismembered (!) if she does things alone, talks about conspiracies and “they” doing “things on purpose”.   Today, she states that she feels “very bad”, endorses sad mood and anhedonia. She continues to take vaguely about conspiracies, and when asked about ECT, she states that she would only go if accompanied by her daughter, for fear of being abducted. She denies AH, denies anxiety, denies insomnia. Denies SI currently.  She understands the nature of ECT treatment, asks appropriate questions. She states that she is agreeble, but does not want to sign because she "wants her daughter to go with her".

## 2023-08-25 LAB — GLUCOSE BLDC GLUCOMTR-MCNC: 119 MG/DL — HIGH (ref 70–99)

## 2023-08-25 PROCEDURE — 90870 ELECTROCONVULSIVE THERAPY: CPT

## 2023-08-25 PROCEDURE — 99232 SBSQ HOSP IP/OBS MODERATE 35: CPT | Mod: 25

## 2023-08-25 RX ORDER — SODIUM CHLORIDE 0.65 %
2 AEROSOL, SPRAY (ML) NASAL THREE TIMES A DAY
Refills: 0 | Status: DISCONTINUED | OUTPATIENT
Start: 2023-08-25 | End: 2023-10-17

## 2023-08-25 RX ORDER — FLUMAZENIL 0.1 MG/ML
0.2 VIAL (ML) INTRAVENOUS ONCE
Refills: 0 | Status: COMPLETED | OUTPATIENT
Start: 2023-08-25 | End: 2023-08-25

## 2023-08-25 RX ORDER — MIDAZOLAM HYDROCHLORIDE 1 MG/ML
2 INJECTION, SOLUTION INTRAMUSCULAR; INTRAVENOUS ONCE
Refills: 0 | Status: DISCONTINUED | OUTPATIENT
Start: 2023-08-25 | End: 2023-08-25

## 2023-08-25 RX ADMIN — LOSARTAN POTASSIUM 75 MILLIGRAM(S): 100 TABLET, FILM COATED ORAL at 06:56

## 2023-08-25 RX ADMIN — Medication 0.5 MILLIGRAM(S): at 08:17

## 2023-08-25 RX ADMIN — Medication 1 MILLIGRAM(S): at 08:17

## 2023-08-25 RX ADMIN — Medication 2 SPRAY(S): at 22:19

## 2023-08-25 RX ADMIN — Medication 0.2 MILLIGRAM(S): at 12:11

## 2023-08-25 RX ADMIN — Medication 0.5 MILLIGRAM(S): at 16:58

## 2023-08-25 RX ADMIN — AMLODIPINE BESYLATE 2.5 MILLIGRAM(S): 2.5 TABLET ORAL at 06:56

## 2023-08-25 RX ADMIN — FAMOTIDINE 20 MILLIGRAM(S): 10 INJECTION INTRAVENOUS at 06:56

## 2023-08-25 RX ADMIN — ATORVASTATIN CALCIUM 10 MILLIGRAM(S): 80 TABLET, FILM COATED ORAL at 22:19

## 2023-08-25 NOTE — BH INPATIENT PSYCHIATRY PROGRESS NOTE - NSBHFUPINTERVALHXFT_PSY_A_CORE
Patient seen for psychotic depression. No overnight events , overall calmer. VSS . Patient slept in bed  Had minor nosebleed last night. Eating okay. . Tolerated ECT , had tacky to 136 given betablocker

## 2023-08-25 NOTE — BH TREATMENT PLAN - NSTXCAREGIVERPARTICIPATE_PSY_P_CORE
Family/Caregiver participated in identification of needs/problems/goals for treatment
Family/Caregiver participated in defining interventions
Family/Caregiver participated in identification of needs/problems/goals for treatment

## 2023-08-25 NOTE — BH INPATIENT PSYCHIATRY PROGRESS NOTE - NSBHMETABOLIC_PSY_ALL_CORE_FT
BMI: BMI (kg/m2): 23 (06-07-23 @ 18:13)  HbA1c: A1C with Estimated Average Glucose Result: 5.7 % (06-01-23 @ 05:10)    Glucose: POCT Blood Glucose.: 119 mg/dL (08-25-23 @ 11:10)    BP: 169/69 (08-25-23 @ 13:15) (135/66 - 182/66)  Lipid Panel: Date/Time: 06-08-23 @ 08:30  Cholesterol, Serum: 119  Direct LDL: --  HDL Cholesterol, Serum: 47  Total Cholesterol/HDL Ration Measurement: --  Triglycerides, Serum: 56

## 2023-08-25 NOTE — BH INPATIENT PSYCHIATRY PROGRESS NOTE - NSBHCHARTREVIEWVS_PSY_A_CORE FT
Vital Signs Last 24 Hrs  T(C): 36.4 (08-25-23 @ 13:15), Max: 36.8 (08-25-23 @ 12:03)  T(F): 97.6 (08-25-23 @ 13:15), Max: 98.3 (08-25-23 @ 12:03)  HR: 80 (08-25-23 @ 13:15) (80 - 133)  BP: 169/69 (08-25-23 @ 13:15) (135/66 - 182/66)  BP(mean): --  RR: 18 (08-25-23 @ 13:15) (16 - 25)  SpO2: 100% (08-25-23 @ 13:15) (98% - 100%)    Orthostatic VS  08-25-23 @ 05:59  Lying BP: 133/64 HR: 80  Sitting BP: 151/61 HR: 78  Standing BP: --/-- HR: --  Site: --  Mode: --  Orthostatic VS  08-24-23 @ 06:56  Lying BP: 141/53 HR: 78  Sitting BP: 128/53 HR: 84  Standing BP: --/-- HR: --  Site: --  Mode: --

## 2023-08-25 NOTE — BH INPATIENT PSYCHIATRY PROGRESS NOTE - NSBHASSESSSUMMFT_PSY_ALL_CORE
8/14 better but now hard to stay if responding to Abilify or prns of haldol will increase Abilify to 17mg then 20mg/d while initiating ECT discussion  8/15 Not much improvement   except less paranoid which may be attributable to haldol prn. Will d/c Abilify and start  Latuda will discuss ECT option  8/16 COnt Latuda trial while entering into discussion with patient re ECT  8/17 Tolerating latuda well; paranoia remains  8/18 Paranoia continues though improved; increase latuda to 40mg po daily - to be administered with food  8/19: On encounter today pt was seen chatting with one of her peers. Pt denied any complaints and reported feeling well. Denies paranoid thoughts or feeling afraid someone was going to hurt her. Still needing frequent redirection from staff to use her walker.   8/20 Depressed anxious fearful disorganized. Cont Latuda trial will cont to discuss ECT with patient, family is supportive  8/21 Depressed psychotic partial response will cont with ECT patient amenable as long as a family member goes with her for fear she will go off unit and never return. Will get labs ask medicine to see  8/22 Depressed not responded well to meds will increase Latuda while prepping for ECT. Will clarify need for ASA given severe nosebleed hx will discuss nosebleed issue with ECT   8/23 Cont ECT w/u discussed with medicine will   cont ASA  8/24 Psychotic depression with limited response to  multiple medication trials. Calmer less floridly delusion but anhedonia in state of constant distress. Patient assents to ECT, daughter Marcelina who is her HCP will provide consent. Patient requests family accompany her to ECT  for support as well as likely afraid she will not come back to unit if taken off. Will d/c Protonix suspension and use Pepcid as the former cannot be give while patient NPO. will lower Latuda as want to begin to reduce meds concurrent with ECT  8/25 Patient tolerated ECT, cont ECT, try to reduce Klonopin when better. Cont Effexor and Latuda. Recheck Na level on Sunday

## 2023-08-25 NOTE — BH CHART NOTE - NSEVENTNOTEFT_PSY_ALL_CORE
The patient was seen today to assess the appropriateness of a course of ECT treatments.      Briefly, the patient is a 81-year-old , retired female, domiciled at the [facility] of dementia, DM2, and HTN, PPH of late onset Bipolar I disorder, PTSD and Cluster B Personality traits, 2 prior psych adm last in 2022 at [facility], who presented to ED with worsening paranoia, anxiety, FTT (weight loss of 20lb since march), and confusion. Psychiatry was consulted for psychosis/AMS. Patient is now being transferred to [facility] for psychiatric treatment and stabilization.    The treatment team feels the patient’s symptoms would benefit from ECT, with little risk.  The patient has been medically cleared for ECT.  The treatment was discussed with the patient and pt's next of kin, her daughter.  The patient’s daughter did agree to the treatment, and signed the consent on pt's behalf.  Met with the patient today.  The pt shows significant symptoms of depression and reported to be delusional and paranoid.  It is my view that the patient does not have capacity at this time to decide on a course of ECT treatments, but has not dissented to the treatment.  In this case, the benefits of the treatment outweigh the risks, and, as the family, as health care proxy or next of kin, is also in agreement, that the treatment team can move forward with a course of ECT.  
Per nursing, patient refused all qhs medications and became agitated. She was not receptive to limit setting. Given IM lorazepam 0.5mg. 
MADELYN was called by nursing saying patient has been complaining of a fall on the unit. However, after speaking with nursing staff, patient has been on a 1:1 and the 1:1 was in the bathroom with the patient when she deliberately sat down on the floor. Both nursing and the 1:1 reported this was not a fall and there was no sign of injury either during or after the even.     Patient now complaining of pain all over. Offered PO Tylenol PRN for pain.

## 2023-08-25 NOTE — BH INPATIENT PSYCHIATRY PROGRESS NOTE - CURRENT MEDICATION
MEDICATIONS  (STANDING):  amLODIPine   Tablet 2.5 milliGRAM(s) Oral <User Schedule>  aspirin  chewable 81 milliGRAM(s) Oral daily  atorvastatin 10 milliGRAM(s) Oral at bedtime  clonazePAM Oral Disintegrating Tablet 0.5 milliGRAM(s) Oral <User Schedule>  famotidine    Tablet 20 milliGRAM(s) Oral <User Schedule>  glucagon  Injectable 1 milliGRAM(s) IntraMuscular once  lactulose Syrup 10 Gram(s) Oral <User Schedule>  losartan 75 milliGRAM(s) Oral <User Schedule>  lurasidone 40 milliGRAM(s) Oral daily  senna 2 Tablet(s) Oral daily  sodium chloride 0.65% Nasal 2 Spray(s) Both Nostrils two times a day  venlafaxine 225 milliGRAM(s) Oral daily    MEDICATIONS  (PRN):  acetaminophen     Tablet .. 650 milliGRAM(s) Oral every 6 hours PRN Temp greater or equal to 38C (100.4F), Mild Pain (1 - 3), Moderate Pain (4 - 6)  bisacodyl 5 milliGRAM(s) Oral daily PRN constipation  bisacodyl Suppository 10 milliGRAM(s) Rectal daily PRN constipation  dextrose Oral Gel 15 Gram(s) Oral once PRN Blood Glucose LESS THAN 70 milliGRAM(s)/deciliter  haloperidol     Tablet 0.5 milliGRAM(s) Oral every 6 hours PRN agitation  haloperidol    Injectable 1 milliGRAM(s) IntraMuscular once PRN agitation  lidocaine   4% Patch 1 Patch Transdermal daily PRN LBP  melatonin. 3 milliGRAM(s) Oral at bedtime PRN Insomnia  simethicone 80 milliGRAM(s) Chew every 4 hours PRN abdominal discomfort

## 2023-08-26 PROCEDURE — 99232 SBSQ HOSP IP/OBS MODERATE 35: CPT

## 2023-08-26 RX ORDER — CLONAZEPAM 1 MG
0.25 TABLET ORAL
Refills: 0 | Status: DISCONTINUED | OUTPATIENT
Start: 2023-08-26 | End: 2023-08-29

## 2023-08-26 RX ORDER — CLONAZEPAM 1 MG
0.5 TABLET ORAL DAILY
Refills: 0 | Status: DISCONTINUED | OUTPATIENT
Start: 2023-08-26 | End: 2023-08-30

## 2023-08-26 RX ADMIN — Medication 3 MILLIGRAM(S): at 21:58

## 2023-08-26 RX ADMIN — Medication 0.25 MILLIGRAM(S): at 16:45

## 2023-08-26 RX ADMIN — FAMOTIDINE 20 MILLIGRAM(S): 10 INJECTION INTRAVENOUS at 06:12

## 2023-08-26 RX ADMIN — LOSARTAN POTASSIUM 75 MILLIGRAM(S): 100 TABLET, FILM COATED ORAL at 06:12

## 2023-08-26 RX ADMIN — Medication 2 SPRAY(S): at 09:56

## 2023-08-26 RX ADMIN — LACTULOSE 10 GRAM(S): 10 SOLUTION ORAL at 12:30

## 2023-08-26 RX ADMIN — Medication 81 MILLIGRAM(S): at 09:55

## 2023-08-26 RX ADMIN — SENNA PLUS 2 TABLET(S): 8.6 TABLET ORAL at 09:55

## 2023-08-26 RX ADMIN — Medication 2 SPRAY(S): at 12:30

## 2023-08-26 RX ADMIN — LURASIDONE HYDROCHLORIDE 40 MILLIGRAM(S): 40 TABLET ORAL at 09:55

## 2023-08-26 RX ADMIN — HALOPERIDOL DECANOATE 0.5 MILLIGRAM(S): 100 INJECTION INTRAMUSCULAR at 11:50

## 2023-08-26 RX ADMIN — Medication 225 MILLIGRAM(S): at 09:55

## 2023-08-26 RX ADMIN — ATORVASTATIN CALCIUM 10 MILLIGRAM(S): 80 TABLET, FILM COATED ORAL at 21:58

## 2023-08-26 RX ADMIN — Medication 0.5 MILLIGRAM(S): at 09:55

## 2023-08-26 RX ADMIN — AMLODIPINE BESYLATE 2.5 MILLIGRAM(S): 2.5 TABLET ORAL at 06:12

## 2023-08-26 RX ADMIN — Medication 2 SPRAY(S): at 21:58

## 2023-08-26 RX ADMIN — HALOPERIDOL DECANOATE 0.5 MILLIGRAM(S): 100 INJECTION INTRAMUSCULAR at 21:58

## 2023-08-26 NOTE — BH INPATIENT PSYCHIATRY PROGRESS NOTE - NSBHFUPINTERVALHXFT_PSY_A_CORE
Patient seen for psychotic depression. No overnight events , overall calmer. VSS . Patient slept in bed  Eating okay. Tolerated ECT , had tacky to 136 given betablocker.

## 2023-08-26 NOTE — BH INPATIENT PSYCHIATRY PROGRESS NOTE - CURRENT MEDICATION
MEDICATIONS  (STANDING):  amLODIPine   Tablet 2.5 milliGRAM(s) Oral <User Schedule>  aspirin  chewable 81 milliGRAM(s) Oral daily  atorvastatin 10 milliGRAM(s) Oral at bedtime  clonazePAM Oral Disintegrating Tablet 0.25 milliGRAM(s) Oral <User Schedule>  clonazePAM Oral Disintegrating Tablet 0.5 milliGRAM(s) Oral daily  famotidine    Tablet 20 milliGRAM(s) Oral <User Schedule>  glucagon  Injectable 1 milliGRAM(s) IntraMuscular once  lactulose Syrup 10 Gram(s) Oral <User Schedule>  losartan 75 milliGRAM(s) Oral <User Schedule>  lurasidone 40 milliGRAM(s) Oral daily  senna 2 Tablet(s) Oral daily  sodium chloride 0.65% Nasal 2 Spray(s) Both Nostrils three times a day  venlafaxine 225 milliGRAM(s) Oral daily    MEDICATIONS  (PRN):  acetaminophen     Tablet .. 650 milliGRAM(s) Oral every 6 hours PRN Temp greater or equal to 38C (100.4F), Mild Pain (1 - 3), Moderate Pain (4 - 6)  bisacodyl 5 milliGRAM(s) Oral daily PRN constipation  bisacodyl Suppository 10 milliGRAM(s) Rectal daily PRN constipation  dextrose Oral Gel 15 Gram(s) Oral once PRN Blood Glucose LESS THAN 70 milliGRAM(s)/deciliter  haloperidol     Tablet 0.5 milliGRAM(s) Oral every 6 hours PRN agitation  haloperidol    Injectable 1 milliGRAM(s) IntraMuscular once PRN agitation  lidocaine   4% Patch 1 Patch Transdermal daily PRN LBP  melatonin. 3 milliGRAM(s) Oral at bedtime PRN Insomnia  simethicone 80 milliGRAM(s) Chew every 4 hours PRN abdominal discomfort

## 2023-08-26 NOTE — BH INPATIENT PSYCHIATRY PROGRESS NOTE - NSBHASSESSSUMMFT_PSY_ALL_CORE
8/14 better but now hard to stay if responding to Abilify or prns of haldol will increase Abilify to 17mg then 20mg/d while initiating ECT discussion  8/15 Not much improvement   except less paranoid which may be attributable to haldol prn. Will d/c Abilify and start  Latuda will discuss ECT option  8/16 COnt Latuda trial while entering into discussion with patient re ECT  8/17 Tolerating latuda well; paranoia remains  8/18 Paranoia continues though improved; increase latuda to 40mg po daily - to be administered with food  8/19: On encounter today pt was seen chatting with one of her peers. Pt denied any complaints and reported feeling well. Denies paranoid thoughts or feeling afraid someone was going to hurt her. Still needing frequent redirection from staff to use her walker.   8/20 Depressed anxious fearful disorganized. Cont Latuda trial will cont to discuss ECT with patient, family is supportive  8/21 Depressed psychotic partial response will cont with ECT patient amenable as long as a family member goes with her for fear she will go off unit and never return. Will get labs ask medicine to see  8/22 Depressed not responded well to meds will increase Latuda while prepping for ECT. Will clarify need for ASA given severe nosebleed hx will discuss nosebleed issue with ECT   8/23 Cont ECT w/u discussed with medicine will   cont ASA  8/24 Psychotic depression with limited response to  multiple medication trials. Calmer less floridly delusion but anhedonia in state of constant distress. Patient assents to ECT, daughter Marcelina who is her HCP will provide consent. Patient requests family accompany her to ECT  for support as well as likely afraid she will not come back to unit if taken off. Will d/c Protonix suspension and use Pepcid as the former cannot be give while patient NPO. will lower Latuda as want to begin to reduce meds concurrent with ECT  8/25 Patient tolerated ECT, cont ECT, try to reduce Klonopin when better. Cont Effexor and Latuda. Recheck Na level on Sunday 8/26 Tolerated first ECT amnestic for treatment itself sl calmer. COnt ECT will begin to reduce klonopin to reduce cog se, check BMP in AM

## 2023-08-26 NOTE — BH INPATIENT PSYCHIATRY PROGRESS NOTE - NSBHCHARTREVIEWVS_PSY_A_CORE FT
Vital Signs Last 24 Hrs  T(C): 36.4 (08-25-23 @ 13:15), Max: 36.8 (08-25-23 @ 12:03)  T(F): 97.6 (08-25-23 @ 13:15), Max: 98.3 (08-25-23 @ 12:03)  HR: 80 (08-25-23 @ 13:15) (80 - 133)  BP: 142/55 (08-26-23 @ 06:17) (135/66 - 182/66)  BP(mean): 71 (08-26-23 @ 06:17) (71 - 71)  RR: 18 (08-25-23 @ 13:15) (16 - 25)  SpO2: 100% (08-25-23 @ 13:15) (98% - 100%)    Orthostatic VS  08-25-23 @ 05:59  Lying BP: 133/64 HR: 80  Sitting BP: 151/61 HR: 78  Standing BP: --/-- HR: --  Site: --  Mode: --

## 2023-08-26 NOTE — BH INPATIENT PSYCHIATRY PROGRESS NOTE - NSBHMETABOLIC_PSY_ALL_CORE_FT
BMI: BMI (kg/m2): 23 (06-07-23 @ 18:13)  HbA1c: A1C with Estimated Average Glucose Result: 5.7 % (06-01-23 @ 05:10)    Glucose: POCT Blood Glucose.: 119 mg/dL (08-25-23 @ 11:10)    BP: 142/55 (08-26-23 @ 06:17) (135/66 - 182/66)  Lipid Panel: Date/Time: 06-08-23 @ 08:30  Cholesterol, Serum: 119  Direct LDL: --  HDL Cholesterol, Serum: 47  Total Cholesterol/HDL Ration Measurement: --  Triglycerides, Serum: 56

## 2023-08-26 NOTE — BH INPATIENT PSYCHIATRY PROGRESS NOTE - MSE UNSTRUCTURED FT
Appearance: fair grooming, adequate hygiene; mild tremor, not stiff. Does better with voice amplifier  Behavior: adequate boundaries, very mild psychomotor retardation, attentive  Speech: somewhat slowed but otherwise wnl  Mood:  dysphoric, sl less anxious.   Affect: congruent appears distressed, sl less  Thought process: illogical, tangential  Thought content: paranoid delusions elicited but less severe remains fearful of going off unit as scared she will be moved permanently though does not reveal this directly; denies SI/HI  Perceptual disturbances: denies Ah/VH  Orientation:  MMSE 18/30 Oriented to year, hospital. Amnestic for ECT  Insight: has awareness that she has depression causing her to feel badly  Judgement: decreased

## 2023-08-27 LAB
ANION GAP SERPL CALC-SCNC: 9 MMOL/L — SIGNIFICANT CHANGE UP (ref 7–14)
BUN SERPL-MCNC: 9 MG/DL — SIGNIFICANT CHANGE UP (ref 7–23)
CALCIUM SERPL-MCNC: 9.4 MG/DL — SIGNIFICANT CHANGE UP (ref 8.4–10.5)
CHLORIDE SERPL-SCNC: 97 MMOL/L — LOW (ref 98–107)
CO2 SERPL-SCNC: 23 MMOL/L — SIGNIFICANT CHANGE UP (ref 22–31)
CREAT SERPL-MCNC: 0.61 MG/DL — SIGNIFICANT CHANGE UP (ref 0.5–1.3)
EGFR: 89 ML/MIN/1.73M2 — SIGNIFICANT CHANGE UP
GLUCOSE SERPL-MCNC: 240 MG/DL — HIGH (ref 70–99)
POTASSIUM SERPL-MCNC: 4.1 MMOL/L — SIGNIFICANT CHANGE UP (ref 3.5–5.3)
POTASSIUM SERPL-SCNC: 4.1 MMOL/L — SIGNIFICANT CHANGE UP (ref 3.5–5.3)
SODIUM SERPL-SCNC: 129 MMOL/L — LOW (ref 135–145)

## 2023-08-27 PROCEDURE — 99232 SBSQ HOSP IP/OBS MODERATE 35: CPT

## 2023-08-27 RX ADMIN — LURASIDONE HYDROCHLORIDE 40 MILLIGRAM(S): 40 TABLET ORAL at 09:49

## 2023-08-27 RX ADMIN — AMLODIPINE BESYLATE 2.5 MILLIGRAM(S): 2.5 TABLET ORAL at 06:41

## 2023-08-27 RX ADMIN — ATORVASTATIN CALCIUM 10 MILLIGRAM(S): 80 TABLET, FILM COATED ORAL at 21:51

## 2023-08-27 RX ADMIN — Medication 650 MILLIGRAM(S): at 10:32

## 2023-08-27 RX ADMIN — Medication 0.5 MILLIGRAM(S): at 09:48

## 2023-08-27 RX ADMIN — Medication 225 MILLIGRAM(S): at 09:49

## 2023-08-27 RX ADMIN — Medication 81 MILLIGRAM(S): at 09:48

## 2023-08-27 RX ADMIN — SENNA PLUS 2 TABLET(S): 8.6 TABLET ORAL at 09:49

## 2023-08-27 RX ADMIN — Medication 2 SPRAY(S): at 09:49

## 2023-08-27 RX ADMIN — LOSARTAN POTASSIUM 75 MILLIGRAM(S): 100 TABLET, FILM COATED ORAL at 09:52

## 2023-08-27 RX ADMIN — Medication 0.25 MILLIGRAM(S): at 17:04

## 2023-08-27 RX ADMIN — Medication 650 MILLIGRAM(S): at 09:49

## 2023-08-27 RX ADMIN — FAMOTIDINE 20 MILLIGRAM(S): 10 INJECTION INTRAVENOUS at 06:40

## 2023-08-27 NOTE — CHART NOTE - NSCHARTNOTEFT_GEN_A_CORE
Called by covering team that Na went from 131--->129 while here per documentation thought to have poor PO intake.  Advised to send urine lytes and repeat BMP in am.

## 2023-08-27 NOTE — BH INPATIENT PSYCHIATRY PROGRESS NOTE - NSBHASSESSSUMMFT_PSY_ALL_CORE
8/14 better but now hard to stay if responding to Abilify or prns of haldol will increase Abilify to 17mg then 20mg/d while initiating ECT discussion  8/15 Not much improvement   except less paranoid which may be attributable to haldol prn. Will d/c Abilify and start  Latuda will discuss ECT option  8/16 COnt Latuda trial while entering into discussion with patient re ECT  8/17 Tolerating latuda well; paranoia remains  8/18 Paranoia continues though improved; increase latuda to 40mg po daily - to be administered with food  8/19: On encounter today pt was seen chatting with one of her peers. Pt denied any complaints and reported feeling well. Denies paranoid thoughts or feeling afraid someone was going to hurt her. Still needing frequent redirection from staff to use her walker.   8/20 Depressed anxious fearful disorganized. Cont Latuda trial will cont to discuss ECT with patient, family is supportive  8/21 Depressed psychotic partial response will cont with ECT patient amenable as long as a family member goes with her for fear she will go off unit and never return. Will get labs ask medicine to see  8/22 Depressed not responded well to meds will increase Latuda while prepping for ECT. Will clarify need for ASA given severe nosebleed hx will discuss nosebleed issue with ECT   8/23 Cont ECT w/u discussed with medicine will   cont ASA  8/24 Psychotic depression with limited response to  multiple medication trials. Calmer less floridly delusion but anhedonia in state of constant distress. Patient assents to ECT, daughter Marcelina who is her HCP will provide consent. Patient requests family accompany her to ECT  for support as well as likely afraid she will not come back to unit if taken off. Will d/c Protonix suspension and use Pepcid as the former cannot be give while patient NPO. will lower Latuda as want to begin to reduce meds concurrent with ECT  8/25 Patient tolerated ECT, cont ECT, try to reduce Klonopin when better. Cont Effexor and Latuda. Recheck Na level on Sunday 8/26 Tolerated first ECT amnestic for treatment itself sl calmer. COnt ECT will begin to reduce klonopin to reduce cog se, check BMP in AM  8/27 less dysphoric, still paranoid, no SI/HI. Labs reviewed and discussed with hospitalist. Repeat BMP, urine NA and osmolality to f/u.

## 2023-08-27 NOTE — BH INPATIENT PSYCHIATRY PROGRESS NOTE - MSE UNSTRUCTURED FT
Appearance: fair grooming, adequate hygiene; mild tremor, not stiff. Does better with voice amplifier  Behavior: adequate boundaries, very mild psychomotor retardation, attentive  Speech: soft, somewhat slowed but otherwise normal  Mood:  ok  less anxious.   Affect: less dysphoric.  Thought process: linear, illogical, tangential.  Thought content: paranoid delusions elicited but less severe; denies SI/HI  Perceptual disturbances: denies Ah/VH  Orientation:  MMSE 18/30 Oriented to year, hospital. Amnestic for ECT  Insight: has awareness that she has depression causing her to feel badly  Judgement: decreased

## 2023-08-27 NOTE — BH INPATIENT PSYCHIATRY PROGRESS NOTE - NSBHMETABOLIC_PSY_ALL_CORE_FT
BMI: BMI (kg/m2): 23 (06-07-23 @ 18:13)  HbA1c: A1C with Estimated Average Glucose Result: 5.7 % (06-01-23 @ 05:10)    Glucose: POCT Blood Glucose.: 119 mg/dL (08-25-23 @ 11:10)    BP: 142/62 (08-26-23 @ 12:00) (135/66 - 182/66)  Lipid Panel: Date/Time: 06-08-23 @ 08:30  Cholesterol, Serum: 119  Direct LDL: --  HDL Cholesterol, Serum: 47  Total Cholesterol/HDL Ration Measurement: --  Triglycerides, Serum: 56

## 2023-08-27 NOTE — BH INPATIENT PSYCHIATRY PROGRESS NOTE - NSBHFUPINTERVALHXFT_PSY_A_CORE
Patient seen for psychotic depression, chart reviewed and discussed with nursing staff. Pt is compliant with medications, no acute events overnight/this am, no PRN needed. On exam, Pt is visible in day room, awake and cooperative. Reports feeling ok, sleep and appetite are alright. Endorses some paranoid thoughts about staff members. Denies any SI, HI, hallucination. Labs reviewed noted below. Discussed with hospitalist, recommended to repeat tomorrow and ordered urine osmolarity and NA.

## 2023-08-28 LAB
ANION GAP SERPL CALC-SCNC: 10 MMOL/L — SIGNIFICANT CHANGE UP (ref 7–14)
BUN SERPL-MCNC: 7 MG/DL — SIGNIFICANT CHANGE UP (ref 7–23)
CALCIUM SERPL-MCNC: 9.5 MG/DL — SIGNIFICANT CHANGE UP (ref 8.4–10.5)
CHLORIDE SERPL-SCNC: 98 MMOL/L — SIGNIFICANT CHANGE UP (ref 98–107)
CO2 SERPL-SCNC: 26 MMOL/L — SIGNIFICANT CHANGE UP (ref 22–31)
CREAT SERPL-MCNC: 0.56 MG/DL — SIGNIFICANT CHANGE UP (ref 0.5–1.3)
EGFR: 91 ML/MIN/1.73M2 — SIGNIFICANT CHANGE UP
GLUCOSE SERPL-MCNC: 128 MG/DL — HIGH (ref 70–99)
POTASSIUM SERPL-MCNC: 4.5 MMOL/L — SIGNIFICANT CHANGE UP (ref 3.5–5.3)
POTASSIUM SERPL-SCNC: 4.5 MMOL/L — SIGNIFICANT CHANGE UP (ref 3.5–5.3)
SODIUM SERPL-SCNC: 134 MMOL/L — LOW (ref 135–145)

## 2023-08-28 PROCEDURE — 90870 ELECTROCONVULSIVE THERAPY: CPT

## 2023-08-28 PROCEDURE — 99232 SBSQ HOSP IP/OBS MODERATE 35: CPT | Mod: 25

## 2023-08-28 PROCEDURE — 99223 1ST HOSP IP/OBS HIGH 75: CPT

## 2023-08-28 RX ORDER — METFORMIN HYDROCHLORIDE 850 MG/1
500 TABLET ORAL
Refills: 0 | Status: DISCONTINUED | OUTPATIENT
Start: 2023-08-28 | End: 2023-08-28

## 2023-08-28 RX ORDER — FLUMAZENIL 0.1 MG/ML
0.2 VIAL (ML) INTRAVENOUS ONCE
Refills: 0 | Status: COMPLETED | OUTPATIENT
Start: 2023-08-28 | End: 2023-08-28

## 2023-08-28 RX ORDER — METFORMIN HYDROCHLORIDE 850 MG/1
250 TABLET ORAL
Refills: 0 | Status: DISCONTINUED | OUTPATIENT
Start: 2023-08-28 | End: 2023-10-17

## 2023-08-28 RX ADMIN — LURASIDONE HYDROCHLORIDE 40 MILLIGRAM(S): 40 TABLET ORAL at 09:53

## 2023-08-28 RX ADMIN — SENNA PLUS 2 TABLET(S): 8.6 TABLET ORAL at 09:53

## 2023-08-28 RX ADMIN — FAMOTIDINE 20 MILLIGRAM(S): 10 INJECTION INTRAVENOUS at 06:28

## 2023-08-28 RX ADMIN — Medication 2 SPRAY(S): at 09:55

## 2023-08-28 RX ADMIN — ATORVASTATIN CALCIUM 10 MILLIGRAM(S): 80 TABLET, FILM COATED ORAL at 21:59

## 2023-08-28 RX ADMIN — Medication 225 MILLIGRAM(S): at 09:52

## 2023-08-28 RX ADMIN — LOSARTAN POTASSIUM 75 MILLIGRAM(S): 100 TABLET, FILM COATED ORAL at 06:28

## 2023-08-28 RX ADMIN — AMLODIPINE BESYLATE 2.5 MILLIGRAM(S): 2.5 TABLET ORAL at 06:28

## 2023-08-28 RX ADMIN — METFORMIN HYDROCHLORIDE 250 MILLIGRAM(S): 850 TABLET ORAL at 21:59

## 2023-08-28 RX ADMIN — Medication 0.25 MILLIGRAM(S): at 18:09

## 2023-08-28 RX ADMIN — Medication 81 MILLIGRAM(S): at 09:53

## 2023-08-28 RX ADMIN — Medication 0.2 MILLIGRAM(S): at 12:37

## 2023-08-28 RX ADMIN — Medication 0.5 MILLIGRAM(S): at 09:52

## 2023-08-28 RX ADMIN — Medication 2 SPRAY(S): at 22:00

## 2023-08-28 RX ADMIN — Medication 1 MILLIGRAM(S): at 10:36

## 2023-08-28 NOTE — BH INPATIENT PSYCHIATRY PROGRESS NOTE - NSBHCHARTREVIEWVS_PSY_A_CORE FT
Vital Signs Last 24 Hrs  T(C): 36.1 (08-28-23 @ 13:20), Max: 36.9 (08-28-23 @ 12:26)  T(F): 97 (08-28-23 @ 13:20), Max: 98.4 (08-28-23 @ 12:26)  HR: 95 (08-28-23 @ 13:20) (79 - 107)  BP: 153/63 (08-28-23 @ 13:20) (130/68 - 173/65)  BP(mean): --  RR: 18 (08-28-23 @ 13:20) (16 - 18)  SpO2: 99% (08-28-23 @ 13:20) (98% - 100%)

## 2023-08-28 NOTE — CONSULT NOTE ADULT - ASSESSMENT
83 y/o F with pmhx of dementia, DM2, HTN, anxiety, paranoia presented to the ED for new onset AMS and worsening agitation and paranoia. Pt admitted to [facility] for management of behavorial issues. ECT started 8/25/23. Medicine previously consulted in June for dysphagia that has improved as her paranoia has improved. Consulted 8/28 for ongoing issues with hyponatremia.     Hyponatremia, worsening 8/28  Hyperglycemia  Pt appears euvolemic on exam. Unclear oral intake. Need collateral from staff regarding Glucerna intake. She is on multiple medication that have SE profiles including SIADH including: Latuda, effexor, klonopin, and ECT (new 8/25). EKG without abnormalities on 8/21/23. No evidence of cardiomegaly on prior CT in 2022. Does show coronary artery calcifications. No prior TTE.   PLAN:   - F/u BMP 8/28  - Check proBNP at next lab draw    DMII w A1c of 5.7%   - Currently on SSI, can resume metformin 500 mg BID to try to reduce her insulin requirements    81 y/o F with pmhx of dementia, DM2, HTN, anxiety, paranoia presented to the ED for new onset AMS and worsening agitation and paranoia. Pt admitted to [facility] for management of behavorial issues. ECT started 8/25/23. Medicine previously consulted in June for dysphagia that has improved as her paranoia has improved. Consulted 8/28 for ongoing issues with hyponatremia.     Hyponatremia, worsening 8/28  Hyperglycemia  Pt appears euvolemic on exam. Unclear oral intake. Need collateral from staff regarding Glucerna intake. She is on multiple medication that have SE profiles including SIADH including: Latuda, effexor, klonopin, and ECT (new 8/25). EKG without abnormalities on 8/21/23. No evidence of cardiomegaly on prior CT in 2022. Does show coronary artery calcifications. No prior TTE.   PLAN:   - F/u BMP 8/28  - Check proBNP at next lab draw  - Encourage use of TID Glucerna  - Monitor oral intake  - Cont minced and moist diet, consider repeat speech eval to liberalize diet- pt does not like the [facility] food.     DMII w A1c of 5.7%   - Currently on SSI, can resume metformin 500 mg BID to try to reduce her insulin requirements (ordered)    HTN  - Cont amlodipine 2.5 mg daily  - Pt is on ASA for primary prevention? USPSTF guidelines recommend against primary prevention use of aspirin in patient 59yo and older due to increase bleeding risk.   - Rec'd d/c baby ASA in pt who has had issues with recurrent nose bleeds as per primary team (unable to review prior hospitalization records in [place]) 81 y/o F with pmhx of dementia, DM2, HTN, anxiety, paranoia presented to the ED for new onset AMS and worsening agitation and paranoia. Pt admitted to [facility] for management of behavorial issues. ECT started 8/25/23. Medicine previously consulted in June for dysphagia that has improved as her paranoia has improved. Consulted 8/28 for ongoing issues with hyponatremia.     Hyponatremia, worsening 8/28  Hyperglycemia  Pt appears euvolemic on exam. Unclear oral intake. Need collateral from staff regarding Glucerna intake. She is on multiple medication that have SE profiles including SIADH including: Latuda, effexor, klonopin, and ECT (new 8/25). EKG without abnormalities on 8/21/23. No evidence of cardiomegaly on prior CT in 2022. Does show coronary artery calcifications. No prior TTE.   PLAN:   - F/u BMP 8/28  - Check proBNP at next lab draw  - Encourage use of TID Glucerna  - Monitor oral intake  - Cont minced and moist diet, consider repeat speech eval to liberalize diet- pt does not like the [facility] food.     DMII w A1c of 5.7%   - Previously on SSI,  resume metformin 500 mg BID (ordered) to try to reduce her insulin requirements (ordered)    HTN  - Cont amlodipine 2.5 mg daily  - Pt is on ASA for primary prevention? USPSTF guidelines recommend against primary prevention use of aspirin in patient 59yo and older due to increase bleeding risk.   - Rec'd d/c baby ASA in pt who has had issues with recurrent nose bleeds as per primary team (unable to review prior hospitalization records in [place])

## 2023-08-28 NOTE — BH INPATIENT PSYCHIATRY PROGRESS NOTE - NSBHFUPINTERVALHXFT_PSY_A_CORE
Patient seen for psychotic depression, chart reviewed and discussed with nursing staff. VSS. Patient's Na up to 134. Patient eating fair, sleep okay. Tolerated ECT comfortable going when family member went to ECT suite with her. Family reported  had more pleasant , reality based phone call with her on Sunday  after finding her confused on Saturday

## 2023-08-28 NOTE — BH INPATIENT PSYCHIATRY PROGRESS NOTE - NSBHASSESSSUMMFT_PSY_ALL_CORE
8/14 better but now hard to stay if responding to Abilify or prns of haldol will increase Abilify to 17mg then 20mg/d while initiating ECT discussion  8/15 Not much improvement   except less paranoid which may be attributable to haldol prn. Will d/c Abilify and start  Latuda will discuss ECT option  8/16 COnt Latuda trial while entering into discussion with patient re ECT  8/17 Tolerating latuda well; paranoia remains  8/18 Paranoia continues though improved; increase latuda to 40mg po daily - to be administered with food  8/19: On encounter today pt was seen chatting with one of her peers. Pt denied any complaints and reported feeling well. Denies paranoid thoughts or feeling afraid someone was going to hurt her. Still needing frequent redirection from staff to use her walker.   8/20 Depressed anxious fearful disorganized. Cont Latuda trial will cont to discuss ECT with patient, family is supportive  8/21 Depressed psychotic partial response will cont with ECT patient amenable as long as a family member goes with her for fear she will go off unit and never return. Will get labs ask medicine to see  8/22 Depressed not responded well to meds will increase Latuda while prepping for ECT. Will clarify need for ASA given severe nosebleed hx will discuss nosebleed issue with ECT   8/23 Cont ECT w/u discussed with medicine will   cont ASA  8/24 Psychotic depression with limited response to  multiple medication trials. Calmer less floridly delusion but anhedonia in state of constant distress. Patient assents to ECT, daughter Marcelina who is her HCP will provide consent. Patient requests family accompany her to ECT  for support as well as likely afraid she will not come back to unit if taken off. Will d/c Protonix suspension and use Pepcid as the former cannot be give while patient NPO. will lower Latuda as want to begin to reduce meds concurrent with ECT  8/25 Patient tolerated ECT, cont ECT, try to reduce Klonopin when better. Cont Effexor and Latuda. Recheck Na level on Sunday 8/26 Tolerated first ECT amnestic for treatment itself sl calmer. COnt ECT will begin to reduce klonopin to reduce cog se, check BMP in AM  8/27 less dysphoric, still paranoid, no SI/HI. Labs reviewed and discussed with hospitalist. Repeat BMP, urine NA and osmolality to f/u.  8/28 Tolerating ECT, cont treatments spread out so next one  9/1. Eval for further decrease Klonopin. Medicine saw patient , reintroduced metformin at low dose and d/declan ASA based on risk benefit

## 2023-08-28 NOTE — CONSULT NOTE ADULT - SUBJECTIVE AND OBJECTIVE BOX
HPI: 83 y/o F with pmhx of dementia, DM2, hx of nosebleeds, HTN, anxiety, paranoia presented to the ED for new onset AMS and worsening agitation and paranoia. Pt admitted to [facility] for management of paranoia in June 2023. Medicine consulted for hyponatremia, intermittent.     Pt is in chair in community room at time of exam. She is hard of hearing but answers all questions and is following commands. She says she is "anxious" about going downstairs (presumably for ECT treatments, new as of 8/25 for her). She does not like the food at [facility]. Says she much prefers what she cooks at home. "I used to be a ." Denies dysphagia or symptoms of food aversion currently, but can't tell me what she typically eats here. No dysuria, constipation, fevers. No chest pain. Unclear if she has been drinking her glucerna shakes.         PAST MEDICAL & SURGICAL HISTORY:  HTN (hypertension)      DM (diabetes mellitus)      Hyperlipemia      Diabetes mellitus      Dementia      S/P carpal tunnel release  bilateral      S/P partial hysterectomy          Review of Systems:   Limited. As above.    Allergies    penicillin (Rash)  sulfa drugs (Rash)  adhesives (Unknown)  Cipro (Rash)    Intolerances        Social History:     FAMILY HISTORY:  No pertinent family history in first degree relatives        MEDICATIONS  (STANDING):  amLODIPine   Tablet 2.5 milliGRAM(s) Oral <User Schedule>  aspirin  chewable 81 milliGRAM(s) Oral daily  atorvastatin 10 milliGRAM(s) Oral at bedtime  clonazePAM Oral Disintegrating Tablet 0.25 milliGRAM(s) Oral <User Schedule>  clonazePAM Oral Disintegrating Tablet 0.5 milliGRAM(s) Oral daily  famotidine    Tablet 20 milliGRAM(s) Oral <User Schedule>  glucagon  Injectable 1 milliGRAM(s) IntraMuscular once  lactulose Syrup 10 Gram(s) Oral <User Schedule>  losartan 75 milliGRAM(s) Oral <User Schedule>  lurasidone 40 milliGRAM(s) Oral daily  senna 2 Tablet(s) Oral daily  sodium chloride 0.65% Nasal 2 Spray(s) Both Nostrils three times a day  venlafaxine 225 milliGRAM(s) Oral daily    MEDICATIONS  (PRN):  acetaminophen     Tablet .. 650 milliGRAM(s) Oral every 6 hours PRN Temp greater or equal to 38C (100.4F), Mild Pain (1 - 3), Moderate Pain (4 - 6)  bisacodyl 5 milliGRAM(s) Oral daily PRN constipation  bisacodyl Suppository 10 milliGRAM(s) Rectal daily PRN constipation  dextrose Oral Gel 15 Gram(s) Oral once PRN Blood Glucose LESS THAN 70 milliGRAM(s)/deciliter  haloperidol     Tablet 0.5 milliGRAM(s) Oral every 6 hours PRN agitation  haloperidol    Injectable 1 milliGRAM(s) IntraMuscular once PRN agitation  lidocaine   4% Patch 1 Patch Transdermal daily PRN LBP  melatonin. 3 milliGRAM(s) Oral at bedtime PRN Insomnia  simethicone 80 milliGRAM(s) Chew every 4 hours PRN abdominal discomfort      Vital Signs Last 24 Hrs  T(C): --  T(F): --  HR: --  BP: --  BP(mean): --  RR: --  SpO2: --      CAPILLARY BLOOD GLUCOSE            PHYSICAL EXAM:  GENERAL: NAD, temporal wasting, pale appearing  HEAD:  Atraumatic, Normocephalic  EYES: EOMI, conjunctiva and sclera clear  NECK: Supple, No JVD  CHEST/LUNG: Clear to auscultation bilaterally; No wheeze, no crackles, no LE edema  HEART: Regular rate and rhythm; No murmurs, rubs, or gallops  ABDOMEN: Soft, Nontender, Nondistended; Bowel sounds present  EXTREMITIES:  2+ Peripheral Pulses, No clubbing, cyanosis, or edema  PSYCH/NEURO: oriented to person, place, not year, does know AGUILA, thinks its July and the president is Nic  SKIN: No rashes or lesions    LABS:    08-28    134<L>  |  98  |  7   ----------------------------<  128<H>  4.5   |  26  |  0.56    Ca    9.5      28 Aug 2023 09:31            Urinalysis Basic - ( 28 Aug 2023 09:31 )    Color: x / Appearance: x / SG: x / pH: x  Gluc: 128 mg/dL / Ketone: x  / Bili: x / Urobili: x   Blood: x / Protein: x / Nitrite: x   Leuk Esterase: x / RBC: x / WBC x   Sq Epi: x / Non Sq Epi: x / Bacteria: x        EKG(personally reviewed):    RADIOLOGY & ADDITIONAL TESTS:    Imaging Personally Reviewed: CXR    Consultant(s) Notes Reviewed:  ED notes, IM notes,  notes    Care Discussed with Consultants/Other Providers: Dr Jones  
Kit Aldrich  Hospitalist  Pager- [phone]    HPI: 83 y/o F with PMH of dementia, DM2, HTN, anxiety, paranoia presented to the ED for new onset AMS and worsening agitation and paranoia. Pt admitted to [facility] for management of behavorial issues During my interview, pt lying in bed comfortably.   Reports she had scrambled eggs for breakfast. Tolerated it   On asking where she is- She knows she was at [facility] and is now transferred to a special hospital. Was initially worried that she as she didn't know what to expect.   Asking what is planned for her today   DW staff at bedside- pt ambulating       PAST MEDICAL & SURGICAL HISTORY:  HTN (hypertension)  DM (diabetes mellitus)  Hyperlipemia  Dementia      S/P carpal tunnel release bilateral      S/P partial hysterectomy          Review of Systems:   CONSTITUTIONAL: Deniesfever  EYES: No eye pain, visual disturbances, or discharge  ENMT:  No difficulty hearing, tinnitus, vertigo; No sinus or throat pain  NECK: No pain or stiffness  RESPIRATORY: No cough, wheezing, chills or hemoptysis; No shortness of breath  CARDIOVASCULAR: No chest pain, palpitations, dizziness, or leg swelling  GASTROINTESTINAL: No abdominal or epigastric pain. No nausea, vomiting, or hematemesis; No diarrhea or constipation. No melena or hematochezia.  GENITOURINARY: No dysuria, frequency, hematuria, or incontinence  NEUROLOGICAL: No headaches, memory loss, loss of strength, numbness, or tremors  SKIN: No itching, burning, rashes, or lesions   LYMPH NODES: No enlarged glands  ENDOCRINE: No heat or cold intolerance; No hair loss  MUSCULOSKELETAL: No joint pain or swelling; No muscle, back, or extremity pain  HEME/LYMPH: No easy bruising, or bleeding gums  ALLERY AND IMMUNOLOGIC: No hives or eczema    Allergies    penicillin (Rash)  sulfa drugs (Rash)  adhesives (Unknown)  Cipro (Rash)    Intolerances      Social History: Denies ETOH use, Denies tobacco use, Denies illicit drug use    FAMILY HISTORY:  No pertinent family history in first degree relatives        MEDICATIONS  (STANDING):  amLODIPine   Tablet 2.5 milliGRAM(s) Oral daily  aspirin  chewable 81 milliGRAM(s) Oral daily  atorvastatin 10 milliGRAM(s) Oral at bedtime  calcium carbonate   1250 mG (OsCal) 1 Tablet(s) Oral two times a day  cephalexin 500 milliGRAM(s) Oral four times a day  clonazePAM  Tablet 0.5 milliGRAM(s) Oral <User Schedule>  divalproex Sprinkle 500 milliGRAM(s) Oral at bedtime  divalproex Sprinkle 250 milliGRAM(s) Oral daily  DULoxetine 30 milliGRAM(s) Oral daily  folic acid 1 milliGRAM(s) Oral daily  glucagon  Injectable 1 milliGRAM(s) IntraMuscular once  insulin lispro (ADMELOG) corrective regimen sliding scale   SubCutaneous three times a day before meals  lactobacillus acidophilus 1 Tablet(s) Oral daily  losartan 100 milliGRAM(s) Oral daily  pantoprazole    Tablet 40 milliGRAM(s) Oral before breakfast  QUEtiapine 50 milliGRAM(s) Oral at bedtime  senna 2 Tablet(s) Oral at bedtime    MEDICATIONS  (PRN):  acetaminophen     Tablet .. 650 milliGRAM(s) Oral every 6 hours PRN Temp greater or equal to 38C (100.4F), Mild Pain (1 - 3), Moderate Pain (4 - 6)  dextrose Oral Gel 15 Gram(s) Oral once PRN Blood Glucose LESS THAN 70 milliGRAM(s)/deciliter  LORazepam     Tablet 0.5 milliGRAM(s) Oral every 12 hours PRN anxiety/agitation  melatonin. 3 milliGRAM(s) Oral at bedtime PRN Insomnia  QUEtiapine 12.5 milliGRAM(s) Oral every 6 hours PRN anxiety      Vital Signs Last 24 Hrs  T(C): 36.6 (08 Jun 2023 08:15), Max: 36.6 (07 Jun 2023 11:07)  T(F): 97.9 (08 Jun 2023 08:15), Max: 97.9 (08 Jun 2023 08:15)  HR: 76 (07 Jun 2023 11:07) (76 - 76)  BP: 121/58 (07 Jun 2023 11:07) (121/58 - 121/58)  BP(mean): --  RR: 16 (07 Jun 2023 18:13) (16 - 17)  SpO2: 96% (07 Jun 2023 18:13) (96% - 98%)      CAPILLARY BLOOD GLUCOSE      POCT Blood Glucose.: 74 mg/dL (08 Jun 2023 08:25)  POCT Blood Glucose.: 104 mg/dL (07 Jun 2023 22:49)  POCT Blood Glucose.: 89 mg/dL (07 Jun 2023 16:26)  POCT Blood Glucose.: 137 mg/dL (07 Jun 2023 10:59)        PHYSICAL EXAM:  GENERAL: NAD  HEAD:  Atraumatic, Normocephalic  EYES: EOMI, conjunctiva and sclera clear  NECK: Supple, No JVD  CHEST/LUNG: Clear to auscultation bilaterally; No wheeze  HEART: Regular rate and rhythm; No murmurs, rubs, or gallops  ABDOMEN: Soft, Nontender, Nondistended; Bowel sounds present  EXTREMITIES:  2+ Peripheral Pulses, No clubbing, cyanosis, or edema  NEUROLOGY: non-focal  SKIN: RUE- 2cm erythematous, indurated thickened, mildly tender    LABS:                        11.5   6.19  )-----------( 241      ( 07 Jun 2023 09:35 )             37.6     06-07    136  |  101  |  9   ----------------------------<  162<H>  4.0   |  24  |  0.71    Ca    9.9      07 Jun 2023 09:35  Phos  3.6     06-07  Mg     1.80     06-07                EKG(personally reviewed):    RADIOLOGY & ADDITIONAL TESTS:    Imaging Personally Reviewed:Y    Consultant(s) Notes Reviewed:      Care Discussed with Consultants/Other Providers: Psych NP

## 2023-08-28 NOTE — BH INPATIENT PSYCHIATRY PROGRESS NOTE - MSE UNSTRUCTURED FT
Appearance: fair grooming, adequate hygiene; mild tremor, not stiff. Does better with voice amplifier  Behavior: adequate boundaries, very mild psychomotor retardation, attentive  Speech: soft, somewhat slowed but otherwise normal  Mood:  ok  less anxious.   Affect: less dysphoric.  Thought process: linear, illogical, tangential.  Thought content: paranoid delusions elicited but less severe; denies SI/HI  Perceptual disturbances: denies Ah/VH  Orientation:  confused disoriented post ECT  Insight: partial  Judgement: impaired

## 2023-08-28 NOTE — BH INPATIENT PSYCHIATRY PROGRESS NOTE - CURRENT MEDICATION
MEDICATIONS  (STANDING):  amLODIPine   Tablet 2.5 milliGRAM(s) Oral <User Schedule>  atorvastatin 10 milliGRAM(s) Oral at bedtime  clonazePAM Oral Disintegrating Tablet 0.25 milliGRAM(s) Oral <User Schedule>  clonazePAM Oral Disintegrating Tablet 0.5 milliGRAM(s) Oral daily  famotidine    Tablet 20 milliGRAM(s) Oral <User Schedule>  glucagon  Injectable 1 milliGRAM(s) IntraMuscular once  lactulose Syrup 10 Gram(s) Oral <User Schedule>  losartan 75 milliGRAM(s) Oral <User Schedule>  lurasidone 40 milliGRAM(s) Oral daily  metFORMIN 250 milliGRAM(s) Oral two times a day  senna 2 Tablet(s) Oral daily  sodium chloride 0.65% Nasal 2 Spray(s) Both Nostrils three times a day  venlafaxine 225 milliGRAM(s) Oral daily    MEDICATIONS  (PRN):  acetaminophen     Tablet .. 650 milliGRAM(s) Oral every 6 hours PRN Temp greater or equal to 38C (100.4F), Mild Pain (1 - 3), Moderate Pain (4 - 6)  bisacodyl 5 milliGRAM(s) Oral daily PRN constipation  bisacodyl Suppository 10 milliGRAM(s) Rectal daily PRN constipation  dextrose Oral Gel 15 Gram(s) Oral once PRN Blood Glucose LESS THAN 70 milliGRAM(s)/deciliter  haloperidol     Tablet 0.5 milliGRAM(s) Oral every 6 hours PRN agitation  haloperidol    Injectable 1 milliGRAM(s) IntraMuscular once PRN agitation  lidocaine   4% Patch 1 Patch Transdermal daily PRN LBP  melatonin. 3 milliGRAM(s) Oral at bedtime PRN Insomnia  simethicone 80 milliGRAM(s) Chew every 4 hours PRN abdominal discomfort

## 2023-08-28 NOTE — BH INPATIENT PSYCHIATRY PROGRESS NOTE - NSBHMETABOLIC_PSY_ALL_CORE_FT
BMI: BMI (kg/m2): 23 (06-07-23 @ 18:13)  HbA1c: A1C with Estimated Average Glucose Result: 5.7 % (06-01-23 @ 05:10)    Glucose: POCT Blood Glucose.: 119 mg/dL (08-25-23 @ 11:10)    BP: 153/63 (08-28-23 @ 13:20) (130/68 - 173/65)  Lipid Panel: Date/Time: 06-08-23 @ 08:30  Cholesterol, Serum: 119  Direct LDL: --  HDL Cholesterol, Serum: 47  Total Cholesterol/HDL Ration Measurement: --  Triglycerides, Serum: 56

## 2023-08-29 PROCEDURE — 99232 SBSQ HOSP IP/OBS MODERATE 35: CPT

## 2023-08-29 RX ADMIN — LACTULOSE 10 GRAM(S): 10 SOLUTION ORAL at 17:29

## 2023-08-29 RX ADMIN — SENNA PLUS 2 TABLET(S): 8.6 TABLET ORAL at 10:50

## 2023-08-29 RX ADMIN — Medication 2 SPRAY(S): at 14:01

## 2023-08-29 RX ADMIN — METFORMIN HYDROCHLORIDE 250 MILLIGRAM(S): 850 TABLET ORAL at 10:49

## 2023-08-29 RX ADMIN — LOSARTAN POTASSIUM 75 MILLIGRAM(S): 100 TABLET, FILM COATED ORAL at 07:06

## 2023-08-29 RX ADMIN — Medication 0.5 MILLIGRAM(S): at 10:49

## 2023-08-29 RX ADMIN — ATORVASTATIN CALCIUM 10 MILLIGRAM(S): 80 TABLET, FILM COATED ORAL at 20:44

## 2023-08-29 RX ADMIN — METFORMIN HYDROCHLORIDE 250 MILLIGRAM(S): 850 TABLET ORAL at 20:44

## 2023-08-29 RX ADMIN — AMLODIPINE BESYLATE 2.5 MILLIGRAM(S): 2.5 TABLET ORAL at 07:06

## 2023-08-29 RX ADMIN — LURASIDONE HYDROCHLORIDE 40 MILLIGRAM(S): 40 TABLET ORAL at 10:50

## 2023-08-29 RX ADMIN — Medication 2 SPRAY(S): at 20:46

## 2023-08-29 RX ADMIN — FAMOTIDINE 20 MILLIGRAM(S): 10 INJECTION INTRAVENOUS at 07:06

## 2023-08-29 RX ADMIN — Medication 225 MILLIGRAM(S): at 10:50

## 2023-08-29 NOTE — BH INPATIENT PSYCHIATRY PROGRESS NOTE - NSBHCHARTREVIEWVS_PSY_A_CORE FT
Vital Signs Last 24 Hrs  T(C): 36.3 (08-29-23 @ 07:43), Max: 36.3 (08-29-23 @ 07:43)  T(F): 97.4 (08-29-23 @ 07:43), Max: 97.4 (08-29-23 @ 07:43)  HR: --  BP: --  BP(mean): --  RR: --  SpO2: --    Orthostatic VS  08-29-23 @ 07:43  Lying BP: --/-- HR: --  Sitting BP: 124/53 HR: 91  Standing BP: 124/56 HR: 105  Site: upper left arm  Mode: electronic

## 2023-08-29 NOTE — BH INPATIENT PSYCHIATRY PROGRESS NOTE - CURRENT MEDICATION
MEDICATIONS  (STANDING):  amLODIPine   Tablet 2.5 milliGRAM(s) Oral <User Schedule>  atorvastatin 10 milliGRAM(s) Oral at bedtime  clonazePAM Oral Disintegrating Tablet 0.5 milliGRAM(s) Oral daily  famotidine    Tablet 20 milliGRAM(s) Oral <User Schedule>  glucagon  Injectable 1 milliGRAM(s) IntraMuscular once  lactulose Syrup 10 Gram(s) Oral <User Schedule>  losartan 75 milliGRAM(s) Oral <User Schedule>  lurasidone 40 milliGRAM(s) Oral daily  metFORMIN 250 milliGRAM(s) Oral two times a day  senna 2 Tablet(s) Oral daily  sodium chloride 0.65% Nasal 2 Spray(s) Both Nostrils three times a day  venlafaxine 225 milliGRAM(s) Oral daily    MEDICATIONS  (PRN):  acetaminophen     Tablet .. 650 milliGRAM(s) Oral every 6 hours PRN Temp greater or equal to 38C (100.4F), Mild Pain (1 - 3), Moderate Pain (4 - 6)  bisacodyl 5 milliGRAM(s) Oral daily PRN constipation  bisacodyl Suppository 10 milliGRAM(s) Rectal daily PRN constipation  dextrose Oral Gel 15 Gram(s) Oral once PRN Blood Glucose LESS THAN 70 milliGRAM(s)/deciliter  haloperidol     Tablet 0.5 milliGRAM(s) Oral every 6 hours PRN agitation  haloperidol    Injectable 1 milliGRAM(s) IntraMuscular once PRN agitation  lidocaine   4% Patch 1 Patch Transdermal daily PRN LBP  melatonin. 3 milliGRAM(s) Oral at bedtime PRN Insomnia  simethicone 80 milliGRAM(s) Chew every 4 hours PRN abdominal discomfort

## 2023-08-29 NOTE — BH INPATIENT PSYCHIATRY PROGRESS NOTE - NSBHASSESSSUMMFT_PSY_ALL_CORE
8/14 better but now hard to stay if responding to Abilify or prns of haldol will increase Abilify to 17mg then 20mg/d while initiating ECT discussion  8/15 Not much improvement   except less paranoid which may be attributable to haldol prn. Will d/c Abilify and start  Latuda will discuss ECT option  8/16 COnt Latuda trial while entering into discussion with patient re ECT  8/17 Tolerating latuda well; paranoia remains  8/18 Paranoia continues though improved; increase latuda to 40mg po daily - to be administered with food  8/19: On encounter today pt was seen chatting with one of her peers. Pt denied any complaints and reported feeling well. Denies paranoid thoughts or feeling afraid someone was going to hurt her. Still needing frequent redirection from staff to use her walker.   8/20 Depressed anxious fearful disorganized. Cont Latuda trial will cont to discuss ECT with patient, family is supportive  8/21 Depressed psychotic partial response will cont with ECT patient amenable as long as a family member goes with her for fear she will go off unit and never return. Will get labs ask medicine to see  8/22 Depressed not responded well to meds will increase Latuda while prepping for ECT. Will clarify need for ASA given severe nosebleed hx will discuss nosebleed issue with ECT   8/23 Cont ECT w/u discussed with medicine will   cont ASA  8/24 Psychotic depression with limited response to  multiple medication trials. Calmer less floridly delusion but anhedonia in state of constant distress. Patient assents to ECT, daughter Marcelina who is her HCP will provide consent. Patient requests family accompany her to ECT  for support as well as likely afraid she will not come back to unit if taken off. Will d/c Protonix suspension and use Pepcid as the former cannot be give while patient NPO. will lower Latuda as want to begin to reduce meds concurrent with ECT  8/25 Patient tolerated ECT, cont ECT, try to reduce Klonopin when better. Cont Effexor and Latuda. Recheck Na level on Sunday 8/26 Tolerated first ECT amnestic for treatment itself sl calmer. COnt ECT will begin to reduce klonopin to reduce cog se, check BMP in AM  8/27 less dysphoric, still paranoid, no SI/HI. Labs reviewed and discussed with hospitalist. Repeat BMP, urine NA and osmolality to f/u.  8/28 Tolerating ECT, cont treatments spread out so next one  9/1. Eval for further decrease Klonopin. Medicine saw patient , reintroduced metformin at low dose and d/declan ASA based on risk benefit   8/29 Mood psychosis better, sleepy during day confused. Will hold ECT til 9/1 reduce Klonopin  also may reduce Latuda

## 2023-08-29 NOTE — BH INPATIENT PSYCHIATRY PROGRESS NOTE - NSBHFUPINTERVALHXFT_PSY_A_CORE
Patient seen for psychotic depression, chart reviewed and discussed with nursing staff. VSS. Patient's Na up to 134. Patient eating fair, sleep okay. Tolerated ECT comfortable going when family member went to ECT suite with her.

## 2023-08-29 NOTE — BH INPATIENT PSYCHIATRY PROGRESS NOTE - MSE UNSTRUCTURED FT
Appearance: fair grooming, adequate hygiene; mild tremor, not stiff. Does better with voice amplifier  Behavior: more engaged very mild psychomotor retardation, attentive  Speech: soft, somewhat slowed but otherwise normal  Mood:  ok  less anxious.   Affect: less dysphoric.  Thought process: linear, illogical, tangential.  Thought content: no delusions elicted  Perceptual disturbances: denies Ah/VH  Orientation:  confused , thought she was in assisted living though it was July 2023, did not rember having had ECT  Insight: partial  Judgement: impaired

## 2023-08-30 PROCEDURE — 99232 SBSQ HOSP IP/OBS MODERATE 35: CPT

## 2023-08-30 RX ORDER — CLONAZEPAM 1 MG
0.5 TABLET ORAL DAILY
Refills: 0 | Status: DISCONTINUED | OUTPATIENT
Start: 2023-08-30 | End: 2023-09-06

## 2023-08-30 RX ADMIN — METFORMIN HYDROCHLORIDE 250 MILLIGRAM(S): 850 TABLET ORAL at 20:09

## 2023-08-30 RX ADMIN — Medication 0.5 MILLIGRAM(S): at 10:15

## 2023-08-30 RX ADMIN — FAMOTIDINE 20 MILLIGRAM(S): 10 INJECTION INTRAVENOUS at 07:02

## 2023-08-30 RX ADMIN — ATORVASTATIN CALCIUM 10 MILLIGRAM(S): 80 TABLET, FILM COATED ORAL at 20:09

## 2023-08-30 RX ADMIN — Medication 2 SPRAY(S): at 11:27

## 2023-08-30 RX ADMIN — LOSARTAN POTASSIUM 75 MILLIGRAM(S): 100 TABLET, FILM COATED ORAL at 07:01

## 2023-08-30 RX ADMIN — SENNA PLUS 2 TABLET(S): 8.6 TABLET ORAL at 10:16

## 2023-08-30 RX ADMIN — Medication 2 SPRAY(S): at 20:14

## 2023-08-30 RX ADMIN — AMLODIPINE BESYLATE 2.5 MILLIGRAM(S): 2.5 TABLET ORAL at 07:01

## 2023-08-30 RX ADMIN — METFORMIN HYDROCHLORIDE 250 MILLIGRAM(S): 850 TABLET ORAL at 10:16

## 2023-08-30 RX ADMIN — LURASIDONE HYDROCHLORIDE 40 MILLIGRAM(S): 40 TABLET ORAL at 10:15

## 2023-08-30 RX ADMIN — Medication 225 MILLIGRAM(S): at 10:15

## 2023-08-30 NOTE — BH INPATIENT PSYCHIATRY PROGRESS NOTE - NSBHCHARTREVIEWVS_PSY_A_CORE FT
Vital Signs Last 24 Hrs  T(C): 36.6 (08-30-23 @ 07:17), Max: 36.6 (08-30-23 @ 07:17)  T(F): 97.9 (08-30-23 @ 07:17), Max: 97.9 (08-30-23 @ 07:17)  HR: --  BP: --  BP(mean): --  RR: --  SpO2: --    Orthostatic VS  08-30-23 @ 07:17  Lying BP: --/-- HR: --  Sitting BP: 152/69 HR: 102  Standing BP: 150/77 HR: 105  Site: upper left arm  Mode: electronic  Orthostatic VS  08-29-23 @ 07:43  Lying BP: --/-- HR: --  Sitting BP: 124/53 HR: 91  Standing BP: 124/56 HR: 105  Site: upper left arm  Mode: electronic

## 2023-08-30 NOTE — BH INPATIENT PSYCHIATRY PROGRESS NOTE - MSE UNSTRUCTURED FT
Appearance: fair grooming, adequate hygiene; mild tremor, not stiff. Does better with voice amplifier  Behavior: more engaged very mild psychomotor retardation, attentive  Speech: soft, somewhat slowed but otherwise normal  Mood:  ok  less anxious. Perplexed  Affect: less dysphoric.  Thought process: linear, illogical, tangential.  Thought content: no delusions elicited  Perceptual disturbances: denies Ah/VH  Orientation:  confused , thought she was in assisted living got year wrong said it was March. No recall of going to ECT  Insight: partial  Judgement: impaired

## 2023-08-30 NOTE — BH INPATIENT PSYCHIATRY PROGRESS NOTE - NSBHFUPINTERVALHXFT_PSY_A_CORE
Patient seen for psychotic depression, chart reviewed and discussed with nursing staff. VSS. Patient's Na up to 134. Patient eating fair, sleep okay. Tolerated ECT well. HAs appeared calmer today, more affect

## 2023-08-30 NOTE — BH INPATIENT PSYCHIATRY PROGRESS NOTE - NSBHASSESSSUMMFT_PSY_ALL_CORE
8/14 better but now hard to stay if responding to Abilify or prns of haldol will increase Abilify to 17mg then 20mg/d while initiating ECT discussion  8/15 Not much improvement   except less paranoid which may be attributable to haldol prn. Will d/c Abilify and start  Latuda will discuss ECT option  8/16 COnt Latuda trial while entering into discussion with patient re ECT  8/17 Tolerating latuda well; paranoia remains  8/18 Paranoia continues though improved; increase latuda to 40mg po daily - to be administered with food  8/19: On encounter today pt was seen chatting with one of her peers. Pt denied any complaints and reported feeling well. Denies paranoid thoughts or feeling afraid someone was going to hurt her. Still needing frequent redirection from staff to use her walker.   8/20 Depressed anxious fearful disorganized. Cont Latuda trial will cont to discuss ECT with patient, family is supportive  8/21 Depressed psychotic partial response will cont with ECT patient amenable as long as a family member goes with her for fear she will go off unit and never return. Will get labs ask medicine to see  8/22 Depressed not responded well to meds will increase Latuda while prepping for ECT. Will clarify need for ASA given severe nosebleed hx will discuss nosebleed issue with ECT   8/23 Cont ECT w/u discussed with medicine will   cont ASA  8/24 Psychotic depression with limited response to  multiple medication trials. Calmer less floridly delusion but anhedonia in state of constant distress. Patient assents to ECT, daughter Marcelina who is her HCP will provide consent. Patient requests family accompany her to ECT  for support as well as likely afraid she will not come back to unit if taken off. Will d/c Protonix suspension and use Pepcid as the former cannot be give while patient NPO. will lower Latuda as want to begin to reduce meds concurrent with ECT  8/25 Patient tolerated ECT, cont ECT, try to reduce Klonopin when better. Cont Effexor and Latuda. Recheck Na level on Sunday 8/26 Tolerated first ECT amnestic for treatment itself sl calmer. COnt ECT will begin to reduce klonopin to reduce cog se, check BMP in AM  8/27 less dysphoric, still paranoid, no SI/HI. Labs reviewed and discussed with hospitalist. Repeat BMP, urine NA and osmolality to f/u.  8/28 Tolerating ECT, cont treatments spread out so next one  9/1. Eval for further decrease Klonopin. Medicine saw patient , reintroduced metformin at low dose and d/declan ASA based on risk benefit   8/29 Mood psychosis better, sleepy during day confused. Will hold ECT til 9/1 reduce Klonopin  also may reduce Latuda  8/30 Improved with ECT but cog worse Will cont to hold ECT til clearer cont to reduce BDZ

## 2023-08-31 PROCEDURE — 99232 SBSQ HOSP IP/OBS MODERATE 35: CPT

## 2023-08-31 RX ORDER — LURASIDONE HYDROCHLORIDE 40 MG/1
20 TABLET ORAL DAILY
Refills: 0 | Status: DISCONTINUED | OUTPATIENT
Start: 2023-08-31 | End: 2023-09-01

## 2023-08-31 RX ORDER — VENLAFAXINE HCL 75 MG
150 CAPSULE, EXT RELEASE 24 HR ORAL DAILY
Refills: 0 | Status: DISCONTINUED | OUTPATIENT
Start: 2023-08-31 | End: 2023-09-05

## 2023-08-31 RX ADMIN — Medication 2 SPRAY(S): at 10:44

## 2023-08-31 RX ADMIN — Medication 0.5 MILLIGRAM(S): at 10:43

## 2023-08-31 RX ADMIN — Medication 2 SPRAY(S): at 13:59

## 2023-08-31 RX ADMIN — ATORVASTATIN CALCIUM 10 MILLIGRAM(S): 80 TABLET, FILM COATED ORAL at 21:54

## 2023-08-31 RX ADMIN — LURASIDONE HYDROCHLORIDE 40 MILLIGRAM(S): 40 TABLET ORAL at 10:42

## 2023-08-31 RX ADMIN — LOSARTAN POTASSIUM 75 MILLIGRAM(S): 100 TABLET, FILM COATED ORAL at 06:31

## 2023-08-31 RX ADMIN — FAMOTIDINE 20 MILLIGRAM(S): 10 INJECTION INTRAVENOUS at 06:31

## 2023-08-31 RX ADMIN — LACTULOSE 10 GRAM(S): 10 SOLUTION ORAL at 13:59

## 2023-08-31 RX ADMIN — Medication 2 SPRAY(S): at 21:55

## 2023-08-31 RX ADMIN — METFORMIN HYDROCHLORIDE 250 MILLIGRAM(S): 850 TABLET ORAL at 21:53

## 2023-08-31 RX ADMIN — AMLODIPINE BESYLATE 2.5 MILLIGRAM(S): 2.5 TABLET ORAL at 06:31

## 2023-08-31 RX ADMIN — SENNA PLUS 2 TABLET(S): 8.6 TABLET ORAL at 10:43

## 2023-08-31 RX ADMIN — METFORMIN HYDROCHLORIDE 250 MILLIGRAM(S): 850 TABLET ORAL at 10:42

## 2023-08-31 RX ADMIN — Medication 225 MILLIGRAM(S): at 10:42

## 2023-08-31 NOTE — BH INPATIENT PSYCHIATRY PROGRESS NOTE - NSBHFUPINTERVALHXFT_PSY_A_CORE
Patient seen for psychotic depression, chart reviewed and discussed with nursing staff. VSS.  Patient eating fair, sleep okay.  Has appeared calmer today, more affect

## 2023-08-31 NOTE — BH INPATIENT PSYCHIATRY PROGRESS NOTE - MSE UNSTRUCTURED FT
Appearance: fair grooming, adequate hygiene; mild tremor, not stiff. Does better with voice amplifier  Behavior: more engaged very mild psychomotor retardation, attentive  Speech: soft, somewhat slowed but otherwise normal  Mood:  ok  less anxious. Perplexed  Affect: less dysphoric.  Thought process: linear, illogical, tangential.  Thought content: no delusions elicited  Perceptual disturbances: denies Ah/VH  Orientation:  confused , thought she was in assisted living got year wrong said it was March. No recall of going to ECT  Insight: poor as has no memory of being depressed or being paranoid  Judgement: impaired

## 2023-08-31 NOTE — BH INPATIENT PSYCHIATRY PROGRESS NOTE - NSBHMETABOLIC_PSY_ALL_CORE_FT
BMI: BMI (kg/m2): 23 (06-07-23 @ 18:13)  HbA1c: A1C with Estimated Average Glucose Result: 5.7 % (06-01-23 @ 05:10)    Glucose: POCT Blood Glucose.: 119 mg/dL (08-25-23 @ 11:10)    BP: --  Lipid Panel: Date/Time: 06-08-23 @ 08:30  Cholesterol, Serum: 119  Direct LDL: --  HDL Cholesterol, Serum: 47  Total Cholesterol/HDL Ration Measurement: --  Triglycerides, Serum: 56

## 2023-08-31 NOTE — BH INPATIENT PSYCHIATRY PROGRESS NOTE - CURRENT MEDICATION
MEDICATIONS  (STANDING):  amLODIPine   Tablet 2.5 milliGRAM(s) Oral <User Schedule>  atorvastatin 10 milliGRAM(s) Oral at bedtime  clonazePAM Oral Disintegrating Tablet 0.5 milliGRAM(s) Oral daily  famotidine    Tablet 20 milliGRAM(s) Oral <User Schedule>  glucagon  Injectable 1 milliGRAM(s) IntraMuscular once  lactulose Syrup 10 Gram(s) Oral <User Schedule>  losartan 75 milliGRAM(s) Oral <User Schedule>  lurasidone 20 milliGRAM(s) Oral daily  metFORMIN 250 milliGRAM(s) Oral two times a day  senna 2 Tablet(s) Oral daily  sodium chloride 0.65% Nasal 2 Spray(s) Both Nostrils three times a day  venlafaxine 150 milliGRAM(s) Oral daily    MEDICATIONS  (PRN):  acetaminophen     Tablet .. 650 milliGRAM(s) Oral every 6 hours PRN Temp greater or equal to 38C (100.4F), Mild Pain (1 - 3), Moderate Pain (4 - 6)  bisacodyl 5 milliGRAM(s) Oral daily PRN constipation  bisacodyl Suppository 10 milliGRAM(s) Rectal daily PRN constipation  dextrose Oral Gel 15 Gram(s) Oral once PRN Blood Glucose LESS THAN 70 milliGRAM(s)/deciliter  haloperidol     Tablet 0.5 milliGRAM(s) Oral every 6 hours PRN agitation  haloperidol    Injectable 1 milliGRAM(s) IntraMuscular once PRN agitation  lidocaine   4% Patch 1 Patch Transdermal daily PRN LBP  melatonin. 3 milliGRAM(s) Oral at bedtime PRN Insomnia  simethicone 80 milliGRAM(s) Chew every 4 hours PRN abdominal discomfort

## 2023-08-31 NOTE — BH INPATIENT PSYCHIATRY PROGRESS NOTE - NSBHCHARTREVIEWVS_PSY_A_CORE FT
Vital Signs Last 24 Hrs  T(C): --  T(F): --  HR: --  BP: --  BP(mean): --  RR: --  SpO2: --    Orthostatic VS  08-30-23 @ 07:17  Lying BP: --/-- HR: --  Sitting BP: 152/69 HR: 102  Standing BP: 150/77 HR: 105  Site: upper left arm  Mode: electronic

## 2023-08-31 NOTE — BH INPATIENT PSYCHIATRY PROGRESS NOTE - NSBHASSESSSUMMFT_PSY_ALL_CORE
8/14 better but now hard to stay if responding to Abilify or prns of haldol will increase Abilify to 17mg then 20mg/d while initiating ECT discussion  8/15 Not much improvement   except less paranoid which may be attributable to haldol prn. Will d/c Abilify and start  Latuda will discuss ECT option  8/16 COnt Latuda trial while entering into discussion with patient re ECT  8/17 Tolerating latuda well; paranoia remains  8/18 Paranoia continues though improved; increase latuda to 40mg po daily - to be administered with food  8/19: On encounter today pt was seen chatting with one of her peers. Pt denied any complaints and reported feeling well. Denies paranoid thoughts or feeling afraid someone was going to hurt her. Still needing frequent redirection from staff to use her walker.   8/20 Depressed anxious fearful disorganized. Cont Latuda trial will cont to discuss ECT with patient, family is supportive  8/21 Depressed psychotic partial response will cont with ECT patient amenable as long as a family member goes with her for fear she will go off unit and never return. Will get labs ask medicine to see  8/22 Depressed not responded well to meds will increase Latuda while prepping for ECT. Will clarify need for ASA given severe nosebleed hx will discuss nosebleed issue with ECT   8/23 Cont ECT w/u discussed with medicine will   cont ASA  8/24 Psychotic depression with limited response to  multiple medication trials. Calmer less floridly delusion but anhedonia in state of constant distress. Patient assents to ECT, daughter Marcelina who is her HCP will provide consent. Patient requests family accompany her to ECT  for support as well as likely afraid she will not come back to unit if taken off. Will d/c Protonix suspension and use Pepcid as the former cannot be give while patient NPO. will lower Latuda as want to begin to reduce meds concurrent with ECT  8/25 Patient tolerated ECT, cont ECT, try to reduce Klonopin when better. Cont Effexor and Latuda. Recheck Na level on Sunday 8/26 Tolerated first ECT amnestic for treatment itself sl calmer. COnt ECT will begin to reduce klonopin to reduce cog se, check BMP in AM  8/27 less dysphoric, still paranoid, no SI/HI. Labs reviewed and discussed with hospitalist. Repeat BMP, urine NA and osmolality to f/u.  8/28 Tolerating ECT, cont treatments spread out so next one  9/1. Eval for further decrease Klonopin. Medicine saw patient , reintroduced metformin at low dose and d/declan ASA based on risk benefit   8/29 Mood psychosis better, sleepy during day confused. Will hold ECT til 9/1 reduce Klonopin  also may reduce Latuda  8/30 Improved with ECT but cog worse Will cont to hold ECT til clearer cont to reduce BDZ  8/31 Patient much better with regard to mood and  psychosis but notably cognitive disrupted form ECT Will hold ECT tomorrow. Will lower Lurasidone as was never effective for psychosis. Will start lowering Effexor and change to Zoloft as Effexor not helpful. Around completion of ECT may restart Depakote given episode of anjelica last admission. Consider further reduction in Ko Klonopin to 0.25mg/d . Reviewed plan of care with daughter

## 2023-09-01 PROCEDURE — 99232 SBSQ HOSP IP/OBS MODERATE 35: CPT

## 2023-09-01 RX ORDER — DIVALPROEX SODIUM 500 MG/1
250 TABLET, DELAYED RELEASE ORAL
Refills: 0 | Status: DISCONTINUED | OUTPATIENT
Start: 2023-09-01 | End: 2023-09-01

## 2023-09-01 RX ORDER — DIVALPROEX SODIUM 500 MG/1
250 TABLET, DELAYED RELEASE ORAL ONCE
Refills: 0 | Status: DISCONTINUED | OUTPATIENT
Start: 2023-09-01 | End: 2023-09-01

## 2023-09-01 RX ORDER — LURASIDONE HYDROCHLORIDE 40 MG/1
20 TABLET ORAL ONCE
Refills: 0 | Status: COMPLETED | OUTPATIENT
Start: 2023-09-01 | End: 2023-09-01

## 2023-09-01 RX ORDER — DIVALPROEX SODIUM 500 MG/1
250 TABLET, DELAYED RELEASE ORAL ONCE
Refills: 0 | Status: COMPLETED | OUTPATIENT
Start: 2023-09-01 | End: 2023-09-01

## 2023-09-01 RX ORDER — LURASIDONE HYDROCHLORIDE 40 MG/1
40 TABLET ORAL DAILY
Refills: 0 | Status: DISCONTINUED | OUTPATIENT
Start: 2023-09-01 | End: 2023-09-28

## 2023-09-01 RX ADMIN — Medication 0.5 MILLIGRAM(S): at 10:06

## 2023-09-01 RX ADMIN — Medication 2 SPRAY(S): at 16:32

## 2023-09-01 RX ADMIN — LOSARTAN POTASSIUM 75 MILLIGRAM(S): 100 TABLET, FILM COATED ORAL at 06:36

## 2023-09-01 RX ADMIN — Medication 2 SPRAY(S): at 10:06

## 2023-09-01 RX ADMIN — METFORMIN HYDROCHLORIDE 250 MILLIGRAM(S): 850 TABLET ORAL at 10:12

## 2023-09-01 RX ADMIN — Medication 2 SPRAY(S): at 21:18

## 2023-09-01 RX ADMIN — LACTULOSE 10 GRAM(S): 10 SOLUTION ORAL at 16:33

## 2023-09-01 RX ADMIN — Medication 150 MILLIGRAM(S): at 10:06

## 2023-09-01 RX ADMIN — FAMOTIDINE 20 MILLIGRAM(S): 10 INJECTION INTRAVENOUS at 06:36

## 2023-09-01 RX ADMIN — AMLODIPINE BESYLATE 2.5 MILLIGRAM(S): 2.5 TABLET ORAL at 06:36

## 2023-09-01 RX ADMIN — LURASIDONE HYDROCHLORIDE 20 MILLIGRAM(S): 40 TABLET ORAL at 10:06

## 2023-09-01 RX ADMIN — ATORVASTATIN CALCIUM 10 MILLIGRAM(S): 80 TABLET, FILM COATED ORAL at 21:17

## 2023-09-01 RX ADMIN — DIVALPROEX SODIUM 250 MILLIGRAM(S): 500 TABLET, DELAYED RELEASE ORAL at 10:31

## 2023-09-01 RX ADMIN — LURASIDONE HYDROCHLORIDE 20 MILLIGRAM(S): 40 TABLET ORAL at 18:18

## 2023-09-01 RX ADMIN — METFORMIN HYDROCHLORIDE 250 MILLIGRAM(S): 850 TABLET ORAL at 21:17

## 2023-09-01 NOTE — BH INPATIENT PSYCHIATRY PROGRESS NOTE - NSBHFUPINTERVALHXFT_PSY_A_CORE
Patient seen for psychotic depression, chart reviewed and discussed with nursing staff. VSS.  Patient eating fair, sleep okay.  Has appeared calmer today, more affect. More active ambulating more

## 2023-09-01 NOTE — BH INPATIENT PSYCHIATRY PROGRESS NOTE - MSE UNSTRUCTURED FT
Appearance: fair grooming, adequate hygiene; mild tremor, not stiff. Does better with voice amplifier  Behavior: more engaged very mild psychomotor retardation, attentive  Speech: soft, somewhat slowed but otherwise normal  Mood:  anxious but not sad  Affect:brighter smiling  Thought process: linear, illogical, tangential.  Thought content: paranoid fears someone outside hospital may want to harm her  Perceptual disturbances: denies Ah/VH  Orientation:  confused , knew she is in hospital says its March 2003  Insight: poor as has no memory of being depressed   Judgement: impaired

## 2023-09-01 NOTE — BH INPATIENT PSYCHIATRY PROGRESS NOTE - CURRENT MEDICATION
MEDICATIONS  (STANDING):  amLODIPine   Tablet 2.5 milliGRAM(s) Oral <User Schedule>  atorvastatin 10 milliGRAM(s) Oral at bedtime  clonazePAM Oral Disintegrating Tablet 0.5 milliGRAM(s) Oral daily  famotidine    Tablet 20 milliGRAM(s) Oral <User Schedule>  glucagon  Injectable 1 milliGRAM(s) IntraMuscular once  lactulose Syrup 10 Gram(s) Oral <User Schedule>  losartan 75 milliGRAM(s) Oral <User Schedule>  lurasidone 40 milliGRAM(s) Oral daily  lurasidone 20 milliGRAM(s) Oral once  metFORMIN 250 milliGRAM(s) Oral two times a day  senna 2 Tablet(s) Oral daily  sodium chloride 0.65% Nasal 2 Spray(s) Both Nostrils three times a day  venlafaxine 150 milliGRAM(s) Oral daily    MEDICATIONS  (PRN):  acetaminophen     Tablet .. 650 milliGRAM(s) Oral every 6 hours PRN Temp greater or equal to 38C (100.4F), Mild Pain (1 - 3), Moderate Pain (4 - 6)  bisacodyl 5 milliGRAM(s) Oral daily PRN constipation  bisacodyl Suppository 10 milliGRAM(s) Rectal daily PRN constipation  dextrose Oral Gel 15 Gram(s) Oral once PRN Blood Glucose LESS THAN 70 milliGRAM(s)/deciliter  haloperidol     Tablet 0.5 milliGRAM(s) Oral every 6 hours PRN agitation  haloperidol    Injectable 1 milliGRAM(s) IntraMuscular once PRN agitation  lidocaine   4% Patch 1 Patch Transdermal daily PRN LBP  melatonin. 3 milliGRAM(s) Oral at bedtime PRN Insomnia  simethicone 80 milliGRAM(s) Chew every 4 hours PRN abdominal discomfort

## 2023-09-01 NOTE — BH INPATIENT PSYCHIATRY PROGRESS NOTE - NSBHCHARTREVIEWVS_PSY_A_CORE FT
Vital Signs Last 24 Hrs  T(C): 36.5 (09-01-23 @ 06:05), Max: 36.5 (09-01-23 @ 06:05)  T(F): 97.7 (09-01-23 @ 06:05), Max: 97.7 (09-01-23 @ 06:05)  HR: --  BP: --  BP(mean): --  RR: --  SpO2: --    Orthostatic VS  09-01-23 @ 06:05  Lying BP: --/-- HR: --  Sitting BP: 131/44 HR: 77  Standing BP: 136/62 HR: 96  Site: --  Mode: --  Orthostatic VS  08-31-23 @ 09:55  Lying BP: 150/66 HR: 80  Sitting BP: 144/64 HR: 88  Standing BP: --/-- HR: --  Site: --  Mode: --

## 2023-09-01 NOTE — BH INPATIENT PSYCHIATRY PROGRESS NOTE - NSBHASSESSSUMMFT_PSY_ALL_CORE
Spoke with patient and advised of new orders per Shanae.  Discussed directions for use, completing full 10 day course, taking with food and beginning OTC probiotic. Patient verbalized understanding and has no other questions or concerns at this time.  Prescription sent to preferred pharmacy.   8/14 better but now hard to stay if responding to Abilify or prns of haldol will increase Abilify to 17mg then 20mg/d while initiating ECT discussion  8/15 Not much improvement   except less paranoid which may be attributable to haldol prn. Will d/c Abilify and start  Latuda will discuss ECT option  8/16 COnt Latuda trial while entering into discussion with patient re ECT  8/17 Tolerating latuda well; paranoia remains  8/18 Paranoia continues though improved; increase latuda to 40mg po daily - to be administered with food  8/19: On encounter today pt was seen chatting with one of her peers. Pt denied any complaints and reported feeling well. Denies paranoid thoughts or feeling afraid someone was going to hurt her. Still needing frequent redirection from staff to use her walker.   8/20 Depressed anxious fearful disorganized. Cont Latuda trial will cont to discuss ECT with patient, family is supportive  8/21 Depressed psychotic partial response will cont with ECT patient amenable as long as a family member goes with her for fear she will go off unit and never return. Will get labs ask medicine to see  8/22 Depressed not responded well to meds will increase Latuda while prepping for ECT. Will clarify need for ASA given severe nosebleed hx will discuss nosebleed issue with ECT   8/23 Cont ECT w/u discussed with medicine will   cont ASA  8/24 Psychotic depression with limited response to  multiple medication trials. Calmer less floridly delusion but anhedonia in state of constant distress. Patient assents to ECT, daughter Marcelina who is her HCP will provide consent. Patient requests family accompany her to ECT  for support as well as likely afraid she will not come back to unit if taken off. Will d/c Protonix suspension and use Pepcid as the former cannot be give while patient NPO. will lower Latuda as want to begin to reduce meds concurrent with ECT  8/25 Patient tolerated ECT, cont ECT, try to reduce Klonopin when better. Cont Effexor and Latuda. Recheck Na level on Sunday 8/26 Tolerated first ECT amnestic for treatment itself sl calmer. COnt ECT will begin to reduce klonopin to reduce cog se, check BMP in AM  8/27 less dysphoric, still paranoid, no SI/HI. Labs reviewed and discussed with hospitalist. Repeat BMP, urine NA and osmolality to f/u.  8/28 Tolerating ECT, cont treatments spread out so next one  9/1. Eval for further decrease Klonopin. Medicine saw patient , reintroduced metformin at low dose and d/declan ASA based on risk benefit   8/29 Mood psychosis better, sleepy during day confused. Will hold ECT til 9/1 reduce Klonopin  also may reduce Latuda  8/30 Improved with ECT but cog worse Will cont to hold ECT til clearer cont to reduce BDZ  8/31 Patient much better with regard to mood and  psychosis but notably cognitive disrupted form ECT Will hold ECT tomorrow. Will lower Lurasidone as was never effective for psychosis. Will start lowering Effexor and change to Zoloft as Effexor not helpful. Around completion of ECT may restart Depakote given episode of anjelica last admission. Consider further reduction in Ko Klonopin to 0.25mg/d . Reviewed plan of care with daughter  9/1 Patient improved but paranoia creeping back. Will cont Latuda at 40mg  consider increading back to 60mg. Considered resume VPA based on concern of risk for switching but may inhibit sx requiring higher stimulus which can lead to more confusion. Next rx 9/5

## 2023-09-01 NOTE — PROGRESS NOTE ADULT - ASSESSMENT
82 y/o F with pmhx of dementia, DM2, HTN, anxiety, paranoia presented to the ED for new onset AMS and worsening agitation and paranoia. Pt admitted to [facility] for management of behavorial issues.    # Dysphagia vs Paranoia about food being poisoned  -As per previous attending`s discussion with daughter: Pt has become paranoid about food being poisoned since the last 2 months. Swallow studies done at assisted living facility did not reveal any etiology  and pt was tolerating a pureed diet (pureed as pt would not eat any consistency not due to any actual dysphagia issue)   - s/p Speech and swallow and cine-esophagram. Pt was able to tolerate a minced and moist diet.  Cine showed esophageal web.   - Was seen by GI inpt- Advised to assess dysphagia symptoms when psychiatrically optimized to obtain best description of symptoms outside of paranoia around poisoned food. No urgent need for endoscopy at this time   - Advise to crush all medications as tolerated. Continue minced and moist diet      #Acute paranoia  - management per primary BH team  - patient planned for ECT this week, please see ECT chart note for optimization    #Epistaxis  - patient reported 1x episode of epistaxis overnight on 8/22  - now resolved, noted with small nostril abrasion, most likely source  - hgb stable, can c/w nasal saline to ensure nostrils remain moisturized    #Hyponatremia  - patient noted with hyponatremia to 131  - chart reviewed, patient has history ranged from 130-135  - this is most likely the patient's baseline and there may also be component of poor oral intake  - cont to encourage oral nutrition and hydration    #HLD  - c/w atorvastatin 10mg qhs    #Benign essential HTN  - SBP in 140-160s on   - c/w Norvasc 2.5mg and Losartan 75mg     # DM2  - A1C: 5.7%   - c/w low dose sliding scale insulin  
Assessment/plan:    Onychomycosis secondary to dermatophytes  Diabetes mellitus    Aseptic debridement of mycotic nails all 10 digits with Betadine applied.  Recommend continued routine diabetic foot care as an outpatient.  Thank you for consultation.

## 2023-09-01 NOTE — PROGRESS NOTE ADULT - SUBJECTIVE AND OBJECTIVE BOX
Podiatry pager #: [phone]/ [phone]    Patient is a 83y old  Female who presents with a chief complaint of depression on ECT (28 Aug 2023 10:57)Podiatry consult for painful tender thickened toenails aggravated by shoe gear ambulation and palpation.      HPI:      PAST MEDICAL & SURGICAL HISTORY:  HTN (hypertension)      DM (diabetes mellitus)      Hyperlipemia      Diabetes mellitus      Dementia      S/P carpal tunnel release  bilateral      S/P partial hysterectomy          MEDICATIONS  (STANDING):  amLODIPine   Tablet 2.5 milliGRAM(s) Oral <User Schedule>  atorvastatin 10 milliGRAM(s) Oral at bedtime  clonazePAM Oral Disintegrating Tablet 0.5 milliGRAM(s) Oral daily  divalproex Sprinkle 250 milliGRAM(s) Oral two times a day  famotidine    Tablet 20 milliGRAM(s) Oral <User Schedule>  glucagon  Injectable 1 milliGRAM(s) IntraMuscular once  lactulose Syrup 10 Gram(s) Oral <User Schedule>  losartan 75 milliGRAM(s) Oral <User Schedule>  lurasidone 40 milliGRAM(s) Oral daily  lurasidone 20 milliGRAM(s) Oral once  metFORMIN 250 milliGRAM(s) Oral two times a day  senna 2 Tablet(s) Oral daily  sodium chloride 0.65% Nasal 2 Spray(s) Both Nostrils three times a day  venlafaxine 150 milliGRAM(s) Oral daily    MEDICATIONS  (PRN):  acetaminophen     Tablet .. 650 milliGRAM(s) Oral every 6 hours PRN Temp greater or equal to 38C (100.4F), Mild Pain (1 - 3), Moderate Pain (4 - 6)  bisacodyl 5 milliGRAM(s) Oral daily PRN constipation  bisacodyl Suppository 10 milliGRAM(s) Rectal daily PRN constipation  dextrose Oral Gel 15 Gram(s) Oral once PRN Blood Glucose LESS THAN 70 milliGRAM(s)/deciliter  haloperidol     Tablet 0.5 milliGRAM(s) Oral every 6 hours PRN agitation  haloperidol    Injectable 1 milliGRAM(s) IntraMuscular once PRN agitation  lidocaine   4% Patch 1 Patch Transdermal daily PRN LBP  melatonin. 3 milliGRAM(s) Oral at bedtime PRN Insomnia  simethicone 80 milliGRAM(s) Chew every 4 hours PRN abdominal discomfort      Allergies    penicillin (Rash)  sulfa drugs (Rash)  adhesives (Unknown)  Cipro (Rash)    Intolerances        VITALS:    Vital Signs Last 24 Hrs  T(C): 36.5 (01 Sep 2023 06:05), Max: 36.5 (01 Sep 2023 06:05)  T(F): 97.7 (01 Sep 2023 06:05), Max: 97.7 (01 Sep 2023 06:05)  HR: --  BP: --  BP(mean): --  RR: --  SpO2: --        LABS:                CAPILLARY BLOOD GLUCOSE              LOWER EXTREMITY PHYSICAL EXAM:    Vasular: DP/PT _1/4, B/L, CFT <_2 seconds B/L, Temperature gradient _wnl, B/L.   Neuro: Epicritic sensation diminished_ to the level of _toes, B/L.  Skin: Positive dystrophic hypertrophic brittle toenails with subungual debris to all 10 digits.  No open ulcerations or clinical signs of infection.      RADIOLOGY & ADDITIONAL STUDIES:    
[facility] Department of Hospital Medicine  Elham Martin MD  Available on MS Teams  Pager: [phone]    Patient is a 83y old  Female who presents with a chief complaint of Bipolar disorder     (08 Jun 2023 14:54)    OVERNIGHT EVENTS: No acute events overnight    SUBJECTIVE: Patient seen and examined on  unit. Patient reports feeling overall well. States she had a small nosebleed overnight but denies any further bleeding. Expressed some anxiety about how her lab results will be. Denies any CP or SOB. Has been ambulating with her walker. Tolerating diet although appetite remains poor.     ADDITIONAL REVIEW OF SYSTEMS: as above    MEDICATIONS  (STANDING):  amLODIPine   Tablet 2.5 milliGRAM(s) Oral daily  atorvastatin 10 milliGRAM(s) Oral at bedtime  clonazePAM Oral Disintegrating Tablet 0.5 milliGRAM(s) Oral <User Schedule>  glucagon  Injectable 1 milliGRAM(s) IntraMuscular once  lactulose Syrup 10 Gram(s) Oral daily  losartan 75 milliGRAM(s) Oral daily  lurasidone 60 milliGRAM(s) Oral daily  pantoprazole   Suspension 40 milliGRAM(s) Oral before breakfast  senna 2 Tablet(s) Oral daily  sodium chloride 0.65% Nasal 2 Spray(s) Both Nostrils two times a day  venlafaxine 225 milliGRAM(s) Oral daily    MEDICATIONS  (PRN):  acetaminophen     Tablet .. 650 milliGRAM(s) Oral every 6 hours PRN Temp greater or equal to 38C (100.4F), Mild Pain (1 - 3), Moderate Pain (4 - 6)  bisacodyl 5 milliGRAM(s) Oral daily PRN constipation  bisacodyl Suppository 10 milliGRAM(s) Rectal daily PRN constipation  dextrose Oral Gel 15 Gram(s) Oral once PRN Blood Glucose LESS THAN 70 milliGRAM(s)/deciliter  haloperidol     Tablet 0.5 milliGRAM(s) Oral every 6 hours PRN agitation  haloperidol    Injectable 1 milliGRAM(s) IntraMuscular once PRN agitation  lidocaine   4% Patch 1 Patch Transdermal daily PRN LBP  melatonin. 3 milliGRAM(s) Oral at bedtime PRN Insomnia  simethicone 80 milliGRAM(s) Chew every 4 hours PRN abdominal discomfort    CAPILLARY BLOOD GLUCOSE    I&O's Summary    PHYSICAL EXAM:  Vital Signs Last 24 Hrs  T(C): --  T(F): --  HR: --  BP: --  BP(mean): --  RR: --  SpO2: --    CONSTITUTIONAL: NAD, well-developed  HEAD: Normocephalic, atraumatic  EYES: EOMI, PERRL  ENT: no rhinorrhea, no epistaxis noted, small abrasion noted within right nostril  RESPIRATORY: No increased work of breathing, CTAB, no wheezes or crackles appreciated  CARDIOVASCULAR: RRR, S1 and S2 present, no m/r/g  ABDOMEN: soft, NT, ND, bowel sounds present  EXTREMITIES: No LE edema  MUSCULOSKELETAL: no joint swelling, no tenderness to palpation  NEURO: A&Ox3, moving all extremities    LABS:                        12.0   8.33  )-----------( 242      ( 22 Aug 2023 08:20 )             37.6     08-22    131<L>  |  95<L>  |  8   ----------------------------<  111<H>  4.8   |  24  |  0.64    Ca    10.1      22 Aug 2023 08:20    Urinalysis Basic - ( 22 Aug 2023 08:20 )    Color: x / Appearance: x / SG: x / pH: x  Gluc: 111 mg/dL / Ketone: x  / Bili: x / Urobili: x   Blood: x / Protein: x / Nitrite: x   Leuk Esterase: x / RBC: x / WBC x   Sq Epi: x / Non Sq Epi: x / Bacteria: x    RADIOLOGY & ADDITIONAL TESTS:    Results Reviewed:   Imaging Personally Reviewed:  Electrocardiogram Personally Reviewed:    COORDINATION OF CARE:  Care Discussed with Consultants/Other Providers [Y/N]:  Prior or Outpatient Records Reviewed [Y/N]:

## 2023-09-02 PROCEDURE — 99231 SBSQ HOSP IP/OBS SF/LOW 25: CPT

## 2023-09-02 RX ADMIN — Medication 2 SPRAY(S): at 12:40

## 2023-09-02 RX ADMIN — Medication 0.5 MILLIGRAM(S): at 10:47

## 2023-09-02 RX ADMIN — Medication 2 SPRAY(S): at 21:13

## 2023-09-02 RX ADMIN — LOSARTAN POTASSIUM 75 MILLIGRAM(S): 100 TABLET, FILM COATED ORAL at 06:29

## 2023-09-02 RX ADMIN — SENNA PLUS 2 TABLET(S): 8.6 TABLET ORAL at 10:44

## 2023-09-02 RX ADMIN — ATORVASTATIN CALCIUM 10 MILLIGRAM(S): 80 TABLET, FILM COATED ORAL at 21:03

## 2023-09-02 RX ADMIN — AMLODIPINE BESYLATE 2.5 MILLIGRAM(S): 2.5 TABLET ORAL at 06:29

## 2023-09-02 RX ADMIN — Medication 2 SPRAY(S): at 13:39

## 2023-09-02 RX ADMIN — Medication 150 MILLIGRAM(S): at 10:47

## 2023-09-02 RX ADMIN — METFORMIN HYDROCHLORIDE 250 MILLIGRAM(S): 850 TABLET ORAL at 21:02

## 2023-09-02 RX ADMIN — FAMOTIDINE 20 MILLIGRAM(S): 10 INJECTION INTRAVENOUS at 06:29

## 2023-09-02 RX ADMIN — METFORMIN HYDROCHLORIDE 250 MILLIGRAM(S): 850 TABLET ORAL at 10:46

## 2023-09-02 RX ADMIN — LACTULOSE 10 GRAM(S): 10 SOLUTION ORAL at 13:38

## 2023-09-02 RX ADMIN — LURASIDONE HYDROCHLORIDE 40 MILLIGRAM(S): 40 TABLET ORAL at 10:48

## 2023-09-02 NOTE — BH INPATIENT PSYCHIATRY PROGRESS NOTE - MSE UNSTRUCTURED FT
Patient is awake and alert. Affect neutral. Speech fluent. TP coherent. Reports feeling that she woke up too early, but reports that her mood has been good. No delusions expressed. No motor retardation. Limited insight. No suicidal ideations.

## 2023-09-02 NOTE — BH INPATIENT PSYCHIATRY PROGRESS NOTE - NSBHASSESSSUMMFT_PSY_ALL_CORE
8/14 better but now hard to stay if responding to Abilify or prns of haldol will increase Abilify to 17mg then 20mg/d while initiating ECT discussion  8/15 Not much improvement   except less paranoid which may be attributable to haldol prn. Will d/c Abilify and start  Latuda will discuss ECT option  8/16 COnt Latuda trial while entering into discussion with patient re ECT  8/17 Tolerating latuda well; paranoia remains  8/18 Paranoia continues though improved; increase latuda to 40mg po daily - to be administered with food  8/19: On encounter today pt was seen chatting with one of her peers. Pt denied any complaints and reported feeling well. Denies paranoid thoughts or feeling afraid someone was going to hurt her. Still needing frequent redirection from staff to use her walker.   8/20 Depressed anxious fearful disorganized. Cont Latuda trial will cont to discuss ECT with patient, family is supportive  8/21 Depressed psychotic partial response will cont with ECT patient amenable as long as a family member goes with her for fear she will go off unit and never return. Will get labs ask medicine to see  8/22 Depressed not responded well to meds will increase Latuda while prepping for ECT. Will clarify need for ASA given severe nosebleed hx will discuss nosebleed issue with ECT   8/23 Cont ECT w/u discussed with medicine will   cont ASA  8/24 Psychotic depression with limited response to  multiple medication trials. Calmer less floridly delusion but anhedonia in state of constant distress. Patient assents to ECT, daughter Marcelina who is her HCP will provide consent. Patient requests family accompany her to ECT  for support as well as likely afraid she will not come back to unit if taken off. Will d/c Protonix suspension and use Pepcid as the former cannot be give while patient NPO. will lower Latuda as want to begin to reduce meds concurrent with ECT  8/25 Patient tolerated ECT, cont ECT, try to reduce Klonopin when better. Cont Effexor and Latuda. Recheck Na level on Sunday 8/26 Tolerated first ECT amnestic for treatment itself sl calmer. COnt ECT will begin to reduce klonopin to reduce cog se, check BMP in AM  8/27 less dysphoric, still paranoid, no SI/HI. Labs reviewed and discussed with hospitalist. Repeat BMP, urine NA and osmolality to f/u.  8/28 Tolerating ECT, cont treatments spread out so next one  9/1. Eval for further decrease Klonopin. Medicine saw patient , reintroduced metformin at low dose and d/declan ASA based on risk benefit   8/29 Mood psychosis better, sleepy during day confused. Will hold ECT til 9/1 reduce Klonopin  also may reduce Latuda  8/30 Improved with ECT but cog worse Will cont to hold ECT til clearer cont to reduce BDZ  8/31 Patient much better with regard to mood and  psychosis but notably cognitive disrupted form ECT Will hold ECT tomorrow. Will lower Lurasidone as was never effective for psychosis. Will start lowering Effexor and change to Zoloft as Effexor not helpful. Around completion of ECT may restart Depakote given episode of anjelica last admission. Consider further reduction in Ko Klonopin to 0.25mg/d . Reviewed plan of care with daughter  9/1 Patient improved but paranoia creeping back. Will cont Latuda at 40mg  consider increasing back to 60mg. Considered resume VPA based on concern of risk for switching but may inhibit sx requiring higher stimulus which can lead to more confusion. Next rx 9/5 9/2: Patient with psychotic depression, improving, on lurasidone

## 2023-09-02 NOTE — BH INPATIENT PSYCHIATRY PROGRESS NOTE - CURRENT MEDICATION
MEDICATIONS  (STANDING):  amLODIPine   Tablet 2.5 milliGRAM(s) Oral <User Schedule>  atorvastatin 10 milliGRAM(s) Oral at bedtime  clonazePAM Oral Disintegrating Tablet 0.5 milliGRAM(s) Oral daily  famotidine    Tablet 20 milliGRAM(s) Oral <User Schedule>  glucagon  Injectable 1 milliGRAM(s) IntraMuscular once  lactulose Syrup 10 Gram(s) Oral <User Schedule>  losartan 75 milliGRAM(s) Oral <User Schedule>  lurasidone 40 milliGRAM(s) Oral daily  metFORMIN 250 milliGRAM(s) Oral two times a day  senna 2 Tablet(s) Oral daily  sodium chloride 0.65% Nasal 2 Spray(s) Both Nostrils three times a day  venlafaxine 150 milliGRAM(s) Oral daily    MEDICATIONS  (PRN):  acetaminophen     Tablet .. 650 milliGRAM(s) Oral every 6 hours PRN Temp greater or equal to 38C (100.4F), Mild Pain (1 - 3), Moderate Pain (4 - 6)  bisacodyl 5 milliGRAM(s) Oral daily PRN constipation  bisacodyl Suppository 10 milliGRAM(s) Rectal daily PRN constipation  dextrose Oral Gel 15 Gram(s) Oral once PRN Blood Glucose LESS THAN 70 milliGRAM(s)/deciliter  haloperidol     Tablet 0.5 milliGRAM(s) Oral every 6 hours PRN agitation  haloperidol    Injectable 1 milliGRAM(s) IntraMuscular once PRN agitation  lidocaine   4% Patch 1 Patch Transdermal daily PRN LBP  melatonin. 3 milliGRAM(s) Oral at bedtime PRN Insomnia  simethicone 80 milliGRAM(s) Chew every 4 hours PRN abdominal discomfort

## 2023-09-02 NOTE — BH INPATIENT PSYCHIATRY PROGRESS NOTE - NSBHCHARTREVIEWVS_PSY_A_CORE FT
Vital Signs Last 24 Hrs  T(C): 36.2 (09-02-23 @ 08:03), Max: 36.2 (09-02-23 @ 08:03)  T(F): 97.2 (09-02-23 @ 08:03), Max: 97.2 (09-02-23 @ 08:03)  HR: --  BP: --  BP(mean): --  RR: --  SpO2: --    Orthostatic VS  09-02-23 @ 08:03  Lying BP: --/-- HR: --  Sitting BP: 127/62 HR: 88  Standing BP: 140/55 HR: 80  Site: --  Mode: --  Orthostatic VS  09-02-23 @ 06:38  Lying BP: --/-- HR: --  Sitting BP: 138/61 HR: --  Standing BP: --/-- HR: --  Site: --  Mode: --  Orthostatic VS  09-01-23 @ 06:05  Lying BP: --/-- HR: --  Sitting BP: 131/44 HR: 77  Standing BP: 136/62 HR: 96  Site: --  Mode: --

## 2023-09-03 PROCEDURE — 99231 SBSQ HOSP IP/OBS SF/LOW 25: CPT

## 2023-09-03 RX ADMIN — METFORMIN HYDROCHLORIDE 250 MILLIGRAM(S): 850 TABLET ORAL at 08:03

## 2023-09-03 RX ADMIN — Medication 150 MILLIGRAM(S): at 08:03

## 2023-09-03 RX ADMIN — METFORMIN HYDROCHLORIDE 250 MILLIGRAM(S): 850 TABLET ORAL at 21:23

## 2023-09-03 RX ADMIN — FAMOTIDINE 20 MILLIGRAM(S): 10 INJECTION INTRAVENOUS at 06:32

## 2023-09-03 RX ADMIN — AMLODIPINE BESYLATE 2.5 MILLIGRAM(S): 2.5 TABLET ORAL at 06:32

## 2023-09-03 RX ADMIN — SENNA PLUS 2 TABLET(S): 8.6 TABLET ORAL at 08:03

## 2023-09-03 RX ADMIN — LURASIDONE HYDROCHLORIDE 40 MILLIGRAM(S): 40 TABLET ORAL at 08:03

## 2023-09-03 RX ADMIN — LACTULOSE 10 GRAM(S): 10 SOLUTION ORAL at 12:13

## 2023-09-03 RX ADMIN — LOSARTAN POTASSIUM 75 MILLIGRAM(S): 100 TABLET, FILM COATED ORAL at 06:32

## 2023-09-03 RX ADMIN — Medication 2 SPRAY(S): at 08:57

## 2023-09-03 RX ADMIN — ATORVASTATIN CALCIUM 10 MILLIGRAM(S): 80 TABLET, FILM COATED ORAL at 21:24

## 2023-09-03 RX ADMIN — Medication 0.5 MILLIGRAM(S): at 08:04

## 2023-09-03 NOTE — BH INPATIENT PSYCHIATRY PROGRESS NOTE - NSBHFUPINTERVALHXFT_PSY_A_CORE
Patient seen for psychotic depression. Generally improving, psychosis resolving, mood trending up. AVSS.

## 2023-09-03 NOTE — BH INPATIENT PSYCHIATRY PROGRESS NOTE - NSBHCHARTREVIEWVS_PSY_A_CORE FT
Vital Signs Last 24 Hrs  T(C): 36.3 (09-03-23 @ 05:56), Max: 36.3 (09-03-23 @ 05:56)  T(F): 97.4 (09-03-23 @ 05:56), Max: 97.4 (09-03-23 @ 05:56)  HR: --  BP: --  BP(mean): --  RR: --  SpO2: --    Orthostatic VS  09-03-23 @ 05:56  Lying BP: 144/45 HR: 73  Sitting BP: 130/55 HR: 83  Standing BP: --/-- HR: --  Site: --  Mode: --  Orthostatic VS  09-02-23 @ 08:03  Lying BP: --/-- HR: --  Sitting BP: 127/62 HR: 88  Standing BP: 140/55 HR: 80  Site: --  Mode: --  Orthostatic VS  09-02-23 @ 06:38  Lying BP: --/-- HR: --  Sitting BP: 138/61 HR: --  Standing BP: --/-- HR: --  Site: --  Mode: --

## 2023-09-03 NOTE — BH INPATIENT PSYCHIATRY PROGRESS NOTE - MSE UNSTRUCTURED FT
Patient is awake and alert. Affect bright, but not inappropriate. Speech fluent, not pressured. TP coherent. No delusions expressed and denies paranoia. No motor retardation. Limited insight. No suicidal ideations. No EPS

## 2023-09-03 NOTE — BH INPATIENT PSYCHIATRY PROGRESS NOTE - NSBHASSESSSUMMFT_PSY_ALL_CORE
8/14 better but now hard to stay if responding to Abilify or prns of haldol will increase Abilify to 17mg then 20mg/d while initiating ECT discussion  8/15 Not much improvement   except less paranoid which may be attributable to haldol prn. Will d/c Abilify and start  Latuda will discuss ECT option  8/16 COnt Latuda trial while entering into discussion with patient re ECT  8/17 Tolerating latuda well; paranoia remains  8/18 Paranoia continues though improved; increase latuda to 40mg po daily - to be administered with food  8/19: On encounter today pt was seen chatting with one of her peers. Pt denied any complaints and reported feeling well. Denies paranoid thoughts or feeling afraid someone was going to hurt her. Still needing frequent redirection from staff to use her walker.   8/20 Depressed anxious fearful disorganized. Cont Latuda trial will cont to discuss ECT with patient, family is supportive  8/21 Depressed psychotic partial response will cont with ECT patient amenable as long as a family member goes with her for fear she will go off unit and never return. Will get labs ask medicine to see  8/22 Depressed not responded well to meds will increase Latuda while prepping for ECT. Will clarify need for ASA given severe nosebleed hx will discuss nosebleed issue with ECT   8/23 Cont ECT w/u discussed with medicine will   cont ASA  8/24 Psychotic depression with limited response to  multiple medication trials. Calmer less floridly delusion but anhedonia in state of constant distress. Patient assents to ECT, daughter Marcelina who is her HCP will provide consent. Patient requests family accompany her to ECT  for support as well as likely afraid she will not come back to unit if taken off. Will d/c Protonix suspension and use Pepcid as the former cannot be give while patient NPO. will lower Latuda as want to begin to reduce meds concurrent with ECT  8/25 Patient tolerated ECT, cont ECT, try to reduce Klonopin when better. Cont Effexor and Latuda. Recheck Na level on Sunday 8/26 Tolerated first ECT amnestic for treatment itself sl calmer. COnt ECT will begin to reduce klonopin to reduce cog se, check BMP in AM  8/27 less dysphoric, still paranoid, no SI/HI. Labs reviewed and discussed with hospitalist. Repeat BMP, urine NA and osmolality to f/u.  8/28 Tolerating ECT, cont treatments spread out so next one  9/1. Eval for further decrease Klonopin. Medicine saw patient , reintroduced metformin at low dose and d/declan ASA based on risk benefit   8/29 Mood psychosis better, sleepy during day confused. Will hold ECT til 9/1 reduce Klonopin  also may reduce Latuda  8/30 Improved with ECT but cog worse Will cont to hold ECT til clearer cont to reduce BDZ  8/31 Patient much better with regard to mood and  psychosis but notably cognitive disrupted form ECT Will hold ECT tomorrow. Will lower Lurasidone as was never effective for psychosis. Will start lowering Effexor and change to Zoloft as Effexor not helpful. Around completion of ECT may restart Depakote given episode of anjelica last admission. Consider further reduction in Ko Klonopin to 0.25mg/d . Reviewed plan of care with daughter  9/1 Patient improved but paranoia creeping back. Will cont Latuda at 40mg  consider increasing back to 60mg. Considered resume VPA based on concern of risk for switching but may inhibit sx requiring higher stimulus which can lead to more confusion. Next rx 9/5 9/2: Patient with psychotic depression, improving, on lurasidone   9/3 83 year-old , retired female, domiciled at [facility], [facility] of DM2, HLD, HTN, PPHx of late onset Bipolar disorder, 2 prior psych adm last in 2022 at [facility], who presented to ED with worsening paranoia, anxiety, FTT (weight loss of 20lb since march), psychosis commingled with cognitive impairment   8/14 better but now hard to stay if responding to Abilify or prns of haldol will increase Abilify to 17mg then 20mg/d while initiating ECT discussion  8/15 Not much improvement   except less paranoid which may be attributable to haldol prn. Will d/c Abilify and start  Latuda will discuss ECT option  8/16 COnt Latuda trial while entering into discussion with patient re ECT  8/17 Tolerating latuda well; paranoia remains  8/18 Paranoia continues though improved; increase latuda to 40mg po daily - to be administered with food  8/19: On encounter today pt was seen chatting with one of her peers. Pt denied any complaints and reported feeling well. Denies paranoid thoughts or feeling afraid someone was going to hurt her. Still needing frequent redirection from staff to use her walker.   8/20 Depressed anxious fearful disorganized. Cont Latuda trial will cont to discuss ECT with patient, family is supportive  8/21 Depressed psychotic partial response will cont with ECT patient amenable as long as a family member goes with her for fear she will go off unit and never return. Will get labs ask medicine to see  8/22 Depressed not responded well to meds will increase Latuda while prepping for ECT. Will clarify need for ASA given severe nosebleed hx will discuss nosebleed issue with ECT   8/23 Cont ECT w/u discussed with medicine will   cont ASA  8/24 Psychotic depression with limited response to  multiple medication trials. Calmer less floridly delusion but anhedonia in state of constant distress. Patient assents to ECT, daughter Marcelina who is her HCP will provide consent. Patient requests family accompany her to ECT  for support as well as likely afraid she will not come back to unit if taken off. Will d/c Protonix suspension and use Pepcid as the former cannot be give while patient NPO. will lower Latuda as want to begin to reduce meds concurrent with ECT  8/25 Patient tolerated ECT, cont ECT, try to reduce Klonopin when better. Cont Effexor and Latuda. Recheck Na level on Sunday 8/26 Tolerated first ECT amnestic for treatment itself sl calmer. COnt ECT will begin to reduce klonopin to reduce cog se, check BMP in AM  8/27 less dysphoric, still paranoid, no SI/HI. Labs reviewed and discussed with hospitalist. Repeat BMP, urine NA and osmolality to f/u.  8/28 Tolerating ECT, cont treatments spread out so next one  9/1. Eval for further decrease Klonopin. Medicine saw patient , reintroduced metformin at low dose and d/declan ASA based on risk benefit   8/29 Mood psychosis better, sleepy during day confused. Will hold ECT til 9/1 reduce Klonopin  also may reduce Latuda  8/30 Improved with ECT but cog worse Will cont to hold ECT til clearer cont to reduce BDZ  8/31 Patient much better with regard to mood and  psychosis but notably cognitive disrupted form ECT Will hold ECT tomorrow. Will lower Lurasidone as was never effective for psychosis. Will start lowering Effexor and change to Zoloft as Effexor not helpful. Around completion of ECT may restart Depakote given episode of anjelica last admission. Consider further reduction in Ko Klonopin to 0.25mg/d . Reviewed plan of care with daughter  9/1 Patient improved but paranoia creeping back. Will cont Latuda at 40mg  consider increasing back to 60mg. Considered resume VPA based on concern of risk for switching but may inhibit sx requiring higher stimulus which can lead to more confusion. Next rx 9/5 9/2: Patient with psychotic depression, improving, on lurasidone   9/3: Patient's psychosis resolving, mood improved

## 2023-09-04 PROCEDURE — 99231 SBSQ HOSP IP/OBS SF/LOW 25: CPT

## 2023-09-04 RX ADMIN — LOSARTAN POTASSIUM 75 MILLIGRAM(S): 100 TABLET, FILM COATED ORAL at 06:44

## 2023-09-04 RX ADMIN — METFORMIN HYDROCHLORIDE 250 MILLIGRAM(S): 850 TABLET ORAL at 08:22

## 2023-09-04 RX ADMIN — SENNA PLUS 2 TABLET(S): 8.6 TABLET ORAL at 08:22

## 2023-09-04 RX ADMIN — Medication 2 SPRAY(S): at 20:03

## 2023-09-04 RX ADMIN — Medication 150 MILLIGRAM(S): at 08:21

## 2023-09-04 RX ADMIN — LACTULOSE 10 GRAM(S): 10 SOLUTION ORAL at 12:16

## 2023-09-04 RX ADMIN — AMLODIPINE BESYLATE 2.5 MILLIGRAM(S): 2.5 TABLET ORAL at 06:44

## 2023-09-04 RX ADMIN — METFORMIN HYDROCHLORIDE 250 MILLIGRAM(S): 850 TABLET ORAL at 20:03

## 2023-09-04 RX ADMIN — Medication 0.5 MILLIGRAM(S): at 08:21

## 2023-09-04 RX ADMIN — LURASIDONE HYDROCHLORIDE 40 MILLIGRAM(S): 40 TABLET ORAL at 08:22

## 2023-09-04 RX ADMIN — FAMOTIDINE 20 MILLIGRAM(S): 10 INJECTION INTRAVENOUS at 06:44

## 2023-09-04 RX ADMIN — ATORVASTATIN CALCIUM 10 MILLIGRAM(S): 80 TABLET, FILM COATED ORAL at 20:03

## 2023-09-04 RX ADMIN — Medication 2 SPRAY(S): at 08:33

## 2023-09-04 NOTE — BH INPATIENT PSYCHIATRY PROGRESS NOTE - NSBHFUPINTERVALHXFT_PSY_A_CORE
Patient seen for psychotic depression. Patient reports "I felt confused today, seem confusing, they got everyone up and then said it was breakfast time." Generally improving, psychosis resolving, mood trending up, but some cognitive impairment. AVSS.

## 2023-09-04 NOTE — BH INPATIENT PSYCHIATRY PROGRESS NOTE - NSBHCHARTREVIEWVS_PSY_A_CORE FT
Vital Signs Last 24 Hrs  T(C): 36.5 (09-04-23 @ 06:02), Max: 36.5 (09-04-23 @ 06:02)  T(F): 97.7 (09-04-23 @ 06:02), Max: 97.7 (09-04-23 @ 06:02)  HR: --  BP: --  BP(mean): --  RR: --  SpO2: --    Orthostatic VS  09-04-23 @ 07:18  Lying BP: 154/82 HR: 86  Sitting BP: 152/78 HR: 92  Standing BP: --/-- HR: --  Site: --  Mode: --  Orthostatic VS  09-04-23 @ 06:02  Lying BP: 126/68 HR: 67  Sitting BP: --/-- HR: --  Standing BP: --/-- HR: --  Site: --  Mode: --  Orthostatic VS  09-03-23 @ 09:30  Lying BP: 140/64 HR: 78  Sitting BP: 134/64 HR: 80  Standing BP: --/-- HR: --  Site: --  Mode: --  Orthostatic VS  09-03-23 @ 05:56  Lying BP: 144/45 HR: 73  Sitting BP: 130/55 HR: 83  Standing BP: --/-- HR: --  Site: --  Mode: --

## 2023-09-04 NOTE — BH INPATIENT PSYCHIATRY PROGRESS NOTE - NSBHASSESSSUMMFT_PSY_ALL_CORE
83 year-old , retired female, domiciled at [facility], [facility] of DM2, HLD, HTN, PPHx of late onset Bipolar disorder, 2 prior psych adm last in 2022 at [facility], who presented to ED with worsening paranoia, anxiety, FTT (weight loss of 20lb since march), psychosis commingled with cognitive impairment   8/14 better but now hard to stay if responding to Abilify or prns of haldol will increase Abilify to 17mg then 20mg/d while initiating ECT discussion  8/15 Not much improvement   except less paranoid which may be attributable to haldol prn. Will d/c Abilify and start  Latuda will discuss ECT option  8/16 COnt Latuda trial while entering into discussion with patient re ECT  8/17 Tolerating latuda well; paranoia remains  8/18 Paranoia continues though improved; increase latuda to 40mg po daily - to be administered with food  8/19: On encounter today pt was seen chatting with one of her peers. Pt denied any complaints and reported feeling well. Denies paranoid thoughts or feeling afraid someone was going to hurt her. Still needing frequent redirection from staff to use her walker.   8/20 Depressed anxious fearful disorganized. Cont Latuda trial will cont to discuss ECT with patient, family is supportive  8/21 Depressed psychotic partial response will cont with ECT patient amenable as long as a family member goes with her for fear she will go off unit and never return. Will get labs ask medicine to see  8/22 Depressed not responded well to meds will increase Latuda while prepping for ECT. Will clarify need for ASA given severe nosebleed hx will discuss nosebleed issue with ECT   8/23 Cont ECT w/u discussed with medicine will   cont ASA  8/24 Psychotic depression with limited response to  multiple medication trials. Calmer less floridly delusion but anhedonia in state of constant distress. Patient assents to ECT, daughter Marcelina who is her HCP will provide consent. Patient requests family accompany her to ECT  for support as well as likely afraid she will not come back to unit if taken off. Will d/c Protonix suspension and use Pepcid as the former cannot be give while patient NPO. will lower Latuda as want to begin to reduce meds concurrent with ECT  8/25 Patient tolerated ECT, cont ECT, try to reduce Klonopin when better. Cont Effexor and Latuda. Recheck Na level on Sunday 8/26 Tolerated first ECT amnestic for treatment itself sl calmer. COnt ECT will begin to reduce klonopin to reduce cog se, check BMP in AM  8/27 less dysphoric, still paranoid, no SI/HI. Labs reviewed and discussed with hospitalist. Repeat BMP, urine NA and osmolality to f/u.  8/28 Tolerating ECT, cont treatments spread out so next one  9/1. Eval for further decrease Klonopin. Medicine saw patient , reintroduced metformin at low dose and d/declan ASA based on risk benefit   8/29 Mood psychosis better, sleepy during day confused. Will hold ECT til 9/1 reduce Klonopin  also may reduce Latuda  8/30 Improved with ECT but cog worse Will cont to hold ECT til clearer cont to reduce BDZ  8/31 Patient much better with regard to mood and  psychosis but notably cognitive disrupted form ECT Will hold ECT tomorrow. Will lower Lurasidone as was never effective for psychosis. Will start lowering Effexor and change to Zoloft as Effexor not helpful. Around completion of ECT may restart Depakote given episode of anjelica last admission. Consider further reduction in Ko Klonopin to 0.25mg/d . Reviewed plan of care with daughter  9/1 Patient improved but paranoia creeping back. Will cont Latuda at 40mg  consider increasing back to 60mg. Considered resume VPA based on concern of risk for switching but may inhibit sx requiring higher stimulus which can lead to more confusion. Next rx 9/5 9/2: Patient with psychotic depression, improving, on lurasidone   9/3: Patient's psychosis resolving, mood improved   9/4: Mood and psychotic symptoms improving, some impaired executive function and reasoning

## 2023-09-04 NOTE — BH INPATIENT PSYCHIATRY PROGRESS NOTE - MSE UNSTRUCTURED FT
Patient is awake and alert. Affect bright, but not inappropriate. Speech fluent, not pressured. TP coherent but with some feeling of confusion and trouble articulating- describes waking up and being told to come to breakfast and reports feeling confused by that. No delusions expressed and denies paranoia. No motor retardation. Limited insight. No suicidal ideations. No EPS

## 2023-09-05 LAB — GLUCOSE BLDC GLUCOMTR-MCNC: 114 MG/DL — HIGH (ref 70–99)

## 2023-09-05 PROCEDURE — 99232 SBSQ HOSP IP/OBS MODERATE 35: CPT | Mod: 25

## 2023-09-05 PROCEDURE — 90870 ELECTROCONVULSIVE THERAPY: CPT

## 2023-09-05 RX ORDER — VENLAFAXINE HCL 75 MG
75 CAPSULE, EXT RELEASE 24 HR ORAL DAILY
Refills: 0 | Status: DISCONTINUED | OUTPATIENT
Start: 2023-09-05 | End: 2023-09-07

## 2023-09-05 RX ORDER — FLUMAZENIL 0.1 MG/ML
0.2 VIAL (ML) INTRAVENOUS ONCE
Refills: 0 | Status: COMPLETED | OUTPATIENT
Start: 2023-09-05 | End: 2023-09-05

## 2023-09-05 RX ORDER — SERTRALINE 25 MG/1
50 TABLET, FILM COATED ORAL DAILY
Refills: 0 | Status: DISCONTINUED | OUTPATIENT
Start: 2023-09-05 | End: 2023-09-07

## 2023-09-05 RX ADMIN — METFORMIN HYDROCHLORIDE 250 MILLIGRAM(S): 850 TABLET ORAL at 21:45

## 2023-09-05 RX ADMIN — SENNA PLUS 2 TABLET(S): 8.6 TABLET ORAL at 13:43

## 2023-09-05 RX ADMIN — Medication 0.2 MILLIGRAM(S): at 11:07

## 2023-09-05 RX ADMIN — Medication 150 MILLIGRAM(S): at 13:43

## 2023-09-05 RX ADMIN — Medication 2 SPRAY(S): at 21:46

## 2023-09-05 RX ADMIN — LOSARTAN POTASSIUM 75 MILLIGRAM(S): 100 TABLET, FILM COATED ORAL at 06:58

## 2023-09-05 RX ADMIN — FAMOTIDINE 20 MILLIGRAM(S): 10 INJECTION INTRAVENOUS at 06:58

## 2023-09-05 RX ADMIN — ATORVASTATIN CALCIUM 10 MILLIGRAM(S): 80 TABLET, FILM COATED ORAL at 21:46

## 2023-09-05 RX ADMIN — LURASIDONE HYDROCHLORIDE 40 MILLIGRAM(S): 40 TABLET ORAL at 13:43

## 2023-09-05 RX ADMIN — AMLODIPINE BESYLATE 2.5 MILLIGRAM(S): 2.5 TABLET ORAL at 06:58

## 2023-09-05 RX ADMIN — Medication 0.5 MILLIGRAM(S): at 13:43

## 2023-09-05 RX ADMIN — METFORMIN HYDROCHLORIDE 250 MILLIGRAM(S): 850 TABLET ORAL at 13:44

## 2023-09-05 RX ADMIN — Medication 1 MILLIGRAM(S): at 09:48

## 2023-09-05 NOTE — BH INPATIENT PSYCHIATRY PROGRESS NOTE - NSBHMETABOLIC_PSY_ALL_CORE_FT
BMI: BMI (kg/m2): 23 (06-07-23 @ 18:13)  HbA1c: A1C with Estimated Average Glucose Result: 5.7 % (06-01-23 @ 05:10)    Glucose: POCT Blood Glucose.: 114 mg/dL (09-05-23 @ 10:54)    BP: 181/68 (09-05-23 @ 12:05) (114/69 - 199/71)  Lipid Panel: Date/Time: 06-08-23 @ 08:30  Cholesterol, Serum: 119  Direct LDL: --  HDL Cholesterol, Serum: 47  Total Cholesterol/HDL Ration Measurement: --  Triglycerides, Serum: 56

## 2023-09-05 NOTE — BH INPATIENT PSYCHIATRY PROGRESS NOTE - NSBHFUPINTERVALHXFT_PSY_A_CORE
Patient seen for BAD depressed with psychosis. No overnight events. Eating sleeping well, more active and ambulatory requiring redirection, VSS

## 2023-09-05 NOTE — BH INPATIENT PSYCHIATRY PROGRESS NOTE - NSBHCHARTREVIEWVS_PSY_A_CORE FT
Vital Signs Last 24 Hrs  T(C): 37 (09-05-23 @ 11:04), Max: 37 (09-05-23 @ 05:47)  T(F): 98.6 (09-05-23 @ 11:04), Max: 98.6 (09-05-23 @ 05:47)  HR: 70 (09-05-23 @ 12:05) (70 - 110)  BP: 181/68 (09-05-23 @ 12:05) (114/69 - 199/71)  BP(mean): --  RR: 18 (09-05-23 @ 12:05) (13 - 18)  SpO2: 99% (09-05-23 @ 12:05) (99% - 100%)    Orthostatic VS  09-05-23 @ 05:47  Lying BP: --/-- HR: --  Sitting BP: 150/56 HR: 78  Standing BP: 136/71 HR: 97  Site: --  Mode: --  Orthostatic VS  09-04-23 @ 07:18  Lying BP: 154/82 HR: 86  Sitting BP: 152/78 HR: 92  Standing BP: --/-- HR: --  Site: --  Mode: --  Orthostatic VS  09-04-23 @ 06:02  Lying BP: 126/68 HR: 67  Sitting BP: --/-- HR: --  Standing BP: --/-- HR: --  Site: --  Mode: --

## 2023-09-05 NOTE — BH INPATIENT PSYCHIATRY PROGRESS NOTE - MSE UNSTRUCTURED FT
Patient is awake and alert. Mood anxious, dysphoric, less severe affect c/w mood  Speech fluent, not pressured. . No delusions expressed and denies paranoid delusions but does appear vaguely fearful. No motor retardation. Limited insight. No suicidal ideations. No EPS. Cannot give month or year, knows she is in a facility  .

## 2023-09-05 NOTE — BH INPATIENT PSYCHIATRY PROGRESS NOTE - NSBHASSESSSUMMFT_PSY_ALL_CORE
83 year-old , retired female, domiciled at [facility], [facility] of DM2, HLD, HTN, PPHx of late onset Bipolar disorder, 2 prior psych adm last in 2022 at [facility], who presented to ED with worsening paranoia, anxiety, FTT (weight loss of 20lb since march), psychosis commingled with cognitive impairment   8/14 better but now hard to stay if responding to Abilify or prns of haldol will increase Abilify to 17mg then 20mg/d while initiating ECT discussion  8/15 Not much improvement   except less paranoid which may be attributable to haldol prn. Will d/c Abilify and start  Latuda will discuss ECT option  8/16 COnt Latuda trial while entering into discussion with patient re ECT  8/17 Tolerating latuda well; paranoia remains  8/18 Paranoia continues though improved; increase latuda to 40mg po daily - to be administered with food  8/19: On encounter today pt was seen chatting with one of her peers. Pt denied any complaints and reported feeling well. Denies paranoid thoughts or feeling afraid someone was going to hurt her. Still needing frequent redirection from staff to use her walker.   8/20 Depressed anxious fearful disorganized. Cont Latuda trial will cont to discuss ECT with patient, family is supportive  8/21 Depressed psychotic partial response will cont with ECT patient amenable as long as a family member goes with her for fear she will go off unit and never return. Will get labs ask medicine to see  8/22 Depressed not responded well to meds will increase Latuda while prepping for ECT. Will clarify need for ASA given severe nosebleed hx will discuss nosebleed issue with ECT   8/23 Cont ECT w/u discussed with medicine will   cont ASA  8/24 Psychotic depression with limited response to  multiple medication trials. Calmer less floridly delusion but anhedonia in state of constant distress. Patient assents to ECT, daughter Marcelina who is her HCP will provide consent. Patient requests family accompany her to ECT  for support as well as likely afraid she will not come back to unit if taken off. Will d/c Protonix suspension and use Pepcid as the former cannot be give while patient NPO. will lower Latuda as want to begin to reduce meds concurrent with ECT  8/25 Patient tolerated ECT, cont ECT, try to reduce Klonopin when better. Cont Effexor and Latuda. Recheck Na level on Sunday 8/26 Tolerated first ECT amnestic for treatment itself sl calmer. COnt ECT will begin to reduce klonopin to reduce cog se, check BMP in AM  8/27 less dysphoric, still paranoid, no SI/HI. Labs reviewed and discussed with hospitalist. Repeat BMP, urine NA and osmolality to f/u.  8/28 Tolerating ECT, cont treatments spread out so next one  9/1. Eval for further decrease Klonopin. Medicine saw patient , reintroduced metformin at low dose and d/declan ASA based on risk benefit   8/29 Mood psychosis better, sleepy during day confused. Will hold ECT til 9/1 reduce Klonopin  also may reduce Latuda  8/30 Improved with ECT but cog worse Will cont to hold ECT til clearer cont to reduce BDZ  8/31 Patient much better with regard to mood and  psychosis but notably cognitive disrupted form ECT Will hold ECT tomorrow. Will lower Lurasidone as was never effective for psychosis. Will start lowering Effexor and change to Zoloft as Effexor not helpful. Around completion of ECT may restart Depakote given episode of anjelica last admission. Consider further reduction in Ko Klonopin to 0.25mg/d . Reviewed plan of care with daughter  9/1 Patient improved but paranoia creeping back. Will cont Latuda at 40mg  consider increasing back to 60mg. Considered resume VPA based on concern of risk for switching but may inhibit sx requiring higher stimulus which can lead to more confusion. Next rx 9/5 9/2: Patient with psychotic depression, improving, on lurasidone   9/3: Patient's psychosis resolving, mood improved   9/4: Mood and psychotic symptoms improving, some impaired executive function and reasoning   9/5 PAtient improved but anxious, fearful cog impairment due to ECT but less severe. Cont ECT but spread out will, lower Effexor and add Zoloft as Effexor trial failed. Cont Lurasidone

## 2023-09-05 NOTE — BH INPATIENT PSYCHIATRY PROGRESS NOTE - CURRENT MEDICATION
MEDICATIONS  (STANDING):  amLODIPine   Tablet 2.5 milliGRAM(s) Oral <User Schedule>  atorvastatin 10 milliGRAM(s) Oral at bedtime  clonazePAM Oral Disintegrating Tablet 0.5 milliGRAM(s) Oral daily  famotidine    Tablet 20 milliGRAM(s) Oral <User Schedule>  glucagon  Injectable 1 milliGRAM(s) IntraMuscular once  lactulose Syrup 10 Gram(s) Oral <User Schedule>  losartan 75 milliGRAM(s) Oral <User Schedule>  lurasidone 40 milliGRAM(s) Oral daily  metFORMIN 250 milliGRAM(s) Oral two times a day  senna 2 Tablet(s) Oral daily  sertraline 50 milliGRAM(s) Oral daily  sodium chloride 0.65% Nasal 2 Spray(s) Both Nostrils three times a day  venlafaxine 75 milliGRAM(s) Oral daily    MEDICATIONS  (PRN):  acetaminophen     Tablet .. 650 milliGRAM(s) Oral every 6 hours PRN Temp greater or equal to 38C (100.4F), Mild Pain (1 - 3), Moderate Pain (4 - 6)  bisacodyl 5 milliGRAM(s) Oral daily PRN constipation  bisacodyl Suppository 10 milliGRAM(s) Rectal daily PRN constipation  dextrose Oral Gel 15 Gram(s) Oral once PRN Blood Glucose LESS THAN 70 milliGRAM(s)/deciliter  haloperidol     Tablet 0.5 milliGRAM(s) Oral every 6 hours PRN agitation  haloperidol    Injectable 1 milliGRAM(s) IntraMuscular once PRN agitation  lidocaine   4% Patch 1 Patch Transdermal daily PRN LBP  melatonin. 3 milliGRAM(s) Oral at bedtime PRN Insomnia  simethicone 80 milliGRAM(s) Chew every 4 hours PRN abdominal discomfort

## 2023-09-06 PROCEDURE — 99232 SBSQ HOSP IP/OBS MODERATE 35: CPT

## 2023-09-06 RX ORDER — CLONAZEPAM 1 MG
0.5 TABLET ORAL DAILY
Refills: 0 | Status: DISCONTINUED | OUTPATIENT
Start: 2023-09-06 | End: 2023-09-12

## 2023-09-06 RX ADMIN — Medication 2 SPRAY(S): at 12:31

## 2023-09-06 RX ADMIN — LURASIDONE HYDROCHLORIDE 40 MILLIGRAM(S): 40 TABLET ORAL at 08:20

## 2023-09-06 RX ADMIN — Medication 75 MILLIGRAM(S): at 08:19

## 2023-09-06 RX ADMIN — ATORVASTATIN CALCIUM 10 MILLIGRAM(S): 80 TABLET, FILM COATED ORAL at 22:03

## 2023-09-06 RX ADMIN — Medication 0.5 MILLIGRAM(S): at 08:19

## 2023-09-06 RX ADMIN — SERTRALINE 50 MILLIGRAM(S): 25 TABLET, FILM COATED ORAL at 08:19

## 2023-09-06 RX ADMIN — LACTULOSE 10 GRAM(S): 10 SOLUTION ORAL at 12:30

## 2023-09-06 RX ADMIN — LOSARTAN POTASSIUM 75 MILLIGRAM(S): 100 TABLET, FILM COATED ORAL at 06:44

## 2023-09-06 RX ADMIN — Medication 2 SPRAY(S): at 08:19

## 2023-09-06 RX ADMIN — METFORMIN HYDROCHLORIDE 250 MILLIGRAM(S): 850 TABLET ORAL at 08:19

## 2023-09-06 RX ADMIN — METFORMIN HYDROCHLORIDE 250 MILLIGRAM(S): 850 TABLET ORAL at 22:03

## 2023-09-06 RX ADMIN — SENNA PLUS 2 TABLET(S): 8.6 TABLET ORAL at 08:19

## 2023-09-06 RX ADMIN — AMLODIPINE BESYLATE 2.5 MILLIGRAM(S): 2.5 TABLET ORAL at 06:44

## 2023-09-06 RX ADMIN — Medication 3 MILLIGRAM(S): at 22:03

## 2023-09-06 RX ADMIN — FAMOTIDINE 20 MILLIGRAM(S): 10 INJECTION INTRAVENOUS at 06:44

## 2023-09-06 NOTE — BH INPATIENT PSYCHIATRY PROGRESS NOTE - MSE UNSTRUCTURED FT
Patient is awake and alert. Mood anxious, dysphoric, less severe affect c/w mood  Speech fluent, not pressured. No delusions expressed and denies paranoid delusions but does appear vaguely fearful. No motor retardation. Limited insight. No suicidal ideations. No EPS. Cannot give month or year, knows she is in a facility  .

## 2023-09-06 NOTE — BH INPATIENT PSYCHIATRY PROGRESS NOTE - CURRENT MEDICATION
MEDICATIONS  (STANDING):  amLODIPine   Tablet 2.5 milliGRAM(s) Oral <User Schedule>  atorvastatin 10 milliGRAM(s) Oral at bedtime  famotidine    Tablet 20 milliGRAM(s) Oral <User Schedule>  glucagon  Injectable 1 milliGRAM(s) IntraMuscular once  lactulose Syrup 10 Gram(s) Oral <User Schedule>  losartan 75 milliGRAM(s) Oral <User Schedule>  lurasidone 40 milliGRAM(s) Oral daily  metFORMIN 250 milliGRAM(s) Oral two times a day  senna 2 Tablet(s) Oral daily  sertraline 50 milliGRAM(s) Oral daily  sodium chloride 0.65% Nasal 2 Spray(s) Both Nostrils three times a day  venlafaxine 75 milliGRAM(s) Oral daily    MEDICATIONS  (PRN):  acetaminophen     Tablet .. 650 milliGRAM(s) Oral every 6 hours PRN Temp greater or equal to 38C (100.4F), Mild Pain (1 - 3), Moderate Pain (4 - 6)  bisacodyl 5 milliGRAM(s) Oral daily PRN constipation  bisacodyl Suppository 10 milliGRAM(s) Rectal daily PRN constipation  dextrose Oral Gel 15 Gram(s) Oral once PRN Blood Glucose LESS THAN 70 milliGRAM(s)/deciliter  haloperidol     Tablet 0.5 milliGRAM(s) Oral every 6 hours PRN agitation  haloperidol    Injectable 1 milliGRAM(s) IntraMuscular once PRN agitation  lidocaine   4% Patch 1 Patch Transdermal daily PRN LBP  melatonin. 3 milliGRAM(s) Oral at bedtime PRN Insomnia  simethicone 80 milliGRAM(s) Chew every 4 hours PRN abdominal discomfort

## 2023-09-06 NOTE — BH INPATIENT PSYCHIATRY PROGRESS NOTE - NSBHASSESSSUMMFT_PSY_ALL_CORE
83 year-old , retired female, domiciled at [facility], [facility] of DM2, HLD, HTN, PPHx of late onset Bipolar disorder, 2 prior psych adm last in 2022 at [facility], who presented to ED with worsening paranoia, anxiety, FTT (weight loss of 20lb since march), psychosis commingled with cognitive impairment   8/14 better but now hard to stay if responding to Abilify or prns of haldol will increase Abilify to 17mg then 20mg/d while initiating ECT discussion  8/15 Not much improvement   except less paranoid which may be attributable to haldol prn. Will d/c Abilify and start  Latuda will discuss ECT option  8/16 COnt Latuda trial while entering into discussion with patient re ECT  8/17 Tolerating latuda well; paranoia remains  8/18 Paranoia continues though improved; increase latuda to 40mg po daily - to be administered with food  8/19: On encounter today pt was seen chatting with one of her peers. Pt denied any complaints and reported feeling well. Denies paranoid thoughts or feeling afraid someone was going to hurt her. Still needing frequent redirection from staff to use her walker.   8/20 Depressed anxious fearful disorganized. Cont Latuda trial will cont to discuss ECT with patient, family is supportive  8/21 Depressed psychotic partial response will cont with ECT patient amenable as long as a family member goes with her for fear she will go off unit and never return. Will get labs ask medicine to see  8/22 Depressed not responded well to meds will increase Latuda while prepping for ECT. Will clarify need for ASA given severe nosebleed hx will discuss nosebleed issue with ECT   8/23 Cont ECT w/u discussed with medicine will   cont ASA  8/24 Psychotic depression with limited response to  multiple medication trials. Calmer less floridly delusion but anhedonia in state of constant distress. Patient assents to ECT, daughter Marcelina who is her HCP will provide consent. Patient requests family accompany her to ECT  for support as well as likely afraid she will not come back to unit if taken off. Will d/c Protonix suspension and use Pepcid as the former cannot be give while patient NPO. will lower Latuda as want to begin to reduce meds concurrent with ECT  8/25 Patient tolerated ECT, cont ECT, try to reduce Klonopin when better. Cont Effexor and Latuda. Recheck Na level on Sunday 8/26 Tolerated first ECT amnestic for treatment itself sl calmer. COnt ECT will begin to reduce klonopin to reduce cog se, check BMP in AM  8/27 less dysphoric, still paranoid, no SI/HI. Labs reviewed and discussed with hospitalist. Repeat BMP, urine NA and osmolality to f/u.  8/28 Tolerating ECT, cont treatments spread out so next one  9/1. Eval for further decrease Klonopin. Medicine saw patient , reintroduced metformin at low dose and d/declan ASA based on risk benefit   8/29 Mood psychosis better, sleepy during day confused. Will hold ECT til 9/1 reduce Klonopin  also may reduce Latuda  8/30 Improved with ECT but cog worse Will cont to hold ECT til clearer cont to reduce BDZ  8/31 Patient much better with regard to mood and  psychosis but notably cognitive disrupted form ECT Will hold ECT tomorrow. Will lower Lurasidone as was never effective for psychosis. Will start lowering Effexor and change to Zoloft as Effexor not helpful. Around completion of ECT may restart Depakote given episode of anjelica last admission. Consider further reduction in Ko Klonopin to 0.25mg/d . Reviewed plan of care with daughter  9/1 Patient improved but paranoia creeping back. Will cont Latuda at 40mg  consider increasing back to 60mg. Considered resume VPA based on concern of risk for switching but may inhibit sx requiring higher stimulus which can lead to more confusion. Next rx 9/5 9/2: Patient with psychotic depression, improving, on lurasidone   9/3: Patient's psychosis resolving, mood improved   9/4: Mood and psychotic symptoms improving, some impaired executive function and reasoning   9/5 PAtient improved but anxious, fearful cog impairment due to ECT but less severe. Cont ECT but spread out will, lower Effexor and add Zoloft as Effexor trial failed. Cont Lurasidone  9/6 Patient improving but quite confused. Will hold next treatment til next Monday. Cont to cross over to Zoloft

## 2023-09-06 NOTE — BH INPATIENT PSYCHIATRY PROGRESS NOTE - NSBHCHARTREVIEWVS_PSY_A_CORE FT
Vital Signs Last 24 Hrs  T(C): 36.7 (09-06-23 @ 05:39), Max: 36.7 (09-06-23 @ 05:39)  T(F): 98 (09-06-23 @ 05:39), Max: 98 (09-06-23 @ 05:39)  HR: --  BP: --  BP(mean): --  RR: --  SpO2: --    Orthostatic VS  09-06-23 @ 10:12  Lying BP: --/-- HR: --  Sitting BP: 136/74 HR: 80  Standing BP: 128/64 HR: 87  Site: --  Mode: --  Orthostatic VS  09-06-23 @ 05:39  Lying BP: --/-- HR: --  Sitting BP: 140/50 HR: 74  Standing BP: 119/62 HR: 90  Site: --  Mode: --  Orthostatic VS  09-05-23 @ 05:47  Lying BP: --/-- HR: --  Sitting BP: 150/56 HR: 78  Standing BP: 136/71 HR: 97  Site: --  Mode: --

## 2023-09-07 LAB
ANION GAP SERPL CALC-SCNC: 9 MMOL/L — SIGNIFICANT CHANGE UP (ref 7–14)
BUN SERPL-MCNC: 12 MG/DL — SIGNIFICANT CHANGE UP (ref 7–23)
CALCIUM SERPL-MCNC: 9.8 MG/DL — SIGNIFICANT CHANGE UP (ref 8.4–10.5)
CHLORIDE SERPL-SCNC: 100 MMOL/L — SIGNIFICANT CHANGE UP (ref 98–107)
CO2 SERPL-SCNC: 26 MMOL/L — SIGNIFICANT CHANGE UP (ref 22–31)
CREAT SERPL-MCNC: 0.7 MG/DL — SIGNIFICANT CHANGE UP (ref 0.5–1.3)
EGFR: 86 ML/MIN/1.73M2 — SIGNIFICANT CHANGE UP
GLUCOSE SERPL-MCNC: 208 MG/DL — HIGH (ref 70–99)
POTASSIUM SERPL-MCNC: 4.6 MMOL/L — SIGNIFICANT CHANGE UP (ref 3.5–5.3)
POTASSIUM SERPL-SCNC: 4.6 MMOL/L — SIGNIFICANT CHANGE UP (ref 3.5–5.3)
SODIUM SERPL-SCNC: 135 MMOL/L — SIGNIFICANT CHANGE UP (ref 135–145)

## 2023-09-07 PROCEDURE — 99232 SBSQ HOSP IP/OBS MODERATE 35: CPT

## 2023-09-07 RX ORDER — SERTRALINE 25 MG/1
100 TABLET, FILM COATED ORAL DAILY
Refills: 0 | Status: DISCONTINUED | OUTPATIENT
Start: 2023-09-07 | End: 2023-09-11

## 2023-09-07 RX ADMIN — ATORVASTATIN CALCIUM 10 MILLIGRAM(S): 80 TABLET, FILM COATED ORAL at 21:59

## 2023-09-07 RX ADMIN — LOSARTAN POTASSIUM 75 MILLIGRAM(S): 100 TABLET, FILM COATED ORAL at 06:24

## 2023-09-07 RX ADMIN — SERTRALINE 50 MILLIGRAM(S): 25 TABLET, FILM COATED ORAL at 09:11

## 2023-09-07 RX ADMIN — Medication 2 SPRAY(S): at 09:19

## 2023-09-07 RX ADMIN — Medication 75 MILLIGRAM(S): at 09:11

## 2023-09-07 RX ADMIN — Medication 0.5 MILLIGRAM(S): at 09:11

## 2023-09-07 RX ADMIN — METFORMIN HYDROCHLORIDE 250 MILLIGRAM(S): 850 TABLET ORAL at 09:11

## 2023-09-07 RX ADMIN — Medication 2 SPRAY(S): at 21:59

## 2023-09-07 RX ADMIN — METFORMIN HYDROCHLORIDE 250 MILLIGRAM(S): 850 TABLET ORAL at 21:59

## 2023-09-07 RX ADMIN — FAMOTIDINE 20 MILLIGRAM(S): 10 INJECTION INTRAVENOUS at 06:26

## 2023-09-07 RX ADMIN — AMLODIPINE BESYLATE 2.5 MILLIGRAM(S): 2.5 TABLET ORAL at 06:25

## 2023-09-07 RX ADMIN — SENNA PLUS 2 TABLET(S): 8.6 TABLET ORAL at 09:11

## 2023-09-07 RX ADMIN — LURASIDONE HYDROCHLORIDE 40 MILLIGRAM(S): 40 TABLET ORAL at 09:11

## 2023-09-07 NOTE — BH INPATIENT PSYCHIATRY PROGRESS NOTE - MSE UNSTRUCTURED FT
Patient is awake and alert. Mood anxious, dysphoric, less severe affect c/w mood  Speech fluent, not pressured. No delusions expressed now appearing more anxious than with specific irrational fears . No motor retardation. Limited insight. No suicidal ideations. No EPS. Cannot give month or year, knows she is in a facility  .

## 2023-09-07 NOTE — BH INPATIENT PSYCHIATRY PROGRESS NOTE - NSBHCHARTREVIEWVS_PSY_A_CORE FT
Vital Signs Last 24 Hrs  T(C): 36.2 (09-07-23 @ 07:58), Max: 36.2 (09-07-23 @ 07:58)  T(F): 97.2 (09-07-23 @ 07:58), Max: 97.2 (09-07-23 @ 07:58)  HR: 77 (09-07-23 @ 07:58) (77 - 77)  BP: 114/66 (09-07-23 @ 07:58) (114/66 - 114/66)  BP(mean): --  RR: --  SpO2: --    Orthostatic VS  09-06-23 @ 10:12  Lying BP: --/-- HR: --  Sitting BP: 136/74 HR: 80  Standing BP: 128/64 HR: 87  Site: --  Mode: --  Orthostatic VS  09-06-23 @ 05:39  Lying BP: --/-- HR: --  Sitting BP: 140/50 HR: 74  Standing BP: 119/62 HR: 90  Site: --  Mode: --

## 2023-09-07 NOTE — BH INPATIENT PSYCHIATRY PROGRESS NOTE - NSBHMETABOLIC_PSY_ALL_CORE_FT
BMI: BMI (kg/m2): 23 (06-07-23 @ 18:13)  HbA1c: A1C with Estimated Average Glucose Result: 5.7 % (06-01-23 @ 05:10)    Glucose: POCT Blood Glucose.: 114 mg/dL (09-05-23 @ 10:54)    BP: 114/66 (09-07-23 @ 07:58) (114/66 - 199/71)  Lipid Panel: Date/Time: 06-08-23 @ 08:30  Cholesterol, Serum: 119  Direct LDL: --  HDL Cholesterol, Serum: 47  Total Cholesterol/HDL Ration Measurement: --  Triglycerides, Serum: 56

## 2023-09-07 NOTE — BH INPATIENT PSYCHIATRY PROGRESS NOTE - NSBHASSESSSUMMFT_PSY_ALL_CORE
83 year-old , retired female, domiciled at [facility], [facility] of DM2, HLD, HTN, PPHx of late onset Bipolar disorder, 2 prior psych adm last in 2022 at [facility], who presented to ED with worsening paranoia, anxiety, FTT (weight loss of 20lb since march), psychosis commingled with cognitive impairment   8/14 better but now hard to stay if responding to Abilify or prns of haldol will increase Abilify to 17mg then 20mg/d while initiating ECT discussion  8/15 Not much improvement   except less paranoid which may be attributable to haldol prn. Will d/c Abilify and start  Latuda will discuss ECT option  8/16 COnt Latuda trial while entering into discussion with patient re ECT  8/17 Tolerating latuda well; paranoia remains  8/18 Paranoia continues though improved; increase latuda to 40mg po daily - to be administered with food  8/19: On encounter today pt was seen chatting with one of her peers. Pt denied any complaints and reported feeling well. Denies paranoid thoughts or feeling afraid someone was going to hurt her. Still needing frequent redirection from staff to use her walker.   8/20 Depressed anxious fearful disorganized. Cont Latuda trial will cont to discuss ECT with patient, family is supportive  8/21 Depressed psychotic partial response will cont with ECT patient amenable as long as a family member goes with her for fear she will go off unit and never return. Will get labs ask medicine to see  8/22 Depressed not responded well to meds will increase Latuda while prepping for ECT. Will clarify need for ASA given severe nosebleed hx will discuss nosebleed issue with ECT   8/23 Cont ECT w/u discussed with medicine will   cont ASA  8/24 Psychotic depression with limited response to  multiple medication trials. Calmer less floridly delusion but anhedonia in state of constant distress. Patient assents to ECT, daughter Marcelina who is her HCP will provide consent. Patient requests family accompany her to ECT  for support as well as likely afraid she will not come back to unit if taken off. Will d/c Protonix suspension and use Pepcid as the former cannot be give while patient NPO. will lower Latuda as want to begin to reduce meds concurrent with ECT  8/25 Patient tolerated ECT, cont ECT, try to reduce Klonopin when better. Cont Effexor and Latuda. Recheck Na level on Sunday 8/26 Tolerated first ECT amnestic for treatment itself sl calmer. COnt ECT will begin to reduce klonopin to reduce cog se, check BMP in AM  8/27 less dysphoric, still paranoid, no SI/HI. Labs reviewed and discussed with hospitalist. Repeat BMP, urine NA and osmolality to f/u.  8/28 Tolerating ECT, cont treatments spread out so next one  9/1. Eval for further decrease Klonopin. Medicine saw patient , reintroduced metformin at low dose and d/declan ASA based on risk benefit   8/29 Mood psychosis better, sleepy during day confused. Will hold ECT til 9/1 reduce Klonopin  also may reduce Latuda  8/30 Improved with ECT but cog worse Will cont to hold ECT til clearer cont to reduce BDZ  8/31 Patient much better with regard to mood and  psychosis but notably cognitive disrupted form ECT Will hold ECT tomorrow. Will lower Lurasidone as was never effective for psychosis. Will start lowering Effexor and change to Zoloft as Effexor not helpful. Around completion of ECT may restart Depakote given episode of anjelica last admission. Consider further reduction in Ko Klonopin to 0.25mg/d . Reviewed plan of care with daughter  9/1 Patient improved but paranoia creeping back. Will cont Latuda at 40mg  consider increasing back to 60mg. Considered resume VPA based on concern of risk for switching but may inhibit sx requiring higher stimulus which can lead to more confusion. Next rx 9/5 9/2: Patient with psychotic depression, improving, on lurasidone   9/3: Patient's psychosis resolving, mood improved   9/4: Mood and psychotic symptoms improving, some impaired executive function and reasoning   9/5 PAtient improved but anxious, fearful cog impairment due to ECT but less severe. Cont ECT but spread out will, lower Effexor and add Zoloft as Effexor trial failed. Cont Lurasidone  9/6 Patient improving but quite confused. Will hold next treatment til next Monday. Cont to cross over to Zoloft  9/7 Improving will cont with spread out ECT to reudce severity of cog se of ECT will increase Zoloft and d/c Effexor Will rechallenge with CPAP

## 2023-09-07 NOTE — BH INPATIENT PSYCHIATRY PROGRESS NOTE - CURRENT MEDICATION
MEDICATIONS  (STANDING):  amLODIPine   Tablet 2.5 milliGRAM(s) Oral <User Schedule>  atorvastatin 10 milliGRAM(s) Oral at bedtime  clonazePAM  Tablet 0.5 milliGRAM(s) Oral daily  famotidine    Tablet 20 milliGRAM(s) Oral <User Schedule>  glucagon  Injectable 1 milliGRAM(s) IntraMuscular once  lactulose Syrup 10 Gram(s) Oral <User Schedule>  losartan 75 milliGRAM(s) Oral <User Schedule>  lurasidone 40 milliGRAM(s) Oral daily  metFORMIN 250 milliGRAM(s) Oral two times a day  senna 2 Tablet(s) Oral daily  sertraline 100 milliGRAM(s) Oral daily  sodium chloride 0.65% Nasal 2 Spray(s) Both Nostrils three times a day    MEDICATIONS  (PRN):  acetaminophen     Tablet .. 650 milliGRAM(s) Oral every 6 hours PRN Temp greater or equal to 38C (100.4F), Mild Pain (1 - 3), Moderate Pain (4 - 6)  bisacodyl 5 milliGRAM(s) Oral daily PRN constipation  bisacodyl Suppository 10 milliGRAM(s) Rectal daily PRN constipation  dextrose Oral Gel 15 Gram(s) Oral once PRN Blood Glucose LESS THAN 70 milliGRAM(s)/deciliter  haloperidol     Tablet 0.5 milliGRAM(s) Oral every 6 hours PRN agitation  haloperidol    Injectable 1 milliGRAM(s) IntraMuscular once PRN agitation  lidocaine   4% Patch 1 Patch Transdermal daily PRN LBP  melatonin. 3 milliGRAM(s) Oral at bedtime PRN Insomnia  simethicone 80 milliGRAM(s) Chew every 4 hours PRN abdominal discomfort

## 2023-09-08 PROCEDURE — 99232 SBSQ HOSP IP/OBS MODERATE 35: CPT

## 2023-09-08 RX ADMIN — Medication 0.5 MILLIGRAM(S): at 09:42

## 2023-09-08 RX ADMIN — LOSARTAN POTASSIUM 75 MILLIGRAM(S): 100 TABLET, FILM COATED ORAL at 06:40

## 2023-09-08 RX ADMIN — AMLODIPINE BESYLATE 2.5 MILLIGRAM(S): 2.5 TABLET ORAL at 06:40

## 2023-09-08 RX ADMIN — Medication 2 SPRAY(S): at 09:43

## 2023-09-08 RX ADMIN — METFORMIN HYDROCHLORIDE 250 MILLIGRAM(S): 850 TABLET ORAL at 21:52

## 2023-09-08 RX ADMIN — SENNA PLUS 2 TABLET(S): 8.6 TABLET ORAL at 09:42

## 2023-09-08 RX ADMIN — Medication 2 SPRAY(S): at 12:26

## 2023-09-08 RX ADMIN — LACTULOSE 10 GRAM(S): 10 SOLUTION ORAL at 12:26

## 2023-09-08 RX ADMIN — FAMOTIDINE 20 MILLIGRAM(S): 10 INJECTION INTRAVENOUS at 06:40

## 2023-09-08 RX ADMIN — SERTRALINE 100 MILLIGRAM(S): 25 TABLET, FILM COATED ORAL at 09:42

## 2023-09-08 RX ADMIN — LURASIDONE HYDROCHLORIDE 40 MILLIGRAM(S): 40 TABLET ORAL at 09:42

## 2023-09-08 RX ADMIN — ATORVASTATIN CALCIUM 10 MILLIGRAM(S): 80 TABLET, FILM COATED ORAL at 21:52

## 2023-09-08 RX ADMIN — Medication 2 SPRAY(S): at 21:52

## 2023-09-08 RX ADMIN — METFORMIN HYDROCHLORIDE 250 MILLIGRAM(S): 850 TABLET ORAL at 09:42

## 2023-09-08 NOTE — BH INPATIENT PSYCHIATRY PROGRESS NOTE - NSBHMETABOLIC_PSY_ALL_CORE_FT
BMI: BMI (kg/m2): 23 (06-07-23 @ 18:13)  HbA1c: A1C with Estimated Average Glucose Result: 5.7 % (06-01-23 @ 05:10)    Glucose: POCT Blood Glucose.: 114 mg/dL (09-05-23 @ 10:54)    BP: 114/66 (09-07-23 @ 07:58) (114/66 - 114/66)  Lipid Panel: Date/Time: 06-08-23 @ 08:30  Cholesterol, Serum: 119  Direct LDL: --  HDL Cholesterol, Serum: 47  Total Cholesterol/HDL Ration Measurement: --  Triglycerides, Serum: 56

## 2023-09-08 NOTE — BH INPATIENT PSYCHIATRY PROGRESS NOTE - MSE UNSTRUCTURED FT
Patient is awake and alert. Mood anxious, dysphoric,  clearly less severe affect c/w mood  Speech fluent, not pressured. No delusions expressed now appearing more anxious than with specific irrational fears . No motor retardation. Limited insight. No suicidal ideations. No EPS. Got month right said it was 2008. Some insight not impulsive.

## 2023-09-08 NOTE — BH INPATIENT PSYCHIATRY PROGRESS NOTE - NSCGISEVERILLNESS_PSY_ALL_CORE
4 = Moderately ill – overt symptoms causing noticeable, but modest, functional impairment or distress; symptom level may warrant medication
4 = Moderately ill – overt symptoms causing noticeable, but modest, functional impairment or distress; symptom level may warrant medication
5 = Markedly ill - intrusive symptoms that distinctly impair social/occupational function or cause intrusive levels of distress
4 = Moderately ill – overt symptoms causing noticeable, but modest, functional impairment or distress; symptom level may warrant medication
6 = Severely ill - disruptive pathology, behavior and function are frequently influenced by symptoms, may require assistance from others
4 = Moderately ill – overt symptoms causing noticeable, but modest, functional impairment or distress; symptom level may warrant medication
5 = Markedly ill - intrusive symptoms that distinctly impair social/occupational function or cause intrusive levels of distress
6 = Severely ill - disruptive pathology, behavior and function are frequently influenced by symptoms, may require assistance from others
5 = Markedly ill - intrusive symptoms that distinctly impair social/occupational function or cause intrusive levels of distress
6 = Severely ill - disruptive pathology, behavior and function are frequently influenced by symptoms, may require assistance from others
5 = Markedly ill - intrusive symptoms that distinctly impair social/occupational function or cause intrusive levels of distress
5 = Markedly ill - intrusive symptoms that distinctly impair social/occupational function or cause intrusive levels of distress
4 = Moderately ill – overt symptoms causing noticeable, but modest, functional impairment or distress; symptom level may warrant medication
5 = Markedly ill - intrusive symptoms that distinctly impair social/occupational function or cause intrusive levels of distress
6 = Severely ill - disruptive pathology, behavior and function are frequently influenced by symptoms, may require assistance from others
6 = Severely ill - disruptive pathology, behavior and function are frequently influenced by symptoms, may require assistance from others
5 = Markedly ill - intrusive symptoms that distinctly impair social/occupational function or cause intrusive levels of distress
4 = Moderately ill – overt symptoms causing noticeable, but modest, functional impairment or distress; symptom level may warrant medication
5 = Markedly ill - intrusive symptoms that distinctly impair social/occupational function or cause intrusive levels of distress
4 = Moderately ill – overt symptoms causing noticeable, but modest, functional impairment or distress; symptom level may warrant medication
5 = Markedly ill - intrusive symptoms that distinctly impair social/occupational function or cause intrusive levels of distress
6 = Severely ill - disruptive pathology, behavior and function are frequently influenced by symptoms, may require assistance from others
5 = Markedly ill - intrusive symptoms that distinctly impair social/occupational function or cause intrusive levels of distress
6 = Severely ill - disruptive pathology, behavior and function are frequently influenced by symptoms, may require assistance from others

## 2023-09-08 NOTE — BH INPATIENT PSYCHIATRY PROGRESS NOTE - NSBHFUPINTERVALHXFT_PSY_A_CORE
Patient seen for BAD depressed with psychosis. No overnight events. Eating sleeping well, more active and ambulatory  VSS. Used CPAP part of night

## 2023-09-08 NOTE — BH INPATIENT PSYCHIATRY PROGRESS NOTE - NSBHASSESSSUMMFT_PSY_ALL_CORE
83 year-old , retired female, domiciled at [facility], [facility] of DM2, HLD, HTN, PPHx of late onset Bipolar disorder, 2 prior psych adm last in 2022 at [facility], who presented to ED with worsening paranoia, anxiety, FTT (weight loss of 20lb since march), psychosis commingled with cognitive impairment   8/14 better but now hard to stay if responding to Abilify or prns of haldol will increase Abilify to 17mg then 20mg/d while initiating ECT discussion  8/15 Not much improvement   except less paranoid which may be attributable to haldol prn. Will d/c Abilify and start  Latuda will discuss ECT option  8/16 COnt Latuda trial while entering into discussion with patient re ECT  8/17 Tolerating latuda well; paranoia remains  8/18 Paranoia continues though improved; increase latuda to 40mg po daily - to be administered with food  8/19: On encounter today pt was seen chatting with one of her peers. Pt denied any complaints and reported feeling well. Denies paranoid thoughts or feeling afraid someone was going to hurt her. Still needing frequent redirection from staff to use her walker.   8/20 Depressed anxious fearful disorganized. Cont Latuda trial will cont to discuss ECT with patient, family is supportive  8/21 Depressed psychotic partial response will cont with ECT patient amenable as long as a family member goes with her for fear she will go off unit and never return. Will get labs ask medicine to see  8/22 Depressed not responded well to meds will increase Latuda while prepping for ECT. Will clarify need for ASA given severe nosebleed hx will discuss nosebleed issue with ECT   8/23 Cont ECT w/u discussed with medicine will   cont ASA  8/24 Psychotic depression with limited response to  multiple medication trials. Calmer less floridly delusion but anhedonia in state of constant distress. Patient assents to ECT, daughter Marcelina who is her HCP will provide consent. Patient requests family accompany her to ECT  for support as well as likely afraid she will not come back to unit if taken off. Will d/c Protonix suspension and use Pepcid as the former cannot be give while patient NPO. will lower Latuda as want to begin to reduce meds concurrent with ECT  8/25 Patient tolerated ECT, cont ECT, try to reduce Klonopin when better. Cont Effexor and Latuda. Recheck Na level on Sunday 8/26 Tolerated first ECT amnestic for treatment itself sl calmer. COnt ECT will begin to reduce klonopin to reduce cog se, check BMP in AM  8/27 less dysphoric, still paranoid, no SI/HI. Labs reviewed and discussed with hospitalist. Repeat BMP, urine NA and osmolality to f/u.  8/28 Tolerating ECT, cont treatments spread out so next one  9/1. Eval for further decrease Klonopin. Medicine saw patient , reintroduced metformin at low dose and d/declan ASA based on risk benefit   8/29 Mood psychosis better, sleepy during day confused. Will hold ECT til 9/1 reduce Klonopin  also may reduce Latuda  8/30 Improved with ECT but cog worse Will cont to hold ECT til clearer cont to reduce BDZ  8/31 Patient much better with regard to mood and  psychosis but notably cognitive disrupted form ECT Will hold ECT tomorrow. Will lower Lurasidone as was never effective for psychosis. Will start lowering Effexor and change to Zoloft as Effexor not helpful. Around completion of ECT may restart Depakote given episode of anjelica last admission. Consider further reduction in Ko Klonopin to 0.25mg/d . Reviewed plan of care with daughter  9/1 Patient improved but paranoia creeping back. Will cont Latuda at 40mg  consider increasing back to 60mg. Considered resume VPA based on concern of risk for switching but may inhibit sx requiring higher stimulus which can lead to more confusion. Next rx 9/5 9/2: Patient with psychotic depression, improving, on lurasidone   9/3: Patient's psychosis resolving, mood improved   9/4: Mood and psychotic symptoms improving, some impaired executive function and reasoning   9/5 PAtient improved but anxious, fearful cog impairment due to ECT but less severe. Cont ECT but spread out will, lower Effexor and add Zoloft as Effexor trial failed. Cont Lurasidone  9/6 Patient improving but quite confused. Will hold next treatment til next Monday. Cont to cross over to Zoloft  9/7 Improving will cont with spread out ECT to reudce severity of cog se of ECT will increase Zoloft and d/c Effexor Will rechallenge with CPAP  9/8 Patient improving with ECT next ECT 9/11 cont to titrate Zoloft. COnt encourage use of CPAP

## 2023-09-08 NOTE — BH INPATIENT PSYCHIATRY PROGRESS NOTE - NSBHCHARTREVIEWVS_PSY_A_CORE FT
Vital Signs Last 24 Hrs  T(C): 36.4 (09-08-23 @ 08:01), Max: 36.7 (09-08-23 @ 05:49)  T(F): 97.5 (09-08-23 @ 08:01), Max: 98.1 (09-08-23 @ 05:49)  HR: --  BP: --  BP(mean): --  RR: --  SpO2: --    Orthostatic VS  09-08-23 @ 08:01  Lying BP: --/-- HR: --  Sitting BP: 140/61 HR: 90  Standing BP: 131/63 HR: 108  Site: --  Mode: --  Orthostatic VS  09-08-23 @ 05:49  Lying BP: --/-- HR: --  Sitting BP: 164/57 HR: 85  Standing BP: 152/70 HR: 102  Site: upper left arm  Mode: electronic

## 2023-09-08 NOTE — BH INPATIENT PSYCHIATRY PROGRESS NOTE - NSBHCHARTREVIEWLAB_PSY_A_CORE FT
08-27    129<L>  |  97<L>  |  9   ----------------------------<  240<H>  4.1   |  23  |  0.61    Ca    9.4      27 Aug 2023 09:20  
CBC and CMP, Mg Phos, ammonia checked this morning due to agitation - all wnl.
08-27    129<L>  |  97<L>  |  9   ----------------------------<  240<H>  4.1   |  23  |  0.61    Ca    9.4      27 Aug 2023 09:20  

## 2023-09-08 NOTE — BH INPATIENT PSYCHIATRY PROGRESS NOTE - NSCGIIMPROVESX_PSY_ALL_CORE
Call rc'd from Dr. Kim Del Rio - patient here with increased weakness - Dr. Kim Del Rio requesting ERCM's assistance with discharge plan vs admission.  Per Dr. Kim Del Rio patient recently became agitated after being in ER for 6 hours and patient now c/o leg pain which
3 = Minimally improved - slightly better with little or no clinically meaningful reduction of symptoms.  Represents very little change in basic clinical status, level of care, or functional capacity.
4 = No change - symptoms remain essentially unchanged
3 = Minimally improved - slightly better with little or no clinically meaningful reduction of symptoms.  Represents very little change in basic clinical status, level of care, or functional capacity.
4 = No change - symptoms remain essentially unchanged
3 = Minimally improved - slightly better with little or no clinically meaningful reduction of symptoms.  Represents very little change in basic clinical status, level of care, or functional capacity.

## 2023-09-09 RX ADMIN — ATORVASTATIN CALCIUM 10 MILLIGRAM(S): 80 TABLET, FILM COATED ORAL at 20:59

## 2023-09-09 RX ADMIN — FAMOTIDINE 20 MILLIGRAM(S): 10 INJECTION INTRAVENOUS at 06:17

## 2023-09-09 RX ADMIN — Medication 2 SPRAY(S): at 12:46

## 2023-09-09 RX ADMIN — Medication 3 MILLIGRAM(S): at 20:59

## 2023-09-09 RX ADMIN — LURASIDONE HYDROCHLORIDE 40 MILLIGRAM(S): 40 TABLET ORAL at 10:01

## 2023-09-09 RX ADMIN — Medication 2 SPRAY(S): at 10:26

## 2023-09-09 RX ADMIN — METFORMIN HYDROCHLORIDE 250 MILLIGRAM(S): 850 TABLET ORAL at 10:01

## 2023-09-09 RX ADMIN — SENNA PLUS 2 TABLET(S): 8.6 TABLET ORAL at 10:01

## 2023-09-09 RX ADMIN — Medication 0.5 MILLIGRAM(S): at 10:01

## 2023-09-09 RX ADMIN — SERTRALINE 100 MILLIGRAM(S): 25 TABLET, FILM COATED ORAL at 10:01

## 2023-09-09 RX ADMIN — LOSARTAN POTASSIUM 75 MILLIGRAM(S): 100 TABLET, FILM COATED ORAL at 06:17

## 2023-09-09 RX ADMIN — AMLODIPINE BESYLATE 2.5 MILLIGRAM(S): 2.5 TABLET ORAL at 06:17

## 2023-09-09 RX ADMIN — METFORMIN HYDROCHLORIDE 250 MILLIGRAM(S): 850 TABLET ORAL at 20:59

## 2023-09-09 RX ADMIN — HALOPERIDOL DECANOATE 0.5 MILLIGRAM(S): 100 INJECTION INTRAMUSCULAR at 13:00

## 2023-09-10 RX ADMIN — SERTRALINE 100 MILLIGRAM(S): 25 TABLET, FILM COATED ORAL at 10:02

## 2023-09-10 RX ADMIN — LOSARTAN POTASSIUM 75 MILLIGRAM(S): 100 TABLET, FILM COATED ORAL at 06:47

## 2023-09-10 RX ADMIN — Medication 2 SPRAY(S): at 20:53

## 2023-09-10 RX ADMIN — LURASIDONE HYDROCHLORIDE 40 MILLIGRAM(S): 40 TABLET ORAL at 10:02

## 2023-09-10 RX ADMIN — Medication 2 SPRAY(S): at 12:28

## 2023-09-10 RX ADMIN — LACTULOSE 10 GRAM(S): 10 SOLUTION ORAL at 12:28

## 2023-09-10 RX ADMIN — FAMOTIDINE 20 MILLIGRAM(S): 10 INJECTION INTRAVENOUS at 06:47

## 2023-09-10 RX ADMIN — AMLODIPINE BESYLATE 2.5 MILLIGRAM(S): 2.5 TABLET ORAL at 06:47

## 2023-09-10 RX ADMIN — METFORMIN HYDROCHLORIDE 250 MILLIGRAM(S): 850 TABLET ORAL at 10:03

## 2023-09-10 RX ADMIN — SENNA PLUS 2 TABLET(S): 8.6 TABLET ORAL at 10:02

## 2023-09-10 RX ADMIN — METFORMIN HYDROCHLORIDE 250 MILLIGRAM(S): 850 TABLET ORAL at 20:52

## 2023-09-10 RX ADMIN — Medication 0.5 MILLIGRAM(S): at 10:02

## 2023-09-10 RX ADMIN — ATORVASTATIN CALCIUM 10 MILLIGRAM(S): 80 TABLET, FILM COATED ORAL at 20:52

## 2023-09-10 RX ADMIN — Medication 2 SPRAY(S): at 10:03

## 2023-09-11 LAB — GLUCOSE BLDC GLUCOMTR-MCNC: 121 MG/DL — HIGH (ref 70–99)

## 2023-09-11 PROCEDURE — 99232 SBSQ HOSP IP/OBS MODERATE 35: CPT

## 2023-09-11 PROCEDURE — 90870 ELECTROCONVULSIVE THERAPY: CPT

## 2023-09-11 RX ORDER — SERTRALINE 25 MG/1
125 TABLET, FILM COATED ORAL DAILY
Refills: 0 | Status: ACTIVE | OUTPATIENT
Start: 2023-09-12 | End: 2024-08-10

## 2023-09-11 RX ORDER — FLUMAZENIL 0.1 MG/ML
0.2 VIAL (ML) INTRAVENOUS ONCE
Refills: 0 | Status: COMPLETED | OUTPATIENT
Start: 2023-09-11 | End: 2023-09-11

## 2023-09-11 RX ADMIN — SERTRALINE 100 MILLIGRAM(S): 25 TABLET, FILM COATED ORAL at 08:18

## 2023-09-11 RX ADMIN — Medication 0.5 MILLIGRAM(S): at 08:18

## 2023-09-11 RX ADMIN — LACTULOSE 10 GRAM(S): 10 SOLUTION ORAL at 12:46

## 2023-09-11 RX ADMIN — METFORMIN HYDROCHLORIDE 250 MILLIGRAM(S): 850 TABLET ORAL at 08:19

## 2023-09-11 RX ADMIN — Medication 2 SPRAY(S): at 12:46

## 2023-09-11 RX ADMIN — LURASIDONE HYDROCHLORIDE 40 MILLIGRAM(S): 40 TABLET ORAL at 08:18

## 2023-09-11 RX ADMIN — LOSARTAN POTASSIUM 75 MILLIGRAM(S): 100 TABLET, FILM COATED ORAL at 06:39

## 2023-09-11 RX ADMIN — ATORVASTATIN CALCIUM 10 MILLIGRAM(S): 80 TABLET, FILM COATED ORAL at 21:28

## 2023-09-11 RX ADMIN — FAMOTIDINE 20 MILLIGRAM(S): 10 INJECTION INTRAVENOUS at 06:39

## 2023-09-11 RX ADMIN — AMLODIPINE BESYLATE 2.5 MILLIGRAM(S): 2.5 TABLET ORAL at 06:39

## 2023-09-11 RX ADMIN — Medication 0.2 MILLIGRAM(S): at 09:43

## 2023-09-11 RX ADMIN — Medication 2 SPRAY(S): at 21:29

## 2023-09-11 RX ADMIN — METFORMIN HYDROCHLORIDE 250 MILLIGRAM(S): 850 TABLET ORAL at 21:29

## 2023-09-11 NOTE — BH INPATIENT PSYCHIATRY PROGRESS NOTE - NSBHFUPINTERVALHXFT_PSY_A_CORE
Bipolar d/o, mixed with psychotic features, care discussed w/ tx team, VSS. Patient reported feeling  Bipolar d/o, mixed with psychotic features, care discussed w/ tx team, CHEPE. Patient reported feeling "okay", appearing anxious,  returned from ECT, offered no complaints, reported fair sleep and appetite, denied pain. Patient denied dizziness or constipation.

## 2023-09-11 NOTE — BH INPATIENT PSYCHIATRY PROGRESS NOTE - NSBHMETABOLIC_PSY_ALL_CORE_FT
BMI: BMI (kg/m2): 23 (06-07-23 @ 18:13)  HbA1c: A1C with Estimated Average Glucose Result: 5.7 % (06-01-23 @ 05:10)    Glucose: POCT Blood Glucose.: 121 mg/dL (09-11-23 @ 08:14)    BP: 147/98 (09-11-23 @ 10:30) (141/49 - 175/56)  Lipid Panel: Date/Time: 06-08-23 @ 08:30  Cholesterol, Serum: 119  Direct LDL: --  HDL Cholesterol, Serum: 47  Total Cholesterol/HDL Ration Measurement: --  Triglycerides, Serum: 56

## 2023-09-11 NOTE — BH INPATIENT PSYCHIATRY PROGRESS NOTE - NSBHASSESSSUMMFT_PSY_ALL_CORE
83 year-old , retired female, domiciled at [facility], [facility] of DM2, HLD, HTN, PPHx of late onset Bipolar disorder, 2 prior psych adm last in 2022 at [facility], who presented to ED with worsening paranoia, anxiety, FTT (weight loss of 20lb since march), psychosis commingled with cognitive impairment   8/14 better but now hard to stay if responding to Abilify or prns of haldol will increase Abilify to 17mg then 20mg/d while initiating ECT discussion  8/15 Not much improvement   except less paranoid which may be attributable to haldol prn. Will d/c Abilify and start  Latuda will discuss ECT option  8/16 COnt Latuda trial while entering into discussion with patient re ECT  8/17 Tolerating latuda well; paranoia remains  8/18 Paranoia continues though improved; increase latuda to 40mg po daily - to be administered with food  8/19: On encounter today pt was seen chatting with one of her peers. Pt denied any complaints and reported feeling well. Denies paranoid thoughts or feeling afraid someone was going to hurt her. Still needing frequent redirection from staff to use her walker.   8/20 Depressed anxious fearful disorganized. Cont Latuda trial will cont to discuss ECT with patient, family is supportive  8/21 Depressed psychotic partial response will cont with ECT patient amenable as long as a family member goes with her for fear she will go off unit and never return. Will get labs ask medicine to see  8/22 Depressed not responded well to meds will increase Latuda while prepping for ECT. Will clarify need for ASA given severe nosebleed hx will discuss nosebleed issue with ECT   8/23 Cont ECT w/u discussed with medicine will   cont ASA  8/24 Psychotic depression with limited response to  multiple medication trials. Calmer less floridly delusion but anhedonia in state of constant distress. Patient assents to ECT, daughter Marcelina who is her HCP will provide consent. Patient requests family accompany her to ECT  for support as well as likely afraid she will not come back to unit if taken off. Will d/c Protonix suspension and use Pepcid as the former cannot be give while patient NPO. will lower Latuda as want to begin to reduce meds concurrent with ECT  8/25 Patient tolerated ECT, cont ECT, try to reduce Klonopin when better. Cont Effexor and Latuda. Recheck Na level on Sunday 8/26 Tolerated first ECT amnestic for treatment itself sl calmer. COnt ECT will begin to reduce klonopin to reduce cog se, check BMP in AM  8/27 less dysphoric, still paranoid, no SI/HI. Labs reviewed and discussed with hospitalist. Repeat BMP, urine NA and osmolality to f/u.  8/28 Tolerating ECT, cont treatments spread out so next one  9/1. Eval for further decrease Klonopin. Medicine saw patient , reintroduced metformin at low dose and d/declan ASA based on risk benefit   8/29 Mood psychosis better, sleepy during day confused. Will hold ECT til 9/1 reduce Klonopin  also may reduce Latuda  8/30 Improved with ECT but cog worse Will cont to hold ECT til clearer cont to reduce BDZ  8/31 Patient much better with regard to mood and  psychosis but notably cognitive disrupted form ECT Will hold ECT tomorrow. Will lower Lurasidone as was never effective for psychosis. Will start lowering Effexor and change to Zoloft as Effexor not helpful. Around completion of ECT may restart Depakote given episode of anjelica last admission. Consider further reduction in Ko Klonopin to 0.25mg/d . Reviewed plan of care with daughter  9/1 Patient improved but paranoia creeping back. Will cont Latuda at 40mg  consider increasing back to 60mg. Considered resume VPA based on concern of risk for switching but may inhibit sx requiring higher stimulus which can lead to more confusion. Next rx 9/5 9/2: Patient with psychotic depression, improving, on lurasidone   9/3: Patient's psychosis resolving, mood improved   9/4: Mood and psychotic symptoms improving, some impaired executive function and reasoning   9/5 PAtient improved but anxious, fearful cog impairment due to ECT but less severe. Cont ECT but spread out will, lower Effexor and add Zoloft as Effexor trial failed. Cont Lurasidone  9/6 Patient improving but quite confused. Will hold next treatment til next Monday. Cont to cross over to Zoloft  9/7 Improving will cont with spread out ECT to reudce severity of cog se of ECT will increase Zoloft and d/c Effexor Will rechallenge with CPAP  9/8 Patient improving with ECT next ECT 9/11 cont to titrate Zoloft. COnt encourage use of CPAP 83 year-old , retired female, domiciled at [facility], [facility] of DM2, HLD, HTN, PPHx of late onset Bipolar disorder, 2 prior psych adm last in 2022 at [facility], who presented to ED with worsening paranoia, anxiety, FTT (weight loss of 20lb since march), psychosis commingled with cognitive impairment   8/14 better but now hard to stay if responding to Abilify or prns of haldol will increase Abilify to 17mg then 20mg/d while initiating ECT discussion  8/15 Not much improvement   except less paranoid which may be attributable to haldol prn. Will d/c Abilify and start  Latuda will discuss ECT option  8/16 COnt Latuda trial while entering into discussion with patient re ECT  8/17 Tolerating latuda well; paranoia remains  8/18 Paranoia continues though improved; increase latuda to 40mg po daily - to be administered with food  8/19: On encounter today pt was seen chatting with one of her peers. Pt denied any complaints and reported feeling well. Denies paranoid thoughts or feeling afraid someone was going to hurt her. Still needing frequent redirection from staff to use her walker.   8/20 Depressed anxious fearful disorganized. Cont Latuda trial will cont to discuss ECT with patient, family is supportive  8/21 Depressed psychotic partial response will cont with ECT patient amenable as long as a family member goes with her for fear she will go off unit and never return. Will get labs ask medicine to see  8/22 Depressed not responded well to meds will increase Latuda while prepping for ECT. Will clarify need for ASA given severe nosebleed hx will discuss nosebleed issue with ECT   8/23 Cont ECT w/u discussed with medicine will   cont ASA  8/24 Psychotic depression with limited response to  multiple medication trials. Calmer less floridly delusion but anhedonia in state of constant distress. Patient assents to ECT, daughter Marcelina who is her HCP will provide consent. Patient requests family accompany her to ECT  for support as well as likely afraid she will not come back to unit if taken off. Will d/c Protonix suspension and use Pepcid as the former cannot be give while patient NPO. will lower Latuda as want to begin to reduce meds concurrent with ECT  8/25 Patient tolerated ECT, cont ECT, try to reduce Klonopin when better. Cont Effexor and Latuda. Recheck Na level on Sunday 8/26 Tolerated first ECT amnestic for treatment itself sl calmer. COnt ECT will begin to reduce klonopin to reduce cog se, check BMP in AM  8/27 less dysphoric, still paranoid, no SI/HI. Labs reviewed and discussed with hospitalist. Repeat BMP, urine NA and osmolality to f/u.  8/28 Tolerating ECT, cont treatments spread out so next one  9/1. Eval for further decrease Klonopin. Medicine saw patient , reintroduced metformin at low dose and d/declan ASA based on risk benefit   8/29 Mood psychosis better, sleepy during day confused. Will hold ECT til 9/1 reduce Klonopin  also may reduce Latuda  8/30 Improved with ECT but cog worse Will cont to hold ECT til clearer cont to reduce BDZ  8/31 Patient much better with regard to mood and  psychosis but notably cognitive disrupted form ECT Will hold ECT tomorrow. Will lower Lurasidone as was never effective for psychosis. Will start lowering Effexor and change to Zoloft as Effexor not helpful. Around completion of ECT may restart Depakote given episode of anjelica last admission. Consider further reduction in Ko Klonopin to 0.25mg/d . Reviewed plan of care with daughter  9/1 Patient improved but paranoia creeping back. Will cont Latuda at 40mg  consider increasing back to 60mg. Considered resume VPA based on concern of risk for switching but may inhibit sx requiring higher stimulus which can lead to more confusion. Next rx 9/5 9/2: Patient with psychotic depression, improving, on lurasidone   9/3: Patient's psychosis resolving, mood improved   9/4: Mood and psychotic symptoms improving, some impaired executive function and reasoning   9/5 PAtient improved but anxious, fearful cog impairment due to ECT but less severe. Cont ECT but spread out will, lower Effexor and add Zoloft as Effexor trial failed. Cont Lurasidone  9/6 Patient improving but quite confused. Will hold next treatment til next Monday. Cont to cross over to Zoloft  9/7 Improving will cont with spread out ECT to reudce severity of cog se of ECT will increase Zoloft and d/c Effexor Will rechallenge with CPAP  9/8 Patient improving with ECT next ECT 9/11 cont to titrate Zoloft. COnt encourage use of CPAP  9/11 patient showing improved impulse control, tolerating ECT 83 year-old , retired female, domiciled at [facility], [facility] of DM2, HLD, HTN, PPHx of late onset Bipolar disorder, 2 prior psych adm last in 2022 at [facility], who presented to ED with worsening paranoia, anxiety, FTT (weight loss of 20lb since march), psychosis commingled with cognitive impairment   8/14 better but now hard to stay if responding to Abilify or prns of haldol will increase Abilify to 17mg then 20mg/d while initiating ECT discussion  8/15 Not much improvement   except less paranoid which may be attributable to haldol prn. Will d/c Abilify and start  Latuda will discuss ECT option  8/16 COnt Latuda trial while entering into discussion with patient re ECT  8/17 Tolerating latuda well; paranoia remains  8/18 Paranoia continues though improved; increase latuda to 40mg po daily - to be administered with food  8/19: On encounter today pt was seen chatting with one of her peers. Pt denied any complaints and reported feeling well. Denies paranoid thoughts or feeling afraid someone was going to hurt her. Still needing frequent redirection from staff to use her walker.   8/20 Depressed anxious fearful disorganized. Cont Latuda trial will cont to discuss ECT with patient, family is supportive  8/21 Depressed psychotic partial response will cont with ECT patient amenable as long as a family member goes with her for fear she will go off unit and never return. Will get labs ask medicine to see  8/22 Depressed not responded well to meds will increase Latuda while prepping for ECT. Will clarify need for ASA given severe nosebleed hx will discuss nosebleed issue with ECT   8/23 Cont ECT w/u discussed with medicine will   cont ASA  8/24 Psychotic depression with limited response to  multiple medication trials. Calmer less floridly delusion but anhedonia in state of constant distress. Patient assents to ECT, daughter Marcelina who is her HCP will provide consent. Patient requests family accompany her to ECT  for support as well as likely afraid she will not come back to unit if taken off. Will d/c Protonix suspension and use Pepcid as the former cannot be give while patient NPO. will lower Latuda as want to begin to reduce meds concurrent with ECT  8/25 Patient tolerated ECT, cont ECT, try to reduce Klonopin when better. Cont Effexor and Latuda. Recheck Na level on Sunday 8/26 Tolerated first ECT amnestic for treatment itself sl calmer. COnt ECT will begin to reduce klonopin to reduce cog se, check BMP in AM  8/27 less dysphoric, still paranoid, no SI/HI. Labs reviewed and discussed with hospitalist. Repeat BMP, urine NA and osmolality to f/u.  8/28 Tolerating ECT, cont treatments spread out so next one  9/1. Eval for further decrease Klonopin. Medicine saw patient , reintroduced metformin at low dose and d/declan ASA based on risk benefit   8/29 Mood psychosis better, sleepy during day confused. Will hold ECT til 9/1 reduce Klonopin  also may reduce Latuda  8/30 Improved with ECT but cog worse Will cont to hold ECT til clearer cont to reduce BDZ  8/31 Patient much better with regard to mood and  psychosis but notably cognitive disrupted form ECT Will hold ECT tomorrow. Will lower Lurasidone as was never effective for psychosis. Will start lowering Effexor and change to Zoloft as Effexor not helpful. Around completion of ECT may restart Depakote given episode of anjelica last admission. Consider further reduction in Ko Klonopin to 0.25mg/d . Reviewed plan of care with daughter  9/1 Patient improved but paranoia creeping back. Will cont Latuda at 40mg  consider increasing back to 60mg. Considered resume VPA based on concern of risk for switching but may inhibit sx requiring higher stimulus which can lead to more confusion. Next rx 9/5 9/2: Patient with psychotic depression, improving, on lurasidone   9/3: Patient's psychosis resolving, mood improved   9/4: Mood and psychotic symptoms improving, some impaired executive function and reasoning   9/5 PAtient improved but anxious, fearful cog impairment due to ECT but less severe. Cont ECT but spread out will, lower Effexor and add Zoloft as Effexor trial failed. Cont Lurasidone  9/6 Patient improving but quite confused. Will hold next treatment til next Monday. Cont to cross over to Zoloft  9/7 Improving will cont with spread out ECT to reudce severity of cog se of ECT will increase Zoloft and d/c Effexor Will rechallenge with CPAP  9/8 Patient improving with ECT next ECT 9/11 cont to titrate Zoloft. COnt encourage use of CPAP  9/11 patient showing improved impulse control, tolerating ECT, increase Zoloft to 125mg daily, will check BMP wed

## 2023-09-11 NOTE — BH INPATIENT PSYCHIATRY PROGRESS NOTE - CURRENT MEDICATION
MEDICATIONS  (STANDING):  amLODIPine   Tablet 2.5 milliGRAM(s) Oral <User Schedule>  atorvastatin 10 milliGRAM(s) Oral at bedtime  clonazePAM  Tablet 0.5 milliGRAM(s) Oral daily  famotidine    Tablet 20 milliGRAM(s) Oral <User Schedule>  glucagon  Injectable 1 milliGRAM(s) IntraMuscular once  lactulose Syrup 10 Gram(s) Oral <User Schedule>  losartan 75 milliGRAM(s) Oral <User Schedule>  lurasidone 40 milliGRAM(s) Oral daily  metFORMIN 250 milliGRAM(s) Oral two times a day  senna 2 Tablet(s) Oral daily  sertraline 100 milliGRAM(s) Oral daily  sodium chloride 0.65% Nasal 2 Spray(s) Both Nostrils three times a day    MEDICATIONS  (PRN):  acetaminophen     Tablet .. 650 milliGRAM(s) Oral every 6 hours PRN Temp greater or equal to 38C (100.4F), Mild Pain (1 - 3), Moderate Pain (4 - 6)  bisacodyl 5 milliGRAM(s) Oral daily PRN constipation  bisacodyl Suppository 10 milliGRAM(s) Rectal daily PRN constipation  dextrose Oral Gel 15 Gram(s) Oral once PRN Blood Glucose LESS THAN 70 milliGRAM(s)/deciliter  haloperidol     Tablet 0.5 milliGRAM(s) Oral every 6 hours PRN agitation  haloperidol    Injectable 1 milliGRAM(s) IntraMuscular once PRN agitation  lidocaine   4% Patch 1 Patch Transdermal daily PRN LBP  melatonin. 3 milliGRAM(s) Oral at bedtime PRN Insomnia  simethicone 80 milliGRAM(s) Chew every 4 hours PRN abdominal discomfort   MEDICATIONS  (STANDING):  amLODIPine   Tablet 2.5 milliGRAM(s) Oral <User Schedule>  atorvastatin 10 milliGRAM(s) Oral at bedtime  clonazePAM  Tablet 0.5 milliGRAM(s) Oral daily  famotidine    Tablet 20 milliGRAM(s) Oral <User Schedule>  glucagon  Injectable 1 milliGRAM(s) IntraMuscular once  lactulose Syrup 10 Gram(s) Oral <User Schedule>  losartan 75 milliGRAM(s) Oral <User Schedule>  lurasidone 40 milliGRAM(s) Oral daily  metFORMIN 250 milliGRAM(s) Oral two times a day  senna 2 Tablet(s) Oral daily  sodium chloride 0.65% Nasal 2 Spray(s) Both Nostrils three times a day    MEDICATIONS  (PRN):  acetaminophen     Tablet .. 650 milliGRAM(s) Oral every 6 hours PRN Temp greater or equal to 38C (100.4F), Mild Pain (1 - 3), Moderate Pain (4 - 6)  bisacodyl 5 milliGRAM(s) Oral daily PRN constipation  bisacodyl Suppository 10 milliGRAM(s) Rectal daily PRN constipation  dextrose Oral Gel 15 Gram(s) Oral once PRN Blood Glucose LESS THAN 70 milliGRAM(s)/deciliter  haloperidol     Tablet 0.5 milliGRAM(s) Oral every 6 hours PRN agitation  haloperidol    Injectable 1 milliGRAM(s) IntraMuscular once PRN agitation  lidocaine   4% Patch 1 Patch Transdermal daily PRN LBP  melatonin. 3 milliGRAM(s) Oral at bedtime PRN Insomnia  simethicone 80 milliGRAM(s) Chew every 4 hours PRN abdominal discomfort

## 2023-09-11 NOTE — BH INPATIENT PSYCHIATRY PROGRESS NOTE - NSBHCHARTREVIEWVS_PSY_A_CORE FT
Vital Signs Last 24 Hrs  T(C): 36.6 (09-11-23 @ 10:30), Max: 37 (09-11-23 @ 09:15)  T(F): 97.9 (09-11-23 @ 10:30), Max: 98.6 (09-11-23 @ 09:15)  HR: 105 (09-11-23 @ 10:30) (67 - 105)  BP: 147/98 (09-11-23 @ 10:30) (141/49 - 175/56)  BP(mean): --  RR: 16 (09-11-23 @ 10:30) (16 - 20)  SpO2: 100% (09-11-23 @ 10:30) (98% - 100%)    Orthostatic VS  09-11-23 @ 05:42  Lying BP: --/-- HR: --  Sitting BP: 158/77 HR: 101  Standing BP: 151/67 HR: 108  Site: --  Mode: --  Orthostatic VS  09-10-23 @ 07:21  Lying BP: --/-- HR: --  Sitting BP: 154/64 HR: 83  Standing BP: 150/65 HR: 86  Site: --  Mode: --

## 2023-09-12 PROCEDURE — 99232 SBSQ HOSP IP/OBS MODERATE 35: CPT

## 2023-09-12 RX ORDER — CLONAZEPAM 1 MG
0.5 TABLET ORAL DAILY
Refills: 0 | Status: DISCONTINUED | OUTPATIENT
Start: 2023-09-12 | End: 2023-09-18

## 2023-09-12 RX ADMIN — Medication 3 MILLIGRAM(S): at 01:42

## 2023-09-12 RX ADMIN — FAMOTIDINE 20 MILLIGRAM(S): 10 INJECTION INTRAVENOUS at 06:50

## 2023-09-12 RX ADMIN — AMLODIPINE BESYLATE 2.5 MILLIGRAM(S): 2.5 TABLET ORAL at 06:50

## 2023-09-12 RX ADMIN — Medication 0.5 MILLIGRAM(S): at 09:10

## 2023-09-12 RX ADMIN — LURASIDONE HYDROCHLORIDE 40 MILLIGRAM(S): 40 TABLET ORAL at 09:10

## 2023-09-12 RX ADMIN — LACTULOSE 10 GRAM(S): 10 SOLUTION ORAL at 12:29

## 2023-09-12 RX ADMIN — LOSARTAN POTASSIUM 75 MILLIGRAM(S): 100 TABLET, FILM COATED ORAL at 06:50

## 2023-09-12 RX ADMIN — Medication 2 SPRAY(S): at 09:11

## 2023-09-12 RX ADMIN — ATORVASTATIN CALCIUM 10 MILLIGRAM(S): 80 TABLET, FILM COATED ORAL at 21:47

## 2023-09-12 RX ADMIN — SERTRALINE 125 MILLIGRAM(S): 25 TABLET, FILM COATED ORAL at 09:10

## 2023-09-12 RX ADMIN — METFORMIN HYDROCHLORIDE 250 MILLIGRAM(S): 850 TABLET ORAL at 21:47

## 2023-09-12 RX ADMIN — Medication 2 SPRAY(S): at 21:55

## 2023-09-12 RX ADMIN — SENNA PLUS 2 TABLET(S): 8.6 TABLET ORAL at 09:10

## 2023-09-12 RX ADMIN — METFORMIN HYDROCHLORIDE 250 MILLIGRAM(S): 850 TABLET ORAL at 09:10

## 2023-09-12 NOTE — BH INPATIENT PSYCHIATRY PROGRESS NOTE - NSBHFUPINTERVALHXFT_PSY_A_CORE
Bipolar d/o, mixed with psychotic features, care discussed w/ tx team, CHEPE. Patient reported feeling "very good",  reported by staff that she was disorganized at night, carrying her CPAP. Discussed with nursing--will keep patient on constant observation on ECT days. Slept afterwards and with fair appetite, denied pain. Patient denied dizziness or constipation. Observed sitting out in the milieu.

## 2023-09-12 NOTE — BH INPATIENT PSYCHIATRY PROGRESS NOTE - NSBHASSESSSUMMFT_PSY_ALL_CORE
83 year-old , retired female, domiciled at [facility], [facility] of DM2, HLD, HTN, PPHx of late onset Bipolar disorder, 2 prior psych adm last in 2022 at [facility], who presented to ED with worsening paranoia, anxiety, FTT (weight loss of 20lb since march), psychosis commingled with cognitive impairment   8/14 better but now hard to stay if responding to Abilify or prns of haldol will increase Abilify to 17mg then 20mg/d while initiating ECT discussion  8/15 Not much improvement   except less paranoid which may be attributable to haldol prn. Will d/c Abilify and start  Latuda will discuss ECT option  8/16 COnt Latuda trial while entering into discussion with patient re ECT  8/17 Tolerating latuda well; paranoia remains  8/18 Paranoia continues though improved; increase latuda to 40mg po daily - to be administered with food  8/19: On encounter today pt was seen chatting with one of her peers. Pt denied any complaints and reported feeling well. Denies paranoid thoughts or feeling afraid someone was going to hurt her. Still needing frequent redirection from staff to use her walker.   8/20 Depressed anxious fearful disorganized. Cont Latuda trial will cont to discuss ECT with patient, family is supportive  8/21 Depressed psychotic partial response will cont with ECT patient amenable as long as a family member goes with her for fear she will go off unit and never return. Will get labs ask medicine to see  8/22 Depressed not responded well to meds will increase Latuda while prepping for ECT. Will clarify need for ASA given severe nosebleed hx will discuss nosebleed issue with ECT   8/23 Cont ECT w/u discussed with medicine will   cont ASA  8/24 Psychotic depression with limited response to  multiple medication trials. Calmer less floridly delusion but anhedonia in state of constant distress. Patient assents to ECT, daughter Marcelina who is her HCP will provide consent. Patient requests family accompany her to ECT  for support as well as likely afraid she will not come back to unit if taken off. Will d/c Protonix suspension and use Pepcid as the former cannot be give while patient NPO. will lower Latuda as want to begin to reduce meds concurrent with ECT  8/25 Patient tolerated ECT, cont ECT, try to reduce Klonopin when better. Cont Effexor and Latuda. Recheck Na level on Sunday 8/26 Tolerated first ECT amnestic for treatment itself sl calmer. COnt ECT will begin to reduce klonopin to reduce cog se, check BMP in AM  8/27 less dysphoric, still paranoid, no SI/HI. Labs reviewed and discussed with hospitalist. Repeat BMP, urine NA and osmolality to f/u.  8/28 Tolerating ECT, cont treatments spread out so next one  9/1. Eval for further decrease Klonopin. Medicine saw patient , reintroduced metformin at low dose and d/declan ASA based on risk benefit   8/29 Mood psychosis better, sleepy during day confused. Will hold ECT til 9/1 reduce Klonopin  also may reduce Latuda  8/30 Improved with ECT but cog worse Will cont to hold ECT til clearer cont to reduce BDZ  8/31 Patient much better with regard to mood and  psychosis but notably cognitive disrupted form ECT Will hold ECT tomorrow. Will lower Lurasidone as was never effective for psychosis. Will start lowering Effexor and change to Zoloft as Effexor not helpful. Around completion of ECT may restart Depakote given episode of anjelica last admission. Consider further reduction in Ko Klonopin to 0.25mg/d . Reviewed plan of care with daughter  9/1 Patient improved but paranoia creeping back. Will cont Latuda at 40mg  consider increasing back to 60mg. Considered resume VPA based on concern of risk for switching but may inhibit sx requiring higher stimulus which can lead to more confusion. Next rx 9/5 9/2: Patient with psychotic depression, improving, on lurasidone   9/3: Patient's psychosis resolving, mood improved   9/4: Mood and psychotic symptoms improving, some impaired executive function and reasoning   9/5 PAtient improved but anxious, fearful cog impairment due to ECT but less severe. Cont ECT but spread out will, lower Effexor and add Zoloft as Effexor trial failed. Cont Lurasidone  9/6 Patient improving but quite confused. Will hold next treatment til next Monday. Cont to cross over to Zoloft  9/7 Improving will cont with spread out ECT to reudce severity of cog se of ECT will increase Zoloft and d/c Effexor Will rechallenge with CPAP  9/8 Patient improving with ECT next ECT 9/11 cont to titrate Zoloft. COnt encourage use of CPAP  9/11 patient showing improved impulse control, tolerating ECT, increase Zoloft to 125mg daily, will check BMP wed  9/12 more organized today, pleasant on approach, needs redirection to avoid touching peers on the shoulder

## 2023-09-12 NOTE — BH INPATIENT PSYCHIATRY PROGRESS NOTE - CURRENT MEDICATION
MEDICATIONS  (STANDING):  amLODIPine   Tablet 2.5 milliGRAM(s) Oral <User Schedule>  atorvastatin 10 milliGRAM(s) Oral at bedtime  clonazePAM  Tablet 0.5 milliGRAM(s) Oral daily  famotidine    Tablet 20 milliGRAM(s) Oral <User Schedule>  glucagon  Injectable 1 milliGRAM(s) IntraMuscular once  lactulose Syrup 10 Gram(s) Oral <User Schedule>  losartan 75 milliGRAM(s) Oral <User Schedule>  lurasidone 40 milliGRAM(s) Oral daily  metFORMIN 250 milliGRAM(s) Oral two times a day  senna 2 Tablet(s) Oral daily  sertraline 125 milliGRAM(s) Oral daily  sodium chloride 0.65% Nasal 2 Spray(s) Both Nostrils three times a day    MEDICATIONS  (PRN):  acetaminophen     Tablet .. 650 milliGRAM(s) Oral every 6 hours PRN Temp greater or equal to 38C (100.4F), Mild Pain (1 - 3), Moderate Pain (4 - 6)  bisacodyl 5 milliGRAM(s) Oral daily PRN constipation  bisacodyl Suppository 10 milliGRAM(s) Rectal daily PRN constipation  dextrose Oral Gel 15 Gram(s) Oral once PRN Blood Glucose LESS THAN 70 milliGRAM(s)/deciliter  haloperidol     Tablet 0.5 milliGRAM(s) Oral every 6 hours PRN agitation  haloperidol    Injectable 1 milliGRAM(s) IntraMuscular once PRN agitation  lidocaine   4% Patch 1 Patch Transdermal daily PRN LBP  melatonin. 3 milliGRAM(s) Oral at bedtime PRN Insomnia  simethicone 80 milliGRAM(s) Chew every 4 hours PRN abdominal discomfort

## 2023-09-12 NOTE — BH INPATIENT PSYCHIATRY PROGRESS NOTE - NSBHCHARTREVIEWVS_PSY_A_CORE FT
Vital Signs Last 24 Hrs  T(C): 36.4 (09-12-23 @ 11:44), Max: 36.4 (09-12-23 @ 11:44)  T(F): 97.5 (09-12-23 @ 11:44), Max: 97.5 (09-12-23 @ 11:44)  HR: --  BP: 129/63 (09-12-23 @ 11:44) (129/63 - 129/63)  BP(mean): 69 (09-12-23 @ 11:44) (69 - 69)  RR: --  SpO2: --    Orthostatic VS  09-11-23 @ 05:42  Lying BP: --/-- HR: --  Sitting BP: 158/77 HR: 101  Standing BP: 151/67 HR: 108  Site: --  Mode: --

## 2023-09-12 NOTE — BH INPATIENT PSYCHIATRY PROGRESS NOTE - NSBHMETABOLIC_PSY_ALL_CORE_FT
BMI: BMI (kg/m2): 23 (06-07-23 @ 18:13)  HbA1c: A1C with Estimated Average Glucose Result: 5.7 % (06-01-23 @ 05:10)    Glucose: POCT Blood Glucose.: 121 mg/dL (09-11-23 @ 08:14)    BP: 129/63 (09-12-23 @ 11:44) (129/63 - 175/56)  Lipid Panel: Date/Time: 06-08-23 @ 08:30  Cholesterol, Serum: 119  Direct LDL: --  HDL Cholesterol, Serum: 47  Total Cholesterol/HDL Ration Measurement: --  Triglycerides, Serum: 56

## 2023-09-13 LAB
ANION GAP SERPL CALC-SCNC: 15 MMOL/L — HIGH (ref 7–14)
BUN SERPL-MCNC: 12 MG/DL — SIGNIFICANT CHANGE UP (ref 7–23)
CALCIUM SERPL-MCNC: 10.3 MG/DL — SIGNIFICANT CHANGE UP (ref 8.4–10.5)
CHLORIDE SERPL-SCNC: 98 MMOL/L — SIGNIFICANT CHANGE UP (ref 98–107)
CO2 SERPL-SCNC: 22 MMOL/L — SIGNIFICANT CHANGE UP (ref 22–31)
CREAT SERPL-MCNC: 0.58 MG/DL — SIGNIFICANT CHANGE UP (ref 0.5–1.3)
EGFR: 90 ML/MIN/1.73M2 — SIGNIFICANT CHANGE UP
GLUCOSE SERPL-MCNC: 132 MG/DL — HIGH (ref 70–99)
POTASSIUM SERPL-MCNC: 4 MMOL/L — SIGNIFICANT CHANGE UP (ref 3.5–5.3)
POTASSIUM SERPL-SCNC: 4 MMOL/L — SIGNIFICANT CHANGE UP (ref 3.5–5.3)
SODIUM SERPL-SCNC: 135 MMOL/L — SIGNIFICANT CHANGE UP (ref 135–145)

## 2023-09-13 PROCEDURE — 99232 SBSQ HOSP IP/OBS MODERATE 35: CPT

## 2023-09-13 RX ADMIN — ATORVASTATIN CALCIUM 10 MILLIGRAM(S): 80 TABLET, FILM COATED ORAL at 20:54

## 2023-09-13 RX ADMIN — LOSARTAN POTASSIUM 75 MILLIGRAM(S): 100 TABLET, FILM COATED ORAL at 06:48

## 2023-09-13 RX ADMIN — LACTULOSE 10 GRAM(S): 10 SOLUTION ORAL at 12:43

## 2023-09-13 RX ADMIN — METFORMIN HYDROCHLORIDE 250 MILLIGRAM(S): 850 TABLET ORAL at 09:37

## 2023-09-13 RX ADMIN — SERTRALINE 125 MILLIGRAM(S): 25 TABLET, FILM COATED ORAL at 09:37

## 2023-09-13 RX ADMIN — Medication 2 SPRAY(S): at 21:27

## 2023-09-13 RX ADMIN — SENNA PLUS 2 TABLET(S): 8.6 TABLET ORAL at 09:38

## 2023-09-13 RX ADMIN — Medication 2 SPRAY(S): at 09:39

## 2023-09-13 RX ADMIN — LURASIDONE HYDROCHLORIDE 40 MILLIGRAM(S): 40 TABLET ORAL at 09:39

## 2023-09-13 RX ADMIN — METFORMIN HYDROCHLORIDE 250 MILLIGRAM(S): 850 TABLET ORAL at 20:54

## 2023-09-13 RX ADMIN — FAMOTIDINE 20 MILLIGRAM(S): 10 INJECTION INTRAVENOUS at 06:48

## 2023-09-13 RX ADMIN — Medication 3 MILLIGRAM(S): at 01:59

## 2023-09-13 RX ADMIN — AMLODIPINE BESYLATE 2.5 MILLIGRAM(S): 2.5 TABLET ORAL at 06:48

## 2023-09-13 RX ADMIN — Medication 0.5 MILLIGRAM(S): at 09:37

## 2023-09-13 RX ADMIN — Medication 2 SPRAY(S): at 12:43

## 2023-09-13 NOTE — BH INPATIENT PSYCHIATRY PROGRESS NOTE - NSBHASSESSSUMMFT_PSY_ALL_CORE
83 year-old , retired female, domiciled at [facility], [facility] of DM2, HLD, HTN, PPHx of late onset Bipolar disorder, 2 prior psych adm last in 2022 at [facility], who presented to ED with worsening paranoia, anxiety, FTT (weight loss of 20lb since march), psychosis commingled with cognitive impairment   8/14 better but now hard to stay if responding to Abilify or prns of haldol will increase Abilify to 17mg then 20mg/d while initiating ECT discussion  8/15 Not much improvement   except less paranoid which may be attributable to haldol prn. Will d/c Abilify and start  Latuda will discuss ECT option  8/16 COnt Latuda trial while entering into discussion with patient re ECT  8/17 Tolerating latuda well; paranoia remains  8/18 Paranoia continues though improved; increase latuda to 40mg po daily - to be administered with food  8/19: On encounter today pt was seen chatting with one of her peers. Pt denied any complaints and reported feeling well. Denies paranoid thoughts or feeling afraid someone was going to hurt her. Still needing frequent redirection from staff to use her walker.   8/20 Depressed anxious fearful disorganized. Cont Latuda trial will cont to discuss ECT with patient, family is supportive  8/21 Depressed psychotic partial response will cont with ECT patient amenable as long as a family member goes with her for fear she will go off unit and never return. Will get labs ask medicine to see  8/22 Depressed not responded well to meds will increase Latuda while prepping for ECT. Will clarify need for ASA given severe nosebleed hx will discuss nosebleed issue with ECT   8/23 Cont ECT w/u discussed with medicine will   cont ASA  8/24 Psychotic depression with limited response to  multiple medication trials. Calmer less floridly delusion but anhedonia in state of constant distress. Patient assents to ECT, daughter Marcelina who is her HCP will provide consent. Patient requests family accompany her to ECT  for support as well as likely afraid she will not come back to unit if taken off. Will d/c Protonix suspension and use Pepcid as the former cannot be give while patient NPO. will lower Latuda as want to begin to reduce meds concurrent with ECT  8/25 Patient tolerated ECT, cont ECT, try to reduce Klonopin when better. Cont Effexor and Latuda. Recheck Na level on Sunday 8/26 Tolerated first ECT amnestic for treatment itself sl calmer. COnt ECT will begin to reduce klonopin to reduce cog se, check BMP in AM  8/27 less dysphoric, still paranoid, no SI/HI. Labs reviewed and discussed with hospitalist. Repeat BMP, urine NA and osmolality to f/u.  8/28 Tolerating ECT, cont treatments spread out so next one  9/1. Eval for further decrease Klonopin. Medicine saw patient , reintroduced metformin at low dose and d/declan ASA based on risk benefit   8/29 Mood psychosis better, sleepy during day confused. Will hold ECT til 9/1 reduce Klonopin  also may reduce Latuda  8/30 Improved with ECT but cog worse Will cont to hold ECT til clearer cont to reduce BDZ  8/31 Patient much better with regard to mood and  psychosis but notably cognitive disrupted form ECT Will hold ECT tomorrow. Will lower Lurasidone as was never effective for psychosis. Will start lowering Effexor and change to Zoloft as Effexor not helpful. Around completion of ECT may restart Depakote given episode of anjelica last admission. Consider further reduction in Ko Klonopin to 0.25mg/d . Reviewed plan of care with daughter  9/1 Patient improved but paranoia creeping back. Will cont Latuda at 40mg  consider increasing back to 60mg. Considered resume VPA based on concern of risk for switching but may inhibit sx requiring higher stimulus which can lead to more confusion. Next rx 9/5 9/2: Patient with psychotic depression, improving, on lurasidone   9/3: Patient's psychosis resolving, mood improved   9/4: Mood and psychotic symptoms improving, some impaired executive function and reasoning   9/5 PAtient improved but anxious, fearful cog impairment due to ECT but less severe. Cont ECT but spread out will, lower Effexor and add Zoloft as Effexor trial failed. Cont Lurasidone  9/6 Patient improving but quite confused. Will hold next treatment til next Monday. Cont to cross over to Zoloft  9/7 Improving will cont with spread out ECT to reudce severity of cog se of ECT will increase Zoloft and d/c Effexor Will rechallenge with CPAP  9/8 Patient improving with ECT next ECT 9/11 cont to titrate Zoloft. COnt encourage use of CPAP  9/11 patient showing improved impulse control, tolerating ECT, increase Zoloft to 125mg daily, will check BMP wed  9/12 more organized today, pleasant on approach, needs redirection to avoid touching peers on the shoulder   9/13: overall improvement in behaviors, needs redirection at times.next ECT friday.

## 2023-09-13 NOTE — BH INPATIENT PSYCHIATRY PROGRESS NOTE - NSBHFUPINTERVALHXFT_PSY_A_CORE
Bipolar d/o, mixed with psychotic features, care discussed w/ tx team, CHEPE. Patient visible in the dayroom, initially sitting calmly, ate her breakfast, reported fair sleep and appetite. Later on in the afternoon, patient with poor boundaries, needing redirection. Patient taking medications. no SE reported. LBM 2 days ago. nursing to monitor.

## 2023-09-14 PROCEDURE — 99232 SBSQ HOSP IP/OBS MODERATE 35: CPT

## 2023-09-14 RX ORDER — SERTRALINE 25 MG/1
150 TABLET, FILM COATED ORAL DAILY
Refills: 0 | Status: DISCONTINUED | OUTPATIENT
Start: 2023-09-15 | End: 2023-09-18

## 2023-09-14 RX ADMIN — ATORVASTATIN CALCIUM 10 MILLIGRAM(S): 80 TABLET, FILM COATED ORAL at 21:15

## 2023-09-14 RX ADMIN — Medication 2 SPRAY(S): at 21:25

## 2023-09-14 RX ADMIN — Medication 3 MILLIGRAM(S): at 21:15

## 2023-09-14 RX ADMIN — SENNA PLUS 2 TABLET(S): 8.6 TABLET ORAL at 10:35

## 2023-09-14 RX ADMIN — SERTRALINE 125 MILLIGRAM(S): 25 TABLET, FILM COATED ORAL at 10:34

## 2023-09-14 RX ADMIN — METFORMIN HYDROCHLORIDE 250 MILLIGRAM(S): 850 TABLET ORAL at 21:15

## 2023-09-14 RX ADMIN — FAMOTIDINE 20 MILLIGRAM(S): 10 INJECTION INTRAVENOUS at 06:23

## 2023-09-14 RX ADMIN — LURASIDONE HYDROCHLORIDE 40 MILLIGRAM(S): 40 TABLET ORAL at 10:36

## 2023-09-14 RX ADMIN — AMLODIPINE BESYLATE 2.5 MILLIGRAM(S): 2.5 TABLET ORAL at 06:23

## 2023-09-14 RX ADMIN — METFORMIN HYDROCHLORIDE 250 MILLIGRAM(S): 850 TABLET ORAL at 10:35

## 2023-09-14 RX ADMIN — Medication 0.5 MILLIGRAM(S): at 10:35

## 2023-09-14 RX ADMIN — LOSARTAN POTASSIUM 75 MILLIGRAM(S): 100 TABLET, FILM COATED ORAL at 06:23

## 2023-09-14 RX ADMIN — Medication 2 SPRAY(S): at 10:36

## 2023-09-14 NOTE — BH INPATIENT PSYCHIATRY PROGRESS NOTE - NSBHCHARTREVIEWVS_PSY_A_CORE FT
Vital Signs Last 24 Hrs  T(C): 37.1 (09-14-23 @ 06:48), Max: 37.1 (09-14-23 @ 06:48)  T(F): 98.7 (09-14-23 @ 06:48), Max: 98.7 (09-14-23 @ 06:48)  HR: --  BP: --  BP(mean): --  RR: --  SpO2: --    Orthostatic VS  09-14-23 @ 06:48  Lying BP: 129/70 HR: 84  Sitting BP: 127/65 HR: 68  Standing BP: --/-- HR: --  Site: upper left arm  Mode: electronic

## 2023-09-14 NOTE — BH INPATIENT PSYCHIATRY PROGRESS NOTE - NSBHASSESSSUMMFT_PSY_ALL_CORE
83 year-old , retired female, domiciled at [facility], [facility] of DM2, HLD, HTN, PPHx of late onset Bipolar disorder, 2 prior psych adm last in 2022 at [facility], who presented to ED with worsening paranoia, anxiety, FTT (weight loss of 20lb since march), psychosis commingled with cognitive impairment   8/14 better but now hard to stay if responding to Abilify or prns of haldol will increase Abilify to 17mg then 20mg/d while initiating ECT discussion  8/15 Not much improvement   except less paranoid which may be attributable to haldol prn. Will d/c Abilify and start  Latuda will discuss ECT option  8/16 COnt Latuda trial while entering into discussion with patient re ECT  8/17 Tolerating latuda well; paranoia remains  8/18 Paranoia continues though improved; increase latuda to 40mg po daily - to be administered with food  8/19: On encounter today pt was seen chatting with one of her peers. Pt denied any complaints and reported feeling well. Denies paranoid thoughts or feeling afraid someone was going to hurt her. Still needing frequent redirection from staff to use her walker.   8/20 Depressed anxious fearful disorganized. Cont Latuda trial will cont to discuss ECT with patient, family is supportive  8/21 Depressed psychotic partial response will cont with ECT patient amenable as long as a family member goes with her for fear she will go off unit and never return. Will get labs ask medicine to see  8/22 Depressed not responded well to meds will increase Latuda while prepping for ECT. Will clarify need for ASA given severe nosebleed hx will discuss nosebleed issue with ECT   8/23 Cont ECT w/u discussed with medicine will   cont ASA  8/24 Psychotic depression with limited response to  multiple medication trials. Calmer less floridly delusion but anhedonia in state of constant distress. Patient assents to ECT, daughter Marcelina who is her HCP will provide consent. Patient requests family accompany her to ECT  for support as well as likely afraid she will not come back to unit if taken off. Will d/c Protonix suspension and use Pepcid as the former cannot be give while patient NPO. will lower Latuda as want to begin to reduce meds concurrent with ECT  8/25 Patient tolerated ECT, cont ECT, try to reduce Klonopin when better. Cont Effexor and Latuda. Recheck Na level on Sunday 8/26 Tolerated first ECT amnestic for treatment itself sl calmer. COnt ECT will begin to reduce klonopin to reduce cog se, check BMP in AM  8/27 less dysphoric, still paranoid, no SI/HI. Labs reviewed and discussed with hospitalist. Repeat BMP, urine NA and osmolality to f/u.  8/28 Tolerating ECT, cont treatments spread out so next one  9/1. Eval for further decrease Klonopin. Medicine saw patient , reintroduced metformin at low dose and d/declan ASA based on risk benefit   8/29 Mood psychosis better, sleepy during day confused. Will hold ECT til 9/1 reduce Klonopin  also may reduce Latuda  8/30 Improved with ECT but cog worse Will cont to hold ECT til clearer cont to reduce BDZ  8/31 Patient much better with regard to mood and  psychosis but notably cognitive disrupted form ECT Will hold ECT tomorrow. Will lower Lurasidone as was never effective for psychosis. Will start lowering Effexor and change to Zoloft as Effexor not helpful. Around completion of ECT may restart Depakote given episode of anjelica last admission. Consider further reduction in Ko Klonopin to 0.25mg/d . Reviewed plan of care with daughter  9/1 Patient improved but paranoia creeping back. Will cont Latuda at 40mg  consider increasing back to 60mg. Considered resume VPA based on concern of risk for switching but may inhibit sx requiring higher stimulus which can lead to more confusion. Next rx 9/5 9/2: Patient with psychotic depression, improving, on lurasidone   9/3: Patient's psychosis resolving, mood improved   9/4: Mood and psychotic symptoms improving, some impaired executive function and reasoning   9/5 PAtient improved but anxious, fearful cog impairment due to ECT but less severe. Cont ECT but spread out will, lower Effexor and add Zoloft as Effexor trial failed. Cont Lurasidone  9/6 Patient improving but quite confused. Will hold next treatment til next Monday. Cont to cross over to Zoloft  9/7 Improving will cont with spread out ECT to reudce severity of cog se of ECT will increase Zoloft and d/c Effexor Will rechallenge with CPAP  9/8 Patient improving with ECT next ECT 9/11 cont to titrate Zoloft. COnt encourage use of CPAP  9/11 patient showing improved impulse control, tolerating ECT, increase Zoloft to 125mg daily, will check BMP wed  9/12 more organized today, pleasant on approach, needs redirection to avoid touching peers on the shoulder   9/13: overall improvement in behaviors, needs redirection at times.next ECT friday.   9/14: ECT tomorrow, overall improved

## 2023-09-14 NOTE — BH INPATIENT PSYCHIATRY PROGRESS NOTE - NSBHMETABOLIC_PSY_ALL_CORE_FT
BMI: BMI (kg/m2): 23 (06-07-23 @ 18:13)  HbA1c: A1C with Estimated Average Glucose Result: 5.7 % (06-01-23 @ 05:10)    Glucose: POCT Blood Glucose.: 121 mg/dL (09-11-23 @ 08:14)    BP: 129/63 (09-12-23 @ 11:44) (129/63 - 129/63)  Lipid Panel: Date/Time: 06-08-23 @ 08:30  Cholesterol, Serum: 119  Direct LDL: --  HDL Cholesterol, Serum: 47  Total Cholesterol/HDL Ration Measurement: --  Triglycerides, Serum: 56

## 2023-09-14 NOTE — BH INPATIENT PSYCHIATRY PROGRESS NOTE - NSBHFUPINTERVALHXFT_PSY_A_CORE
Bipolar d/o, mixed with psychotic features, care discussed w/ tx team, VSS. Patient visible in the dayroom, pleasant on approach, observed to be social with peers. taking meds, tolerating ECT. Patient with good appetite and reports sleeping well.

## 2023-09-14 NOTE — BH INPATIENT PSYCHIATRY PROGRESS NOTE - CURRENT MEDICATION
MEDICATIONS  (STANDING):  amLODIPine   Tablet 2.5 milliGRAM(s) Oral <User Schedule>  atorvastatin 10 milliGRAM(s) Oral at bedtime  clonazePAM  Tablet 0.5 milliGRAM(s) Oral daily  famotidine    Tablet 20 milliGRAM(s) Oral <User Schedule>  glucagon  Injectable 1 milliGRAM(s) IntraMuscular once  lactulose Syrup 10 Gram(s) Oral <User Schedule>  losartan 75 milliGRAM(s) Oral <User Schedule>  lurasidone 40 milliGRAM(s) Oral daily  metFORMIN 250 milliGRAM(s) Oral two times a day  senna 2 Tablet(s) Oral daily  sodium chloride 0.65% Nasal 2 Spray(s) Both Nostrils three times a day    MEDICATIONS  (PRN):  acetaminophen     Tablet .. 650 milliGRAM(s) Oral every 6 hours PRN Temp greater or equal to 38C (100.4F), Mild Pain (1 - 3), Moderate Pain (4 - 6)  bisacodyl 5 milliGRAM(s) Oral daily PRN constipation  bisacodyl Suppository 10 milliGRAM(s) Rectal daily PRN constipation  dextrose Oral Gel 15 Gram(s) Oral once PRN Blood Glucose LESS THAN 70 milliGRAM(s)/deciliter  haloperidol     Tablet 0.5 milliGRAM(s) Oral every 6 hours PRN agitation  haloperidol    Injectable 1 milliGRAM(s) IntraMuscular once PRN agitation  lidocaine   4% Patch 1 Patch Transdermal daily PRN LBP  melatonin. 3 milliGRAM(s) Oral at bedtime PRN Insomnia  simethicone 80 milliGRAM(s) Chew every 4 hours PRN abdominal discomfort

## 2023-09-15 LAB — GLUCOSE BLDC GLUCOMTR-MCNC: 110 MG/DL — HIGH (ref 70–99)

## 2023-09-15 PROCEDURE — 90870 ELECTROCONVULSIVE THERAPY: CPT

## 2023-09-15 PROCEDURE — 99232 SBSQ HOSP IP/OBS MODERATE 35: CPT

## 2023-09-15 RX ORDER — FLUMAZENIL 0.1 MG/ML
0.2 VIAL (ML) INTRAVENOUS ONCE
Refills: 0 | Status: COMPLETED | OUTPATIENT
Start: 2023-09-15 | End: 2023-09-15

## 2023-09-15 RX ADMIN — LOSARTAN POTASSIUM 75 MILLIGRAM(S): 100 TABLET, FILM COATED ORAL at 06:10

## 2023-09-15 RX ADMIN — METFORMIN HYDROCHLORIDE 250 MILLIGRAM(S): 850 TABLET ORAL at 21:06

## 2023-09-15 RX ADMIN — LURASIDONE HYDROCHLORIDE 40 MILLIGRAM(S): 40 TABLET ORAL at 11:45

## 2023-09-15 RX ADMIN — Medication 3 MILLIGRAM(S): at 21:10

## 2023-09-15 RX ADMIN — AMLODIPINE BESYLATE 2.5 MILLIGRAM(S): 2.5 TABLET ORAL at 06:10

## 2023-09-15 RX ADMIN — Medication 0.2 MILLIGRAM(S): at 10:35

## 2023-09-15 RX ADMIN — Medication 2 SPRAY(S): at 21:06

## 2023-09-15 RX ADMIN — Medication 2 SPRAY(S): at 12:08

## 2023-09-15 RX ADMIN — Medication 0.5 MILLIGRAM(S): at 11:46

## 2023-09-15 RX ADMIN — SENNA PLUS 2 TABLET(S): 8.6 TABLET ORAL at 11:45

## 2023-09-15 RX ADMIN — LACTULOSE 10 GRAM(S): 10 SOLUTION ORAL at 12:08

## 2023-09-15 RX ADMIN — FAMOTIDINE 20 MILLIGRAM(S): 10 INJECTION INTRAVENOUS at 06:10

## 2023-09-15 RX ADMIN — SERTRALINE 150 MILLIGRAM(S): 25 TABLET, FILM COATED ORAL at 11:46

## 2023-09-15 RX ADMIN — METFORMIN HYDROCHLORIDE 250 MILLIGRAM(S): 850 TABLET ORAL at 11:46

## 2023-09-15 RX ADMIN — ATORVASTATIN CALCIUM 10 MILLIGRAM(S): 80 TABLET, FILM COATED ORAL at 21:06

## 2023-09-15 NOTE — BH TREATMENT PLAN - NSCMSPTSTRENGTHS_PSY_ALL_CORE
Financial stability/Supportive family
Compliance to treatment
Compliance to treatment/Future/goal oriented/Supportive family

## 2023-09-15 NOTE — BH INPATIENT PSYCHIATRY PROGRESS NOTE - NSBHMSETHTPROC_PSY_A_CORE
Linear/Impaired reasoning
Linear/Impaired reasoning
Disorganized/Tangential/Illogical/Impaired reasoning
Linear/Impaired reasoning

## 2023-09-15 NOTE — BH INPATIENT PSYCHIATRY PROGRESS NOTE - NSBHCHARTREVIEWVS_PSY_A_CORE FT
Vital Signs Last 24 Hrs  T(C): 36.2 (09-15-23 @ 11:20), Max: 36.9 (09-15-23 @ 10:14)  T(F): 97.1 (09-15-23 @ 11:20), Max: 98.4 (09-15-23 @ 10:14)  HR: 76 (09-15-23 @ 11:20) (66 - 80)  BP: 184/66 (09-15-23 @ 11:20) (155/50 - 184/66)  BP(mean): --  RR: 16 (09-15-23 @ 11:20) (14 - 18)  SpO2: 99% (09-15-23 @ 11:20) (97% - 100%)    Orthostatic VS  09-15-23 @ 06:12  Lying BP: --/-- HR: --  Sitting BP: 132/60 HR: 81  Standing BP: 129/83 HR: 113  Site: upper left arm  Mode: electronic  Orthostatic VS  09-14-23 @ 06:48  Lying BP: 129/70 HR: 84  Sitting BP: 127/65 HR: 68  Standing BP: --/-- HR: --  Site: upper left arm  Mode: electronic

## 2023-09-15 NOTE — BH INPATIENT PSYCHIATRY PROGRESS NOTE - NSBHMETABOLIC_PSY_ALL_CORE_FT
BMI: BMI (kg/m2): 23 (06-07-23 @ 18:13)  HbA1c: A1C with Estimated Average Glucose Result: 5.7 % (06-01-23 @ 05:10)    Glucose: POCT Blood Glucose.: 110 mg/dL (09-15-23 @ 10:17)    BP: 184/66 (09-15-23 @ 11:20) (155/50 - 184/66)  Lipid Panel: Date/Time: 06-08-23 @ 08:30  Cholesterol, Serum: 119  Direct LDL: --  HDL Cholesterol, Serum: 47  Total Cholesterol/HDL Ration Measurement: --  Triglycerides, Serum: 56

## 2023-09-15 NOTE — BH INPATIENT PSYCHIATRY PROGRESS NOTE - NSBHMSESPABN_PSY_A_CORE
Decreased productivity

## 2023-09-15 NOTE — BH INPATIENT PSYCHIATRY PROGRESS NOTE - NSBHMSERECMEM_PSY_A_CORE
Impaired
Labs wnl. Awaiting CT results. Will re-eval for discharge home following. d/w Dr. Escobar. Carmelita PGY1
No acute CT findings. Pt feeling improved with Lidoderm patch. Ambulating, pain improved. Appears clinically stable. Return precautions provided. Will follow up with PCP 1-2 days.   d/w Dr. Kurtz. Carmelita PGY1
Unable to assess
Impaired

## 2023-09-15 NOTE — BH INPATIENT PSYCHIATRY PROGRESS NOTE - NSBHMSELANG_PSY_A_CORE
No abnormalities noted/Unable to assess

## 2023-09-15 NOTE — BH INPATIENT PSYCHIATRY PROGRESS NOTE - NSBHASSESSSUMMFT_PSY_ALL_CORE
83 year-old , retired female, domiciled at [facility], [facility] of DM2, HLD, HTN, PPHx of late onset Bipolar disorder, 2 prior psych adm last in 2022 at [facility], who presented to ED with worsening paranoia, anxiety, FTT (weight loss of 20lb since march), psychosis commingled with cognitive impairment   8/14 better but now hard to stay if responding to Abilify or prns of haldol will increase Abilify to 17mg then 20mg/d while initiating ECT discussion  8/15 Not much improvement   except less paranoid which may be attributable to haldol prn. Will d/c Abilify and start  Latuda will discuss ECT option  8/16 COnt Latuda trial while entering into discussion with patient re ECT  8/17 Tolerating latuda well; paranoia remains  8/18 Paranoia continues though improved; increase latuda to 40mg po daily - to be administered with food  8/19: On encounter today pt was seen chatting with one of her peers. Pt denied any complaints and reported feeling well. Denies paranoid thoughts or feeling afraid someone was going to hurt her. Still needing frequent redirection from staff to use her walker.   8/20 Depressed anxious fearful disorganized. Cont Latuda trial will cont to discuss ECT with patient, family is supportive  8/21 Depressed psychotic partial response will cont with ECT patient amenable as long as a family member goes with her for fear she will go off unit and never return. Will get labs ask medicine to see  8/22 Depressed not responded well to meds will increase Latuda while prepping for ECT. Will clarify need for ASA given severe nosebleed hx will discuss nosebleed issue with ECT   8/23 Cont ECT w/u discussed with medicine will   cont ASA  8/24 Psychotic depression with limited response to  multiple medication trials. Calmer less floridly delusion but anhedonia in state of constant distress. Patient assents to ECT, daughter Marcelina who is her HCP will provide consent. Patient requests family accompany her to ECT  for support as well as likely afraid she will not come back to unit if taken off. Will d/c Protonix suspension and use Pepcid as the former cannot be give while patient NPO. will lower Latuda as want to begin to reduce meds concurrent with ECT  8/25 Patient tolerated ECT, cont ECT, try to reduce Klonopin when better. Cont Effexor and Latuda. Recheck Na level on Sunday 8/26 Tolerated first ECT amnestic for treatment itself sl calmer. COnt ECT will begin to reduce klonopin to reduce cog se, check BMP in AM  8/27 less dysphoric, still paranoid, no SI/HI. Labs reviewed and discussed with hospitalist. Repeat BMP, urine NA and osmolality to f/u.  8/28 Tolerating ECT, cont treatments spread out so next one  9/1. Eval for further decrease Klonopin. Medicine saw patient , reintroduced metformin at low dose and d/declan ASA based on risk benefit   8/29 Mood psychosis better, sleepy during day confused. Will hold ECT til 9/1 reduce Klonopin  also may reduce Latuda  8/30 Improved with ECT but cog worse Will cont to hold ECT til clearer cont to reduce BDZ  8/31 Patient much better with regard to mood and  psychosis but notably cognitive disrupted form ECT Will hold ECT tomorrow. Will lower Lurasidone as was never effective for psychosis. Will start lowering Effexor and change to Zoloft as Effexor not helpful. Around completion of ECT may restart Depakote given episode of anjelica last admission. Consider further reduction in Ko Klonopin to 0.25mg/d . Reviewed plan of care with daughter  9/1 Patient improved but paranoia creeping back. Will cont Latuda at 40mg  consider increasing back to 60mg. Considered resume VPA based on concern of risk for switching but may inhibit sx requiring higher stimulus which can lead to more confusion. Next rx 9/5 9/2: Patient with psychotic depression, improving, on lurasidone   9/3: Patient's psychosis resolving, mood improved   9/4: Mood and psychotic symptoms improving, some impaired executive function and reasoning   9/5 PAtient improved but anxious, fearful cog impairment due to ECT but less severe. Cont ECT but spread out will, lower Effexor and add Zoloft as Effexor trial failed. Cont Lurasidone  9/6 Patient improving but quite confused. Will hold next treatment til next Monday. Cont to cross over to Zoloft  9/7 Improving will cont with spread out ECT to reudce severity of cog se of ECT will increase Zoloft and d/c Effexor Will rechallenge with CPAP  9/8 Patient improving with ECT next ECT 9/11 cont to titrate Zoloft. COnt encourage use of CPAP  9/11 patient showing improved impulse control, tolerating ECT, increase Zoloft to 125mg daily, will check BMP wed  9/12 more organized today, pleasant on approach, needs redirection to avoid touching peers on the shoulder   9/13: overall improvement in behaviors, needs redirection at times.next ECT friday.   9/14: ECT tomorrow, overall improved   9/15: next ECT monday, overall behaviors in control, redirectable, will keep patient on CO 11-7 since she had ECT today

## 2023-09-15 NOTE — BH INPATIENT PSYCHIATRY PROGRESS NOTE - NSBHPSYCHOLCOGABN_PSY_A_CORE
disoriented to time/disoriented to situation
disoriented to time/disoriented to situation
disoriented to time/disoriented to place/disoriented to situation
disoriented to time/disoriented to place/disoriented to situation
disoriented to time/disoriented to situation
disoriented to time/disoriented to place/disoriented to situation
disoriented to time/disoriented to place/disoriented to situation

## 2023-09-15 NOTE — BH INPATIENT PSYCHIATRY PROGRESS NOTE - CURRENT MEDICATION
MEDICATIONS  (STANDING):  amLODIPine   Tablet 2.5 milliGRAM(s) Oral <User Schedule>  atorvastatin 10 milliGRAM(s) Oral at bedtime  clonazePAM  Tablet 0.5 milliGRAM(s) Oral daily  famotidine    Tablet 20 milliGRAM(s) Oral <User Schedule>  glucagon  Injectable 1 milliGRAM(s) IntraMuscular once  lactulose Syrup 10 Gram(s) Oral <User Schedule>  losartan 75 milliGRAM(s) Oral <User Schedule>  lurasidone 40 milliGRAM(s) Oral daily  metFORMIN 250 milliGRAM(s) Oral two times a day  senna 2 Tablet(s) Oral daily  sertraline 150 milliGRAM(s) Oral daily  sodium chloride 0.65% Nasal 2 Spray(s) Both Nostrils three times a day    MEDICATIONS  (PRN):  acetaminophen     Tablet .. 650 milliGRAM(s) Oral every 6 hours PRN Temp greater or equal to 38C (100.4F), Mild Pain (1 - 3), Moderate Pain (4 - 6)  bisacodyl 5 milliGRAM(s) Oral daily PRN constipation  bisacodyl Suppository 10 milliGRAM(s) Rectal daily PRN constipation  dextrose Oral Gel 15 Gram(s) Oral once PRN Blood Glucose LESS THAN 70 milliGRAM(s)/deciliter  haloperidol     Tablet 0.5 milliGRAM(s) Oral every 6 hours PRN agitation  haloperidol    Injectable 1 milliGRAM(s) IntraMuscular once PRN agitation  lidocaine   4% Patch 1 Patch Transdermal daily PRN LBP  melatonin. 3 milliGRAM(s) Oral at bedtime PRN Insomnia  simethicone 80 milliGRAM(s) Chew every 4 hours PRN abdominal discomfort

## 2023-09-15 NOTE — BH INPATIENT PSYCHIATRY PROGRESS NOTE - NSBHFUPINTERVALHXFT_PSY_A_CORE
Bipolar d/o, mixed with psychotic features, care discussed w/ tx team, VSS. Patient visible in the dayroom, pleasant on approach, social with peers. taking meds. Patient with good appetite. Behaviors in control, improving. As per nursing, sleep was poor, observed to be somnolent this AM.

## 2023-09-15 NOTE — BH INPATIENT PSYCHIATRY PROGRESS NOTE - GENERAL APPEARANCE
No deformities present
Other
Other
No deformities present

## 2023-09-15 NOTE — BH INPATIENT PSYCHIATRY PROGRESS NOTE - NSBHMSEPERCEPT_PSY_A_CORE
No abnormalities/Unable to assess

## 2023-09-16 RX ADMIN — Medication 0.5 MILLIGRAM(S): at 09:35

## 2023-09-16 RX ADMIN — SENNA PLUS 2 TABLET(S): 8.6 TABLET ORAL at 09:35

## 2023-09-16 RX ADMIN — ATORVASTATIN CALCIUM 10 MILLIGRAM(S): 80 TABLET, FILM COATED ORAL at 20:53

## 2023-09-16 RX ADMIN — SERTRALINE 150 MILLIGRAM(S): 25 TABLET, FILM COATED ORAL at 09:35

## 2023-09-16 RX ADMIN — METFORMIN HYDROCHLORIDE 250 MILLIGRAM(S): 850 TABLET ORAL at 20:53

## 2023-09-16 RX ADMIN — METFORMIN HYDROCHLORIDE 250 MILLIGRAM(S): 850 TABLET ORAL at 09:35

## 2023-09-16 RX ADMIN — Medication 2 SPRAY(S): at 12:46

## 2023-09-16 RX ADMIN — AMLODIPINE BESYLATE 2.5 MILLIGRAM(S): 2.5 TABLET ORAL at 06:33

## 2023-09-16 RX ADMIN — LACTULOSE 10 GRAM(S): 10 SOLUTION ORAL at 12:46

## 2023-09-16 RX ADMIN — LOSARTAN POTASSIUM 75 MILLIGRAM(S): 100 TABLET, FILM COATED ORAL at 06:33

## 2023-09-16 RX ADMIN — FAMOTIDINE 20 MILLIGRAM(S): 10 INJECTION INTRAVENOUS at 06:33

## 2023-09-16 RX ADMIN — LURASIDONE HYDROCHLORIDE 40 MILLIGRAM(S): 40 TABLET ORAL at 09:36

## 2023-09-16 RX ADMIN — Medication 2 SPRAY(S): at 09:36

## 2023-09-17 RX ADMIN — LOSARTAN POTASSIUM 75 MILLIGRAM(S): 100 TABLET, FILM COATED ORAL at 05:24

## 2023-09-17 RX ADMIN — Medication 3 MILLIGRAM(S): at 21:36

## 2023-09-17 RX ADMIN — ATORVASTATIN CALCIUM 10 MILLIGRAM(S): 80 TABLET, FILM COATED ORAL at 21:07

## 2023-09-17 RX ADMIN — Medication 2 SPRAY(S): at 12:26

## 2023-09-17 RX ADMIN — SENNA PLUS 2 TABLET(S): 8.6 TABLET ORAL at 08:42

## 2023-09-17 RX ADMIN — Medication 0.5 MILLIGRAM(S): at 08:43

## 2023-09-17 RX ADMIN — Medication 2 SPRAY(S): at 08:43

## 2023-09-17 RX ADMIN — AMLODIPINE BESYLATE 2.5 MILLIGRAM(S): 2.5 TABLET ORAL at 05:25

## 2023-09-17 RX ADMIN — Medication 2 SPRAY(S): at 21:26

## 2023-09-17 RX ADMIN — METFORMIN HYDROCHLORIDE 250 MILLIGRAM(S): 850 TABLET ORAL at 21:07

## 2023-09-17 RX ADMIN — LURASIDONE HYDROCHLORIDE 40 MILLIGRAM(S): 40 TABLET ORAL at 08:43

## 2023-09-17 RX ADMIN — FAMOTIDINE 20 MILLIGRAM(S): 10 INJECTION INTRAVENOUS at 05:24

## 2023-09-17 RX ADMIN — SERTRALINE 150 MILLIGRAM(S): 25 TABLET, FILM COATED ORAL at 08:42

## 2023-09-17 RX ADMIN — METFORMIN HYDROCHLORIDE 250 MILLIGRAM(S): 850 TABLET ORAL at 08:42

## 2023-09-18 PROCEDURE — 99232 SBSQ HOSP IP/OBS MODERATE 35: CPT

## 2023-09-18 RX ORDER — SERTRALINE 25 MG/1
150 TABLET, FILM COATED ORAL DAILY
Refills: 0 | Status: DISCONTINUED | OUTPATIENT
Start: 2023-09-18 | End: 2023-09-26

## 2023-09-18 RX ORDER — CLONAZEPAM 1 MG
0.25 TABLET ORAL DAILY
Refills: 0 | Status: DISCONTINUED | OUTPATIENT
Start: 2023-09-18 | End: 2023-09-25

## 2023-09-18 RX ORDER — LANOLIN ALCOHOL/MO/W.PET/CERES
3 CREAM (GRAM) TOPICAL AT BEDTIME
Refills: 0 | Status: DISCONTINUED | OUTPATIENT
Start: 2023-09-18 | End: 2023-10-17

## 2023-09-18 RX ADMIN — Medication 0.5 MILLIGRAM(S): at 12:16

## 2023-09-18 RX ADMIN — Medication 3 MILLIGRAM(S): at 20:59

## 2023-09-18 RX ADMIN — FAMOTIDINE 20 MILLIGRAM(S): 10 INJECTION INTRAVENOUS at 06:18

## 2023-09-18 RX ADMIN — LURASIDONE HYDROCHLORIDE 40 MILLIGRAM(S): 40 TABLET ORAL at 12:16

## 2023-09-18 RX ADMIN — LACTULOSE 10 GRAM(S): 10 SOLUTION ORAL at 12:16

## 2023-09-18 RX ADMIN — AMLODIPINE BESYLATE 2.5 MILLIGRAM(S): 2.5 TABLET ORAL at 06:18

## 2023-09-18 RX ADMIN — Medication 2 SPRAY(S): at 21:00

## 2023-09-18 RX ADMIN — SENNA PLUS 2 TABLET(S): 8.6 TABLET ORAL at 12:16

## 2023-09-18 RX ADMIN — METFORMIN HYDROCHLORIDE 250 MILLIGRAM(S): 850 TABLET ORAL at 21:00

## 2023-09-18 RX ADMIN — ATORVASTATIN CALCIUM 10 MILLIGRAM(S): 80 TABLET, FILM COATED ORAL at 20:58

## 2023-09-18 RX ADMIN — LOSARTAN POTASSIUM 75 MILLIGRAM(S): 100 TABLET, FILM COATED ORAL at 06:18

## 2023-09-18 RX ADMIN — SERTRALINE 150 MILLIGRAM(S): 25 TABLET, FILM COATED ORAL at 12:16

## 2023-09-18 RX ADMIN — METFORMIN HYDROCHLORIDE 250 MILLIGRAM(S): 850 TABLET ORAL at 12:16

## 2023-09-18 NOTE — BH INPATIENT PSYCHIATRY PROGRESS NOTE - NSBHASSESSSUMMFT_PSY_ALL_CORE
83 year-old , retired female, domiciled at [facility], [facility] of DM2, HLD, HTN, PPHx of late onset Bipolar disorder, 2 prior psych adm last in 2022 at [facility], who presented to ED with worsening paranoia, anxiety, FTT (weight loss of 20lb since march), psychosis commingled with cognitive impairment   8/14 better but now hard to stay if responding to Abilify or prns of haldol will increase Abilify to 17mg then 20mg/d while initiating ECT discussion  8/15 Not much improvement   except less paranoid which may be attributable to haldol prn. Will d/c Abilify and start  Latuda will discuss ECT option  8/16 COnt Latuda trial while entering into discussion with patient re ECT  8/17 Tolerating latuda well; paranoia remains  8/18 Paranoia continues though improved; increase latuda to 40mg po daily - to be administered with food  8/19: On encounter today pt was seen chatting with one of her peers. Pt denied any complaints and reported feeling well. Denies paranoid thoughts or feeling afraid someone was going to hurt her. Still needing frequent redirection from staff to use her walker.   8/20 Depressed anxious fearful disorganized. Cont Latuda trial will cont to discuss ECT with patient, family is supportive  8/21 Depressed psychotic partial response will cont with ECT patient amenable as long as a family member goes with her for fear she will go off unit and never return. Will get labs ask medicine to see  8/22 Depressed not responded well to meds will increase Latuda while prepping for ECT. Will clarify need for ASA given severe nosebleed hx will discuss nosebleed issue with ECT   8/23 Cont ECT w/u discussed with medicine will   cont ASA  8/24 Psychotic depression with limited response to  multiple medication trials. Calmer less floridly delusion but anhedonia in state of constant distress. Patient assents to ECT, daughter Marcelina who is her HCP will provide consent. Patient requests family accompany her to ECT  for support as well as likely afraid she will not come back to unit if taken off. Will d/c Protonix suspension and use Pepcid as the former cannot be give while patient NPO. will lower Latuda as want to begin to reduce meds concurrent with ECT  8/25 Patient tolerated ECT, cont ECT, try to reduce Klonopin when better. Cont Effexor and Latuda. Recheck Na level on Sunday 8/26 Tolerated first ECT amnestic for treatment itself sl calmer. COnt ECT will begin to reduce klonopin to reduce cog se, check BMP in AM  8/27 less dysphoric, still paranoid, no SI/HI. Labs reviewed and discussed with hospitalist. Repeat BMP, urine NA and osmolality to f/u.  8/28 Tolerating ECT, cont treatments spread out so next one  9/1. Eval for further decrease Klonopin. Medicine saw patient , reintroduced metformin at low dose and d/declan ASA based on risk benefit   8/29 Mood psychosis better, sleepy during day confused. Will hold ECT til 9/1 reduce Klonopin  also may reduce Latuda  8/30 Improved with ECT but cog worse Will cont to hold ECT til clearer cont to reduce BDZ  8/31 Patient much better with regard to mood and  psychosis but notably cognitive disrupted form ECT Will hold ECT tomorrow. Will lower Lurasidone as was never effective for psychosis. Will start lowering Effexor and change to Zoloft as Effexor not helpful. Around completion of ECT may restart Depakote given episode of anjelica last admission. Consider further reduction in Ko Klonopin to 0.25mg/d . Reviewed plan of care with daughter  9/1 Patient improved but paranoia creeping back. Will cont Latuda at 40mg  consider increasing back to 60mg. Considered resume VPA based on concern of risk for switching but may inhibit sx requiring higher stimulus which can lead to more confusion. Next rx 9/5 9/2: Patient with psychotic depression, improving, on lurasidone   9/3: Patient's psychosis resolving, mood improved   9/4: Mood and psychotic symptoms improving, some impaired executive function and reasoning   9/5 PAtient improved but anxious, fearful cog impairment due to ECT but less severe. Cont ECT but spread out will, lower Effexor and add Zoloft as Effexor trial failed. Cont Lurasidone  9/6 Patient improving but quite confused. Will hold next treatment til next Monday. Cont to cross over to Zoloft  9/7 Improving will cont with spread out ECT to reudce severity of cog se of ECT will increase Zoloft and d/c Effexor Will rechallenge with CPAP  9/8 Patient improving with ECT next ECT 9/11 cont to titrate Zoloft. COnt encourage use of CPAP  9/11 patient showing improved impulse control, tolerating ECT, increase Zoloft to 125mg daily, will check BMP wed 9/12 more organized today, pleasant on approach, needs redirection to avoid touching peers on the shoulder   9/13: overall improvement in behaviors, needs redirection at times.next ECT friday.   9/14: ECT tomorrow, overall improved   9/15: next ECT monday, overall behaviors in control, redirectable, will keep patient on CO 11-7 since she had ECT today  9/18 Will change ECT to wednesday to space out better consider not doing further ECT after than unless very clear cut symptoms.  Plan resume Delapkote after las ECT, titrate Zoloft 83 year-old , retired female, domiciled at [facility], [facility] of DM2, HLD, HTN, PPHx of late onset Bipolar disorder, 2 prior psych adm last in 2022 at [facility], who presented to ED with worsening paranoia, anxiety, FTT (weight loss of 20lb since march), psychosis commingled with cognitive impairment   8/14 better but now hard to stay if responding to Abilify or prns of haldol will increase Abilify to 17mg then 20mg/d while initiating ECT discussion  8/15 Not much improvement   except less paranoid which may be attributable to haldol prn. Will d/c Abilify and start  Latuda will discuss ECT option  8/16 COnt Latuda trial while entering into discussion with patient re ECT  8/17 Tolerating latuda well; paranoia remains  8/18 Paranoia continues though improved; increase latuda to 40mg po daily - to be administered with food  8/19: On encounter today pt was seen chatting with one of her peers. Pt denied any complaints and reported feeling well. Denies paranoid thoughts or feeling afraid someone was going to hurt her. Still needing frequent redirection from staff to use her walker.   8/20 Depressed anxious fearful disorganized. Cont Latuda trial will cont to discuss ECT with patient, family is supportive  8/21 Depressed psychotic partial response will cont with ECT patient amenable as long as a family member goes with her for fear she will go off unit and never return. Will get labs ask medicine to see  8/22 Depressed not responded well to meds will increase Latuda while prepping for ECT. Will clarify need for ASA given severe nosebleed hx will discuss nosebleed issue with ECT   8/23 Cont ECT w/u discussed with medicine will   cont ASA  8/24 Psychotic depression with limited response to  multiple medication trials. Calmer less floridly delusion but anhedonia in state of constant distress. Patient assents to ECT, daughter Marcelina who is her HCP will provide consent. Patient requests family accompany her to ECT  for support as well as likely afraid she will not come back to unit if taken off. Will d/c Protonix suspension and use Pepcid as the former cannot be give while patient NPO. will lower Latuda as want to begin to reduce meds concurrent with ECT  8/25 Patient tolerated ECT, cont ECT, try to reduce Klonopin when better. Cont Effexor and Latuda. Recheck Na level on Sunday 8/26 Tolerated first ECT amnestic for treatment itself sl calmer. COnt ECT will begin to reduce klonopin to reduce cog se, check BMP in AM  8/27 less dysphoric, still paranoid, no SI/HI. Labs reviewed and discussed with hospitalist. Repeat BMP, urine NA and osmolality to f/u.  8/28 Tolerating ECT, cont treatments spread out so next one  9/1. Eval for further decrease Klonopin. Medicine saw patient , reintroduced metformin at low dose and d/declan ASA based on risk benefit   8/29 Mood psychosis better, sleepy during day confused. Will hold ECT til 9/1 reduce Klonopin  also may reduce Latuda  8/30 Improved with ECT but cog worse Will cont to hold ECT til clearer cont to reduce BDZ  8/31 Patient much better with regard to mood and  psychosis but notably cognitive disrupted form ECT Will hold ECT tomorrow. Will lower Lurasidone as was never effective for psychosis. Will start lowering Effexor and change to Zoloft as Effexor not helpful. Around completion of ECT may restart Depakote given episode of anjelica last admission. Consider further reduction in Ko Klonopin to 0.25mg/d . Reviewed plan of care with daughter  9/1 Patient improved but paranoia creeping back. Will cont Latuda at 40mg  consider increasing back to 60mg. Considered resume VPA based on concern of risk for switching but may inhibit sx requiring higher stimulus which can lead to more confusion. Next rx 9/5 9/2: Patient with psychotic depression, improving, on lurasidone   9/3: Patient's psychosis resolving, mood improved   9/4: Mood and psychotic symptoms improving, some impaired executive function and reasoning   9/5 PAtient improved but anxious, fearful cog impairment due to ECT but less severe. Cont ECT but spread out will, lower Effexor and add Zoloft as Effexor trial failed. Cont Lurasidone  9/6 Patient improving but quite confused. Will hold next treatment til next Monday. Cont to cross over to Zoloft  9/7 Improving will cont with spread out ECT to reudce severity of cog se of ECT will increase Zoloft and d/c Effexor Will rechallenge with CPAP  9/8 Patient improving with ECT next ECT 9/11 cont to titrate Zoloft. COnt encourage use of CPAP  9/11 patient showing improved impulse control, tolerating ECT, increase Zoloft to 125mg daily, will check BMP wed 9/12 more organized today, pleasant on approach, needs redirection to avoid touching peers on the shoulder   9/13: overall improvement in behaviors, needs redirection at times.next ECT friday.   9/14: ECT tomorrow, overall improved   9/15: next ECT monday, overall behaviors in control, redirectable, will keep patient on CO 11-7 since she had ECT today  9/18 Will change ECT to wednesday to space out better consider not doing further ECT after than unless very clear cut symptoms.  Plan resume Delapkote after las ECT, conisider moving Klonopin to later of split to minimize daytime sedation

## 2023-09-18 NOTE — BH INPATIENT PSYCHIATRY PROGRESS NOTE - NSBHCHARTREVIEWVS_PSY_A_CORE FT
Vital Signs Last 24 Hrs  T(C): 36.9 (09-18-23 @ 06:02), Max: 36.9 (09-18-23 @ 06:02)  T(F): 98.4 (09-18-23 @ 06:02), Max: 98.4 (09-18-23 @ 06:02)  HR: 73 (09-18-23 @ 06:02) (73 - 73)  BP: 110/50 (09-18-23 @ 06:02) (110/50 - 110/50)  BP(mean): --  RR: --  SpO2: --

## 2023-09-18 NOTE — BH INPATIENT PSYCHIATRY PROGRESS NOTE - MSE UNSTRUCTURED FT
Patient dressed in regular clothes, cooperative recognized writer after his beeing away a week. No EPS. Mood somewhat perplexed, some anxiety,  not sad. Patient denies SI, denies fear of being taken away. Denies fear of being harmed physically on unit. Says she feels safe. No salmon. No FTD> Oriented to [id number], had no recall of every having received ECT. Limited insight

## 2023-09-18 NOTE — BH INPATIENT PSYCHIATRY PROGRESS NOTE - NSBHFUPINTERVALHXFT_PSY_A_CORE
Bipolar d/o, depressed with psychotic features, care discussed w/ tx team, VSS. Patient visible in the dayroom, pleasant on approach, social with peers. taking meds. Patient with good appetite. Behaviors in control, improving. Sleep fair still some daytime napping. Wore CPAP most of night

## 2023-09-18 NOTE — BH INPATIENT PSYCHIATRY PROGRESS NOTE - NSBHMETABOLIC_PSY_ALL_CORE_FT
BMI: BMI (kg/m2): 23 (06-07-23 @ 18:13)  HbA1c: A1C with Estimated Average Glucose Result: 5.7 % (06-01-23 @ 05:10)    Glucose: POCT Blood Glucose.: 110 mg/dL (09-15-23 @ 10:17)    BP: 110/50 (09-18-23 @ 06:02) (110/50 - 140/54)  Lipid Panel: Date/Time: 06-08-23 @ 08:30  Cholesterol, Serum: 119  Direct LDL: --  HDL Cholesterol, Serum: 47  Total Cholesterol/HDL Ration Measurement: --  Triglycerides, Serum: 56

## 2023-09-19 PROCEDURE — 99232 SBSQ HOSP IP/OBS MODERATE 35: CPT

## 2023-09-19 RX ORDER — TUBERCULIN PURIFIED PROTEIN DERIVATIVE 5 [IU]/.1ML
5 INJECTION, SOLUTION INTRADERMAL ONCE
Refills: 0 | Status: COMPLETED | OUTPATIENT
Start: 2023-09-19 | End: 2023-09-19

## 2023-09-19 RX ADMIN — LURASIDONE HYDROCHLORIDE 40 MILLIGRAM(S): 40 TABLET ORAL at 09:43

## 2023-09-19 RX ADMIN — SERTRALINE 150 MILLIGRAM(S): 25 TABLET, FILM COATED ORAL at 09:42

## 2023-09-19 RX ADMIN — SENNA PLUS 2 TABLET(S): 8.6 TABLET ORAL at 09:42

## 2023-09-19 RX ADMIN — Medication 3 MILLIGRAM(S): at 21:31

## 2023-09-19 RX ADMIN — ATORVASTATIN CALCIUM 10 MILLIGRAM(S): 80 TABLET, FILM COATED ORAL at 21:31

## 2023-09-19 RX ADMIN — Medication 0.25 MILLIGRAM(S): at 09:42

## 2023-09-19 RX ADMIN — TUBERCULIN PURIFIED PROTEIN DERIVATIVE 5 UNIT(S): 5 INJECTION, SOLUTION INTRADERMAL at 09:16

## 2023-09-19 RX ADMIN — LACTULOSE 10 GRAM(S): 10 SOLUTION ORAL at 13:21

## 2023-09-19 RX ADMIN — Medication 2 SPRAY(S): at 09:43

## 2023-09-19 RX ADMIN — LOSARTAN POTASSIUM 75 MILLIGRAM(S): 100 TABLET, FILM COATED ORAL at 06:07

## 2023-09-19 RX ADMIN — METFORMIN HYDROCHLORIDE 250 MILLIGRAM(S): 850 TABLET ORAL at 21:32

## 2023-09-19 RX ADMIN — Medication 2 SPRAY(S): at 21:31

## 2023-09-19 RX ADMIN — FAMOTIDINE 20 MILLIGRAM(S): 10 INJECTION INTRAVENOUS at 06:07

## 2023-09-19 RX ADMIN — Medication 2 SPRAY(S): at 13:21

## 2023-09-19 RX ADMIN — AMLODIPINE BESYLATE 2.5 MILLIGRAM(S): 2.5 TABLET ORAL at 06:08

## 2023-09-19 RX ADMIN — METFORMIN HYDROCHLORIDE 250 MILLIGRAM(S): 850 TABLET ORAL at 10:32

## 2023-09-19 NOTE — BH DISCHARGE NOTE NURSING/SOCIAL WORK/PSYCH REHAB - DISCHARGE INSTRUCTIONS AFTERCARE APPOINTMENTS
In order to check the location, date, or time of your aftercare appointment, please refer to your Discharge Instructions Document given to you upon leaving the hospital.  If you have lost the instructions please call [phone]

## 2023-09-19 NOTE — BH INPATIENT PSYCHIATRY PROGRESS NOTE - MSE UNSTRUCTURED FT
Patient dressed in regular clothes. No EPS. Mood somewhat perplexed, some anxiety-less,  not sad. Patient denies SI, denies fear of being taken away. Denies fear of being harmed physically on unit. Says she feels safe. No salmon. No FTD. Oriented to self, had no recall of every having received ECT. Limited insight

## 2023-09-19 NOTE — BH INPATIENT PSYCHIATRY PROGRESS NOTE - NSBHCHARTREVIEWVS_PSY_A_CORE FT
Vital Signs Last 24 Hrs  T(C): 36.6 (09-19-23 @ 07:36), Max: 36.6 (09-19-23 @ 07:36)  T(F): 97.9 (09-19-23 @ 07:36), Max: 97.9 (09-19-23 @ 07:36)  HR: --  BP: --  BP(mean): --  RR: --  SpO2: --    Orthostatic VS  09-19-23 @ 07:36  Lying BP: --/-- HR: --  Sitting BP: 119/69 HR: 84  Standing BP: 113/58 HR: 80  Site: upper right arm  Mode: electronic

## 2023-09-19 NOTE — BH INPATIENT PSYCHIATRY PROGRESS NOTE - CURRENT MEDICATION
MEDICATIONS  (STANDING):  amLODIPine   Tablet 2.5 milliGRAM(s) Oral <User Schedule>  atorvastatin 10 milliGRAM(s) Oral at bedtime  clonazePAM Oral Disintegrating Tablet 0.25 milliGRAM(s) Oral daily  famotidine    Tablet 20 milliGRAM(s) Oral <User Schedule>  glucagon  Injectable 1 milliGRAM(s) IntraMuscular once  lactulose Syrup 10 Gram(s) Oral <User Schedule>  losartan 75 milliGRAM(s) Oral <User Schedule>  lurasidone 40 milliGRAM(s) Oral daily  melatonin. 3 milliGRAM(s) Oral at bedtime  metFORMIN 250 milliGRAM(s) Oral two times a day  senna 2 Tablet(s) Oral daily  sertraline 150 milliGRAM(s) Oral daily  sodium chloride 0.65% Nasal 2 Spray(s) Both Nostrils three times a day    MEDICATIONS  (PRN):  acetaminophen     Tablet .. 650 milliGRAM(s) Oral every 6 hours PRN Temp greater or equal to 38C (100.4F), Mild Pain (1 - 3), Moderate Pain (4 - 6)  bisacodyl 5 milliGRAM(s) Oral daily PRN constipation  bisacodyl Suppository 10 milliGRAM(s) Rectal daily PRN constipation  dextrose Oral Gel 15 Gram(s) Oral once PRN Blood Glucose LESS THAN 70 milliGRAM(s)/deciliter  haloperidol     Tablet 0.5 milliGRAM(s) Oral every 6 hours PRN agitation  haloperidol    Injectable 1 milliGRAM(s) IntraMuscular once PRN agitation  lidocaine   4% Patch 1 Patch Transdermal daily PRN LBP  simethicone 80 milliGRAM(s) Chew every 4 hours PRN abdominal discomfort

## 2023-09-19 NOTE — BH INPATIENT PSYCHIATRY PROGRESS NOTE - NSBHASSESSSUMMFT_PSY_ALL_CORE
83 year-old , retired female, domiciled at [facility], [facility] of DM2, HLD, HTN, PPHx of late onset Bipolar disorder, 2 prior psych adm last in 2022 at [facility], who presented to ED with worsening paranoia, anxiety, FTT (weight loss of 20lb since march), psychosis commingled with cognitive impairment   8/14 better but now hard to stay if responding to Abilify or prns of haldol will increase Abilify to 17mg then 20mg/d while initiating ECT discussion  8/15 Not much improvement   except less paranoid which may be attributable to haldol prn. Will d/c Abilify and start  Latuda will discuss ECT option  8/16 COnt Latuda trial while entering into discussion with patient re ECT  8/17 Tolerating latuda well; paranoia remains  8/18 Paranoia continues though improved; increase latuda to 40mg po daily - to be administered with food  8/19: On encounter today pt was seen chatting with one of her peers. Pt denied any complaints and reported feeling well. Denies paranoid thoughts or feeling afraid someone was going to hurt her. Still needing frequent redirection from staff to use her walker.   8/20 Depressed anxious fearful disorganized. Cont Latuda trial will cont to discuss ECT with patient, family is supportive  8/21 Depressed psychotic partial response will cont with ECT patient amenable as long as a family member goes with her for fear she will go off unit and never return. Will get labs ask medicine to see  8/22 Depressed not responded well to meds will increase Latuda while prepping for ECT. Will clarify need for ASA given severe nosebleed hx will discuss nosebleed issue with ECT   8/23 Cont ECT w/u discussed with medicine will   cont ASA  8/24 Psychotic depression with limited response to  multiple medication trials. Calmer less floridly delusion but anhedonia in state of constant distress. Patient assents to ECT, daughter Marcelina who is her HCP will provide consent. Patient requests family accompany her to ECT  for support as well as likely afraid she will not come back to unit if taken off. Will d/c Protonix suspension and use Pepcid as the former cannot be give while patient NPO. will lower Latuda as want to begin to reduce meds concurrent with ECT  8/25 Patient tolerated ECT, cont ECT, try to reduce Klonopin when better. Cont Effexor and Latuda. Recheck Na level on Sunday 8/26 Tolerated first ECT amnestic for treatment itself sl calmer. COnt ECT will begin to reduce klonopin to reduce cog se, check BMP in AM  8/27 less dysphoric, still paranoid, no SI/HI. Labs reviewed and discussed with hospitalist. Repeat BMP, urine NA and osmolality to f/u.  8/28 Tolerating ECT, cont treatments spread out so next one  9/1. Eval for further decrease Klonopin. Medicine saw patient , reintroduced metformin at low dose and d/declan ASA based on risk benefit   8/29 Mood psychosis better, sleepy during day confused. Will hold ECT til 9/1 reduce Klonopin  also may reduce Latuda  8/30 Improved with ECT but cog worse Will cont to hold ECT til clearer cont to reduce BDZ  8/31 Patient much better with regard to mood and  psychosis but notably cognitive disrupted form ECT Will hold ECT tomorrow. Will lower Lurasidone as was never effective for psychosis. Will start lowering Effexor and change to Zoloft as Effexor not helpful. Around completion of ECT may restart Depakote given episode of anjelica last admission. Consider further reduction in Ko Klonopin to 0.25mg/d . Reviewed plan of care with daughter  9/1 Patient improved but paranoia creeping back. Will cont Latuda at 40mg  consider increasing back to 60mg. Considered resume VPA based on concern of risk for switching but may inhibit sx requiring higher stimulus which can lead to more confusion. Next rx 9/5 9/2: Patient with psychotic depression, improving, on lurasidone   9/3: Patient's psychosis resolving, mood improved   9/4: Mood and psychotic symptoms improving, some impaired executive function and reasoning   9/5 PAtient improved but anxious, fearful cog impairment due to ECT but less severe. Cont ECT but spread out will, lower Effexor and add Zoloft as Effexor trial failed. Cont Lurasidone  9/6 Patient improving but quite confused. Will hold next treatment til next Monday. Cont to cross over to Zoloft  9/7 Improving will cont with spread out ECT to reudce severity of cog se of ECT will increase Zoloft and d/c Effexor Will rechallenge with CPAP  9/8 Patient improving with ECT next ECT 9/11 cont to titrate Zoloft. COnt encourage use of CPAP  9/11 patient showing improved impulse control, tolerating ECT, increase Zoloft to 125mg daily, will check BMP wed 9/12 more organized today, pleasant on approach, needs redirection to avoid touching peers on the shoulder   9/13: overall improvement in behaviors, needs redirection at times.next ECT friday.   9/14: ECT tomorrow, overall improved   9/15: next ECT monday, overall behaviors in control, redirectable, will keep patient on CO 11-7 since she had ECT today  9/18 Will change ECT to wednesday to space out better consider not doing further ECT after than unless very clear cut symptoms.  Plan resume Delapkote after las ECT, conisider moving Klonopin to later of split to minimize daytime sedation  9/19 Improving will give ECT in AM , peggy last ECT will startr Depakote after ect done

## 2023-09-19 NOTE — BH DISCHARGE NOTE NURSING/SOCIAL WORK/PSYCH REHAB - NSDCPRRECOMMEND_PSY_ALL_CORE
Patient is scheduled to be discharged today and will return to the assisted living facility. Writer encouraged patient to maintain medication compliance, to utilize her coping skills and to participate in structured activities.

## 2023-09-19 NOTE — BH DISCHARGE NOTE NURSING/SOCIAL WORK/PSYCH REHAB - PATIENT PORTAL LINK FT
You can access the FollowMyHealth Patient Portal offered by [facility] by registering at the following website: [email or web address]. By joining [facility]’s FollowMyHealth portal, you will also be able to view your health information using other applications (apps) compatible with our system.

## 2023-09-19 NOTE — BH DISCHARGE NOTE NURSING/SOCIAL WORK/PSYCH REHAB - NSCDUDCCRISIS_PSY_A_CORE
.[facility] [phone] ([phone]) Website: www.[facility].org/.  [facility]  [phone]/.  [facility] / [facility]  [phone] ([phone])/.  [facility]  [address] [place] [id number] [phone]   Hours:  Monday through Friday from 9 AM to 3 PM

## 2023-09-19 NOTE — BH DISCHARGE NOTE NURSING/SOCIAL WORK/PSYCH REHAB - NSDCPRGOAL_PSY_ALL_CORE
Patient initially presented with symptoms of paranoia, agitation, anxiety and decreased daily functioning. Patient's initial goals included increasing her coping skills, increasing her frustration tolerance, decreasing anxiety and increasing daily functioning. Patient made gains towards her rehab goals as evidenced by patient's brighter affect, increased daily functioning, utilization of coping skills and decreased anxiety. Patient also demonstrated daily medication compliance and was cooperative with her ADL's. Patient with moderate encouragement participated in some of the rehab leisure activities.     ***Patient did not complete a safety plan.

## 2023-09-19 NOTE — BH DISCHARGE NOTE NURSING/SOCIAL WORK/PSYCH REHAB - NSBHADVANCE_PSY_ALL_CORE
The pt's daughter, Marcelina Sue, is the pt's primary HCA and daughter, Brit Gonzáles, is the pt's secondary HCA. The pt has a MOLST with DNR/DNI order in effect./Yes

## 2023-09-20 LAB — GLUCOSE BLDC GLUCOMTR-MCNC: 107 MG/DL — HIGH (ref 70–99)

## 2023-09-20 PROCEDURE — 99232 SBSQ HOSP IP/OBS MODERATE 35: CPT | Mod: 25

## 2023-09-20 PROCEDURE — 90870 ELECTROCONVULSIVE THERAPY: CPT

## 2023-09-20 RX ORDER — ATORVASTATIN CALCIUM 80 MG/1
1 TABLET, FILM COATED ORAL
Qty: 30 | Refills: 0
Start: 2023-09-20 | End: 2023-10-19

## 2023-09-20 RX ORDER — CLONAZEPAM 1 MG
1 TABLET ORAL
Qty: 30 | Refills: 0
Start: 2023-09-20 | End: 2023-10-19

## 2023-09-20 RX ORDER — FLUMAZENIL 0.1 MG/ML
0.2 VIAL (ML) INTRAVENOUS ONCE
Refills: 0 | Status: COMPLETED | OUTPATIENT
Start: 2023-09-20 | End: 2023-09-20

## 2023-09-20 RX ORDER — ASPIRIN/CALCIUM CARB/MAGNESIUM 324 MG
1 TABLET ORAL
Refills: 0 | DISCHARGE

## 2023-09-20 RX ORDER — DIVALPROEX SODIUM 500 MG/1
250 TABLET, DELAYED RELEASE ORAL
Refills: 0 | Status: DISCONTINUED | OUTPATIENT
Start: 2023-09-20 | End: 2023-09-25

## 2023-09-20 RX ORDER — PREGABALIN 225 MG/1
1 CAPSULE ORAL
Refills: 0 | DISCHARGE

## 2023-09-20 RX ORDER — DOCUSATE SODIUM 100 MG
1 CAPSULE ORAL
Refills: 0 | DISCHARGE

## 2023-09-20 RX ORDER — LOSARTAN POTASSIUM 100 MG/1
1 TABLET, FILM COATED ORAL
Refills: 0 | DISCHARGE

## 2023-09-20 RX ORDER — DIVALPROEX SODIUM 500 MG/1
3 TABLET, DELAYED RELEASE ORAL
Qty: 180 | Refills: 0
Start: 2023-09-20 | End: 2023-10-19

## 2023-09-20 RX ORDER — AMLODIPINE BESYLATE 2.5 MG/1
1 TABLET ORAL
Refills: 0 | DISCHARGE

## 2023-09-20 RX ORDER — SODIUM CHLORIDE 0.65 %
1 AEROSOL, SPRAY (ML) NASAL
Qty: 15 | Refills: 0
Start: 2023-09-20 | End: 2023-10-19

## 2023-09-20 RX ORDER — LOSARTAN POTASSIUM 100 MG/1
3 TABLET, FILM COATED ORAL
Qty: 90 | Refills: 0
Start: 2023-09-20 | End: 2023-10-19

## 2023-09-20 RX ORDER — CLONAZEPAM 1 MG
1 TABLET ORAL
Refills: 0 | DISCHARGE

## 2023-09-20 RX ORDER — FOLIC ACID 0.8 MG
1 TABLET ORAL
Refills: 0 | DISCHARGE

## 2023-09-20 RX ORDER — SERTRALINE 25 MG/1
3 TABLET, FILM COATED ORAL
Qty: 90 | Refills: 0
Start: 2023-09-20 | End: 2023-10-19

## 2023-09-20 RX ORDER — OMEPRAZOLE 10 MG/1
1 CAPSULE, DELAYED RELEASE ORAL
Refills: 0 | DISCHARGE

## 2023-09-20 RX ORDER — METFORMIN HYDROCHLORIDE 850 MG/1
1 TABLET ORAL
Refills: 0 | DISCHARGE

## 2023-09-20 RX ORDER — LACTULOSE 10 G/15ML
15 SOLUTION ORAL
Qty: 450 | Refills: 0
Start: 2023-09-20 | End: 2023-10-19

## 2023-09-20 RX ORDER — AMLODIPINE BESYLATE 2.5 MG/1
1 TABLET ORAL
Qty: 30 | Refills: 0
Start: 2023-09-20 | End: 2023-10-19

## 2023-09-20 RX ORDER — LACTOBACILLUS ACIDOPHILUS 100MM CELL
1 CAPSULE ORAL
Refills: 0 | DISCHARGE

## 2023-09-20 RX ORDER — METFORMIN HYDROCHLORIDE 850 MG/1
0.5 TABLET ORAL
Qty: 30 | Refills: 0
Start: 2023-09-20 | End: 2023-10-19

## 2023-09-20 RX ORDER — FAMOTIDINE 10 MG/ML
1 INJECTION INTRAVENOUS
Qty: 30 | Refills: 0
Start: 2023-09-20 | End: 2023-10-19

## 2023-09-20 RX ORDER — SENNA PLUS 8.6 MG/1
2 TABLET ORAL
Qty: 60 | Refills: 0
Start: 2023-09-20 | End: 2023-10-19

## 2023-09-20 RX ORDER — SITAGLIPTIN 50 MG/1
1 TABLET, FILM COATED ORAL
Refills: 0 | DISCHARGE

## 2023-09-20 RX ORDER — LANOLIN ALCOHOL/MO/W.PET/CERES
1 CREAM (GRAM) TOPICAL
Qty: 30 | Refills: 0
Start: 2023-09-20 | End: 2023-10-19

## 2023-09-20 RX ORDER — LURASIDONE HYDROCHLORIDE 40 MG/1
1 TABLET ORAL
Qty: 30 | Refills: 0
Start: 2023-09-20 | End: 2023-10-19

## 2023-09-20 RX ORDER — LANOLIN ALCOHOL/MO/W.PET/CERES
1 CREAM (GRAM) TOPICAL
Refills: 0 | DISCHARGE

## 2023-09-20 RX ADMIN — LOSARTAN POTASSIUM 75 MILLIGRAM(S): 100 TABLET, FILM COATED ORAL at 06:44

## 2023-09-20 RX ADMIN — Medication 0.25 MILLIGRAM(S): at 08:37

## 2023-09-20 RX ADMIN — FAMOTIDINE 20 MILLIGRAM(S): 10 INJECTION INTRAVENOUS at 06:45

## 2023-09-20 RX ADMIN — LURASIDONE HYDROCHLORIDE 40 MILLIGRAM(S): 40 TABLET ORAL at 08:37

## 2023-09-20 RX ADMIN — SERTRALINE 150 MILLIGRAM(S): 25 TABLET, FILM COATED ORAL at 08:37

## 2023-09-20 RX ADMIN — ATORVASTATIN CALCIUM 10 MILLIGRAM(S): 80 TABLET, FILM COATED ORAL at 21:35

## 2023-09-20 RX ADMIN — AMLODIPINE BESYLATE 2.5 MILLIGRAM(S): 2.5 TABLET ORAL at 06:45

## 2023-09-20 RX ADMIN — Medication 3 MILLIGRAM(S): at 21:34

## 2023-09-20 RX ADMIN — DIVALPROEX SODIUM 250 MILLIGRAM(S): 500 TABLET, DELAYED RELEASE ORAL at 21:34

## 2023-09-20 RX ADMIN — Medication 0.2 MILLIGRAM(S): at 11:56

## 2023-09-20 RX ADMIN — Medication 2 SPRAY(S): at 21:36

## 2023-09-20 RX ADMIN — Medication 2 SPRAY(S): at 08:37

## 2023-09-20 RX ADMIN — METFORMIN HYDROCHLORIDE 250 MILLIGRAM(S): 850 TABLET ORAL at 21:34

## 2023-09-20 NOTE — BH INPATIENT PSYCHIATRY PROGRESS NOTE - NSBHFUPINTERVALHXFT_PSY_A_CORE
Bipolar d/o, depressed with psychotic features, care discussed w/ tx team, VSS. Patient visible in the dayroom, pleasant on approach, social with peers. taking meds. Patient with good appetite. Behaviors in control, improving. Sleep fair still some daytime napping in morning. Wore CPAP most of night

## 2023-09-20 NOTE — BH INPATIENT PSYCHIATRY PROGRESS NOTE - NSBHMETABOLIC_PSY_ALL_CORE_FT
BMI: BMI (kg/m2): 23 (06-07-23 @ 18:13)  HbA1c: A1C with Estimated Average Glucose Result: 5.7 % (06-01-23 @ 05:10)    Glucose: POCT Blood Glucose.: 107 mg/dL (09-20-23 @ 11:42)    BP: 167/62 (09-20-23 @ 12:40) (110/50 - 175/58)  Lipid Panel: Date/Time: 06-08-23 @ 08:30  Cholesterol, Serum: 119  Direct LDL: --  HDL Cholesterol, Serum: 47  Total Cholesterol/HDL Ration Measurement: --  Triglycerides, Serum: 56

## 2023-09-20 NOTE — BH INPATIENT PSYCHIATRY PROGRESS NOTE - NSBHASSESSSUMMFT_PSY_ALL_CORE
83 year-old , retired female, domiciled at [facility], [facility] of DM2, HLD, HTN, PPHx of late onset Bipolar disorder, 2 prior psych adm last in 2022 at [facility], who presented to ED with worsening paranoia, anxiety, FTT (weight loss of 20lb since march), psychosis commingled with cognitive impairment   8/14 better but now hard to stay if responding to Abilify or prns of haldol will increase Abilify to 17mg then 20mg/d while initiating ECT discussion  8/15 Not much improvement   except less paranoid which may be attributable to haldol prn. Will d/c Abilify and start  Latuda will discuss ECT option  8/16 COnt Latuda trial while entering into discussion with patient re ECT  8/17 Tolerating latuda well; paranoia remains  8/18 Paranoia continues though improved; increase latuda to 40mg po daily - to be administered with food  8/19: On encounter today pt was seen chatting with one of her peers. Pt denied any complaints and reported feeling well. Denies paranoid thoughts or feeling afraid someone was going to hurt her. Still needing frequent redirection from staff to use her walker.   8/20 Depressed anxious fearful disorganized. Cont Latuda trial will cont to discuss ECT with patient, family is supportive  8/21 Depressed psychotic partial response will cont with ECT patient amenable as long as a family member goes with her for fear she will go off unit and never return. Will get labs ask medicine to see  8/22 Depressed not responded well to meds will increase Latuda while prepping for ECT. Will clarify need for ASA given severe nosebleed hx will discuss nosebleed issue with ECT   8/23 Cont ECT w/u discussed with medicine will   cont ASA  8/24 Psychotic depression with limited response to  multiple medication trials. Calmer less floridly delusion but anhedonia in state of constant distress. Patient assents to ECT, daughter Marcelina who is her HCP will provide consent. Patient requests family accompany her to ECT  for support as well as likely afraid she will not come back to unit if taken off. Will d/c Protonix suspension and use Pepcid as the former cannot be give while patient NPO. will lower Latuda as want to begin to reduce meds concurrent with ECT  8/25 Patient tolerated ECT, cont ECT, try to reduce Klonopin when better. Cont Effexor and Latuda. Recheck Na level on Sunday 8/26 Tolerated first ECT amnestic for treatment itself sl calmer. COnt ECT will begin to reduce klonopin to reduce cog se, check BMP in AM  8/27 less dysphoric, still paranoid, no SI/HI. Labs reviewed and discussed with hospitalist. Repeat BMP, urine NA and osmolality to f/u.  8/28 Tolerating ECT, cont treatments spread out so next one  9/1. Eval for further decrease Klonopin. Medicine saw patient , reintroduced metformin at low dose and d/declan ASA based on risk benefit   8/29 Mood psychosis better, sleepy during day confused. Will hold ECT til 9/1 reduce Klonopin  also may reduce Latuda  8/30 Improved with ECT but cog worse Will cont to hold ECT til clearer cont to reduce BDZ  8/31 Patient much better with regard to mood and  psychosis but notably cognitive disrupted form ECT Will hold ECT tomorrow. Will lower Lurasidone as was never effective for psychosis. Will start lowering Effexor and change to Zoloft as Effexor not helpful. Around completion of ECT may restart Depakote given episode of anjelica last admission. Consider further reduction in Ko Klonopin to 0.25mg/d . Reviewed plan of care with daughter  9/1 Patient improved but paranoia creeping back. Will cont Latuda at 40mg  consider increasing back to 60mg. Considered resume VPA based on concern of risk for switching but may inhibit sx requiring higher stimulus which can lead to more confusion. Next rx 9/5 9/2: Patient with psychotic depression, improving, on lurasidone   9/3: Patient's psychosis resolving, mood improved   9/4: Mood and psychotic symptoms improving, some impaired executive function and reasoning   9/5 PAtient improved but anxious, fearful cog impairment due to ECT but less severe. Cont ECT but spread out will, lower Effexor and add Zoloft as Effexor trial failed. Cont Lurasidone  9/6 Patient improving but quite confused. Will hold next treatment til next Monday. Cont to cross over to Zoloft  9/7 Improving will cont with spread out ECT to reudce severity of cog se of ECT will increase Zoloft and d/c Effexor Will rechallenge with CPAP  9/8 Patient improving with ECT next ECT 9/11 cont to titrate Zoloft. COnt encourage use of CPAP  9/11 patient showing improved impulse control, tolerating ECT, increase Zoloft to 125mg daily, will check BMP wed 9/12 more organized today, pleasant on approach, needs redirection to avoid touching peers on the shoulder   9/13: overall improvement in behaviors, needs redirection at times.next ECT friday.   9/14: ECT tomorrow, overall improved   9/15: next ECT monday, overall behaviors in control, redirectable, will keep patient on CO 11-7 since she had ECT today  9/18 Will change ECT to wednesday to space out better consider not doing further ECT after than unless very clear cut symptoms.  Plan resume Delapkote after las ECT, conisider moving Klonopin to later of split to minimize daytime sedation  9/19 Improving will give ECT in AM peggy last ECT will startr Depakote after ect done  9/20 Improving likely last ECt will start Depakote back up cont other meds

## 2023-09-20 NOTE — BH INPATIENT PSYCHIATRY PROGRESS NOTE - CURRENT MEDICATION
MEDICATIONS  (STANDING):  amLODIPine   Tablet 2.5 milliGRAM(s) Oral <User Schedule>  atorvastatin 10 milliGRAM(s) Oral at bedtime  clonazePAM Oral Disintegrating Tablet 0.25 milliGRAM(s) Oral daily  divalproex Sprinkle 250 milliGRAM(s) Oral two times a day  famotidine    Tablet 20 milliGRAM(s) Oral <User Schedule>  glucagon  Injectable 1 milliGRAM(s) IntraMuscular once  lactulose Syrup 10 Gram(s) Oral <User Schedule>  losartan 75 milliGRAM(s) Oral <User Schedule>  lurasidone 40 milliGRAM(s) Oral daily  melatonin. 3 milliGRAM(s) Oral at bedtime  metFORMIN 250 milliGRAM(s) Oral two times a day  senna 2 Tablet(s) Oral daily  sertraline 150 milliGRAM(s) Oral daily  sodium chloride 0.65% Nasal 2 Spray(s) Both Nostrils three times a day    MEDICATIONS  (PRN):  acetaminophen     Tablet .. 650 milliGRAM(s) Oral every 6 hours PRN Temp greater or equal to 38C (100.4F), Mild Pain (1 - 3), Moderate Pain (4 - 6)  bisacodyl 5 milliGRAM(s) Oral daily PRN constipation  bisacodyl Suppository 10 milliGRAM(s) Rectal daily PRN constipation  dextrose Oral Gel 15 Gram(s) Oral once PRN Blood Glucose LESS THAN 70 milliGRAM(s)/deciliter  haloperidol     Tablet 0.5 milliGRAM(s) Oral every 6 hours PRN agitation  haloperidol    Injectable 1 milliGRAM(s) IntraMuscular once PRN agitation  lidocaine   4% Patch 1 Patch Transdermal daily PRN LBP  simethicone 80 milliGRAM(s) Chew every 4 hours PRN abdominal discomfort

## 2023-09-20 NOTE — BH INPATIENT PSYCHIATRY PROGRESS NOTE - NSBHCHARTREVIEWVS_PSY_A_CORE FT
Vital Signs Last 24 Hrs  T(C): 36.4 (09-20-23 @ 12:40), Max: 36.6 (09-20-23 @ 06:42)  T(F): 97.5 (09-20-23 @ 12:40), Max: 97.8 (09-20-23 @ 06:42)  HR: 82 (09-20-23 @ 12:40) (70 - 84)  BP: 167/62 (09-20-23 @ 12:40) (139/51 - 175/58)  BP(mean): --  RR: 14 (09-20-23 @ 12:40) (12 - 20)  SpO2: 98% (09-20-23 @ 12:40) (96% - 100%)    Orthostatic VS  09-20-23 @ 06:42  Lying BP: --/-- HR: --  Sitting BP: 128/65 HR: 88  Standing BP: 141/70 HR: 97  Site: --  Mode: --  Orthostatic VS  09-19-23 @ 07:36  Lying BP: --/-- HR: --  Sitting BP: 119/69 HR: 84  Standing BP: 113/58 HR: 80  Site: upper right arm  Mode: electronic

## 2023-09-21 PROCEDURE — 99232 SBSQ HOSP IP/OBS MODERATE 35: CPT

## 2023-09-21 RX ADMIN — METFORMIN HYDROCHLORIDE 250 MILLIGRAM(S): 850 TABLET ORAL at 21:21

## 2023-09-21 RX ADMIN — LOSARTAN POTASSIUM 75 MILLIGRAM(S): 100 TABLET, FILM COATED ORAL at 06:29

## 2023-09-21 RX ADMIN — SERTRALINE 150 MILLIGRAM(S): 25 TABLET, FILM COATED ORAL at 09:41

## 2023-09-21 RX ADMIN — LACTULOSE 10 GRAM(S): 10 SOLUTION ORAL at 12:30

## 2023-09-21 RX ADMIN — Medication 0.25 MILLIGRAM(S): at 09:41

## 2023-09-21 RX ADMIN — METFORMIN HYDROCHLORIDE 250 MILLIGRAM(S): 850 TABLET ORAL at 09:42

## 2023-09-21 RX ADMIN — AMLODIPINE BESYLATE 2.5 MILLIGRAM(S): 2.5 TABLET ORAL at 06:30

## 2023-09-21 RX ADMIN — Medication 3 MILLIGRAM(S): at 21:21

## 2023-09-21 RX ADMIN — ATORVASTATIN CALCIUM 10 MILLIGRAM(S): 80 TABLET, FILM COATED ORAL at 21:20

## 2023-09-21 RX ADMIN — TUBERCULIN PURIFIED PROTEIN DERIVATIVE 5 UNIT(S): 5 INJECTION, SOLUTION INTRADERMAL at 15:34

## 2023-09-21 RX ADMIN — SENNA PLUS 2 TABLET(S): 8.6 TABLET ORAL at 09:41

## 2023-09-21 RX ADMIN — FAMOTIDINE 20 MILLIGRAM(S): 10 INJECTION INTRAVENOUS at 06:30

## 2023-09-21 RX ADMIN — Medication 2 SPRAY(S): at 12:30

## 2023-09-21 RX ADMIN — DIVALPROEX SODIUM 250 MILLIGRAM(S): 500 TABLET, DELAYED RELEASE ORAL at 09:41

## 2023-09-21 RX ADMIN — LURASIDONE HYDROCHLORIDE 40 MILLIGRAM(S): 40 TABLET ORAL at 09:41

## 2023-09-21 RX ADMIN — Medication 2 SPRAY(S): at 09:42

## 2023-09-21 RX ADMIN — DIVALPROEX SODIUM 250 MILLIGRAM(S): 500 TABLET, DELAYED RELEASE ORAL at 21:20

## 2023-09-21 NOTE — BH INPATIENT PSYCHIATRY PROGRESS NOTE - MSE UNSTRUCTURED FT
Patient dressed in regular clothes.[place] does well with amplifier. Speech normal No EPS. Mood somewhat perplexed, some anxiety-less,  not sad. Patient denies SI, denies fear of being taken away. Denies fear of being harmed physically on unit. Says she feels safe. No salmon. No FTD. Oriented to self, had no recall of every having received ECT. Limited insight

## 2023-09-21 NOTE — BH INPATIENT PSYCHIATRY PROGRESS NOTE - NSBHMETABOLIC_PSY_ALL_CORE_FT
BMI: BMI (kg/m2): 23 (06-07-23 @ 18:13)  HbA1c: A1C with Estimated Average Glucose Result: 5.7 % (06-01-23 @ 05:10)    Glucose: POCT Blood Glucose.: 107 mg/dL (09-20-23 @ 11:42)    BP: 130/58 (09-21-23 @ 06:53) (130/58 - 175/58)  Lipid Panel: Date/Time: 06-08-23 @ 08:30  Cholesterol, Serum: 119  Direct LDL: --  HDL Cholesterol, Serum: 47  Total Cholesterol/HDL Ration Measurement: --  Triglycerides, Serum: 56

## 2023-09-21 NOTE — BH INPATIENT PSYCHIATRY PROGRESS NOTE - NSBHASSESSSUMMFT_PSY_ALL_CORE
83 year-old , retired female, domiciled at [facility], [facility] of DM2, HLD, HTN, PPHx of late onset Bipolar disorder, 2 prior psych adm last in 2022 at [facility], who presented to ED with worsening paranoia, anxiety, FTT (weight loss of 20lb since march), psychosis commingled with cognitive impairment   8/14 better but now hard to stay if responding to Abilify or prns of haldol will increase Abilify to 17mg then 20mg/d while initiating ECT discussion  8/15 Not much improvement   except less paranoid which may be attributable to haldol prn. Will d/c Abilify and start  Latuda will discuss ECT option  8/16 COnt Latuda trial while entering into discussion with patient re ECT  8/17 Tolerating latuda well; paranoia remains  8/18 Paranoia continues though improved; increase latuda to 40mg po daily - to be administered with food  8/19: On encounter today pt was seen chatting with one of her peers. Pt denied any complaints and reported feeling well. Denies paranoid thoughts or feeling afraid someone was going to hurt her. Still needing frequent redirection from staff to use her walker.   8/20 Depressed anxious fearful disorganized. Cont Latuda trial will cont to discuss ECT with patient, family is supportive  8/21 Depressed psychotic partial response will cont with ECT patient amenable as long as a family member goes with her for fear she will go off unit and never return. Will get labs ask medicine to see  8/22 Depressed not responded well to meds will increase Latuda while prepping for ECT. Will clarify need for ASA given severe nosebleed hx will discuss nosebleed issue with ECT   8/23 Cont ECT w/u discussed with medicine will   cont ASA  8/24 Psychotic depression with limited response to  multiple medication trials. Calmer less floridly delusion but anhedonia in state of constant distress. Patient assents to ECT, daughter Marcelina who is her HCP will provide consent. Patient requests family accompany her to ECT  for support as well as likely afraid she will not come back to unit if taken off. Will d/c Protonix suspension and use Pepcid as the former cannot be give while patient NPO. will lower Latuda as want to begin to reduce meds concurrent with ECT  8/25 Patient tolerated ECT, cont ECT, try to reduce Klonopin when better. Cont Effexor and Latuda. Recheck Na level on Sunday 8/26 Tolerated first ECT amnestic for treatment itself sl calmer. COnt ECT will begin to reduce klonopin to reduce cog se, check BMP in AM  8/27 less dysphoric, still paranoid, no SI/HI. Labs reviewed and discussed with hospitalist. Repeat BMP, urine NA and osmolality to f/u.  8/28 Tolerating ECT, cont treatments spread out so next one  9/1. Eval for further decrease Klonopin. Medicine saw patient , reintroduced metformin at low dose and d/declan ASA based on risk benefit   8/29 Mood psychosis better, sleepy during day confused. Will hold ECT til 9/1 reduce Klonopin  also may reduce Latuda  8/30 Improved with ECT but cog worse Will cont to hold ECT til clearer cont to reduce BDZ  8/31 Patient much better with regard to mood and  psychosis but notably cognitive disrupted form ECT Will hold ECT tomorrow. Will lower Lurasidone as was never effective for psychosis. Will start lowering Effexor and change to Zoloft as Effexor not helpful. Around completion of ECT may restart Depakote given episode of anjelica last admission. Consider further reduction in Ko Klonopin to 0.25mg/d . Reviewed plan of care with daughter  9/1 Patient improved but paranoia creeping back. Will cont Latuda at 40mg  consider increasing back to 60mg. Considered resume VPA based on concern of risk for switching but may inhibit sx requiring higher stimulus which can lead to more confusion. Next rx 9/5 9/2: Patient with psychotic depression, improving, on lurasidone   9/3: Patient's psychosis resolving, mood improved   9/4: Mood and psychotic symptoms improving, some impaired executive function and reasoning   9/5 PAtient improved but anxious, fearful cog impairment due to ECT but less severe. Cont ECT but spread out will, lower Effexor and add Zoloft as Effexor trial failed. Cont Lurasidone  9/6 Patient improving but quite confused. Will hold next treatment til next Monday. Cont to cross over to Zoloft  9/7 Improving will cont with spread out ECT to reudce severity of cog se of ECT will increase Zoloft and d/c Effexor Will rechallenge with CPAP  9/8 Patient improving with ECT next ECT 9/11 cont to titrate Zoloft. COnt encourage use of CPAP  9/11 patient showing improved impulse control, tolerating ECT, increase Zoloft to 125mg daily, will check BMP wed 9/12 more organized today, pleasant on approach, needs redirection to avoid touching peers on the shoulder   9/13: overall improvement in behaviors, needs redirection at times.next ECT friday.   9/14: ECT tomorrow, overall improved   9/15: next ECT monday, overall behaviors in control, redirectable, will keep patient on CO 11-7 since she had ECT today  9/18 Will change ECT to wednesday to space out better consider not doing further ECT after than unless very clear cut symptoms.  Plan resume Delapkote after las ECT, conisider moving Klonopin to later of split to minimize daytime sedation  9/19 Improving will give ECT in AM peggy last ECT will startr Depakote after ect done  9/20 Improving likely last ECt will start Depakote back up cont other meds  9/21 Improved notably with course of ECT, will hold off on further rx, observe whetther any symptoms re appear as she clears from cog se of ECT

## 2023-09-21 NOTE — BH INPATIENT PSYCHIATRY PROGRESS NOTE - NSBHCHARTREVIEWVS_PSY_A_CORE FT
Vital Signs Last 24 Hrs  T(C): --  T(F): --  HR: --  BP: 130/58 (09-21-23 @ 06:53) (130/58 - 130/58)  BP(mean): --  RR: --  SpO2: --    Orthostatic VS  09-20-23 @ 06:42  Lying BP: --/-- HR: --  Sitting BP: 128/65 HR: 88  Standing BP: 141/70 HR: 97  Site: --  Mode: --

## 2023-09-22 PROCEDURE — 99232 SBSQ HOSP IP/OBS MODERATE 35: CPT

## 2023-09-22 RX ADMIN — AMLODIPINE BESYLATE 2.5 MILLIGRAM(S): 2.5 TABLET ORAL at 06:45

## 2023-09-22 RX ADMIN — METFORMIN HYDROCHLORIDE 250 MILLIGRAM(S): 850 TABLET ORAL at 20:27

## 2023-09-22 RX ADMIN — LURASIDONE HYDROCHLORIDE 40 MILLIGRAM(S): 40 TABLET ORAL at 17:30

## 2023-09-22 RX ADMIN — SENNA PLUS 2 TABLET(S): 8.6 TABLET ORAL at 17:30

## 2023-09-22 RX ADMIN — METFORMIN HYDROCHLORIDE 250 MILLIGRAM(S): 850 TABLET ORAL at 17:30

## 2023-09-22 RX ADMIN — DIVALPROEX SODIUM 250 MILLIGRAM(S): 500 TABLET, DELAYED RELEASE ORAL at 17:27

## 2023-09-22 RX ADMIN — ATORVASTATIN CALCIUM 10 MILLIGRAM(S): 80 TABLET, FILM COATED ORAL at 20:26

## 2023-09-22 RX ADMIN — Medication 0.25 MILLIGRAM(S): at 17:27

## 2023-09-22 RX ADMIN — DIVALPROEX SODIUM 250 MILLIGRAM(S): 500 TABLET, DELAYED RELEASE ORAL at 20:26

## 2023-09-22 RX ADMIN — Medication 2 SPRAY(S): at 21:28

## 2023-09-22 RX ADMIN — FAMOTIDINE 20 MILLIGRAM(S): 10 INJECTION INTRAVENOUS at 06:46

## 2023-09-22 RX ADMIN — LACTULOSE 10 GRAM(S): 10 SOLUTION ORAL at 17:28

## 2023-09-22 RX ADMIN — Medication 3 MILLIGRAM(S): at 20:27

## 2023-09-22 RX ADMIN — Medication 2 SPRAY(S): at 17:28

## 2023-09-22 RX ADMIN — LOSARTAN POTASSIUM 75 MILLIGRAM(S): 100 TABLET, FILM COATED ORAL at 06:46

## 2023-09-22 RX ADMIN — SERTRALINE 150 MILLIGRAM(S): 25 TABLET, FILM COATED ORAL at 17:30

## 2023-09-22 NOTE — BH INPATIENT PSYCHIATRY PROGRESS NOTE - NSBHFUPINTERVALHXFT_PSY_A_CORE
Bipolar d/o, depressed with psychotic features, care discussed w/ tx team, VSS. Patient visible in the dayroom, pleasant on approach, social with peers. taking meds. Patient with good appetite. Behaviors in control, improving. Sleep fair still some daytime napping in morning less today. Wore CPAP most of night

## 2023-09-22 NOTE — BH INPATIENT PSYCHIATRY PROGRESS NOTE - NSBHASSESSSUMMFT_PSY_ALL_CORE
83 year-old , retired female, domiciled at [facility], [facility] of DM2, HLD, HTN, PPHx of late onset Bipolar disorder, 2 prior psych adm last in 2022 at [facility], who presented to ED with worsening paranoia, anxiety, FTT (weight loss of 20lb since march), psychosis commingled with cognitive impairment   8/14 better but now hard to stay if responding to Abilify or prns of haldol will increase Abilify to 17mg then 20mg/d while initiating ECT discussion  8/15 Not much improvement   except less paranoid which may be attributable to haldol prn. Will d/c Abilify and start  Latuda will discuss ECT option  8/16 COnt Latuda trial while entering into discussion with patient re ECT  8/17 Tolerating latuda well; paranoia remains  8/18 Paranoia continues though improved; increase latuda to 40mg po daily - to be administered with food  8/19: On encounter today pt was seen chatting with one of her peers. Pt denied any complaints and reported feeling well. Denies paranoid thoughts or feeling afraid someone was going to hurt her. Still needing frequent redirection from staff to use her walker.   8/20 Depressed anxious fearful disorganized. Cont Latuda trial will cont to discuss ECT with patient, family is supportive  8/21 Depressed psychotic partial response will cont with ECT patient amenable as long as a family member goes with her for fear she will go off unit and never return. Will get labs ask medicine to see  8/22 Depressed not responded well to meds will increase Latuda while prepping for ECT. Will clarify need for ASA given severe nosebleed hx will discuss nosebleed issue with ECT   8/23 Cont ECT w/u discussed with medicine will   cont ASA  8/24 Psychotic depression with limited response to  multiple medication trials. Calmer less floridly delusion but anhedonia in state of constant distress. Patient assents to ECT, daughter Marcelina who is her HCP will provide consent. Patient requests family accompany her to ECT  for support as well as likely afraid she will not come back to unit if taken off. Will d/c Protonix suspension and use Pepcid as the former cannot be give while patient NPO. will lower Latuda as want to begin to reduce meds concurrent with ECT  8/25 Patient tolerated ECT, cont ECT, try to reduce Klonopin when better. Cont Effexor and Latuda. Recheck Na level on Sunday 8/26 Tolerated first ECT amnestic for treatment itself sl calmer. COnt ECT will begin to reduce klonopin to reduce cog se, check BMP in AM  8/27 less dysphoric, still paranoid, no SI/HI. Labs reviewed and discussed with hospitalist. Repeat BMP, urine NA and osmolality to f/u.  8/28 Tolerating ECT, cont treatments spread out so next one  9/1. Eval for further decrease Klonopin. Medicine saw patient , reintroduced metformin at low dose and d/declan ASA based on risk benefit   8/29 Mood psychosis better, sleepy during day confused. Will hold ECT til 9/1 reduce Klonopin  also may reduce Latuda  8/30 Improved with ECT but cog worse Will cont to hold ECT til clearer cont to reduce BDZ  8/31 Patient much better with regard to mood and  psychosis but notably cognitive disrupted form ECT Will hold ECT tomorrow. Will lower Lurasidone as was never effective for psychosis. Will start lowering Effexor and change to Zoloft as Effexor not helpful. Around completion of ECT may restart Depakote given episode of anjelica last admission. Consider further reduction in Ko Klonopin to 0.25mg/d . Reviewed plan of care with daughter  9/1 Patient improved but paranoia creeping back. Will cont Latuda at 40mg  consider increasing back to 60mg. Considered resume VPA based on concern of risk for switching but may inhibit sx requiring higher stimulus which can lead to more confusion. Next rx 9/5 9/2: Patient with psychotic depression, improving, on lurasidone   9/3: Patient's psychosis resolving, mood improved   9/4: Mood and psychotic symptoms improving, some impaired executive function and reasoning   9/5 PAtient improved but anxious, fearful cog impairment due to ECT but less severe. Cont ECT but spread out will, lower Effexor and add Zoloft as Effexor trial failed. Cont Lurasidone  9/6 Patient improving but quite confused. Will hold next treatment til next Monday. Cont to cross over to Zoloft  9/7 Improving will cont with spread out ECT to reudce severity of cog se of ECT will increase Zoloft and d/c Effexor Will rechallenge with CPAP  9/8 Patient improving with ECT next ECT 9/11 cont to titrate Zoloft. COnt encourage use of CPAP  9/11 patient showing improved impulse control, tolerating ECT, increase Zoloft to 125mg daily, will check BMP wed 9/12 more organized today, pleasant on approach, needs redirection to avoid touching peers on the shoulder   9/13: overall improvement in behaviors, needs redirection at times.next ECT friday.   9/14: ECT tomorrow, overall improved   9/15: next ECT monday, overall behaviors in control, redirectable, will keep patient on CO 11-7 since she had ECT today  9/18 Will change ECT to wednesday to space out better consider not doing further ECT after than unless very clear cut symptoms.  Plan resume Delapkote after las ECT, conisider moving Klonopin to later of split to minimize daytime sedation  9/19 Improving will give ECT in AM peggy last ECT will startr Depakote after ect done  9/20 Improving likely last ECt will start Depakote back up cont other meds  9/21 Improved notably with course of ECT, will hold off on further rx, observe whetther any symptoms re appear as she clears from cog se of ECT  9/22 Mood and psychotic symptoms much improved. Will not give further ECT unless any symptoms return. Will see if cognition shows improvement with further time from ECT. Plan titrate Depakote

## 2023-09-22 NOTE — BH INPATIENT PSYCHIATRY PROGRESS NOTE - MSE UNSTRUCTURED FT
Patient dressed in regular clothes.[place] does well with amplifier. Speech normal No EPS. Mood somewhat perplexed, some anxiety-less,  not sad. Patient denies SI, denies fear of being taken away. Denies fear of being harmed physically on unit. Says she feels safe. No salmon. No FTD. Oriented to self, had no recall of every having received ECT. MMSE 16/30 Limited insight

## 2023-09-22 NOTE — BH INPATIENT PSYCHIATRY PROGRESS NOTE - NSBHCHARTREVIEWVS_PSY_A_CORE FT
Vital Signs Last 24 Hrs  T(C): 36.6 (09-22-23 @ 06:38), Max: 36.6 (09-22-23 @ 06:38)  T(F): 97.8 (09-22-23 @ 06:38), Max: 97.8 (09-22-23 @ 06:38)  HR: --  BP: --  BP(mean): --  RR: --  SpO2: --    Orthostatic VS  09-22-23 @ 06:38  Lying BP: --/-- HR: --  Sitting BP: 145/72 HR: 91  Standing BP: --/-- HR: --  Site: upper left arm  Mode: electronic

## 2023-09-23 RX ADMIN — ATORVASTATIN CALCIUM 10 MILLIGRAM(S): 80 TABLET, FILM COATED ORAL at 21:28

## 2023-09-23 RX ADMIN — LACTULOSE 10 GRAM(S): 10 SOLUTION ORAL at 14:05

## 2023-09-23 RX ADMIN — LOSARTAN POTASSIUM 75 MILLIGRAM(S): 100 TABLET, FILM COATED ORAL at 06:48

## 2023-09-23 RX ADMIN — METFORMIN HYDROCHLORIDE 250 MILLIGRAM(S): 850 TABLET ORAL at 21:28

## 2023-09-23 RX ADMIN — METFORMIN HYDROCHLORIDE 250 MILLIGRAM(S): 850 TABLET ORAL at 10:07

## 2023-09-23 RX ADMIN — LURASIDONE HYDROCHLORIDE 40 MILLIGRAM(S): 40 TABLET ORAL at 10:07

## 2023-09-23 RX ADMIN — Medication 0.25 MILLIGRAM(S): at 10:06

## 2023-09-23 RX ADMIN — DIVALPROEX SODIUM 250 MILLIGRAM(S): 500 TABLET, DELAYED RELEASE ORAL at 21:27

## 2023-09-23 RX ADMIN — Medication 2 SPRAY(S): at 10:07

## 2023-09-23 RX ADMIN — DIVALPROEX SODIUM 250 MILLIGRAM(S): 500 TABLET, DELAYED RELEASE ORAL at 10:05

## 2023-09-23 RX ADMIN — SENNA PLUS 2 TABLET(S): 8.6 TABLET ORAL at 10:06

## 2023-09-23 RX ADMIN — SERTRALINE 150 MILLIGRAM(S): 25 TABLET, FILM COATED ORAL at 10:05

## 2023-09-23 RX ADMIN — AMLODIPINE BESYLATE 2.5 MILLIGRAM(S): 2.5 TABLET ORAL at 06:48

## 2023-09-23 RX ADMIN — Medication 3 MILLIGRAM(S): at 21:28

## 2023-09-23 RX ADMIN — FAMOTIDINE 20 MILLIGRAM(S): 10 INJECTION INTRAVENOUS at 06:48

## 2023-09-24 RX ADMIN — SENNA PLUS 2 TABLET(S): 8.6 TABLET ORAL at 09:20

## 2023-09-24 RX ADMIN — METFORMIN HYDROCHLORIDE 250 MILLIGRAM(S): 850 TABLET ORAL at 21:23

## 2023-09-24 RX ADMIN — DIVALPROEX SODIUM 250 MILLIGRAM(S): 500 TABLET, DELAYED RELEASE ORAL at 09:20

## 2023-09-24 RX ADMIN — DIVALPROEX SODIUM 250 MILLIGRAM(S): 500 TABLET, DELAYED RELEASE ORAL at 21:23

## 2023-09-24 RX ADMIN — AMLODIPINE BESYLATE 2.5 MILLIGRAM(S): 2.5 TABLET ORAL at 06:15

## 2023-09-24 RX ADMIN — Medication 2 SPRAY(S): at 09:21

## 2023-09-24 RX ADMIN — FAMOTIDINE 20 MILLIGRAM(S): 10 INJECTION INTRAVENOUS at 06:15

## 2023-09-24 RX ADMIN — SERTRALINE 150 MILLIGRAM(S): 25 TABLET, FILM COATED ORAL at 09:20

## 2023-09-24 RX ADMIN — Medication 3 MILLIGRAM(S): at 21:23

## 2023-09-24 RX ADMIN — LURASIDONE HYDROCHLORIDE 40 MILLIGRAM(S): 40 TABLET ORAL at 09:21

## 2023-09-24 RX ADMIN — LACTULOSE 10 GRAM(S): 10 SOLUTION ORAL at 12:33

## 2023-09-24 RX ADMIN — Medication 2 SPRAY(S): at 12:33

## 2023-09-24 RX ADMIN — ATORVASTATIN CALCIUM 10 MILLIGRAM(S): 80 TABLET, FILM COATED ORAL at 21:23

## 2023-09-24 RX ADMIN — Medication 0.25 MILLIGRAM(S): at 09:20

## 2023-09-24 RX ADMIN — METFORMIN HYDROCHLORIDE 250 MILLIGRAM(S): 850 TABLET ORAL at 09:21

## 2023-09-24 RX ADMIN — Medication 2 SPRAY(S): at 21:33

## 2023-09-24 RX ADMIN — LOSARTAN POTASSIUM 75 MILLIGRAM(S): 100 TABLET, FILM COATED ORAL at 06:15

## 2023-09-25 PROCEDURE — 99232 SBSQ HOSP IP/OBS MODERATE 35: CPT

## 2023-09-25 RX ORDER — CLONAZEPAM 1 MG
0.25 TABLET ORAL DAILY
Refills: 0 | Status: DISCONTINUED | OUTPATIENT
Start: 2023-09-25 | End: 2023-10-02

## 2023-09-25 RX ADMIN — Medication 3 MILLIGRAM(S): at 21:18

## 2023-09-25 RX ADMIN — DIVALPROEX SODIUM 250 MILLIGRAM(S): 500 TABLET, DELAYED RELEASE ORAL at 09:28

## 2023-09-25 RX ADMIN — ATORVASTATIN CALCIUM 10 MILLIGRAM(S): 80 TABLET, FILM COATED ORAL at 21:18

## 2023-09-25 RX ADMIN — AMLODIPINE BESYLATE 2.5 MILLIGRAM(S): 2.5 TABLET ORAL at 06:47

## 2023-09-25 RX ADMIN — Medication 2 SPRAY(S): at 09:40

## 2023-09-25 RX ADMIN — Medication 2 SPRAY(S): at 12:38

## 2023-09-25 RX ADMIN — Medication 2 SPRAY(S): at 21:21

## 2023-09-25 RX ADMIN — LURASIDONE HYDROCHLORIDE 40 MILLIGRAM(S): 40 TABLET ORAL at 09:28

## 2023-09-25 RX ADMIN — FAMOTIDINE 20 MILLIGRAM(S): 10 INJECTION INTRAVENOUS at 06:47

## 2023-09-25 RX ADMIN — METFORMIN HYDROCHLORIDE 250 MILLIGRAM(S): 850 TABLET ORAL at 09:27

## 2023-09-25 RX ADMIN — METFORMIN HYDROCHLORIDE 250 MILLIGRAM(S): 850 TABLET ORAL at 21:18

## 2023-09-25 RX ADMIN — Medication 0.25 MILLIGRAM(S): at 09:28

## 2023-09-25 RX ADMIN — SERTRALINE 150 MILLIGRAM(S): 25 TABLET, FILM COATED ORAL at 09:28

## 2023-09-25 RX ADMIN — LACTULOSE 10 GRAM(S): 10 SOLUTION ORAL at 12:38

## 2023-09-25 RX ADMIN — SENNA PLUS 2 TABLET(S): 8.6 TABLET ORAL at 09:28

## 2023-09-25 RX ADMIN — LOSARTAN POTASSIUM 75 MILLIGRAM(S): 100 TABLET, FILM COATED ORAL at 06:47

## 2023-09-25 NOTE — BH INPATIENT PSYCHIATRY PROGRESS NOTE - NSBHFUPINTERVALHXFT_PSY_A_CORE
Bipolar d/o, depressed with psychotic features, care discussed w/ tx team, VSS. Patient visible in the dayroom, pleasant on approach, social with peers. taking meds. Patient with good appetite. Behaviors in control, improving. Sleep fair still some daytime napping in morning less today. Wore CPAP most of night. Has refused some showers agreed to take one today and 3PM. Family reports she is making vague paranoid references about facility she lives in not being safe.

## 2023-09-25 NOTE — BH INPATIENT PSYCHIATRY PROGRESS NOTE - MSE UNSTRUCTURED FT
Patient dressed in regular clothes.[place] does well with amplifier. Speech normal No EPS. Mood somewhat perplexed, some anxiety-less,  some distressed appearance. Patient denies SI, denies fear of being taken away. Denies fear of being harmed physically on unit. Says she feels safe in other facility but it is not clear and she will miss people here when she leaves No salmon. No FTD. Oriented to self, had no recall of every having received ECT. MMSE 16/30 Limited insight

## 2023-09-25 NOTE — BH INPATIENT PSYCHIATRY PROGRESS NOTE - NSBHCHARTREVIEWVS_PSY_A_CORE FT
Vital Signs Last 24 Hrs  T(C): 36.7 (09-25-23 @ 06:39), Max: 36.7 (09-25-23 @ 06:39)  T(F): 98 (09-25-23 @ 06:39), Max: 98 (09-25-23 @ 06:39)  HR: --  BP: --  BP(mean): --  RR: --  SpO2: --    Orthostatic VS  09-25-23 @ 06:39  Lying BP: --/-- HR: --  Sitting BP: 138/56 HR: 79  Standing BP: 126/62 HR: 91  Site: --  Mode: --

## 2023-09-25 NOTE — BH INPATIENT PSYCHIATRY PROGRESS NOTE - NSBHASSESSSUMMFT_PSY_ALL_CORE
83 year-old , retired female, domiciled at [facility], [facility] of DM2, HLD, HTN, PPHx of late onset Bipolar disorder, 2 prior psych adm last in 2022 at [facility], who presented to ED with worsening paranoia, anxiety, FTT (weight loss of 20lb since march), psychosis commingled with cognitive impairment   8/14 better but now hard to stay if responding to Abilify or prns of haldol will increase Abilify to 17mg then 20mg/d while initiating ECT discussion  8/15 Not much improvement   except less paranoid which may be attributable to haldol prn. Will d/c Abilify and start  Latuda will discuss ECT option  8/16 COnt Latuda trial while entering into discussion with patient re ECT  8/17 Tolerating latuda well; paranoia remains  8/18 Paranoia continues though improved; increase latuda to 40mg po daily - to be administered with food  8/19: On encounter today pt was seen chatting with one of her peers. Pt denied any complaints and reported feeling well. Denies paranoid thoughts or feeling afraid someone was going to hurt her. Still needing frequent redirection from staff to use her walker.   8/20 Depressed anxious fearful disorganized. Cont Latuda trial will cont to discuss ECT with patient, family is supportive  8/21 Depressed psychotic partial response will cont with ECT patient amenable as long as a family member goes with her for fear she will go off unit and never return. Will get labs ask medicine to see  8/22 Depressed not responded well to meds will increase Latuda while prepping for ECT. Will clarify need for ASA given severe nosebleed hx will discuss nosebleed issue with ECT   8/23 Cont ECT w/u discussed with medicine will   cont ASA  8/24 Psychotic depression with limited response to  multiple medication trials. Calmer less floridly delusion but anhedonia in state of constant distress. Patient assents to ECT, daughter Marcelina who is her HCP will provide consent. Patient requests family accompany her to ECT  for support as well as likely afraid she will not come back to unit if taken off. Will d/c Protonix suspension and use Pepcid as the former cannot be give while patient NPO. will lower Latuda as want to begin to reduce meds concurrent with ECT  8/25 Patient tolerated ECT, cont ECT, try to reduce Klonopin when better. Cont Effexor and Latuda. Recheck Na level on Sunday 8/26 Tolerated first ECT amnestic for treatment itself sl calmer. COnt ECT will begin to reduce klonopin to reduce cog se, check BMP in AM  8/27 less dysphoric, still paranoid, no SI/HI. Labs reviewed and discussed with hospitalist. Repeat BMP, urine NA and osmolality to f/u.  8/28 Tolerating ECT, cont treatments spread out so next one  9/1. Eval for further decrease Klonopin. Medicine saw patient , reintroduced metformin at low dose and d/declan ASA based on risk benefit   8/29 Mood psychosis better, sleepy during day confused. Will hold ECT til 9/1 reduce Klonopin  also may reduce Latuda  8/30 Improved with ECT but cog worse Will cont to hold ECT til clearer cont to reduce BDZ  8/31 Patient much better with regard to mood and  psychosis but notably cognitive disrupted form ECT Will hold ECT tomorrow. Will lower Lurasidone as was never effective for psychosis. Will start lowering Effexor and change to Zoloft as Effexor not helpful. Around completion of ECT may restart Depakote given episode of anjelica last admission. Consider further reduction in Ko Klonopin to 0.25mg/d . Reviewed plan of care with daughter  9/1 Patient improved but paranoia creeping back. Will cont Latuda at 40mg  consider increasing back to 60mg. Considered resume VPA based on concern of risk for switching but may inhibit sx requiring higher stimulus which can lead to more confusion. Next rx 9/5 9/2: Patient with psychotic depression, improving, on lurasidone   9/3: Patient's psychosis resolving, mood improved   9/4: Mood and psychotic symptoms improving, some impaired executive function and reasoning   9/5 PAtient improved but anxious, fearful cog impairment due to ECT but less severe. Cont ECT but spread out will, lower Effexor and add Zoloft as Effexor trial failed. Cont Lurasidone  9/6 Patient improving but quite confused. Will hold next treatment til next Monday. Cont to cross over to Zoloft  9/7 Improving will cont with spread out ECT to reudce severity of cog se of ECT will increase Zoloft and d/c Effexor Will rechallenge with CPAP  9/8 Patient improving with ECT next ECT 9/11 cont to titrate Zoloft. COnt encourage use of CPAP  9/11 patient showing improved impulse control, tolerating ECT, increase Zoloft to 125mg daily, will check BMP wed 9/12 more organized today, pleasant on approach, needs redirection to avoid touching peers on the shoulder   9/13: overall improvement in behaviors, needs redirection at times.next ECT friday.   9/14: ECT tomorrow, overall improved   9/15: next ECT monday, overall behaviors in control, redirectable, will keep patient on CO 11-7 since she had ECT today  9/18 Will change ECT to wednesday to space out better consider not doing further ECT after than unless very clear cut symptoms.  Plan resume Delapkote after las ECT, conisider moving Klonopin to later of split to minimize daytime sedation  9/19 Improving will give ECT in AM , peggy last ECT will startr Depakote after ect done  9/20 Improving likely last ECt will start Depakote back up cont other meds  9/21 Improved notably with course of ECT, will hold off on further rx, observe whetther any symptoms re appear as she clears from cog se of ECT  9/22 Mood and psychotic symptoms much improved. Will not give further ECT unless any symptoms return. Will see if cognition shows improvement with further time from ECT. Plan titrate Depakote.   9/25 Patient improved but some regression unclear if distress about d/c or true relpase will consider further ECT and increase in Latuda will review with family

## 2023-09-26 RX ORDER — SERTRALINE 25 MG/1
200 TABLET, FILM COATED ORAL DAILY
Refills: 0 | Status: DISCONTINUED | OUTPATIENT
Start: 2023-09-26 | End: 2023-10-17

## 2023-09-26 RX ADMIN — LURASIDONE HYDROCHLORIDE 40 MILLIGRAM(S): 40 TABLET ORAL at 10:24

## 2023-09-26 RX ADMIN — Medication 0.25 MILLIGRAM(S): at 10:25

## 2023-09-26 RX ADMIN — FAMOTIDINE 20 MILLIGRAM(S): 10 INJECTION INTRAVENOUS at 06:27

## 2023-09-26 RX ADMIN — Medication 2 SPRAY(S): at 10:24

## 2023-09-26 RX ADMIN — SENNA PLUS 2 TABLET(S): 8.6 TABLET ORAL at 10:25

## 2023-09-26 RX ADMIN — METFORMIN HYDROCHLORIDE 250 MILLIGRAM(S): 850 TABLET ORAL at 21:20

## 2023-09-26 RX ADMIN — METFORMIN HYDROCHLORIDE 250 MILLIGRAM(S): 850 TABLET ORAL at 10:25

## 2023-09-26 RX ADMIN — ATORVASTATIN CALCIUM 10 MILLIGRAM(S): 80 TABLET, FILM COATED ORAL at 21:20

## 2023-09-26 RX ADMIN — Medication 3 MILLIGRAM(S): at 21:20

## 2023-09-26 RX ADMIN — AMLODIPINE BESYLATE 2.5 MILLIGRAM(S): 2.5 TABLET ORAL at 06:27

## 2023-09-26 RX ADMIN — LOSARTAN POTASSIUM 75 MILLIGRAM(S): 100 TABLET, FILM COATED ORAL at 06:26

## 2023-09-26 RX ADMIN — SERTRALINE 150 MILLIGRAM(S): 25 TABLET, FILM COATED ORAL at 10:25

## 2023-09-26 RX ADMIN — Medication 2 SPRAY(S): at 21:24

## 2023-09-26 NOTE — BH INPATIENT PSYCHIATRY PROGRESS NOTE - NSBHMETABOLIC_PSY_ALL_CORE_FT
BMI: BMI (kg/m2): 23 (06-07-23 @ 18:13)  HbA1c: A1C with Estimated Average Glucose Result: 5.7 % (06-01-23 @ 05:10)    Glucose: POCT Blood Glucose.: 107 mg/dL (09-20-23 @ 11:42)    BP: 143/53 (09-24-23 @ 07:57) (143/53 - 143/53)  Lipid Panel: Date/Time: 06-08-23 @ 08:30  Cholesterol, Serum: 119  Direct LDL: --  HDL Cholesterol, Serum: 47  Total Cholesterol/HDL Ration Measurement: --  Triglycerides, Serum: 56

## 2023-09-26 NOTE — BH INPATIENT PSYCHIATRY PROGRESS NOTE - NSBHCHARTREVIEWVS_PSY_A_CORE FT
Vital Signs Last 24 Hrs  T(C): 36.2 (09-26-23 @ 05:38), Max: 36.2 (09-26-23 @ 05:38)  T(F): 97.2 (09-26-23 @ 05:38), Max: 97.2 (09-26-23 @ 05:38)  HR: --  BP: --  BP(mean): --  RR: --  SpO2: --    Orthostatic VS  09-26-23 @ 05:38  Lying BP: --/-- HR: --  Sitting BP: 139/52 HR: 60  Standing BP: 151/64 HR: 75  Site: --  Mode: --  Orthostatic VS  09-25-23 @ 06:39  Lying BP: --/-- HR: --  Sitting BP: 138/56 HR: 79  Standing BP: 126/62 HR: 91  Site: --  Mode: --

## 2023-09-26 NOTE — BH INPATIENT PSYCHIATRY PROGRESS NOTE - MSE UNSTRUCTURED FT
Patient dressed in regular clothes.[place] does well with amplifier. Speech normal No EPS. Mood more subdued, some anxiety some distressed appearance. Patient denies SI, denies fear of being taken away. Denies fear of being harmed physically on unit. Says she feels safe in other facilityNo salmon. No FTD. Oriented to self, had no recall of every having received ECT.  Limited insight

## 2023-09-26 NOTE — BH INPATIENT PSYCHIATRY PROGRESS NOTE - CURRENT MEDICATION
MEDICATIONS  (STANDING):  amLODIPine   Tablet 2.5 milliGRAM(s) Oral <User Schedule>  atorvastatin 10 milliGRAM(s) Oral at bedtime  clonazePAM Oral Disintegrating Tablet 0.25 milliGRAM(s) Oral daily  famotidine    Tablet 20 milliGRAM(s) Oral <User Schedule>  glucagon  Injectable 1 milliGRAM(s) IntraMuscular once  lactulose Syrup 10 Gram(s) Oral <User Schedule>  losartan 75 milliGRAM(s) Oral <User Schedule>  lurasidone 40 milliGRAM(s) Oral daily  melatonin. 3 milliGRAM(s) Oral at bedtime  metFORMIN 250 milliGRAM(s) Oral two times a day  senna 2 Tablet(s) Oral daily  sertraline 200 milliGRAM(s) Oral daily  sodium chloride 0.65% Nasal 2 Spray(s) Both Nostrils three times a day    MEDICATIONS  (PRN):  acetaminophen     Tablet .. 650 milliGRAM(s) Oral every 6 hours PRN Temp greater or equal to 38C (100.4F), Mild Pain (1 - 3), Moderate Pain (4 - 6)  bisacodyl 5 milliGRAM(s) Oral daily PRN constipation  bisacodyl Suppository 10 milliGRAM(s) Rectal daily PRN constipation  dextrose Oral Gel 15 Gram(s) Oral once PRN Blood Glucose LESS THAN 70 milliGRAM(s)/deciliter  haloperidol     Tablet 0.5 milliGRAM(s) Oral every 6 hours PRN agitation  haloperidol    Injectable 1 milliGRAM(s) IntraMuscular once PRN agitation  lidocaine   4% Patch 1 Patch Transdermal daily PRN LBP  simethicone 80 milliGRAM(s) Chew every 4 hours PRN abdominal discomfort

## 2023-09-26 NOTE — BH INPATIENT PSYCHIATRY PROGRESS NOTE - NSBHFUPINTERVALHXFT_PSY_A_CORE
Bipolar d/o, depressed with psychotic features, care discussed w/ tx team, [place]REES. Patient visible in the dayroom, pleasant on approach, social with peers. taking meds. Patient with good appetite. Behaviors in control, improving. Sleep fair still some daytime napping in morning less today. Wore CPAP most of night. Did take shower yesterday Family reports she is making vague paranoid references about facility she lives in not being safe.

## 2023-09-26 NOTE — BH INPATIENT PSYCHIATRY PROGRESS NOTE - NSBHASSESSSUMMFT_PSY_ALL_CORE
83 year-old , retired female, domiciled at [facility], [facility] of DM2, HLD, HTN, PPHx of late onset Bipolar disorder, 2 prior psych adm last in 2022 at [facility], who presented to ED with worsening paranoia, anxiety, FTT (weight loss of 20lb since march), psychosis commingled with cognitive impairment   8/14 better but now hard to stay if responding to Abilify or prns of haldol will increase Abilify to 17mg then 20mg/d while initiating ECT discussion  8/15 Not much improvement   except less paranoid which may be attributable to haldol prn. Will d/c Abilify and start  Latuda will discuss ECT option  8/16 COnt Latuda trial while entering into discussion with patient re ECT  8/17 Tolerating latuda well; paranoia remains  8/18 Paranoia continues though improved; increase latuda to 40mg po daily - to be administered with food  8/19: On encounter today pt was seen chatting with one of her peers. Pt denied any complaints and reported feeling well. Denies paranoid thoughts or feeling afraid someone was going to hurt her. Still needing frequent redirection from staff to use her walker.   8/20 Depressed anxious fearful disorganized. Cont Latuda trial will cont to discuss ECT with patient, family is supportive  8/21 Depressed psychotic partial response will cont with ECT patient amenable as long as a family member goes with her for fear she will go off unit and never return. Will get labs ask medicine to see  8/22 Depressed not responded well to meds will increase Latuda while prepping for ECT. Will clarify need for ASA given severe nosebleed hx will discuss nosebleed issue with ECT   8/23 Cont ECT w/u discussed with medicine will   cont ASA  8/24 Psychotic depression with limited response to  multiple medication trials. Calmer less floridly delusion but anhedonia in state of constant distress. Patient assents to ECT, daughter Marcelina who is her HCP will provide consent. Patient requests family accompany her to ECT  for support as well as likely afraid she will not come back to unit if taken off. Will d/c Protonix suspension and use Pepcid as the former cannot be give while patient NPO. will lower Latuda as want to begin to reduce meds concurrent with ECT  8/25 Patient tolerated ECT, cont ECT, try to reduce Klonopin when better. Cont Effexor and Latuda. Recheck Na level on Sunday 8/26 Tolerated first ECT amnestic for treatment itself sl calmer. COnt ECT will begin to reduce klonopin to reduce cog se, check BMP in AM  8/27 less dysphoric, still paranoid, no SI/HI. Labs reviewed and discussed with hospitalist. Repeat BMP, urine NA and osmolality to f/u.  8/28 Tolerating ECT, cont treatments spread out so next one  9/1. Eval for further decrease Klonopin. Medicine saw patient , reintroduced metformin at low dose and d/declan ASA based on risk benefit   8/29 Mood psychosis better, sleepy during day confused. Will hold ECT til 9/1 reduce Klonopin  also may reduce Latuda  8/30 Improved with ECT but cog worse Will cont to hold ECT til clearer cont to reduce BDZ  8/31 Patient much better with regard to mood and  psychosis but notably cognitive disrupted form ECT Will hold ECT tomorrow. Will lower Lurasidone as was never effective for psychosis. Will start lowering Effexor and change to Zoloft as Effexor not helpful. Around completion of ECT may restart Depakote given episode of anjelica last admission. Consider further reduction in Ko Klonopin to 0.25mg/d . Reviewed plan of care with daughter  9/1 Patient improved but paranoia creeping back. Will cont Latuda at 40mg  consider increasing back to 60mg. Considered resume VPA based on concern of risk for switching but may inhibit sx requiring higher stimulus which can lead to more confusion. Next rx 9/5 9/2: Patient with psychotic depression, improving, on lurasidone   9/3: Patient's psychosis resolving, mood improved   9/4: Mood and psychotic symptoms improving, some impaired executive function and reasoning   9/5 PAtient improved but anxious, fearful cog impairment due to ECT but less severe. Cont ECT but spread out will, lower Effexor and add Zoloft as Effexor trial failed. Cont Lurasidone  9/6 Patient improving but quite confused. Will hold next treatment til next Monday. Cont to cross over to Zoloft  9/7 Improving will cont with spread out ECT to reudce severity of cog se of ECT will increase Zoloft and d/c Effexor Will rechallenge with CPAP  9/8 Patient improving with ECT next ECT 9/11 cont to titrate Zoloft. COnt encourage use of CPAP  9/11 patient showing improved impulse control, tolerating ECT, increase Zoloft to 125mg daily, will check BMP wed 9/12 more organized today, pleasant on approach, needs redirection to avoid touching peers on the shoulder   9/13: overall improvement in behaviors, needs redirection at times.next ECT friday.   9/14: ECT tomorrow, overall improved   9/15: next ECT monday, overall behaviors in control, redirectable, will keep patient on CO 11-7 since she had ECT today  9/18 Will change ECT to wednesday to space out better consider not doing further ECT after than unless very clear cut symptoms.  Plan resume Delapkote after las ECT, conisider moving Klonopin to later of split to minimize daytime sedation  9/19 Improving will give ECT in AM , peggy last ECT will startr Depakote after ect done  9/20 Improving likely last ECt will start Depakote back up cont other meds  9/21 Improved notably with course of ECT, will hold off on further rx, observe whetther any symptoms re appear as she clears from cog se of ECT  9/22 Mood and psychotic symptoms much improved. Will not give further ECT unless any symptoms return. Will see if cognition shows improvement with further time from ECT. Plan titrate Depakote.   9/25 Patient improved but some regression unclear if distress about d/c or true relpase will consider further ECT and increase in Latuda will review with family  9/26 Patient some regression since finishing ECT so are doing rescue ECT also increase Zoloft possible increase Latuda

## 2023-09-27 LAB — GLUCOSE BLDC GLUCOMTR-MCNC: 86 MG/DL — SIGNIFICANT CHANGE UP (ref 70–99)

## 2023-09-27 PROCEDURE — 90870 ELECTROCONVULSIVE THERAPY: CPT

## 2023-09-27 PROCEDURE — 99232 SBSQ HOSP IP/OBS MODERATE 35: CPT | Mod: 25

## 2023-09-27 RX ORDER — FLUMAZENIL 0.1 MG/ML
0.2 VIAL (ML) INTRAVENOUS ONCE
Refills: 0 | Status: COMPLETED | OUTPATIENT
Start: 2023-09-27 | End: 2023-09-27

## 2023-09-27 RX ADMIN — LOSARTAN POTASSIUM 75 MILLIGRAM(S): 100 TABLET, FILM COATED ORAL at 06:52

## 2023-09-27 RX ADMIN — ATORVASTATIN CALCIUM 10 MILLIGRAM(S): 80 TABLET, FILM COATED ORAL at 21:14

## 2023-09-27 RX ADMIN — LURASIDONE HYDROCHLORIDE 40 MILLIGRAM(S): 40 TABLET ORAL at 12:43

## 2023-09-27 RX ADMIN — METFORMIN HYDROCHLORIDE 250 MILLIGRAM(S): 850 TABLET ORAL at 21:13

## 2023-09-27 RX ADMIN — Medication 0.25 MILLIGRAM(S): at 12:44

## 2023-09-27 RX ADMIN — SERTRALINE 200 MILLIGRAM(S): 25 TABLET, FILM COATED ORAL at 12:45

## 2023-09-27 RX ADMIN — Medication 0.2 MILLIGRAM(S): at 10:16

## 2023-09-27 RX ADMIN — AMLODIPINE BESYLATE 2.5 MILLIGRAM(S): 2.5 TABLET ORAL at 06:52

## 2023-09-27 RX ADMIN — FAMOTIDINE 20 MILLIGRAM(S): 10 INJECTION INTRAVENOUS at 06:52

## 2023-09-27 RX ADMIN — SENNA PLUS 2 TABLET(S): 8.6 TABLET ORAL at 12:44

## 2023-09-27 RX ADMIN — METFORMIN HYDROCHLORIDE 250 MILLIGRAM(S): 850 TABLET ORAL at 12:44

## 2023-09-27 RX ADMIN — Medication 2 SPRAY(S): at 21:12

## 2023-09-27 RX ADMIN — Medication 3 MILLIGRAM(S): at 21:13

## 2023-09-27 NOTE — BH INPATIENT PSYCHIATRY PROGRESS NOTE - NSBHCHARTREVIEWVS_PSY_A_CORE FT
Vital Signs Last 24 Hrs  T(C): 36.9 (09-27-23 @ 11:05), Max: 36.9 (09-27-23 @ 09:51)  T(F): 98.4 (09-27-23 @ 11:05), Max: 98.4 (09-27-23 @ 09:51)  HR: 76 (09-27-23 @ 12:28) (67 - 80)  BP: 147/53 (09-27-23 @ 12:28) (118/55 - 169/63)  BP(mean): --  RR: 15 (09-27-23 @ 11:05) (14 - 18)  SpO2: 94% (09-27-23 @ 11:05) (94% - 97%)    Orthostatic VS  09-26-23 @ 05:38  Lying BP: --/-- HR: --  Sitting BP: 139/52 HR: 60  Standing BP: 151/64 HR: 75  Site: --  Mode: --

## 2023-09-27 NOTE — BH INPATIENT PSYCHIATRY PROGRESS NOTE - NSBHMETABOLIC_PSY_ALL_CORE_FT
BMI: BMI (kg/m2): 23 (06-07-23 @ 18:13)  HbA1c: A1C with Estimated Average Glucose Result: 5.7 % (06-01-23 @ 05:10)    Glucose: POCT Blood Glucose.: 86 mg/dL (09-27-23 @ 08:05)    BP: 147/53 (09-27-23 @ 12:28) (118/55 - 169/63)  Lipid Panel: Date/Time: 06-08-23 @ 08:30  Cholesterol, Serum: 119  Direct LDL: --  HDL Cholesterol, Serum: 47  Total Cholesterol/HDL Ration Measurement: --  Triglycerides, Serum: 56

## 2023-09-27 NOTE — BH INPATIENT PSYCHIATRY PROGRESS NOTE - NSBHFUPINTERVALHXFT_PSY_A_CORE
Bipolar d/o, depressed with psychotic features, care discussed w/ tx team, CHEPE. Patient visible in the dayroom, more subdued than week ago, social with a few peers, taking meds. Patient with good appetite. Behaviors in control, improving. Sleep fair still some daytime napping in morning less today. Wore CPAP most of night. Did take shower  . Tolerated ECT well

## 2023-09-27 NOTE — BH INPATIENT PSYCHIATRY PROGRESS NOTE - NSBHASSESSSUMMFT_PSY_ALL_CORE
83 year-old , retired female, domiciled at [facility], [facility] of DM2, HLD, HTN, PPHx of late onset Bipolar disorder, 2 prior psych adm last in 2022 at [facility], who presented to ED with worsening paranoia, anxiety, FTT (weight loss of 20lb since march), psychosis commingled with cognitive impairment   8/14 better but now hard to stay if responding to Abilify or prns of haldol will increase Abilify to 17mg then 20mg/d while initiating ECT discussion  8/15 Not much improvement   except less paranoid which may be attributable to haldol prn. Will d/c Abilify and start  Latuda will discuss ECT option  8/16 COnt Latuda trial while entering into discussion with patient re ECT  8/17 Tolerating latuda well; paranoia remains  8/18 Paranoia continues though improved; increase latuda to 40mg po daily - to be administered with food  8/19: On encounter today pt was seen chatting with one of her peers. Pt denied any complaints and reported feeling well. Denies paranoid thoughts or feeling afraid someone was going to hurt her. Still needing frequent redirection from staff to use her walker.   8/20 Depressed anxious fearful disorganized. Cont Latuda trial will cont to discuss ECT with patient, family is supportive  8/21 Depressed psychotic partial response will cont with ECT patient amenable as long as a family member goes with her for fear she will go off unit and never return. Will get labs ask medicine to see  8/22 Depressed not responded well to meds will increase Latuda while prepping for ECT. Will clarify need for ASA given severe nosebleed hx will discuss nosebleed issue with ECT   8/23 Cont ECT w/u discussed with medicine will   cont ASA  8/24 Psychotic depression with limited response to  multiple medication trials. Calmer less floridly delusion but anhedonia in state of constant distress. Patient assents to ECT, daughter Marcelina who is her HCP will provide consent. Patient requests family accompany her to ECT  for support as well as likely afraid she will not come back to unit if taken off. Will d/c Protonix suspension and use Pepcid as the former cannot be give while patient NPO. will lower Latuda as want to begin to reduce meds concurrent with ECT  8/25 Patient tolerated ECT, cont ECT, try to reduce Klonopin when better. Cont Effexor and Latuda. Recheck Na level on Sunday 8/26 Tolerated first ECT amnestic for treatment itself sl calmer. COnt ECT will begin to reduce klonopin to reduce cog se, check BMP in AM  8/27 less dysphoric, still paranoid, no SI/HI. Labs reviewed and discussed with hospitalist. Repeat BMP, urine NA and osmolality to f/u.  8/28 Tolerating ECT, cont treatments spread out so next one  9/1. Eval for further decrease Klonopin. Medicine saw patient , reintroduced metformin at low dose and d/declan ASA based on risk benefit   8/29 Mood psychosis better, sleepy during day confused. Will hold ECT til 9/1 reduce Klonopin  also may reduce Latuda  8/30 Improved with ECT but cog worse Will cont to hold ECT til clearer cont to reduce BDZ  8/31 Patient much better with regard to mood and  psychosis but notably cognitive disrupted form ECT Will hold ECT tomorrow. Will lower Lurasidone as was never effective for psychosis. Will start lowering Effexor and change to Zoloft as Effexor not helpful. Around completion of ECT may restart Depakote given episode of anjelica last admission. Consider further reduction in Ko Klonopin to 0.25mg/d . Reviewed plan of care with daughter  9/1 Patient improved but paranoia creeping back. Will cont Latuda at 40mg  consider increasing back to 60mg. Considered resume VPA based on concern of risk for switching but may inhibit sx requiring higher stimulus which can lead to more confusion. Next rx 9/5 9/2: Patient with psychotic depression, improving, on lurasidone   9/3: Patient's psychosis resolving, mood improved   9/4: Mood and psychotic symptoms improving, some impaired executive function and reasoning   9/5 PAtient improved but anxious, fearful cog impairment due to ECT but less severe. Cont ECT but spread out will, lower Effexor and add Zoloft as Effexor trial failed. Cont Lurasidone  9/6 Patient improving but quite confused. Will hold next treatment til next Monday. Cont to cross over to Zoloft  9/7 Improving will cont with spread out ECT to reudce severity of cog se of ECT will increase Zoloft and d/c Effexor Will rechallenge with CPAP  9/8 Patient improving with ECT next ECT 9/11 cont to titrate Zoloft. COnt encourage use of CPAP  9/11 patient showing improved impulse control, tolerating ECT, increase Zoloft to 125mg daily, will check BMP wed 9/12 more organized today, pleasant on approach, needs redirection to avoid touching peers on the shoulder   9/13: overall improvement in behaviors, needs redirection at times.next ECT friday.   9/14: ECT tomorrow, overall improved   9/15: next ECT monday, overall behaviors in control, redirectable, will keep patient on CO 11-7 since she had ECT today  9/18 Will change ECT to wednesday to space out better consider not doing further ECT after than unless very clear cut symptoms.  Plan resume Delapkote after las ECT, conisider moving Klonopin to later of split to minimize daytime sedation  9/19 Improving will give ECT in AM , peggy last ECT will startr Depakote after ect done  9/20 Improving likely last ECt will start Depakote back up cont other meds  9/21 Improved notably with course of ECT, will hold off on further rx, observe whetther any symptoms re appear as she clears from cog se of ECT  9/22 Mood and psychotic symptoms much improved. Will not give further ECT unless any symptoms return. Will see if cognition shows improvement with further time from ECT. Plan titrate Depakote.   9/25 Patient improved but some regression unclear if distress about d/c or true relpase will consider further ECT and increase in Latuda will review with family  9/26 Patient some regression since finishing ECT so are doing rescue ECT also increase Zoloft possible increase Latuda  9/27 Some re appearance of paranoia, cont longer course of ECT and will plan to increase Latuda

## 2023-09-27 NOTE — BH INPATIENT PSYCHIATRY PROGRESS NOTE - MSE UNSTRUCTURED FT
Patient dressed in regular clothes.[place] does well with amplifier. Speech normal No EPS. Mood more subdued, some anxiety some distressed appearance. Patient denies SI, denies fear of being taken away. Denies fear of being harmed physically on unit. Says she feels safe [facility] other [facility] No FTD. Oriented to self, had no recall of every having received ECT.  Limited insight

## 2023-09-28 PROCEDURE — 99232 SBSQ HOSP IP/OBS MODERATE 35: CPT

## 2023-09-28 RX ORDER — LURASIDONE HYDROCHLORIDE 40 MG/1
60 TABLET ORAL DAILY
Refills: 0 | Status: DISCONTINUED | OUTPATIENT
Start: 2023-09-28 | End: 2023-10-11

## 2023-09-28 RX ADMIN — LURASIDONE HYDROCHLORIDE 40 MILLIGRAM(S): 40 TABLET ORAL at 09:10

## 2023-09-28 RX ADMIN — Medication 0.25 MILLIGRAM(S): at 09:10

## 2023-09-28 RX ADMIN — SENNA PLUS 2 TABLET(S): 8.6 TABLET ORAL at 09:10

## 2023-09-28 RX ADMIN — Medication 3 MILLIGRAM(S): at 20:23

## 2023-09-28 RX ADMIN — ATORVASTATIN CALCIUM 10 MILLIGRAM(S): 80 TABLET, FILM COATED ORAL at 20:23

## 2023-09-28 RX ADMIN — Medication 2 SPRAY(S): at 09:11

## 2023-09-28 RX ADMIN — METFORMIN HYDROCHLORIDE 250 MILLIGRAM(S): 850 TABLET ORAL at 20:22

## 2023-09-28 RX ADMIN — Medication 2 SPRAY(S): at 12:54

## 2023-09-28 RX ADMIN — FAMOTIDINE 20 MILLIGRAM(S): 10 INJECTION INTRAVENOUS at 06:13

## 2023-09-28 RX ADMIN — AMLODIPINE BESYLATE 2.5 MILLIGRAM(S): 2.5 TABLET ORAL at 06:13

## 2023-09-28 RX ADMIN — METFORMIN HYDROCHLORIDE 250 MILLIGRAM(S): 850 TABLET ORAL at 09:08

## 2023-09-28 RX ADMIN — Medication 2 SPRAY(S): at 20:23

## 2023-09-28 RX ADMIN — LOSARTAN POTASSIUM 75 MILLIGRAM(S): 100 TABLET, FILM COATED ORAL at 06:13

## 2023-09-28 RX ADMIN — LACTULOSE 10 GRAM(S): 10 SOLUTION ORAL at 12:54

## 2023-09-28 RX ADMIN — SERTRALINE 200 MILLIGRAM(S): 25 TABLET, FILM COATED ORAL at 09:09

## 2023-09-28 NOTE — ADVANCED PRACTICE NURSE CONSULT - RECOMMEDATIONS
Recommended to readjust the fitting of the C-pap, apply [place] barrier film underneath the CPAP strap (face and behind ears) to reduce moisture and place a nasal gel pad over the nose bridge before placing the mask on the patient’s face for comfort and to reduce friction and shear.   Endorsed to primary nurse  Will be available as needed.

## 2023-09-28 NOTE — ADVANCED PRACTICE NURSE CONSULT - ASSESSMENT
Patient is a 83y old  Female who presents with a chief complaint of depression on ECT (28 Aug 2023 10:57). Wound care consult for nose wound wound from CPAP.   Nasal pressure injury stage 2 noted over nose bridge measuring 2 cm x 2 cm erythema, no drainage, tenderness to touch due to C-pap.

## 2023-09-28 NOTE — BH INPATIENT PSYCHIATRY PROGRESS NOTE - CURRENT MEDICATION
MEDICATIONS  (STANDING):  amLODIPine   Tablet 2.5 milliGRAM(s) Oral <User Schedule>  atorvastatin 10 milliGRAM(s) Oral at bedtime  clonazePAM Oral Disintegrating Tablet 0.25 milliGRAM(s) Oral daily  famotidine    Tablet 20 milliGRAM(s) Oral <User Schedule>  glucagon  Injectable 1 milliGRAM(s) IntraMuscular once  lactulose Syrup 10 Gram(s) Oral <User Schedule>  losartan 75 milliGRAM(s) Oral <User Schedule>  lurasidone 60 milliGRAM(s) Oral daily  melatonin. 3 milliGRAM(s) Oral at bedtime  metFORMIN 250 milliGRAM(s) Oral two times a day  senna 2 Tablet(s) Oral daily  sertraline 200 milliGRAM(s) Oral daily  sodium chloride 0.65% Nasal 2 Spray(s) Both Nostrils three times a day    MEDICATIONS  (PRN):  acetaminophen     Tablet .. 650 milliGRAM(s) Oral every 6 hours PRN Temp greater or equal to 38C (100.4F), Mild Pain (1 - 3), Moderate Pain (4 - 6)  bisacodyl 5 milliGRAM(s) Oral daily PRN constipation  bisacodyl Suppository 10 milliGRAM(s) Rectal daily PRN constipation  dextrose Oral Gel 15 Gram(s) Oral once PRN Blood Glucose LESS THAN 70 milliGRAM(s)/deciliter  haloperidol     Tablet 0.5 milliGRAM(s) Oral every 6 hours PRN agitation  haloperidol    Injectable 1 milliGRAM(s) IntraMuscular once PRN agitation  lidocaine   4% Patch 1 Patch Transdermal daily PRN LBP  simethicone 80 milliGRAM(s) Chew every 4 hours PRN abdominal discomfort

## 2023-09-28 NOTE — BH INPATIENT PSYCHIATRY PROGRESS NOTE - NSBHASSESSSUMMFT_PSY_ALL_CORE
83 year-old , retired female, domiciled at [facility], [facility] of DM2, HLD, HTN, PPHx of late onset Bipolar disorder, 2 prior psych adm last in 2022 at [facility], who presented to ED with worsening paranoia, anxiety, FTT (weight loss of 20lb since march), psychosis commingled with cognitive impairment   8/14 better but now hard to stay if responding to Abilify or prns of haldol will increase Abilify to 17mg then 20mg/d while initiating ECT discussion  8/15 Not much improvement   except less paranoid which may be attributable to haldol prn. Will d/c Abilify and start  Latuda will discuss ECT option  8/16 COnt Latuda trial while entering into discussion with patient re ECT  8/17 Tolerating latuda well; paranoia remains  8/18 Paranoia continues though improved; increase latuda to 40mg po daily - to be administered with food  8/19: On encounter today pt was seen chatting with one of her peers. Pt denied any complaints and reported feeling well. Denies paranoid thoughts or feeling afraid someone was going to hurt her. Still needing frequent redirection from staff to use her walker.   8/20 Depressed anxious fearful disorganized. Cont Latuda trial will cont to discuss ECT with patient, family is supportive  8/21 Depressed psychotic partial response will cont with ECT patient amenable as long as a family member goes with her for fear she will go off unit and never return. Will get labs ask medicine to see  8/22 Depressed not responded well to meds will increase Latuda while prepping for ECT. Will clarify need for ASA given severe nosebleed hx will discuss nosebleed issue with ECT   8/23 Cont ECT w/u discussed with medicine will   cont ASA  8/24 Psychotic depression with limited response to  multiple medication trials. Calmer less floridly delusion but anhedonia in state of constant distress. Patient assents to ECT, daughter Marcelina who is her HCP will provide consent. Patient requests family accompany her to ECT  for support as well as likely afraid she will not come back to unit if taken off. Will d/c Protonix suspension and use Pepcid as the former cannot be give while patient NPO. will lower Latuda as want to begin to reduce meds concurrent with ECT  8/25 Patient tolerated ECT, cont ECT, try to reduce Klonopin when better. Cont Effexor and Latuda. Recheck Na level on Sunday 8/26 Tolerated first ECT amnestic for treatment itself sl calmer. COnt ECT will begin to reduce klonopin to reduce cog se, check BMP in AM  8/27 less dysphoric, still paranoid, no SI/HI. Labs reviewed and discussed with hospitalist. Repeat BMP, urine NA and osmolality to f/u.  8/28 Tolerating ECT, cont treatments spread out so next one  9/1. Eval for further decrease Klonopin. Medicine saw patient , reintroduced metformin at low dose and d/declan ASA based on risk benefit   8/29 Mood psychosis better, sleepy during day confused. Will hold ECT til 9/1 reduce Klonopin  also may reduce Latuda  8/30 Improved with ECT but cog worse Will cont to hold ECT til clearer cont to reduce BDZ  8/31 Patient much better with regard to mood and  psychosis but notably cognitive disrupted form ECT Will hold ECT tomorrow. Will lower Lurasidone as was never effective for psychosis. Will start lowering Effexor and change to Zoloft as Effexor not helpful. Around completion of ECT may restart Depakote given episode of anjelica last admission. Consider further reduction in Ko Klonopin to 0.25mg/d . Reviewed plan of care with daughter  9/1 Patient improved but paranoia creeping back. Will cont Latuda at 40mg  consider increasing back to 60mg. Considered resume VPA based on concern of risk for switching but may inhibit sx requiring higher stimulus which can lead to more confusion. Next rx 9/5 9/2: Patient with psychotic depression, improving, on lurasidone   9/3: Patient's psychosis resolving, mood improved   9/4: Mood and psychotic symptoms improving, some impaired executive function and reasoning   9/5 PAtient improved but anxious, fearful cog impairment due to ECT but less severe. Cont ECT but spread out will, lower Effexor and add Zoloft as Effexor trial failed. Cont Lurasidone  9/6 Patient improving but quite confused. Will hold next treatment til next Monday. Cont to cross over to Zoloft  9/7 Improving will cont with spread out ECT to reudce severity of cog se of ECT will increase Zoloft and d/c Effexor Will rechallenge with CPAP  9/8 Patient improving with ECT next ECT 9/11 cont to titrate Zoloft. COnt encourage use of CPAP  9/11 patient showing improved impulse control, tolerating ECT, increase Zoloft to 125mg daily, will check BMP wed 9/12 more organized today, pleasant on approach, needs redirection to avoid touching peers on the shoulder   9/13: overall improvement in behaviors, needs redirection at times.next ECT friday.   9/14: ECT tomorrow, overall improved   9/15: next ECT monday, overall behaviors in control, redirectable, will keep patient on CO 11-7 since she had ECT today  9/18 Will change ECT to wednesday to space out better consider not doing further ECT after than unless very clear cut symptoms.  Plan resume Delapkote after las ECT, conisider moving Klonopin to later of split to minimize daytime sedation  9/19 Improving will give ECT in AM , peggy last ECT will startr Depakote after ect done  9/20 Improving likely last ECt will start Depakote back up cont other meds  9/21 Improved notably with course of ECT, will hold off on further rx, observe whetther any symptoms re appear as she clears from cog se of ECT  9/22 Mood and psychotic symptoms much improved. Will not give further ECT unless any symptoms return. Will see if cognition shows improvement with further time from ECT. Plan titrate Depakote.   9/25 Patient improved but some regression unclear if distress about d/c or true relpase will consider further ECT and increase in Latuda will review with family  9/26 Patient some regression since finishing ECT so are doing rescue ECT also increase Zoloft possible increase Latuda  9/27 Some re appearance of paranoia, cont longer course of ECT and will plan to increase Latuda  9/28 recrudescence of paranoia simmering down cont ECT spread out and increase Latuda, working on getting soft liner for CPA mask to protect nose

## 2023-09-28 NOTE — BH INPATIENT PSYCHIATRY PROGRESS NOTE - NSBHCHARTREVIEWVS_PSY_A_CORE FT
Vital Signs Last 24 Hrs  T(C): 36.2 (09-28-23 @ 05:56), Max: 36.2 (09-28-23 @ 05:56)  T(F): 97.1 (09-28-23 @ 05:56), Max: 97.1 (09-28-23 @ 05:56)  HR: --  BP: --  BP(mean): --  RR: --  SpO2: --    Orthostatic VS  09-28-23 @ 05:56  Lying BP: --/-- HR: --  Sitting BP: 125/59 HR: 82  Standing BP: 122/69 HR: 99  Site: --  Mode: --

## 2023-09-28 NOTE — BH INPATIENT PSYCHIATRY PROGRESS NOTE - MSE UNSTRUCTURED FT
Patient dressed in regular clothes.[place] does well with amplifier. Speech normal No EPS. Mood more subdued, some anxiety some distressed appearance. Patient denies SI, denies fear of being taken away. Denies fear of being harmed physically on unit. Says she feels safe [facility] other [facility] No [facility]. Oriented to self, had no recall of every having received ECT.  Limited insight

## 2023-09-28 NOTE — BH INPATIENT PSYCHIATRY PROGRESS NOTE - NSBHFUPINTERVALHXFT_PSY_A_CORE
Bipolar d/o, depressed with psychotic features, care discussed w/ tx team, CHEPE. Patient visible in the dayroom, more subdued than week ago, social with a few peers, taking meds. Patient with good appetite. Behaviors in control, improving. Sleep fair still some daytime napping in morning less today. Wore CPAP most of night. Did take shower  . Tolerated ECT well. Patient with irritation on bridge of nose

## 2023-09-29 PROCEDURE — 99232 SBSQ HOSP IP/OBS MODERATE 35: CPT

## 2023-09-29 RX ADMIN — Medication 2 SPRAY(S): at 10:04

## 2023-09-29 RX ADMIN — METFORMIN HYDROCHLORIDE 250 MILLIGRAM(S): 850 TABLET ORAL at 10:04

## 2023-09-29 RX ADMIN — Medication 0.25 MILLIGRAM(S): at 10:03

## 2023-09-29 RX ADMIN — LURASIDONE HYDROCHLORIDE 60 MILLIGRAM(S): 40 TABLET ORAL at 10:04

## 2023-09-29 RX ADMIN — SERTRALINE 200 MILLIGRAM(S): 25 TABLET, FILM COATED ORAL at 10:02

## 2023-09-29 RX ADMIN — FAMOTIDINE 20 MILLIGRAM(S): 10 INJECTION INTRAVENOUS at 06:19

## 2023-09-29 RX ADMIN — Medication 3 MILLIGRAM(S): at 20:19

## 2023-09-29 RX ADMIN — AMLODIPINE BESYLATE 2.5 MILLIGRAM(S): 2.5 TABLET ORAL at 06:19

## 2023-09-29 RX ADMIN — ATORVASTATIN CALCIUM 10 MILLIGRAM(S): 80 TABLET, FILM COATED ORAL at 20:19

## 2023-09-29 RX ADMIN — LOSARTAN POTASSIUM 75 MILLIGRAM(S): 100 TABLET, FILM COATED ORAL at 06:18

## 2023-09-29 RX ADMIN — SENNA PLUS 2 TABLET(S): 8.6 TABLET ORAL at 10:03

## 2023-09-29 RX ADMIN — METFORMIN HYDROCHLORIDE 250 MILLIGRAM(S): 850 TABLET ORAL at 20:19

## 2023-09-29 NOTE — BH INPATIENT PSYCHIATRY PROGRESS NOTE - NSBHFUPINTERVALHXFT_PSY_A_CORE
Bipolar d/o, depressed with psychotic features, care discussed w/ tx team, VSS. Patient visible in the dayroom, more subdued than week ago, social with a few peers, taking meds. Patient with good appetite. Slow to get out of bed in morning .  Behaviors in control. Sleep fair, there was malfunction of CPAP and replace unit has been requested.  Tolerating ECT well. Patient with irritation on bridge of nose , wound care nurse evaluated and recommended padding for CPAP contact points

## 2023-09-29 NOTE — BH INPATIENT PSYCHIATRY PROGRESS NOTE - NSBHASSESSSUMMFT_PSY_ALL_CORE
83 year-old , retired female, domiciled at [facility], [facility] of DM2, HLD, HTN, PPHx of late onset Bipolar disorder, 2 prior psych adm last in 2022 at [facility], who presented to ED with worsening paranoia, anxiety, FTT (weight loss of 20lb since march), psychosis commingled with cognitive impairment   8/14 better but now hard to stay if responding to Abilify or prns of haldol will increase Abilify to 17mg then 20mg/d while initiating ECT discussion  8/15 Not much improvement   except less paranoid which may be attributable to haldol prn. Will d/c Abilify and start  Latuda will discuss ECT option  8/16 COnt Latuda trial while entering into discussion with patient re ECT  8/17 Tolerating latuda well; paranoia remains  8/18 Paranoia continues though improved; increase latuda to 40mg po daily - to be administered with food  8/19: On encounter today pt was seen chatting with one of her peers. Pt denied any complaints and reported feeling well. Denies paranoid thoughts or feeling afraid someone was going to hurt her. Still needing frequent redirection from staff to use her walker.   8/20 Depressed anxious fearful disorganized. Cont Latuda trial will cont to discuss ECT with patient, family is supportive  8/21 Depressed psychotic partial response will cont with ECT patient amenable as long as a family member goes with her for fear she will go off unit and never return. Will get labs ask medicine to see  8/22 Depressed not responded well to meds will increase Latuda while prepping for ECT. Will clarify need for ASA given severe nosebleed hx will discuss nosebleed issue with ECT   8/23 Cont ECT w/u discussed with medicine will   cont ASA  8/24 Psychotic depression with limited response to  multiple medication trials. Calmer less floridly delusion but anhedonia in state of constant distress. Patient assents to ECT, daughter Marcelina who is her HCP will provide consent. Patient requests family accompany her to ECT  for support as well as likely afraid she will not come back to unit if taken off. Will d/c Protonix suspension and use Pepcid as the former cannot be give while patient NPO. will lower Latuda as want to begin to reduce meds concurrent with ECT  8/25 Patient tolerated ECT, cont ECT, try to reduce Klonopin when better. Cont Effexor and Latuda. Recheck Na level on Sunday 8/26 Tolerated first ECT amnestic for treatment itself sl calmer. COnt ECT will begin to reduce klonopin to reduce cog se, check BMP in AM  8/27 less dysphoric, still paranoid, no SI/HI. Labs reviewed and discussed with hospitalist. Repeat BMP, urine NA and osmolality to f/u.  8/28 Tolerating ECT, cont treatments spread out so next one  9/1. Eval for further decrease Klonopin. Medicine saw patient , reintroduced metformin at low dose and d/declan ASA based on risk benefit   8/29 Mood psychosis better, sleepy during day confused. Will hold ECT til 9/1 reduce Klonopin  also may reduce Latuda  8/30 Improved with ECT but cog worse Will cont to hold ECT til clearer cont to reduce BDZ  8/31 Patient much better with regard to mood and  psychosis but notably cognitive disrupted form ECT Will hold ECT tomorrow. Will lower Lurasidone as was never effective for psychosis. Will start lowering Effexor and change to Zoloft as Effexor not helpful. Around completion of ECT may restart Depakote given episode of anjelica last admission. Consider further reduction in Ko Klonopin to 0.25mg/d . Reviewed plan of care with daughter  9/1 Patient improved but paranoia creeping back. Will cont Latuda at 40mg  consider increasing back to 60mg. Considered resume VPA based on concern of risk for switching but may inhibit sx requiring higher stimulus which can lead to more confusion. Next rx 9/5 9/2: Patient with psychotic depression, improving, on lurasidone   9/3: Patient's psychosis resolving, mood improved   9/4: Mood and psychotic symptoms improving, some impaired executive function and reasoning   9/5 PAtient improved but anxious, fearful cog impairment due to ECT but less severe. Cont ECT but spread out will, lower Effexor and add Zoloft as Effexor trial failed. Cont Lurasidone  9/6 Patient improving but quite confused. Will hold next treatment til next Monday. Cont to cross over to Zoloft  9/7 Improving will cont with spread out ECT to reudce severity of cog se of ECT will increase Zoloft and d/c Effexor Will rechallenge with CPAP  9/8 Patient improving with ECT next ECT 9/11 cont to titrate Zoloft. COnt encourage use of CPAP  9/11 patient showing improved impulse control, tolerating ECT, increase Zoloft to 125mg daily, will check BMP wed 9/12 more organized today, pleasant on approach, needs redirection to avoid touching peers on the shoulder   9/13: overall improvement in behaviors, needs redirection at times.next ECT friday.   9/14: ECT tomorrow, overall improved   9/15: next ECT monday, overall behaviors in control, redirectable, will keep patient on CO 11-7 since she had ECT today  9/18 Will change ECT to wednesday to space out better consider not doing further ECT after than unless very clear cut symptoms.  Plan resume Delapkote after las ECT, conisider moving Klonopin to later of split to minimize daytime sedation  9/19 Improving will give ECT in AM , peggy last ECT will startr Depakote after ect done  9/20 Improving likely last ECt will start Depakote back up cont other meds  9/21 Improved notably with course of ECT, will hold off on further rx, observe whetther any symptoms re appear as she clears from cog se of ECT  9/22 Mood and psychotic symptoms much improved. Will not give further ECT unless any symptoms return. Will see if cognition shows improvement with further time from ECT. Plan titrate Depakote.   9/25 Patient improved but some regression unclear if distress about d/c or true relpase will consider further ECT and increase in Latuda will review with family  9/26 Patient some regression since finishing ECT so are doing rescue ECT also increase Zoloft possible increase Latuda  9/27 Some re appearance of paranoia, cont longer course of ECT and will plan to increase Latuda  9/28 recrudescence of paranoia simmering down cont ECT spread out and increase Latuda, working on getting soft liner for CPA mask to protect nose  9/29 Improved after resumption of ECT Cont ECT 10/2 and tconsider increasing Latuda further if tolerating 60mg.

## 2023-09-29 NOTE — BH INPATIENT PSYCHIATRY PROGRESS NOTE - MSE UNSTRUCTURED FT
Patient dressed in regular clothes.[place] does well with amplifier. Speech normal No EPS emerging. Mood neutral, less anxious Patient denies SI, denies fear of being taken away. Denies fear of being harmed physically on unit. Says she feels safe in other facility. Denies other facility is unclear just says it is an older facility than [facility]. No FTD. Oriented to self, had no recall of every having received ECT.  Limited insight about illness, shows good judgement in ambulation and use of walker

## 2023-09-29 NOTE — BH INPATIENT PSYCHIATRY PROGRESS NOTE - NSBHCHARTREVIEWVS_PSY_A_CORE FT
Vital Signs Last 24 Hrs  T(C): 36.8 (09-29-23 @ 06:13), Max: 36.8 (09-29-23 @ 06:13)  T(F): 98.3 (09-29-23 @ 06:13), Max: 98.3 (09-29-23 @ 06:13)  HR: --  BP: --  BP(mean): --  RR: --  SpO2: --    Orthostatic VS  09-29-23 @ 06:13  Lying BP: --/-- HR: --  Sitting BP: 128/72 HR: 77  Standing BP: 125/59 HR: 89  Site: --  Mode: --  Orthostatic VS  09-28-23 @ 05:56  Lying BP: --/-- HR: --  Sitting BP: 125/59 HR: 82  Standing BP: 122/69 HR: 99  Site: --  Mode: --

## 2023-09-30 RX ADMIN — Medication 2 SPRAY(S): at 20:22

## 2023-09-30 RX ADMIN — Medication 2 SPRAY(S): at 12:28

## 2023-09-30 RX ADMIN — FAMOTIDINE 20 MILLIGRAM(S): 10 INJECTION INTRAVENOUS at 06:11

## 2023-09-30 RX ADMIN — ATORVASTATIN CALCIUM 10 MILLIGRAM(S): 80 TABLET, FILM COATED ORAL at 20:23

## 2023-09-30 RX ADMIN — Medication 2 SPRAY(S): at 09:25

## 2023-09-30 RX ADMIN — LACTULOSE 10 GRAM(S): 10 SOLUTION ORAL at 12:27

## 2023-09-30 RX ADMIN — METFORMIN HYDROCHLORIDE 250 MILLIGRAM(S): 850 TABLET ORAL at 20:23

## 2023-09-30 RX ADMIN — LURASIDONE HYDROCHLORIDE 60 MILLIGRAM(S): 40 TABLET ORAL at 09:25

## 2023-09-30 RX ADMIN — Medication 3 MILLIGRAM(S): at 20:23

## 2023-09-30 RX ADMIN — SERTRALINE 200 MILLIGRAM(S): 25 TABLET, FILM COATED ORAL at 09:24

## 2023-09-30 RX ADMIN — SENNA PLUS 2 TABLET(S): 8.6 TABLET ORAL at 09:25

## 2023-09-30 RX ADMIN — LOSARTAN POTASSIUM 75 MILLIGRAM(S): 100 TABLET, FILM COATED ORAL at 06:10

## 2023-09-30 RX ADMIN — Medication 0.25 MILLIGRAM(S): at 09:24

## 2023-09-30 RX ADMIN — AMLODIPINE BESYLATE 2.5 MILLIGRAM(S): 2.5 TABLET ORAL at 06:11

## 2023-09-30 RX ADMIN — METFORMIN HYDROCHLORIDE 250 MILLIGRAM(S): 850 TABLET ORAL at 09:25

## 2023-10-01 RX ADMIN — LURASIDONE HYDROCHLORIDE 60 MILLIGRAM(S): 40 TABLET ORAL at 08:47

## 2023-10-01 RX ADMIN — AMLODIPINE BESYLATE 2.5 MILLIGRAM(S): 2.5 TABLET ORAL at 06:23

## 2023-10-01 RX ADMIN — Medication 0.25 MILLIGRAM(S): at 08:47

## 2023-10-01 RX ADMIN — Medication 2 SPRAY(S): at 12:32

## 2023-10-01 RX ADMIN — SENNA PLUS 2 TABLET(S): 8.6 TABLET ORAL at 08:46

## 2023-10-01 RX ADMIN — SERTRALINE 200 MILLIGRAM(S): 25 TABLET, FILM COATED ORAL at 08:46

## 2023-10-01 RX ADMIN — LACTULOSE 10 GRAM(S): 10 SOLUTION ORAL at 12:31

## 2023-10-01 RX ADMIN — METFORMIN HYDROCHLORIDE 250 MILLIGRAM(S): 850 TABLET ORAL at 21:22

## 2023-10-01 RX ADMIN — Medication 2 SPRAY(S): at 21:23

## 2023-10-01 RX ADMIN — LOSARTAN POTASSIUM 75 MILLIGRAM(S): 100 TABLET, FILM COATED ORAL at 06:23

## 2023-10-01 RX ADMIN — METFORMIN HYDROCHLORIDE 250 MILLIGRAM(S): 850 TABLET ORAL at 08:47

## 2023-10-01 RX ADMIN — FAMOTIDINE 20 MILLIGRAM(S): 10 INJECTION INTRAVENOUS at 06:23

## 2023-10-01 RX ADMIN — Medication 2 SPRAY(S): at 08:47

## 2023-10-01 RX ADMIN — Medication 3 MILLIGRAM(S): at 21:22

## 2023-10-01 RX ADMIN — ATORVASTATIN CALCIUM 10 MILLIGRAM(S): 80 TABLET, FILM COATED ORAL at 21:22

## 2023-10-02 PROCEDURE — 90870 ELECTROCONVULSIVE THERAPY: CPT

## 2023-10-02 PROCEDURE — 99232 SBSQ HOSP IP/OBS MODERATE 35: CPT | Mod: 25

## 2023-10-02 RX ORDER — CLONAZEPAM 1 MG
0.25 TABLET ORAL DAILY
Refills: 0 | Status: DISCONTINUED | OUTPATIENT
Start: 2023-10-02 | End: 2023-10-09

## 2023-10-02 RX ORDER — FLUMAZENIL 0.1 MG/ML
0.2 VIAL (ML) INTRAVENOUS ONCE
Refills: 0 | Status: COMPLETED | OUTPATIENT
Start: 2023-10-02 | End: 2023-10-02

## 2023-10-02 RX ADMIN — Medication 3 MILLIGRAM(S): at 21:29

## 2023-10-02 RX ADMIN — AMLODIPINE BESYLATE 2.5 MILLIGRAM(S): 2.5 TABLET ORAL at 06:45

## 2023-10-02 RX ADMIN — Medication 2 SPRAY(S): at 21:29

## 2023-10-02 RX ADMIN — Medication 0.2 MILLIGRAM(S): at 11:43

## 2023-10-02 RX ADMIN — LOSARTAN POTASSIUM 75 MILLIGRAM(S): 100 TABLET, FILM COATED ORAL at 06:45

## 2023-10-02 RX ADMIN — ATORVASTATIN CALCIUM 10 MILLIGRAM(S): 80 TABLET, FILM COATED ORAL at 21:29

## 2023-10-02 RX ADMIN — FAMOTIDINE 20 MILLIGRAM(S): 10 INJECTION INTRAVENOUS at 06:44

## 2023-10-02 RX ADMIN — METFORMIN HYDROCHLORIDE 250 MILLIGRAM(S): 850 TABLET ORAL at 21:29

## 2023-10-02 NOTE — BH INPATIENT PSYCHIATRY PROGRESS NOTE - NSBHFUPINTERVALHXFT_PSY_A_CORE
Bipolar d/o, depressed with psychotic features, care discussed w/ tx team, VSS. Patient visible in the dayroom, more subdued than week ago, social with a few peers, taking meds. Patient with good appetite. Slow to get out of bed in morning .  Behaviors in control. Sleep fair with sig awake period last night in middle of nightTolerating ECT well. Patient with irritation on bridge of nose  better.

## 2023-10-02 NOTE — BH INPATIENT PSYCHIATRY PROGRESS NOTE - MSE UNSTRUCTURED FT
Patient dressed in regular clothes.[place] does well with amplifier. Speech normal No EPS emerging. Mood neutral, less anxious Patient denies SI, denies fear of being taken away. Denies fear of being harmed physically on unit. Says she feels safe in other facility. Denies other facility is unclear just says it is an older facility than [facility]. No FTD. Oriented to self, had no recall of every having received ECT.  Limited insight about illness, shows good judgement in ambulation and use of walker

## 2023-10-02 NOTE — CHART NOTE - NSCHARTNOTESELECT_GEN_ALL_CORE
Event Note
Hospitalist/Event Note
Medicine/Event Note
ECT Clearance/Event Note
Event Note
Low Na/Event Note
Nutrition follow up/Nutrition Services

## 2023-10-02 NOTE — BH INPATIENT PSYCHIATRY PROGRESS NOTE - NSBHCHARTREVIEWVS_PSY_A_CORE FT
Vital Signs Last 24 Hrs  T(C): 36.6 (10-02-23 @ 12:30), Max: 36.7 (10-02-23 @ 11:25)  T(F): 97.9 (10-02-23 @ 12:30), Max: 98.1 (10-02-23 @ 11:25)  HR: 70 (10-02-23 @ 12:30) (70 - 76)  BP: 142/55 (10-02-23 @ 12:30) (120/56 - 156/51)  BP(mean): --  RR: 15 (10-02-23 @ 12:30) (15 - 19)  SpO2: 96% (10-02-23 @ 12:30) (94% - 96%)    Orthostatic VS  10-02-23 @ 05:59  Lying BP: 142/48 HR: 66  Sitting BP: 148/58 HR: 72  Standing BP: --/-- HR: --  Site: --  Mode: --  Orthostatic VS  10-01-23 @ 06:03  Lying BP: --/-- HR: --  Sitting BP: 135/56 HR: 74  Standing BP: 138/56 HR: 87  Site: --  Mode: --

## 2023-10-02 NOTE — CHART NOTE - NSCHARTNOTEFT_GEN_A_CORE
Nutrition follow up assessment for length of stay. Patient is a 81 y/o female, domiciled at the [facility], with PMHx of dementia, DM2, and HTN, PPHx of late onset Bipolar I disorder, PTSD and Cluster B Personality traits, 2 prior psych adm last in 2022 at [facility], who presented to ED with worsening paranoia, anxiety, FTT (weight loss of 20lb since march), and confusion. Psychiatry was consulted for psychosis/AMS. Patient is now being transferred to [facility] for psychiatric treatment and stabilization.     Met with patient in the day room today. Patient with [facility]. As per patient and RN, patient has been having good appetite and PO intake at meals. Remains on Minced and moist diet per MD order. No reports of chewing/swallowing difficulties on current diet order. Food preferences already implemented on Cboard; no new food preferences obtained today. Patient states "everything is fine." Denies GI distress (nausea/vomiting/diarrhea/ constipation) today, continues on senna, lactulose, dulcolax PRN, and suppository PRN for bowel regularity per MD order. On fluid restriction per MD order due to hyponatremia; noted current Na+ has improved. Patient did not exhibit interest in diet education during encounter today.      Diet : Diet, Minced and Moist:   1200mL Fluid Restriction ([id number])  Supplement Feeding Modality:  Oral  Glucerna Shake Cans or Servings Per Day:  3       Frequency:  Three Times a day (08-27-23 @ 15:30) [Active]    PO intake:  [ ] < 50%  [ ] 50-75%  [X] %  [ ] other :      Height: 152.4 cm  Weight:  53.5kg (6/7), 55.3kg (7/9), 54.9kg (8/12). 55.3kg (9/30)  -- overall gained 3.4% in ~3.5 month, desirable  BMI: 23.8 (9/30)    Skin: nasal pressure injury II 2/2 CPAP as per wound care consult 9/28    Edema: no edema    Pertinent Medications: MEDICATIONS  (STANDING):  amLODIPine   Tablet 2.5 milliGRAM(s) Oral <User Schedule>  atorvastatin 10 milliGRAM(s) Oral at bedtime  clonazePAM Oral Disintegrating Tablet 0.25 milliGRAM(s) Oral daily  famotidine    Tablet 20 milliGRAM(s) Oral <User Schedule>  glucagon  Injectable 1 milliGRAM(s) IntraMuscular once  lactulose Syrup 10 Gram(s) Oral <User Schedule>  losartan 75 milliGRAM(s) Oral <User Schedule>  lurasidone 60 milliGRAM(s) Oral daily  melatonin. 3 milliGRAM(s) Oral at bedtime  metFORMIN 250 milliGRAM(s) Oral two times a day  senna 2 Tablet(s) Oral daily  sertraline 200 milliGRAM(s) Oral daily  sodium chloride 0.65% Nasal 2 Spray(s) Both Nostrils three times a day    MEDICATIONS  (PRN):  acetaminophen     Tablet .. 650 milliGRAM(s) Oral every 6 hours PRN Temp greater or equal to 38C (100.4F), Mild Pain (1 - 3), Moderate Pain (4 - 6)  bisacodyl 5 milliGRAM(s) Oral daily PRN constipation  bisacodyl Suppository 10 milliGRAM(s) Rectal daily PRN constipation  dextrose Oral Gel 15 Gram(s) Oral once PRN Blood Glucose LESS THAN 70 milliGRAM(s)/deciliter  haloperidol     Tablet 0.5 milliGRAM(s) Oral every 6 hours PRN agitation  haloperidol    Injectable 1 milliGRAM(s) IntraMuscular once PRN agitation  lidocaine   4% Patch 1 Patch Transdermal daily PRN LBP  simethicone 80 milliGRAM(s) Chew every 4 hours PRN abdominal discomfort    Pertinent Labs:  Glucose: POCT Blood Glucose.: 86 mg/dL (09-27-23 @ 08:05)    Sodium: 135 mmol/L (09.13.23 @ 09:30)       Estimated Needs:   [X] no change since previous assessment  [ ] recalculated:       Previous Nutrition Diagnosis: N/A    New Nutrition Diagnosis: [X] not applicable        Recommendations:  1. Diet consistency per SLP's recommendation/MD order. Please consult to SLP for swallow re-eval if patient no longer tolerated current diet consistency.  2. Defer fluid restriction to MD.   3. Latuda to be taken with food/350kcal.   4. c/w bowel regimen per MD order.   5. Encourage po intake as tolerated and honor food preferences PRN.   6. Monitor PO intake/tolerance, weights, labs, BM's, and skin integrity.    -- Tiffanie Lozano, MS, RDN, CDN, Pager # [phone]

## 2023-10-02 NOTE — ECT PRE-PROCEDURE CHECKLIST - INV PERIPH IV LOCATION
Right:/basilic vein
Right:/median cubital vein
Right:/basilic vein
Right:/metacarpal vein
Right:/median cubital vein
Right:/median cubital vein

## 2023-10-02 NOTE — BH INPATIENT PSYCHIATRY PROGRESS NOTE - NSBHASSESSSUMMFT_PSY_ALL_CORE
83 year-old , retired female, domiciled at [facility], [facility] of DM2, HLD, HTN, PPHx of late onset Bipolar disorder, 2 prior psych adm last in 2022 at [facility], who presented to ED with worsening paranoia, anxiety, FTT (weight loss of 20lb since march), psychosis commingled with cognitive impairment   8/14 better but now hard to stay if responding to Abilify or prns of haldol will increase Abilify to 17mg then 20mg/d while initiating ECT discussion  8/15 Not much improvement   except less paranoid which may be attributable to haldol prn. Will d/c Abilify and start  Latuda will discuss ECT option  8/16 COnt Latuda trial while entering into discussion with patient re ECT  8/17 Tolerating latuda well; paranoia remains  8/18 Paranoia continues though improved; increase latuda to 40mg po daily - to be administered with food  8/19: On encounter today pt was seen chatting with one of her peers. Pt denied any complaints and reported feeling well. Denies paranoid thoughts or feeling afraid someone was going to hurt her. Still needing frequent redirection from staff to use her walker.   8/20 Depressed anxious fearful disorganized. Cont Latuda trial will cont to discuss ECT with patient, family is supportive  8/21 Depressed psychotic partial response will cont with ECT patient amenable as long as a family member goes with her for fear she will go off unit and never return. Will get labs ask medicine to see  8/22 Depressed not responded well to meds will increase Latuda while prepping for ECT. Will clarify need for ASA given severe nosebleed hx will discuss nosebleed issue with ECT   8/23 Cont ECT w/u discussed with medicine will   cont ASA  8/24 Psychotic depression with limited response to  multiple medication trials. [place] less floridly delusion but anhedonia in state of constant distress. Patient assents to ECT, daughter Marcelina who is her HCP will provide consent. Patient requests family accompany her to ECT  for support as well as likely afraid she will not come back to unit if taken off. Will d/c Protonix suspension and use Pepcid as the former cannot be give while patient NPO. will lower Latuda as want to begin to reduce meds concurrent with ECT  8/25 Patient tolerated ECT, cont ECT, try to reduce Klonopin when better. Cont Effexor and Latuda. Recheck Na level on Sunday 8/26 Tolerated first ECT amnestic for treatment itself sl calmer. COnt ECT will begin to reduce klonopin to reduce cog se, check BMP in AM  8/27 less dysphoric, still paranoid, no SI/HI. Labs reviewed and discussed with hospitalist. Repeat BMP, urine NA and osmolality to f/u.  8/28 Tolerating ECT, cont treatments spread out so next one  9/1. Eval for further decrease Klonopin. Medicine saw patient , reintroduced metformin at low dose and d/declan ASA based on risk benefit   8/29 Mood psychosis better, sleepy during day confused. Will hold ECT til 9/1 reduce Klonopin  also may reduce Latuda  8/30 Improved with ECT but cog worse Will cont to hold ECT til clearer cont to reduce BDZ  8/31 Patient much better with regard to mood and  psychosis but notably cognitive disrupted form ECT Will hold ECT tomorrow. Will lower Lurasidone as was never effective for psychosis. Will start lowering Effexor and change to Zoloft as Effexor not helpful. Around completion of ECT may restart Depakote given episode of anjelica last admission. Consider further reduction in Ko Klonopin to 0.25mg/d . Reviewed plan of care with daughter  9/1 Patient improved but paranoia creeping back. Will cont Latuda at 40mg  consider increasing back to 60mg. Considered resume VPA based on concern of risk for switching but may inhibit sx requiring higher stimulus which can lead to more confusion. Next rx 9/5 9/2: Patient with psychotic depression, improving, on lurasidone   9/3: Patient's psychosis resolving, mood improved   9/4: Mood and psychotic symptoms improving, some impaired executive function and reasoning   9/5 PAtient improved but anxious, fearful cog impairment due to ECT but less severe. Cont ECT but spread out will, lower Effexor and add Zoloft as Effexor trial failed. Cont Lurasidone  9/6 Patient improving but quite confused. Will hold next treatment til next Monday. Cont to cross over to Zoloft  9/7 Improving will cont with spread out ECT to reudce severity of cog se of ECT will increase Zoloft and d/c Effexor Will rechallenge with CPAP  9/8 Patient improving with ECT next ECT 9/11 cont to titrate Zoloft. COnt encourage use of CPAP  9/11 patient showing improved impulse control, tolerating ECT, increase Zoloft to 125mg daily, will check BMP wed 9/12 more organized today, pleasant on approach, needs redirection to avoid touching peers on the shoulder   9/13: overall improvement in behaviors, needs redirection at times.next ECT friday.   9/14: ECT tomorrow, overall improved   9/15: next ECT monday, overall behaviors in control, redirectable, will keep patient on CO 11-7 since she had ECT today  9/18 Will change ECT to wednesday to space out better consider not doing further ECT after than unless very clear cut symptoms.  Plan resume Delapkote after las ECT, conisider moving Klonopin to later of split to minimize daytime sedation  9/19 Improving will give ECT in AM , peggy last ECT will startr Depakote after ect done  9/20 Improving likely last ECt will start Depakote back up cont other meds  9/21 Improved notably with course of ECT, will hold off on further rx, observe whetther any symptoms re appear as she clears from cog se of ECT  9/22 Mood and psychotic symptoms much improved. Will not give further ECT unless any symptoms return. Will see if cognition shows improvement with further time from ECT. Plan titrate Depakote.   9/25 Patient improved but some regression unclear if distress about d/c or true relpase will consider further ECT and increase in Latuda will review with family  9/26 Patient some regression since finishing ECT so are doing rescue ECT also increase Zoloft possible increase Latuda  9/27 Some re appearance of paranoia, cont longer course of ECT and will plan to increase Latuda  9/28 recrudescence of paranoia simmering down cont ECT spread out and increase Latuda, working on getting soft liner for CPA mask to protect nose  9/29 Improved after resumption of ECT Cont ECT 10/2 and tconsider increasing Latuda further if tolerating 60mg.   10/2 Improved paranoia, cont ECT plan further titration of Lurasidone

## 2023-10-02 NOTE — BH INPATIENT PSYCHIATRY PROGRESS NOTE - NSBHMETABOLIC_PSY_ALL_CORE_FT
BMI: BMI (kg/m2): 23 (06-07-23 @ 18:13)  HbA1c: A1C with Estimated Average Glucose Result: 5.7 % (06-01-23 @ 05:10)    Glucose: POCT Blood Glucose.: 86 mg/dL (09-27-23 @ 08:05)    BP: 142/55 (10-02-23 @ 12:30) (120/56 - 156/51)  Lipid Panel: Date/Time: 06-08-23 @ 08:30  Cholesterol, Serum: 119  Direct LDL: --  HDL Cholesterol, Serum: 47  Total Cholesterol/HDL Ration Measurement: --  Triglycerides, Serum: 56

## 2023-10-03 PROCEDURE — 99232 SBSQ HOSP IP/OBS MODERATE 35: CPT

## 2023-10-03 RX ADMIN — FAMOTIDINE 20 MILLIGRAM(S): 10 INJECTION INTRAVENOUS at 06:45

## 2023-10-03 RX ADMIN — LOSARTAN POTASSIUM 75 MILLIGRAM(S): 100 TABLET, FILM COATED ORAL at 06:45

## 2023-10-03 RX ADMIN — AMLODIPINE BESYLATE 2.5 MILLIGRAM(S): 2.5 TABLET ORAL at 06:45

## 2023-10-03 RX ADMIN — SENNA PLUS 2 TABLET(S): 8.6 TABLET ORAL at 10:05

## 2023-10-03 RX ADMIN — Medication 2 SPRAY(S): at 10:06

## 2023-10-03 RX ADMIN — Medication 3 MILLIGRAM(S): at 21:22

## 2023-10-03 RX ADMIN — Medication 2 SPRAY(S): at 21:24

## 2023-10-03 RX ADMIN — LURASIDONE HYDROCHLORIDE 60 MILLIGRAM(S): 40 TABLET ORAL at 10:06

## 2023-10-03 RX ADMIN — SERTRALINE 200 MILLIGRAM(S): 25 TABLET, FILM COATED ORAL at 10:04

## 2023-10-03 RX ADMIN — Medication 0.25 MILLIGRAM(S): at 10:04

## 2023-10-03 RX ADMIN — ATORVASTATIN CALCIUM 10 MILLIGRAM(S): 80 TABLET, FILM COATED ORAL at 21:22

## 2023-10-03 RX ADMIN — METFORMIN HYDROCHLORIDE 250 MILLIGRAM(S): 850 TABLET ORAL at 10:07

## 2023-10-03 RX ADMIN — METFORMIN HYDROCHLORIDE 250 MILLIGRAM(S): 850 TABLET ORAL at 21:22

## 2023-10-03 NOTE — BH INPATIENT PSYCHIATRY PROGRESS NOTE - NSBHCHARTREVIEWVS_PSY_A_CORE FT
Vital Signs Last 24 Hrs  T(C): 36.4 (10-03-23 @ 05:37), Max: 36.4 (10-03-23 @ 05:37)  T(F): 97.5 (10-03-23 @ 05:37), Max: 97.5 (10-03-23 @ 05:37)  HR: --  BP: --  BP(mean): --  RR: --  SpO2: --    Orthostatic VS  10-03-23 @ 05:37  Lying BP: --/-- HR: --  Sitting BP: 120/67 HR: 67  Standing BP: 131/65 HR: 69  Site: upper right arm  Mode: electronic  Orthostatic VS  10-02-23 @ 05:59  Lying BP: 142/48 HR: 66  Sitting BP: 148/58 HR: 72  Standing BP: --/-- HR: --  Site: --  Mode: --

## 2023-10-03 NOTE — BH INPATIENT PSYCHIATRY PROGRESS NOTE - MSE UNSTRUCTURED FT
Patient dressed in regular clothes.[place] does well with amplifier. Speech normal No EPS emerging. Mood neutral, less anxious Patient denies SI, denies fear of being taken away. Denies fear of being harmed physically on unit. Says she feels safe in other facility. Denies other facility is unclear just says [facility] is an older facility than [facility]. No FTD. Oriented to self, today did not recall any details of ECT Limited insight about illness, shows good judgement in ambulation and use of walker

## 2023-10-03 NOTE — BH INPATIENT PSYCHIATRY PROGRESS NOTE - NSBHFUPINTERVALHXFT_PSY_A_CORE
Bipolar d/o, depressed with psychotic features, care discussed w/ tx team, CHEPE. Patient visible in the dayroom, more subdued than week ago, social with a few peers, taking meds. Patient with good appetite. Slow to get out of bed in morning .  Behaviors in control. Sleep fairly good Tolerating ECT well. Patient with irritation on bridge of nose  better.Told family remember she remember having ECT and expresses distress

## 2023-10-03 NOTE — BH INPATIENT PSYCHIATRY PROGRESS NOTE - NSBHASSESSSUMMFT_PSY_ALL_CORE
83 year-old , retired female, domiciled at [facility], [facility] of DM2, HLD, HTN, PPHx of late onset Bipolar disorder, 2 prior psych adm last in 2022 at [facility], who presented to ED with worsening paranoia, anxiety, FTT (weight loss of 20lb since march), psychosis commingled with cognitive impairment   8/14 better but now hard to stay if responding to Abilify or prns of haldol will increase Abilify to 17mg then 20mg/d while initiating ECT discussion  8/15 Not much improvement   except less paranoid which may be attributable to haldol prn. Will d/c Abilify and start  Latuda will discuss ECT option  8/16 COnt Latuda trial while entering into discussion with patient re ECT  8/17 Tolerating latuda well; paranoia remains  8/18 Paranoia continues though improved; increase latuda to 40mg po daily - to be administered with food  8/19: On encounter today pt was seen chatting with one of her peers. Pt denied any complaints and reported feeling well. Denies paranoid thoughts or feeling afraid someone was going to hurt her. Still needing frequent redirection from staff to use her walker.   8/20 Depressed anxious fearful disorganized. Cont Latuda trial will cont to discuss ECT with patient, family is supportive  8/21 Depressed psychotic partial response will cont with ECT patient amenable as long as a family member goes with her for fear she will go off unit and never return. Will get labs ask medicine to see  8/22 Depressed not responded well to meds will increase Latuda while prepping for ECT. Will clarify need for ASA given severe nosebleed hx will discuss nosebleed issue with ECT   8/23 Cont ECT w/u discussed with medicine will   cont ASA  8/24 Psychotic depression with limited response to  multiple medication trials. Calmer less floridly delusion but anhedonia in state of constant distress. Patient assents to ECT, daughter Marcelina who is her HCP will provide consent. Patient requests family accompany her to ECT  for support as well as likely afraid she will not come back to unit if taken off. Will d/c Protonix suspension and use Pepcid as the former cannot be give while patient NPO. will lower Latuda as want to begin to reduce meds concurrent with ECT  8/25 Patient tolerated ECT, cont ECT, try to reduce Klonopin when better. Cont Effexor and Latuda. Recheck Na level on Sunday 8/26 Tolerated first ECT amnestic for treatment itself sl calmer. COnt ECT will begin to reduce klonopin to reduce cog se, check BMP in AM  8/27 less dysphoric, still paranoid, no SI/HI. Labs reviewed and discussed with hospitalist. Repeat BMP, urine NA and osmolality to f/u.  8/28 Tolerating ECT, cont treatments spread out so next one  9/1. Eval for further decrease Klonopin. Medicine saw patient , reintroduced metformin at low dose and d/declan ASA based on risk benefit   8/29 Mood psychosis better, sleepy during day confused. Will hold ECT til 9/1 reduce Klonopin  also may reduce Latuda  8/30 Improved with ECT but cog worse Will cont to hold ECT til clearer cont to reduce BDZ  8/31 Patient much better with regard to mood and  psychosis but notably cognitive disrupted form ECT Will hold ECT tomorrow. Will lower Lurasidone as was never effective for psychosis. Will start lowering Effexor and change to Zoloft as Effexor not helpful. Around completion of ECT may restart Depakote given episode of anjelica last admission. Consider further reduction in Ko Klonopin to 0.25mg/d . Reviewed plan of care with daughter  9/1 Patient improved but paranoia creeping back. Will cont Latuda at 40mg  consider increasing back to 60mg. Considered resume VPA based on concern of risk for switching but may inhibit sx requiring higher stimulus which can lead to more confusion. Next rx 9/5 9/2: Patient with psychotic depression, improving, on lurasidone   9/3: Patient's psychosis resolving, mood improved   9/4: Mood and psychotic symptoms improving, some impaired executive function and reasoning   9/5 PAtient improved but anxious, fearful cog impairment due to ECT but less severe. Cont ECT but spread out will, lower Effexor and add Zoloft as Effexor trial failed. Cont Lurasidone  9/6 Patient improving but quite confused. Will hold next treatment til next Monday. Cont to cross over to Zoloft  9/7 Improving will cont with spread out ECT to reudce severity of cog se of ECT will increase Zoloft and d/c Effexor Will rechallenge with CPAP  9/8 Patient improving with ECT next ECT 9/11 cont to titrate Zoloft. COnt encourage use of CPAP  9/11 patient showing improved impulse control, tolerating ECT, increase Zoloft to 125mg daily, will check BMP wed 9/12 more organized today, pleasant on approach, needs redirection to avoid touching peers on the shoulder   9/13: overall improvement in behaviors, needs redirection at times.next ECT friday.   9/14: ECT tomorrow, overall improved   9/15: next ECT monday, overall behaviors in control, redirectable, will keep patient on CO 11-7 since she had ECT today  9/18 Will change ECT to wednesday to space out better consider not doing further ECT after than unless very clear cut symptoms.  Plan resume Delapkote after las ECT, conisider moving Klonopin to later of split to minimize daytime sedation  9/19 Improving will give ECT in AM , peggy last ECT will startr Depakote after ect done  9/20 Improving likely last ECt will start Depakote back up cont other meds  9/21 Improved notably with course of ECT, will hold off on further rx, observe whetther any symptoms re appear as she clears from cog se of ECT  9/22 Mood and psychotic symptoms much improved. Will not give further ECT unless any symptoms return. Will see if cognition shows improvement with further time from ECT. Plan titrate Depakote.   9/25 Patient improved but some regression unclear if distress about d/c or true relpase will consider further ECT and increase in Latuda will review with family  9/26 Patient some regression since finishing ECT so are doing rescue ECT also increase Zoloft possible increase Latuda  9/27 Some re appearance of paranoia, cont longer course of ECT and will plan to increase Latuda  9/28 recrudescence of paranoia simmering down cont ECT spread out and increase Latuda, working on getting soft liner for CPA mask to protect nose  9/29 Improved after resumption of ECT Cont ECT 10/2 and consider increasing Latuda further if tolerating 60mg.   10/2 Improved paranoia, cont ECT plan further titration of Lurasidone   10/3 Maintaining gains, no return of paranoia. Will f/u on what she told family, will relay to ECT consider Versed if having distressing recall of ECT

## 2023-10-04 PROCEDURE — 99232 SBSQ HOSP IP/OBS MODERATE 35: CPT

## 2023-10-04 RX ADMIN — SENNA PLUS 2 TABLET(S): 8.6 TABLET ORAL at 08:42

## 2023-10-04 RX ADMIN — ATORVASTATIN CALCIUM 10 MILLIGRAM(S): 80 TABLET, FILM COATED ORAL at 21:09

## 2023-10-04 RX ADMIN — LURASIDONE HYDROCHLORIDE 60 MILLIGRAM(S): 40 TABLET ORAL at 08:42

## 2023-10-04 RX ADMIN — SERTRALINE 200 MILLIGRAM(S): 25 TABLET, FILM COATED ORAL at 08:42

## 2023-10-04 RX ADMIN — Medication 3 MILLIGRAM(S): at 21:09

## 2023-10-04 RX ADMIN — METFORMIN HYDROCHLORIDE 250 MILLIGRAM(S): 850 TABLET ORAL at 08:42

## 2023-10-04 RX ADMIN — LOSARTAN POTASSIUM 75 MILLIGRAM(S): 100 TABLET, FILM COATED ORAL at 06:49

## 2023-10-04 RX ADMIN — Medication 2 SPRAY(S): at 12:48

## 2023-10-04 RX ADMIN — Medication 0.25 MILLIGRAM(S): at 08:42

## 2023-10-04 RX ADMIN — AMLODIPINE BESYLATE 2.5 MILLIGRAM(S): 2.5 TABLET ORAL at 06:49

## 2023-10-04 RX ADMIN — METFORMIN HYDROCHLORIDE 250 MILLIGRAM(S): 850 TABLET ORAL at 21:09

## 2023-10-04 RX ADMIN — FAMOTIDINE 20 MILLIGRAM(S): 10 INJECTION INTRAVENOUS at 06:50

## 2023-10-04 RX ADMIN — LACTULOSE 10 GRAM(S): 10 SOLUTION ORAL at 12:48

## 2023-10-04 RX ADMIN — Medication 2 SPRAY(S): at 21:14

## 2023-10-04 RX ADMIN — Medication 2 SPRAY(S): at 08:42

## 2023-10-04 NOTE — BH INPATIENT PSYCHIATRY PROGRESS NOTE - MSE UNSTRUCTURED FT
Patient dressed in regular clothes.[place] does well with amplifier. Speech normal No EPS emerging. Mood pleasant , affectr brighterPatient denies SI, denies fear of being taken away. Denies fear of being harmed physically on unit. Says she feels safe in other facility, likes it there. Denies other facility is unclear just says it is an older facility than [facility]. No FTD. Oriented to self, today did not recall any details of ECT Limited insight about illness, shows good judgement in ambulation and use of walker

## 2023-10-04 NOTE — BH INPATIENT PSYCHIATRY PROGRESS NOTE - NSBHFUPINTERVALHXFT_PSY_A_CORE
Bipolar d/o, depressed with psychotic features, care discussed w/ tx team, VSS. Patient visible in the dayroom, more subdued than week ago, social with a few peers, taking meds. Patient with good appetite. Slow to get out of bed in morning .  Behaviors in control. Sleep fairly good Tolerating ECT well. Patient with irritation on bridge of nose  better. Out of room more accepting help with personal care.

## 2023-10-04 NOTE — BH INPATIENT PSYCHIATRY PROGRESS NOTE - NSBHASSESSSUMMFT_PSY_ALL_CORE
83 year-old , retired female, domiciled at [facility], [facility] of DM2, HLD, HTN, PPHx of late onset Bipolar disorder, 2 prior psych adm last in 2022 at [facility], who presented to ED with worsening paranoia, anxiety, FTT (weight loss of 20lb since march), psychosis commingled with cognitive impairment   8/14 better but now hard to stay if responding to Abilify or prns of haldol will increase Abilify to 17mg then 20mg/d while initiating ECT discussion  8/15 Not much improvement   except less paranoid which may be attributable to haldol prn. Will d/c Abilify and start  Latuda will discuss ECT option  8/16 COnt Latuda trial while entering into discussion with patient re ECT  8/17 Tolerating latuda well; paranoia remains  8/18 Paranoia continues though improved; increase latuda to 40mg po daily - to be administered with food  8/19: On encounter today pt was seen chatting with one of her peers. Pt denied any complaints and reported feeling well. Denies paranoid thoughts or feeling afraid someone was going to hurt her. Still needing frequent redirection from staff to use her walker.   8/20 Depressed anxious fearful disorganized. Cont Latuda trial will cont to discuss ECT with patient, family is supportive  8/21 Depressed psychotic partial response will cont with ECT patient amenable as long as a family member goes with her for fear she will go off unit and never return. Will get labs ask medicine to see  8/22 Depressed not responded well to meds will increase Latuda while prepping for ECT. Will clarify need for ASA given severe nosebleed hx will discuss nosebleed issue with ECT   8/23 Cont ECT w/u discussed with medicine will   cont ASA  8/24 Psychotic depression with limited response to  multiple medication trials. Calmer less floridly delusion but anhedonia in state of constant distress. Patient assents to ECT, daughter Marcelina who is her HCP will provide consent. Patient requests family accompany her to ECT  for support as well as likely afraid she will not come back to unit if taken off. Will d/c Protonix suspension and use Pepcid as the former cannot be give while patient NPO. will lower Latuda as want to begin to reduce meds concurrent with ECT  8/25 Patient tolerated ECT, cont ECT, try to reduce Klonopin when better. Cont Effexor and Latuda. Recheck Na level on Sunday 8/26 Tolerated first ECT amnestic for treatment itself sl calmer. COnt ECT will begin to reduce klonopin to reduce cog se, check BMP in AM  8/27 less dysphoric, still paranoid, no SI/HI. Labs reviewed and discussed with hospitalist. Repeat BMP, urine NA and osmolality to f/u.  8/28 Tolerating ECT, cont treatments spread out so next one  9/1. Eval for further decrease Klonopin. Medicine saw patient , reintroduced metformin at low dose and d/declan ASA based on risk benefit   8/29 Mood psychosis better, sleepy during day confused. Will hold ECT til 9/1 reduce Klonopin  also may reduce Latuda  8/30 Improved with ECT but cog worse Will cont to hold ECT til clearer cont to reduce BDZ  8/31 Patient much better with regard to mood and  psychosis but notably cognitive disrupted form ECT Will hold ECT tomorrow. Will lower Lurasidone as was never effective for psychosis. Will start lowering Effexor and change to Zoloft as Effexor not helpful. Around completion of ECT may restart Depakote given episode of anjelica last admission. Consider further reduction in Ko Klonopin to 0.25mg/d . Reviewed plan of care with daughter  9/1 Patient improved but paranoia creeping back. Will cont Latuda at 40mg  consider increasing back to 60mg. Considered resume VPA based on concern of risk for switching but may inhibit sx requiring higher stimulus which can lead to more confusion. Next rx 9/5 9/2: Patient with psychotic depression, improving, on lurasidone   9/3: Patient's psychosis resolving, mood improved   9/4: Mood and psychotic symptoms improving, some impaired executive function and reasoning   9/5 PAtient improved but anxious, fearful cog impairment due to ECT but less severe. Cont ECT but spread out will, lower Effexor and add Zoloft as Effexor trial failed. Cont Lurasidone  9/6 Patient improving but quite confused. Will hold next treatment til next Monday. Cont to cross over to Zoloft  9/7 Improving will cont with spread out ECT to reudce severity of cog se of ECT will increase Zoloft and d/c Effexor Will rechallenge with CPAP  9/8 Patient improving with ECT next ECT 9/11 cont to titrate Zoloft. COnt encourage use of CPAP  9/11 patient showing improved impulse control, tolerating ECT, increase Zoloft to 125mg daily, will check BMP wed 9/12 more organized today, pleasant on approach, needs redirection to avoid touching peers on the shoulder   9/13: overall improvement in behaviors, needs redirection at times.next ECT friday.   9/14: ECT tomorrow, overall improved   9/15: next ECT monday, overall behaviors in control, redirectable, will keep patient on CO 11-7 since she had ECT today  9/18 Will change ECT to wednesday to space out better consider not doing further ECT after than unless very clear cut symptoms.  Plan resume Delapkote after las ECT, conisider moving Klonopin to later of split to minimize daytime sedation  9/19 Improving will give ECT in AM , peggy last ECT will startr Depakote after ect done  9/20 Improving likely last ECt will start Depakote back up cont other meds  9/21 Improved notably with course of ECT, will hold off on further rx, observe whetther any symptoms re appear as she clears from cog se of ECT  9/22 Mood and psychotic symptoms much improved. Will not give further ECT unless any symptoms return. Will see if cognition shows improvement with further time from ECT. Plan titrate Depakote.   9/25 Patient improved but some regression unclear if distress about d/c or true relpase will consider further ECT and increase in Latuda will review with family  9/26 Patient some regression since finishing ECT so are doing rescue ECT also increase Zoloft possible increase Latuda  9/27 Some re appearance of paranoia, cont longer course of ECT and will plan to increase Latuda  9/28 recrudescence of paranoia simmering down cont ECT spread out and increase Latuda, working on getting soft liner for CPA mask to protect nose  9/29 Improved after resumption of ECT Cont ECT 10/2 and consider increasing Latuda further if tolerating 60mg.   10/2 Improved paranoia, cont ECT plan further titration of Lurasidone   10/3 Maintaining gains, no return of paranoia. Will f/u on what she told family, will relay to ECT consider Versed if having distressing recall of ECT  10/4  Improved, cont ECT, plan increase Latuda, restart VPa after last ECT

## 2023-10-04 NOTE — BH INPATIENT PSYCHIATRY PROGRESS NOTE - NSBHCHARTREVIEWVS_PSY_A_CORE FT
Vital Signs Last 24 Hrs  T(C): 36.4 (10-04-23 @ 05:45), Max: 36.4 (10-04-23 @ 05:45)  T(F): 97.5 (10-04-23 @ 05:45), Max: 97.5 (10-04-23 @ 05:45)  HR: --  BP: --  BP(mean): --  RR: --  SpO2: 98% (10-04-23 @ 05:45) (98% - 98%)    Orthostatic VS  10-04-23 @ 05:45  Lying BP: --/-- HR: --  Sitting BP: 102/47 HR: 81  Standing BP: 111/53 HR: 97  Site: upper right arm  Mode: electronic  Orthostatic VS  10-03-23 @ 05:37  Lying BP: --/-- HR: --  Sitting BP: 120/67 HR: 67  Standing BP: 131/65 HR: 69  Site: upper right arm  Mode: electronic

## 2023-10-05 PROCEDURE — 99232 SBSQ HOSP IP/OBS MODERATE 35: CPT

## 2023-10-05 RX ADMIN — Medication 3 MILLIGRAM(S): at 21:04

## 2023-10-05 RX ADMIN — Medication 2 SPRAY(S): at 12:16

## 2023-10-05 RX ADMIN — Medication 2 SPRAY(S): at 09:39

## 2023-10-05 RX ADMIN — METFORMIN HYDROCHLORIDE 250 MILLIGRAM(S): 850 TABLET ORAL at 09:38

## 2023-10-05 RX ADMIN — LACTULOSE 10 GRAM(S): 10 SOLUTION ORAL at 12:16

## 2023-10-05 RX ADMIN — AMLODIPINE BESYLATE 2.5 MILLIGRAM(S): 2.5 TABLET ORAL at 06:45

## 2023-10-05 RX ADMIN — LURASIDONE HYDROCHLORIDE 60 MILLIGRAM(S): 40 TABLET ORAL at 09:39

## 2023-10-05 RX ADMIN — SERTRALINE 200 MILLIGRAM(S): 25 TABLET, FILM COATED ORAL at 09:39

## 2023-10-05 RX ADMIN — LOSARTAN POTASSIUM 75 MILLIGRAM(S): 100 TABLET, FILM COATED ORAL at 06:45

## 2023-10-05 RX ADMIN — FAMOTIDINE 20 MILLIGRAM(S): 10 INJECTION INTRAVENOUS at 06:45

## 2023-10-05 RX ADMIN — SENNA PLUS 2 TABLET(S): 8.6 TABLET ORAL at 09:39

## 2023-10-05 RX ADMIN — ATORVASTATIN CALCIUM 10 MILLIGRAM(S): 80 TABLET, FILM COATED ORAL at 21:04

## 2023-10-05 RX ADMIN — Medication 0.25 MILLIGRAM(S): at 09:39

## 2023-10-05 RX ADMIN — METFORMIN HYDROCHLORIDE 250 MILLIGRAM(S): 850 TABLET ORAL at 21:04

## 2023-10-05 NOTE — BH INPATIENT PSYCHIATRY PROGRESS NOTE - NSBHFUPINTERVALHXFT_PSY_A_CORE
Bipolar d/o, depressed with psychotic features, care discussed w/ tx team, VSS. Patient visible in the dayroom, , social with a few peers, taking meds. Patient with good appetite.   Sleep fairly well Tolerating ECT well. Patient with irritation on bridge of nose  better. Out of room more accepting help with personal care. Family brought in her own CPAP awaiting checking from Biomedical Department

## 2023-10-05 NOTE — BH INPATIENT PSYCHIATRY PROGRESS NOTE - MSE UNSTRUCTURED FT
Patient dressed in regular clothes.[place] does well with amplifier. Speech normal No EPS emerging. Mood pleasant , affectr brighterPatient denies SI, denies fear of being taken away. Denies fear of being harmed physically on unit. Says she feels safe in other facility, likes it there. Denies other facility is unclear just says it is an older facility than [facility]. No FTD. Oriented to self, today did not recall any details of ECT Limited insight about illness related in part to retrograde amnesia, shows good judgement in ambulation and use of walker

## 2023-10-05 NOTE — BH INPATIENT PSYCHIATRY PROGRESS NOTE - NSBHASSESSSUMMFT_PSY_ALL_CORE
83 year-old , retired female, domiciled at [facility], [facility] of DM2, HLD, HTN, PPHx of late onset Bipolar disorder, 2 prior psych adm last in 2022 at [facility], who presented to ED with worsening paranoia, anxiety, FTT (weight loss of 20lb since march), psychosis commingled with cognitive impairment   8/14 better but now hard to stay if responding to Abilify or prns of haldol will increase Abilify to 17mg then 20mg/d while initiating ECT discussion  8/15 Not much improvement   except less paranoid which may be attributable to haldol prn. Will d/c Abilify and start  Latuda will discuss ECT option  8/16 COnt Latuda trial while entering into discussion with patient re ECT  8/17 Tolerating latuda well; paranoia remains  8/18 Paranoia continues though improved; increase latuda to 40mg po daily - to be administered with food  8/19: On encounter today pt was seen chatting with one of her peers. Pt denied any complaints and reported feeling well. Denies paranoid thoughts or feeling afraid someone was going to hurt her. Still needing frequent redirection from staff to use her walker.   8/20 Depressed anxious fearful disorganized. Cont Latuda trial will cont to discuss ECT with patient, family is supportive  8/21 Depressed psychotic partial response will cont with ECT patient amenable as long as a family member goes with her for fear she will go off unit and never return. Will get labs ask medicine to see  8/22 Depressed not responded well to meds will increase Latuda while prepping for ECT. Will clarify need for ASA given severe nosebleed hx will discuss nosebleed issue with ECT   8/23 Cont ECT w/u discussed with medicine will   cont ASA  8/24 Psychotic depression with limited response to  multiple medication trials. Calmer less floridly delusion but anhedonia in state of constant distress. Patient assents to ECT, daughter Marcelina who is her HCP will provide consent. Patient requests family accompany her to ECT  for support as well as likely afraid she will not come back to unit if taken off. Will d/c Protonix suspension and use Pepcid as the former cannot be give while patient NPO. will lower Latuda as want to begin to reduce meds concurrent with ECT  8/25 Patient tolerated ECT, cont ECT, try to reduce Klonopin when better. Cont Effexor and Latuda. Recheck Na level on Sunday 8/26 Tolerated first ECT amnestic for treatment itself sl calmer. COnt ECT will begin to reduce klonopin to reduce cog se, check BMP in AM  8/27 less dysphoric, still paranoid, no SI/HI. Labs reviewed and discussed with hospitalist. Repeat BMP, urine NA and osmolality to f/u.  8/28 Tolerating ECT, cont treatments spread out so next one  9/1. Eval for further decrease Klonopin. Medicine saw patient , reintroduced metformin at low dose and d/declan ASA based on risk benefit   8/29 Mood psychosis better, sleepy during day confused. Will hold ECT til 9/1 reduce Klonopin  also may reduce Latuda  8/30 Improved with ECT but cog worse Will cont to hold ECT til clearer cont to reduce BDZ  8/31 Patient much better with regard to mood and  psychosis but notably cognitive disrupted form ECT Will hold ECT tomorrow. Will lower Lurasidone as was never effective for psychosis. Will start lowering Effexor and change to Zoloft as Effexor not helpful. Around completion of ECT may restart Depakote given episode of anjelica last admission. Consider further reduction in Ko Klonopin to 0.25mg/d . Reviewed plan of care with daughter  9/1 Patient improved but paranoia creeping back. Will cont Latuda at 40mg  consider increasing back to 60mg. Considered resume VPA based on concern of risk for switching but may inhibit sx requiring higher stimulus which can lead to more confusion. Next rx 9/5 9/2: Patient with psychotic depression, improving, on lurasidone   9/3: Patient's psychosis resolving, mood improved   9/4: Mood and psychotic symptoms improving, some impaired executive function and reasoning   9/5 PAtient improved but anxious, fearful cog impairment due to ECT but less severe. Cont ECT but spread out will, lower Effexor and add Zoloft as Effexor trial failed. Cont Lurasidone  9/6 Patient improving but quite confused. Will hold next treatment til next Monday. Cont to cross over to Zoloft  9/7 Improving will cont with spread out ECT to reudce severity of cog se of ECT will increase Zoloft and d/c Effexor Will rechallenge with CPAP  9/8 Patient improving with ECT next ECT 9/11 cont to titrate Zoloft. COnt encourage use of CPAP  9/11 patient showing improved impulse control, tolerating ECT, increase Zoloft to 125mg daily, will check BMP wed 9/12 more organized today, pleasant on approach, needs redirection to avoid touching peers on the shoulder   9/13: overall improvement in behaviors, needs redirection at times.next ECT friday.   9/14: ECT tomorrow, overall improved   9/15: next ECT monday, overall behaviors in control, redirectable, will keep patient on CO 11-7 since she had ECT today  9/18 Will change ECT to wednesday to space out better consider not doing further ECT after than unless very clear cut symptoms.  Plan resume Delapkote after las ECT, conisider moving Klonopin to later of split to minimize daytime sedation  9/19 Improving will give ECT in AM , peggy last ECT will startr Depakote after ect done  9/20 Improving likely last ECt will start Depakote back up cont other meds  9/21 Improved notably with course of ECT, will hold off on further rx, observe whetther any symptoms re appear as she clears from cog se of ECT  9/22 Mood and psychotic symptoms much improved. Will not give further ECT unless any symptoms return. Will see if cognition shows improvement with further time from ECT. Plan titrate Depakote.   9/25 Patient improved but some regression unclear if distress about d/c or true relpase will consider further ECT and increase in Latuda will review with family  9/26 Patient some regression since finishing ECT so are doing rescue ECT also increase Zoloft possible increase Latuda  9/27 Some re appearance of paranoia, cont longer course of ECT and will plan to increase Latuda  9/28 recrudescence of paranoia simmering down cont ECT spread out and increase Latuda, working on getting soft liner for CPA mask to protect nose  9/29 Improved after resumption of ECT Cont ECT 10/2 and consider increasing Latuda further if tolerating 60mg.   10/2 Improved paranoia, cont ECT plan further titration of Lurasidone   10/3 Maintaining gains, no return of paranoia. Will f/u on what she told family, will relay to ECT consider Versed if having distressing recall of ECT  10/4  Improved, cont ECT, plan increase Latuda, restart VPa after last ECT  10/5 Improving mood, no retrun of suspiciousnes, does not clear much between ECT. More accepting of help with ADL's. Plan next ECT in AM likely last one. Plan increase LAtuda after last ECT and restart Depakote

## 2023-10-05 NOTE — BH INPATIENT PSYCHIATRY PROGRESS NOTE - NSBHCHARTREVIEWVS_PSY_A_CORE FT
Vital Signs Last 24 Hrs  T(C): 36.1 (10-05-23 @ 05:53), Max: 36.1 (10-05-23 @ 05:53)  T(F): 97 (10-05-23 @ 05:53), Max: 97 (10-05-23 @ 05:53)  HR: --  BP: --  BP(mean): --  RR: --  SpO2: --    Orthostatic VS  10-05-23 @ 05:53  Lying BP: --/-- HR: --  Sitting BP: 114/57 HR: 69  Standing BP: 129/63 HR: 74  Site: --  Mode: --  Orthostatic VS  10-04-23 @ 05:45  Lying BP: --/-- HR: --  Sitting BP: 102/47 HR: 81  Standing BP: 111/53 HR: 97  Site: upper right arm  Mode: electronic

## 2023-10-05 NOTE — BH INPATIENT PSYCHIATRY PROGRESS NOTE - NSBHMETABOLIC_PSY_ALL_CORE_FT
BMI: BMI (kg/m2): 23 (06-07-23 @ 18:13)  HbA1c: A1C with Estimated Average Glucose Result: 5.7 % (06-01-23 @ 05:10)    Glucose: POCT Blood Glucose.: 86 mg/dL (09-27-23 @ 08:05)    BP: --  Lipid Panel: Date/Time: 06-08-23 @ 08:30  Cholesterol, Serum: 119  Direct LDL: --  HDL Cholesterol, Serum: 47  Total Cholesterol/HDL Ration Measurement: --  Triglycerides, Serum: 56

## 2023-10-06 LAB — GLUCOSE BLDC GLUCOMTR-MCNC: 101 MG/DL — HIGH (ref 70–99)

## 2023-10-06 PROCEDURE — 99232 SBSQ HOSP IP/OBS MODERATE 35: CPT | Mod: 25

## 2023-10-06 PROCEDURE — 90870 ELECTROCONVULSIVE THERAPY: CPT

## 2023-10-06 RX ORDER — DIVALPROEX SODIUM 500 MG/1
250 TABLET, DELAYED RELEASE ORAL
Refills: 0 | Status: DISCONTINUED | OUTPATIENT
Start: 2023-10-07 | End: 2023-10-10

## 2023-10-06 RX ORDER — FLUMAZENIL 0.1 MG/ML
0.2 VIAL (ML) INTRAVENOUS ONCE
Refills: 0 | Status: COMPLETED | OUTPATIENT
Start: 2023-10-06 | End: 2023-10-06

## 2023-10-06 RX ADMIN — LOSARTAN POTASSIUM 75 MILLIGRAM(S): 100 TABLET, FILM COATED ORAL at 06:28

## 2023-10-06 RX ADMIN — AMLODIPINE BESYLATE 2.5 MILLIGRAM(S): 2.5 TABLET ORAL at 06:29

## 2023-10-06 RX ADMIN — METFORMIN HYDROCHLORIDE 250 MILLIGRAM(S): 850 TABLET ORAL at 21:23

## 2023-10-06 RX ADMIN — ATORVASTATIN CALCIUM 10 MILLIGRAM(S): 80 TABLET, FILM COATED ORAL at 21:24

## 2023-10-06 RX ADMIN — SERTRALINE 200 MILLIGRAM(S): 25 TABLET, FILM COATED ORAL at 10:00

## 2023-10-06 RX ADMIN — LURASIDONE HYDROCHLORIDE 60 MILLIGRAM(S): 40 TABLET ORAL at 10:00

## 2023-10-06 RX ADMIN — FAMOTIDINE 20 MILLIGRAM(S): 10 INJECTION INTRAVENOUS at 06:27

## 2023-10-06 RX ADMIN — Medication 0.25 MILLIGRAM(S): at 10:09

## 2023-10-06 RX ADMIN — Medication 2 SPRAY(S): at 21:25

## 2023-10-06 RX ADMIN — LACTULOSE 10 GRAM(S): 10 SOLUTION ORAL at 13:32

## 2023-10-06 RX ADMIN — Medication 0.2 MILLIGRAM(S): at 10:59

## 2023-10-06 RX ADMIN — SENNA PLUS 2 TABLET(S): 8.6 TABLET ORAL at 10:00

## 2023-10-06 RX ADMIN — Medication 2 SPRAY(S): at 09:59

## 2023-10-06 RX ADMIN — METFORMIN HYDROCHLORIDE 250 MILLIGRAM(S): 850 TABLET ORAL at 10:01

## 2023-10-06 RX ADMIN — Medication 3 MILLIGRAM(S): at 21:24

## 2023-10-06 NOTE — ECT PRE-PROCEDURE CHECKLIST - PERIPHERAL IV: INSERTION DATE
05-Sep-2023
28-Aug-2023
20-Sep-2023
27-Sep-2023
25-Aug-2023
06-Oct-2023
15-Sep-2023
02-Oct-2023
11-Sep-2023

## 2023-10-06 NOTE — ECT PRE-PROCEDURE CHECKLIST - AS BP NONINV SITE
left upper arm

## 2023-10-06 NOTE — ECT PRE-PROCEDURE CHECKLIST - NS PRO AD PATIENT TYPE ON CHART
Health Care Proxy (HCP)/Do Not Resuscitate (DNR)

## 2023-10-06 NOTE — ECT PRE-PROCEDURE CHECKLIST - NSPTPHYSICALIMPAIR_PSY_ALL_CORE
yes (specify)

## 2023-10-06 NOTE — BH TREATMENT PLAN - NSTXDISORGINTERMD_PSY_ALL_CORE
Medication titration
Medication titration/ ECT
Medication titration
Medication titration/ ECT
Medication titration

## 2023-10-06 NOTE — BH TREATMENT PLAN - NSTXCOPEINTERPR_PSY_ALL_CORE
Over the past seven days, patient demonstrated some progress towards her treatment goal evidenced by responding more positively towards staff’s redirection/assistance. Patient continues to endorse anxiety and fear of unknowing, however at times, patient smiles and able to hold calmness for brief seconds. Psychiatric rehabilitation team will continue to engage with patient daily and work towards decreasing anxiety through autonomous execution of self-regulation skills.
Patient has demonstrated minimal progress towards psychiatric rehabilitation goal of identifying and utilizing coping skills to better manage symptoms. Psychiatric rehabilitation recommends patient continue to attend groups, engage in individual sessions, and participate in activities in the milieu to continue exploring coping skills to manage symptoms.
Patient will identify and utilize two coping skills daily to manage her anxiety within seven days. Patient will be encouraged to participate in one rehab group daily.

## 2023-10-06 NOTE — ECT TREATMENT NOTE - NSICDXBHPRIMARYDX_PSY_ALL_CORE
Bipolar I disorder, most recent episode mixed, severe with psychotic features   F31.64  

## 2023-10-06 NOTE — BH TREATMENT PLAN - NSTXDCOTHRGOAL_PSY_ALL_CORE
The pt will be discharged back to [facility] where she will have onsite psychiatric f/u. If the pt's inpatient stay exceeds her Medicaid bedhold, referral to [facility] will be made again.
The pt will be discharged back to [facility] with onsite psychiatric f/u. Referral to a [facility] for RN, PT, and services will be made.
pt will comply with treatment and improve mental status in order to effectively engage with dc planning.
pt will comply with treatment in order to effectively engage with dc planning.
The pt will be discharged back to [facility] where she will have onsite psychiatric f/u.
No

## 2023-10-06 NOTE — BH TREATMENT PLAN - NSBHPRIMARYDX_PSY_ALL_CORE
Moderate bipolar II disorder, depressed, with anxious distress    
Bipolar I disorder, most recent episode mixed, severe with psychotic features    
Moderate bipolar II disorder, depressed, with anxious distress    
Bipolar I disorder, most recent episode mixed, severe with psychotic features    
Bipolar I disorder, most recent episode mixed, severe with psychotic features

## 2023-10-06 NOTE — ECT PRE-PROCEDURE CHECKLIST - NSPROEDALEARNPREF_GEN_A_NUR
individual instruction

## 2023-10-06 NOTE — BH INPATIENT PSYCHIATRY PROGRESS NOTE - NSBHFUPINTERVALHXFT_PSY_A_CORE
Bipolar d/o, depressed with psychotic features, care discussed w/ tx team, VSS. Patient visible in the dayroom, , social with a few peers, taking meds. Patient with good appetite.   Sleep fairly well Tolerating ECT well. Patient with irritation on bridge of nose  better. Out of room more accepting help with personal care. Family brought in her own CPAP cord missing awaiting checking from Biomedical Department when unit complete

## 2023-10-06 NOTE — ECT TREATMENT NOTE - NSECTPOSTTXSUMMARY_PSY_ALL_CORE
The patient had a well modified grand mal seizure under general anesthesia and muscle relaxant. The patient is alert, responsive, in no acute distress.  Recovery uneventful. 

## 2023-10-06 NOTE — ECT TREATMENT NOTE - NSECTCPTCODE_PSY_ALL_CORE
89988 (ECT-Electroconvulsive Therapy)
77752 (ECT-Electroconvulsive Therapy)
20010 (ECT-Electroconvulsive Therapy)
34494 (ECT-Electroconvulsive Therapy)
60065 (ECT-Electroconvulsive Therapy)
66916 (ECT-Electroconvulsive Therapy)
33691 (ECT-Electroconvulsive Therapy)
00598 (ECT-Electroconvulsive Therapy)
15645 (ECT-Electroconvulsive Therapy)

## 2023-10-06 NOTE — ECT TREATMENT NOTE - NSECTTXPERFDATETIME_PSY_ALL_CORE
11-Sep-2023 09:48
27-Sep-2023 10:22
25-Aug-2023 12:20
02-Oct-2023 11:53
05-Sep-2023 11:14
28-Aug-2023 12:35
15-Sep-2023 10:23
06-Oct-2023 11:00
20-Sep-2023 12:02

## 2023-10-06 NOTE — BH INPATIENT PSYCHIATRY PROGRESS NOTE - NSBHASSESSSUMMFT_PSY_ALL_CORE
83 year-old , retired female, domiciled at [facility], [facility] of DM2, HLD, HTN, PPHx of late onset Bipolar disorder, 2 prior psych adm last in 2022 at [facility], who presented to ED with worsening paranoia, anxiety, FTT (weight loss of 20lb since march), psychosis commingled with cognitive impairment   8/14 better but now hard to stay if responding to Abilify or prns of haldol will increase Abilify to 17mg then 20mg/d while initiating ECT discussion  8/15 Not much improvement   except less paranoid which may be attributable to haldol prn. Will d/c Abilify and start  Latuda will discuss ECT option  8/16 COnt Latuda trial while entering into discussion with patient re ECT  8/17 Tolerating latuda well; paranoia remains  8/18 Paranoia continues though improved; increase latuda to 40mg po daily - to be administered with food  8/19: On encounter today pt was seen chatting with one of her peers. Pt denied any complaints and reported feeling well. Denies paranoid thoughts or feeling afraid someone was going to hurt her. Still needing frequent redirection from staff to use her walker.   8/20 Depressed anxious fearful disorganized. Cont Latuda trial will cont to discuss ECT with patient, family is supportive  8/21 Depressed psychotic partial response will cont with ECT patient amenable as long as a family member goes with her for fear she will go off unit and never return. Will get labs ask medicine to see  8/22 Depressed not responded well to meds will increase Latuda while prepping for ECT. Will clarify need for ASA given severe nosebleed hx will discuss nosebleed issue with ECT   8/23 Cont ECT w/u discussed with medicine will   cont ASA  8/24 Psychotic depression with limited response to  multiple medication trials. Calmer less floridly delusion but anhedonia in state of constant distress. Patient assents to ECT, daughter Marcelina who is her HCP will provide consent. Patient requests family accompany her to ECT  for support as well as likely afraid she will not come back to unit if taken off. Will d/c Protonix suspension and use Pepcid as the former cannot be give while patient NPO. will lower Latuda as want to begin to reduce meds concurrent with ECT  8/25 Patient tolerated ECT, cont ECT, try to reduce Klonopin when better. Cont Effexor and Latuda. Recheck Na level on Sunday 8/26 Tolerated first ECT amnestic for treatment itself sl calmer. COnt ECT will begin to reduce klonopin to reduce cog se, check BMP in AM  8/27 less dysphoric, still paranoid, no SI/HI. Labs reviewed and discussed with hospitalist. Repeat BMP, urine NA and osmolality to f/u.  8/28 Tolerating ECT, cont treatments spread out so next one  9/1. Eval for further decrease Klonopin. Medicine saw patient , reintroduced metformin at low dose and d/declan ASA based on risk benefit   8/29 Mood psychosis better, sleepy during day confused. Will hold ECT til 9/1 reduce Klonopin  also may reduce Latuda  8/30 Improved with ECT but cog worse Will cont to hold ECT til clearer cont to reduce BDZ  8/31 Patient much better with regard to mood and  psychosis but notably cognitive disrupted form ECT Will hold ECT tomorrow. Will lower Lurasidone as was never effective for psychosis. Will start lowering Effexor and change to Zoloft as Effexor not helpful. Around completion of ECT may restart Depakote given episode of anjelica last admission. Consider further reduction in Ko Klonopin to 0.25mg/d . Reviewed plan of care with daughter  9/1 Patient improved but paranoia creeping back. Will cont Latuda at 40mg  consider increasing back to 60mg. Considered resume VPA based on concern of risk for switching but may inhibit sx requiring higher stimulus which can lead to more confusion. Next rx 9/5 9/2: Patient with psychotic depression, improving, on lurasidone   9/3: Patient's psychosis resolving, mood improved   9/4: Mood and psychotic symptoms improving, some impaired executive function and reasoning   9/5 PAtient improved but anxious, fearful cog impairment due to ECT but less severe. Cont ECT but spread out will, lower Effexor and add Zoloft as Effexor trial failed. Cont Lurasidone  9/6 Patient improving but quite confused. Will hold next treatment til next Monday. Cont to cross over to Zoloft  9/7 Improving will cont with spread out ECT to reudce severity of cog se of ECT will increase Zoloft and d/c Effexor Will rechallenge with CPAP  9/8 Patient improving with ECT next ECT 9/11 cont to titrate Zoloft. COnt encourage use of CPAP  9/11 patient showing improved impulse control, tolerating ECT, increase Zoloft to 125mg daily, will check BMP wed 9/12 more organized today, pleasant on approach, needs redirection to avoid touching peers on the shoulder   9/13: overall improvement in behaviors, needs redirection at times.next ECT friday.   9/14: ECT tomorrow, overall improved   9/15: next ECT monday, overall behaviors in control, redirectable, will keep patient on CO 11-7 since she had ECT today  9/18 Will change ECT to wednesday to space out better consider not doing further ECT after than unless very clear cut symptoms.  Plan resume Delapkote after las ECT, conisider moving Klonopin to later of split to minimize daytime sedation  9/19 Improving will give ECT in AM , peggy last ECT will startr Depakote after ect done  9/20 Improving likely last ECt will start Depakote back up cont other meds  9/21 Improved notably with course of ECT, will hold off on further rx, observe whetther any symptoms re appear as she clears from cog se of ECT  9/22 Mood and psychotic symptoms much improved. Will not give further ECT unless any symptoms return. Will see if cognition shows improvement with further time from ECT. Plan titrate Depakote.   9/25 Patient improved but some regression unclear if distress about d/c or true relpase will consider further ECT and increase in Latuda will review with family  9/26 Patient some regression since finishing ECT so are doing rescue ECT also increase Zoloft possible increase Latuda  9/27 Some re appearance of paranoia, cont longer course of ECT and will plan to increase Latuda  9/28 recrudescence of paranoia simmering down cont ECT spread out and increase Latuda, working on getting soft liner for CPA mask to protect nose  9/29 Improved after resumption of ECT Cont ECT 10/2 and consider increasing Latuda further if tolerating 60mg.   10/2 Improved paranoia, cont ECT plan further titration of Lurasidone   10/3 Maintaining gains, no return of paranoia. Will f/u on what she told family, will relay to ECT consider Versed if having distressing recall of ECT  10/4  Improved, cont ECT, plan increase Latuda, restart VPa after last ECT  10/5 Improving mood, no retrun of suspiciousnes, does not clear much between ECT. More accepting of help with ADL's. Plan next ECT in AM likely last one. Plan increase Ltuda after last ECT and restart Depakote  10/6 Improvined from ECT, confused , no unusual memories of treatment. Plan complete course of ECT will restart Depakote . Plan titration  of Latuda

## 2023-10-06 NOTE — BH TREATMENT PLAN - NSTXPATIENTPARTICIPATE_PSY_ALL_CORE
No, patient unwilling to participate
Patient participated in defining interventions
Patient participated in defining interventions
Patient participated in identification of needs/problems/goals for treatment
No, patient unwilling to participate

## 2023-10-06 NOTE — BH TREATMENT PLAN - NSTXDCOTHRINTERMD_PSY_ALL_CORE
Discuss with family when psychiatrically stable
Closure 3 Information: This tab is for additional flaps and grafts above and beyond our usual structured repairs.  Please note if you enter information here it will not currently bill and you will need to add the billing information manually.

## 2023-10-06 NOTE — ECT PRE-PROCEDURE CHECKLIST - NSPTSENTTO_PSY_ALL_CORE
procedural room

## 2023-10-06 NOTE — BH INPATIENT PSYCHIATRY PROGRESS NOTE - NSBHMETABOLIC_PSY_ALL_CORE_FT
BMI: BMI (kg/m2): 23 (06-07-23 @ 18:13)  HbA1c: A1C with Estimated Average Glucose Result: 5.7 % (06-01-23 @ 05:10)    Glucose: POCT Blood Glucose.: 101 mg/dL (10-06-23 @ 07:39)    BP: 178/58 (10-06-23 @ 11:50) (128/70 - 178/58)  Lipid Panel: Date/Time: 06-08-23 @ 08:30  Cholesterol, Serum: 119  Direct LDL: --  HDL Cholesterol, Serum: 47  Total Cholesterol/HDL Ration Measurement: --  Triglycerides, Serum: 56

## 2023-10-06 NOTE — ECT TREATMENT NOTE - NSECTPTEVAL_PSY_ALL_CORE
Patient evaluated and History and Physical reviewed prior to ECT. There are no significant changes to the patient's condition unless specified.

## 2023-10-06 NOTE — ECT PRE-PROCEDURE CHECKLIST - NSECTCONSENT_PSY_ALL_CORE
ECT consent  obtained on 8/24/23  Anesthesia consent expires on 11/24/23/yes

## 2023-10-06 NOTE — ECT PRE-PROCEDURE CHECKLIST - NSPROEDAABILITYLEARN_GEN_A_NUR
anxiety/hearing problems
anxiety
anxiety/hearing problems

## 2023-10-06 NOTE — ECT PRE-PROCEDURE CHECKLIST - TO WHOM
Procedure room nurse 

## 2023-10-06 NOTE — ECT PRE-PROCEDURE CHECKLIST - NSDISPOFBELONG_PSY_ALL_CORE
given to procedural RN
given to procedural RN
not applicable
remains with patient
given to procedural RN
given to procedural RN
not applicable
given to procedural RN
given to procedural RN

## 2023-10-06 NOTE — BH TREATMENT PLAN - NSTXDCOTHRINTERSW_PSY_ALL_CORE
SW will provide support, insight, discharge planning, psycho-education, maintain contact with identified supports and encourage treatment compliance.
sw will provide support and dc planing. interface with family and community resources.
SW provides pt support and psychoeducation, reinforces her adaptive efforts, and addresses discharge plan of return to pt's prior residence. BABATUNDE maintains contact with the pt's daughter, providing treatment updates, gaining her perspective, and addressing discharge planning issues. BABATUNDE maintains contact with [facility] admissions director, providing treatment update and discussing the pt's discharge readiness.
SW provides pt support and psychoeducation about depression and ECT and reinforces her adaptive efforts to participate in the unit milieu. SW maintains contact with pt's daughter, providing treatment updates, gaining her perspective, and addressing treatment plan of ECT and discharge planning issues.

## 2023-10-06 NOTE — BH TREATMENT PLAN - NSTXPLANTHERAPYSESSIONSFT_PSY_ALL_CORE
08-23-23  Type of therapy: Coping skills, Leisure development, Psychoeducation  Type of session: Individual  Level of patient participation: Participates  Duration of participation: 25 min  Therapy conducted by: Psych rehab  Therapy Summary: Writer met with patient to assess her progress towards her treatment goal. Upon approach, patient was sitting in the dayroom, finishing up her breakfast. Patient presented with anxiety and confusion, repetitively asking “What is going to happen to me?.” Throughout the session, patient provided ample amount of reassurance and validations. Writer attempted to do 5,4,3,2,1 mindfulness practice to reduce patient’s anxiety, however patient was unresponsive and continued to endorse anxiety. During session, patient was noted to be smiling intermittently for brief seconds. Treatment team has been working on encouraging patient to eat autonomously: holding spoon and feeding herself. Patient was noted to be making gradual progress. Patient reported having difficulty swallowing, however was noted to be drinking tea/water and eating apple sauce without any challenges. Lastly, patient’s anxiety seems to alleviate when amplifier is used. Per chart, patient requires redirection/reinforcement to use her walker due to unsteady gait. Over the past seven days, patient was noted to be visible in the day room and minimally social with peers. Patient has been compliant with medication. Patient’s ADLs are good. Psychiatric rehabilitation team will continue to engage with patient daily and provide therapeutic support.    08-24-23  Type of therapy: Other, Physical therapy  Type of session: Individual  Level of patient participation: Participates  --  Therapy conducted by: Other (specify), Physical therapy  Therapy Summary: Patient engaged in functional transfer training and ambulation within unit using RW.  
  09-08-23  Type of therapy: Other, physical therapy  Type of session: Individual  Level of patient participation: Participates  --  Therapy conducted by: Other (specify), physical therapy  Therapy Summary: Performed therex including range of motion bilateral upper and lower extremity.  Performed strengthing exercises bilateral upper and lower extremity in sit and stand to prevent falls and improve safey while transfering and amb.  Performed transfer training sit to stand + rw + s for safety.  Performed gait training with rw + s + 100 feet x 2.  Pt needs rest stops while amb and during therex with complaints of fatigue.  Tolerated treatment well.    09-13-23  Type of therapy: physical therapy  Type of session: Individual  Level of patient participation: Attentive, Participates  --  Therapy conducted by: Other (specify), physical therapy  Therapy Summary: Pt ambulated 100 feet with close supervision with RW    09-13-23  Type of therapy: Creative arts therapy, Health and fitness, Mindfulness  Type of session: Individual  Level of patient participation: Participates  Duration of participation: Less than 15 minutes  Therapy conducted by: Psych rehab  Therapy Summary: Writer met with patient for an individual session in order to review progress towards psychiatric rehabilitation goals. Patient was receptive to speaking with writer though she was minimally able to engage in meaningful conversation due to severity of symptoms. Patient has demonstrated limited improvements in psychiatric symptoms over the past seven days. Patient continues to present as confused and disoriented. Patient is somewhat less irritable and paranoid, though during session she became preoccupied with peer who she claimed was sitting in the chair that has belonged to her for the past 9 months. Patient is adherent to medication and presents with fair appetite. Due to the COVID-19 pandemic, unit structure and activities are re-evaluated on a consistent basis in effort to maintain the safety of patients and staff. Patient participates in some psychiatric rehabilitation groups with encouragement from staff. Patient is visible on the unit and requires structure due to disorganization. Patient is social with select peers though she requires redirection for intrusive behavior.  
  06-22-23  Type of therapy: Health and fitness, Leisure development, Peer advocate  Type of session: Individual  Level of patient participation: Participates  Duration of participation: Less than 15 minutes  Therapy conducted by: Psych rehab  Therapy Summary: Writer met with patient for an individual session in order to review progress towards psychiatric rehabilitation goals. Patient was receptive to speaking with writer and moderately able to engage in conversation about current presentation and symptoms. Patient has demonstrated some improvements in psychiatric symptoms over the past seven days. Patient remains on CO 1:1 for disorganization. Patient continues to present as anxious and disorganized though at times she is more receptive to redirection. Patient is adherent to medication. Patient reported poor sleep, though per staff she had slept well the night before. Patient endorsed fair appetite. Due to the COVID-19 pandemic, unit structure and activities are re-evaluated on a consistent basis in effort to maintain the safety of patients and staff. Patient has attended some psychiatric rehabilitation groups with encouragement from staff. Patient is visible on the unit and is social with peers, though she can be intrusive.  
  10-04-23  Type of therapy: Coping skills, Creative arts therapy, Health and fitness, Spirituality, Stress management  Type of session: Group  Level of patient participation: Not engaged  Duration of participation: Less than 15 minutes  Therapy conducted by: Psych rehab  Therapy Summary: Patient over the past week made some gains. Overall patient appears less anxious and less dysphoric than in previous weeks. Patient with encouragement is less isolative and sitting in common patient areas for longer periods. Patient is more verbal and calmer during 1:1 sessions with writer. Patient is complying with her medications and ECT. Patient ambulates with a walker and eats her meals with the community. However, today during a 1:1 session with writer patient stated she is not sure when she will be ready for discharge. Patient would not elaborate on her statement. Writer provided patient supoortive encouragement.    10-05-23  --  Type of session: Individual  Level of patient participation: Participates  --  Therapy conducted by: Other (specify), physical therapy  Therapy Summary: Pt recived in day room. Participate in therex, transfer training and gait training

## 2023-10-06 NOTE — ECT TREATMENT NOTE - NSECTFOCPECOMPLET_PSY_ALL_CORE
Focused Physical Exam Completed

## 2023-10-06 NOTE — ECT TREATMENT NOTE - NSECTROSNEGAT_PSY_ALL_CORE
Review of Systems negative/unchanged from previous exam except as noted below

## 2023-10-06 NOTE — BH INPATIENT PSYCHIATRY PROGRESS NOTE - NSBHCHARTREVIEWVS_PSY_A_CORE FT
Vital Signs Last 24 Hrs  T(C): 36.8 (10-06-23 @ 11:50), Max: 36.8 (10-06-23 @ 11:50)  T(F): 98.2 (10-06-23 @ 11:50), Max: 98.2 (10-06-23 @ 11:50)  HR: 67 (10-06-23 @ 11:50) (55 - 67)  BP: 178/58 (10-06-23 @ 11:50) (128/70 - 178/58)  BP(mean): --  RR: 20 (10-06-23 @ 11:50) (13 - 20)  SpO2: 98% (10-06-23 @ 11:50) (94% - 100%)    Orthostatic VS  10-06-23 @ 05:42  Lying BP: --/-- HR: --  Sitting BP: 156/55 HR: 68  Standing BP: 142/66 HR: 75  Site: --  Mode: --  Orthostatic VS  10-05-23 @ 05:53  Lying BP: --/-- HR: --  Sitting BP: 114/57 HR: 69  Standing BP: 129/63 HR: 74  Site: --  Mode: --

## 2023-10-06 NOTE — BH INPATIENT PSYCHIATRY PROGRESS NOTE - MSE UNSTRUCTURED FT
Patient dressed in regular clothes.[place] does well with amplifier. Speech normal No EPS emerging. Mood pleasant , affectr brighterPatient denies SI, denies fear of being taken away. Denies fear of being harmed physically on unit. Says she feels safe in other facility, likes it there. [place].  No salmon. No FTD. Oriented to self, today did not recall having had ECT Limited insight about illness related in part to retrograde amnesia, shows good judgement in ambulation and use of walker

## 2023-10-06 NOTE — BH TREATMENT PLAN - NSDCCRITERIA_PSY_ALL_CORE
pending clinical improvement

## 2023-10-06 NOTE — ECT PRE-PROCEDURE CHECKLIST - PATIENT'S SEXUAL ORIENTATION
Heterosexual

## 2023-10-06 NOTE — ECT PRE-PROCEDURE CHECKLIST - ALLERGIES
Allergies:-  adhesives  sulfa drugs  penicillin  Cipro      

## 2023-10-06 NOTE — ECT PRE-PROCEDURE CHECKLIST - NSMENTALSTATUS_PSY_ALL_CORE
withdrawn/anxious

## 2023-10-06 NOTE — ECT PRE-PROCEDURE CHECKLIST - CAREGIVER RELATION TO PATIENT
Daughter, Primary HCA
Daughter 
Daughter 
Daughter, Primary HCA
Daughter 
Daughter 
Daughter, Primary HCA
Daughter, Primary HCA
Daughter

## 2023-10-06 NOTE — ECT TREATMENT NOTE - NSECTREVSYS_PSY_ALL_CORE
Cardiovascular/Abdominal/Metabolic

## 2023-10-06 NOTE — ECT PRE-PROCEDURE CHECKLIST - CAREGIVER NAME
Marcelina Sue
Brit Gonzáles
Marcelina Sue
Brit Gonzáles

## 2023-10-06 NOTE — ECT TREATMENT NOTE - NSECTIMPPLAN_PSY_ALL_CORE
Assessment today offers no contraindications to continue plan of treatment with ECT.

## 2023-10-06 NOTE — ECT PRE-PROCEDURE CHECKLIST - HAND OFF
Unit RN to OR RN/Holding RN to OR RN
Unit RN to OR RN
Unit RN to OR RN/Holding RN to OR RN

## 2023-10-06 NOTE — ECT PRE-PROCEDURE CHECKLIST - CAREGIVER ADDRESS
[address] [place] [address]
 [address] [place], [place]
 [address] [place] [address]
 [address] [place] [address]
 1 [place], [place]
 [address] [place] [address]
 1 [place], [place]
 [address] [place] [address]
 1 [place], [place]

## 2023-10-06 NOTE — ECT PRE-PROCEDURE CHECKLIST - NSECTNPODT_PSY_ALL_CORE
27-Sep-2023 00:00
11-Sep-2023 08:18
25-Aug-2023 00:00
20-Sep-2023 00:00
15-Sep-2023 00:00
01-Oct-2023 23:00
05-Sep-2023 00:00
28-Aug-2023 00:00
06-Oct-2023 00:00

## 2023-10-07 LAB — GLUCOSE BLDC GLUCOMTR-MCNC: 97 MG/DL — SIGNIFICANT CHANGE UP (ref 70–99)

## 2023-10-07 PROCEDURE — 99231 SBSQ HOSP IP/OBS SF/LOW 25: CPT

## 2023-10-07 RX ADMIN — Medication 2 SPRAY(S): at 08:04

## 2023-10-07 RX ADMIN — LOSARTAN POTASSIUM 75 MILLIGRAM(S): 100 TABLET, FILM COATED ORAL at 06:32

## 2023-10-07 RX ADMIN — METFORMIN HYDROCHLORIDE 250 MILLIGRAM(S): 850 TABLET ORAL at 20:12

## 2023-10-07 RX ADMIN — SERTRALINE 200 MILLIGRAM(S): 25 TABLET, FILM COATED ORAL at 08:04

## 2023-10-07 RX ADMIN — Medication 0.25 MILLIGRAM(S): at 08:04

## 2023-10-07 RX ADMIN — METFORMIN HYDROCHLORIDE 250 MILLIGRAM(S): 850 TABLET ORAL at 08:04

## 2023-10-07 RX ADMIN — LURASIDONE HYDROCHLORIDE 60 MILLIGRAM(S): 40 TABLET ORAL at 08:04

## 2023-10-07 RX ADMIN — ATORVASTATIN CALCIUM 10 MILLIGRAM(S): 80 TABLET, FILM COATED ORAL at 20:12

## 2023-10-07 RX ADMIN — Medication 3 MILLIGRAM(S): at 20:12

## 2023-10-07 RX ADMIN — FAMOTIDINE 20 MILLIGRAM(S): 10 INJECTION INTRAVENOUS at 06:32

## 2023-10-07 RX ADMIN — Medication 2 SPRAY(S): at 12:49

## 2023-10-07 RX ADMIN — AMLODIPINE BESYLATE 2.5 MILLIGRAM(S): 2.5 TABLET ORAL at 06:32

## 2023-10-07 RX ADMIN — DIVALPROEX SODIUM 250 MILLIGRAM(S): 500 TABLET, DELAYED RELEASE ORAL at 08:04

## 2023-10-07 RX ADMIN — DIVALPROEX SODIUM 250 MILLIGRAM(S): 500 TABLET, DELAYED RELEASE ORAL at 20:12

## 2023-10-07 RX ADMIN — SENNA PLUS 2 TABLET(S): 8.6 TABLET ORAL at 08:03

## 2023-10-07 RX ADMIN — Medication 2 SPRAY(S): at 20:11

## 2023-10-07 RX ADMIN — LACTULOSE 10 GRAM(S): 10 SOLUTION ORAL at 12:50

## 2023-10-07 NOTE — BH INPATIENT PSYCHIATRY PROGRESS NOTE - NSBHASSESSSUMMFT_PSY_ALL_CORE
83 year-old , retired female, domiciled at [facility], [facility] of DM2, HLD, HTN, PPHx of late onset Bipolar disorder, 2 prior psych adm last in 2022 at [facility], who presented to ED with worsening paranoia, anxiety, FTT (weight loss of 20lb since march), psychosis commingled with cognitive impairment   8/14 better but now hard to stay if responding to Abilify or prns of haldol will increase Abilify to 17mg then 20mg/d while initiating ECT discussion  8/15 Not much improvement   except less paranoid which may be attributable to haldol prn. Will d/c Abilify and start  Latuda will discuss ECT option  8/16 COnt Latuda trial while entering into discussion with patient re ECT  8/17 Tolerating latuda well; paranoia remains  8/18 Paranoia continues though improved; increase latuda to 40mg po daily - to be administered with food  8/19: On encounter today pt was seen chatting with one of her peers. Pt denied any complaints and reported feeling well. Denies paranoid thoughts or feeling afraid someone was going to hurt her. Still needing frequent redirection from staff to use her walker.   8/20 Depressed anxious fearful disorganized. Cont Latuda trial will cont to discuss ECT with patient, family is supportive  8/21 Depressed psychotic partial response will cont with ECT patient amenable as long as a family member goes with her for fear she will go off unit and never return. Will get labs ask medicine to see  8/22 Depressed not responded well to meds will increase Latuda while prepping for ECT. Will clarify need for ASA given severe nosebleed hx will discuss nosebleed issue with ECT   8/23 Cont ECT w/u discussed with medicine will   cont ASA  8/24 Psychotic depression with limited response to  multiple medication trials. Calmer less floridly delusion but anhedonia in state of constant distress. Patient assents to ECT, daughter Marcelina who is her HCP will provide consent. Patient requests family accompany her to ECT  for support as well as likely afraid she will not come back to unit if taken off. Will d/c Protonix suspension and use Pepcid as the former cannot be give while patient NPO. will lower Latuda as want to begin to reduce meds concurrent with ECT  8/25 Patient tolerated ECT, cont ECT, try to reduce Klonopin when better. Cont Effexor and Latuda. Recheck Na level on Sunday 8/26 Tolerated first ECT amnestic for treatment itself sl calmer. COnt ECT will begin to reduce klonopin to reduce cog se, check BMP in AM  8/27 less dysphoric, still paranoid, no SI/HI. Labs reviewed and discussed with hospitalist. Repeat BMP, urine NA and osmolality to f/u.  8/28 Tolerating ECT, cont treatments spread out so next one  9/1. Eval for further decrease Klonopin. Medicine saw patient , reintroduced metformin at low dose and d/declan ASA based on risk benefit   8/29 Mood psychosis better, sleepy during day confused. Will hold ECT til 9/1 reduce Klonopin  also may reduce Latuda  8/30 Improved with ECT but cog worse Will cont to hold ECT til clearer cont to reduce BDZ  8/31 Patient much better with regard to mood and  psychosis but notably cognitive disrupted form ECT Will hold ECT tomorrow. Will lower Lurasidone as was never effective for psychosis. Will start lowering Effexor and change to Zoloft as Effexor not helpful. Around completion of ECT may restart Depakote given episode of anjelica last admission. Consider further reduction in Ko Klonopin to 0.25mg/d . Reviewed plan of care with daughter  9/1 Patient improved but paranoia creeping back. Will cont Latuda at 40mg  consider increasing back to 60mg. Considered resume VPA based on concern of risk for switching but may inhibit sx requiring higher stimulus which can lead to more confusion. Next rx 9/5 9/2: Patient with psychotic depression, improving, on lurasidone   9/3: Patient's psychosis resolving, mood improved   9/4: Mood and psychotic symptoms improving, some impaired executive function and reasoning   9/5 PAtient improved but anxious, fearful cog impairment due to ECT but less severe. Cont ECT but spread out will, lower Effexor and add Zoloft as Effexor trial failed. Cont Lurasidone  9/6 Patient improving but quite confused. Will hold next treatment til next Monday. Cont to cross over to Zoloft  9/7 Improving will cont with spread out ECT to reudce severity of cog se of ECT will increase Zoloft and d/c Effexor Will rechallenge with CPAP  9/8 Patient improving with ECT next ECT 9/11 cont to titrate Zoloft. COnt encourage use of CPAP  9/11 patient showing improved impulse control, tolerating ECT, increase Zoloft to 125mg daily, will check BMP wed 9/12 more organized today, pleasant on approach, needs redirection to avoid touching peers on the shoulder   9/13: overall improvement in behaviors, needs redirection at times.next ECT friday.   9/14: ECT tomorrow, overall improved   9/15: next ECT monday, overall behaviors in control, redirectable, will keep patient on CO 11-7 since she had ECT today  9/18 Will change ECT to wednesday to space out better consider not doing further ECT after than unless very clear cut symptoms.  Plan resume Delapkote after las ECT, conisider moving Klonopin to later of split to minimize daytime sedation  9/19 Improving will give ECT in AM , peggy last ECT will startr Depakote after ect done  9/20 Improving likely last ECt will start Depakote back up cont other meds  9/21 Improved notably with course of ECT, will hold off on further rx, observe whetther any symptoms re appear as she clears from cog se of ECT  9/22 Mood and psychotic symptoms much improved. Will not give further ECT unless any symptoms return. Will see if cognition shows improvement with further time from ECT. Plan titrate Depakote.   9/25 Patient improved but some regression unclear if distress about d/c or true relpase will consider further ECT and increase in Latuda will review with family  9/26 Patient some regression since finishing ECT so are doing rescue ECT also increase Zoloft possible increase Latuda  9/27 Some re appearance of paranoia, cont longer course of ECT and will plan to increase Latuda  9/28 recrudescence of paranoia simmering down cont ECT spread out and increase Latuda, working on getting soft liner for CPA mask to protect nose  9/29 Improved after resumption of ECT Cont ECT 10/2 and consider increasing Latuda further if tolerating 60mg.   10/2 Improved paranoia, cont ECT plan further titration of Lurasidone   10/3 Maintaining gains, no return of paranoia. Will f/u on what she told family, will relay to ECT consider Versed if having distressing recall of ECT  10/4  Improved, cont ECT, plan increase Latuda, restart VPa after last ECT  10/5 Improving mood, no retrun of suspiciousnes, does not clear much between ECT. More accepting of help with ADL's. Plan next ECT in AM likely last one. Plan increase Ltuda after last ECT and restart Depakote  10/6 Improvined from ECT, confused , no unusual memories of treatment. Plan complete course of ECT will restart Depakote . Plan titration  of Latuda  10/7: Patient with some improvement from ECT, plan now for VPA and titration of lurasidone

## 2023-10-07 NOTE — BH INPATIENT PSYCHIATRY PROGRESS NOTE - NSBHFUPINTERVALHXFT_PSY_A_CORE
Bipolar d/o, depressed with psychotic features. calm today. reports that she feels "good." eating and drinking adequately. NINOSKA

## 2023-10-07 NOTE — BH INPATIENT PSYCHIATRY PROGRESS NOTE - CURRENT MEDICATION
MEDICATIONS  (STANDING):  amLODIPine   Tablet 2.5 milliGRAM(s) Oral <User Schedule>  atorvastatin 10 milliGRAM(s) Oral at bedtime  clonazePAM Oral Disintegrating Tablet 0.25 milliGRAM(s) Oral daily  divalproex  milliGRAM(s) Oral two times a day  famotidine    Tablet 20 milliGRAM(s) Oral <User Schedule>  glucagon  Injectable 1 milliGRAM(s) IntraMuscular once  lactulose Syrup 10 Gram(s) Oral <User Schedule>  losartan 75 milliGRAM(s) Oral <User Schedule>  lurasidone 60 milliGRAM(s) Oral daily  melatonin. 3 milliGRAM(s) Oral at bedtime  metFORMIN 250 milliGRAM(s) Oral two times a day  senna 2 Tablet(s) Oral daily  sertraline 200 milliGRAM(s) Oral daily  sodium chloride 0.65% Nasal 2 Spray(s) Both Nostrils three times a day    MEDICATIONS  (PRN):  acetaminophen     Tablet .. 650 milliGRAM(s) Oral every 6 hours PRN Temp greater or equal to 38C (100.4F), Mild Pain (1 - 3), Moderate Pain (4 - 6)  bisacodyl 5 milliGRAM(s) Oral daily PRN constipation  bisacodyl Suppository 10 milliGRAM(s) Rectal daily PRN constipation  dextrose Oral Gel 15 Gram(s) Oral once PRN Blood Glucose LESS THAN 70 milliGRAM(s)/deciliter  haloperidol     Tablet 0.5 milliGRAM(s) Oral every 6 hours PRN agitation  haloperidol    Injectable 1 milliGRAM(s) IntraMuscular once PRN agitation  lidocaine   4% Patch 1 Patch Transdermal daily PRN LBP  simethicone 80 milliGRAM(s) Chew every 4 hours PRN abdominal discomfort

## 2023-10-07 NOTE — BH INPATIENT PSYCHIATRY PROGRESS NOTE - MSE UNSTRUCTURED FT
Patient is awake and alert. Affect is neutral, somewhat irritable. Speech is fluent TP is logical and coherent, somewhat guarded. Calm. No PMR. No hallucinations. Limited insight. No suicidal ideations.

## 2023-10-07 NOTE — BH INPATIENT PSYCHIATRY PROGRESS NOTE - NSBHCHARTREVIEWVS_PSY_A_CORE FT
Vital Signs Last 24 Hrs  T(C): --  T(F): --  HR: --  BP: 126/49 (10-07-23 @ 05:47) (126/49 - 126/49)  BP(mean): 64 (10-07-23 @ 05:47) (64 - 64)  RR: --  SpO2: --    Orthostatic VS  10-06-23 @ 05:42  Lying BP: --/-- HR: --  Sitting BP: 156/55 HR: 68  Standing BP: 142/66 HR: 75  Site: --  Mode: --

## 2023-10-07 NOTE — BH INPATIENT PSYCHIATRY PROGRESS NOTE - NSBHMETABOLIC_PSY_ALL_CORE_FT
BMI: BMI (kg/m2): 23 (06-07-23 @ 18:13)  HbA1c: A1C with Estimated Average Glucose Result: 5.7 % (06-01-23 @ 05:10)    Glucose: POCT Blood Glucose.: 97 mg/dL (10-07-23 @ 07:50)    BP: 126/49 (10-07-23 @ 05:47) (126/49 - 178/58)  Lipid Panel: Date/Time: 06-08-23 @ 08:30  Cholesterol, Serum: 119  Direct LDL: --  HDL Cholesterol, Serum: 47  Total Cholesterol/HDL Ration Measurement: --  Triglycerides, Serum: 56

## 2023-10-08 LAB
GLUCOSE BLDC GLUCOMTR-MCNC: 100 MG/DL — HIGH (ref 70–99)
GLUCOSE BLDC GLUCOMTR-MCNC: 109 MG/DL — HIGH (ref 70–99)

## 2023-10-08 RX ADMIN — Medication 0.25 MILLIGRAM(S): at 10:02

## 2023-10-08 RX ADMIN — METFORMIN HYDROCHLORIDE 250 MILLIGRAM(S): 850 TABLET ORAL at 20:37

## 2023-10-08 RX ADMIN — LURASIDONE HYDROCHLORIDE 60 MILLIGRAM(S): 40 TABLET ORAL at 10:02

## 2023-10-08 RX ADMIN — DIVALPROEX SODIUM 250 MILLIGRAM(S): 500 TABLET, DELAYED RELEASE ORAL at 20:37

## 2023-10-08 RX ADMIN — LOSARTAN POTASSIUM 75 MILLIGRAM(S): 100 TABLET, FILM COATED ORAL at 06:44

## 2023-10-08 RX ADMIN — DIVALPROEX SODIUM 250 MILLIGRAM(S): 500 TABLET, DELAYED RELEASE ORAL at 10:02

## 2023-10-08 RX ADMIN — Medication 3 MILLIGRAM(S): at 20:37

## 2023-10-08 RX ADMIN — FAMOTIDINE 20 MILLIGRAM(S): 10 INJECTION INTRAVENOUS at 06:43

## 2023-10-08 RX ADMIN — Medication 2 SPRAY(S): at 10:03

## 2023-10-08 RX ADMIN — AMLODIPINE BESYLATE 2.5 MILLIGRAM(S): 2.5 TABLET ORAL at 06:43

## 2023-10-08 RX ADMIN — SERTRALINE 200 MILLIGRAM(S): 25 TABLET, FILM COATED ORAL at 10:02

## 2023-10-08 RX ADMIN — SENNA PLUS 2 TABLET(S): 8.6 TABLET ORAL at 10:02

## 2023-10-08 RX ADMIN — Medication 2 SPRAY(S): at 21:38

## 2023-10-08 RX ADMIN — ATORVASTATIN CALCIUM 10 MILLIGRAM(S): 80 TABLET, FILM COATED ORAL at 20:38

## 2023-10-08 RX ADMIN — METFORMIN HYDROCHLORIDE 250 MILLIGRAM(S): 850 TABLET ORAL at 10:03

## 2023-10-09 PROCEDURE — 99231 SBSQ HOSP IP/OBS SF/LOW 25: CPT

## 2023-10-09 RX ORDER — CLONAZEPAM 1 MG
0.25 TABLET ORAL DAILY
Refills: 0 | Status: DISCONTINUED | OUTPATIENT
Start: 2023-10-09 | End: 2023-10-13

## 2023-10-09 RX ADMIN — Medication 2 SPRAY(S): at 12:18

## 2023-10-09 RX ADMIN — Medication 0.25 MILLIGRAM(S): at 09:25

## 2023-10-09 RX ADMIN — AMLODIPINE BESYLATE 2.5 MILLIGRAM(S): 2.5 TABLET ORAL at 06:43

## 2023-10-09 RX ADMIN — SENNA PLUS 2 TABLET(S): 8.6 TABLET ORAL at 09:24

## 2023-10-09 RX ADMIN — ATORVASTATIN CALCIUM 10 MILLIGRAM(S): 80 TABLET, FILM COATED ORAL at 21:25

## 2023-10-09 RX ADMIN — FAMOTIDINE 20 MILLIGRAM(S): 10 INJECTION INTRAVENOUS at 06:43

## 2023-10-09 RX ADMIN — LACTULOSE 10 GRAM(S): 10 SOLUTION ORAL at 12:18

## 2023-10-09 RX ADMIN — LURASIDONE HYDROCHLORIDE 60 MILLIGRAM(S): 40 TABLET ORAL at 09:25

## 2023-10-09 RX ADMIN — SERTRALINE 200 MILLIGRAM(S): 25 TABLET, FILM COATED ORAL at 09:25

## 2023-10-09 RX ADMIN — Medication 3 MILLIGRAM(S): at 21:25

## 2023-10-09 RX ADMIN — DIVALPROEX SODIUM 250 MILLIGRAM(S): 500 TABLET, DELAYED RELEASE ORAL at 09:25

## 2023-10-09 RX ADMIN — METFORMIN HYDROCHLORIDE 250 MILLIGRAM(S): 850 TABLET ORAL at 21:25

## 2023-10-09 RX ADMIN — LOSARTAN POTASSIUM 75 MILLIGRAM(S): 100 TABLET, FILM COATED ORAL at 06:43

## 2023-10-09 RX ADMIN — METFORMIN HYDROCHLORIDE 250 MILLIGRAM(S): 850 TABLET ORAL at 09:25

## 2023-10-09 RX ADMIN — DIVALPROEX SODIUM 250 MILLIGRAM(S): 500 TABLET, DELAYED RELEASE ORAL at 21:25

## 2023-10-09 RX ADMIN — Medication 2 SPRAY(S): at 22:05

## 2023-10-09 RX ADMIN — Medication 2 SPRAY(S): at 09:26

## 2023-10-09 NOTE — BH INPATIENT PSYCHIATRY PROGRESS NOTE - NSBHCHARTREVIEWVS_PSY_A_CORE FT
Vital Signs Last 24 Hrs  T(C): 36.2 (10-09-23 @ 05:50), Max: 36.2 (10-09-23 @ 05:50)  T(F): 97.1 (10-09-23 @ 05:50), Max: 97.1 (10-09-23 @ 05:50)  HR: --  BP: --  BP(mean): --  RR: --  SpO2: --    Orthostatic VS  10-09-23 @ 05:50  Lying BP: --/-- HR: --  Sitting BP: 144/61 HR: 73  Standing BP: 153/65 HR: 81  Site: --  Mode: --  Orthostatic VS  10-08-23 @ 06:18  Lying BP: --/-- HR: --  Sitting BP: 120/68 HR: 71  Standing BP: 99/66 HR: 83  Site: --  Mode: --

## 2023-10-09 NOTE — BH INPATIENT PSYCHIATRY PROGRESS NOTE - NSBHMETABOLIC_PSY_ALL_CORE_FT
BMI: BMI (kg/m2): 23 (06-07-23 @ 18:13)  HbA1c: A1C with Estimated Average Glucose Result: 5.7 % (06-01-23 @ 05:10)    Glucose: POCT Blood Glucose.: 109 mg/dL (10-08-23 @ 16:30)    BP: 126/49 (10-07-23 @ 05:47) (126/49 - 126/49)  Lipid Panel: Date/Time: 06-08-23 @ 08:30  Cholesterol, Serum: 119  Direct LDL: --  HDL Cholesterol, Serum: 47  Total Cholesterol/HDL Ration Measurement: --  Triglycerides, Serum: 56

## 2023-10-09 NOTE — BH INPATIENT PSYCHIATRY PROGRESS NOTE - NSBHFUPINTERVALHXFT_PSY_A_CORE
Bipolar d/o, depressed with psychotic features. calm today. reports that she feels "good." eating and drinking adequately. VSS

## 2023-10-09 NOTE — BH INPATIENT PSYCHIATRY PROGRESS NOTE - MSE UNSTRUCTURED FT
Patient is awake and alert. Mood neutral, affect somewhat constricted. Speech is fluent TP is logical . No PMR. So SI. No delusions or overvalued ideas about facility she lives in or here. No hallucinations.  oriented to x1. Limited insight.

## 2023-10-09 NOTE — BH INPATIENT PSYCHIATRY PROGRESS NOTE - NSBHASSESSSUMMFT_PSY_ALL_CORE
83 year-old , retired female, domiciled at [facility], [facility] of DM2, HLD, HTN, PPHx of late onset Bipolar disorder, 2 prior psych adm last in 2022 at [facility], who presented to ED with worsening paranoia, anxiety, FTT (weight loss of 20lb since march), psychosis commingled with cognitive impairment   8/14 better but now hard to stay if responding to Abilify or prns of haldol will increase Abilify to 17mg then 20mg/d while initiating ECT discussion  8/15 Not much improvement   except less paranoid which may be attributable to haldol prn. Will d/c Abilify and start  Latuda will discuss ECT option  8/16 COnt Latuda trial while entering into discussion with patient re ECT  8/17 Tolerating latuda well; paranoia remains  8/18 Paranoia continues though improved; increase latuda to 40mg po daily - to be administered with food  8/19: On encounter today pt was seen chatting with one of her peers. Pt denied any complaints and reported feeling well. Denies paranoid thoughts or feeling afraid someone was going to hurt her. Still needing frequent redirection from staff to use her walker.   8/20 Depressed anxious fearful disorganized. Cont Latuda trial will cont to discuss ECT with patient, family is supportive  8/21 Depressed psychotic partial response will cont with ECT patient amenable as long as a family member goes with her for fear she will go off unit and never return. Will get labs ask medicine to see  8/22 Depressed not responded well to meds will increase Latuda while prepping for ECT. Will clarify need for ASA given severe nosebleed hx will discuss nosebleed issue with ECT   8/23 Cont ECT w/u discussed with medicine will   cont ASA  8/24 Psychotic depression with limited response to  multiple medication trials. Calmer less floridly delusion but anhedonia in state of constant distress. Patient assents to ECT, daughter Marcelina who is her HCP will provide consent. Patient requests family accompany her to ECT  for support as well as likely afraid she will not come back to unit if taken off. Will d/c Protonix suspension and use Pepcid as the former cannot be give while patient NPO. will lower Latuda as want to begin to reduce meds concurrent with ECT  8/25 Patient tolerated ECT, cont ECT, try to reduce Klonopin when better. Cont Effexor and Latuda. Recheck Na level on Sunday 8/26 Tolerated first ECT amnestic for treatment itself sl calmer. COnt ECT will begin to reduce klonopin to reduce cog se, check BMP in AM  8/27 less dysphoric, still paranoid, no SI/HI. Labs reviewed and discussed with hospitalist. Repeat BMP, urine NA and osmolality to f/u.  8/28 Tolerating ECT, cont treatments spread out so next one  9/1. Eval for further decrease Klonopin. Medicine saw patient , reintroduced metformin at low dose and d/declan ASA based on risk benefit   8/29 Mood psychosis better, sleepy during day confused. Will hold ECT til 9/1 reduce Klonopin  also may reduce Latuda  8/30 Improved with ECT but cog worse Will cont to hold ECT til clearer cont to reduce BDZ  8/31 Patient much better with regard to mood and  psychosis but notably cognitive disrupted form ECT Will hold ECT tomorrow. Will lower Lurasidone as was never effective for psychosis. Will start lowering Effexor and change to Zoloft as Effexor not helpful. Around completion of ECT may restart Depakote given episode of anjelica last admission. Consider further reduction in Ko Klonopin to 0.25mg/d . Reviewed plan of care with daughter  9/1 Patient improved but paranoia creeping back. Will cont Latuda at 40mg  consider increasing back to 60mg. Considered resume VPA based on concern of risk for switching but may inhibit sx requiring higher stimulus which can lead to more confusion. Next rx 9/5 9/2: Patient with psychotic depression, improving, on lurasidone   9/3: Patient's psychosis resolving, mood improved   9/4: Mood and psychotic symptoms improving, some impaired executive function and reasoning   9/5 PAtient improved but anxious, fearful cog impairment due to ECT but less severe. Cont ECT but spread out will, lower Effexor and add Zoloft as Effexor trial failed. Cont Lurasidone  9/6 Patient improving but quite confused. Will hold next treatment til next Monday. Cont to cross over to Zoloft  9/7 Improving will cont with spread out ECT to reudce severity of cog se of ECT will increase Zoloft and d/c Effexor Will rechallenge with CPAP  9/8 Patient improving with ECT next ECT 9/11 cont to titrate Zoloft. COnt encourage use of CPAP  9/11 patient showing improved impulse control, tolerating ECT, increase Zoloft to 125mg daily, will check BMP wed 9/12 more organized today, pleasant on approach, needs redirection to avoid touching peers on the shoulder   9/13: overall improvement in behaviors, needs redirection at times.next ECT friday.   9/14: ECT tomorrow, overall improved   9/15: next ECT monday, overall behaviors in control, redirectable, will keep patient on CO 11-7 since she had ECT today  9/18 Will change ECT to wednesday to space out better consider not doing further ECT after than unless very clear cut symptoms.  Plan resume Delapkote after las ECT, conisider moving Klonopin to later of split to minimize daytime sedation  9/19 Improving will give ECT in AM , peggy last ECT will startr Depakote after ect done  9/20 Improving likely last ECt will start Depakote back up cont other meds  9/21 Improved notably with course of ECT, will hold off on further rx, observe whetther any symptoms re appear as she clears from cog se of ECT  9/22 Mood and psychotic symptoms much improved. Will not give further ECT unless any symptoms return. Will see if cognition shows improvement with further time from ECT. Plan titrate Depakote.   9/25 Patient improved but some regression unclear if distress about d/c or true relpase will consider further ECT and increase in Latuda will review with family  9/26 Patient some regression since finishing ECT so are doing rescue ECT also increase Zoloft possible increase Latuda  9/27 Some re appearance of paranoia, cont longer course of ECT and will plan to increase Latuda  9/28 recrudescence of paranoia simmering down cont ECT spread out and increase Latuda, working on getting soft liner for CPA mask to protect nose  9/29 Improved after resumption of ECT Cont ECT 10/2 and consider increasing Latuda further if tolerating 60mg.   10/2 Improved paranoia, cont ECT plan further titration of Lurasidone   10/3 Maintaining gains, no return of paranoia. Will f/u on what she told family, will relay to ECT consider Versed if having distressing recall of ECT  10/4  Improved, cont ECT, plan increase Latuda, restart VPa after last ECT  10/5 Improving mood, no retrun of suspiciousnes, does not clear much between ECT. More accepting of help with ADL's. Plan next ECT in AM likely last one. Plan increase Ltuda after last ECT and restart Depakote  10/6 Improvined from ECT, confused , no unusual memories of treatment. Plan complete course of ECT will restart Depakote . Plan titration  of Latuda  10/7: Patient with some improvement from ECT, plan now for VPA and titration of lurasidone   10/9 Improved from ECT  still with sig cog impairment Cont to allow her to clear while planning increase in Lurasidone and VPA

## 2023-10-10 PROCEDURE — 99232 SBSQ HOSP IP/OBS MODERATE 35: CPT

## 2023-10-10 RX ORDER — DIVALPROEX SODIUM 500 MG/1
375 TABLET, DELAYED RELEASE ORAL
Refills: 0 | Status: DISCONTINUED | OUTPATIENT
Start: 2023-10-10 | End: 2023-10-11

## 2023-10-10 RX ADMIN — DIVALPROEX SODIUM 250 MILLIGRAM(S): 500 TABLET, DELAYED RELEASE ORAL at 11:24

## 2023-10-10 RX ADMIN — SERTRALINE 200 MILLIGRAM(S): 25 TABLET, FILM COATED ORAL at 11:25

## 2023-10-10 RX ADMIN — Medication 2 SPRAY(S): at 13:23

## 2023-10-10 RX ADMIN — LOSARTAN POTASSIUM 75 MILLIGRAM(S): 100 TABLET, FILM COATED ORAL at 07:13

## 2023-10-10 RX ADMIN — SENNA PLUS 2 TABLET(S): 8.6 TABLET ORAL at 11:24

## 2023-10-10 RX ADMIN — Medication 0.25 MILLIGRAM(S): at 11:24

## 2023-10-10 RX ADMIN — Medication 2 SPRAY(S): at 11:25

## 2023-10-10 RX ADMIN — LURASIDONE HYDROCHLORIDE 60 MILLIGRAM(S): 40 TABLET ORAL at 11:24

## 2023-10-10 RX ADMIN — AMLODIPINE BESYLATE 2.5 MILLIGRAM(S): 2.5 TABLET ORAL at 07:13

## 2023-10-10 RX ADMIN — LACTULOSE 10 GRAM(S): 10 SOLUTION ORAL at 13:23

## 2023-10-10 RX ADMIN — FAMOTIDINE 20 MILLIGRAM(S): 10 INJECTION INTRAVENOUS at 07:13

## 2023-10-10 RX ADMIN — METFORMIN HYDROCHLORIDE 250 MILLIGRAM(S): 850 TABLET ORAL at 11:24

## 2023-10-10 NOTE — BH INPATIENT PSYCHIATRY PROGRESS NOTE - NSBHMETABOLIC_PSY_ALL_CORE_FT
BMI: BMI (kg/m2): 23 (06-07-23 @ 18:13)  HbA1c: A1C with Estimated Average Glucose Result: 5.7 % (06-01-23 @ 05:10)    Glucose: POCT Blood Glucose.: 109 mg/dL (10-08-23 @ 16:30)    BP: --  Lipid Panel: Date/Time: 06-08-23 @ 08:30  Cholesterol, Serum: 119  Direct LDL: --  HDL Cholesterol, Serum: 47  Total Cholesterol/HDL Ration Measurement: --  Triglycerides, Serum: 56

## 2023-10-10 NOTE — BH INPATIENT PSYCHIATRY PROGRESS NOTE - NSBHCHARTREVIEWVS_PSY_A_CORE FT
Vital Signs Last 24 Hrs  T(C): 36.4 (10-10-23 @ 05:36), Max: 36.4 (10-10-23 @ 05:36)  T(F): 97.6 (10-10-23 @ 05:36), Max: 97.6 (10-10-23 @ 05:36)  HR: --  BP: --  BP(mean): --  RR: 16 (10-10-23 @ 05:36) (16 - 16)  SpO2: 100% (10-10-23 @ 05:36) (100% - 100%)    Orthostatic VS  10-10-23 @ 05:36  Lying BP: --/-- HR: --  Sitting BP: 108/46 HR: 64  Standing BP: 106/49 HR: 75  Site: --  Mode: --  Orthostatic VS  10-09-23 @ 05:50  Lying BP: --/-- HR: --  Sitting BP: 144/61 HR: 73  Standing BP: 153/65 HR: 81  Site: --  Mode: --

## 2023-10-10 NOTE — BH INPATIENT PSYCHIATRY PROGRESS NOTE - NSBHFUPINTERVALHXFT_PSY_A_CORE
Bipolar d/o, depressed with psychotic features. calm today. reports that she feels "good." eating and drinking adequately. Sleeping well. VSS. Patient seen spending long time in BR but denies being constipated

## 2023-10-10 NOTE — BH INPATIENT PSYCHIATRY PROGRESS NOTE - NSBHASSESSSUMMFT_PSY_ALL_CORE
83 year-old , retired female, domiciled at [facility], [facility] of DM2, HLD, HTN, PPHx of late onset Bipolar disorder, 2 prior psych adm last in 2022 at [facility], who presented to ED with worsening paranoia, anxiety, FTT (weight loss of 20lb since march), psychosis commingled with cognitive impairment   8/14 better but now hard to stay if responding to Abilify or prns of haldol will increase Abilify to 17mg then 20mg/d while initiating ECT discussion  8/15 Not much improvement   except less paranoid which may be attributable to haldol prn. Will d/c Abilify and start  Latuda will discuss ECT option  8/16 COnt Latuda trial while entering into discussion with patient re ECT  8/17 Tolerating latuda well; paranoia remains  8/18 Paranoia continues though improved; increase latuda to 40mg po daily - to be administered with food  8/19: On encounter today pt was seen chatting with one of her peers. Pt denied any complaints and reported feeling well. Denies paranoid thoughts or feeling afraid someone was going to hurt her. Still needing frequent redirection from staff to use her walker.   8/20 Depressed anxious fearful disorganized. Cont Latuda trial will cont to discuss ECT with patient, family is supportive  8/21 Depressed psychotic partial response will cont with ECT patient amenable as long as a family member goes with her for fear she will go off unit and never return. Will get labs ask medicine to see  8/22 Depressed not responded well to meds will increase Latuda while prepping for ECT. Will clarify need for ASA given severe nosebleed hx will discuss nosebleed issue with ECT   8/23 Cont ECT w/u discussed with medicine will   cont ASA  8/24 Psychotic depression with limited response to  multiple medication trials. Calmer less floridly delusion but anhedonia in state of constant distress. Patient assents to ECT, daughter Marcelina who is her HCP will provide consent. Patient requests family accompany her to ECT  for support as well as likely afraid she will not come back to unit if taken off. Will d/c Protonix suspension and use Pepcid as the former cannot be give while patient NPO. will lower Latuda as want to begin to reduce meds concurrent with ECT  8/25 Patient tolerated ECT, cont ECT, try to reduce Klonopin when better. Cont Effexor and Latuda. Recheck Na level on Sunday 8/26 Tolerated first ECT amnestic for treatment itself sl calmer. COnt ECT will begin to reduce klonopin to reduce cog se, check BMP in AM  8/27 less dysphoric, still paranoid, no SI/HI. Labs reviewed and discussed with hospitalist. Repeat BMP, urine NA and osmolality to f/u.  8/28 Tolerating ECT, cont treatments spread out so next one  9/1. Eval for further decrease Klonopin. Medicine saw patient , reintroduced metformin at low dose and d/declan ASA based on risk benefit   8/29 Mood psychosis better, sleepy during day confused. Will hold ECT til 9/1 reduce Klonopin  also may reduce Latuda  8/30 Improved with ECT but cog worse Will cont to hold ECT til clearer cont to reduce BDZ  8/31 Patient much better with regard to mood and  psychosis but notably cognitive disrupted form ECT Will hold ECT tomorrow. Will lower Lurasidone as was never effective for psychosis. Will start lowering Effexor and change to Zoloft as Effexor not helpful. Around completion of ECT may restart Depakote given episode of anjelica last admission. Consider further reduction in Ko Klonopin to 0.25mg/d . Reviewed plan of care with daughter  9/1 Patient improved but paranoia creeping back. Will cont Latuda at 40mg  consider increasing back to 60mg. Considered resume VPA based on concern of risk for switching but may inhibit sx requiring higher stimulus which can lead to more confusion. Next rx 9/5 9/2: Patient with psychotic depression, improving, on lurasidone   9/3: Patient's psychosis resolving, mood improved   9/4: Mood and psychotic symptoms improving, some impaired executive function and reasoning   9/5 PAtient improved but anxious, fearful cog impairment due to ECT but less severe. Cont ECT but spread out will, lower Effexor and add Zoloft as Effexor trial failed. Cont Lurasidone  9/6 Patient improving but quite confused. Will hold next treatment til next Monday. Cont to cross over to Zoloft  9/7 Improving will cont with spread out ECT to reudce severity of cog se of ECT will increase Zoloft and d/c Effexor Will rechallenge with CPAP  9/8 Patient improving with ECT next ECT 9/11 cont to titrate Zoloft. COnt encourage use of CPAP  9/11 patient showing improved impulse control, tolerating ECT, increase Zoloft to 125mg daily, will check BMP wed 9/12 more organized today, pleasant on approach, needs redirection to avoid touching peers on the shoulder   9/13: overall improvement in behaviors, needs redirection at times.next ECT friday.   9/14: ECT tomorrow, overall improved   9/15: next ECT monday, overall behaviors in control, redirectable, will keep patient on CO 11-7 since she had ECT today  9/18 Will change ECT to wednesday to space out better consider not doing further ECT after than unless very clear cut symptoms.  Plan resume Delapkote after las ECT, conisider moving Klonopin to later of split to minimize daytime sedation  9/19 Improving will give ECT in AM , peggy last ECT will startr Depakote after ect done  9/20 Improving likely last ECt will start Depakote back up cont other meds  9/21 Improved notably with course of ECT, will hold off on further rx, observe whetther any symptoms re appear as she clears from cog se of ECT  9/22 Mood and psychotic symptoms much improved. Will not give further ECT unless any symptoms return. Will see if cognition shows improvement with further time from ECT. Plan titrate Depakote.   9/25 Patient improved but some regression unclear if distress about d/c or true relpase will consider further ECT and increase in Latuda will review with family  9/26 Patient some regression since finishing ECT so are doing rescue ECT also increase Zoloft possible increase Latuda  9/27 Some re appearance of paranoia, cont longer course of ECT and will plan to increase Latuda  9/28 recrudescence of paranoia simmering down cont ECT spread out and increase Latuda, working on getting soft liner for CPA mask to protect nose  9/29 Improved after resumption of ECT Cont ECT 10/2 and consider increasing Latuda further if tolerating 60mg.   10/2 Improved paranoia, cont ECT plan further titration of Lurasidone   10/3 Maintaining gains, no return of paranoia. Will f/u on what she told family, will relay to ECT consider Versed if having distressing recall of ECT  10/4  Improved, cont ECT, plan increase Latuda, restart VPa after last ECT  10/5 Improving mood, no retrun of suspiciousnes, does not clear much between ECT. More accepting of help with ADL's. Plan next ECT in AM likely last one. Plan increase Ltuda after last ECT and restart Depakote  10/6 Improvined from ECT, confused , no unusual memories of treatment. Plan complete course of ECT will restart Depakote . Plan titration  of Latuda  10/7: Patient with some improvement from ECT, plan now for VPA and titration of lurasidone   10/9 Improved from ECT  still with sig cog impairment Cont to allow her to clear while planning increase in Lurasidone and VPA  63742/10 Maintaining gains post ECT. Cont meds  except will increase Depakote. Scheduled for jody today will see if there is any hesitation. BP lower than usual if remains could lower BP meds

## 2023-10-10 NOTE — BH INPATIENT PSYCHIATRY PROGRESS NOTE - MSE UNSTRUCTURED FT
Patient is awake and alert. Mood neutral, affect somewhat constricted. Speech is fluent TP is logical . No PMR. So SI. No delusions or overvalued ideas about facility she lives in or here. No complaints about facility or reluctance to go back there.  No hallucinations.  oriented to x1. Limited insight.

## 2023-10-11 PROCEDURE — 99232 SBSQ HOSP IP/OBS MODERATE 35: CPT

## 2023-10-11 RX ORDER — LOSARTAN POTASSIUM 100 MG/1
3 TABLET, FILM COATED ORAL
Qty: 90 | Refills: 0
Start: 2023-10-11 | End: 2023-11-09

## 2023-10-11 RX ORDER — LURASIDONE HYDROCHLORIDE 40 MG/1
80 TABLET ORAL DAILY
Refills: 0 | Status: DISCONTINUED | OUTPATIENT
Start: 2023-10-11 | End: 2023-10-12

## 2023-10-11 RX ORDER — DIVALPROEX SODIUM 500 MG/1
375 TABLET, DELAYED RELEASE ORAL
Refills: 0 | Status: DISCONTINUED | OUTPATIENT
Start: 2023-10-11 | End: 2023-10-17

## 2023-10-11 RX ORDER — LURASIDONE HYDROCHLORIDE 40 MG/1
1 TABLET ORAL
Qty: 30 | Refills: 0
Start: 2023-10-11 | End: 2023-11-09

## 2023-10-11 RX ORDER — SERTRALINE 25 MG/1
2 TABLET, FILM COATED ORAL
Qty: 60 | Refills: 0
Start: 2023-10-11 | End: 2023-11-09

## 2023-10-11 RX ADMIN — ATORVASTATIN CALCIUM 10 MILLIGRAM(S): 80 TABLET, FILM COATED ORAL at 21:18

## 2023-10-11 RX ADMIN — LACTULOSE 10 GRAM(S): 10 SOLUTION ORAL at 12:45

## 2023-10-11 RX ADMIN — LOSARTAN POTASSIUM 75 MILLIGRAM(S): 100 TABLET, FILM COATED ORAL at 06:34

## 2023-10-11 RX ADMIN — LURASIDONE HYDROCHLORIDE 60 MILLIGRAM(S): 40 TABLET ORAL at 10:00

## 2023-10-11 RX ADMIN — Medication 2 SPRAY(S): at 21:20

## 2023-10-11 RX ADMIN — FAMOTIDINE 20 MILLIGRAM(S): 10 INJECTION INTRAVENOUS at 06:34

## 2023-10-11 RX ADMIN — DIVALPROEX SODIUM 375 MILLIGRAM(S): 500 TABLET, DELAYED RELEASE ORAL at 21:18

## 2023-10-11 RX ADMIN — AMLODIPINE BESYLATE 2.5 MILLIGRAM(S): 2.5 TABLET ORAL at 06:34

## 2023-10-11 RX ADMIN — Medication 3 MILLIGRAM(S): at 21:18

## 2023-10-11 RX ADMIN — SERTRALINE 200 MILLIGRAM(S): 25 TABLET, FILM COATED ORAL at 10:00

## 2023-10-11 RX ADMIN — METFORMIN HYDROCHLORIDE 250 MILLIGRAM(S): 850 TABLET ORAL at 10:00

## 2023-10-11 RX ADMIN — SENNA PLUS 2 TABLET(S): 8.6 TABLET ORAL at 10:00

## 2023-10-11 RX ADMIN — DIVALPROEX SODIUM 375 MILLIGRAM(S): 500 TABLET, DELAYED RELEASE ORAL at 10:01

## 2023-10-11 RX ADMIN — Medication 0.25 MILLIGRAM(S): at 10:00

## 2023-10-11 RX ADMIN — Medication 2 SPRAY(S): at 10:01

## 2023-10-11 RX ADMIN — METFORMIN HYDROCHLORIDE 250 MILLIGRAM(S): 850 TABLET ORAL at 21:19

## 2023-10-11 RX ADMIN — Medication 2 SPRAY(S): at 12:45

## 2023-10-11 NOTE — BH INPATIENT PSYCHIATRY PROGRESS NOTE - NSBHASSESSSUMMFT_PSY_ALL_CORE
83 year-old , retired female, domiciled at [facility], [facility] of DM2, HLD, HTN, PPHx of late onset Bipolar disorder, 2 prior psych adm last in 2022 at [facility], who presented to ED with worsening paranoia, anxiety, FTT (weight loss of 20lb since march), psychosis commingled with cognitive impairment   8/14 better but now hard to stay if responding to Abilify or prns of haldol will increase Abilify to 17mg then 20mg/d while initiating ECT discussion  8/15 Not much improvement   except less paranoid which may be attributable to haldol prn. Will d/c Abilify and start  Latuda will discuss ECT option  8/16 COnt Latuda trial while entering into discussion with patient re ECT  8/17 Tolerating latuda well; paranoia remains  8/18 Paranoia continues though improved; increase latuda to 40mg po daily - to be administered with food  8/19: On encounter today pt was seen chatting with one of her peers. Pt denied any complaints and reported feeling well. Denies paranoid thoughts or feeling afraid someone was going to hurt her. Still needing frequent redirection from staff to use her walker.   8/20 Depressed anxious fearful disorganized. Cont Latuda trial will cont to discuss ECT with patient, family is supportive  8/21 Depressed psychotic partial response will cont with ECT patient amenable as long as a family member goes with her for fear she will go off unit and never return. Will get labs ask medicine to see  8/22 Depressed not responded well to meds will increase Latuda while prepping for ECT. Will clarify need for ASA given severe nosebleed hx will discuss nosebleed issue with ECT   8/23 Cont ECT w/u discussed with medicine will   cont ASA  8/24 Psychotic depression with limited response to  multiple medication trials. Calmer less floridly delusion but anhedonia in state of constant distress. Patient assents to ECT, daughter Marcelina who is her HCP will provide consent. Patient requests family accompany her to ECT  for support as well as likely afraid she will not come back to unit if taken off. Will d/c Protonix suspension and use Pepcid as the former cannot be give while patient NPO. will lower Latuda as want to begin to reduce meds concurrent with ECT  8/25 Patient tolerated ECT, cont ECT, try to reduce Klonopin when better. Cont Effexor and Latuda. Recheck Na level on Sunday 8/26 Tolerated first ECT amnestic for treatment itself sl calmer. COnt ECT will begin to reduce klonopin to reduce cog se, check BMP in AM  8/27 less dysphoric, still paranoid, no SI/HI. Labs reviewed and discussed with hospitalist. Repeat BMP, urine NA and osmolality to f/u.  8/28 Tolerating ECT, cont treatments spread out so next one  9/1. Eval for further decrease Klonopin. Medicine saw patient , reintroduced metformin at low dose and d/declan ASA based on risk benefit   8/29 Mood psychosis better, sleepy during day confused. Will hold ECT til 9/1 reduce Klonopin  also may reduce Latuda  8/30 Improved with ECT but cog worse Will cont to hold ECT til clearer cont to reduce BDZ  8/31 Patient much better with regard to mood and  psychosis but notably cognitive disrupted form ECT Will hold ECT tomorrow. Will lower Lurasidone as was never effective for psychosis. Will start lowering Effexor and change to Zoloft as Effexor not helpful. Around completion of ECT may restart Depakote given episode of anjelica last admission. Consider further reduction in Ko Klonopin to 0.25mg/d . Reviewed plan of care with daughter  9/1 Patient improved but paranoia creeping back. Will cont Latuda at 40mg  consider increasing back to 60mg. Considered resume VPA based on concern of risk for switching but may inhibit sx requiring higher stimulus which can lead to more confusion. Next rx 9/5 9/2: Patient with psychotic depression, improving, on lurasidone   9/3: Patient's psychosis resolving, mood improved   9/4: Mood and psychotic symptoms improving, some impaired executive function and reasoning   9/5 PAtient improved but anxious, fearful cog impairment due to ECT but less severe. Cont ECT but spread out will, lower Effexor and add Zoloft as Effexor trial failed. Cont Lurasidone  9/6 Patient improving but quite confused. Will hold next treatment til next Monday. Cont to cross over to Zoloft  9/7 Improving will cont with spread out ECT to reudce severity of cog se of ECT will increase Zoloft and d/c Effexor Will rechallenge with CPAP  9/8 Patient improving with ECT next ECT 9/11 cont to titrate Zoloft. COnt encourage use of CPAP  9/11 patient showing improved impulse control, tolerating ECT, increase Zoloft to 125mg daily, will check BMP wed 9/12 more organized today, pleasant on approach, needs redirection to avoid touching peers on the shoulder   9/13: overall improvement in behaviors, needs redirection at times.next ECT friday.   9/14: ECT tomorrow, overall improved   9/15: next ECT monday, overall behaviors in control, redirectable, will keep patient on CO 11-7 since she had ECT today  9/18 Will change ECT to wednesday to space out better consider not doing further ECT after than unless very clear cut symptoms.  Plan resume Delapkote after las ECT, conisider moving Klonopin to later of split to minimize daytime sedation  9/19 Improving will give ECT in AM , peggy last ECT will startr Depakote after ect done  9/20 Improving likely last ECt will start Depakote back up cont other meds  9/21 Improved notably with course of ECT, will hold off on further rx, observe whetther any symptoms re appear as she clears from cog se of ECT  9/22 Mood and psychotic symptoms much improved. Will not give further ECT unless any symptoms return. Will see if cognition shows improvement with further time from ECT. Plan titrate Depakote.   9/25 Patient improved but some regression unclear if distress about d/c or true relpase will consider further ECT and increase in Latuda will review with family  9/26 Patient some regression since finishing ECT so are doing rescue ECT also increase Zoloft possible increase Latuda  9/27 Some re appearance of paranoia, cont longer course of ECT and will plan to increase Latuda  9/28 recrudescence of paranoia simmering down cont ECT spread out and increase Latuda, working on getting soft liner for CPA mask to protect nose  9/29 Improved after resumption of ECT Cont ECT 10/2 and consider increasing Latuda further if tolerating 60mg.   10/2 Improved paranoia, cont ECT plan further titration of Lurasidone   10/3 Maintaining gains, no return of paranoia. Will f/u on what she told family, will relay to ECT consider Versed if having distressing recall of ECT  10/4  Improved, cont ECT, plan increase Latuda, restart VPa after last ECT  10/5 Improving mood, no retrun of suspiciousnes, does not clear much between ECT. More accepting of help with ADL's. Plan next ECT in AM likely last one. Plan increase Ltuda after last ECT and restart Depakote  10/6 Improvined from ECT, confused , no unusual memories of treatment. Plan complete course of ECT will restart Depakote . Plan titration  of Latuda  10/7: Patient with some improvement from ECT, plan now for VPA and titration of lurasidone   10/9 Improved from ECT  still with sig cog impairment Cont to allow her to clear while planning increase in Lurasidone and VPA  69605/10 Maintaining gains post ECT. Cont meds  except will increase Depakote. Scheduled for jody today will see if there is any hesitation. BP lower than usual if remains could lower BP meds 83 year-old , retired female, domiciled at [facility], [facility] of DM2, HLD, HTN, PPHx of late onset Bipolar disorder, 2 prior psych adm last in 2022 at [facility], who presented to ED with worsening paranoia, anxiety, FTT (weight loss of 20lb since march), psychosis commingled with cognitive impairment   8/14 better but now hard to stay if responding to Abilify or prns of haldol will increase Abilify to 17mg then 20mg/d while initiating ECT discussion  8/15 Not much improvement   except less paranoid which may be attributable to haldol prn. Will d/c Abilify and start  Latuda will discuss ECT option  8/16 COnt Latuda trial while entering into discussion with patient re ECT  8/17 Tolerating latuda well; paranoia remains  8/18 Paranoia continues though improved; increase latuda to 40mg po daily - to be administered with food  8/19: On encounter today pt was seen chatting with one of her peers. Pt denied any complaints and reported feeling well. Denies paranoid thoughts or feeling afraid someone was going to hurt her. Still needing frequent redirection from staff to use her walker.   8/20 Depressed anxious fearful disorganized. Cont Latuda trial will cont to discuss ECT with patient, family is supportive  8/21 Depressed psychotic partial response will cont with ECT patient amenable as long as a family member goes with her for fear she will go off unit and never return. Will get labs ask medicine to see  8/22 Depressed not responded well to meds will increase Latuda while prepping for ECT. Will clarify need for ASA given severe nosebleed hx will discuss nosebleed issue with ECT   8/23 Cont ECT w/u discussed with medicine will   cont ASA  8/24 Psychotic depression with limited response to  multiple medication trials. Calmer less floridly delusion but anhedonia in state of constant distress. Patient assents to ECT, daughter Marcelina who is her HCP will provide consent. Patient requests family accompany her to ECT  for support as well as likely afraid she will not come back to unit if taken off. Will d/c Protonix suspension and use Pepcid as the former cannot be give while patient NPO. will lower Latuda as want to begin to reduce meds concurrent with ECT  8/25 Patient tolerated ECT, cont ECT, try to reduce Klonopin when better. Cont Effexor and Latuda. Recheck Na level on Sunday 8/26 Tolerated first ECT amnestic for treatment itself sl calmer. COnt ECT will begin to reduce klonopin to reduce cog se, check BMP in AM  8/27 less dysphoric, still paranoid, no SI/HI. Labs reviewed and discussed with hospitalist. Repeat BMP, urine NA and osmolality to f/u.  8/28 Tolerating ECT, cont treatments spread out so next one  9/1. Eval for further decrease Klonopin. Medicine saw patient , reintroduced metformin at low dose and d/declan ASA based on risk benefit   8/29 Mood psychosis better, sleepy during day confused. Will hold ECT til 9/1 reduce Klonopin  also may reduce Latuda  8/30 Improved with ECT but cog worse Will cont to hold ECT til clearer cont to reduce BDZ  8/31 Patient much better with regard to mood and  psychosis but notably cognitive disrupted form ECT Will hold ECT tomorrow. Will lower Lurasidone as was never effective for psychosis. Will start lowering Effexor and change to Zoloft as Effexor not helpful. Around completion of ECT may restart Depakote given episode of anjelica last admission. Consider further reduction in Ko Klonopin to 0.25mg/d . Reviewed plan of care with daughter  9/1 Patient improved but paranoia creeping back. Will cont Latuda at 40mg  consider increasing back to 60mg. Considered resume VPA based on concern of risk for switching but may inhibit sx requiring higher stimulus which can lead to more confusion. Next rx 9/5 9/2: Patient with psychotic depression, improving, on lurasidone   9/3: Patient's psychosis resolving, mood improved   9/4: Mood and psychotic symptoms improving, some impaired executive function and reasoning   9/5 PAtient improved but anxious, fearful cog impairment due to ECT but less severe. Cont ECT but spread out will, lower Effexor and add Zoloft as Effexor trial failed. Cont Lurasidone  9/6 Patient improving but quite confused. Will hold next treatment til next Monday. Cont to cross over to Zoloft  9/7 Improving will cont with spread out ECT to reudce severity of cog se of ECT will increase Zoloft and d/c Effexor Will rechallenge with CPAP  9/8 Patient improving with ECT next ECT 9/11 cont to titrate Zoloft. COnt encourage use of CPAP  9/11 patient showing improved impulse control, tolerating ECT, increase Zoloft to 125mg daily, will check BMP wed 9/12 more organized today, pleasant on approach, needs redirection to avoid touching peers on the shoulder   9/13: overall improvement in behaviors, needs redirection at times.next ECT friday.   9/14: ECT tomorrow, overall improved   9/15: next ECT monday, overall behaviors in control, redirectable, will keep patient on CO 11-7 since she had ECT today  9/18 Will change ECT to wednesday to space out better consider not doing further ECT after than unless very clear cut symptoms.  Plan resume Delapkote after las ECT, conisider moving Klonopin to later of split to minimize daytime sedation  9/19 Improving will give ECT in AM , peggy last ECT will startr Depakote after ect done  9/20 Improving likely last ECt will start Depakote back up cont other meds  9/21 Improved notably with course of ECT, will hold off on further rx, observe whetther any symptoms re appear as she clears from cog se of ECT  9/22 Mood and psychotic symptoms much improved. Will not give further ECT unless any symptoms return. Will see if cognition shows improvement with further time from ECT. Plan titrate Depakote.   9/25 Patient improved but some regression unclear if distress about d/c or true relpase will consider further ECT and increase in Latuda will review with family  9/26 Patient some regression since finishing ECT so are doing rescue ECT also increase Zoloft possible increase Latuda  9/27 Some re appearance of paranoia, cont longer course of ECT and will plan to increase Latuda  9/28 recrudescence of paranoia simmering down cont ECT spread out and increase Latuda, working on getting soft liner for CPA mask to protect nose  9/29 Improved after resumption of ECT Cont ECT 10/2 and consider increasing Latuda further if tolerating 60mg.   10/2 Improved paranoia, cont ECT plan further titration of Lurasidone   10/3 Maintaining gains, no return of paranoia. Will f/u on what she told family, will relay to ECT consider Versed if having distressing recall of ECT  10/4  Improved, cont ECT, plan increase Latuda, restart VPa after last ECT  10/5 Improving mood, no retrun of suspiciousnes, does not clear much between ECT. More accepting of help with ADL's. Plan next ECT in AM likely last one. Plan increase Ltuda after last ECT and restart Depakote  10/6 Improvined from ECT, confused , no unusual memories of treatment. Plan complete course of ECT will restart Depakote . Plan titration  of Latuda  10/7: Patient with some improvement from ECT, plan now for VPA and titration of lurasidone   10/9 Improved from ECT  still with sig cog impairment Cont to allow her to clear while planning increase in Lurasidone and VPA  10/10 Maintaining gains post ECT. Cont meds  except will increase Depakote. Scheduled for shower today will see if there is any hesitation. BP lower than usual if remains could lower BP meds  10/11 Maintaining gains no re appearance of paranoia manifest but resistiveness with meds and care nor any paranoid content . COnt meds but will increase Latuda to 80mg/d to optimize maintenance regimen

## 2023-10-11 NOTE — BH INPATIENT PSYCHIATRY PROGRESS NOTE - CURRENT MEDICATION
MEDICATIONS  (STANDING):  amLODIPine   Tablet 2.5 milliGRAM(s) Oral <User Schedule>  atorvastatin 10 milliGRAM(s) Oral at bedtime  clonazePAM Oral Disintegrating Tablet 0.25 milliGRAM(s) Oral daily  divalproex Sprinkle 375 milliGRAM(s) Oral two times a day  famotidine    Tablet 20 milliGRAM(s) Oral <User Schedule>  glucagon  Injectable 1 milliGRAM(s) IntraMuscular once  lactulose Syrup 10 Gram(s) Oral <User Schedule>  losartan 75 milliGRAM(s) Oral <User Schedule>  lurasidone 80 milliGRAM(s) Oral daily  melatonin. 3 milliGRAM(s) Oral at bedtime  metFORMIN 250 milliGRAM(s) Oral two times a day  senna 2 Tablet(s) Oral daily  sertraline 200 milliGRAM(s) Oral daily  sodium chloride 0.65% Nasal 2 Spray(s) Both Nostrils three times a day    MEDICATIONS  (PRN):  acetaminophen     Tablet .. 650 milliGRAM(s) Oral every 6 hours PRN Temp greater or equal to 38C (100.4F), Mild Pain (1 - 3), Moderate Pain (4 - 6)  bisacodyl 5 milliGRAM(s) Oral daily PRN constipation  bisacodyl Suppository 10 milliGRAM(s) Rectal daily PRN constipation  dextrose Oral Gel 15 Gram(s) Oral once PRN Blood Glucose LESS THAN 70 milliGRAM(s)/deciliter  haloperidol     Tablet 0.5 milliGRAM(s) Oral every 6 hours PRN agitation  haloperidol    Injectable 1 milliGRAM(s) IntraMuscular once PRN agitation  lidocaine   4% Patch 1 Patch Transdermal daily PRN LBP  simethicone 80 milliGRAM(s) Chew every 4 hours PRN abdominal discomfort

## 2023-10-11 NOTE — BH INPATIENT PSYCHIATRY PROGRESS NOTE - MSE UNSTRUCTURED FT
Patient is awake and alert. Mood neutral, affect somewhat constricted. Speech is fluent TP is logical . No PMR. So SI. No delusions or overvalued ideas about facility she lives in or here. No complaints about facility or reluctance to go back there.  No hallucinations.  oriented to x1. Limited insight. Patient is awake and alert. [place] Mood neutral, affect somewhat constricted but smiles Speech is fluent TP is logical . No PMR. No SI. No delusions or overvalued negative ideas about facility she lives in or here. No complaints about facility or reluctance to go back there.  No hallucinations.  oriented to x1. Limited insight.

## 2023-10-11 NOTE — BH INPATIENT PSYCHIATRY PROGRESS NOTE - NSBHCHARTREVIEWVS_PSY_A_CORE FT
Vital Signs Last 24 Hrs  T(C): 36.2 (10-11-23 @ 06:16), Max: 36.2 (10-11-23 @ 06:16)  T(F): 97.1 (10-11-23 @ 06:16), Max: 97.1 (10-11-23 @ 06:16)  HR: --  BP: --  BP(mean): --  RR: --  SpO2: --    Orthostatic VS  10-11-23 @ 06:16  Lying BP: 123/65 HR: 68  Sitting BP: --/-- HR: --  Standing BP: --/-- HR: --  Site: --  Mode: --  Orthostatic VS  10-10-23 @ 05:36  Lying BP: --/-- HR: --  Sitting BP: 108/46 HR: 64  Standing BP: 106/49 HR: 75  Site: --  Mode: --

## 2023-10-11 NOTE — BH INPATIENT PSYCHIATRY PROGRESS NOTE - NSBHFUPINTERVALHXFT_PSY_A_CORE
Bipolar d/o, depressed with psychotic features. calm today. reports that she feels "good." eating and drinking adequately. Sleeping well. VSS. Patient seen spending long time in BR but denies being constipated Bipolar d/o, depressed with psychotic features. calm today. reports that she feels "good." eating and drinking adequately. Sleeping well. VSS.Had shower, not resistive to taking one as in past

## 2023-10-12 LAB
ANION GAP SERPL CALC-SCNC: 11 MMOL/L — SIGNIFICANT CHANGE UP (ref 7–14)
BASOPHILS # BLD AUTO: 0.03 K/UL — SIGNIFICANT CHANGE UP (ref 0–0.2)
BASOPHILS NFR BLD AUTO: 0.4 % — SIGNIFICANT CHANGE UP (ref 0–2)
BUN SERPL-MCNC: 9 MG/DL — SIGNIFICANT CHANGE UP (ref 7–23)
CALCIUM SERPL-MCNC: 10.1 MG/DL — SIGNIFICANT CHANGE UP (ref 8.4–10.5)
CHLORIDE SERPL-SCNC: 98 MMOL/L — SIGNIFICANT CHANGE UP (ref 98–107)
CO2 SERPL-SCNC: 24 MMOL/L — SIGNIFICANT CHANGE UP (ref 22–31)
CREAT SERPL-MCNC: 0.66 MG/DL — SIGNIFICANT CHANGE UP (ref 0.5–1.3)
EGFR: 88 ML/MIN/1.73M2 — SIGNIFICANT CHANGE UP
EOSINOPHIL # BLD AUTO: 0.02 K/UL — SIGNIFICANT CHANGE UP (ref 0–0.5)
EOSINOPHIL NFR BLD AUTO: 0.3 % — SIGNIFICANT CHANGE UP (ref 0–6)
GLUCOSE SERPL-MCNC: 159 MG/DL — HIGH (ref 70–99)
HCT VFR BLD CALC: 35.7 % — SIGNIFICANT CHANGE UP (ref 34.5–45)
HGB BLD-MCNC: 11.6 G/DL — SIGNIFICANT CHANGE UP (ref 11.5–15.5)
IANC: 5.75 K/UL — SIGNIFICANT CHANGE UP (ref 1.8–7.4)
IMM GRANULOCYTES NFR BLD AUTO: 0.3 % — SIGNIFICANT CHANGE UP (ref 0–0.9)
LYMPHOCYTES # BLD AUTO: 1.59 K/UL — SIGNIFICANT CHANGE UP (ref 1–3.3)
LYMPHOCYTES # BLD AUTO: 20.2 % — SIGNIFICANT CHANGE UP (ref 13–44)
MCHC RBC-ENTMCNC: 26.5 PG — LOW (ref 27–34)
MCHC RBC-ENTMCNC: 32.5 GM/DL — SIGNIFICANT CHANGE UP (ref 32–36)
MCV RBC AUTO: 81.5 FL — SIGNIFICANT CHANGE UP (ref 80–100)
MONOCYTES # BLD AUTO: 0.45 K/UL — SIGNIFICANT CHANGE UP (ref 0–0.9)
MONOCYTES NFR BLD AUTO: 5.7 % — SIGNIFICANT CHANGE UP (ref 2–14)
NEUTROPHILS # BLD AUTO: 5.75 K/UL — SIGNIFICANT CHANGE UP (ref 1.8–7.4)
NEUTROPHILS NFR BLD AUTO: 73.1 % — SIGNIFICANT CHANGE UP (ref 43–77)
NRBC # BLD: 0 /100 WBCS — SIGNIFICANT CHANGE UP (ref 0–0)
NRBC # FLD: 0 K/UL — SIGNIFICANT CHANGE UP (ref 0–0)
PLATELET # BLD AUTO: 221 K/UL — SIGNIFICANT CHANGE UP (ref 150–400)
POTASSIUM SERPL-MCNC: 4.5 MMOL/L — SIGNIFICANT CHANGE UP (ref 3.5–5.3)
POTASSIUM SERPL-SCNC: 4.5 MMOL/L — SIGNIFICANT CHANGE UP (ref 3.5–5.3)
RBC # BLD: 4.38 M/UL — SIGNIFICANT CHANGE UP (ref 3.8–5.2)
RBC # FLD: 13.9 % — SIGNIFICANT CHANGE UP (ref 10.3–14.5)
SODIUM SERPL-SCNC: 133 MMOL/L — LOW (ref 135–145)
WBC # BLD: 7.86 K/UL — SIGNIFICANT CHANGE UP (ref 3.8–10.5)
WBC # FLD AUTO: 7.86 K/UL — SIGNIFICANT CHANGE UP (ref 3.8–10.5)

## 2023-10-12 PROCEDURE — 99232 SBSQ HOSP IP/OBS MODERATE 35: CPT

## 2023-10-12 RX ORDER — LURASIDONE HYDROCHLORIDE 40 MG/1
40 TABLET ORAL DAILY
Refills: 0 | Status: DISCONTINUED | OUTPATIENT
Start: 2023-10-12 | End: 2023-10-16

## 2023-10-12 RX ADMIN — LOSARTAN POTASSIUM 75 MILLIGRAM(S): 100 TABLET, FILM COATED ORAL at 06:20

## 2023-10-12 RX ADMIN — METFORMIN HYDROCHLORIDE 250 MILLIGRAM(S): 850 TABLET ORAL at 21:30

## 2023-10-12 RX ADMIN — AMLODIPINE BESYLATE 2.5 MILLIGRAM(S): 2.5 TABLET ORAL at 06:20

## 2023-10-12 RX ADMIN — Medication 2 SPRAY(S): at 21:37

## 2023-10-12 RX ADMIN — DIVALPROEX SODIUM 375 MILLIGRAM(S): 500 TABLET, DELAYED RELEASE ORAL at 10:00

## 2023-10-12 RX ADMIN — SERTRALINE 200 MILLIGRAM(S): 25 TABLET, FILM COATED ORAL at 09:59

## 2023-10-12 RX ADMIN — Medication 2 SPRAY(S): at 10:00

## 2023-10-12 RX ADMIN — Medication 0.25 MILLIGRAM(S): at 10:00

## 2023-10-12 RX ADMIN — METFORMIN HYDROCHLORIDE 250 MILLIGRAM(S): 850 TABLET ORAL at 10:00

## 2023-10-12 RX ADMIN — ATORVASTATIN CALCIUM 10 MILLIGRAM(S): 80 TABLET, FILM COATED ORAL at 21:30

## 2023-10-12 RX ADMIN — Medication 3 MILLIGRAM(S): at 21:29

## 2023-10-12 RX ADMIN — DIVALPROEX SODIUM 375 MILLIGRAM(S): 500 TABLET, DELAYED RELEASE ORAL at 21:29

## 2023-10-12 RX ADMIN — FAMOTIDINE 20 MILLIGRAM(S): 10 INJECTION INTRAVENOUS at 06:19

## 2023-10-12 NOTE — BH INPATIENT PSYCHIATRY PROGRESS NOTE - CURRENT MEDICATION
MEDICATIONS  (STANDING):  amLODIPine   Tablet 2.5 milliGRAM(s) Oral <User Schedule>  atorvastatin 10 milliGRAM(s) Oral at bedtime  clonazePAM Oral Disintegrating Tablet 0.25 milliGRAM(s) Oral daily  divalproex Sprinkle 375 milliGRAM(s) Oral two times a day  famotidine    Tablet 20 milliGRAM(s) Oral <User Schedule>  glucagon  Injectable 1 milliGRAM(s) IntraMuscular once  lactulose Syrup 10 Gram(s) Oral <User Schedule>  losartan 75 milliGRAM(s) Oral <User Schedule>  lurasidone 40 milliGRAM(s) Oral daily  melatonin. 3 milliGRAM(s) Oral at bedtime  metFORMIN 250 milliGRAM(s) Oral two times a day  senna 2 Tablet(s) Oral daily  sertraline 200 milliGRAM(s) Oral daily  sodium chloride 0.65% Nasal 2 Spray(s) Both Nostrils three times a day    MEDICATIONS  (PRN):  acetaminophen     Tablet .. 650 milliGRAM(s) Oral every 6 hours PRN Temp greater or equal to 38C (100.4F), Mild Pain (1 - 3), Moderate Pain (4 - 6)  bisacodyl 5 milliGRAM(s) Oral daily PRN constipation  bisacodyl Suppository 10 milliGRAM(s) Rectal daily PRN constipation  dextrose Oral Gel 15 Gram(s) Oral once PRN Blood Glucose LESS THAN 70 milliGRAM(s)/deciliter  haloperidol     Tablet 0.5 milliGRAM(s) Oral every 6 hours PRN agitation  haloperidol    Injectable 1 milliGRAM(s) IntraMuscular once PRN agitation  lidocaine   4% Patch 1 Patch Transdermal daily PRN LBP  simethicone 80 milliGRAM(s) Chew every 4 hours PRN abdominal discomfort

## 2023-10-12 NOTE — BH INPATIENT PSYCHIATRY PROGRESS NOTE - MSE UNSTRUCTURED FT
Patient is awake and alert. [place] Mood neutral, affect somewhat constricted but smiles Speech is fluent TP is logical . No PMR. No SI. No delusions or overvalued negative ideas about facility she lives in or here. No complaints about facility or reluctance to go back there.  No hallucinations.  oriented to x1. Had difficulty naming children knoew she had 5 Limited insight.

## 2023-10-12 NOTE — BH INPATIENT PSYCHIATRY PROGRESS NOTE - NSICDXBHSECONDARYDX_PSY_ALL_CORE
[id number] [facility] at [facility]  Supervised Weight Loss     Date:   10/26/2017    Patient's Name: Marta Ojeda  : 1965    Insurance:  Raquel Castro          Session: 3 of  6  Surgery: Gastric Bypass Revision   Surgeon:  Shanell Spangler M.D. Height: 65\"   Weight:    258      Lbs. BMI: 42   Pounds Lost since last month: 0               Pounds Gained since last month: 1#    Starting Weight: 263#   Previous Months Weight: 257#  Overall Pounds Lost: 5#  Overall Pounds Gained: 0    Other Pertinent Information: n/a     Smoking Status:  none  Alcohol Intake: none    I have reviewed with patient the guidelines of the supervised weight loss class. Patient understands the expectations of some weight loss during the weight loss trial.  Patient understands that weight gain could delay the process. I have also expressed to patient that classes need to be consecutive. Missing a class may subject patient to have to start their trial over. Patient has received this information in writing. Changes that patient has made since last month include:  none. Eating Habits and Behaviors      Today we reviewed the general diet principles for weight loss surgery. Their plate should be made up of 1/2 coming from non-starchy vegetables, 1/4 coming from lean meat, and 1/4 of their plate coming from carbohydrates, including fruits, starches, or milk. We discussed the importance of measuring meals to 1/2 cup (4 oz) after surgery to prevent overeating. Emphasis was placed on the importance of eating 3 meals a day and aiming for 60 grams of protein per day. We talked about cooking more meals at home, less eating out, reduced intake of sweets and added sugars and drinking only calorie-free, non-carbonated fluids. Patient's current diet habits include: eating 3 meals a day. Snacking on fruit, veggies, protein drinks and hummus. Eating pretzels with hummus. Eating donuts once a month.  Eating baked, grilled and broiled foods. Eating out is 1 time per week. Drinking 26 oz water, 24 oz diet soda daily. Denies emotional eating and sometimes situational eating. Packing meals when away from home. Eating most meals at a table,while watching television and sometimes while in bed. Reports snacking and lack of activity are biggest barriers to weight loss at this time. Physical Activity/Exercise  During class we discussed the importance of increasing daily physical activity and beginning to develop an exercise regimen/routine. An education lesson was provided including motivation to start exercise, exercise programs and resources, tips for getting started and overcoming barriers and health benefits of exercise. We discussed that exercise is an important part of long term weight loss. Comments:  During class, I discussed with patient the importance of getting into an exercise routine. Patient is currently not exercising but is getting more daily movement. Patient has been encouraged to consider chair exercises or short intervals of walking spaced throughout the day. Behavior Modification       Comments: We discussed the importance of eating mindfully after weight loss surgery to prevent food intolerance and prevent weight regain. We talked about how to eat more mindfully and identify emotional eating triggers. Tips and recommendations for how to make these changes were provided. Patient was encouraged to keep a food journal and record what they were taking in daily. Overall Assessment: Patient demonstrates small lifestyle changes this past month evidenced by weight maintenance. Will continue to assess as pt works to complete supervised weight loss requirements. Patient-Set Goals:   1. Nutrition - 3 meals a day, protein goals, decrease diet soda  2. Exercise - walking longer  3.  Behavior -not drinking with meals     Tiffanie Colin, KATHERYN  10/26/2017 Type II diabetes mellitus   E11.9  Vitamin B12 deficiency   E53.8  Hyperlipidemia   E78.5  Hypertension   I10  S/P partial hysterectomy   Z90.711  S/P carpal tunnel release   Z98.890  Anxiety state   F41.1  Esophageal web   Q39.4

## 2023-10-12 NOTE — BH INPATIENT PSYCHIATRY PROGRESS NOTE - NSBHASSESSSUMMFT_PSY_ALL_CORE
83 year-old , retired female, domiciled at [facility], [facility] of DM2, HLD, HTN, PPHx of late onset Bipolar disorder, 2 prior psych adm last in 2022 at [facility], who presented to ED with worsening paranoia, anxiety, FTT (weight loss of 20lb since march), psychosis commingled with cognitive impairment   8/14 better but now hard to stay if responding to Abilify or prns of haldol will increase Abilify to 17mg then 20mg/d while initiating ECT discussion  8/15 Not much improvement   except less paranoid which may be attributable to haldol prn. Will d/c Abilify and start  Latuda will discuss ECT option  8/16 COnt Latuda trial while entering into discussion with patient re ECT  8/17 Tolerating latuda well; paranoia remains  8/18 Paranoia continues though improved; increase latuda to 40mg po daily - to be administered with food  8/19: On encounter today pt was seen chatting with one of her peers. Pt denied any complaints and reported feeling well. Denies paranoid thoughts or feeling afraid someone was going to hurt her. Still needing frequent redirection from staff to use her walker.   8/20 Depressed anxious fearful disorganized. Cont Latuda trial will cont to discuss ECT with patient, family is supportive  8/21 Depressed psychotic partial response will cont with ECT patient amenable as long as a family member goes with her for fear she will go off unit and never return. Will get labs ask medicine to see  8/22 Depressed not responded well to meds will increase Latuda while prepping for ECT. Will clarify need for ASA given severe nosebleed hx will discuss nosebleed issue with ECT   8/23 Cont ECT w/u discussed with medicine will   cont ASA  8/24 Psychotic depression with limited response to  multiple medication trials. Calmer less floridly delusion but anhedonia in state of constant distress. Patient assents to ECT, daughter Marcelina who is her HCP will provide consent. Patient requests family accompany her to ECT  for support as well as likely afraid she will not come back to unit if taken off. Will d/c Protonix suspension and use Pepcid as the former cannot be give while patient NPO. will lower Latuda as want to begin to reduce meds concurrent with ECT  8/25 Patient tolerated ECT, cont ECT, try to reduce Klonopin when better. Cont Effexor and Latuda. Recheck Na level on Sunday 8/26 Tolerated first ECT amnestic for treatment itself sl calmer. COnt ECT will begin to reduce klonopin to reduce cog se, check BMP in AM  8/27 less dysphoric, still paranoid, no SI/HI. Labs reviewed and discussed with hospitalist. Repeat BMP, urine NA and osmolality to f/u.  8/28 Tolerating ECT, cont treatments spread out so next one  9/1. Eval for further decrease Klonopin. Medicine saw patient , reintroduced metformin at low dose and d/declan ASA based on risk benefit   8/29 Mood psychosis better, sleepy during day confused. Will hold ECT til 9/1 reduce Klonopin  also may reduce Latuda  8/30 Improved with ECT but cog worse Will cont to hold ECT til clearer cont to reduce BDZ  8/31 Patient much better with regard to mood and  psychosis but notably cognitive disrupted form ECT Will hold ECT tomorrow. Will lower Lurasidone as was never effective for psychosis. Will start lowering Effexor and change to Zoloft as Effexor not helpful. Around completion of ECT may restart Depakote given episode of anjelica last admission. Consider further reduction in Ko Klonopin to 0.25mg/d . Reviewed plan of care with daughter  9/1 Patient improved but paranoia creeping back. Will cont Latuda at 40mg  consider increasing back to 60mg. Considered resume VPA based on concern of risk for switching but may inhibit sx requiring higher stimulus which can lead to more confusion. Next rx 9/5 9/2: Patient with psychotic depression, improving, on lurasidone   9/3: Patient's psychosis resolving, mood improved   9/4: Mood and psychotic symptoms improving, some impaired executive function and reasoning   9/5 PAtient improved but anxious, fearful cog impairment due to ECT but less severe. Cont ECT but spread out will, lower Effexor and add Zoloft as Effexor trial failed. Cont Lurasidone  9/6 Patient improving but quite confused. Will hold next treatment til next Monday. Cont to cross over to Zoloft  9/7 Improving will cont with spread out ECT to reudce severity of cog se of ECT will increase Zoloft and d/c Effexor Will rechallenge with CPAP  9/8 Patient improving with ECT next ECT 9/11 cont to titrate Zoloft. COnt encourage use of CPAP  9/11 patient showing improved impulse control, tolerating ECT, increase Zoloft to 125mg daily, will check BMP wed 9/12 more organized today, pleasant on approach, needs redirection to avoid touching peers on the shoulder   9/13: overall improvement in behaviors, needs redirection at times.next ECT friday.   9/14: ECT tomorrow, overall improved   9/15: next ECT monday, overall behaviors in control, redirectable, will keep patient on CO 11-7 since she had ECT today  9/18 Will change ECT to wednesday to space out better consider not doing further ECT after than unless very clear cut symptoms.  Plan resume Delapkote after las ECT, conisider moving Klonopin to later of split to minimize daytime sedation  9/19 Improving will give ECT in AM , peggy last ECT will startr Depakote after ect done  9/20 Improving likely last ECt will start Depakote back up cont other meds  9/21 Improved notably with course of ECT, will hold off on further rx, observe whetther any symptoms re appear as she clears from cog se of ECT  9/22 Mood and psychotic symptoms much improved. Will not give further ECT unless any symptoms return. Will see if cognition shows improvement with further time from ECT. Plan titrate Depakote.   9/25 Patient improved but some regression unclear if distress about d/c or true relpase will consider further ECT and increase in Latuda will review with family  9/26 Patient some regression since finishing ECT so are doing rescue ECT also increase Zoloft possible increase Latuda  9/27 Some re appearance of paranoia, cont longer course of ECT and will plan to increase Latuda  9/28 recrudescence of paranoia simmering down cont ECT spread out and increase Latuda, working on getting soft liner for CPA mask to protect nose  9/29 Improved after resumption of ECT Cont ECT 10/2 and consider increasing Latuda further if tolerating 60mg.   10/2 Improved paranoia, cont ECT plan further titration of Lurasidone   10/3 Maintaining gains, no return of paranoia. Will f/u on what she told family, will relay to ECT consider Versed if having distressing recall of ECT  10/4  Improved, cont ECT, plan increase Latuda, restart VPa after last ECT  10/5 Improving mood, no retrun of suspiciousnes, does not clear much between ECT. More accepting of help with ADL's. Plan next ECT in AM likely last one. Plan increase Ltuda after last ECT and restart Depakote  10/6 Improvined from ECT, confused , no unusual memories of treatment. Plan complete course of ECT will restart Depakote . Plan titration  of Latuda  10/7: Patient with some improvement from ECT, plan now for VPA and titration of lurasidone   10/9 Improved from ECT  still with sig cog impairment Cont to allow her to clear while planning increase in Lurasidone and VPA  10/10 Maintaining gains post ECT. Cont meds  except will increase Depakote. Scheduled for shower today will see if there is any hesitation. BP lower than usual if remains could lower BP meds  10/11 Maintaining gains no re appearance of paranoia manifest but resistiveness with meds and care nor any paranoid content . COnt meds but will increase Latuda to 80mg/d to optimize maintenance regimen  10/12 Patient more confused depsite time away from ECT wiill check labs as has had low NA in past check ua, reduce Latuda

## 2023-10-12 NOTE — BH INPATIENT PSYCHIATRY PROGRESS NOTE - NSBHCHARTREVIEWVS_PSY_A_CORE FT
Vital Signs Last 24 Hrs  T(C): 36.7 (10-12-23 @ 05:24), Max: 36.7 (10-12-23 @ 05:24)  T(F): 98 (10-12-23 @ 05:24), Max: 98 (10-12-23 @ 05:24)  HR: --  BP: --  BP(mean): --  RR: --  SpO2: --    Orthostatic VS  10-12-23 @ 05:24  Lying BP: 117/43 HR: 64  Sitting BP: --/-- HR: --  Standing BP: --/-- HR: --  Site: --  Mode: --  Orthostatic VS  10-11-23 @ 06:16  Lying BP: 123/65 HR: 68  Sitting BP: --/-- HR: --  Standing BP: --/-- HR: --  Site: --  Mode: --

## 2023-10-12 NOTE — BH INPATIENT PSYCHIATRY PROGRESS NOTE - NSBHFUPINTERVALHXFT_PSY_A_CORE
Bipolar d/o, depressed with psychotic features. calm today. reports that she feels "okay." eating and drinking adequately. Sleep was awake first part of night VSS.Had shower, not resistive to taking one as in past. Family notice her being less clear on phone calls

## 2023-10-13 LAB — GLUCOSE BLDC GLUCOMTR-MCNC: 80 MG/DL — SIGNIFICANT CHANGE UP (ref 70–99)

## 2023-10-13 PROCEDURE — 99232 SBSQ HOSP IP/OBS MODERATE 35: CPT

## 2023-10-13 RX ORDER — PANTOPRAZOLE SODIUM 20 MG/1
40 TABLET, DELAYED RELEASE ORAL DAILY
Refills: 0 | Status: DISCONTINUED | OUTPATIENT
Start: 2023-10-13 | End: 2023-10-17

## 2023-10-13 RX ORDER — LURASIDONE HYDROCHLORIDE 40 MG/1
1 TABLET ORAL
Qty: 30 | Refills: 0
Start: 2023-10-13 | End: 2023-11-11

## 2023-10-13 RX ORDER — PANTOPRAZOLE SODIUM 20 MG/1
1 TABLET, DELAYED RELEASE ORAL
Qty: 30 | Refills: 0
Start: 2023-10-13 | End: 2023-11-11

## 2023-10-13 RX ORDER — CLONAZEPAM 1 MG
0.25 TABLET ORAL DAILY
Refills: 0 | Status: DISCONTINUED | OUTPATIENT
Start: 2023-10-13 | End: 2023-10-17

## 2023-10-13 RX ADMIN — AMLODIPINE BESYLATE 2.5 MILLIGRAM(S): 2.5 TABLET ORAL at 06:25

## 2023-10-13 RX ADMIN — LACTULOSE 10 GRAM(S): 10 SOLUTION ORAL at 12:42

## 2023-10-13 RX ADMIN — Medication 3 MILLIGRAM(S): at 20:45

## 2023-10-13 RX ADMIN — SERTRALINE 200 MILLIGRAM(S): 25 TABLET, FILM COATED ORAL at 09:24

## 2023-10-13 RX ADMIN — FAMOTIDINE 20 MILLIGRAM(S): 10 INJECTION INTRAVENOUS at 06:25

## 2023-10-13 RX ADMIN — Medication 0.25 MILLIGRAM(S): at 09:24

## 2023-10-13 RX ADMIN — PANTOPRAZOLE SODIUM 40 MILLIGRAM(S): 20 TABLET, DELAYED RELEASE ORAL at 09:25

## 2023-10-13 RX ADMIN — LOSARTAN POTASSIUM 75 MILLIGRAM(S): 100 TABLET, FILM COATED ORAL at 06:25

## 2023-10-13 RX ADMIN — LURASIDONE HYDROCHLORIDE 40 MILLIGRAM(S): 40 TABLET ORAL at 09:24

## 2023-10-13 RX ADMIN — METFORMIN HYDROCHLORIDE 250 MILLIGRAM(S): 850 TABLET ORAL at 09:25

## 2023-10-13 RX ADMIN — SENNA PLUS 2 TABLET(S): 8.6 TABLET ORAL at 09:23

## 2023-10-13 RX ADMIN — METFORMIN HYDROCHLORIDE 250 MILLIGRAM(S): 850 TABLET ORAL at 20:45

## 2023-10-13 RX ADMIN — Medication 2 SPRAY(S): at 12:42

## 2023-10-13 RX ADMIN — ATORVASTATIN CALCIUM 10 MILLIGRAM(S): 80 TABLET, FILM COATED ORAL at 20:46

## 2023-10-13 RX ADMIN — DIVALPROEX SODIUM 375 MILLIGRAM(S): 500 TABLET, DELAYED RELEASE ORAL at 09:24

## 2023-10-13 RX ADMIN — DIVALPROEX SODIUM 375 MILLIGRAM(S): 500 TABLET, DELAYED RELEASE ORAL at 20:45

## 2023-10-13 NOTE — BH INPATIENT PSYCHIATRY PROGRESS NOTE - NSBHMETABOLIC_PSY_ALL_CORE_FT
BMI: BMI (kg/m2): 23 (06-07-23 @ 18:13)  HbA1c: A1C with Estimated Average Glucose Result: 5.7 % (06-01-23 @ 05:10)    Glucose: POCT Blood Glucose.: 80 mg/dL (10-13-23 @ 07:26)    BP: 126/58 (10-13-23 @ 05:51) (126/58 - 126/58)  Lipid Panel: Date/Time: 06-08-23 @ 08:30  Cholesterol, Serum: 119  Direct LDL: --  HDL Cholesterol, Serum: 47  Total Cholesterol/HDL Ration Measurement: --  Triglycerides, Serum: 56

## 2023-10-13 NOTE — BH INPATIENT PSYCHIATRY PROGRESS NOTE - NSBHFUPINTERVALHXFT_PSY_A_CORE
Bipolar d/o, depressed with psychotic features. calm today. reports that she feels "okay." Eating and drinking adequately. Sleep was awake first part of night VSS.Had shower, not resistive to taking one as in past. Slept well last night out of room earlier today appeared brighter more alert

## 2023-10-13 NOTE — BH INPATIENT PSYCHIATRY PROGRESS NOTE - MSE UNSTRUCTURED FT
Patient is awake and alert. [place] Mood neutral, affect brighter todaySpeech is fluent TP is logical . No PMR. No SI. No delusions or overvalued negative ideas about facility she lives in or here. No complaints about facility or reluctance to go back there.  No hallucinations.  oriented to x1.Named children more easily,

## 2023-10-13 NOTE — BH INPATIENT PSYCHIATRY PROGRESS NOTE - NSBHCHARTREVIEWVS_PSY_A_CORE FT
Vital Signs Last 24 Hrs  T(C): 36.2 (10-13-23 @ 05:51), Max: 36.2 (10-13-23 @ 05:51)  T(F): 97.1 (10-13-23 @ 05:51), Max: 97.1 (10-13-23 @ 05:51)  HR: 60 (10-13-23 @ 05:51) (60 - 60)  BP: 126/58 (10-13-23 @ 05:51) (126/58 - 126/58)  BP(mean): --  RR: 17 (10-13-23 @ 05:51) (17 - 17)  SpO2: --    Orthostatic VS  10-12-23 @ 05:24  Lying BP: 117/43 HR: 64  Sitting BP: --/-- HR: --  Standing BP: --/-- HR: --  Site: --  Mode: --

## 2023-10-13 NOTE — BH INPATIENT PSYCHIATRY PROGRESS NOTE - CURRENT MEDICATION
MEDICATIONS  (STANDING):  amLODIPine   Tablet 2.5 milliGRAM(s) Oral <User Schedule>  atorvastatin 10 milliGRAM(s) Oral at bedtime  clonazePAM Oral Disintegrating Tablet 0.25 milliGRAM(s) Oral daily  divalproex Sprinkle 375 milliGRAM(s) Oral two times a day  glucagon  Injectable 1 milliGRAM(s) IntraMuscular once  lactulose Syrup 10 Gram(s) Oral <User Schedule>  losartan 75 milliGRAM(s) Oral <User Schedule>  lurasidone 40 milliGRAM(s) Oral daily  melatonin. 3 milliGRAM(s) Oral at bedtime  metFORMIN 250 milliGRAM(s) Oral two times a day  pantoprazole   Suspension 40 milliGRAM(s) Oral daily  senna 2 Tablet(s) Oral daily  sertraline 200 milliGRAM(s) Oral daily  sodium chloride 0.65% Nasal 2 Spray(s) Both Nostrils three times a day    MEDICATIONS  (PRN):  acetaminophen     Tablet .. 650 milliGRAM(s) Oral every 6 hours PRN Temp greater or equal to 38C (100.4F), Mild Pain (1 - 3), Moderate Pain (4 - 6)  bisacodyl 5 milliGRAM(s) Oral daily PRN constipation  bisacodyl Suppository 10 milliGRAM(s) Rectal daily PRN constipation  dextrose Oral Gel 15 Gram(s) Oral once PRN Blood Glucose LESS THAN 70 milliGRAM(s)/deciliter  haloperidol     Tablet 0.5 milliGRAM(s) Oral every 6 hours PRN agitation  haloperidol    Injectable 1 milliGRAM(s) IntraMuscular once PRN agitation  lidocaine   4% Patch 1 Patch Transdermal daily PRN LBP  simethicone 80 milliGRAM(s) Chew every 4 hours PRN abdominal discomfort

## 2023-10-14 LAB
APPEARANCE UR: CLEAR — SIGNIFICANT CHANGE UP
BACTERIA # UR AUTO: NEGATIVE /HPF — SIGNIFICANT CHANGE UP
BILIRUB UR-MCNC: NEGATIVE — SIGNIFICANT CHANGE UP
CAST: 0 /LPF — SIGNIFICANT CHANGE UP (ref 0–4)
COLOR SPEC: YELLOW — SIGNIFICANT CHANGE UP
DIFF PNL FLD: NEGATIVE — SIGNIFICANT CHANGE UP
GLUCOSE UR QL: NEGATIVE MG/DL — SIGNIFICANT CHANGE UP
KETONES UR-MCNC: ABNORMAL MG/DL
LEUKOCYTE ESTERASE UR-ACNC: ABNORMAL
NITRITE UR-MCNC: NEGATIVE — SIGNIFICANT CHANGE UP
PH UR: 7 — SIGNIFICANT CHANGE UP (ref 5–8)
PROT UR-MCNC: NEGATIVE MG/DL — SIGNIFICANT CHANGE UP
RBC CASTS # UR COMP ASSIST: 1 /HPF — SIGNIFICANT CHANGE UP (ref 0–4)
REVIEW: SIGNIFICANT CHANGE UP
SP GR SPEC: 1.01 — SIGNIFICANT CHANGE UP (ref 1–1.03)
SQUAMOUS # UR AUTO: 0 /HPF — SIGNIFICANT CHANGE UP (ref 0–5)
UROBILINOGEN FLD QL: 0.2 MG/DL — SIGNIFICANT CHANGE UP (ref 0.2–1)
WBC UR QL: 1 /HPF — SIGNIFICANT CHANGE UP (ref 0–5)

## 2023-10-14 RX ORDER — CEFPODOXIME PROXETIL 100 MG
100 TABLET ORAL ONCE
Refills: 0 | Status: COMPLETED | OUTPATIENT
Start: 2023-10-14 | End: 2023-10-14

## 2023-10-14 RX ORDER — CEFPODOXIME PROXETIL 100 MG
100 TABLET ORAL
Refills: 0 | Status: DISCONTINUED | OUTPATIENT
Start: 2023-10-14 | End: 2023-10-17

## 2023-10-14 RX ADMIN — Medication 0.25 MILLIGRAM(S): at 09:42

## 2023-10-14 RX ADMIN — Medication 100 MILLIGRAM(S): at 20:20

## 2023-10-14 RX ADMIN — METFORMIN HYDROCHLORIDE 250 MILLIGRAM(S): 850 TABLET ORAL at 09:41

## 2023-10-14 RX ADMIN — LURASIDONE HYDROCHLORIDE 40 MILLIGRAM(S): 40 TABLET ORAL at 09:40

## 2023-10-14 RX ADMIN — ATORVASTATIN CALCIUM 10 MILLIGRAM(S): 80 TABLET, FILM COATED ORAL at 20:20

## 2023-10-14 RX ADMIN — METFORMIN HYDROCHLORIDE 250 MILLIGRAM(S): 850 TABLET ORAL at 20:20

## 2023-10-14 RX ADMIN — PANTOPRAZOLE SODIUM 40 MILLIGRAM(S): 20 TABLET, DELAYED RELEASE ORAL at 09:42

## 2023-10-14 RX ADMIN — Medication 2 SPRAY(S): at 09:42

## 2023-10-14 RX ADMIN — LOSARTAN POTASSIUM 75 MILLIGRAM(S): 100 TABLET, FILM COATED ORAL at 05:30

## 2023-10-14 RX ADMIN — DIVALPROEX SODIUM 375 MILLIGRAM(S): 500 TABLET, DELAYED RELEASE ORAL at 20:20

## 2023-10-14 RX ADMIN — DIVALPROEX SODIUM 375 MILLIGRAM(S): 500 TABLET, DELAYED RELEASE ORAL at 09:41

## 2023-10-14 RX ADMIN — Medication 3 MILLIGRAM(S): at 20:20

## 2023-10-14 RX ADMIN — SENNA PLUS 2 TABLET(S): 8.6 TABLET ORAL at 09:41

## 2023-10-14 RX ADMIN — AMLODIPINE BESYLATE 2.5 MILLIGRAM(S): 2.5 TABLET ORAL at 05:30

## 2023-10-14 RX ADMIN — SERTRALINE 200 MILLIGRAM(S): 25 TABLET, FILM COATED ORAL at 09:41

## 2023-10-14 RX ADMIN — LACTULOSE 10 GRAM(S): 10 SOLUTION ORAL at 13:21

## 2023-10-14 RX ADMIN — Medication 100 MILLIGRAM(S): at 13:21

## 2023-10-15 LAB
ANION GAP SERPL CALC-SCNC: 10 MMOL/L — SIGNIFICANT CHANGE UP (ref 7–14)
BASOPHILS # BLD AUTO: 0.02 K/UL — SIGNIFICANT CHANGE UP (ref 0–0.2)
BASOPHILS NFR BLD AUTO: 0.3 % — SIGNIFICANT CHANGE UP (ref 0–2)
BUN SERPL-MCNC: 10 MG/DL — SIGNIFICANT CHANGE UP (ref 7–23)
CALCIUM SERPL-MCNC: 9.9 MG/DL — SIGNIFICANT CHANGE UP (ref 8.4–10.5)
CHLORIDE SERPL-SCNC: 100 MMOL/L — SIGNIFICANT CHANGE UP (ref 98–107)
CO2 SERPL-SCNC: 26 MMOL/L — SIGNIFICANT CHANGE UP (ref 22–31)
CREAT SERPL-MCNC: 0.68 MG/DL — SIGNIFICANT CHANGE UP (ref 0.5–1.3)
EGFR: 87 ML/MIN/1.73M2 — SIGNIFICANT CHANGE UP
EOSINOPHIL # BLD AUTO: 0.1 K/UL — SIGNIFICANT CHANGE UP (ref 0–0.5)
EOSINOPHIL NFR BLD AUTO: 1.5 % — SIGNIFICANT CHANGE UP (ref 0–6)
GLUCOSE SERPL-MCNC: 82 MG/DL — SIGNIFICANT CHANGE UP (ref 70–99)
HCT VFR BLD CALC: 40.3 % — SIGNIFICANT CHANGE UP (ref 34.5–45)
HGB BLD-MCNC: 12.8 G/DL — SIGNIFICANT CHANGE UP (ref 11.5–15.5)
IANC: 4.06 K/UL — SIGNIFICANT CHANGE UP (ref 1.8–7.4)
IMM GRANULOCYTES NFR BLD AUTO: 0.1 % — SIGNIFICANT CHANGE UP (ref 0–0.9)
LYMPHOCYTES # BLD AUTO: 2.02 K/UL — SIGNIFICANT CHANGE UP (ref 1–3.3)
LYMPHOCYTES # BLD AUTO: 30.3 % — SIGNIFICANT CHANGE UP (ref 13–44)
MCHC RBC-ENTMCNC: 26.4 PG — LOW (ref 27–34)
MCHC RBC-ENTMCNC: 31.8 GM/DL — LOW (ref 32–36)
MCV RBC AUTO: 83.1 FL — SIGNIFICANT CHANGE UP (ref 80–100)
MONOCYTES # BLD AUTO: 0.46 K/UL — SIGNIFICANT CHANGE UP (ref 0–0.9)
MONOCYTES NFR BLD AUTO: 6.9 % — SIGNIFICANT CHANGE UP (ref 2–14)
NEUTROPHILS # BLD AUTO: 4.06 K/UL — SIGNIFICANT CHANGE UP (ref 1.8–7.4)
NEUTROPHILS NFR BLD AUTO: 60.9 % — SIGNIFICANT CHANGE UP (ref 43–77)
NRBC # BLD: 0 /100 WBCS — SIGNIFICANT CHANGE UP (ref 0–0)
NRBC # FLD: 0 K/UL — SIGNIFICANT CHANGE UP (ref 0–0)
PLATELET # BLD AUTO: 203 K/UL — SIGNIFICANT CHANGE UP (ref 150–400)
POTASSIUM SERPL-MCNC: 4.5 MMOL/L — SIGNIFICANT CHANGE UP (ref 3.5–5.3)
POTASSIUM SERPL-SCNC: 4.5 MMOL/L — SIGNIFICANT CHANGE UP (ref 3.5–5.3)
RBC # BLD: 4.85 M/UL — SIGNIFICANT CHANGE UP (ref 3.8–5.2)
RBC # FLD: 13.8 % — SIGNIFICANT CHANGE UP (ref 10.3–14.5)
SODIUM SERPL-SCNC: 136 MMOL/L — SIGNIFICANT CHANGE UP (ref 135–145)
WBC # BLD: 6.67 K/UL — SIGNIFICANT CHANGE UP (ref 3.8–10.5)
WBC # FLD AUTO: 6.67 K/UL — SIGNIFICANT CHANGE UP (ref 3.8–10.5)

## 2023-10-15 PROCEDURE — 99232 SBSQ HOSP IP/OBS MODERATE 35: CPT

## 2023-10-15 RX ADMIN — Medication 0.25 MILLIGRAM(S): at 09:52

## 2023-10-15 RX ADMIN — Medication 2 SPRAY(S): at 12:44

## 2023-10-15 RX ADMIN — PANTOPRAZOLE SODIUM 40 MILLIGRAM(S): 20 TABLET, DELAYED RELEASE ORAL at 09:54

## 2023-10-15 RX ADMIN — ATORVASTATIN CALCIUM 10 MILLIGRAM(S): 80 TABLET, FILM COATED ORAL at 20:36

## 2023-10-15 RX ADMIN — Medication 100 MILLIGRAM(S): at 20:53

## 2023-10-15 RX ADMIN — LURASIDONE HYDROCHLORIDE 40 MILLIGRAM(S): 40 TABLET ORAL at 09:51

## 2023-10-15 RX ADMIN — DIVALPROEX SODIUM 375 MILLIGRAM(S): 500 TABLET, DELAYED RELEASE ORAL at 20:36

## 2023-10-15 RX ADMIN — Medication 3 MILLIGRAM(S): at 20:36

## 2023-10-15 RX ADMIN — DIVALPROEX SODIUM 375 MILLIGRAM(S): 500 TABLET, DELAYED RELEASE ORAL at 09:54

## 2023-10-15 RX ADMIN — LOSARTAN POTASSIUM 75 MILLIGRAM(S): 100 TABLET, FILM COATED ORAL at 05:04

## 2023-10-15 RX ADMIN — Medication 100 MILLIGRAM(S): at 09:50

## 2023-10-15 RX ADMIN — METFORMIN HYDROCHLORIDE 250 MILLIGRAM(S): 850 TABLET ORAL at 20:35

## 2023-10-15 RX ADMIN — SENNA PLUS 2 TABLET(S): 8.6 TABLET ORAL at 09:53

## 2023-10-15 RX ADMIN — LACTULOSE 10 GRAM(S): 10 SOLUTION ORAL at 12:44

## 2023-10-15 RX ADMIN — SERTRALINE 200 MILLIGRAM(S): 25 TABLET, FILM COATED ORAL at 09:53

## 2023-10-15 RX ADMIN — AMLODIPINE BESYLATE 2.5 MILLIGRAM(S): 2.5 TABLET ORAL at 05:06

## 2023-10-15 RX ADMIN — METFORMIN HYDROCHLORIDE 250 MILLIGRAM(S): 850 TABLET ORAL at 09:51

## 2023-10-15 NOTE — BH INPATIENT PSYCHIATRY PROGRESS NOTE - CURRENT MEDICATION
MEDICATIONS  (STANDING):  amLODIPine   Tablet 2.5 milliGRAM(s) Oral <User Schedule>  atorvastatin 10 milliGRAM(s) Oral at bedtime  cefpodoxime 100 milliGRAM(s) Oral <User Schedule>  clonazePAM Oral Disintegrating Tablet 0.25 milliGRAM(s) Oral daily  divalproex Sprinkle 375 milliGRAM(s) Oral two times a day  glucagon  Injectable 1 milliGRAM(s) IntraMuscular once  lactulose Syrup 10 Gram(s) Oral <User Schedule>  losartan 75 milliGRAM(s) Oral <User Schedule>  lurasidone 40 milliGRAM(s) Oral daily  melatonin. 3 milliGRAM(s) Oral at bedtime  metFORMIN 250 milliGRAM(s) Oral two times a day  pantoprazole   Suspension 40 milliGRAM(s) Oral daily  senna 2 Tablet(s) Oral daily  sertraline 200 milliGRAM(s) Oral daily  sodium chloride 0.65% Nasal 2 Spray(s) Both Nostrils three times a day    MEDICATIONS  (PRN):  acetaminophen     Tablet .. 650 milliGRAM(s) Oral every 6 hours PRN Temp greater or equal to 38C (100.4F), Mild Pain (1 - 3), Moderate Pain (4 - 6)  bisacodyl 5 milliGRAM(s) Oral daily PRN constipation  bisacodyl Suppository 10 milliGRAM(s) Rectal daily PRN constipation  dextrose Oral Gel 15 Gram(s) Oral once PRN Blood Glucose LESS THAN 70 milliGRAM(s)/deciliter  haloperidol     Tablet 0.5 milliGRAM(s) Oral every 6 hours PRN agitation  haloperidol    Injectable 1 milliGRAM(s) IntraMuscular once PRN agitation  lidocaine   4% Patch 1 Patch Transdermal daily PRN LBP  simethicone 80 milliGRAM(s) Chew every 4 hours PRN abdominal discomfort

## 2023-10-15 NOTE — BH INPATIENT PSYCHIATRY PROGRESS NOTE - NSBHASSESSSUMMFT_PSY_ALL_CORE
83 year-old , retired female, domiciled at [facility], [facility] of DM2, HLD, HTN, PPHx of late onset Bipolar disorder, 2 prior psych adm last in 2022 at [facility], who presented to ED with worsening paranoia, anxiety, FTT (weight loss of 20lb since march), psychosis commingled with cognitive impairment   8/14 better but now hard to stay if responding to Abilify or prns of haldol will increase Abilify to 17mg then 20mg/d while initiating ECT discussion  8/15 Not much improvement   except less paranoid which may be attributable to haldol prn. Will d/c Abilify and start  Latuda will discuss ECT option  8/16 COnt Latuda trial while entering into discussion with patient re ECT  8/17 Tolerating latuda well; paranoia remains  8/18 Paranoia continues though improved; increase latuda to 40mg po daily - to be administered with food  8/19: On encounter today pt was seen chatting with one of her peers. Pt denied any complaints and reported feeling well. Denies paranoid thoughts or feeling afraid someone was going to hurt her. Still needing frequent redirection from staff to use her walker.   8/20 Depressed anxious fearful disorganized. Cont Latuda trial will cont to discuss ECT with patient, family is supportive  8/21 Depressed psychotic partial response will cont with ECT patient amenable as long as a family member goes with her for fear she will go off unit and never return. Will get labs ask medicine to see  8/22 Depressed not responded well to meds will increase Latuda while prepping for ECT. Will clarify need for ASA given severe nosebleed hx will discuss nosebleed issue with ECT   8/23 Cont ECT w/u discussed with medicine will   cont ASA  8/24 Psychotic depression with limited response to  multiple medication trials. Calmer less floridly delusion but anhedonia in state of constant distress. Patient assents to ECT, daughter Marcelina who is her HCP will provide consent. Patient requests family accompany her to ECT  for support as well as likely afraid she will not come back to unit if taken off. Will d/c Protonix suspension and use Pepcid as the former cannot be give while patient NPO. will lower Latuda as want to begin to reduce meds concurrent with ECT  8/25 Patient tolerated ECT, cont ECT, try to reduce Klonopin when better. Cont Effexor and Latuda. Recheck Na level on Sunday 8/26 Tolerated first ECT amnestic for treatment itself sl calmer. COnt ECT will begin to reduce klonopin to reduce cog se, check BMP in AM  8/27 less dysphoric, still paranoid, no SI/HI. Labs reviewed and discussed with hospitalist. Repeat BMP, urine NA and osmolality to f/u.  8/28 Tolerating ECT, cont treatments spread out so next one  9/1. Eval for further decrease Klonopin. Medicine saw patient , reintroduced metformin at low dose and d/declan ASA based on risk benefit   8/29 Mood psychosis better, sleepy during day confused. Will hold ECT til 9/1 reduce Klonopin  also may reduce Latuda  8/30 Improved with ECT but cog worse Will cont to hold ECT til clearer cont to reduce BDZ  8/31 Patient much better with regard to mood and  psychosis but notably cognitive disrupted form ECT Will hold ECT tomorrow. Will lower Lurasidone as was never effective for psychosis. Will start lowering Effexor and change to Zoloft as Effexor not helpful. Around completion of ECT may restart Depakote given episode of anjelica last admission. Consider further reduction in Ko Klonopin to 0.25mg/d . Reviewed plan of care with daughter  9/1 Patient improved but paranoia creeping back. Will cont Latuda at 40mg  consider increasing back to 60mg. Considered resume VPA based on concern of risk for switching but may inhibit sx requiring higher stimulus which can lead to more confusion. Next rx 9/5 9/2: Patient with psychotic depression, improving, on lurasidone   9/3: Patient's psychosis resolving, mood improved   9/4: Mood and psychotic symptoms improving, some impaired executive function and reasoning   9/5 PAtient improved but anxious, fearful cog impairment due to ECT but less severe. Cont ECT but spread out will, lower Effexor and add Zoloft as Effexor trial failed. Cont Lurasidone  9/6 Patient improving but quite confused. Will hold next treatment til next Monday. Cont to cross over to Zoloft  9/7 Improving will cont with spread out ECT to reudce severity of cog se of ECT will increase Zoloft and d/c Effexor Will rechallenge with CPAP  9/8 Patient improving with ECT next ECT 9/11 cont to titrate Zoloft. COnt encourage use of CPAP  9/11 patient showing improved impulse control, tolerating ECT, increase Zoloft to 125mg daily, will check BMP wed 9/12 more organized today, pleasant on approach, needs redirection to avoid touching peers on the shoulder   9/13: overall improvement in behaviors, needs redirection at times.next ECT friday.   9/14: ECT tomorrow, overall improved   9/15: next ECT monday, overall behaviors in control, redirectable, will keep patient on CO 11-7 since she had ECT today  9/18 Will change ECT to wednesday to space out better consider not doing further ECT after than unless very clear cut symptoms.  Plan resume Delapkote after las ECT, conisider moving Klonopin to later of split to minimize daytime sedation  9/19 Improving will give ECT in AM , peggy last ECT will startr Depakote after ect done  9/20 Improving likely last ECt will start Depakote back up cont other meds  9/21 Improved notably with course of ECT, will hold off on further rx, observe whetther any symptoms re appear as she clears from cog se of ECT  9/22 Mood and psychotic symptoms much improved. Will not give further ECT unless any symptoms return. Will see if cognition shows improvement with further time from ECT. Plan titrate Depakote.   9/25 Patient improved but some regression unclear if distress about d/c or true relpase will consider further ECT and increase in Latuda will review with family  9/26 Patient some regression since finishing ECT so are doing rescue ECT also increase Zoloft possible increase Latuda  9/27 Some re appearance of paranoia, cont longer course of ECT and will plan to increase Latuda  9/28 recrudescence of paranoia simmering down cont ECT spread out and increase Latuda, working on getting soft liner for CPA mask to protect nose  9/29 Improved after resumption of ECT Cont ECT 10/2 and consider increasing Latuda further if tolerating 60mg.   10/2 Improved paranoia, cont ECT plan further titration of Lurasidone   10/3 Maintaining gains, no return of paranoia. Will f/u on what she told family, will relay to ECT consider Versed if having distressing recall of ECT  10/4  Improved, cont ECT, plan increase Latuda, restart VPa after last ECT  10/5 Improving mood, no retrun of suspiciousnes, does not clear much between ECT. More accepting of help with ADL's. Plan next ECT in AM likely last one. Plan increase Ltuda after last ECT and restart Depakote  10/6 Improvined from ECT, confused , no unusual memories of treatment. Plan complete course of ECT will restart Depakote . Plan titration  of Latuda  10/7: Patient with some improvement from ECT, plan now for VPA and titration of lurasidone   10/9 Improved from ECT  still with sig cog impairment Cont to allow her to clear while planning increase in Lurasidone and VPA  10/10 Maintaining gains post ECT. Cont meds  except will increase Depakote. Scheduled for shower today will see if there is any hesitation. BP lower than usual if remains could lower BP meds  10/11 Maintaining gains no re appearance of paranoia manifest but resistiveness with meds and care nor any paranoid content . COnt meds but will increase Latuda to 80mg/d to optimize maintenance regimen  10/12 Patient more confused depsite time away from ECT wiill check labs as has had low NA in past check ua, reduce Latuda  10/13 Looks better with med reduction, labs okay. COnt current meds, check urine culture. Will change Famotidine to Protonix as famotidine has some anticholinergic effect  10/15 Maintaining gains no emergence of paranoia. Urine Cult 100k ecoli but ua unremarkable. Spoke with medicine who feels 3Day course of Vcantin should be completed at this pont

## 2023-10-15 NOTE — BH INPATIENT PSYCHIATRY PROGRESS NOTE - NSBHCHARTREVIEWVS_PSY_A_CORE FT
Vital Signs Last 24 Hrs  T(C): --  T(F): --  HR: 60 (10-15-23 @ 05:05) (60 - 60)  BP: 115/50 (10-15-23 @ 05:05) (115/50 - 115/50)  BP(mean): --  RR: --  SpO2: --    Orthostatic VS  10-14-23 @ 05:50  Lying BP: 143/58 HR: 60  Sitting BP: 113/97 HR: 62  Standing BP: --/-- HR: --  Site: upper left arm  Mode: electronic

## 2023-10-15 NOTE — BH INPATIENT PSYCHIATRY PROGRESS NOTE - MSE UNSTRUCTURED FT
Patient is awake and alert. [place] Mood neutral, affect brighter todaySpeech is fluent TP is logical . No PMR. No SI. No delusions or overvalued negative ideas about facility she lives in or here. No complaints about facility or reluctance to go back there.  No hallucinations.  oriented to x1.Named children more easily,

## 2023-10-15 NOTE — BH INPATIENT PSYCHIATRY PROGRESS NOTE - NSBHFUPINTERVALHXFT_PSY_A_CORE
Bipolar d/o, depressed with psychotic features. calm today. reports that she feels "okay." Eating and drinking adequately. Sleep fair, tends to nap in morning after breakfastVSS afebrile

## 2023-10-15 NOTE — BH INPATIENT PSYCHIATRY PROGRESS NOTE - NSBHMETABOLIC_PSY_ALL_CORE_FT
BMI: BMI (kg/m2): 23 (06-07-23 @ 18:13)  HbA1c: A1C with Estimated Average Glucose Result: 5.7 % (06-01-23 @ 05:10)    Glucose: POCT Blood Glucose.: 80 mg/dL (10-13-23 @ 07:26)    BP: 115/50 (10-15-23 @ 05:05) (115/50 - 126/58)  Lipid Panel: Date/Time: 06-08-23 @ 08:30  Cholesterol, Serum: 119  Direct LDL: --  HDL Cholesterol, Serum: 47  Total Cholesterol/HDL Ration Measurement: --  Triglycerides, Serum: 56

## 2023-10-16 LAB
-  AMIKACIN: SIGNIFICANT CHANGE UP
-  AMOXICILLIN/CLAVULANIC ACID: SIGNIFICANT CHANGE UP
-  AMPICILLIN/SULBACTAM: SIGNIFICANT CHANGE UP
-  AMPICILLIN: SIGNIFICANT CHANGE UP
-  AMPICILLIN: SIGNIFICANT CHANGE UP
-  AZTREONAM: SIGNIFICANT CHANGE UP
-  CEFAZOLIN: SIGNIFICANT CHANGE UP
-  CEFEPIME: SIGNIFICANT CHANGE UP
-  CEFOXITIN: SIGNIFICANT CHANGE UP
-  CEFTRIAXONE: SIGNIFICANT CHANGE UP
-  CEFUROXIME: SIGNIFICANT CHANGE UP
-  CIPROFLOXACIN: SIGNIFICANT CHANGE UP
-  CIPROFLOXACIN: SIGNIFICANT CHANGE UP
-  ERTAPENEM: SIGNIFICANT CHANGE UP
-  GENTAMICIN: SIGNIFICANT CHANGE UP
-  IMIPENEM: SIGNIFICANT CHANGE UP
-  LEVOFLOXACIN: SIGNIFICANT CHANGE UP
-  LEVOFLOXACIN: SIGNIFICANT CHANGE UP
-  MEROPENEM: SIGNIFICANT CHANGE UP
-  NITROFURANTOIN: SIGNIFICANT CHANGE UP
-  NITROFURANTOIN: SIGNIFICANT CHANGE UP
-  PIPERACILLIN/TAZOBACTAM: SIGNIFICANT CHANGE UP
-  TETRACYCLINE: SIGNIFICANT CHANGE UP
-  TOBRAMYCIN: SIGNIFICANT CHANGE UP
-  TRIMETHOPRIM/SULFAMETHOXAZOLE: SIGNIFICANT CHANGE UP
-  VANCOMYCIN: SIGNIFICANT CHANGE UP
CULTURE RESULTS: SIGNIFICANT CHANGE UP
METHOD TYPE: SIGNIFICANT CHANGE UP
METHOD TYPE: SIGNIFICANT CHANGE UP
ORGANISM # SPEC MICROSCOPIC CNT: SIGNIFICANT CHANGE UP
SPECIMEN SOURCE: SIGNIFICANT CHANGE UP

## 2023-10-16 PROCEDURE — 99232 SBSQ HOSP IP/OBS MODERATE 35: CPT

## 2023-10-16 RX ORDER — LURASIDONE HYDROCHLORIDE 40 MG/1
60 TABLET ORAL DAILY
Refills: 0 | Status: DISCONTINUED | OUTPATIENT
Start: 2023-10-16 | End: 2023-10-17

## 2023-10-16 RX ORDER — LURASIDONE HYDROCHLORIDE 40 MG/1
1 TABLET ORAL
Qty: 30 | Refills: 0
Start: 2023-10-16 | End: 2023-11-14

## 2023-10-16 RX ADMIN — AMLODIPINE BESYLATE 2.5 MILLIGRAM(S): 2.5 TABLET ORAL at 06:46

## 2023-10-16 RX ADMIN — LURASIDONE HYDROCHLORIDE 60 MILLIGRAM(S): 40 TABLET ORAL at 08:53

## 2023-10-16 RX ADMIN — Medication 2 SPRAY(S): at 08:52

## 2023-10-16 RX ADMIN — LACTULOSE 10 GRAM(S): 10 SOLUTION ORAL at 12:18

## 2023-10-16 RX ADMIN — METFORMIN HYDROCHLORIDE 250 MILLIGRAM(S): 850 TABLET ORAL at 22:46

## 2023-10-16 RX ADMIN — DIVALPROEX SODIUM 375 MILLIGRAM(S): 500 TABLET, DELAYED RELEASE ORAL at 22:46

## 2023-10-16 RX ADMIN — Medication 3 MILLIGRAM(S): at 22:46

## 2023-10-16 RX ADMIN — Medication 100 MILLIGRAM(S): at 22:46

## 2023-10-16 RX ADMIN — SERTRALINE 200 MILLIGRAM(S): 25 TABLET, FILM COATED ORAL at 08:52

## 2023-10-16 RX ADMIN — LOSARTAN POTASSIUM 75 MILLIGRAM(S): 100 TABLET, FILM COATED ORAL at 06:46

## 2023-10-16 RX ADMIN — ATORVASTATIN CALCIUM 10 MILLIGRAM(S): 80 TABLET, FILM COATED ORAL at 22:45

## 2023-10-16 RX ADMIN — PANTOPRAZOLE SODIUM 40 MILLIGRAM(S): 20 TABLET, DELAYED RELEASE ORAL at 08:52

## 2023-10-16 RX ADMIN — DIVALPROEX SODIUM 375 MILLIGRAM(S): 500 TABLET, DELAYED RELEASE ORAL at 08:53

## 2023-10-16 RX ADMIN — Medication 0.25 MILLIGRAM(S): at 08:53

## 2023-10-16 RX ADMIN — Medication 2 SPRAY(S): at 12:18

## 2023-10-16 RX ADMIN — Medication 2 SPRAY(S): at 22:46

## 2023-10-16 RX ADMIN — SENNA PLUS 2 TABLET(S): 8.6 TABLET ORAL at 08:52

## 2023-10-16 RX ADMIN — METFORMIN HYDROCHLORIDE 250 MILLIGRAM(S): 850 TABLET ORAL at 08:53

## 2023-10-16 RX ADMIN — Medication 100 MILLIGRAM(S): at 08:53

## 2023-10-16 NOTE — BH INPATIENT PSYCHIATRY PROGRESS NOTE - NSBHANTIPSYCHOTIC_PSY_ALL_CORE
Yes...

## 2023-10-16 NOTE — BH INPATIENT PSYCHIATRY PROGRESS NOTE - NSBHASSESSSUMMFT_PSY_ALL_CORE
83 year-old , retired female, domiciled at [facility], [facility] of DM2, HLD, HTN, PPHx of late onset Bipolar disorder, 2 prior psych adm last in 2022 at [facility], who presented to ED with worsening paranoia, anxiety, FTT (weight loss of 20lb since march), psychosis commingled with cognitive impairment   8/14 better but now hard to stay if responding to Abilify or prns of haldol will increase Abilify to 17mg then 20mg/d while initiating ECT discussion  8/15 Not much improvement   except less paranoid which may be attributable to haldol prn. Will d/c Abilify and start  Latuda will discuss ECT option  8/16 COnt Latuda trial while entering into discussion with patient re ECT  8/17 Tolerating latuda well; paranoia remains  8/18 Paranoia continues though improved; increase latuda to 40mg po daily - to be administered with food  8/19: On encounter today pt was seen chatting with one of her peers. Pt denied any complaints and reported feeling well. Denies paranoid thoughts or feeling afraid someone was going to hurt her. Still needing frequent redirection from staff to use her walker.   8/20 Depressed anxious fearful disorganized. Cont Latuda trial will cont to discuss ECT with patient, family is supportive  8/21 Depressed psychotic partial response will cont with ECT patient amenable as long as a family member goes with her for fear she will go off unit and never return. Will get labs ask medicine to see  8/22 Depressed not responded well to meds will increase Latuda while prepping for ECT. Will clarify need for ASA given severe nosebleed hx will discuss nosebleed issue with ECT   8/23 Cont ECT w/u discussed with medicine will   cont ASA  8/24 Psychotic depression with limited response to  multiple medication trials. Calmer less floridly delusion but anhedonia in state of constant distress. Patient assents to ECT, daughter Marcelina who is her HCP will provide consent. Patient requests family accompany her to ECT  for support as well as likely afraid she will not come back to unit if taken off. Will d/c Protonix suspension and use Pepcid as the former cannot be give while patient NPO. will lower Latuda as want to begin to reduce meds concurrent with ECT  8/25 Patient tolerated ECT, cont ECT, try to reduce Klonopin when better. Cont Effexor and Latuda. Recheck Na level on Sunday 8/26 Tolerated first ECT amnestic for treatment itself sl calmer. COnt ECT will begin to reduce klonopin to reduce cog se, check BMP in AM  8/27 less dysphoric, still paranoid, no SI/HI. Labs reviewed and discussed with hospitalist. Repeat BMP, urine NA and osmolality to f/u.  8/28 Tolerating ECT, cont treatments spread out so next one  9/1. Eval for further decrease Klonopin. Medicine saw patient , reintroduced metformin at low dose and d/declan ASA based on risk benefit   8/29 Mood psychosis better, sleepy during day confused. Will hold ECT til 9/1 reduce Klonopin  also may reduce Latuda  8/30 Improved with ECT but cog worse Will cont to hold ECT til clearer cont to reduce BDZ  8/31 Patient much better with regard to mood and  psychosis but notably cognitive disrupted form ECT Will hold ECT tomorrow. Will lower Lurasidone as was never effective for psychosis. Will start lowering Effexor and change to Zoloft as Effexor not helpful. Around completion of ECT may restart Depakote given episode of anjelica last admission. Consider further reduction in Ko Klonopin to 0.25mg/d . Reviewed plan of care with daughter  9/1 Patient improved but paranoia creeping back. Will cont Latuda at 40mg  consider increasing back to 60mg. Considered resume VPA based on concern of risk for switching but may inhibit sx requiring higher stimulus which can lead to more confusion. Next rx 9/5 9/2: Patient with psychotic depression, improving, on lurasidone   9/3: Patient's psychosis resolving, mood improved   9/4: Mood and psychotic symptoms improving, some impaired executive function and reasoning   9/5 PAtient improved but anxious, fearful cog impairment due to ECT but less severe. Cont ECT but spread out will, lower Effexor and add Zoloft as Effexor trial failed. Cont Lurasidone  9/6 Patient improving but quite confused. Will hold next treatment til next Monday. Cont to cross over to Zoloft  9/7 Improving will cont with spread out ECT to reudce severity of cog se of ECT will increase Zoloft and d/c Effexor Will rechallenge with CPAP  9/8 Patient improving with ECT next ECT 9/11 cont to titrate Zoloft. COnt encourage use of CPAP  9/11 patient showing improved impulse control, tolerating ECT, increase Zoloft to 125mg daily, will check BMP wed 9/12 more organized today, pleasant on approach, needs redirection to avoid touching peers on the shoulder   9/13: overall improvement in behaviors, needs redirection at times.next ECT friday.   9/14: ECT tomorrow, overall improved   9/15: next ECT monday, overall behaviors in control, redirectable, will keep patient on CO 11-7 since she had ECT today  9/18 Will change ECT to wednesday to space out better consider not doing further ECT after than unless very clear cut symptoms.  Plan resume Delapkote after las ECT, conisider moving Klonopin to later of split to minimize daytime sedation  9/19 Improving will give ECT in AM , peggy last ECT will startr Depakote after ect done  9/20 Improving likely last ECt will start Depakote back up cont other meds  9/21 Improved notably with course of ECT, will hold off on further rx, observe whetther any symptoms re appear as she clears from cog se of ECT  9/22 Mood and psychotic symptoms much improved. Will not give further ECT unless any symptoms return. Will see if cognition shows improvement with further time from ECT. Plan titrate Depakote.   9/25 Patient improved but some regression unclear if distress about d/c or true relpase will consider further ECT and increase in Latuda will review with family  9/26 Patient some regression since finishing ECT so are doing rescue ECT also increase Zoloft possible increase Latuda  9/27 Some re appearance of paranoia, cont longer course of ECT and will plan to increase Latuda  9/28 recrudescence of paranoia simmering down cont ECT spread out and increase Latuda, working on getting soft liner for CPA mask to protect nose  9/29 Improved after resumption of ECT Cont ECT 10/2 and consider increasing Latuda further if tolerating 60mg.   10/2 Improved paranoia, cont ECT plan further titration of Lurasidone   10/3 Maintaining gains, no return of paranoia. Will f/u on what she told family, will relay to ECT consider Versed if having distressing recall of ECT  10/4  Improved, cont ECT, plan increase Latuda, restart VPa after last ECT  10/5 Improving mood, no retrun of suspiciousnes, does not clear much between ECT. More accepting of help with ADL's. Plan next ECT in AM likely last one. Plan increase Ltuda after last ECT and restart Depakote  10/6 Improvined from ECT, confused , no unusual memories of treatment. Plan complete course of ECT will restart Depakote . Plan titration  of Latuda  10/7: Patient with some improvement from ECT, plan now for VPA and titration of lurasidone   10/9 Improved from ECT  still with sig cog impairment Cont to allow her to clear while planning increase in Lurasidone and VPA  10/10 Maintaining gains post ECT. Cont meds  except will increase Depakote. Scheduled for shower today will see if there is any hesitation. BP lower than usual if remains could lower BP meds  10/11 Maintaining gains no re appearance of paranoia manifest but resistiveness with meds and care nor any paranoid content . COnt meds but will increase Latuda to 80mg/d to optimize maintenance regimen  10/12 Patient more confused depsite time away from ECT wiill check labs as has had low NA in past check ua, reduce Latuda  10/13 Looks better with med reduction, labs okay. COnt current meds, check urine culture. Will change Famotidine to Protonix as famotidine has some anticholinergic effect  10/15 Maintaining gains no emergence of paranoia. Urine Cult 100k ecoli but ua unremarkable. Spoke with medicine who feels 3Day course of Vcantin should be completed at this pont   10/16 Improved maintaining gains. Plan for d/c in AM. Given possibility recent upswing in cog impairment was related to UTI not meds  will return Latuda to 60mg as she otherwise tolerated well and want to optimize maintenance rx as she will not be getting further ECT

## 2023-10-16 NOTE — BH INPATIENT PSYCHIATRY PROGRESS NOTE - NSTREATMENTCERTY_PSY_ALL_CORE

## 2023-10-16 NOTE — BH INPATIENT PSYCHIATRY PROGRESS NOTE - NSTXANXDATEEST_PSY_ALL_CORE
09-Jun-2023

## 2023-10-16 NOTE — BH INPATIENT PSYCHIATRY PROGRESS NOTE - NSTXDISORGGOAL_PSY_ALL_CORE
Will make at least 3 goal and reality oriented statements during therapy
Will demonstrate related thoughts for 5 min in conversation
Will make at least 3 goal and reality oriented statements during therapy
Will demonstrate related thoughts for 5 min in conversation
Will identify 2 coping skills that assist in organizing
Will make at least 3 goal and reality oriented statements during therapy
Will make at least 3 goal and reality oriented statements during therapy
Will demonstrate purposeful and predictable thoughts/behaviors by making a request
Will make at least 3 goal and reality oriented statements during therapy
Will make at least 3 goal and reality oriented statements during therapy
Will demonstrate related thoughts for 5 min in conversation
Will make at least 3 goal and reality oriented statements during therapy
Will make at least 3 goal and reality oriented statements during therapy
Will demonstrate purposeful and predictable thoughts/behaviors by making a request
Will demonstrate related thoughts for 5 min in conversation
Will make at least 3 goal and reality oriented statements during therapy
Will make at least 3 goal and reality oriented statements during therapy
Will demonstrate related thoughts for 5 min in conversation
Will make at least 3 goal and reality oriented statements during therapy
Will demonstrate the ability to maintain reality orientation and communicate clearly with others during 2 conversations with staff daily
Will make at least 3 goal and reality oriented statements during therapy
Will demonstrate related thoughts for 5 min in conversation
Will make at least 3 goal and reality oriented statements during therapy
Will demonstrate related thoughts for 5 min in conversation
Will make at least 3 goal and reality oriented statements during therapy
Will demonstrate related thoughts for 5 min in conversation
Will make at least 3 goal and reality oriented statements during therapy
Will demonstrate purposeful and predictable thoughts/behaviors by making a request
Will make at least 3 goal and reality oriented statements during therapy
Will demonstrate related thoughts for 5 min in conversation
Will identify 2 coping skills that assist in organizing
Will make at least 3 goal and reality oriented statements during therapy
Will demonstrate related thoughts for 5 min in conversation
Will demonstrate related thoughts for 5 min in conversation
Will demonstrate the ability to maintain reality orientation and communicate clearly with others during 2 conversations with staff daily
Will make at least 3 goal and reality oriented statements during therapy
Will demonstrate related thoughts for 5 min in conversation
Will make at least 3 goal and reality oriented statements during therapy
Will demonstrate related thoughts for 5 min in conversation
Will demonstrate purposeful and predictable thoughts/behaviors by making a request
Will make at least 3 goal and reality oriented statements during therapy
Will demonstrate related thoughts for 5 min in conversation
Will demonstrate related thoughts for 5 min in conversation
Will make at least 3 goal and reality oriented statements during therapy
Will make at least 3 goal and reality oriented statements during therapy
Will demonstrate related thoughts for 5 min in conversation
Will demonstrate related thoughts for 5 min in conversation
Will make at least 3 goal and reality oriented statements during therapy

## 2023-10-16 NOTE — BH INPATIENT PSYCHIATRY PROGRESS NOTE - NS ED BHA MED ROS PSYCHIATRIC
See HPI

## 2023-10-16 NOTE — BH INPATIENT PSYCHIATRY PROGRESS NOTE - NSTXANXINTERMD_PSY_ALL_CORE
Medication titraiton
Medication titraiton
ECT
Medication titraiton
ECT
Medication titraiton
ECT trial to start
Medication titraiton
Medication titraiton
ECT
ECT trial to start
ECT trial to start
Medication titraiton
Medication titraiton
ECT trial to start
Medication titraiton
Medication titraiton
ECT
Medication titraiton
ECT
ECT
Medication titraiton
ECT
ECT
Medication titraiton
Medication titraiton
ECT trial to start
Medication titraiton
Medication titraiton
ECT
ECT trial to start
ECT trial to start
Medication titraiton
ECT
ECT trial to start
Medication titraiton
ECT
ECT trial to start
Medication titraiton
ECT
Medication titraiton
ECT
Medication titraiton
ECT trial to start
Medication titraiton
ECT
ECT trial to start
Medication titraiton
ECT
ECT trial to start
Medication titraiton
Medication titraiton
ECT
ECT trial to start
ECT
ECT trial to start
Medication titraiton
Medication titraiton
ECT trial to start
Medication titraiton

## 2023-10-16 NOTE — BH INPATIENT PSYCHIATRY PROGRESS NOTE - NSBHLEGALSTATUSCHANGE_PSY_ALL_CORE
No

## 2023-10-16 NOTE — BH INPATIENT PSYCHIATRY PROGRESS NOTE - NSBHROSSYSTEMS_PSY_ALL_CORE
Psychiatric

## 2023-10-16 NOTE — BH INPATIENT PSYCHIATRY PROGRESS NOTE - NSDCCRITERIA_PSY_ALL_CORE
pending clinical improvement

## 2023-10-16 NOTE — BH INPATIENT PSYCHIATRY PROGRESS NOTE - NSTXDCOTHRDATEEST_PSY_ALL_CORE
08-Jun-2023
11-Jul-2023
11-Oct-2023
07-Sep-2023
08-Jun-2023
28-Jun-2023
08-Jun-2023
28-Jun-2023
07-Sep-2023
11-Jul-2023
28-Jun-2023
24-Aug-2023
28-Sep-2023
08-Jun-2023
11-Oct-2023
24-Jul-2023
28-Jun-2023
28-Sep-2023
07-Sep-2023
08-Jun-2023
07-Sep-2023
15-Aug-2023
15-Aug-2023
31-Jul-2023
08-Jun-2023
11-Jul-2023
11-Oct-2023
24-Aug-2023
08-Jun-2023
08-Jun-2023
28-Jun-2023
28-Jun-2023
31-Jul-2023
24-Aug-2023
24-Aug-2023
24-Jul-2023
28-Jun-2023
08-Jun-2023
10-Aug-2023
28-Jun-2023
31-Jul-2023
07-Sep-2023
07-Sep-2023
08-Jun-2023
15-Aug-2023
24-Aug-2023
11-Jul-2023
11-Jul-2023
11-Oct-2023
24-Aug-2023
28-Jun-2023
31-Jul-2023
08-Jun-2023
08-Jun-2023
10-Aug-2023
11-Jul-2023
24-Jul-2023
24-Jul-2023
08-Jun-2023
10-Aug-2023
31-Jul-2023
11-Jul-2023
24-Aug-2023
28-Sep-2023
31-Jul-2023
10-Aug-2023
28-Sep-2023
24-Aug-2023
24-Aug-2023
28-Jun-2023
07-Sep-2023
08-Jun-2023
10-Aug-2023
31-Jul-2023
31-Jul-2023
11-Jul-2023
15-Aug-2023
24-Aug-2023
28-Jun-2023
28-Jun-2023
11-Oct-2023
15-Aug-2023
24-Aug-2023
24-Jul-2023
28-Sep-2023
08-Jun-2023
11-Jul-2023
07-Sep-2023
11-Jul-2023
24-Aug-2023
28-Sep-2023
24-Aug-2023
31-Jul-2023
10-Aug-2023
28-Sep-2023
07-Sep-2023
28-Sep-2023
07-Sep-2023
07-Sep-2023
31-Jul-2023
31-Jul-2023
07-Sep-2023
15-Aug-2023
28-Jun-2023
24-Aug-2023
07-Sep-2023
10-Aug-2023
24-Aug-2023
24-Jul-2023
28-Sep-2023
11-Jul-2023
11-Jul-2023

## 2023-10-16 NOTE — BH INPATIENT PSYCHIATRY PROGRESS NOTE - NSTXDISORGINTERMD_PSY_ALL_CORE
Medication titration
Medication titration/ ECT
Medication titration/ ECT
Medication titration
Medication titration/ ECT
Medication titration
Medication titration/ ECT
Medication titration
Medication titration/ ECT
Medication titration
Medication titration/ ECT
Medication titration
Medication titration/ ECT
Medication titration/ ECT
Medication titration
Medication titration
Medication titration/ ECT
Medication titration
Medication titration/ ECT
Medication titration
Medication titration/ ECT
Medication titration
Medication titration/ ECT
Medication titration
Medication titration/ ECT
Medication titration/ ECT
Medication titration
Medication titration/ ECT
Medication titration
Medication titration/ ECT
Medication titration/ ECT
Medication titration
Medication titration/ ECT
Medication titration/ ECT
Medication titration
Medication titration/ ECT
Medication titration/ ECT
Medication titration
Medication titration/ ECT
Medication titration

## 2023-10-16 NOTE — BH INPATIENT PSYCHIATRY PROGRESS NOTE - NSTXCOPEDATEEST_PSY_ALL_CORE
02-Aug-2023
27-Jul-2023
02-Jan-2023
14-Jul-2023
26-Jul-2023
02-Aug-2023
02-Aug-2023
26-Jul-2023
26-Jul-2023
28-Jun-2023
26-Jul-2023
28-Sep-2023
02-Aug-2023
02-Aug-2023
26-Jul-2023
02-Aug-2023
07-Jul-2023
14-Jul-2023
02-Aug-2023
02-Aug-2023
27-Jul-2023
28-Jun-2023
02-Jan-2023
07-Jul-2023
11-Oct-2023
12-Jul-2023
19-Jul-2023
27-Jul-2023
28-Jun-2023
28-Jun-2023
02-Aug-2023
04-Oct-2023
26-Jul-2023
28-Jun-2023
02-Jan-2023
26-Jul-2023
02-Aug-2023
19-Jul-2023
28-Jun-2023
02-Jan-2023
07-Jul-2023
11-Oct-2023
26-Jul-2023
26-Jul-2023
11-Oct-2023
19-Jul-2023
26-Jul-2023
27-Jul-2023
04-Oct-2023
11-Oct-2023
26-Jul-2023
28-Jun-2023
02-Aug-2023
02-Aug-2023
28-Jun-2023
02-Aug-2023
02-Aug-2023
14-Jul-2023
28-Jun-2023
26-Jul-2023
02-Aug-2023
26-Jul-2023
07-Jul-2023
12-Jul-2023
26-Jul-2023
04-Oct-2023
19-Jul-2023
26-Jul-2023
11-Oct-2023
26-Jul-2023
02-Aug-2023
26-Jul-2023
28-Sep-2023
02-Aug-2023
02-Aug-2023
04-Oct-2023
07-Jul-2023
26-Jul-2023
28-Sep-2023
26-Jul-2023
02-Jan-2023
19-Jul-2023
19-Jul-2023
02-Aug-2023
04-Oct-2023
28-Sep-2023
26-Jul-2023
26-Jul-2023
27-Jul-2023
02-Aug-2023
02-Jan-2023
04-Oct-2023
02-Aug-2023
19-Jul-2023
26-Jul-2023
26-Jul-2023
02-Aug-2023
14-Jul-2023
14-Jul-2023

## 2023-10-16 NOTE — BH INPATIENT PSYCHIATRY PROGRESS NOTE - NSTXANXDATETRGT_PSY_ALL_CORE
22-Jun-2023
29-Jun-2023
29-Jun-2023
22-Jun-2023
29-Jun-2023
22-Jun-2023
29-Jun-2023
22-Jun-2023
29-Jun-2023
22-Jun-2023
29-Jun-2023
22-Jun-2023
29-Jun-2023
22-Jun-2023
22-Jun-2023
29-Jun-2023
22-Jun-2023
29-Jun-2023
22-Jun-2023
29-Jun-2023
22-Jun-2023
29-Jun-2023
29-Jun-2023
22-Jun-2023
29-Jun-2023
22-Jun-2023
29-Jun-2023
no
29-Jun-2023

## 2023-10-16 NOTE — BH INPATIENT PSYCHIATRY PROGRESS NOTE - NSTXCOPEDATETRGT_PSY_ALL_CORE
14-Jul-2023
26-Jul-2023
30-Aug-2023
16-Aug-2023
26-Jul-2023
11-Oct-2023
21-Jul-2023
19-Jul-2023
20-Sep-2023
21-Jul-2023
02-Aug-2023
06-Sep-2023
09-Aug-2023
09-Aug-2023
11-Oct-2023
02-Aug-2023
05-Oct-2023
09-Aug-2023
09-Aug-2023
20-Sep-2023
02-Aug-2023
13-Sep-2023
27-Sep-2023
05-Jul-2023
06-Sep-2023
14-Jul-2023
05-Jul-2023
06-Sep-2023
14-Jul-2023
27-Sep-2023
02-Aug-2023
05-Jul-2023
05-Oct-2023
09-Aug-2023
09-Aug-2023
11-Oct-2023
31-Aug-2023
31-Aug-2023
20-Sep-2023
05-Jul-2023
06-Sep-2023
25-Aug-2023
05-Jul-2023
14-Jul-2023
14-Sep-2023
16-Aug-2023
25-Aug-2023
26-Jul-2023
18-Oct-2023
19-Jul-2023
26-Jul-2023
02-Aug-2023
11-Oct-2023
11-Oct-2023
16-Aug-2023
16-Aug-2023
05-Jul-2023
09-Aug-2023
16-Aug-2023
18-Oct-2023
18-Oct-2023
21-Jul-2023
05-Jul-2023
05-Jul-2023
14-Jul-2023
16-Aug-2023
21-Jul-2023
06-Sep-2023
18-Oct-2023
26-Jul-2023
27-Sep-2023
16-Aug-2023
25-Aug-2023
05-Jul-2023
14-Sep-2023
16-Aug-2023
31-Aug-2023
27-Sep-2023
31-Aug-2023
11-Oct-2023
05-Oct-2023
31-Aug-2023
02-Aug-2023
14-Sep-2023
16-Aug-2023
18-Oct-2023
25-Aug-2023
05-Oct-2023
14-Sep-2023
25-Aug-2023
02-Aug-2023
25-Aug-2023
21-Jul-2023
27-Sep-2023
06-Sep-2023
20-Sep-2023
26-Jul-2023
26-Jul-2023
27-Sep-2023
06-Sep-2023
20-Sep-2023

## 2023-10-16 NOTE — BH INPATIENT PSYCHIATRY PROGRESS NOTE - MSE UNSTRUCTURED FT
Patient is awake and alert . [place] Mood neutral, affect brighter todaySpeech is fluent TP is logical . No PMR. No SI. No delusions or overvalued negative ideas about facility she lives in or here. No complaints about facility or reluctance to go back there says she looks forward to returning.  No hallucinations.  oriented to x1.

## 2023-10-16 NOTE — BH INPATIENT PSYCHIATRY PROGRESS NOTE - NSBHFUPMEDSE_PSY_A_CORE
None known

## 2023-10-16 NOTE — BH INPATIENT PSYCHIATRY PROGRESS NOTE - NSBHROSSTATEMENT_PSY_A_CORE
----- Message from Lori Ivory sent at 11/27/2018  4:59 PM CST -----  Regarding: OXYGEN ORDER  Hi,    My name is [facility] reaching out in re: oxygen order received order is missing the portable bundle \"Portable gaseous oxygen system and accessories and portable gaseous oxygen contents\" under oxygen and equipment needed please add and have provider co-sign order. Office note also needs to be dictated and signed on behalf of Medicare. Please have provider address.    Thank you     [facility]  
.

## 2023-10-16 NOTE — BH INPATIENT PSYCHIATRY PROGRESS NOTE - CURRENT MEDICATION
MEDICATIONS  (STANDING):  amLODIPine   Tablet 2.5 milliGRAM(s) Oral <User Schedule>  atorvastatin 10 milliGRAM(s) Oral at bedtime  cefpodoxime 100 milliGRAM(s) Oral <User Schedule>  clonazePAM Oral Disintegrating Tablet 0.25 milliGRAM(s) Oral daily  divalproex Sprinkle 375 milliGRAM(s) Oral two times a day  glucagon  Injectable 1 milliGRAM(s) IntraMuscular once  lactulose Syrup 10 Gram(s) Oral <User Schedule>  losartan 75 milliGRAM(s) Oral <User Schedule>  lurasidone 60 milliGRAM(s) Oral daily  melatonin. 3 milliGRAM(s) Oral at bedtime  metFORMIN 250 milliGRAM(s) Oral two times a day  pantoprazole   Suspension 40 milliGRAM(s) Oral daily  senna 2 Tablet(s) Oral daily  sertraline 200 milliGRAM(s) Oral daily  sodium chloride 0.65% Nasal 2 Spray(s) Both Nostrils three times a day    MEDICATIONS  (PRN):  acetaminophen     Tablet .. 650 milliGRAM(s) Oral every 6 hours PRN Temp greater or equal to 38C (100.4F), Mild Pain (1 - 3), Moderate Pain (4 - 6)  bisacodyl 5 milliGRAM(s) Oral daily PRN constipation  bisacodyl Suppository 10 milliGRAM(s) Rectal daily PRN constipation  dextrose Oral Gel 15 Gram(s) Oral once PRN Blood Glucose LESS THAN 70 milliGRAM(s)/deciliter  haloperidol     Tablet 0.5 milliGRAM(s) Oral every 6 hours PRN agitation  haloperidol    Injectable 1 milliGRAM(s) IntraMuscular once PRN agitation  lidocaine   4% Patch 1 Patch Transdermal daily PRN LBP  simethicone 80 milliGRAM(s) Chew every 4 hours PRN abdominal discomfort

## 2023-10-16 NOTE — BH INPATIENT PSYCHIATRY PROGRESS NOTE - NSBHATTESTTYPEVISIT_PSY_A_CORE
Attending Only
[place] without on-site Attending supervision
Attending Only
Attending with Resident/Fellow/Student
Attending with Resident/Fellow/Student
Attending Only
Attending with Resident/Fellow/Student
Attending Only
Attending with Resident/Fellow/Student
Attending with Resident/Fellow/Student
Attending Only
Attending with Resident/Fellow/Student
Attending Only
Attending with Resident/Fellow/Student
Attending Only
Attending Only
Attending with Resident/Fellow/Student
Attending with Resident/Fellow/Student
Attending Only
[place] without on-site Attending supervision
[place] without on-site Attending supervision
Attending Only
[place] without on-site Attending supervision

## 2023-10-16 NOTE — BH INPATIENT PSYCHIATRY PROGRESS NOTE - NSBHCONSULTIPREASON_PSY_A_CORE
danger to self; mental illness expected to respond to inpatient care
done
danger to self; mental illness expected to respond to inpatient care

## 2023-10-16 NOTE — BH INPATIENT PSYCHIATRY PROGRESS NOTE - NSTXDCOTHRINTERMD_PSY_ALL_CORE
Discuss with family when psychiatrically stable

## 2023-10-16 NOTE — BH INPATIENT PSYCHIATRY PROGRESS NOTE - NSTXANXPROGRES_PSY_ALL_CORE
Improving

## 2023-10-16 NOTE — BH INPATIENT PSYCHIATRY PROGRESS NOTE - NSTXPROBANX_PSY_ALL_CORE
ANXIETY/PANIC/FEAR

## 2023-10-16 NOTE — BH INPATIENT PSYCHIATRY PROGRESS NOTE - NS ED [facility] REVIEW OF ED CHART VITAL SIGNS REVIEWED
Yes

## 2023-10-16 NOTE — BH INPATIENT PSYCHIATRY PROGRESS NOTE - NSTXPROBDISORG_PSY_ALL_CORE
DISORGANIZATION OF THOUGHT/BEHAVIOR

## 2023-10-16 NOTE — BH INPATIENT PSYCHIATRY PROGRESS NOTE - NSTXANXGOAL_PSY_ALL_CORE
Be able to perform ADLs and maintain safety despite anxiety/panic daily

## 2023-10-16 NOTE — BH INPATIENT PSYCHIATRY PROGRESS NOTE - NSTXPROBCOPE_PSY_ALL_CORE
COPING, INEFFECTIVE

## 2023-10-16 NOTE — BH INPATIENT PSYCHIATRY PROGRESS NOTE - NSTXCOPEPROGRES_PSY_ALL_CORE
No Change
No Change
Improving
Improving
No Change
No Change
Improving
No Change
Improving
No Change
No Change
Improving
No Change
Improving
No Change
No Change
Worsening
Improving
No Change
No Change
Improving
No Change
Improving
No Change
No Change
Improving
No Change
Improving
No Change
No Change
Improving
No Change
No Change
Improving
No Change
Worsening
Improving
No Change
Improving
No Change
Improving
No Change
No Change
Improving
Worsening
Improving
No Change
Improving
No Change
Improving
No Change
No Change

## 2023-10-16 NOTE — BH INPATIENT PSYCHIATRY PROGRESS NOTE - NSBHFUPINTERVALHXFT_PSY_A_CORE
Bipolar d/o, depressed with psychotic features. calm today. reports that she feels "okay." Eating and drinking adequately. Sleep fair, tends to nap in morning after breakfast though was up earlier today. VSS.

## 2023-10-16 NOTE — BH INPATIENT PSYCHIATRY PROGRESS NOTE - NSTXDCOTHRPROGRES_PSY_ALL_CORE
No Change
Improving
Improving
No Change
Improving
Improving
No Change
Worsening
Improving
No Change
No Change
Improving
No Change
Improving
No Change
Improving
Improving
No Change
Improving
No Change
Improving
Improving
No Change
No Change
Improving
No Change
No Change
Improving
No Change
Improving
Improving
No Change
Improving
Improving
Worsening
No Change
Improving
No Change
No Change
Worsening
Improving
No Change
No Change
Improving
No Change
Improving
No Change
No Change
Improving
No Change
Improving
No Change
Worsening
Worsening
Improving
No Change
Improving
No Change
No Change
Improving
Worsening
No Change
No Change
Improving
No Change
No Change
Worsening
Improving
Improving
Worsening
Worsening
No Change
Improving
Improving

## 2023-10-16 NOTE — BH INPATIENT PSYCHIATRY PROGRESS NOTE - NSBHMETABOLIC_PSY_ALL_CORE_FT
BMI: BMI (kg/m2): 23 (06-07-23 @ 18:13)  HbA1c: A1C with Estimated Average Glucose Result: 5.7 % (06-01-23 @ 05:10)    Glucose: POCT Blood Glucose.: 80 mg/dL (10-13-23 @ 07:26)    BP: 115/50 (10-15-23 @ 05:05) (115/50 - 115/50)  Lipid Panel: Date/Time: 06-08-23 @ 08:30  Cholesterol, Serum: 119  Direct LDL: --  HDL Cholesterol, Serum: 47  Total Cholesterol/HDL Ration Measurement: --  Triglycerides, Serum: 56

## 2023-10-16 NOTE — BH INPATIENT PSYCHIATRY PROGRESS NOTE - NSTREATMENTCERT_PSY_ALL_CORE
.

## 2023-10-16 NOTE — BH INPATIENT PSYCHIATRY PROGRESS NOTE - NSBHPROGSUIC_PSY_A_CORE
no

## 2023-10-16 NOTE — BH INPATIENT PSYCHIATRY PROGRESS NOTE - NSTXCOPEINTERMD_PSY_ALL_CORE
psychoeducation

## 2023-10-16 NOTE — BH INPATIENT PSYCHIATRY PROGRESS NOTE - TELEPSYCHIATRY?
No

## 2023-10-16 NOTE — [facility] INPATIENT PSYCHIATRY PROGRESS NOTE - PRN MEDS
MEDICATIONS  (PRN):  acetaminophen     Tablet .. 650 milliGRAM(s) Oral every 6 hours PRN Temp greater or equal to 38C (100.4F), Mild Pain (1 - 3), Moderate Pain (4 - 6)  bisacodyl 5 milliGRAM(s) Oral daily PRN constipation  bisacodyl Suppository 10 milliGRAM(s) Rectal daily PRN constipation  dextrose Oral Gel 15 Gram(s) Oral once PRN Blood Glucose LESS THAN 70 milliGRAM(s)/deciliter  haloperidol     Tablet 0.5 milliGRAM(s) Oral every 6 hours PRN agitation  haloperidol    Injectable 1 milliGRAM(s) IntraMuscular once PRN agitation  melatonin. 3 milliGRAM(s) Oral at bedtime PRN Insomnia  
MEDICATIONS  (PRN):  acetaminophen     Tablet .. 650 milliGRAM(s) Oral every 6 hours PRN Temp greater or equal to 38C (100.4F), Mild Pain (1 - 3), Moderate Pain (4 - 6)  bisacodyl 5 milliGRAM(s) Oral daily PRN constipation  bisacodyl Suppository 10 milliGRAM(s) Rectal daily PRN constipation  dextrose Oral Gel 15 Gram(s) Oral once PRN Blood Glucose LESS THAN 70 milliGRAM(s)/deciliter  haloperidol     Tablet 0.5 milliGRAM(s) Oral every 6 hours PRN agitation  haloperidol    Injectable 1 milliGRAM(s) IntraMuscular once PRN agitation  melatonin. 3 milliGRAM(s) Oral at bedtime PRN Insomnia  
MEDICATIONS  (PRN):  acetaminophen     Tablet .. 650 milliGRAM(s) Oral every 6 hours PRN Temp greater or equal to 38C (100.4F), Mild Pain (1 - 3), Moderate Pain (4 - 6)  dextrose Oral Gel 15 Gram(s) Oral once PRN Blood Glucose LESS THAN 70 milliGRAM(s)/deciliter  LORazepam     Tablet 0.5 milliGRAM(s) Oral every 12 hours PRN anxiety/agitation  melatonin. 3 milliGRAM(s) Oral at bedtime PRN Insomnia  QUEtiapine 12.5 milliGRAM(s) Oral every 6 hours PRN anxiety  
MEDICATIONS  (PRN):  acetaminophen     Tablet .. 650 milliGRAM(s) Oral every 6 hours PRN Temp greater or equal to 38C (100.4F), Mild Pain (1 - 3), Moderate Pain (4 - 6)  bisacodyl 5 milliGRAM(s) Oral daily PRN constipation  bisacodyl Suppository 10 milliGRAM(s) Rectal daily PRN constipation  dextrose Oral Gel 15 Gram(s) Oral once PRN Blood Glucose LESS THAN 70 milliGRAM(s)/deciliter  haloperidol     Tablet 0.5 milliGRAM(s) Oral every 6 hours PRN agitation  haloperidol    Injectable 1 milliGRAM(s) IntraMuscular once PRN agitation  lidocaine   4% Patch 1 Patch Transdermal daily PRN LBP  melatonin. 3 milliGRAM(s) Oral at bedtime PRN Insomnia  simethicone 80 milliGRAM(s) Chew every 4 hours PRN abdominal discomfort  
MEDICATIONS  (PRN):  acetaminophen     Tablet .. 650 milliGRAM(s) Oral every 6 hours PRN Temp greater or equal to 38C (100.4F), Mild Pain (1 - 3), Moderate Pain (4 - 6)  bisacodyl 5 milliGRAM(s) Oral daily PRN constipation  bisacodyl Suppository 10 milliGRAM(s) Rectal daily PRN constipation  dextrose Oral Gel 15 Gram(s) Oral once PRN Blood Glucose LESS THAN 70 milliGRAM(s)/deciliter  haloperidol     Tablet 0.5 milliGRAM(s) Oral every 6 hours PRN agitation  haloperidol    Injectable 1 milliGRAM(s) IntraMuscular once PRN agitation  melatonin. 3 milliGRAM(s) Oral at bedtime PRN Insomnia  
MEDICATIONS  (PRN):  acetaminophen     Tablet .. 650 milliGRAM(s) Oral every 6 hours PRN Temp greater or equal to 38C (100.4F), Mild Pain (1 - 3), Moderate Pain (4 - 6)  dextrose Oral Gel 15 Gram(s) Oral once PRN Blood Glucose LESS THAN 70 milliGRAM(s)/deciliter  LORazepam     Tablet 0.5 milliGRAM(s) Oral every 12 hours PRN anxiety/agitation  LORazepam   Injectable 1 milliGRAM(s) IntraMuscular once PRN severe agitation  LORazepam   Injectable 0.5 milliGRAM(s) IntraMuscular once PRN severe agitation  melatonin. 3 milliGRAM(s) Oral at bedtime PRN Insomnia  polyethylene glycol 3350 17 Gram(s) Oral daily PRN constipation  QUEtiapine 12.5 milliGRAM(s) Oral every 6 hours PRN anxiety  
MEDICATIONS  (PRN):  acetaminophen     Tablet .. 650 milliGRAM(s) Oral every 6 hours PRN Temp greater or equal to 38C (100.4F), Mild Pain (1 - 3), Moderate Pain (4 - 6)  dextrose Oral Gel 15 Gram(s) Oral once PRN Blood Glucose LESS THAN 70 milliGRAM(s)/deciliter  LORazepam     Tablet 0.5 milliGRAM(s) Oral every 12 hours PRN anxiety/agitation  melatonin. 3 milliGRAM(s) Oral at bedtime PRN Insomnia  QUEtiapine 12.5 milliGRAM(s) Oral every 6 hours PRN anxiety  
MEDICATIONS  (PRN):  acetaminophen     Tablet .. 650 milliGRAM(s) Oral every 6 hours PRN Temp greater or equal to 38C (100.4F), Mild Pain (1 - 3), Moderate Pain (4 - 6)  bisacodyl 5 milliGRAM(s) Oral daily PRN constipation  bisacodyl Suppository 10 milliGRAM(s) Rectal daily PRN constipation  dextrose Oral Gel 15 Gram(s) Oral once PRN Blood Glucose LESS THAN 70 milliGRAM(s)/deciliter  haloperidol     Tablet 0.5 milliGRAM(s) Oral every 6 hours PRN agitation  haloperidol    Injectable 1 milliGRAM(s) IntraMuscular once PRN agitation  lidocaine   4% Patch 1 Patch Transdermal daily PRN LBP  melatonin. 3 milliGRAM(s) Oral at bedtime PRN Insomnia  simethicone 80 milliGRAM(s) Chew every 4 hours PRN abdominal discomfort  
MEDICATIONS  (PRN):  acetaminophen     Tablet .. 650 milliGRAM(s) Oral every 6 hours PRN Temp greater or equal to 38C (100.4F), Mild Pain (1 - 3), Moderate Pain (4 - 6)  bisacodyl 5 milliGRAM(s) Oral daily PRN constipation  bisacodyl Suppository 10 milliGRAM(s) Rectal daily PRN constipation  dextrose Oral Gel 15 Gram(s) Oral once PRN Blood Glucose LESS THAN 70 milliGRAM(s)/deciliter  haloperidol     Tablet 0.5 milliGRAM(s) Oral every 6 hours PRN agitation  haloperidol    Injectable 1 milliGRAM(s) IntraMuscular once PRN agitation  lidocaine   4% Patch 1 Patch Transdermal daily PRN LBP  simethicone 80 milliGRAM(s) Chew every 4 hours PRN abdominal discomfort  
MEDICATIONS  (PRN):  acetaminophen     Tablet .. 650 milliGRAM(s) Oral every 6 hours PRN Temp greater or equal to 38C (100.4F), Mild Pain (1 - 3), Moderate Pain (4 - 6)  bisacodyl 5 milliGRAM(s) Oral daily PRN constipation  bisacodyl Suppository 10 milliGRAM(s) Rectal daily PRN constipation  dextrose Oral Gel 15 Gram(s) Oral once PRN Blood Glucose LESS THAN 70 milliGRAM(s)/deciliter  haloperidol     Tablet 0.5 milliGRAM(s) Oral every 6 hours PRN agitation  haloperidol    Injectable 1 milliGRAM(s) IntraMuscular once PRN agitation  melatonin. 3 milliGRAM(s) Oral at bedtime PRN Insomnia  
MEDICATIONS  (PRN):  acetaminophen     Tablet .. 650 milliGRAM(s) Oral every 6 hours PRN Temp greater or equal to 38C (100.4F), Mild Pain (1 - 3), Moderate Pain (4 - 6)  bisacodyl 5 milliGRAM(s) Oral daily PRN constipation  bisacodyl Suppository 10 milliGRAM(s) Rectal daily PRN constipation  dextrose Oral Gel 15 Gram(s) Oral once PRN Blood Glucose LESS THAN 70 milliGRAM(s)/deciliter  haloperidol     Tablet 0.5 milliGRAM(s) Oral every 6 hours PRN agitation  haloperidol    Injectable 1 milliGRAM(s) IntraMuscular once PRN agitation  melatonin. 3 milliGRAM(s) Oral at bedtime PRN Insomnia  
MEDICATIONS  (PRN):  acetaminophen     Tablet .. 650 milliGRAM(s) Oral every 6 hours PRN Temp greater or equal to 38C (100.4F), Mild Pain (1 - 3), Moderate Pain (4 - 6)  bisacodyl 5 milliGRAM(s) Oral daily PRN constipation  bisacodyl Suppository 10 milliGRAM(s) Rectal daily PRN constipation  dextrose Oral Gel 15 Gram(s) Oral once PRN Blood Glucose LESS THAN 70 milliGRAM(s)/deciliter  haloperidol     Tablet 0.5 milliGRAM(s) Oral every 6 hours PRN agitation  haloperidol    Injectable 1 milliGRAM(s) IntraMuscular once PRN agitation  lidocaine   4% Patch 1 Patch Transdermal daily PRN LBP  melatonin. 3 milliGRAM(s) Oral at bedtime PRN Insomnia  
MEDICATIONS  (PRN):  acetaminophen     Tablet .. 650 milliGRAM(s) Oral every 6 hours PRN Temp greater or equal to 38C (100.4F), Mild Pain (1 - 3), Moderate Pain (4 - 6)  bisacodyl 5 milliGRAM(s) Oral daily PRN constipation  bisacodyl Suppository 10 milliGRAM(s) Rectal daily PRN constipation  dextrose Oral Gel 15 Gram(s) Oral once PRN Blood Glucose LESS THAN 70 milliGRAM(s)/deciliter  haloperidol     Tablet 0.5 milliGRAM(s) Oral every 6 hours PRN agitation  haloperidol    Injectable 1 milliGRAM(s) IntraMuscular once PRN agitation  lidocaine   4% Patch 1 Patch Transdermal daily PRN LBP  melatonin. 3 milliGRAM(s) Oral at bedtime PRN Insomnia  simethicone 80 milliGRAM(s) Chew every 4 hours PRN abdominal discomfort  
MEDICATIONS  (PRN):  acetaminophen     Tablet .. 650 milliGRAM(s) Oral every 6 hours PRN Temp greater or equal to 38C (100.4F), Mild Pain (1 - 3), Moderate Pain (4 - 6)  bisacodyl 5 milliGRAM(s) Oral daily PRN constipation  bisacodyl Suppository 10 milliGRAM(s) Rectal daily PRN constipation  dextrose Oral Gel 15 Gram(s) Oral once PRN Blood Glucose LESS THAN 70 milliGRAM(s)/deciliter  haloperidol     Tablet 0.5 milliGRAM(s) Oral every 6 hours PRN agitation  haloperidol    Injectable 1 milliGRAM(s) IntraMuscular once PRN agitation  lidocaine   4% Patch 1 Patch Transdermal daily PRN LBP  melatonin. 3 milliGRAM(s) Oral at bedtime PRN Insomnia  simethicone 80 milliGRAM(s) Chew every 4 hours PRN abdominal discomfort  
MEDICATIONS  (PRN):  acetaminophen     Tablet .. 650 milliGRAM(s) Oral every 6 hours PRN Temp greater or equal to 38C (100.4F), Mild Pain (1 - 3), Moderate Pain (4 - 6)  bisacodyl 5 milliGRAM(s) Oral daily PRN constipation  bisacodyl Suppository 10 milliGRAM(s) Rectal daily PRN constipation  dextrose Oral Gel 15 Gram(s) Oral once PRN Blood Glucose LESS THAN 70 milliGRAM(s)/deciliter  haloperidol     Tablet 0.5 milliGRAM(s) Oral every 6 hours PRN agitation  haloperidol    Injectable 1 milliGRAM(s) IntraMuscular once PRN agitation  lidocaine   4% Patch 1 Patch Transdermal daily PRN LBP  simethicone 80 milliGRAM(s) Chew every 4 hours PRN abdominal discomfort  
MEDICATIONS  (PRN):  acetaminophen     Tablet .. 650 milliGRAM(s) Oral every 6 hours PRN Temp greater or equal to 38C (100.4F), Mild Pain (1 - 3), Moderate Pain (4 - 6)  bisacodyl 5 milliGRAM(s) Oral daily PRN constipation  bisacodyl Suppository 10 milliGRAM(s) Rectal daily PRN constipation  dextrose Oral Gel 15 Gram(s) Oral once PRN Blood Glucose LESS THAN 70 milliGRAM(s)/deciliter  haloperidol     Tablet 0.5 milliGRAM(s) Oral every 6 hours PRN agitation  haloperidol    Injectable 1 milliGRAM(s) IntraMuscular once PRN agitation  melatonin. 3 milliGRAM(s) Oral at bedtime PRN Insomnia  
MEDICATIONS  (PRN):  acetaminophen     Tablet .. 650 milliGRAM(s) Oral every 6 hours PRN Temp greater or equal to 38C (100.4F), Mild Pain (1 - 3), Moderate Pain (4 - 6)  dextrose Oral Gel 15 Gram(s) Oral once PRN Blood Glucose LESS THAN 70 milliGRAM(s)/deciliter  LORazepam     Tablet 0.5 milliGRAM(s) Oral every 12 hours PRN anxiety/agitation  melatonin. 3 milliGRAM(s) Oral at bedtime PRN Insomnia  QUEtiapine 12.5 milliGRAM(s) Oral every 6 hours PRN anxiety  
MEDICATIONS  (PRN):  acetaminophen     Tablet .. 650 milliGRAM(s) Oral every 6 hours PRN Temp greater or equal to 38C (100.4F), Mild Pain (1 - 3), Moderate Pain (4 - 6)  bisacodyl 5 milliGRAM(s) Oral daily PRN constipation  bisacodyl Suppository 10 milliGRAM(s) Rectal daily PRN constipation  dextrose Oral Gel 15 Gram(s) Oral once PRN Blood Glucose LESS THAN 70 milliGRAM(s)/deciliter  haloperidol     Tablet 0.5 milliGRAM(s) Oral every 6 hours PRN agitation  haloperidol    Injectable 1 milliGRAM(s) IntraMuscular once PRN agitation  lidocaine   4% Patch 1 Patch Transdermal daily PRN LBP  melatonin. 3 milliGRAM(s) Oral at bedtime PRN Insomnia  simethicone 80 milliGRAM(s) Chew every 4 hours PRN abdominal discomfort  
MEDICATIONS  (PRN):  acetaminophen     Tablet .. 650 milliGRAM(s) Oral every 6 hours PRN Temp greater or equal to 38C (100.4F), Mild Pain (1 - 3), Moderate Pain (4 - 6)  bisacodyl 5 milliGRAM(s) Oral daily PRN constipation  bisacodyl Suppository 10 milliGRAM(s) Rectal daily PRN constipation  dextrose Oral Gel 15 Gram(s) Oral once PRN Blood Glucose LESS THAN 70 milliGRAM(s)/deciliter  haloperidol     Tablet 0.5 milliGRAM(s) Oral every 6 hours PRN agitation  haloperidol    Injectable 1 milliGRAM(s) IntraMuscular once PRN agitation  lidocaine   4% Patch 1 Patch Transdermal daily PRN LBP  melatonin. 3 milliGRAM(s) Oral at bedtime PRN Insomnia  simethicone 80 milliGRAM(s) Chew every 4 hours PRN abdominal discomfort  
MEDICATIONS  (PRN):  acetaminophen     Tablet .. 650 milliGRAM(s) Oral every 6 hours PRN Temp greater or equal to 38C (100.4F), Mild Pain (1 - 3), Moderate Pain (4 - 6)  bisacodyl 5 milliGRAM(s) Oral daily PRN constipation  bisacodyl Suppository 10 milliGRAM(s) Rectal daily PRN constipation  dextrose Oral Gel 15 Gram(s) Oral once PRN Blood Glucose LESS THAN 70 milliGRAM(s)/deciliter  haloperidol     Tablet 0.5 milliGRAM(s) Oral every 6 hours PRN agitation  haloperidol    Injectable 1 milliGRAM(s) IntraMuscular once PRN agitation  lidocaine   4% Patch 1 Patch Transdermal daily PRN LBP  simethicone 80 milliGRAM(s) Chew every 4 hours PRN abdominal discomfort  
MEDICATIONS  (PRN):  acetaminophen     Tablet .. 650 milliGRAM(s) Oral every 6 hours PRN Temp greater or equal to 38C (100.4F), Mild Pain (1 - 3), Moderate Pain (4 - 6)  bisacodyl 5 milliGRAM(s) Oral daily PRN constipation  bisacodyl Suppository 10 milliGRAM(s) Rectal daily PRN constipation  dextrose Oral Gel 15 Gram(s) Oral once PRN Blood Glucose LESS THAN 70 milliGRAM(s)/deciliter  haloperidol     Tablet 0.5 milliGRAM(s) Oral every 6 hours PRN agitation  haloperidol    Injectable 1 milliGRAM(s) IntraMuscular once PRN agitation  melatonin. 3 milliGRAM(s) Oral at bedtime PRN Insomnia  
MEDICATIONS  (PRN):  acetaminophen     Tablet .. 650 milliGRAM(s) Oral every 6 hours PRN Temp greater or equal to 38C (100.4F), Mild Pain (1 - 3), Moderate Pain (4 - 6)  bisacodyl 5 milliGRAM(s) Oral daily PRN constipation  bisacodyl Suppository 10 milliGRAM(s) Rectal daily PRN constipation  dextrose Oral Gel 15 Gram(s) Oral once PRN Blood Glucose LESS THAN 70 milliGRAM(s)/deciliter  haloperidol     Tablet 1 milliGRAM(s) Oral every 6 hours PRN agitation  haloperidol    Injectable 1 milliGRAM(s) IntraMuscular once PRN agitation  LORazepam     Tablet 0.5 milliGRAM(s) Oral every 6 hours PRN agitation  LORazepam   Injectable 0.5 milliGRAM(s) IntraMuscular once PRN severe agitation  melatonin. 3 milliGRAM(s) Oral at bedtime PRN Insomnia  
MEDICATIONS  (PRN):  acetaminophen     Tablet .. 650 milliGRAM(s) Oral every 6 hours PRN Temp greater or equal to 38C (100.4F), Mild Pain (1 - 3), Moderate Pain (4 - 6)  bisacodyl 5 milliGRAM(s) Oral daily PRN constipation  bisacodyl Suppository 10 milliGRAM(s) Rectal daily PRN constipation  dextrose Oral Gel 15 Gram(s) Oral once PRN Blood Glucose LESS THAN 70 milliGRAM(s)/deciliter  haloperidol     Tablet 1 milliGRAM(s) Oral every 6 hours PRN agitation  haloperidol    Injectable 1 milliGRAM(s) IntraMuscular once PRN agitation  LORazepam     Tablet 0.5 milliGRAM(s) Oral every 6 hours PRN agitation  melatonin. 3 milliGRAM(s) Oral at bedtime PRN Insomnia  
MEDICATIONS  (PRN):  acetaminophen     Tablet .. 650 milliGRAM(s) Oral every 6 hours PRN Temp greater or equal to 38C (100.4F), Mild Pain (1 - 3), Moderate Pain (4 - 6)  bisacodyl 5 milliGRAM(s) Oral daily PRN constipation  bisacodyl Suppository 10 milliGRAM(s) Rectal daily PRN constipation  dextrose Oral Gel 15 Gram(s) Oral once PRN Blood Glucose LESS THAN 70 milliGRAM(s)/deciliter  haloperidol     Tablet 1 milliGRAM(s) Oral every 6 hours PRN agitation  haloperidol    Injectable 1 milliGRAM(s) IntraMuscular once PRN agitation  melatonin. 3 milliGRAM(s) Oral at bedtime PRN Insomnia  
MEDICATIONS  (PRN):  acetaminophen     Tablet .. 650 milliGRAM(s) Oral every 6 hours PRN Temp greater or equal to 38C (100.4F), Mild Pain (1 - 3), Moderate Pain (4 - 6)  dextrose Oral Gel 15 Gram(s) Oral once PRN Blood Glucose LESS THAN 70 milliGRAM(s)/deciliter  LORazepam     Tablet 0.5 milliGRAM(s) Oral every 12 hours PRN anxiety/agitation  LORazepam   Injectable 0.5 milliGRAM(s) IntraMuscular once PRN severe agitation  melatonin. 3 milliGRAM(s) Oral at bedtime PRN Insomnia  QUEtiapine 12.5 milliGRAM(s) Oral every 6 hours PRN anxiety  
MEDICATIONS  (PRN):  acetaminophen     Tablet .. 650 milliGRAM(s) Oral every 6 hours PRN Temp greater or equal to 38C (100.4F), Mild Pain (1 - 3), Moderate Pain (4 - 6)  bisacodyl 5 milliGRAM(s) Oral daily PRN constipation  bisacodyl Suppository 10 milliGRAM(s) Rectal daily PRN constipation  dextrose Oral Gel 15 Gram(s) Oral once PRN Blood Glucose LESS THAN 70 milliGRAM(s)/deciliter  haloperidol     Tablet 0.5 milliGRAM(s) Oral every 6 hours PRN agitation  haloperidol    Injectable 1 milliGRAM(s) IntraMuscular once PRN agitation  lidocaine   4% Patch 1 Patch Transdermal daily PRN LBP  simethicone 80 milliGRAM(s) Chew every 4 hours PRN abdominal discomfort  
MEDICATIONS  (PRN):  acetaminophen     Tablet .. 650 milliGRAM(s) Oral every 6 hours PRN Temp greater or equal to 38C (100.4F), Mild Pain (1 - 3), Moderate Pain (4 - 6)  bisacodyl 5 milliGRAM(s) Oral daily PRN constipation  bisacodyl Suppository 10 milliGRAM(s) Rectal daily PRN constipation  dextrose Oral Gel 15 Gram(s) Oral once PRN Blood Glucose LESS THAN 70 milliGRAM(s)/deciliter  haloperidol     Tablet 0.5 milliGRAM(s) Oral every 6 hours PRN agitation  haloperidol    Injectable 1 milliGRAM(s) IntraMuscular once PRN agitation  melatonin. 3 milliGRAM(s) Oral at bedtime PRN Insomnia  
MEDICATIONS  (PRN):  acetaminophen     Tablet .. 650 milliGRAM(s) Oral every 6 hours PRN Temp greater or equal to 38C (100.4F), Mild Pain (1 - 3), Moderate Pain (4 - 6)  dextrose Oral Gel 15 Gram(s) Oral once PRN Blood Glucose LESS THAN 70 milliGRAM(s)/deciliter  LORazepam   Injectable 0.5 milliGRAM(s) IntraMuscular once PRN severe agitation  melatonin. 3 milliGRAM(s) Oral at bedtime PRN Insomnia  polyethylene glycol 3350 17 Gram(s) Oral daily PRN constipation  QUEtiapine 12.5 milliGRAM(s) Oral every 6 hours PRN anxiety  
MEDICATIONS  (PRN):  acetaminophen     Tablet .. 650 milliGRAM(s) Oral every 6 hours PRN Temp greater or equal to 38C (100.4F), Mild Pain (1 - 3), Moderate Pain (4 - 6)  bisacodyl 5 milliGRAM(s) Oral daily PRN constipation  bisacodyl Suppository 10 milliGRAM(s) Rectal daily PRN constipation  dextrose Oral Gel 15 Gram(s) Oral once PRN Blood Glucose LESS THAN 70 milliGRAM(s)/deciliter  haloperidol     Tablet 0.5 milliGRAM(s) Oral every 6 hours PRN agitation  haloperidol    Injectable 1 milliGRAM(s) IntraMuscular once PRN agitation  lidocaine   4% Patch 1 Patch Transdermal daily PRN LBP  simethicone 80 milliGRAM(s) Chew every 4 hours PRN abdominal discomfort  
MEDICATIONS  (PRN):  acetaminophen     Tablet .. 650 milliGRAM(s) Oral every 6 hours PRN Temp greater or equal to 38C (100.4F), Mild Pain (1 - 3), Moderate Pain (4 - 6)  bisacodyl 5 milliGRAM(s) Oral daily PRN constipation  bisacodyl Suppository 10 milliGRAM(s) Rectal daily PRN constipation  dextrose Oral Gel 15 Gram(s) Oral once PRN Blood Glucose LESS THAN 70 milliGRAM(s)/deciliter  haloperidol     Tablet 1 milliGRAM(s) Oral every 6 hours PRN agitation  haloperidol    Injectable 1 milliGRAM(s) IntraMuscular once PRN agitation  LORazepam     Tablet 0.5 milliGRAM(s) Oral every 6 hours PRN agitation  LORazepam   Injectable 0.5 milliGRAM(s) IntraMuscular once PRN severe agitation  melatonin. 3 milliGRAM(s) Oral at bedtime PRN Insomnia  
MEDICATIONS  (PRN):  acetaminophen     Tablet .. 650 milliGRAM(s) Oral every 6 hours PRN Temp greater or equal to 38C (100.4F), Mild Pain (1 - 3), Moderate Pain (4 - 6)  bisacodyl 5 milliGRAM(s) Oral daily PRN constipation  bisacodyl Suppository 10 milliGRAM(s) Rectal daily PRN constipation  dextrose Oral Gel 15 Gram(s) Oral once PRN Blood Glucose LESS THAN 70 milliGRAM(s)/deciliter  haloperidol     Tablet 0.5 milliGRAM(s) Oral every 6 hours PRN agitation  haloperidol    Injectable 1 milliGRAM(s) IntraMuscular once PRN agitation  lidocaine   4% Patch 1 Patch Transdermal daily PRN LBP  melatonin. 3 milliGRAM(s) Oral at bedtime PRN Insomnia  simethicone 80 milliGRAM(s) Chew every 4 hours PRN abdominal discomfort  
MEDICATIONS  (PRN):  acetaminophen     Tablet .. 650 milliGRAM(s) Oral every 6 hours PRN Temp greater or equal to 38C (100.4F), Mild Pain (1 - 3), Moderate Pain (4 - 6)  bisacodyl 5 milliGRAM(s) Oral daily PRN constipation  bisacodyl Suppository 10 milliGRAM(s) Rectal daily PRN constipation  dextrose Oral Gel 15 Gram(s) Oral once PRN Blood Glucose LESS THAN 70 milliGRAM(s)/deciliter  haloperidol     Tablet 0.5 milliGRAM(s) Oral every 6 hours PRN agitation  haloperidol    Injectable 1 milliGRAM(s) IntraMuscular once PRN agitation  lidocaine   4% Patch 1 Patch Transdermal daily PRN LBP  melatonin. 3 milliGRAM(s) Oral at bedtime PRN Insomnia  simethicone 80 milliGRAM(s) Chew every 4 hours PRN abdominal discomfort  
MEDICATIONS  (PRN):  acetaminophen     Tablet .. 650 milliGRAM(s) Oral every 6 hours PRN Temp greater or equal to 38C (100.4F), Mild Pain (1 - 3), Moderate Pain (4 - 6)  bisacodyl 5 milliGRAM(s) Oral daily PRN constipation  bisacodyl Suppository 10 milliGRAM(s) Rectal daily PRN constipation  dextrose Oral Gel 15 Gram(s) Oral once PRN Blood Glucose LESS THAN 70 milliGRAM(s)/deciliter  haloperidol     Tablet 0.5 milliGRAM(s) Oral every 6 hours PRN agitation  haloperidol    Injectable 1 milliGRAM(s) IntraMuscular once PRN agitation  melatonin. 3 milliGRAM(s) Oral at bedtime PRN Insomnia  
MEDICATIONS  (PRN):  acetaminophen     Tablet .. 650 milliGRAM(s) Oral every 6 hours PRN Temp greater or equal to 38C (100.4F), Mild Pain (1 - 3), Moderate Pain (4 - 6)  bisacodyl 5 milliGRAM(s) Oral daily PRN constipation  bisacodyl Suppository 10 milliGRAM(s) Rectal daily PRN constipation  dextrose Oral Gel 15 Gram(s) Oral once PRN Blood Glucose LESS THAN 70 milliGRAM(s)/deciliter  haloperidol     Tablet 0.5 milliGRAM(s) Oral every 6 hours PRN agitation  haloperidol    Injectable 1 milliGRAM(s) IntraMuscular once PRN agitation  lidocaine   4% Patch 1 Patch Transdermal daily PRN LBP  melatonin. 3 milliGRAM(s) Oral at bedtime PRN Insomnia  simethicone 80 milliGRAM(s) Chew every 4 hours PRN abdominal discomfort  
MEDICATIONS  (PRN):  acetaminophen     Tablet .. 650 milliGRAM(s) Oral every 6 hours PRN Temp greater or equal to 38C (100.4F), Mild Pain (1 - 3), Moderate Pain (4 - 6)  bisacodyl 5 milliGRAM(s) Oral daily PRN constipation  bisacodyl Suppository 10 milliGRAM(s) Rectal daily PRN constipation  dextrose Oral Gel 15 Gram(s) Oral once PRN Blood Glucose LESS THAN 70 milliGRAM(s)/deciliter  haloperidol     Tablet 0.5 milliGRAM(s) Oral every 6 hours PRN agitation  haloperidol    Injectable 1 milliGRAM(s) IntraMuscular once PRN agitation  lidocaine   4% Patch 1 Patch Transdermal daily PRN LBP  simethicone 80 milliGRAM(s) Chew every 4 hours PRN abdominal discomfort  
MEDICATIONS  (PRN):  acetaminophen     Tablet .. 650 milliGRAM(s) Oral every 6 hours PRN Temp greater or equal to 38C (100.4F), Mild Pain (1 - 3), Moderate Pain (4 - 6)  dextrose Oral Gel 15 Gram(s) Oral once PRN Blood Glucose LESS THAN 70 milliGRAM(s)/deciliter  LORazepam     Tablet 0.5 milliGRAM(s) Oral every 12 hours PRN anxiety/agitation  melatonin. 3 milliGRAM(s) Oral at bedtime PRN Insomnia  QUEtiapine 12.5 milliGRAM(s) Oral every 6 hours PRN anxiety  
MEDICATIONS  (PRN):  acetaminophen     Tablet .. 650 milliGRAM(s) Oral every 6 hours PRN Temp greater or equal to 38C (100.4F), Mild Pain (1 - 3), Moderate Pain (4 - 6)  bisacodyl 5 milliGRAM(s) Oral daily PRN constipation  bisacodyl Suppository 10 milliGRAM(s) Rectal daily PRN constipation  dextrose Oral Gel 15 Gram(s) Oral once PRN Blood Glucose LESS THAN 70 milliGRAM(s)/deciliter  haloperidol     Tablet 0.5 milliGRAM(s) Oral every 6 hours PRN agitation  haloperidol    Injectable 1 milliGRAM(s) IntraMuscular once PRN agitation  lidocaine   4% Patch 1 Patch Transdermal daily PRN LBP  simethicone 80 milliGRAM(s) Chew every 4 hours PRN abdominal discomfort  
MEDICATIONS  (PRN):  acetaminophen     Tablet .. 650 milliGRAM(s) Oral every 6 hours PRN Temp greater or equal to 38C (100.4F), Mild Pain (1 - 3), Moderate Pain (4 - 6)  bisacodyl 5 milliGRAM(s) Oral daily PRN constipation  bisacodyl Suppository 10 milliGRAM(s) Rectal daily PRN constipation  dextrose Oral Gel 15 Gram(s) Oral once PRN Blood Glucose LESS THAN 70 milliGRAM(s)/deciliter  haloperidol     Tablet 0.5 milliGRAM(s) Oral every 6 hours PRN agitation  haloperidol    Injectable 1 milliGRAM(s) IntraMuscular once PRN agitation  lidocaine   4% Patch 1 Patch Transdermal daily PRN LBP  simethicone 80 milliGRAM(s) Chew every 4 hours PRN abdominal discomfort  
MEDICATIONS  (PRN):  acetaminophen     Tablet .. 650 milliGRAM(s) Oral every 6 hours PRN Temp greater or equal to 38C (100.4F), Mild Pain (1 - 3), Moderate Pain (4 - 6)  bisacodyl 5 milliGRAM(s) Oral daily PRN constipation  bisacodyl Suppository 10 milliGRAM(s) Rectal daily PRN constipation  dextrose Oral Gel 15 Gram(s) Oral once PRN Blood Glucose LESS THAN 70 milliGRAM(s)/deciliter  haloperidol     Tablet 0.5 milliGRAM(s) Oral every 6 hours PRN agitation  haloperidol    Injectable 1 milliGRAM(s) IntraMuscular once PRN agitation  melatonin. 3 milliGRAM(s) Oral at bedtime PRN Insomnia  
MEDICATIONS  (PRN):  acetaminophen     Tablet .. 650 milliGRAM(s) Oral every 6 hours PRN Temp greater or equal to 38C (100.4F), Mild Pain (1 - 3), Moderate Pain (4 - 6)  bisacodyl 5 milliGRAM(s) Oral daily PRN constipation  bisacodyl Suppository 10 milliGRAM(s) Rectal daily PRN constipation  dextrose Oral Gel 15 Gram(s) Oral once PRN Blood Glucose LESS THAN 70 milliGRAM(s)/deciliter  haloperidol     Tablet 0.5 milliGRAM(s) Oral every 6 hours PRN agitation  haloperidol    Injectable 1 milliGRAM(s) IntraMuscular once PRN agitation  melatonin. 3 milliGRAM(s) Oral at bedtime PRN Insomnia  
MEDICATIONS  (PRN):  acetaminophen     Tablet .. 650 milliGRAM(s) Oral every 6 hours PRN Temp greater or equal to 38C (100.4F), Mild Pain (1 - 3), Moderate Pain (4 - 6)  bisacodyl 5 milliGRAM(s) Oral daily PRN constipation  bisacodyl Suppository 10 milliGRAM(s) Rectal daily PRN constipation  dextrose Oral Gel 15 Gram(s) Oral once PRN Blood Glucose LESS THAN 70 milliGRAM(s)/deciliter  haloperidol     Tablet 0.5 milliGRAM(s) Oral every 6 hours PRN agitation  haloperidol    Injectable 1 milliGRAM(s) IntraMuscular once PRN agitation  lidocaine   4% Patch 1 Patch Transdermal daily PRN LBP  melatonin. 3 milliGRAM(s) Oral at bedtime PRN Insomnia  simethicone 80 milliGRAM(s) Chew every 4 hours PRN abdominal discomfort  
MEDICATIONS  (PRN):  acetaminophen     Tablet .. 650 milliGRAM(s) Oral every 6 hours PRN Temp greater or equal to 38C (100.4F), Mild Pain (1 - 3), Moderate Pain (4 - 6)  bisacodyl 5 milliGRAM(s) Oral daily PRN constipation  bisacodyl Suppository 10 milliGRAM(s) Rectal daily PRN constipation  dextrose Oral Gel 15 Gram(s) Oral once PRN Blood Glucose LESS THAN 70 milliGRAM(s)/deciliter  haloperidol     Tablet 0.5 milliGRAM(s) Oral every 6 hours PRN agitation  haloperidol    Injectable 1 milliGRAM(s) IntraMuscular once PRN agitation  lidocaine   4% Patch 1 Patch Transdermal daily PRN LBP  melatonin. 3 milliGRAM(s) Oral at bedtime PRN Insomnia  simethicone 80 milliGRAM(s) Chew every 4 hours PRN abdominal discomfort  
MEDICATIONS  (PRN):  acetaminophen     Tablet .. 650 milliGRAM(s) Oral every 6 hours PRN Temp greater or equal to 38C (100.4F), Mild Pain (1 - 3), Moderate Pain (4 - 6)  bisacodyl 5 milliGRAM(s) Oral daily PRN constipation  bisacodyl Suppository 10 milliGRAM(s) Rectal daily PRN constipation  dextrose Oral Gel 15 Gram(s) Oral once PRN Blood Glucose LESS THAN 70 milliGRAM(s)/deciliter  haloperidol     Tablet 0.5 milliGRAM(s) Oral every 6 hours PRN agitation  haloperidol    Injectable 1 milliGRAM(s) IntraMuscular once PRN agitation  melatonin. 3 milliGRAM(s) Oral at bedtime PRN Insomnia  
MEDICATIONS  (PRN):  acetaminophen     Tablet .. 650 milliGRAM(s) Oral every 6 hours PRN Temp greater or equal to 38C (100.4F), Mild Pain (1 - 3), Moderate Pain (4 - 6)  bisacodyl 5 milliGRAM(s) Oral daily PRN constipation  bisacodyl Suppository 10 milliGRAM(s) Rectal daily PRN constipation  dextrose Oral Gel 15 Gram(s) Oral once PRN Blood Glucose LESS THAN 70 milliGRAM(s)/deciliter  haloperidol     Tablet 1 milliGRAM(s) Oral every 6 hours PRN agitation  haloperidol    Injectable 1 milliGRAM(s) IntraMuscular once PRN agitation  LORazepam     Tablet 0.5 milliGRAM(s) Oral every 6 hours PRN agitation  LORazepam   Injectable 0.5 milliGRAM(s) IntraMuscular once PRN severe agitation  melatonin. 3 milliGRAM(s) Oral at bedtime PRN Insomnia  
MEDICATIONS  (PRN):  acetaminophen     Tablet .. 650 milliGRAM(s) Oral every 6 hours PRN Temp greater or equal to 38C (100.4F), Mild Pain (1 - 3), Moderate Pain (4 - 6)  bisacodyl 5 milliGRAM(s) Oral daily PRN constipation  bisacodyl Suppository 10 milliGRAM(s) Rectal daily PRN constipation  dextrose Oral Gel 15 Gram(s) Oral once PRN Blood Glucose LESS THAN 70 milliGRAM(s)/deciliter  haloperidol     Tablet 1 milliGRAM(s) Oral every 6 hours PRN agitation  haloperidol    Injectable 1 milliGRAM(s) IntraMuscular once PRN agitation  LORazepam     Tablet 0.5 milliGRAM(s) Oral every 6 hours PRN agitation  melatonin. 3 milliGRAM(s) Oral at bedtime PRN Insomnia  
MEDICATIONS  (PRN):  acetaminophen     Tablet .. 650 milliGRAM(s) Oral every 6 hours PRN Temp greater or equal to 38C (100.4F), Mild Pain (1 - 3), Moderate Pain (4 - 6)  dextrose Oral Gel 15 Gram(s) Oral once PRN Blood Glucose LESS THAN 70 milliGRAM(s)/deciliter  LORazepam     Tablet 0.5 milliGRAM(s) Oral every 12 hours PRN anxiety/agitation  melatonin. 3 milliGRAM(s) Oral at bedtime PRN Insomnia  QUEtiapine 12.5 milliGRAM(s) Oral every 6 hours PRN anxiety  
MEDICATIONS  (PRN):  acetaminophen     Tablet .. 650 milliGRAM(s) Oral every 6 hours PRN Temp greater or equal to 38C (100.4F), Mild Pain (1 - 3), Moderate Pain (4 - 6)  bisacodyl 5 milliGRAM(s) Oral daily PRN constipation  bisacodyl Suppository 10 milliGRAM(s) Rectal daily PRN constipation  dextrose Oral Gel 15 Gram(s) Oral once PRN Blood Glucose LESS THAN 70 milliGRAM(s)/deciliter  haloperidol     Tablet 0.5 milliGRAM(s) Oral every 6 hours PRN agitation  haloperidol    Injectable 1 milliGRAM(s) IntraMuscular once PRN agitation  lidocaine   4% Patch 1 Patch Transdermal daily PRN LBP  melatonin. 3 milliGRAM(s) Oral at bedtime PRN Insomnia  simethicone 80 milliGRAM(s) Chew every 4 hours PRN abdominal discomfort  
MEDICATIONS  (PRN):  acetaminophen     Tablet .. 650 milliGRAM(s) Oral every 6 hours PRN Temp greater or equal to 38C (100.4F), Mild Pain (1 - 3), Moderate Pain (4 - 6)  bisacodyl 5 milliGRAM(s) Oral daily PRN constipation  bisacodyl Suppository 10 milliGRAM(s) Rectal daily PRN constipation  dextrose Oral Gel 15 Gram(s) Oral once PRN Blood Glucose LESS THAN 70 milliGRAM(s)/deciliter  haloperidol     Tablet 0.5 milliGRAM(s) Oral every 6 hours PRN agitation  haloperidol    Injectable 1 milliGRAM(s) IntraMuscular once PRN agitation  lidocaine   4% Patch 1 Patch Transdermal daily PRN LBP  simethicone 80 milliGRAM(s) Chew every 4 hours PRN abdominal discomfort  
MEDICATIONS  (PRN):  acetaminophen     Tablet .. 650 milliGRAM(s) Oral every 6 hours PRN Temp greater or equal to 38C (100.4F), Mild Pain (1 - 3), Moderate Pain (4 - 6)  bisacodyl 5 milliGRAM(s) Oral daily PRN constipation  bisacodyl Suppository 10 milliGRAM(s) Rectal daily PRN constipation  dextrose Oral Gel 15 Gram(s) Oral once PRN Blood Glucose LESS THAN 70 milliGRAM(s)/deciliter  haloperidol     Tablet 0.5 milliGRAM(s) Oral every 6 hours PRN agitation  haloperidol    Injectable 1 milliGRAM(s) IntraMuscular once PRN agitation  melatonin. 3 milliGRAM(s) Oral at bedtime PRN Insomnia  
MEDICATIONS  (PRN):  acetaminophen     Tablet .. 650 milliGRAM(s) Oral every 6 hours PRN Temp greater or equal to 38C (100.4F), Mild Pain (1 - 3), Moderate Pain (4 - 6)  dextrose Oral Gel 15 Gram(s) Oral once PRN Blood Glucose LESS THAN 70 milliGRAM(s)/deciliter  LORazepam     Tablet 0.5 milliGRAM(s) Oral every 12 hours PRN anxiety/agitation  melatonin. 3 milliGRAM(s) Oral at bedtime PRN Insomnia  QUEtiapine 12.5 milliGRAM(s) Oral every 6 hours PRN anxiety  
MEDICATIONS  (PRN):  acetaminophen     Tablet .. 650 milliGRAM(s) Oral every 6 hours PRN Temp greater or equal to 38C (100.4F), Mild Pain (1 - 3), Moderate Pain (4 - 6)  dextrose Oral Gel 15 Gram(s) Oral once PRN Blood Glucose LESS THAN 70 milliGRAM(s)/deciliter  LORazepam     Tablet 0.5 milliGRAM(s) Oral every 12 hours PRN anxiety/agitation  melatonin. 3 milliGRAM(s) Oral at bedtime PRN Insomnia  QUEtiapine 12.5 milliGRAM(s) Oral every 6 hours PRN anxiety  
MEDICATIONS  (PRN):  acetaminophen     Tablet .. 650 milliGRAM(s) Oral every 6 hours PRN Temp greater or equal to 38C (100.4F), Mild Pain (1 - 3), Moderate Pain (4 - 6)  dextrose Oral Gel 15 Gram(s) Oral once PRN Blood Glucose LESS THAN 70 milliGRAM(s)/deciliter  melatonin. 3 milliGRAM(s) Oral at bedtime PRN Insomnia  polyethylene glycol 3350 17 Gram(s) Oral daily PRN constipation  QUEtiapine 12.5 milliGRAM(s) Oral every 6 hours PRN anxiety  
MEDICATIONS  (PRN):  acetaminophen     Tablet .. 650 milliGRAM(s) Oral every 6 hours PRN Temp greater or equal to 38C (100.4F), Mild Pain (1 - 3), Moderate Pain (4 - 6)  bisacodyl 5 milliGRAM(s) Oral daily PRN constipation  bisacodyl Suppository 10 milliGRAM(s) Rectal daily PRN constipation  dextrose Oral Gel 15 Gram(s) Oral once PRN Blood Glucose LESS THAN 70 milliGRAM(s)/deciliter  LORazepam   Injectable 0.5 milliGRAM(s) IntraMuscular once PRN severe agitation  melatonin. 3 milliGRAM(s) Oral at bedtime PRN Insomnia  QUEtiapine 12.5 milliGRAM(s) Oral every 6 hours PRN anxiety  
MEDICATIONS  (PRN):  acetaminophen     Tablet .. 650 milliGRAM(s) Oral every 6 hours PRN Temp greater or equal to 38C (100.4F), Mild Pain (1 - 3), Moderate Pain (4 - 6)  bisacodyl 5 milliGRAM(s) Oral daily PRN constipation  bisacodyl Suppository 10 milliGRAM(s) Rectal daily PRN constipation  dextrose Oral Gel 15 Gram(s) Oral once PRN Blood Glucose LESS THAN 70 milliGRAM(s)/deciliter  LORazepam   Injectable 0.5 milliGRAM(s) IntraMuscular once PRN severe agitation  melatonin. 3 milliGRAM(s) Oral at bedtime PRN Insomnia  QUEtiapine 12.5 milliGRAM(s) Oral every 6 hours PRN anxiety  
MEDICATIONS  (PRN):  acetaminophen     Tablet .. 650 milliGRAM(s) Oral every 6 hours PRN Temp greater or equal to 38C (100.4F), Mild Pain (1 - 3), Moderate Pain (4 - 6)  bisacodyl 5 milliGRAM(s) Oral daily PRN constipation  bisacodyl Suppository 10 milliGRAM(s) Rectal daily PRN constipation  dextrose Oral Gel 15 Gram(s) Oral once PRN Blood Glucose LESS THAN 70 milliGRAM(s)/deciliter  haloperidol     Tablet 0.5 milliGRAM(s) Oral every 6 hours PRN agitation  haloperidol    Injectable 1 milliGRAM(s) IntraMuscular once PRN agitation  lidocaine   4% Patch 1 Patch Transdermal daily PRN LBP  melatonin. 3 milliGRAM(s) Oral at bedtime PRN Insomnia  simethicone 80 milliGRAM(s) Chew every 4 hours PRN abdominal discomfort  
MEDICATIONS  (PRN):  acetaminophen     Tablet .. 650 milliGRAM(s) Oral every 6 hours PRN Temp greater or equal to 38C (100.4F), Mild Pain (1 - 3), Moderate Pain (4 - 6)  bisacodyl 5 milliGRAM(s) Oral daily PRN constipation  bisacodyl Suppository 10 milliGRAM(s) Rectal daily PRN constipation  dextrose Oral Gel 15 Gram(s) Oral once PRN Blood Glucose LESS THAN 70 milliGRAM(s)/deciliter  haloperidol     Tablet 1 milliGRAM(s) Oral every 6 hours PRN agitation  haloperidol    Injectable 1 milliGRAM(s) IntraMuscular once PRN agitation  lidocaine   4% Patch 1 Patch Transdermal daily PRN LBP  melatonin. 3 milliGRAM(s) Oral at bedtime PRN Insomnia  
MEDICATIONS  (PRN):  acetaminophen     Tablet .. 650 milliGRAM(s) Oral every 6 hours PRN Temp greater or equal to 38C (100.4F), Mild Pain (1 - 3), Moderate Pain (4 - 6)  bisacodyl 5 milliGRAM(s) Oral daily PRN constipation  bisacodyl Suppository 10 milliGRAM(s) Rectal daily PRN constipation  dextrose Oral Gel 15 Gram(s) Oral once PRN Blood Glucose LESS THAN 70 milliGRAM(s)/deciliter  haloperidol     Tablet 0.5 milliGRAM(s) Oral every 6 hours PRN agitation  haloperidol    Injectable 1 milliGRAM(s) IntraMuscular once PRN agitation  lidocaine   4% Patch 1 Patch Transdermal daily PRN LBP  simethicone 80 milliGRAM(s) Chew every 4 hours PRN abdominal discomfort  
MEDICATIONS  (PRN):  acetaminophen     Tablet .. 650 milliGRAM(s) Oral every 6 hours PRN Temp greater or equal to 38C (100.4F), Mild Pain (1 - 3), Moderate Pain (4 - 6)  bisacodyl 5 milliGRAM(s) Oral daily PRN constipation  bisacodyl Suppository 10 milliGRAM(s) Rectal daily PRN constipation  dextrose Oral Gel 15 Gram(s) Oral once PRN Blood Glucose LESS THAN 70 milliGRAM(s)/deciliter  haloperidol     Tablet 1 milliGRAM(s) Oral every 6 hours PRN agitation  haloperidol    Injectable 1 milliGRAM(s) IntraMuscular once PRN agitation  melatonin. 3 milliGRAM(s) Oral at bedtime PRN Insomnia  
MEDICATIONS  (PRN):  acetaminophen     Tablet .. 650 milliGRAM(s) Oral every 6 hours PRN Temp greater or equal to 38C (100.4F), Mild Pain (1 - 3), Moderate Pain (4 - 6)  dextrose Oral Gel 15 Gram(s) Oral once PRN Blood Glucose LESS THAN 70 milliGRAM(s)/deciliter  LORazepam     Tablet 0.5 milliGRAM(s) Oral every 12 hours PRN anxiety/agitation  LORazepam   Injectable 0.5 milliGRAM(s) IntraMuscular once PRN severe agitation  melatonin. 3 milliGRAM(s) Oral at bedtime PRN Insomnia  QUEtiapine 12.5 milliGRAM(s) Oral every 6 hours PRN anxiety  
MEDICATIONS  (PRN):  acetaminophen     Tablet .. 650 milliGRAM(s) Oral every 6 hours PRN Temp greater or equal to 38C (100.4F), Mild Pain (1 - 3), Moderate Pain (4 - 6)  dextrose Oral Gel 15 Gram(s) Oral once PRN Blood Glucose LESS THAN 70 milliGRAM(s)/deciliter  melatonin. 3 milliGRAM(s) Oral at bedtime PRN Insomnia  polyethylene glycol 3350 17 Gram(s) Oral daily PRN constipation  QUEtiapine 12.5 milliGRAM(s) Oral every 6 hours PRN anxiety  
MEDICATIONS  (PRN):  acetaminophen     Tablet .. 650 milliGRAM(s) Oral every 6 hours PRN Temp greater or equal to 38C (100.4F), Mild Pain (1 - 3), Moderate Pain (4 - 6)  bisacodyl 5 milliGRAM(s) Oral daily PRN constipation  bisacodyl Suppository 10 milliGRAM(s) Rectal daily PRN constipation  dextrose Oral Gel 15 Gram(s) Oral once PRN Blood Glucose LESS THAN 70 milliGRAM(s)/deciliter  haloperidol     Tablet 0.5 milliGRAM(s) Oral every 6 hours PRN agitation  haloperidol    Injectable 1 milliGRAM(s) IntraMuscular once PRN agitation  lidocaine   4% Patch 1 Patch Transdermal daily PRN LBP  melatonin. 3 milliGRAM(s) Oral at bedtime PRN Insomnia  simethicone 80 milliGRAM(s) Chew every 4 hours PRN abdominal discomfort  
MEDICATIONS  (PRN):  acetaminophen     Tablet .. 650 milliGRAM(s) Oral every 6 hours PRN Temp greater or equal to 38C (100.4F), Mild Pain (1 - 3), Moderate Pain (4 - 6)  bisacodyl 5 milliGRAM(s) Oral daily PRN constipation  bisacodyl Suppository 10 milliGRAM(s) Rectal daily PRN constipation  dextrose Oral Gel 15 Gram(s) Oral once PRN Blood Glucose LESS THAN 70 milliGRAM(s)/deciliter  haloperidol     Tablet 0.5 milliGRAM(s) Oral every 6 hours PRN agitation  haloperidol    Injectable 1 milliGRAM(s) IntraMuscular once PRN agitation  melatonin. 3 milliGRAM(s) Oral at bedtime PRN Insomnia  
MEDICATIONS  (PRN):  acetaminophen     Tablet .. 650 milliGRAM(s) Oral every 6 hours PRN Temp greater or equal to 38C (100.4F), Mild Pain (1 - 3), Moderate Pain (4 - 6)  bisacodyl 5 milliGRAM(s) Oral daily PRN constipation  bisacodyl Suppository 10 milliGRAM(s) Rectal daily PRN constipation  dextrose Oral Gel 15 Gram(s) Oral once PRN Blood Glucose LESS THAN 70 milliGRAM(s)/deciliter  haloperidol     Tablet 1 milliGRAM(s) Oral every 6 hours PRN agitation  haloperidol    Injectable 1 milliGRAM(s) IntraMuscular once PRN agitation  LORazepam     Tablet 0.5 milliGRAM(s) Oral every 6 hours PRN agitation  melatonin. 3 milliGRAM(s) Oral at bedtime PRN Insomnia  
MEDICATIONS  (PRN):  acetaminophen     Tablet .. 650 milliGRAM(s) Oral every 6 hours PRN Temp greater or equal to 38C (100.4F), Mild Pain (1 - 3), Moderate Pain (4 - 6)  dextrose Oral Gel 15 Gram(s) Oral once PRN Blood Glucose LESS THAN 70 milliGRAM(s)/deciliter  LORazepam     Tablet 0.5 milliGRAM(s) Oral every 12 hours PRN anxiety/agitation  melatonin. 3 milliGRAM(s) Oral at bedtime PRN Insomnia  QUEtiapine 12.5 milliGRAM(s) Oral every 6 hours PRN anxiety  
MEDICATIONS  (PRN):  acetaminophen     Tablet .. 650 milliGRAM(s) Oral every 6 hours PRN Temp greater or equal to 38C (100.4F), Mild Pain (1 - 3), Moderate Pain (4 - 6)  bisacodyl 5 milliGRAM(s) Oral daily PRN constipation  bisacodyl Suppository 10 milliGRAM(s) Rectal daily PRN constipation  dextrose Oral Gel 15 Gram(s) Oral once PRN Blood Glucose LESS THAN 70 milliGRAM(s)/deciliter  haloperidol     Tablet 0.5 milliGRAM(s) Oral every 6 hours PRN agitation  haloperidol    Injectable 1 milliGRAM(s) IntraMuscular once PRN agitation  lidocaine   4% Patch 1 Patch Transdermal daily PRN LBP  melatonin. 3 milliGRAM(s) Oral at bedtime PRN Insomnia  
MEDICATIONS  (PRN):  acetaminophen     Tablet .. 650 milliGRAM(s) Oral every 6 hours PRN Temp greater or equal to 38C (100.4F), Mild Pain (1 - 3), Moderate Pain (4 - 6)  bisacodyl 5 milliGRAM(s) Oral daily PRN constipation  bisacodyl Suppository 10 milliGRAM(s) Rectal daily PRN constipation  dextrose Oral Gel 15 Gram(s) Oral once PRN Blood Glucose LESS THAN 70 milliGRAM(s)/deciliter  haloperidol     Tablet 1 milliGRAM(s) Oral every 6 hours PRN agitation  haloperidol    Injectable 1 milliGRAM(s) IntraMuscular once PRN agitation  LORazepam     Tablet 0.5 milliGRAM(s) Oral every 6 hours PRN agitation  LORazepam   Injectable 0.5 milliGRAM(s) IntraMuscular once PRN severe agitation  melatonin. 3 milliGRAM(s) Oral at bedtime PRN Insomnia  
MEDICATIONS  (PRN):  acetaminophen     Tablet .. 650 milliGRAM(s) Oral every 6 hours PRN Temp greater or equal to 38C (100.4F), Mild Pain (1 - 3), Moderate Pain (4 - 6)  bisacodyl 5 milliGRAM(s) Oral daily PRN constipation  bisacodyl Suppository 10 milliGRAM(s) Rectal daily PRN constipation  dextrose Oral Gel 15 Gram(s) Oral once PRN Blood Glucose LESS THAN 70 milliGRAM(s)/deciliter  haloperidol     Tablet 1 milliGRAM(s) Oral every 6 hours PRN agitation  haloperidol    Injectable 1 milliGRAM(s) IntraMuscular once PRN agitation  lidocaine   4% Patch 1 Patch Transdermal daily PRN LBP  melatonin. 3 milliGRAM(s) Oral at bedtime PRN Insomnia  
MEDICATIONS  (PRN):  acetaminophen     Tablet .. 650 milliGRAM(s) Oral every 6 hours PRN Temp greater or equal to 38C (100.4F), Mild Pain (1 - 3), Moderate Pain (4 - 6)  dextrose Oral Gel 15 Gram(s) Oral once PRN Blood Glucose LESS THAN 70 milliGRAM(s)/deciliter  LORazepam   Injectable 0.5 milliGRAM(s) IntraMuscular once PRN Severe agitation  melatonin. 3 milliGRAM(s) Oral at bedtime PRN Insomnia  polyethylene glycol 3350 17 Gram(s) Oral daily PRN constipation  QUEtiapine 12.5 milliGRAM(s) Oral every 6 hours PRN anxiety  
MEDICATIONS  (PRN):  acetaminophen     Tablet .. 650 milliGRAM(s) Oral every 6 hours PRN Temp greater or equal to 38C (100.4F), Mild Pain (1 - 3), Moderate Pain (4 - 6)  bisacodyl 5 milliGRAM(s) Oral daily PRN constipation  bisacodyl Suppository 10 milliGRAM(s) Rectal daily PRN constipation  dextrose Oral Gel 15 Gram(s) Oral once PRN Blood Glucose LESS THAN 70 milliGRAM(s)/deciliter  haloperidol     Tablet 0.5 milliGRAM(s) Oral every 6 hours PRN agitation  haloperidol    Injectable 1 milliGRAM(s) IntraMuscular once PRN agitation  lidocaine   4% Patch 1 Patch Transdermal daily PRN LBP  melatonin. 3 milliGRAM(s) Oral at bedtime PRN Insomnia  simethicone 80 milliGRAM(s) Chew every 4 hours PRN abdominal discomfort  
MEDICATIONS  (PRN):  acetaminophen     Tablet .. 650 milliGRAM(s) Oral every 6 hours PRN Temp greater or equal to 38C (100.4F), Mild Pain (1 - 3), Moderate Pain (4 - 6)  bisacodyl 5 milliGRAM(s) Oral daily PRN constipation  bisacodyl Suppository 10 milliGRAM(s) Rectal daily PRN constipation  dextrose Oral Gel 15 Gram(s) Oral once PRN Blood Glucose LESS THAN 70 milliGRAM(s)/deciliter  haloperidol     Tablet 0.5 milliGRAM(s) Oral every 6 hours PRN agitation  haloperidol    Injectable 1 milliGRAM(s) IntraMuscular once PRN agitation  lidocaine   4% Patch 1 Patch Transdermal daily PRN LBP  simethicone 80 milliGRAM(s) Chew every 4 hours PRN abdominal discomfort  
MEDICATIONS  (PRN):  acetaminophen     Tablet .. 650 milliGRAM(s) Oral every 6 hours PRN Temp greater or equal to 38C (100.4F), Mild Pain (1 - 3), Moderate Pain (4 - 6)  dextrose Oral Gel 15 Gram(s) Oral once PRN Blood Glucose LESS THAN 70 milliGRAM(s)/deciliter  LORazepam     Tablet 0.5 milliGRAM(s) Oral every 12 hours PRN anxiety/agitation  LORazepam   Injectable 1 milliGRAM(s) IntraMuscular once PRN severe agitation  melatonin. 3 milliGRAM(s) Oral at bedtime PRN Insomnia  QUEtiapine 12.5 milliGRAM(s) Oral every 6 hours PRN anxiety  
MEDICATIONS  (PRN):  acetaminophen     Tablet .. 650 milliGRAM(s) Oral every 6 hours PRN Temp greater or equal to 38C (100.4F), Mild Pain (1 - 3), Moderate Pain (4 - 6)  bisacodyl 5 milliGRAM(s) Oral daily PRN constipation  bisacodyl Suppository 10 milliGRAM(s) Rectal daily PRN constipation  dextrose Oral Gel 15 Gram(s) Oral once PRN Blood Glucose LESS THAN 70 milliGRAM(s)/deciliter  haloperidol     Tablet 0.5 milliGRAM(s) Oral every 6 hours PRN agitation  haloperidol    Injectable 1 milliGRAM(s) IntraMuscular once PRN agitation  lidocaine   4% Patch 1 Patch Transdermal daily PRN LBP  melatonin. 3 milliGRAM(s) Oral at bedtime PRN Insomnia  simethicone 80 milliGRAM(s) Chew every 4 hours PRN abdominal discomfort  
MEDICATIONS  (PRN):  acetaminophen     Tablet .. 650 milliGRAM(s) Oral every 6 hours PRN Temp greater or equal to 38C (100.4F), Mild Pain (1 - 3), Moderate Pain (4 - 6)  bisacodyl 5 milliGRAM(s) Oral daily PRN constipation  bisacodyl Suppository 10 milliGRAM(s) Rectal daily PRN constipation  dextrose Oral Gel 15 Gram(s) Oral once PRN Blood Glucose LESS THAN 70 milliGRAM(s)/deciliter  haloperidol     Tablet 0.5 milliGRAM(s) Oral every 6 hours PRN agitation  haloperidol    Injectable 1 milliGRAM(s) IntraMuscular once PRN agitation  lidocaine   4% Patch 1 Patch Transdermal daily PRN LBP  melatonin. 3 milliGRAM(s) Oral at bedtime PRN Insomnia  simethicone 80 milliGRAM(s) Chew every 4 hours PRN abdominal discomfort  
MEDICATIONS  (PRN):  acetaminophen     Tablet .. 650 milliGRAM(s) Oral every 6 hours PRN Temp greater or equal to 38C (100.4F), Mild Pain (1 - 3), Moderate Pain (4 - 6)  bisacodyl 5 milliGRAM(s) Oral daily PRN constipation  bisacodyl Suppository 10 milliGRAM(s) Rectal daily PRN constipation  dextrose Oral Gel 15 Gram(s) Oral once PRN Blood Glucose LESS THAN 70 milliGRAM(s)/deciliter  haloperidol     Tablet 0.5 milliGRAM(s) Oral every 6 hours PRN agitation  haloperidol    Injectable 1 milliGRAM(s) IntraMuscular once PRN agitation  lidocaine   4% Patch 1 Patch Transdermal daily PRN LBP  simethicone 80 milliGRAM(s) Chew every 4 hours PRN abdominal discomfort  
MEDICATIONS  (PRN):  acetaminophen     Tablet .. 650 milliGRAM(s) Oral every 6 hours PRN Temp greater or equal to 38C (100.4F), Mild Pain (1 - 3), Moderate Pain (4 - 6)  bisacodyl 5 milliGRAM(s) Oral daily PRN constipation  bisacodyl Suppository 10 milliGRAM(s) Rectal daily PRN constipation  dextrose Oral Gel 15 Gram(s) Oral once PRN Blood Glucose LESS THAN 70 milliGRAM(s)/deciliter  haloperidol     Tablet 0.5 milliGRAM(s) Oral every 6 hours PRN agitation  haloperidol    Injectable 1 milliGRAM(s) IntraMuscular once PRN agitation  melatonin. 3 milliGRAM(s) Oral at bedtime PRN Insomnia  
MEDICATIONS  (PRN):  acetaminophen     Tablet .. 650 milliGRAM(s) Oral every 6 hours PRN Temp greater or equal to 38C (100.4F), Mild Pain (1 - 3), Moderate Pain (4 - 6)  bisacodyl 5 milliGRAM(s) Oral daily PRN constipation  bisacodyl Suppository 10 milliGRAM(s) Rectal daily PRN constipation  dextrose Oral Gel 15 Gram(s) Oral once PRN Blood Glucose LESS THAN 70 milliGRAM(s)/deciliter  haloperidol     Tablet 0.5 milliGRAM(s) Oral every 6 hours PRN agitation  haloperidol    Injectable 1 milliGRAM(s) IntraMuscular once PRN agitation  lidocaine   4% Patch 1 Patch Transdermal daily PRN LBP  simethicone 80 milliGRAM(s) Chew every 4 hours PRN abdominal discomfort  
MEDICATIONS  (PRN):  acetaminophen     Tablet .. 650 milliGRAM(s) Oral every 6 hours PRN Temp greater or equal to 38C (100.4F), Mild Pain (1 - 3), Moderate Pain (4 - 6)  dextrose Oral Gel 15 Gram(s) Oral once PRN Blood Glucose LESS THAN 70 milliGRAM(s)/deciliter  melatonin. 3 milliGRAM(s) Oral at bedtime PRN Insomnia  polyethylene glycol 3350 17 Gram(s) Oral daily PRN constipation  QUEtiapine 12.5 milliGRAM(s) Oral every 6 hours PRN anxiety  
MEDICATIONS  (PRN):  acetaminophen     Tablet .. 650 milliGRAM(s) Oral every 6 hours PRN Temp greater or equal to 38C (100.4F), Mild Pain (1 - 3), Moderate Pain (4 - 6)  bisacodyl 5 milliGRAM(s) Oral daily PRN constipation  bisacodyl Suppository 10 milliGRAM(s) Rectal daily PRN constipation  dextrose Oral Gel 15 Gram(s) Oral once PRN Blood Glucose LESS THAN 70 milliGRAM(s)/deciliter  haloperidol     Tablet 0.5 milliGRAM(s) Oral every 6 hours PRN agitation  haloperidol    Injectable 1 milliGRAM(s) IntraMuscular once PRN agitation  lidocaine   4% Patch 1 Patch Transdermal daily PRN LBP  melatonin. 3 milliGRAM(s) Oral at bedtime PRN Insomnia  simethicone 80 milliGRAM(s) Chew every 4 hours PRN abdominal discomfort  
MEDICATIONS  (PRN):  acetaminophen     Tablet .. 650 milliGRAM(s) Oral every 6 hours PRN Temp greater or equal to 38C (100.4F), Mild Pain (1 - 3), Moderate Pain (4 - 6)  bisacodyl 5 milliGRAM(s) Oral daily PRN constipation  bisacodyl Suppository 10 milliGRAM(s) Rectal daily PRN constipation  dextrose Oral Gel 15 Gram(s) Oral once PRN Blood Glucose LESS THAN 70 milliGRAM(s)/deciliter  haloperidol     Tablet 0.5 milliGRAM(s) Oral every 6 hours PRN agitation  haloperidol    Injectable 1 milliGRAM(s) IntraMuscular once PRN agitation  lidocaine   4% Patch 1 Patch Transdermal daily PRN LBP  simethicone 80 milliGRAM(s) Chew every 4 hours PRN abdominal discomfort  
MEDICATIONS  (PRN):  acetaminophen     Tablet .. 650 milliGRAM(s) Oral every 6 hours PRN Temp greater or equal to 38C (100.4F), Mild Pain (1 - 3), Moderate Pain (4 - 6)  bisacodyl 5 milliGRAM(s) Oral daily PRN constipation  bisacodyl Suppository 10 milliGRAM(s) Rectal daily PRN constipation  dextrose Oral Gel 15 Gram(s) Oral once PRN Blood Glucose LESS THAN 70 milliGRAM(s)/deciliter  haloperidol     Tablet 0.5 milliGRAM(s) Oral every 6 hours PRN agitation  haloperidol    Injectable 1 milliGRAM(s) IntraMuscular once PRN agitation  melatonin. 3 milliGRAM(s) Oral at bedtime PRN Insomnia  
MEDICATIONS  (PRN):  acetaminophen     Tablet .. 650 milliGRAM(s) Oral every 6 hours PRN Temp greater or equal to 38C (100.4F), Mild Pain (1 - 3), Moderate Pain (4 - 6)  bisacodyl 5 milliGRAM(s) Oral daily PRN constipation  bisacodyl Suppository 10 milliGRAM(s) Rectal daily PRN constipation  dextrose Oral Gel 15 Gram(s) Oral once PRN Blood Glucose LESS THAN 70 milliGRAM(s)/deciliter  haloperidol     Tablet 0.5 milliGRAM(s) Oral every 6 hours PRN agitation  haloperidol    Injectable 1 milliGRAM(s) IntraMuscular once PRN agitation  lidocaine   4% Patch 1 Patch Transdermal daily PRN LBP  melatonin. 3 milliGRAM(s) Oral at bedtime PRN Insomnia  simethicone 80 milliGRAM(s) Chew every 4 hours PRN abdominal discomfort  
MEDICATIONS  (PRN):  acetaminophen     Tablet .. 650 milliGRAM(s) Oral every 6 hours PRN Temp greater or equal to 38C (100.4F), Mild Pain (1 - 3), Moderate Pain (4 - 6)  dextrose Oral Gel 15 Gram(s) Oral once PRN Blood Glucose LESS THAN 70 milliGRAM(s)/deciliter  LORazepam     Tablet 0.5 milliGRAM(s) Oral every 12 hours PRN anxiety/agitation  melatonin. 3 milliGRAM(s) Oral at bedtime PRN Insomnia  QUEtiapine 12.5 milliGRAM(s) Oral every 6 hours PRN anxiety  
MEDICATIONS  (PRN):  acetaminophen     Tablet .. 650 milliGRAM(s) Oral every 6 hours PRN Temp greater or equal to 38C (100.4F), Mild Pain (1 - 3), Moderate Pain (4 - 6)  bisacodyl 5 milliGRAM(s) Oral daily PRN constipation  bisacodyl Suppository 10 milliGRAM(s) Rectal daily PRN constipation  dextrose Oral Gel 15 Gram(s) Oral once PRN Blood Glucose LESS THAN 70 milliGRAM(s)/deciliter  LORazepam   Injectable 0.5 milliGRAM(s) IntraMuscular once PRN severe agitation  melatonin. 3 milliGRAM(s) Oral at bedtime PRN Insomnia  QUEtiapine 12.5 milliGRAM(s) Oral every 6 hours PRN anxiety  
MEDICATIONS  (PRN):  acetaminophen     Tablet .. 650 milliGRAM(s) Oral every 6 hours PRN Temp greater or equal to 38C (100.4F), Mild Pain (1 - 3), Moderate Pain (4 - 6)  bisacodyl 5 milliGRAM(s) Oral daily PRN constipation  bisacodyl Suppository 10 milliGRAM(s) Rectal daily PRN constipation  dextrose Oral Gel 15 Gram(s) Oral once PRN Blood Glucose LESS THAN 70 milliGRAM(s)/deciliter  haloperidol     Tablet 0.5 milliGRAM(s) Oral every 6 hours PRN agitation  haloperidol    Injectable 1 milliGRAM(s) IntraMuscular once PRN agitation  lidocaine   4% Patch 1 Patch Transdermal daily PRN LBP  simethicone 80 milliGRAM(s) Chew every 4 hours PRN abdominal discomfort  
MEDICATIONS  (PRN):  acetaminophen     Tablet .. 650 milliGRAM(s) Oral every 6 hours PRN Temp greater or equal to 38C (100.4F), Mild Pain (1 - 3), Moderate Pain (4 - 6)  bisacodyl 5 milliGRAM(s) Oral daily PRN constipation  bisacodyl Suppository 10 milliGRAM(s) Rectal daily PRN constipation  dextrose Oral Gel 15 Gram(s) Oral once PRN Blood Glucose LESS THAN 70 milliGRAM(s)/deciliter  haloperidol     Tablet 0.5 milliGRAM(s) Oral every 6 hours PRN agitation  haloperidol    Injectable 1 milliGRAM(s) IntraMuscular once PRN agitation  lidocaine   4% Patch 1 Patch Transdermal daily PRN LBP  melatonin. 3 milliGRAM(s) Oral at bedtime PRN Insomnia  simethicone 80 milliGRAM(s) Chew every 4 hours PRN abdominal discomfort  
MEDICATIONS  (PRN):  acetaminophen     Tablet .. 650 milliGRAM(s) Oral every 6 hours PRN Temp greater or equal to 38C (100.4F), Mild Pain (1 - 3), Moderate Pain (4 - 6)  bisacodyl 5 milliGRAM(s) Oral daily PRN constipation  bisacodyl Suppository 10 milliGRAM(s) Rectal daily PRN constipation  dextrose Oral Gel 15 Gram(s) Oral once PRN Blood Glucose LESS THAN 70 milliGRAM(s)/deciliter  haloperidol     Tablet 0.5 milliGRAM(s) Oral every 6 hours PRN agitation  haloperidol    Injectable 1 milliGRAM(s) IntraMuscular once PRN agitation  lidocaine   4% Patch 1 Patch Transdermal daily PRN LBP  melatonin. 3 milliGRAM(s) Oral at bedtime PRN Insomnia  simethicone 80 milliGRAM(s) Chew every 4 hours PRN abdominal discomfort  
MEDICATIONS  (PRN):  acetaminophen     Tablet .. 650 milliGRAM(s) Oral every 6 hours PRN Temp greater or equal to 38C (100.4F), Mild Pain (1 - 3), Moderate Pain (4 - 6)  bisacodyl 5 milliGRAM(s) Oral daily PRN constipation  bisacodyl Suppository 10 milliGRAM(s) Rectal daily PRN constipation  dextrose Oral Gel 15 Gram(s) Oral once PRN Blood Glucose LESS THAN 70 milliGRAM(s)/deciliter  haloperidol     Tablet 0.5 milliGRAM(s) Oral every 6 hours PRN agitation  haloperidol    Injectable 1 milliGRAM(s) IntraMuscular once PRN agitation  lidocaine   4% Patch 1 Patch Transdermal daily PRN LBP  simethicone 80 milliGRAM(s) Chew every 4 hours PRN abdominal discomfort  
MEDICATIONS  (PRN):  acetaminophen     Tablet .. 650 milliGRAM(s) Oral every 6 hours PRN Temp greater or equal to 38C (100.4F), Mild Pain (1 - 3), Moderate Pain (4 - 6)  bisacodyl 5 milliGRAM(s) Oral daily PRN constipation  bisacodyl Suppository 10 milliGRAM(s) Rectal daily PRN constipation  dextrose Oral Gel 15 Gram(s) Oral once PRN Blood Glucose LESS THAN 70 milliGRAM(s)/deciliter  haloperidol     Tablet 0.5 milliGRAM(s) Oral every 6 hours PRN agitation  haloperidol    Injectable 1 milliGRAM(s) IntraMuscular once PRN agitation  melatonin. 3 milliGRAM(s) Oral at bedtime PRN Insomnia  
MEDICATIONS  (PRN):  acetaminophen     Tablet .. 650 milliGRAM(s) Oral every 6 hours PRN Temp greater or equal to 38C (100.4F), Mild Pain (1 - 3), Moderate Pain (4 - 6)  bisacodyl 5 milliGRAM(s) Oral daily PRN constipation  bisacodyl Suppository 10 milliGRAM(s) Rectal daily PRN constipation  dextrose Oral Gel 15 Gram(s) Oral once PRN Blood Glucose LESS THAN 70 milliGRAM(s)/deciliter  haloperidol     Tablet 0.5 milliGRAM(s) Oral every 6 hours PRN agitation  haloperidol    Injectable 1 milliGRAM(s) IntraMuscular once PRN agitation  lidocaine   4% Patch 1 Patch Transdermal daily PRN LBP  simethicone 80 milliGRAM(s) Chew every 4 hours PRN abdominal discomfort  
MEDICATIONS  (PRN):  acetaminophen     Tablet .. 650 milliGRAM(s) Oral every 6 hours PRN Temp greater or equal to 38C (100.4F), Mild Pain (1 - 3), Moderate Pain (4 - 6)  dextrose Oral Gel 15 Gram(s) Oral once PRN Blood Glucose LESS THAN 70 milliGRAM(s)/deciliter  LORazepam     Tablet 0.5 milliGRAM(s) Oral every 12 hours PRN anxiety/agitation  melatonin. 3 milliGRAM(s) Oral at bedtime PRN Insomnia  QUEtiapine 12.5 milliGRAM(s) Oral every 6 hours PRN anxiety  
MEDICATIONS  (PRN):  acetaminophen     Tablet .. 650 milliGRAM(s) Oral every 6 hours PRN Temp greater or equal to 38C (100.4F), Mild Pain (1 - 3), Moderate Pain (4 - 6)  bisacodyl 5 milliGRAM(s) Oral daily PRN constipation  bisacodyl Suppository 10 milliGRAM(s) Rectal daily PRN constipation  dextrose Oral Gel 15 Gram(s) Oral once PRN Blood Glucose LESS THAN 70 milliGRAM(s)/deciliter  haloperidol     Tablet 0.5 milliGRAM(s) Oral every 6 hours PRN agitation  haloperidol    Injectable 1 milliGRAM(s) IntraMuscular once PRN agitation  lidocaine   4% Patch 1 Patch Transdermal daily PRN LBP  melatonin. 3 milliGRAM(s) Oral at bedtime PRN Insomnia  simethicone 80 milliGRAM(s) Chew every 4 hours PRN abdominal discomfort  
MEDICATIONS  (PRN):  acetaminophen     Tablet .. 650 milliGRAM(s) Oral every 6 hours PRN Temp greater or equal to 38C (100.4F), Mild Pain (1 - 3), Moderate Pain (4 - 6)  dextrose Oral Gel 15 Gram(s) Oral once PRN Blood Glucose LESS THAN 70 milliGRAM(s)/deciliter  LORazepam     Tablet 0.5 milliGRAM(s) Oral every 12 hours PRN anxiety/agitation  melatonin. 3 milliGRAM(s) Oral at bedtime PRN Insomnia  QUEtiapine 12.5 milliGRAM(s) Oral every 6 hours PRN anxiety  
MEDICATIONS  (PRN):  acetaminophen     Tablet .. 650 milliGRAM(s) Oral every 6 hours PRN Temp greater or equal to 38C (100.4F), Mild Pain (1 - 3), Moderate Pain (4 - 6)  bisacodyl 5 milliGRAM(s) Oral daily PRN constipation  bisacodyl Suppository 10 milliGRAM(s) Rectal daily PRN constipation  dextrose Oral Gel 15 Gram(s) Oral once PRN Blood Glucose LESS THAN 70 milliGRAM(s)/deciliter  haloperidol     Tablet 0.5 milliGRAM(s) Oral every 6 hours PRN agitation  haloperidol    Injectable 1 milliGRAM(s) IntraMuscular once PRN agitation  lidocaine   4% Patch 1 Patch Transdermal daily PRN LBP  melatonin. 3 milliGRAM(s) Oral at bedtime PRN Insomnia  simethicone 80 milliGRAM(s) Chew every 4 hours PRN abdominal discomfort  
MEDICATIONS  (PRN):  acetaminophen     Tablet .. 650 milliGRAM(s) Oral every 6 hours PRN Temp greater or equal to 38C (100.4F), Mild Pain (1 - 3), Moderate Pain (4 - 6)  bisacodyl 5 milliGRAM(s) Oral daily PRN constipation  bisacodyl Suppository 10 milliGRAM(s) Rectal daily PRN constipation  dextrose Oral Gel 15 Gram(s) Oral once PRN Blood Glucose LESS THAN 70 milliGRAM(s)/deciliter  haloperidol     Tablet 0.5 milliGRAM(s) Oral every 6 hours PRN agitation  haloperidol    Injectable 1 milliGRAM(s) IntraMuscular once PRN agitation  lidocaine   4% Patch 1 Patch Transdermal daily PRN LBP  simethicone 80 milliGRAM(s) Chew every 4 hours PRN abdominal discomfort  
MEDICATIONS  (PRN):  acetaminophen     Tablet .. 650 milliGRAM(s) Oral every 6 hours PRN Temp greater or equal to 38C (100.4F), Mild Pain (1 - 3), Moderate Pain (4 - 6)  bisacodyl 5 milliGRAM(s) Oral daily PRN constipation  bisacodyl Suppository 10 milliGRAM(s) Rectal daily PRN constipation  dextrose Oral Gel 15 Gram(s) Oral once PRN Blood Glucose LESS THAN 70 milliGRAM(s)/deciliter  haloperidol     Tablet 0.5 milliGRAM(s) Oral every 6 hours PRN agitation  haloperidol    Injectable 1 milliGRAM(s) IntraMuscular once PRN agitation  lidocaine   4% Patch 1 Patch Transdermal daily PRN LBP  melatonin. 3 milliGRAM(s) Oral at bedtime PRN Insomnia  simethicone 80 milliGRAM(s) Chew every 4 hours PRN abdominal discomfort  
MEDICATIONS  (PRN):  acetaminophen     Tablet .. 650 milliGRAM(s) Oral every 6 hours PRN Temp greater or equal to 38C (100.4F), Mild Pain (1 - 3), Moderate Pain (4 - 6)  dextrose Oral Gel 15 Gram(s) Oral once PRN Blood Glucose LESS THAN 70 milliGRAM(s)/deciliter  LORazepam     Tablet 0.5 milliGRAM(s) Oral every 12 hours PRN anxiety/agitation  melatonin. 3 milliGRAM(s) Oral at bedtime PRN Insomnia  QUEtiapine 12.5 milliGRAM(s) Oral every 6 hours PRN anxiety  
MEDICATIONS  (PRN):  acetaminophen     Tablet .. 650 milliGRAM(s) Oral every 6 hours PRN Temp greater or equal to 38C (100.4F), Mild Pain (1 - 3), Moderate Pain (4 - 6)  bisacodyl 5 milliGRAM(s) Oral daily PRN constipation  bisacodyl Suppository 10 milliGRAM(s) Rectal daily PRN constipation  dextrose Oral Gel 15 Gram(s) Oral once PRN Blood Glucose LESS THAN 70 milliGRAM(s)/deciliter  haloperidol     Tablet 0.5 milliGRAM(s) Oral every 6 hours PRN agitation  haloperidol    Injectable 1 milliGRAM(s) IntraMuscular once PRN agitation  lidocaine   4% Patch 1 Patch Transdermal daily PRN LBP  simethicone 80 milliGRAM(s) Chew every 4 hours PRN abdominal discomfort  
MEDICATIONS  (PRN):  acetaminophen     Tablet .. 650 milliGRAM(s) Oral every 6 hours PRN Temp greater or equal to 38C (100.4F), Mild Pain (1 - 3), Moderate Pain (4 - 6)  bisacodyl 5 milliGRAM(s) Oral daily PRN constipation  bisacodyl Suppository 10 milliGRAM(s) Rectal daily PRN constipation  dextrose Oral Gel 15 Gram(s) Oral once PRN Blood Glucose LESS THAN 70 milliGRAM(s)/deciliter  haloperidol     Tablet 0.5 milliGRAM(s) Oral every 6 hours PRN agitation  haloperidol    Injectable 1 milliGRAM(s) IntraMuscular once PRN agitation  melatonin. 3 milliGRAM(s) Oral at bedtime PRN Insomnia  
MEDICATIONS  (PRN):  acetaminophen     Tablet .. 650 milliGRAM(s) Oral every 6 hours PRN Temp greater or equal to 38C (100.4F), Mild Pain (1 - 3), Moderate Pain (4 - 6)  bisacodyl 5 milliGRAM(s) Oral daily PRN constipation  bisacodyl Suppository 10 milliGRAM(s) Rectal daily PRN constipation  dextrose Oral Gel 15 Gram(s) Oral once PRN Blood Glucose LESS THAN 70 milliGRAM(s)/deciliter  haloperidol     Tablet 0.5 milliGRAM(s) Oral every 6 hours PRN agitation  haloperidol    Injectable 1 milliGRAM(s) IntraMuscular once PRN agitation  lidocaine   4% Patch 1 Patch Transdermal daily PRN LBP  melatonin. 3 milliGRAM(s) Oral at bedtime PRN Insomnia  simethicone 80 milliGRAM(s) Chew every 4 hours PRN abdominal discomfort  
MEDICATIONS  (PRN):  acetaminophen     Tablet .. 650 milliGRAM(s) Oral every 6 hours PRN Temp greater or equal to 38C (100.4F), Mild Pain (1 - 3), Moderate Pain (4 - 6)  bisacodyl 5 milliGRAM(s) Oral daily PRN constipation  bisacodyl Suppository 10 milliGRAM(s) Rectal daily PRN constipation  dextrose Oral Gel 15 Gram(s) Oral once PRN Blood Glucose LESS THAN 70 milliGRAM(s)/deciliter  haloperidol     Tablet 0.5 milliGRAM(s) Oral every 6 hours PRN agitation  haloperidol    Injectable 1 milliGRAM(s) IntraMuscular once PRN agitation  melatonin. 3 milliGRAM(s) Oral at bedtime PRN Insomnia  
MEDICATIONS  (PRN):  acetaminophen     Tablet .. 650 milliGRAM(s) Oral every 6 hours PRN Temp greater or equal to 38C (100.4F), Mild Pain (1 - 3), Moderate Pain (4 - 6)  bisacodyl 5 milliGRAM(s) Oral daily PRN constipation  bisacodyl Suppository 10 milliGRAM(s) Rectal daily PRN constipation  dextrose Oral Gel 15 Gram(s) Oral once PRN Blood Glucose LESS THAN 70 milliGRAM(s)/deciliter  haloperidol     Tablet 0.5 milliGRAM(s) Oral every 6 hours PRN agitation  haloperidol    Injectable 1 milliGRAM(s) IntraMuscular once PRN agitation  lidocaine   4% Patch 1 Patch Transdermal daily PRN LBP  melatonin. 3 milliGRAM(s) Oral at bedtime PRN Insomnia  simethicone 80 milliGRAM(s) Chew every 4 hours PRN abdominal discomfort  
MEDICATIONS  (PRN):  acetaminophen     Tablet .. 650 milliGRAM(s) Oral every 6 hours PRN Temp greater or equal to 38C (100.4F), Mild Pain (1 - 3), Moderate Pain (4 - 6)  bisacodyl 5 milliGRAM(s) Oral daily PRN constipation  bisacodyl Suppository 10 milliGRAM(s) Rectal daily PRN constipation  dextrose Oral Gel 15 Gram(s) Oral once PRN Blood Glucose LESS THAN 70 milliGRAM(s)/deciliter  haloperidol     Tablet 0.5 milliGRAM(s) Oral every 6 hours PRN agitation  haloperidol    Injectable 1 milliGRAM(s) IntraMuscular once PRN agitation  lidocaine   4% Patch 1 Patch Transdermal daily PRN LBP  simethicone 80 milliGRAM(s) Chew every 4 hours PRN abdominal discomfort  
MEDICATIONS  (PRN):  acetaminophen     Tablet .. 650 milliGRAM(s) Oral every 6 hours PRN Temp greater or equal to 38C (100.4F), Mild Pain (1 - 3), Moderate Pain (4 - 6)  dextrose Oral Gel 15 Gram(s) Oral once PRN Blood Glucose LESS THAN 70 milliGRAM(s)/deciliter  LORazepam     Tablet 0.5 milliGRAM(s) Oral every 12 hours PRN anxiety/agitation  melatonin. 3 milliGRAM(s) Oral at bedtime PRN Insomnia  QUEtiapine 12.5 milliGRAM(s) Oral every 6 hours PRN anxiety  
MEDICATIONS  (PRN):  acetaminophen     Tablet .. 650 milliGRAM(s) Oral every 6 hours PRN Temp greater or equal to 38C (100.4F), Mild Pain (1 - 3), Moderate Pain (4 - 6)  bisacodyl 5 milliGRAM(s) Oral daily PRN constipation  bisacodyl Suppository 10 milliGRAM(s) Rectal daily PRN constipation  dextrose Oral Gel 15 Gram(s) Oral once PRN Blood Glucose LESS THAN 70 milliGRAM(s)/deciliter  haloperidol     Tablet 0.5 milliGRAM(s) Oral every 6 hours PRN agitation  haloperidol    Injectable 1 milliGRAM(s) IntraMuscular once PRN agitation  lidocaine   4% Patch 1 Patch Transdermal daily PRN LBP  melatonin. 3 milliGRAM(s) Oral at bedtime PRN Insomnia  simethicone 80 milliGRAM(s) Chew every 4 hours PRN abdominal discomfort  
MEDICATIONS  (PRN):  acetaminophen     Tablet .. 650 milliGRAM(s) Oral every 6 hours PRN Temp greater or equal to 38C (100.4F), Mild Pain (1 - 3), Moderate Pain (4 - 6)  bisacodyl 5 milliGRAM(s) Oral daily PRN constipation  bisacodyl Suppository 10 milliGRAM(s) Rectal daily PRN constipation  dextrose Oral Gel 15 Gram(s) Oral once PRN Blood Glucose LESS THAN 70 milliGRAM(s)/deciliter  haloperidol     Tablet 0.5 milliGRAM(s) Oral every 6 hours PRN agitation  haloperidol    Injectable 1 milliGRAM(s) IntraMuscular once PRN agitation  melatonin. 3 milliGRAM(s) Oral at bedtime PRN Insomnia  
MEDICATIONS  (PRN):  acetaminophen     Tablet .. 650 milliGRAM(s) Oral every 6 hours PRN Temp greater or equal to 38C (100.4F), Mild Pain (1 - 3), Moderate Pain (4 - 6)  bisacodyl 5 milliGRAM(s) Oral daily PRN constipation  bisacodyl Suppository 10 milliGRAM(s) Rectal daily PRN constipation  dextrose Oral Gel 15 Gram(s) Oral once PRN Blood Glucose LESS THAN 70 milliGRAM(s)/deciliter  haloperidol     Tablet 0.5 milliGRAM(s) Oral every 6 hours PRN agitation  haloperidol    Injectable 1 milliGRAM(s) IntraMuscular once PRN agitation  lidocaine   4% Patch 1 Patch Transdermal daily PRN LBP  melatonin. 3 milliGRAM(s) Oral at bedtime PRN Insomnia  simethicone 80 milliGRAM(s) Chew every 4 hours PRN abdominal discomfort  
MEDICATIONS  (PRN):  acetaminophen     Tablet .. 650 milliGRAM(s) Oral every 6 hours PRN Temp greater or equal to 38C (100.4F), Mild Pain (1 - 3), Moderate Pain (4 - 6)  bisacodyl 5 milliGRAM(s) Oral daily PRN constipation  bisacodyl Suppository 10 milliGRAM(s) Rectal daily PRN constipation  dextrose Oral Gel 15 Gram(s) Oral once PRN Blood Glucose LESS THAN 70 milliGRAM(s)/deciliter  haloperidol     Tablet 0.5 milliGRAM(s) Oral every 6 hours PRN agitation  haloperidol    Injectable 1 milliGRAM(s) IntraMuscular once PRN agitation  melatonin. 3 milliGRAM(s) Oral at bedtime PRN Insomnia  
MEDICATIONS  (PRN):  acetaminophen     Tablet .. 650 milliGRAM(s) Oral every 6 hours PRN Temp greater or equal to 38C (100.4F), Mild Pain (1 - 3), Moderate Pain (4 - 6)  dextrose Oral Gel 15 Gram(s) Oral once PRN Blood Glucose LESS THAN 70 milliGRAM(s)/deciliter  melatonin. 3 milliGRAM(s) Oral at bedtime PRN Insomnia  polyethylene glycol 3350 17 Gram(s) Oral daily PRN constipation  QUEtiapine 12.5 milliGRAM(s) Oral every 6 hours PRN anxiety  
MEDICATIONS  (PRN):  acetaminophen     Tablet .. 650 milliGRAM(s) Oral every 6 hours PRN Temp greater or equal to 38C (100.4F), Mild Pain (1 - 3), Moderate Pain (4 - 6)  bisacodyl 5 milliGRAM(s) Oral daily PRN constipation  bisacodyl Suppository 10 milliGRAM(s) Rectal daily PRN constipation  dextrose Oral Gel 15 Gram(s) Oral once PRN Blood Glucose LESS THAN 70 milliGRAM(s)/deciliter  haloperidol     Tablet 0.5 milliGRAM(s) Oral every 6 hours PRN agitation  haloperidol    Injectable 1 milliGRAM(s) IntraMuscular once PRN agitation  melatonin. 3 milliGRAM(s) Oral at bedtime PRN Insomnia  
MEDICATIONS  (PRN):  acetaminophen     Tablet .. 650 milliGRAM(s) Oral every 6 hours PRN Temp greater or equal to 38C (100.4F), Mild Pain (1 - 3), Moderate Pain (4 - 6)  bisacodyl 5 milliGRAM(s) Oral daily PRN constipation  bisacodyl Suppository 10 milliGRAM(s) Rectal daily PRN constipation  dextrose Oral Gel 15 Gram(s) Oral once PRN Blood Glucose LESS THAN 70 milliGRAM(s)/deciliter  haloperidol     Tablet 1 milliGRAM(s) Oral every 6 hours PRN agitation  haloperidol    Injectable 1 milliGRAM(s) IntraMuscular once PRN agitation  lidocaine   4% Patch 1 Patch Transdermal daily PRN LBP  melatonin. 3 milliGRAM(s) Oral at bedtime PRN Insomnia  
MEDICATIONS  (PRN):  acetaminophen     Tablet .. 650 milliGRAM(s) Oral every 6 hours PRN Temp greater or equal to 38C (100.4F), Mild Pain (1 - 3), Moderate Pain (4 - 6)  bisacodyl 5 milliGRAM(s) Oral daily PRN constipation  bisacodyl Suppository 10 milliGRAM(s) Rectal daily PRN constipation  dextrose Oral Gel 15 Gram(s) Oral once PRN Blood Glucose LESS THAN 70 milliGRAM(s)/deciliter  haloperidol     Tablet 0.5 milliGRAM(s) Oral every 6 hours PRN agitation  haloperidol    Injectable 1 milliGRAM(s) IntraMuscular once PRN agitation  melatonin. 3 milliGRAM(s) Oral at bedtime PRN Insomnia

## 2023-10-16 NOTE — BH INPATIENT PSYCHIATRY PROGRESS NOTE - NSTXCOPEGOAL_PSY_ALL_CORE
Identify and utilize 2 coping skills that meet their needs

## 2023-10-16 NOTE — [facility] INPATIENT PSYCHIATRY PROGRESS NOTE - [id number]_PSY_ALL_CORE
13-Jul-2023
14-Sep-2023
13-Jul-2023
13-Jul-2023
14-Sep-2023
22-Jun-2023
13-Jul-2023
13-Jul-2023
14-Sep-2023
22-Jun-2023
13-Jul-2023
14-Sep-2023
22-Jun-2023
22-Jun-2023
13-Jul-2023
13-Jul-2023
22-Jun-2023
13-Jul-2023
13-Jul-2023
14-Jun-2023
22-Jun-2023
13-Jul-2023
22-Jun-2023
13-Jul-2023
14-Sep-2023
14-Sep-2023
13-Jul-2023
14-Sep-2023
22-Jun-2023
13-Jul-2023
22-Jun-2023
13-Jul-2023
22-Jun-2023
13-Jul-2023
14-Sep-2023
14-Sep-2023
13-Jul-2023
14-Jun-2023
14-Sep-2023
14-Sep-2023
29-Jun-2023
13-Jul-2023
14-Sep-2023
29-Jun-2023
13-Jul-2023
14-Sep-2023
14-Sep-2023
29-Jun-2023
29-Jun-2023
13-Jul-2023
14-Sep-2023
28-Jun-2023
13-Jul-2023
14-Sep-2023
29-Jun-2023
13-Jul-2023
14-Sep-2023
14-Sep-2023
29-Jun-2023
14-Sep-2023
29-Jun-2023
13-Jul-2023
14-Sep-2023
29-Jun-2023
13-Jul-2023
13-Jul-2023
14-Sep-2023
29-Jun-2023
13-Jul-2023
14-Sep-2023
13-Jul-2023
22-Jun-2023
13-Jul-2023
14-Sep-2023
28-Jun-2023
13-Jul-2023
13-Jul-2023
14-Sep-2023
14-Sep-2023
29-Jun-2023
13-Jul-2023
29-Jun-2023
14-Sep-2023
29-Jun-2023
13-Jul-2023
14-Sep-2023
13-Jul-2023
14-Sep-2023

## 2023-10-16 NOTE — BH INPATIENT PSYCHIATRY PROGRESS NOTE - NSTXDCOTHRGOAL_PSY_ALL_CORE
The pt will be discharged back to [facility] with onsite psychiatric f/u. Referral to a [facility] for RN, PT, and services will be made.
The pt will be discharged back to [facility] with onsite psychiatric f/u. Referral to a [facility] for RN, PT, and services will be made.
The pt will be discharged back to [facility] where she will have onsite psychiatric f/u. If the pt's inpatient stay exceeds her Medicaid bedhold, referral to [facility] will be made again.
The pt will be discharged back to her prior residence, [facility], where she will have onsite psychiatric f/u.
The pt will be discharged back to [facility] where she will have onsite psychiatric f/u.
pt will comply with treatment and improve mental status in order to effectively engage with dc planning.
The pt will be discharged back to [facility] with onsite psychiatric f/u. Referral to a [facility] for RN, PT, and services will be made.
The pt will be discharged back to [facility] where she will have onsite psychiatric f/u.
The pt will be discharged back to her prior residence, [facility], for her long term care. Pt's daughter is uncertain whether she wants the pt to have outpatient f/u by the [facility] consultant psychiatrist or [facility]
The pt will show resolution of acute symptoms and return to baseline functioning. The pt will be discharged back to her prior residence, [facility], with [facility] to [facility] f/u.
pt will comply with treatment and improve mental status in order to effectively engage with dc planning.
[facility].
The pt will be discharged back to [facility] where she will have onsite psychiatric f/u. If the pt's inpatient stay exceeds her Medicaid bedhold, referral to [facility] will be made again.
The pt will show resolution of acute symptoms and return to baseline functioning. The pt will be discharged back to her prior residence, [facility], with [facility] to [facility]/u.
pt will comply with treatment and improve mental status in order to effectively engage with dc planning.
pt will comply with treatment in order to effectively engage with dc planning.
The pt will be discharged back to [facility] where she will have onsite psychiatric f/u.
The pt will be discharged back to [facility] where she will have onsite psychiatric f/u.
The pt will be discharged back to [facility] with onsite psychiatric f/u.
The pt will show resolution of acute symptoms and return to baseline functioning. The pt will be discharged back to her prior residence, [facility], with [facility] to [facility].
pt will comply with treatment and improve mental status in order to effectively engage with dc planning.
The pt will be discharged back to [facility] where she will have onsite psychiatric f/u. If the pt's inpatient stay exceeds her Medicaid bedhold, referral to [facility] will be made again.
The pt will show resolution of acute symptoms and return to baseline functioning. The pt will be discharged back to her prior residence, [facility], with referral to [facility] f/u.
The pt will show resolution of acute symptoms and return to baseline functioning. The pt will be discharged back to her prior residence, [facility], with [facility] to [facility] for [facility] f/u.
The pt will be discharged back to [facility] where she will have onsite psychiatric f/u. If the pt's inpatient stay exceeds her Medicaid bedhold, referral to [facility] will be made again.
The pt will show resolution of acute symptoms and return to baseline functioning. The pt will be discharged back to her prior residence, [facility] with [facility].
The pt will be discharged back to [facility] with onsite psychiatric f/u.
The pt will be discharged back to [facility] with onsite psychiatric f/u. Referral to a [facility] for RN, PT, and services will be made.
The pt will be discharged back to [facility] where she will have onsite psychiatric f/u. If the pt's inpatient stay exceeds her Medicaid bedhold, referral to [facility] will be made again.
The pt will be discharged back to her prior residence, [facility], where she will have onsite psychiatric f/u. The pt has been referred to a [facility] for RN, PT, and aide services.
The pt will be discharged back to her prior residence, [facility], where she will have onsite psychiatric f/u. The pt has been referred to a [facility] for RN, PT, and aide services.
pt will comply with treatment and improve mental status in order to effectively engage with dc planning.
pt will comply with treatment in order to effectively engage with dc planning.
The pt will be discharged back to [facility] where she will have onsite psychiatric f/u. If the pt's inpatient stay exceeds her Medicaid bedhold, referral to [facility] will be made again.
The pt will show resolution of acute symptoms and return to baseline functioning. The pt will be discharged back to her prior residence, [facility] with [facility].
The pt will be discharged back to [facility] where she will have onsite psychiatric f/u.
The pt will be discharged back to [facility] with onsite psychiatric f/u. Referral to a [facility] for RN, PT, and services will be made.
The pt will be discharged back to [facility] with onsite psychiatric f/u. Referral to a [facility] for RN, PT, and services will be made.
The pt will be discharged back to [facility] where she will have onsite psychiatric f/u. If the pt's inpatient stay exceeds her Medicaid bedhold, referral to [facility] will be made again.
The pt will be discharged back to her prior residence, [facility], where she will have onsite psychiatric f/u.
pt will comply with treatment and improve mental status in order to effectively engage with dc planning.
pt will comply with treatment in order to effectively engage with dc planning.
The pt will be discharged back to [facility] where she will have onsite psychiatric f/u. If the pt's inpatient stay exceeds her Medicaid bedhold, referral to [facility] will be made again.
The pt will be discharged back to [facility] where she will have onsite psychiatric f/u.
The pt will show resolution of acute symptoms and return to baseline functioning. The pt will be discharged back to her prior residence, [facility] with referral [facility].
The pt will be discharged back to [facility] where she will have onsite psychiatric f/u.
The pt will be discharged back to her prior residence, [facility], for her long term care. Pt's daughter is uncertain whether she wants the pt to have outpatient f/u by the [facility] consultant psychiatrist or by [facility].
The pt will show resolution of acute symptoms and return to baseline functioning. The pt will be discharged back to her prior residence, [facility] with referral to [facility] f[facility].
The pt will show resolution of acute symptoms and return to baseline functioning. The pt will be discharged back to her prior residence, [facility], with [facility]/u.
The pt will be discharged back to [facility] where she will have onsite psychiatric f/u.
[facility]/u.
The pt will show resolution of acute symptoms and return to baseline functioning. The pt will be discharged back to her prior residence, [facility] with referral to [facility].
pt will comply with treatment in order to effectively engage with dc planning.
The pt will be discharged back to [facility] with onsite psychiatric f/u. Referral to a [facility] for RN, PT, and services will be made.
The pt will be discharged back to [facility] with onsite psychiatric f/u.
The pt will show resolution of acute symptoms and return to baseline functioning. The pt will be discharged back to her prior residence, [facility] with referral to [facility].
The pt will be discharged back to [facility] where she will have onsite psychiatric f/u. If the pt's inpatient stay exceeds her Medicaid bedhold, referral to [facility] will be made again.
The pt will show resolution of acute symptoms and return to baseline functioning. The pt will be discharged back to her prior residence, [facility], with [facility].
The pt will be discharged back to [facility] where she will have onsite psychiatric f/u.
The pt will be discharged back to [facility] where she will have onsite psychiatric f/u.
The pt will be discharged back to [facility] where she will have onsite psychiatric f/u. If the pt's inpatient stay exceeds her Medicaid bedhold, referral to [facility] will be made again.
The pt will be discharged back to her prior residence, [facility], where she will have onsite psychiatric f/u. The pt has been referred to a [facility] for RN, PT, and aide services.
The pt will be discharged back to her prior residence, [facility], where she will have onsite psychiatric f/u. The pt has been referred to a [facility] for RN, PT, and aide services.
The pt will show resolution of acute symptoms and return to baseline functioning. The pt will be discharged back to her prior residence, [facility] with referral to [facility].
The pt will be discharged back to [facility] where she will have onsite psychiatric f/u.
The pt will be discharged back to [facility] where she will have onsite psychiatric f/u.
The pt will show resolution of acute symptoms and return to baseline functioning. The pt will be discharged back to her prior residence, [facility], with [facility].
pt will comply with treatment and improve mental status in order to effectively engage with dc planning.
The pt will be discharged back to her prior residence, [facility], where she will have onsite psychiatric f/u. The pt has been referred to a [facility] for RN, PT, and aide services.
The pt will be discharged back to her prior residence, [facility], for her long term care. Pt's daughter is uncertain whether she wants the pt to have outpatient f/u by the [facility] consultant psychiatrist or by [facility].
pt will comply with treatment and improve mental status in order to effectively engage with dc planning.
The pt will be discharged back to [facility] where she will have onsite psychiatric f/u.
The pt will be discharged back to her prior residence, [facility], where she will have onsite psychiatric f/u.
The pt will be discharged back to her prior residence, [facility], for her long term care. Pt's daughter is uncertain whether she wants the pt to have outpatient f/u by the [facility] consultant psychiatrist or by the [facility].
pt will comply with treatment in order to effectively engage with dc planning.
The pt will be discharged back to [facility] where she will have onsite psychiatric f/u.
The pt will be discharged back to [facility] where she will have onsite psychiatric f/u.
pt will comply with treatment and improve mental status in order to effectively engage with dc planning.
The pt will be discharged back to her prior residence, [facility], for her long term care. Pt's daughter is uncertain whether she wants the pt to have outpatient f/u by the [facility] consultant psychiatrist or by [facility].
The pt will show resolution of acute symptoms and return to baseline functioning. The pt will be discharged back to her prior residence, [facility] with [facility].
pt will comply with treatment in order to effectively engage with dc planning.
The pt will show resolution of acute symptoms and return to baseline functioning. The pt will be discharged back to her prior residence, [facility] with [facility] f/[facility].
pt will comply with treatment and improve mental status in order to effectively engage with dc planning.
The pt will be discharged back to [facility] where she will have onsite psychiatric f/u.
The pt will be discharged back to her prior residence, [facility], where she will have onsite psychiatric f/u.
The pt will be discharged back to [facility] with onsite psychiatric f/u.
The pt will be discharged back to [facility] where she will have onsite psychiatric f/u.
The pt will be discharged back to her prior residence, [facility], where she will have onsite psychiatric f/u.
pt will comply with treatment and improve mental status in order to effectively engage with dc planning.
The pt will be discharged back to [facility] with onsite psychiatric f/u. Referral to a [facility] for RN, PT, and services will be made.
The pt will show resolution of acute symptoms and return to baseline functioning. The pt will be discharged back to her prior residence, [facility] with [facility].
The pt will show resolution of acute symptoms and return to baseline functioning. The pt will be discharged back to her prior residence, [facility], with [facility].
pt will comply with treatment and improve mental status in order to effectively engage with dc planning.
The pt previously resided in [facility] but has exceeded her bedhold there. When clinically stable, the pt will be referred back to [facility] where she will have onsite psychiatric f/u.
The pt will be discharged back to [facility] with onsite psychiatric f/u.
The pt will show resolution of acute symptoms and return to baseline functioning. The pt will be discharged back to her prior residence, [facility] with [facility].
The pt will be discharged back to [facility] where she will have onsite psychiatric f/u.
The pt will show resolution of acute symptoms and return to baseline functioning. The pt will be discharged back to her prior residence, [facility] with [facility].
The pt previously resided in [facility] but has exceeded her bedhold there. When clinically stable, the pt will be referred back to [facility] where she will have onsite psychiatric f/u.
The pt previously resided in [facility] but has exceeded her bedhold there. When clinically stable, the pt will be referred back to [facility] where she will have onsite psychiatric f/u.
The pt will be discharged back to [facility] where she will have onsite psychiatric f/u. If the pt's inpatient stay exceeds her Medicaid bedhold, referral to [facility] will be made again.
The pt will be discharged back to [facility] where she will have onsite psychiatric f/u. If the pt's inpatient stay exceeds her Medicaid bedhold, referral to [facility] will be made again.
The pt will be discharged back to her prior residence, [facility], for her long term care. Pt's daughter is uncertain whether she wants the pt to have outpatient f/u by the [facility] consultant psychiatrist or by [facility].
The pt will show resolution of acute symptoms and return to baseline functioning. The pt will be discharged back to her prior residence, [facility] with referral to [facility] f[facility]u.
The pt will be discharged back to [facility] where she will have onsite psychiatric f/u.
The pt will be discharged back to [facility] where she will have onsite psychiatric f/u. If the pt's inpatient stay exceeds her Medicaid bedhold, referral to [facility] will be made again.
The pt will be discharged back to [facility] where she will have onsite psychiatric f/u.
The pt will show resolution of acute symptoms and return to baseline functioning. The pt will be discharged back to her prior residence, [facility] with referral to [facility] f/u.
pt will comply with treatment in order to effectively engage with dc planning.
pt will comply with treatment in order to effectively engage with dc planning.
The pt will show resolution of acute symptoms and return to baseline functioning. The pt will be discharged back to her prior residence, [facility] with [facility].
The pt will be discharged back to [facility] where she will have onsite psychiatric f/u. If the pt's inpatient stay exceeds her Medicaid bedhold, referral to [facility] will be made again.
The pt will be discharged back to [facility] with onsite psychiatric f/u.
The pt will be discharged back to her prior residence, [facility], where she will have onsite psychiatric f/u.
The pt will be discharged back to her prior residence, [facility], where she will have onsite psychiatric f/u.
The pt will be discharged back to [facility] where she will have onsite psychiatric f/u. If the pt's inpatient stay exceeds her Medicaid bedhold, referral to [facility] will be made again.
The pt will be discharged back to [facility] with onsite psychiatric f/u. Referral to a [facility] for RN, PT, and services will be made.
The pt will show resolution of acute symptoms and return to baseline functioning. The pt will be discharged back to her prior residence, [facility], [facility].
The pt will show resolution of acute symptoms and return to baseline functioning. The pt will be discharged back to her prior residence, [facility], with [facility]/u.
The pt will show resolution of acute symptoms and return to baseline functioning. The pt will be discharged back to her prior residence, [facility], with [facility] f/u.
The pt will be discharged back to [facility] where she will have onsite psychiatric f/u.

## 2023-10-16 NOTE — BH INPATIENT PSYCHIATRY PROGRESS NOTE - NSBHCHARTREVIEWVS_PSY_A_CORE FT
Vital Signs Last 24 Hrs  T(C): 36.4 (10-16-23 @ 05:38), Max: 36.4 (10-16-23 @ 05:38)  T(F): 97.6 (10-16-23 @ 05:38), Max: 97.6 (10-16-23 @ 05:38)  HR: --  BP: --  BP(mean): --  RR: --  SpO2: --    Orthostatic VS  10-16-23 @ 05:38  Lying BP: --/-- HR: --  Sitting BP: 147/60 HR: 71  Standing BP: 152/54 HR: 77  Site: --  Mode: --

## 2023-10-16 NOTE — BH INPATIENT PSYCHIATRY PROGRESS NOTE - NSTXDISORGPROGRES_PSY_ALL_CORE
No Change
Improving
No Change
No Change
Improving
No Change
Improving
No Change
Improving
No Change
No Change
Improving
Improving
No Change
Improving
No Change
Improving
Improving
No Change
Improving
No Change
Improving
No Change
Improving
No Change
Improving
Improving
No Change
No Change
Improving
No Change
Improving
Improving
No Change
No Change
Improving
Improving
No Change
Improving
No Change
Improving
No Change
No Change
Improving
No Change
Improving
No Change
Improving
No Change
Improving
No Change
Improving
No Change
Improving
Improving
No Change
No Change
Improving
No Change
Improving
Improving
No Change
No Change
Improving
No Change
No Change
Improving
No Change
Improving
No Change
Improving
Improving
No Change
Improving
No Change
Improving

## 2023-10-16 NOTE — BH INPATIENT PSYCHIATRY PROGRESS NOTE - NSTXDCOTHRDATETRGT_PSY_ALL_CORE
07-Aug-2023
14-Sep-2023
14-Sep-2023
22-Jun-2023
31-Jul-2023
05-Oct-2023
14-Sep-2023
31-Jul-2023
31-Jul-2023
05-Jul-2023
15-Chin-2023
22-Jun-2023
17-Oct-2023
31-Jul-2023
14-Sep-2023
18-Jul-2023
05-Jul-2023
22-Aug-2023
22-Jun-2023
31-Aug-2023
29-Jun-2023
31-Aug-2023
05-Oct-2023
05-Jul-2023
07-Aug-2023
14-Sep-2023
18-Jul-2023
07-Aug-2023
22-Jun-2023
31-Aug-2023
29-Jun-2023
05-Jul-2023
22-Jun-2023
05-Jul-2023
05-Jul-2023
07-Aug-2023
14-Sep-2023
14-Sep-2023
22-Jun-2023
31-Aug-2023
31-Aug-2023
05-Oct-2023
07-Aug-2023
18-Jul-2023
22-Aug-2023
22-Aug-2023
22-Jun-2023
05-Jul-2023
18-Jul-2023
05-Jul-2023
14-Sep-2023
22-Aug-2023
31-Aug-2023
31-Jul-2023
07-Aug-2023
17-Aug-2023
17-Oct-2023
18-Jul-2023
31-Aug-2023
05-Jul-2023
14-Sep-2023
17-Aug-2023
17-Aug-2023
31-Aug-2023
07-Aug-2023
14-Sep-2023
29-Jun-2023
05-Jul-2023
05-Jul-2023
18-Jul-2023
22-Jun-2023
07-Aug-2023
17-Aug-2023
17-Aug-2023
17-Oct-2023
31-Aug-2023
07-Aug-2023
17-Aug-2023
17-Oct-2023
29-Jun-2023
31-Aug-2023
05-Oct-2023
15-Chin-2023
05-Oct-2023
22-Aug-2023
22-Jun-2023
29-Jun-2023
17-Aug-2023
22-Aug-2023
22-Jun-2023
07-Aug-2023
22-Jun-2023
05-Jul-2023
14-Sep-2023
31-Aug-2023
17-Oct-2023
31-Aug-2023
07-Aug-2023
14-Sep-2023
31-Aug-2023
05-Oct-2023
18-Jul-2023
05-Jul-2023
05-Oct-2023
14-Sep-2023
14-Sep-2023
18-Jul-2023
18-Jul-2023
05-Oct-2023
14-Sep-2023
31-Jul-2023
31-Aug-2023
18-Jul-2023
05-Oct-2023
18-Jul-2023
31-Aug-2023
18-Jul-2023

## 2023-10-16 NOTE — BH INPATIENT PSYCHIATRY PROGRESS NOTE - NSTXDISORGDATEEST_PSY_ALL_CORE
07-Jun-2023
07-Jun-2023
06-Jul-2023
08-Jun-2023
07-Jun-2023
08-Jun-2023
07-Jun-2023
08-Jun-2023
06-Jul-2023
07-Sep-2023
07-Sep-2023
06-Jul-2023
06-Jul-2023
07-Sep-2023
06-Jul-2023
06-Jul-2023
07-Sep-2023
06-Jul-2023
06-Jul-2023
07-Sep-2023
07-Jun-2023
07-Jun-2023
08-Jun-2023
07-Sep-2023
06-Jul-2023
07-Jun-2023
06-Jul-2023
08-Jun-2023
06-Jul-2023
06-Jul-2023
07-Sep-2023
07-Sep-2023
06-Jul-2023
06-Jul-2023
08-Jun-2023
06-Jul-2023
08-Jun-2023
06-Jul-2023
07-Sep-2023
08-Jun-2023
06-Jul-2023
07-Sep-2023
08-Jun-2023
06-Jul-2023
06-Jul-2023
07-Jun-2023
07-Sep-2023
07-Sep-2023
08-Jun-2023
06-Jul-2023
07-Sep-2023
08-Jun-2023
06-Jul-2023
06-Jul-2023
07-Sep-2023
06-Jul-2023
07-Sep-2023
07-Sep-2023
06-Jul-2023
07-Sep-2023
07-Sep-2023
08-Jun-2023
06-Jul-2023
06-Jul-2023
08-Jun-2023
06-Jul-2023
07-Sep-2023
08-Jun-2023
06-Jul-2023
07-Sep-2023
07-Sep-2023
06-Jul-2023
07-Jun-2023
06-Jul-2023
08-Jun-2023
07-Jun-2023
08-Jun-2023
08-Jun-2023
06-Jul-2023
07-Sep-2023
07-Sep-2023
06-Jul-2023
06-Jul-2023
07-Sep-2023
06-Jul-2023
06-Jul-2023
07-Sep-2023
06-Jul-2023
08-Jun-2023
08-Jun-2023
07-Sep-2023
06-Jul-2023

## 2023-10-16 NOTE — BH INPATIENT PSYCHIATRY PROGRESS NOTE - NSTXPROBDCOTHR_PSY_ALL_CORE
DISCHARGE ISSUE - OTHER

## 2023-10-17 VITALS — TEMPERATURE: 98 F

## 2023-10-17 RX ORDER — FAMOTIDINE 10 MG/ML
1 INJECTION INTRAVENOUS
Qty: 30 | Refills: 0
Start: 2023-10-17 | End: 2023-11-15

## 2023-10-17 RX ADMIN — PANTOPRAZOLE SODIUM 40 MILLIGRAM(S): 20 TABLET, DELAYED RELEASE ORAL at 09:09

## 2023-10-17 RX ADMIN — LOSARTAN POTASSIUM 75 MILLIGRAM(S): 100 TABLET, FILM COATED ORAL at 06:03

## 2023-10-17 RX ADMIN — Medication 2 SPRAY(S): at 09:10

## 2023-10-17 RX ADMIN — LURASIDONE HYDROCHLORIDE 60 MILLIGRAM(S): 40 TABLET ORAL at 09:10

## 2023-10-17 RX ADMIN — Medication 0.25 MILLIGRAM(S): at 09:09

## 2023-10-17 RX ADMIN — SENNA PLUS 2 TABLET(S): 8.6 TABLET ORAL at 09:09

## 2023-10-17 RX ADMIN — METFORMIN HYDROCHLORIDE 250 MILLIGRAM(S): 850 TABLET ORAL at 09:09

## 2023-10-17 RX ADMIN — SERTRALINE 200 MILLIGRAM(S): 25 TABLET, FILM COATED ORAL at 09:10

## 2023-10-17 RX ADMIN — Medication 100 MILLIGRAM(S): at 10:01

## 2023-10-17 RX ADMIN — DIVALPROEX SODIUM 375 MILLIGRAM(S): 500 TABLET, DELAYED RELEASE ORAL at 09:08

## 2023-10-17 RX ADMIN — AMLODIPINE BESYLATE 2.5 MILLIGRAM(S): 2.5 TABLET ORAL at 06:03

## 2023-10-17 NOTE — BH INPATIENT PSYCHIATRY DISCHARGE NOTE - HOSPITAL COURSE
Patient admitted to another unit then transferred to @[facility] because CPAP machines are allowed on this unit. She initially presented with dysphoria, severe agitation, cognitive impairment and intense paranoid delusions. She was initially treated with Depakote Seroquel Klonopin. Daughter had observed a similar episode remitted in 2016 w/o use of antipsychotics and she felt that addition of klonopin  which reduced anxiety resulted also in remission of paranoia. She was treated here with similar regimen of Effexor and clonazepam but in this case regimen not effective. On out unit she was highly agitated restless, at risk for falls from frantic activity needed One to one at times, had delusions she was going to be taken away. Would not sleep in her room because of fear if she went into her room she would be dismembered. Given severe psychosis she was tried on Abilify up to 15mg/d and Effexor up to 225mg/d with no effect. Haldol as prn had some calming effect. When in this state she would not wear her CPAP. She agreed to have ECT and was given  6 ECT with marked improvement. During ECT Depakote was d/declan and she was treated with lurasidone and Zoloft and low dose klonopin. However after ECT was completed she showed sign of suspiciousness and refusal of ADL's suggesting relapse so that she was given another three treatments and this time did not show return of symptoms after two weeks w/o ECT. It was felt patient was too frail and subject to cog impairment of ECT to make maintenance ECT feasible. She had cog impairment while acutely ill then cog impairment during course of ECT and she  began to clear after ECT completed but she has never been w/o significant  cog deficits in memory , orientation. Dementia is suspected but cannot completely rule further clearing leaving her with only mild cognitive impairment or no cognitive disorder. After last ECT she began to clear then became more confused leading to decision to adjust meds by reducing LAtuda and changing famotidine to Protonix *famtodine has some anticholinergic effect). However she had suspected UTI with 100k ecoli sensitive to Vantin and was given 3 day course of note u/a was clear but medicine felt given mono organism culture and increased confused she should be treated anyway. Given alternative source of confusion, it was felt appropriate to return lurasidone to 60mg/d. During time here she had a few nosebleeds which respond to applied pressure and they did not return once she was placed on nasal moisturizer spray.  She had prior hx of urianry retention due to constipation so she was maintained on bowel regimen and anticholinergic meds were avoided. She came in on aspirin but given hx severe nosebleeds and no true indication for it such as prior CVA or MI, and given elevated falls risk medicine felt risk benefit favored d/cing of it. Given prior good response to klonopin and long term exposure to it it was reduced to 0.25mg/d but not stopped completely. Lurasidone and Klonopin have been given during day because it is felt that givine them evening or night and having them at peak levesl in middle of night could elevate risk of falls. Patient has complicated  history, please feel free to contact me [phone] if questions arise. Patient admitted to another unit then transferred to   because CPAP machines are allowed on this unit. She initially presented with dysphoria, severe agitation, cognitive impairment and intense paranoid delusions. She was initially treated with Depakote Seroquel Klonopin. Daughter had observed a similar episode remitted in 2016 w/o use of antipsychotics and she felt that addition of klonopin  which reduced anxiety resulted also in remission of paranoia. She was treated here with similar regimen of Effexor and clonazepam but in this case regimen not effective. On out unit she was highly agitated restless, at risk for falls from frantic activity needed One to one at times, had delusions she was going to be taken away. Would not sleep in her room because of fear if she went into her room she would be dismembered. Given severe psychosis she was tried on Abilify up to 15mg/d and Effexor up to 225mg/d with no effect. Haldol as prn had some calming effect. When in this state she would not wear her CPAP. She agreed to have ECT and was given  6 ECT with marked improvement. During ECT Depakote was d/declan and she was treated with lurasidone and Zoloft and low dose klonopin. However after ECT was completed she showed sign of suspiciousness and refusal of ADL's suggesting relapse so that she was given another three treatments and this time did not show return of symptoms after two weeks w/o ECT. It was felt patient was too frail and subject to cog impairment of ECT to make maintenance ECT feasible. She had cog impairment while acutely ill then cog impairment during course of ECT and she  began to clear after ECT completed but she has never been w/o significant  cog deficits in memory , orientation. Dementia is suspected but cannot completely rule further clearing leaving her with only mild cognitive impairment or no cognitive disorder. After last ECT she began to clear then became more confused leading to decision to adjust meds by reducing Latuda and changing famotidine to Protonix (famotidine has some anticholinergic effect). However she had suspected UTI with 100k ecoli sensitive to Vantin and was given 3 day course of note u/a was clear but medicine felt given mono organism culture and increased confused she should be treated anyway. Given alternative source of confusion, it was felt appropriate to return lurasidone to 60mg/d. During time here she had a few nosebleeds which respond to applied pressure and they did not return once she was placed on nasal moisturizer spray.  She had prior hx of urinary retention due to constipation so she was maintained on bowel regimen and anticholinergic meds were avoided. She came in on aspirin but given hx severe nosebleeds and no true indication for it such as prior CVA or MI, and given elevated falls risk medicine felt risk benefit favored d/cing of it. Given prior good response to klonopin and long term exposure to it was reduced to 0.25mg/d but not stopped completely. Lurasidone and Klonopin have been given during day because it is felt that giving them evening or night and having them at peak levels in middle of night could elevate risk of falls.  Patient has esophageal web and gets crushed meds. She has very mild DM metformin was stopped during stay to avoid neg effects on appetite then restarted . She has hx of low B12 but level in hospital was 971.Patient has complicated  history, please feel free to contact me [phone] if questions arise. Patient admitted to another unit then transferred to   because CPAP machines are allowed on this unit. She initially presented with dysphoria, severe agitation, cognitive impairment and intense paranoid delusions. She was initially treated with Depakote Seroquel Klonopin. Daughter had observed a similar episode remitted in 2016 w/o use of antipsychotics and she felt that addition of klonopin  which reduced anxiety resulted also in remission of paranoia. She was treated here with similar regimen of Effexor and clonazepam but in this case regimen not effective. On out unit she was highly agitated restless, at risk for falls from frantic activity needed One to one at times, had delusions she was going to be taken away. Would not sleep in her room because of fear if she went into her room she would be dismembered. Given severe psychosis she was tried on Abilify up to 15mg/d and Effexor up to 225mg/d with no effect. Haldol as prn had some calming effect. When in this state she would not wear her CPAP. She agreed to have ECT and was given  6 ECT with marked improvement. During ECT Depakote was d/declan and she was treated with lurasidone and Zoloft and low dose klonopin. However after ECT was completed she showed sign of suspiciousness and refusal of ADL's suggesting relapse so that she was given another three treatments and this time did not show return of symptoms after two weeks w/o ECT. It was felt patient was too frail and subject to cog impairment of ECT to make maintenance ECT feasible. She had cog impairment while acutely ill then cog impairment during course of ECT and she  began to clear after ECT completed but she has never been w/o significant  cog deficits in memory , orientation. Dementia is suspected but cannot completely rule further clearing leaving her with only mild cognitive impairment or no cognitive disorder. After last ECT she began to clear then became more confused leading to decision to adjust meds by reducing Latuda. However she had suspected UTI with 100k ecoli sensitive to Vantin and was given 3 day course of note u/a was clear but medicine felt given mono organism culture and increased confused she should be treated anyway. Given alternative source of confusion, it was felt appropriate to return lurasidone to 60mg/d. During time here she had a few nosebleeds which respond to applied pressure and they did not return once she was placed on nasal moisturizer spray.  She had prior hx of urinary retention due to constipation so she was maintained on bowel regimen and anticholinergic meds were avoided. She came in on aspirin but given hx severe nosebleeds and no true indication for it such as prior CVA or MI, and given elevated falls risk medicine felt risk benefit favored d/cing of it. Given prior good response to klonopin and long term exposure to it was reduced to 0.25mg/d but not stopped completely. Lurasidone and Klonopin have been given during day because it is felt that giving them evening or night and having them at peak levels in middle of night could elevate risk of falls.  Patient has esophageal web and gets crushed meds. She has very mild DM metformin was stopped during stay to avoid neg effects on appetite then restarted . She has hx of low B12 but level in hospital was 971.Patient has complicated  history, please feel free to contact me [phone] if questions arise.

## 2023-10-17 NOTE — BH INPATIENT PSYCHIATRY DISCHARGE NOTE - NSDCCPCAREPLAN_GEN_ALL_CORE_FT
PRINCIPAL DISCHARGE DIAGNOSIS  Diagnosis: Severe depressed bipolar I disorder  Assessment and Plan of Treatment:       SECONDARY DISCHARGE DIAGNOSES  Diagnosis: Cognitive disorder  Assessment and Plan of Treatment:

## 2023-10-17 NOTE — BH INPATIENT PSYCHIATRY DISCHARGE NOTE - OTHER PAST PSYCHIATRIC HISTORY (INCLUDE DETAILS REGARDING ONSET, COURSE OF ILLNESS, INPATIENT/OUTPATIENT TREATMENT)
81-year-old , retired female, domiciled at the [facility], [facility] of dementia, DM2, and HTN, PPH of late onset Bipolar I disorder, PTSD and Cluster B Personality traits, 2 prior psych adm last in 2022 at [facility], who presented to ED with worsening paranoia, anxiety, FTT (weight loss of 20lb since march), and confusion. Psychiatry was consulted for psychosis/AMS. Patient is now being transferred to [facility] for psychiatric treatment and stabilization.    Per CL assessment note on 5/31, "Patient extremely anxious, histrionic, perseverates on an imagined painful procedure that she thinks the hospital is going to do to her to unclear ends, thinks that people at her [employer] are stealing her things and poisoning her food, denies SI/HI here and is somewhat less paranoid about food here, no clear AH/VH noted. Mildly hyperverbal but also very hard of hearing, able to be redirected. AOx2, recall 3/3 but patient repeats words for several minutes in anxious state, attention fair, 2/3 on change making exercise, makes task switching errors on luria exam, naming intact." Collateral from her daughter revealed that she was refusing food and to complete her ADLs. The decompensation started in March 2023 with numerous medical issues (uncontrolled epistaxis, then UTI with multiple abx trials and eventual need of ferro for 1 week), but further declined with start of Klonopin to target her anxiety. On CL, patient was tapered off of Gabapentin and starting low dose Seroquel in addition to her home medication.  During admission, pt presented with marked suspiciousness, frequently talks about being taken “somewhere” or being dismembered (!) if she does things alone, talks about conspiracies and “they” doing “things on purpose”.   Today, she states that she feels “very bad”, endorses sad mood and anhedonia. She continues to take vaguely about conspiracies, and when asked about ECT, she states that she would only go if accompanied by her daughter, for fear of being abducted. She denies AH, denies anxiety, denies insomnia. Denies SI currently.  She understands the nature of ECT treatment, asks appropriate questions. She states that she is agreeble, but does not want to sign because she "wants her daughter to go with her".

## 2023-10-17 NOTE — BH INPATIENT PSYCHIATRY DISCHARGE NOTE - NSDCMRMEDTOKEN_GEN_ALL_CORE_FT
amLODIPine 2.5 mg oral tablet: 1 tab(s) orally once a day  atorvastatin 10 mg oral tablet: 1 tab(s) orally once a day (at bedtime)  clonazePAM 0.25 mg oral tablet, disintegratin tab(s) orally once a day MDD: 0.25mg  Constulose 10 g/15 mL oral liquid: 15 milliliter(s) orally once a day  Depakote Sprinkles 125 mg oral delayed release capsule: 3 cap(s) orally 2 times a day  losartan 25 mg oral tablet: 3 tab(s) orally once a day  lurasidone 60 mg oral tablet: 1 tab(s) orally once a day  melatonin 3 mg oral tablet: 1 tab(s) orally once a day (at bedtime)  metFORMIN 500 mg oral tablet: 0.5 tab(s) orally 2 times a day  pantoprazole 40 mg oral granule, delayed release: 1 each orally once a day  senna leaf extract oral tablet: 2 tab(s) orally once a day  sertraline 100 mg oral tablet: 2 tab(s) orally once a day  sodium chloride 0.65% nasal spray: 1 spray(s) nasal 2 times a day one spray each nostril bid   amLODIPine 2.5 mg oral tablet: 1 tab(s) orally once a day  atorvastatin 10 mg oral tablet: 1 tab(s) orally once a day (at bedtime)  clonazePAM 0.25 mg oral tablet, disintegratin tab(s) orally once a day MDD: 0.25mg  Constulose 10 g/15 mL oral liquid: 15 milliliter(s) orally once a day  Depakote Sprinkles 125 mg oral delayed release capsule: 3 cap(s) orally 2 times a day  losartan 25 mg oral tablet: 3 tab(s) orally once a day  lurasidone 60 mg oral tablet: 1 tab(s) orally once a day  melatonin 3 mg oral tablet: 1 tab(s) orally once a day (at bedtime)  metFORMIN 500 mg oral tablet: 0.5 tab(s) orally 2 times a day  Pepcid 20 mg oral tablet: 1 tab(s) orally once a day  senna leaf extract oral tablet: 2 tab(s) orally once a day  sertraline 100 mg oral tablet: 2 tab(s) orally once a day  sodium chloride 0.65% nasal spray: 1 spray(s) nasal 2 times a day one spray each nostril bid

## 2023-10-17 NOTE — BH INPATIENT PSYCHIATRY DISCHARGE NOTE - HPI (INCLUDE ILLNESS QUALITY, SEVERITY, DURATION, TIMING, CONTEXT, MODIFYING FACTORS, ASSOCIATED SIGNS AND SYMPTOMS)
81 year old , retired female, domiciled at the [facility], [facility] of dementia, DM2, and HTN, PPH of late onset Bipolar I disorder, PTSD and Cluster B Personality traits, 2 prior psych adm last in 2022 at [facility], who presented to ED with worsening paranoia, anxiety, FTT (weight loss of 20lb since march), and confusion. Psychiatry was consulted for psychosis/AMS. Patient is now being transferred to [facility] for psychiatric treatment and stabilization.    Per  assessment note on 5/31, "Patient extremely anxious, histrionic, perseverates on an imagined painful procedure that she thinks the hospital is going to do to her to unclear ends, thinks that people at her [employer] are stealing her things and poisoning her food, denies SI/HI here and is somewhat less paranoid about food here, no clear AH/VH noted. Mildly hyperverbal but also very hard of hearing, able to be redirected. AOx2, recall 3/3 but patient repeats words for several minutes in anxious state, attention fair, 2/3 on change making exercise, makes task switching errors on luria exam, naming intact." Collateral from her daughter revealed that she was refusing food and to complete her ADLs. The decompensation started in March 2023 with numerous medical issues (uncontrolled epistaxis, then UTI with multiple abx trials and eventual need of ferro for 1 week), but further declined with start of Klonopin to target her anxiety. On CL, patient was tapered off of Gabapentin and starting low dose Seroquel in addition to her home medications.    On ML5, patient is unable to engage in an interview. She was seen after being evaluated by the nurses, but initially kept her eyes closed and wouldn't respond when writer started the interview. After multiple attempts, she opened her eyes but continued not to answer any questions. Instead, she would continuously interrupt to ask to speak to her daughter, if she'll stay on this floor, or for the unit's visiting hours. She also made multiple attempts to get out of her chair despite redirection.

## 2023-11-17 ENCOUNTER — TRANSCRIPTION ENCOUNTER (OUTPATIENT)
Age: 83
End: 2023-11-17

## 2023-12-08 NOTE — BH TREATMENT PLAN - NSTXDISORGGOAL_PSY_ALL_CORE
Hospitalist brief update note:    Hospitalist consult received, hospitalist already following as priamry service.     Marino Cleary MD  Hospitalist  
Will demonstrate purposeful and predictable thoughts/behaviors by making a request
Will make at least 3 goal and reality oriented statements during therapy
Will demonstrate related thoughts for 5 min in conversation
Will make at least 3 goal and reality oriented statements during therapy
Will make at least 3 goal and reality oriented statements during therapy

## 2024-02-09 NOTE — BH INPATIENT PSYCHIATRY PROGRESS NOTE - NSBHMETABOLIC_PSY_ALL_CORE_FT
Call Center TCM Note      Flowsheet Row Responses   [facility] patient discharged from? [facility]   Does the patient have one of the following disease processes/diagnoses(primary or secondary)? Other   TCM attempt successful? Yes   Call start time 1019   Call end time 1026   Discharge diagnosis Diverticulitis   Person spoke with today (if not patient) and relationship pt   Meds reviewed with patient/caregiver? Yes   Is the patient having any side effects they believe may be caused by any medication additions or changes? Yes   Side effects comments  Vomiting   Does the patient have all medications ordered at discharge? Yes   Is the patient taking all medications as directed (includes completed medication regime)? Yes   Does the patient have an appointment with their PCP within 7-14 days of discharge? Yes  [2/15/2024 at 11:15 AM]   Psychosocial issues? No   Did the patient receive a copy of their discharge instructions? Yes   Nursing interventions Reviewed instructions with patient   What is the patient's perception of their health status since discharge? Improving   Is the patient/caregiver able to teach back signs and symptoms related to disease process for when to call PCP? Yes   Is the patient/caregiver able to teach back signs and symptoms related to disease process for when to call 911? Yes   Is the patient/caregiver able to teach back the hierarchy of who to call/visit for symptoms/problems? PCP, Specialist, Home health nurse, Urgent Care, ED, 911 Yes   If the patient is a current smoker, are they able to teach back resources for cessation? Not a smoker   TCM call completed? Yes   Wrap up additional comments Pt shares she is feeling better today, and denies any abdominal discomfort. Reviewed AVS/meds with pt,  pt reports when taking AUGMENTIN that she is having a vomiting reaction taking it. Patient advised to call PCP office to report that  she is having a vomiting reaction taking AUGMENTIN so PCP can  send another rx to pharmacy. Pt verbalized understanding as she knows she needs to be on an antibiotic. Pt verified PCP TCM HFU appt on 2/15/24.   Call end time 1026   Would this patient benefit from a Referral to [facility] Social Work? No   Is the patient interested in additional calls from an ambulatory ? No            Sujey Paulson RN    2/9/2024, 10:32 EST         BMI: BMI (kg/m2): 23 (06-07-23 @ 18:13)  HbA1c: A1C with Estimated Average Glucose Result: 5.7 % (06-01-23 @ 05:10)    Glucose: POCT Blood Glucose.: 91 mg/dL (06-20-23 @ 08:28)    BP: --  Lipid Panel: Date/Time: 06-08-23 @ 08:30  Cholesterol, Serum: 119  Direct LDL: --  HDL Cholesterol, Serum: 47  Total Cholesterol/HDL Ration Measurement: --  Triglycerides, Serum: 56

## 2024-07-08 NOTE — ED ADULT NURSE NOTE - CAS DISCH CONDITION
7/5 THRU 7/7  ADMISSION REVIEW     Payor: [facility]  Subscriber #:  [id number]  Authorization Number: [id number]    Admit date: 7/5/24  Admit time:  4:59 PM       REVIEW DOCUMENTATION:     ED Provider Notes        ED Provider Notes signed by Rainer Clemons MD at 7/5/2024  3:46 PM       Author: Rainer Clemons MD Service: -- Author Type: Physician    Filed: 7/5/2024  3:46 PM Date of Service: 7/5/2024 11:49 AM Status: Signed    : Rainer Clemons MD (Physician)           Patient Seen in: [facility] Emergency Department      History     Chief Complaint   Patient presents with    Abdomen/Flank Pain     Stated Complaint: Abd painn since last noc    Subjective:   HPI    Patient is a 38-year-old female who last night developed severe abdominal pain.  Patient with sharp stabbing 8 out of 10 periumbilical location.  Patient states been fairly constant currently 6 out of 10.  Patient has nausea but no vomiting.  Patient denies any diarrhea.  Patient believes last bowel movement was a day ago.  Patient denies any blood in her stool, no black stool.  Patient states she has had some intermittent pain for the last few months with alternating constipation and diarrhea.  Patient has not had any workup done regarding this.  Patient denies hematuria or dysuria, no chest pain, shortness of breath, no headache.  Remainder of review of systems negative.    Objective:   Past Medical History:    Anxiety    Asthma (HCC)    Back problem    Depression    Diastolic dysfunction    Extrinsic asthma, unspecified    High blood pressure    Lymphadenitis    Migraines    Obesity    Pituitary tumor    Pulmonary hypertension (HCC)    Seizure disorder (HCC)    Shortness of breath    Sleep apnea              Past Surgical History:   Procedure Laterality Date    Adenoidectomy      Other surgical history  9-4-13 [facility]     Incision and Drainage of Deep Left Axillary Abscess    Other surgical history  02/03/2023     ENDOSCOPIC ENDONASAL CEREBROSPINAL FLUID LEAK REPAIR WITH ABDOMINAL FAT GRAFT    Other surgical history      Other surgical history  02/10/2023    RIGHT OCCIPITAL VENTRICULOPERITONEAL SHUNT INSERTION    Tonsillectomy                  Social History     Socioeconomic History    Marital status: Single   Tobacco Use    Smoking status: Never     Passive exposure: Never    Smokeless tobacco: Never   Vaping Use    Vaping status: Never Used   Substance and Sexual Activity    Alcohol use: Yes     Comment: socially    Drug use: No   Other Topics Concern    Caffeine Concern No    Exercise Yes     Comment: walks     Social Determinants of Health      Received from [facility]          Non-smoker, no alcohol no drugs    Review of Systems    Positive for stated Chief Complaint: Abdomen/Flank Pain    Other systems are as noted in HPI.  Constitutional and vital signs reviewed.      All other systems reviewed and negative except as noted above.    Physical Exam     ED Triage Vitals   BP 07/05/24 1118 143/70   Pulse 07/05/24 1118 82   Resp 07/05/24 1118 16   Temp 07/05/24 1118 98.2 °F (36.8 °C)   Temp src 07/05/24 1118 Temporal   SpO2 07/05/24 1118 100 %   O2 Device 07/05/24 1215 None (Room air)       Current Vitals:   Vital Signs  BP: 103/66  Pulse: 59  Resp: 18  Temp: 98.2 °F (36.8 °C)  Temp src: Temporal  MAP (mmHg): 76    Oxygen Therapy  SpO2: 99 %  O2 Device: None (Room air)            Physical Exam  GENERAL: Patient resting  on the cart in no acute distress.  HEENT: Extraocular muscles intact, pupils equal round reactive to light  Mouth normal, neck supple, no meningismus.  LUNGS: Lungs clear to auscultation bilaterally.  CARDIOVASCULAR: + S1-S2, regular rate and rhythm, no murmurs.  BACK: No CVA tenderness, no midline bony tenderness.  ABDOMEN: + Bowel sounds, soft, moderate diffuse tenderness, nondistended.  No rebound, no guarding, no hepatosplenomegaly.  EXTREMITIES: Full range of motion, no tenderness,  Stable good capillary refill.  SKIN: No rash, good turgor.  NEURO: Patient answers questions appropriately.  No focal deficits appreciated.           ED Course     Labs Reviewed   COMP METABOLIC PANEL (14) - Abnormal; Notable for the following components:       Result Value    Glucose 124 (*)      (*)      (*)     Alkaline Phosphatase 233 (*)     Globulin  4.6 (*)     A/G Ratio 0.8 (*)     All other components within normal limits   URINALYSIS WITH CULTURE REFLEX - Abnormal; Notable for the following components:    Urobilinogen Urine 8 (*)     All other components within normal limits   CBC W/ DIFFERENTIAL - Abnormal; Notable for the following components:    HGB 10.3 (*)     HCT 33.9 (*)     MCV 73.7 (*)     MCH 22.4 (*)     MCHC 30.4 (*)     Lymphocyte Absolute 0.97 (*)     All other components within normal limits   LIPASE - Normal   POCT PREGNANCY URINE - Normal   CBC WITH DIFFERENTIAL WITH PLATELET    Narrative:     The following orders were created for panel order CBC With Differential With Platelet.  Procedure                               Abnormality         Status                     ---------                               -----------         ------                     CBC W/ DIFFERENTIAL[[id number]]          Abnormal            Final result                 Please view results for these tests on the individual orders.   RAINBOW DRAW LAVENDER   RAINBOW DRAW LIGHT GREEN             CT abdomen pelvis1. Low-attenuation lesion which appears to be exophytic inferiorly off of the tail the pancreas measuring 3.2 x by 3.2 by 3.5 cm.      2. Heterogeneously enhancing lesions within the liver which are indeterminate and should be further characterized using MRI with Eovist.      3. Diverticulosis without evidence of diverticulitis.      4. Uterine fibroids.   Independently reviewed by myself, no free air        MDM      Patient given IV fluids.  Patient with Zofran.  Patient declines pain medicines.  Patient  eventually did request something for pain and did not want narcotic was given Toradol.  Patient had no significant change in her pain.  Patient eventually was given Dilaudid as well.  Patient CT did have a lesion of the pancreas with liver lesions as well and elevated liver enzymes.  I did speak with GI Dr. ruiz who recommended admission for further imaging and evaluation.  Patient is comfortable with this plan.  I did consider pancreas mass with liver lesions, electrolyte abnormalities, diverticulitis, bowel obstruction.  [facility]  Patient states her  shunt needs to be reset within 4 hours if she does have an MRI.  Patient's neurosurgeon is Dr. cordova                                 Medical Decision Making      Disposition and Plan     Clinical Impression:  1. Abdominal pain of unknown etiology    2. Pancreatic mass (HCC)    3. Liver masses    4. Elevated liver enzymes         Disposition:  There is no disposition on file for this visit.  There is no disposition time on file for this visit.    Follow-up:  No follow-up provider specified.        Medications Prescribed:  Current Discharge Medication List                                           Signed by Rainer Clemons MD on 7/5/2024  3:46 PM         MEDICATIONS ADMINISTERED IN LAST 1 DAY:  amLODIPine (Norvasc) tab 10 mg       Date Action Dose Route User    7/7/2024 2047 Given 10 mg Oral Omero Raymundo RN          heparin (Porcine) 5000 UNIT/ML injection 10,000 Units       Date Action Dose Route User    7/8/2024 1326 Given 10,000 Units Subcutaneous (Left Lower Abdomen) Radha De Jesus RN    7/8/2024 0555 Given 10,000 Units Subcutaneous (Left Lower Abdomen) Omero Raymundo RN    7/7/2024 2047 Given 10,000 Units Subcutaneous (Right Lower Abdomen) Omero Raymundo RN    7/7/2024 1544 Given 10,000 Units Subcutaneous (Left Lower Abdomen) Romeo Bettencourt RN          hydroCHLOROthiazide tab 25 mg       Date Action Dose Route User     7/7/2024 2047 Given 25 mg Oral Omero Raymundo RN          losartan (Cozaar) tab 100 mg       Date Action Dose Route User    7/7/2024 2047 Given 100 mg Oral Omero Raymundo RN          metoprolol succinate ER (Toprol XL) 24 hr tab 50 mg       Date Action Dose Route User    7/7/2024 2046 Given 50 mg Oral Omero Raymundo RN          pantoprazole (Protonix) 40 mg in sodium chloride 0.9% PF 10 mL IV push       Date Action Dose Route User    7/8/2024 0555 Given 40 mg Intravenous Omero Raymundo RN    7/7/2024 1544 Given 40 mg Intravenous Romeo Bettencourt RN          sodium ferric gluconate (Ferrlecit) 125 mg in sodium chloride 0.9% 100mL IVPB premix       Date Action Dose Route User    7/8/2024 1327 New Bag 125 mg Intravenous Radha De Jesus RN          topiramate (TopaMAX) tab 75 mg       Date Action Dose Route User    7/8/2024 0800 Given 75 mg Oral Radha De Jesus RN    7/7/2024 2047 Given 75 mg Oral Omero Raymundo RN          traMADol (Ultram) tab 50 mg       Date Action Dose Route User    7/7/2024 2053 Given 50 mg Oral Omero Raymundo RN            Vitals (last day)       Date/Time Temp Pulse Resp BP SpO2 Weight O2 Device O2 Flow Rate (L/min) [facility]    07/08/24 1235 98.9 °F (37.2 °C) 69 16 118/72 100 % -- None (Room air) --     07/08/24 0810 98.4 °F (36.9 °C) 60 14 114/51 94 % -- None (Room air) --     07/08/24 0345 98 °F (36.7 °C) 62 16 104/56 96 % -- None (Room air) --     07/08/24 0015 98.1 °F (36.7 °C) 71 18 107/61 96 % -- None (Room air) --     07/07/24 2010 98.6 °F (37 °C) 65 20 111/58 100 % -- None (Room air) --     07/07/24 1617 98.9 °F (37.2 °C) 67 18 105/48 100 % -- Nasal cannula 0 L/min PB    07/07/24 1200 98.2 °F (36.8 °C) 64 18 112/36 96 % -- None (Room air) 0 L/min PB    07/07/24 0945 -- 83 -- -- 100 % -- -- -- PB    07/07/24 0725 98.8 °F (37.1 °C) 62 17 120/70 94 % -- None (Room air) 0 L/min PB    07/07/24 0400 97.9 °F (36.6 °C) 64 16 124/52 98 % -- None (Room air) -- JR           7/5 GI CONSULT  NOTE       Reason for consultation: abdominal pain, abnormal imaging  HPI: Ms Macias is a 37 yo F who presents with upper abdominal pain which started 3 months ago. The pain is constant but changes in severity. She reports a few episode of intense pain. She has had nausea, vomiting. She reports that her BMs alternate between constipation and diarrhea which is chronic. She denies unintentional weight loss. She denies fevers. She denies melena or hematochezia. She denies herbal medications.     Impression:   Epigastric pain: DDx includes secondary to pancreatic lesion, PUD, IBS, etc  Liver lesion and elevated LFTs: obtain laboratory work up and MRI liver  Microcytic Anemia     Recommendations:   MRI Liver with Eovist  PPI BID  Obtain laboratory work up for elevated LFTs  Pancreatic elastase, fecal fat  Iron studies  Await above; if she has metastatic disease to liver, would obtain bx of liver lesion vs EUS with FNA of pancreatic lesion     H&P  Chief Complaint: Abdominal pain        Subjective:  History of Present Illness:   Honey Macias is a 38 year old female with PMHx anxiety, depression, migraines, hypertension, DELLA, morbid obesity, 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 She has had intermittent abdominal pain for the past 3 months but has been more severe and constant since last night. She describes it as a stabbing sensation in the todd-umbilical region. She has associated nausea and vomiting. She denies any melena or hematochezia. No fever, chills, chest pain, SOB or dysuria. BM change between constipation and diarrhea. No weight loss or night sweats.  In the ER, CT a/p shows pancreatic tail mass with liver lesions. Her LFTs are elevated with normal bilirubin.           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 Yanez     #Morbid obesity  -BMI is 55.13     #Hypertension  -Resume home meds     #Migraines  -Sees Dr. Santos  -On Ubrelvy PRN (ok to bring home med)  -Topamax     #DELLA  -DELLA protocol     #Microcytic anemia  -Check iron studies    #Anxiety and depression     7/6 GI NOTE  CC: abd pain, abnormal imaging  S: Patient with improved abdominal pain at this time.  O: /62 (BP Location: Right arm)   Pulse 60   Temp 98 °F (36.7 °C) (Oral)   Resp 17   Ht 5' 7\" (1.702 m)   Wt (!) 352 lb (159.7 kg)   LMP 06/23/2024 (Approximate)   SpO2 97%   BMI 55.13 kg/m²      Gen: NAD  Abd: (+)BS, soft, non-tender, non-distended; no rebound or guarding       Assessment/Plan: 39 yo F who presents with upper abdominal pain.  CT abdomen pelvis with low-attenuation lesion of the pancreatic tail along with multiple enhancing lesions of the liver.  Patient also with elevated LFTs which are downtrending at this time.  Patient also with iron deficiency anemia with hemoglobin 9.2 with no active bleeding.    -MRCP/MR liver pending  -Chronic liver disease workup negative/pending  -Can consider bidirectional endoscopy pending clinical progression and MRI findings  -Daily LFTs  7/6 HOSPITALIST NOTE  Chief Complaint: Abdominal pain        Subjective:  Patient states abdominal pain about the same, denies nausea, vomiting.    Vital signs:  Temp:  [98 °F (36.7 °C)-98.2 °F (36.8 °C)] 98 °F (36.7 °C)  Pulse:  [57-82] 60  Resp:  [16-18] 17  BP: (100-143)/(60-85) 135/62  SpO2:  [96 %-100 %] 97 %  Recent Labs   Lab 07/05/24  1221 07/06/24  0530 07/06/24  0531   WBC 4.3 4.7  --    HGB 10.3* 9.2*  --    MCV 73.7* 76.3*  --    .0 361.0  --    INR  --   --  1.11              Recent Labs   Lab 07/05/24  1221 07/06/24  0530   * 111*   BUN  18 16   CREATSERUM 1.01 1.18*   CA 8.8 8.5   ALB 3.6 3.2*    139   K 3.9 3.9    109   CO2 23.0 26.0   ALKPHO 233* 187*   * 105*   * 231*   BILT 0.9 0.5   TP 8.2 7.1           Recent Labs   Lab 07/06/24  0531   PTP 14.3   INR 1.11        Medications:   Scheduled Medications    pantoprazole  40 mg Intravenous Q12H    amLODIPine  10 mg Oral Nightly    hydroCHLOROthiazide  25 mg Oral Nightly    losartan  100 mg Oral Nightly    metoprolol succinate ER  50 mg Oral Nightly    topiramate  75 mg Oral BID    heparin  7,500 Units Subcutaneous Q8H REX    sodium ferric gluconate  125 mg Intravenous Daily                  Assessment & Plan:  #Abdominal pain due to pancreatic tail mass with liver lesions  -AFP and CA 19-9 wnl  -GI consult  -Pain control, PRN antiemetics  -MRI Liver with Eovist pending     #Elevated LFTs due to above  -Trend, lower today  -Work up sent by GI     #Hx of 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 Yanez     #Morbid obesity  -BMI is 55.13     #Hypertension  -Resume home meds  -controlled     #Migraines  -Sees Dr. Santos  -On Ubrelvy PRN (ok to bring home med)  -Topamax     #DELLA  -DELLA protocol     #Microcytic anemia  -Check iron studies    #Anxiety and depression      7/7 GI NOTE    CC: abd pain, abnormal imaging  S: Patient with stable abdominal pain at this time.  O: /70 (BP Location: Right arm)   Pulse 62   Temp 98.8 °F (37.1 °C) (Axillary)   Resp 17   Ht 5' 7\" (1.702 m)   Wt (!) 352 lb (159.7 kg)   LMP 06/23/2024 (Approximate)   SpO2 94%   BMI 55.13 kg/m²      Gen: NAD  Abd: (+)BS, soft, non-tender, non-distended; no rebound or guarding    Assessment/Plan: 37 yo F who presents with upper abdominal pain.  CT abdomen pelvis with low-attenuation lesion of the pancreatic tail along with multiple enhancing lesions of the liver.  Patient also with elevated LFTs which are  downtrending at this time.  Patient also with iron deficiency anemia with hemoglobin 9.4 with no active bleeding.    -MRCP/MR liver pending  -Chronic liver disease workup negative/pending  -Can consider bidirectional endoscopy pending clinical progression and MRI findings  -Daily LFTs   7/7 HOSPITALIST NOTE  hief Complaint: Abdominal pain        Subjective:  Patient states abdominal pain improved, denies nausea, vomiting.           Objective:  Review of Systems:   A comprehensive review of systems was completed; pertinent positive and negatives stated in subjective.     Vital signs:  Temp:  [97.9 °F (36.6 °C)-99 °F (37.2 °C)] 98.8 °F (37.1 °C)  Pulse:  [60-79] 62  Resp:  [16-18] 17  BP: ()/(52-78) 120/70  SpO2:  [94 %-100 %] 94 %     Physical Exam:    General: No acute distress  Respiratory: No wheezes, no rhonchi  Cardiovascular: S1, S2, regular rate and rhythm  Abdomen: Soft, Non-tender, non-distended, positive bowel sounds  Neuro: No new focal deficits.   Extremities: No edema        Medications:   Scheduled Medications    heparin  10,000 Units Subcutaneous Q8H REX    pantoprazole  40 mg Intravenous Q12H    amLODIPine  10 mg Oral Nightly    hydroCHLOROthiazide  25 mg Oral Nightly    losartan  100 mg Oral Nightly    metoprolol succinate ER  50 mg Oral Nightly    topiramate  75 mg Oral BID    sodium ferric gluconate  125 mg Intravenous Daily                  Assessment & Plan:  #Abdominal pain due to pancreatic tail mass with liver lesions  -AFP and CA 19-9 wnl  -GI consult, PPi BID, pancreatic elastase, fecal fat  -Pain control, PRN antiemetics  -MRI Liver with Eovist pending     #Elevated LFTs due to above  -Trend, lower yesterday, repeat pending today  -Work up sent by GI     #Hx of 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 Yanez     #Morbid obesity  -BMI is 55.13     #Hypertension  -Resume home  meds  -controlled     #Migraines  -Sees Dr. Santos  -On Ubrelvy PRN (ok to bring home med)  -Topamax     #DELLA  -DELLA protocol     #Microcytic anemia  -c/w DARLING    #Anxiety and depression

## 2024-08-10 NOTE — BH DISCHARGE NOTE NURSING/SOCIAL WORK/PSYCH REHAB - NSDCPRGOAL_PSY_ALL_CORE
[facility] Rosen - Neurosurgery ([facility])  Neurology  Consult Note    Patient Name: Pernell Ly  MRN: [id number]  Admission Date: 2024  Hospital Length of Stay: 0 days  Code Status: Full Code   Attending Provider: Maite Hargrove MD   Consulting Provider: Enzo Nunes MD  Primary Care Physician: Zoey Nelson MD  Principal Problem:Seizure    Inpatient consult to Neurology  Consult performed by: Enzo Nunes MD  Consult ordered by: Rolando Hampton MD  Reason for consult: Seizure like activity         Subjective:     Chief Complaint:  Seizure like activity      HPI:   Ms. Pernell Ly is a 44 y.o. patient with history of HTN, migraines and epilepsy. Presented ot OSH presented with a seizure 2 hours prior to being admitted to the ED. Episode described as blank starring with left upward gaze, followed by left sided tingling, numbness sensation and associated left temporal headaches. Episodes are usually described as GTC. This is the first time patient has this type of presentation. Patient has been compliant with home AED (previously on Onfi 20 nightly and  BID). Followed as an outpatient with Epilepsy team with plans on sorting out a possible EMU admission. On admission upon transfer without evidence of seizure like activity.     Patient trasferred to [facility] for Neurology evaluation and EEG monitoring     Past Medical History:   Diagnosis Date    Encounter for blood transfusion     Epilepsy     Fibroids     Focal epilepsy     Hypertension     Migraine        Past Surgical History:   Procedure Laterality Date     SECTION      x1    HYSTERECTOMY  2017    TUBAL LIGATION         Review of patient's allergies indicates:  No Known Allergies      No current facility-administered medications on file prior to encounter.     Current Outpatient Medications on File Prior to Encounter   Medication Sig    azelastine (ASTELIN) 137 mcg (0.1 %) nasal spray USE 1 SPRAY NASALLY TWICE DAILY     cloBAZam (ONFI) 20 mg Tab Take 1 tablet (20 mg total) by mouth nightly.    fluticasone propionate (FLOVENT HFA) 110 mcg/actuation inhaler Inhale 1 puff into the lungs 2 (two) times daily. Controller    ibuprofen (ADVIL,MOTRIN) 600 MG tablet Take 1 tablet (600 mg total) by mouth every 6 (six) hours as needed for Pain.    lamoTRIgine (LAMICTAL) 150 MG Tab Take 1 tablet (150 mg total) by mouth 2 (two) times daily.    multivitamin with minerals tablet Take by mouth.    NIFEdipine (PROCARDIA-XL) 90 MG (OSM) 24 hr tablet TAKE 1 TABLET(90 MG) BY MOUTH EVERY DAY    omeprazole (PRILOSEC) 20 MG capsule Take 20 mg by mouth daily as needed.    potassium chloride SA (K-DUR,KLOR-CON) 20 MEQ tablet Take 1 tablet (20 mEq total) by mouth 2 (two) times daily.    albuterol (PROVENTIL HFA) 90 mcg/actuation inhaler Inhale 2 puffs into the lungs every 6 (six) hours as needed for Wheezing. Rescue    amoxicillin (AMOXIL) 875 MG tablet Take 875 mg by mouth every 12 (twelve) hours. (Patient not taking: Reported on 5/23/2024)    cetirizine (ZYRTEC) 10 MG tablet Take 1 tablet (10 mg total) by mouth once daily.    ciclesonide (OMNARIS) 50 mcg Spry 2 sprays by Each Nostril route once daily.    predniSONE (DELTASONE) 20 MG tablet Take 2 tablets (40 mg total) by mouth once daily. (Patient not taking: Reported on 5/23/2024)    promethazine-dextromethorphan (PROMETHAZINE-DM) 6.25-15 mg/5 mL Syrp TAKE 5 ML BY MOUTH EVERY 6 HOURS FOR COUGH (Patient not taking: Reported on 4/24/2024)    tiZANidine (ZANAFLEX) 2 MG tablet Take 1 tablet (2 mg total) by mouth nightly as needed (muscle spasms). (Patient not taking: Reported on 5/23/2024)     Family History       Problem Relation (Age of Onset)    Breast cancer Maternal Grandmother    Cancer Maternal Grandfather    Colon cancer Maternal Cousin    Hypertension Father, Sister, Maternal Grandmother, Paternal Grandmother          Tobacco Use    Smoking status: Never     Passive exposure: Never    Smokeless  tobacco: Never   Substance and Sexual Activity    Alcohol use: No     Comment: Occasional; 3x /month    Drug use: No    Sexual activity: Yes     Partners: Male     Birth control/protection: See Surgical Hx     Review of Systems   Constitutional:  Negative for chills and fever.   HENT:  Positive for congestion.    Eyes:  Negative for visual disturbance.   Respiratory:  Negative for shortness of breath.    Cardiovascular:  Negative for chest pain.   Gastrointestinal:  Negative for abdominal pain, nausea and vomiting.   Endocrine: Negative for polyuria.   Genitourinary:  Negative for dysuria.   Musculoskeletal:  Negative for back pain.   Skin:  Negative for rash.   Neurological:  Positive for seizures. Negative for tremors, weakness and headaches.   Hematological:  Does not bruise/bleed easily.   Psychiatric/Behavioral:  Negative for confusion.      Objective:     Vital Signs (Most Recent):  Temp: 99 °F (37.2 °C) (08/10/24 1204)  Pulse: 70 (08/10/24 1211)  Resp: 18 (08/10/24 1204)  BP: 116/81 (08/10/24 1204)  SpO2: 98 % (08/10/24 1204) Vital Signs (24h Range):  Temp:  [97.8 °F (36.6 °C)-99 °F (37.2 °C)] 99 °F (37.2 °C)  Pulse:  [66-79] 70  Resp:  [16-20] 18  SpO2:  [96 %-100 %] 98 %  BP: (102-151)/(53-95) 116/81     Weight: 77.2 kg (170 lb 3.5 oz)  Body mass index is 29.22 kg/m².     Physical Exam  Vitals and nursing note reviewed.   Constitutional:       Appearance: She is not ill-appearing.   HENT:      Head: Normocephalic and atraumatic.      Nose: Nose normal.   Eyes:      Extraocular Movements: Extraocular movements intact.      Conjunctiva/sclera: Conjunctivae normal.   Cardiovascular:      Rate and Rhythm: Normal rate and regular rhythm.   Pulmonary:      Effort: Pulmonary effort is normal.      Breath sounds: Normal breath sounds.   Abdominal:      General: Bowel sounds are normal. There is no distension.      Palpations: Abdomen is soft.      Tenderness: There is no abdominal tenderness. There is no guarding  or rebound.   Musculoskeletal:         General: Normal range of motion.      Cervical back: Normal range of motion.      Right lower leg: No edema.      Left lower leg: No edema.   Skin:     General: Skin is warm and dry.      Capillary Refill: Capillary refill takes less than 2 seconds.   Neurological:      Mental Status: She is alert and oriented to person, place, and time.      Cranial Nerves: Cranial nerves 2-12 are intact.      Motor: No weakness.      Deep Tendon Reflexes:      Reflex Scores:       Tricep reflexes are 2+ on the right side and 2+ on the left side.       Bicep reflexes are 2+ on the right side and 2+ on the left side.       Patellar reflexes are 2+ on the right side and 2+ on the left side.  Psychiatric:         Mood and Affect: Mood normal.         Behavior: Behavior normal.         Thought Content: Thought content normal.          NEUROLOGICAL EXAMINATION:     MENTAL STATUS   Oriented to person, place, and time.   Level of consciousness: alert    CRANIAL NERVES   Cranial nerves II through XII intact.     MOTOR EXAM   Muscle bulk: normal    Strength   Right deltoid: 5/5  Left deltoid: 5/5  Right biceps: 5/5  Left biceps: 5/5  Right triceps: 5/5  Left triceps: 5/5  Right quadriceps: 5/5  Left quadriceps: 5/5  Right hamstrin/5  Left hamstrin/5    REFLEXES     Reflexes   Right biceps: 2+  Left biceps: 2+  Right triceps: 2+  Left triceps: 2+  Right patellar: 2+  Left patellar: 2+    SENSORY EXAM   Light touch normal.   Vibration normal.       Significant Labs: All pertinent lab results from the past 24 hours have been reviewed.    Significant Imaging: I have reviewed and interpreted all pertinent imaging results/findings within the past 24 hours.  Assessment and Plan:     * Seizure  Ms. Pernell Ly is a 44 y.o. patient transferred from OSH for EEG monitorin. Presented with seizure like activity. On admission to OSH, seizure episodes different than prior GTCs. This time with 1-2  minutes blank starring. No gaze preference during episodes. Prior to transfer optimized on Onfi and continued on LTG. Patient has needed EMU for a long time, however, insurance authorization pending to start admission process. Will continue to monitor seizure like activity, if any, while inpatient. Current episode more consistent with complicated migraines. However, will monitor neurological electrical function.     Plan   -Continue Onfi 30mg nightly   -Continue LTG 150mg BID   -Consider Ativan 2mg PRN with parameters (Only if GTC > 5 minutes)   -Place EEG   -Thank you for this consult. Neurology will continue to follow.         VTE Risk Mitigation (From admission, onward)           Ordered     IP VTE LOW RISK PATIENT  Once         08/09/24 1754     Place sequential compression device  Until discontinued         08/09/24 1754                    Enzo Nunes MD  Neurology  [facility] - Neurosurgery ([facility])   Patient initially presented with symptoms of anjelica, anxiety, delusions and Catholic preoccupation. Patient's initial goals included increasing her coping skills, decreasing anxiety and decreasing Catholic preoccupation. Patient made gains towards her rehab goals as evidenced by patient's utilization of coping skills, decreased anxiety, decreased focus on Catholic preoccupation and daily medication compliance. Also, patient was attentive to her hygiene and grooming.     ***Patient completed a self-rating and a Press Ganey survey.

## 2024-11-11 NOTE — BH INPATIENT PSYCHIATRY PROGRESS NOTE - NSBHFUPINTERVALCCFT_PSY_A_CORE
Addended by: SHITAL WOMACK on: 11/11/2024 03:22 PM     Modules accepted: Orders     disorganized, paranoid

## 2024-11-19 NOTE — BH INPATIENT PSYCHIATRY PROGRESS NOTE - NSICDXBHSECONDARYDX_PSY_ALL_CORE
Type II diabetes mellitus   E11.9  Vitamin B12 deficiency   E53.8  Hyperlipidemia   E78.5  Hypertension   I10  S/P partial hysterectomy   Z90.711  S/P carpal tunnel release   Z98.890  Anxiety state   F41.1  Esophageal web   Q39.4   Opt out

## 2024-12-30 NOTE — BH INPATIENT PSYCHIATRY PROGRESS NOTE - NSBHFUPINTERVALHXFT_PSY_A_CORE
Patient followed for bipolar mixed. AVSS. Remains demanding at times. No need for prn meds.    [Well Nourished] : well nourished [Well Developed] : well developed [Normal Sclera/Conjunctiva] : normal sclera/conjunctiva [PERRL] : pupils equal round and reactive to light [EOMI] : extraocular movements intact [Normal Outer Ear/Nose] : the outer ears and nose were normal in appearance [Normal Oropharynx] : the oropharynx was normal [No Lymphadenopathy] : no lymphadenopathy [No Respiratory Distress] : no respiratory distress  [No Accessory Muscle Use] : no accessory muscle use [Clear to Auscultation] : lungs were clear to auscultation bilaterally [Normal Rate] : normal rate  [Regular Rhythm] : with a regular rhythm [Normal S1, S2] : normal S1 and S2 [No Murmur] : no murmur heard [Soft] : abdomen soft [Non Tender] : non-tender [Non-distended] : non-distended [Normal Bowel Sounds] : normal bowel sounds [No Spinal Tenderness] : no spinal tenderness [Scoliosis] : scoliosis [No Joint Swelling] : no joint swelling [No Focal Deficits] : no focal deficits [Normal Insight/Judgement] : insight and judgment were intact [No Acute Distress] : no acute distress [Well-Appearing] : well-appearing [Normal TMs] : both tympanic membranes were normal [Normal Appearance] : was normal in appearance [Neck Supple] : was supple [No Edema] : there was no peripheral edema [Normal Posterior Cervical Nodes] : no posterior cervical lymphadenopathy [Normal Anterior Cervical Nodes] : no anterior cervical lymphadenopathy [Grossly Normal Strength/Tone] : grossly normal strength/tone [No Rash] : no rash [Alert and Oriented x3] : oriented to person, place, and time [de-identified] : +anxious

## 2025-04-16 NOTE — BH INPATIENT PSYCHIATRY PROGRESS NOTE - NSBHASSESSSUMMFT_PSY_ALL_CORE
5 83 year-old , retired female, domiciled at [facility], [facility] of DM2, HLD, HTN, PPHx of late onset Bipolar disorder, 2 prior psych adm last in 2022 at [facility], who presented to ED with worsening paranoia, anxiety, FTT (weight loss of 20lb since march), psychosis commingled with cognitive impairment   8/14 better but now hard to stay if responding to Abilify or prns of haldol will increase Abilify to 17mg then 20mg/d while initiating ECT discussion  8/15 Not much improvement   except less paranoid which may be attributable to haldol prn. Will d/c Abilify and start  Latuda will discuss ECT option  8/16 COnt Latuda trial while entering into discussion with patient re ECT  8/17 Tolerating latuda well; paranoia remains  8/18 Paranoia continues though improved; increase latuda to 40mg po daily - to be administered with food  8/19: On encounter today pt was seen chatting with one of her peers. Pt denied any complaints and reported feeling well. Denies paranoid thoughts or feeling afraid someone was going to hurt her. Still needing frequent redirection from staff to use her walker.   8/20 Depressed anxious fearful disorganized. Cont Latuda trial will cont to discuss ECT with patient, family is supportive  8/21 Depressed psychotic partial response will cont with ECT patient amenable as long as a family member goes with her for fear she will go off unit and never return. Will get labs ask medicine to see  8/22 Depressed not responded well to meds will increase Latuda while prepping for ECT. Will clarify need for ASA given severe nosebleed hx will discuss nosebleed issue with ECT   8/23 Cont ECT w/u discussed with medicine will   cont ASA  8/24 Psychotic depression with limited response to  multiple medication trials. Calmer less floridly delusion but anhedonia in state of constant distress. Patient assents to ECT, daughter Marcelina who is her HCP will provide consent. Patient requests family accompany her to ECT  for support as well as likely afraid she will not come back to unit if taken off. Will d/c Protonix suspension and use Pepcid as the former cannot be give while patient NPO. will lower Latuda as want to begin to reduce meds concurrent with ECT  8/25 Patient tolerated ECT, cont ECT, try to reduce Klonopin when better. Cont Effexor and Latuda. Recheck Na level on Sunday 8/26 Tolerated first ECT amnestic for treatment itself sl calmer. COnt ECT will begin to reduce klonopin to reduce cog se, check BMP in AM  8/27 less dysphoric, still paranoid, no SI/HI. Labs reviewed and discussed with hospitalist. Repeat BMP, urine NA and osmolality to f/u.  8/28 Tolerating ECT, cont treatments spread out so next one  9/1. Eval for further decrease Klonopin. Medicine saw patient , reintroduced metformin at low dose and d/declan ASA based on risk benefit   8/29 Mood psychosis better, sleepy during day confused. Will hold ECT til 9/1 reduce Klonopin  also may reduce Latuda  8/30 Improved with ECT but cog worse Will cont to hold ECT til clearer cont to reduce BDZ  8/31 Patient much better with regard to mood and  psychosis but notably cognitive disrupted form ECT Will hold ECT tomorrow. Will lower Lurasidone as was never effective for psychosis. Will start lowering Effexor and change to Zoloft as Effexor not helpful. Around completion of ECT may restart Depakote given episode of anjelica last admission. Consider further reduction in Ko Klonopin to 0.25mg/d . Reviewed plan of care with daughter  9/1 Patient improved but paranoia creeping back. Will cont Latuda at 40mg  consider increasing back to 60mg. Considered resume VPA based on concern of risk for switching but may inhibit sx requiring higher stimulus which can lead to more confusion. Next rx 9/5 9/2: Patient with psychotic depression, improving, on lurasidone   9/3: Patient's psychosis resolving, mood improved   9/4: Mood and psychotic symptoms improving, some impaired executive function and reasoning   9/5 PAtient improved but anxious, fearful cog impairment due to ECT but less severe. Cont ECT but spread out will, lower Effexor and add Zoloft as Effexor trial failed. Cont Lurasidone  9/6 Patient improving but quite confused. Will hold next treatment til next Monday. Cont to cross over to Zoloft  9/7 Improving will cont with spread out ECT to reudce severity of cog se of ECT will increase Zoloft and d/c Effexor Will rechallenge with CPAP  9/8 Patient improving with ECT next ECT 9/11 cont to titrate Zoloft. COnt encourage use of CPAP  9/11 patient showing improved impulse control, tolerating ECT, increase Zoloft to 125mg daily, will check BMP wed 9/12 more organized today, pleasant on approach, needs redirection to avoid touching peers on the shoulder   9/13: overall improvement in behaviors, needs redirection at times.next ECT friday.   9/14: ECT tomorrow, overall improved   9/15: next ECT monday, overall behaviors in control, redirectable, will keep patient on CO 11-7 since she had ECT today  9/18 Will change ECT to wednesday to space out better consider not doing further ECT after than unless very clear cut symptoms.  Plan resume Delapkote after las ECT, conisider moving Klonopin to later of split to minimize daytime sedation  9/19 Improving will give ECT in AM , peggy last ECT will startr Depakote after ect done  9/20 Improving likely last ECt will start Depakote back up cont other meds  9/21 Improved notably with course of ECT, will hold off on further rx, observe whetther any symptoms re appear as she clears from cog se of ECT  9/22 Mood and psychotic symptoms much improved. Will not give further ECT unless any symptoms return. Will see if cognition shows improvement with further time from ECT. Plan titrate Depakote.   9/25 Patient improved but some regression unclear if distress about d/c or true relpase will consider further ECT and increase in Latuda will review with family  9/26 Patient some regression since finishing ECT so are doing rescue ECT also increase Zoloft possible increase Latuda  9/27 Some re appearance of paranoia, cont longer course of ECT and will plan to increase Latuda  9/28 recrudescence of paranoia simmering down cont ECT spread out and increase Latuda, working on getting soft liner for CPA mask to protect nose  9/29 Improved after resumption of ECT Cont ECT 10/2 and consider increasing Latuda further if tolerating 60mg.   10/2 Improved paranoia, cont ECT plan further titration of Lurasidone   10/3 Maintaining gains, no return of paranoia. Will f/u on what she told family, will relay to ECT consider Versed if having distressing recall of ECT  10/4  Improved, cont ECT, plan increase Latuda, restart VPa after last ECT  10/5 Improving mood, no retrun of suspiciousnes, does not clear much between ECT. More accepting of help with ADL's. Plan next ECT in AM likely last one. Plan increase Ltuda after last ECT and restart Depakote  10/6 Improvined from ECT, confused , no unusual memories of treatment. Plan complete course of ECT will restart Depakote . Plan titration  of Latuda  10/7: Patient with some improvement from ECT, plan now for VPA and titration of lurasidone   10/9 Improved from ECT  still with sig cog impairment Cont to allow her to clear while planning increase in Lurasidone and VPA  10/10 Maintaining gains post ECT. Cont meds  except will increase Depakote. Scheduled for shower today will see if there is any hesitation. BP lower than usual if remains could lower BP meds  10/11 Maintaining gains no re appearance of paranoia manifest but resistiveness with meds and care nor any paranoid content . COnt meds but will increase Latuda to 80mg/d to optimize maintenance regimen  10/12 Patient more confused depsite time away from ECT wiill check labs as has had low NA in past check ua, reduce Latuda  10/13 Looks better with med reduction, labs okay. COnt current meds, check urine culture. Will change Famotidine to Protonix as famotidine has some anticholinergic effect

## 2025-05-30 NOTE — ED ADULT NURSE REASSESSMENT NOTE - NS ED NURSE REASSESS COMMENT FT1
Patient spoke with daughter Marcelina regarding patient's transfer to [facility] [General Appearance - Well Developed] : well developed [Normal Appearance] : normal appearance [Well Groomed] : well groomed [General Appearance - Well Nourished] : well nourished [No Deformities] : no deformities [General Appearance - In No Acute Distress] : no acute distress [Heart Rate And Rhythm] : heart rate was normal and rhythm regular [Heart Sounds] : normal S1 and S2 [Heart Sounds Gallop] : no gallops [Murmurs] : no murmurs [Heart Sounds Pericardial Friction Rub] : no pericardial rub [] : no respiratory distress [Auscultation Breath Sounds / Voice Sounds] : lungs were clear to auscultation bilaterally

## 2025-07-30 NOTE — PHYSICAL THERAPY INITIAL EVALUATION ADULT - GAIT DISTANCE, PT EVAL
----- Message from Veronica Robles MD sent at 7/30/2025  9:30 AM CDT -----  Stable. I will see her as scheduled. Continue diabetic diet and low fat diet.  
Spoke with patient regarding results below. Patient verbalized understanding, no further questions asked at this time.   
150 feet